# Patient Record
Sex: FEMALE | Race: WHITE | NOT HISPANIC OR LATINO | Employment: OTHER | URBAN - METROPOLITAN AREA
[De-identification: names, ages, dates, MRNs, and addresses within clinical notes are randomized per-mention and may not be internally consistent; named-entity substitution may affect disease eponyms.]

---

## 2017-01-12 ENCOUNTER — LAB (OUTPATIENT)
Dept: FAMILY MEDICINE CLINIC | Facility: CLINIC | Age: 82
End: 2017-01-12

## 2017-01-12 DIAGNOSIS — R79.89 ELEVATED SERUM CREATININE: ICD-10-CM

## 2017-01-12 LAB
ANION GAP SERPL CALCULATED.3IONS-SCNC: 14.5 MMOL/L
BUN BLD-MCNC: 15 MG/DL (ref 8–23)
BUN/CREAT SERPL: 14 (ref 7–25)
CALCIUM SPEC-SCNC: 9.8 MG/DL (ref 8.6–10.5)
CHLORIDE SERPL-SCNC: 100 MMOL/L (ref 98–107)
CO2 SERPL-SCNC: 25.5 MMOL/L (ref 22–29)
CREAT BLD-MCNC: 1.07 MG/DL (ref 0.57–1)
GFR SERPL CREATININE-BSD FRML MDRD: 49 ML/MIN/1.73
GLUCOSE BLD-MCNC: 91 MG/DL (ref 65–99)
POTASSIUM BLD-SCNC: 4.6 MMOL/L (ref 3.5–5.2)
SODIUM BLD-SCNC: 140 MMOL/L (ref 136–145)

## 2017-01-12 PROCEDURE — 80048 BASIC METABOLIC PNL TOTAL CA: CPT | Performed by: INTERNAL MEDICINE

## 2017-01-14 ENCOUNTER — LAB (OUTPATIENT)
Dept: FAMILY MEDICINE CLINIC | Facility: CLINIC | Age: 82
End: 2017-01-14

## 2017-01-14 DIAGNOSIS — R79.89 ELEVATED SERUM CREATININE: ICD-10-CM

## 2017-01-14 DIAGNOSIS — R80.9 PROTEIN IN URINE: ICD-10-CM

## 2017-01-15 DIAGNOSIS — R80.9 PROTEINURIA: Primary | ICD-10-CM

## 2017-01-15 LAB
CREAT 24H UR-MCNC: 28.6 MG/DL
CREAT 24H UR-MRATE: 858 MG/24 HR (ref 800–1800)
CREAT CL 24H UR+SERPL-VRATE: 60 ML/MIN (ref 88–128)
CREAT SERPL-MCNC: 0.99 MG/DL (ref 0.57–1)
PROT 24H UR-MRATE: 381 MG/24 HR (ref 30–150)
PROT UR-MCNC: 12.7 MG/DL

## 2017-01-23 ENCOUNTER — TELEPHONE (OUTPATIENT)
Dept: FAMILY MEDICINE CLINIC | Facility: CLINIC | Age: 82
End: 2017-01-23

## 2017-01-23 NOTE — TELEPHONE ENCOUNTER
RX SENT TO PHARM    ----- Message from Meena Guerrero sent at 1/23/2017  1:55 PM EST -----  Contact: PATIENT [phone]  REFILL ON glucose blood (FREESTYLE TEST STRIPS) test strip      [id number] - [place], [place] - [address] AT [facility] - [phone] [facility] [phone]  [phone] (Phone)

## 2017-02-02 ENCOUNTER — TELEPHONE (OUTPATIENT)
Dept: FAMILY MEDICINE CLINIC | Facility: CLINIC | Age: 82
End: 2017-02-02

## 2017-02-10 ENCOUNTER — TELEPHONE (OUTPATIENT)
Dept: FAMILY MEDICINE CLINIC | Facility: CLINIC | Age: 82
End: 2017-02-10

## 2017-02-10 NOTE — TELEPHONE ENCOUNTER
Pt informed of lab results    ----- Message from Claudia Timmons sent at 2/10/2017  3:27 PM EST -----  Contact: TELE: [phone]   SHE GOT A LETTER COLOR GOLD THAT IS THE CORRECT NUMBER YOU CAN LEAVE A MESSAGE ON HER ANSWER MACHINE.

## 2017-02-13 RX ORDER — AMLODIPINE BESYLATE 5 MG/1
TABLET ORAL
Qty: 180 TABLET | Refills: 0 | Status: SHIPPED | OUTPATIENT
Start: 2017-02-13 | End: 2017-05-13 | Stop reason: SDUPTHER

## 2017-03-14 RX ORDER — CYCLOBENZAPRINE HCL 5 MG
TABLET ORAL
Qty: 40 TABLET | Refills: 0 | OUTPATIENT
Start: 2017-03-14

## 2017-03-20 ENCOUNTER — TELEPHONE (OUTPATIENT)
Dept: FAMILY MEDICINE CLINIC | Facility: CLINIC | Age: 82
End: 2017-03-20

## 2017-03-20 RX ORDER — LEVOTHYROXINE SODIUM 0.07 MG/1
75 TABLET ORAL DAILY
Qty: 30 TABLET | Refills: 2 | Status: SHIPPED | OUTPATIENT
Start: 2017-03-20 | End: 2018-04-27 | Stop reason: SDUPTHER

## 2017-03-20 RX ORDER — CYCLOBENZAPRINE HCL 5 MG
TABLET ORAL
Qty: 40 TABLET | Refills: 0 | OUTPATIENT
Start: 2017-03-20

## 2017-03-20 NOTE — TELEPHONE ENCOUNTER
rx sent to pharm  Pt informed    ----- Message from Lavon Reyna sent at 3/20/2017  2:28 PM EDT -----  Contact: [phone]  ENT HAS BEEN GIVING HER SYNTHROID 75 MCG AND TOLD HER THE PRIMARY CARE CAN TAKE OVER MEDS SO CAN YOU REFIL THIS MED?    PHARMACY : [facility] [address] [facility]

## 2017-03-21 ENCOUNTER — TELEPHONE (OUTPATIENT)
Dept: FAMILY MEDICINE CLINIC | Facility: CLINIC | Age: 82
End: 2017-03-21

## 2017-03-21 NOTE — TELEPHONE ENCOUNTER
Gave pt appt with shira chan tomorrow    ----- Message from Malini Sanchez sent at 3/21/2017 11:12 AM EDT -----  Contact: -[phone]  PT HAS BLADDER INFECTION AND WANTS TO SEE SOMEONE TODAY OR AT LEAST COME IN TO LEAVE URINE. SHE IS MISERABLE. PLEASE CALL

## 2017-03-22 ENCOUNTER — OFFICE VISIT (OUTPATIENT)
Dept: FAMILY MEDICINE CLINIC | Facility: CLINIC | Age: 82
End: 2017-03-22

## 2017-03-22 VITALS
OXYGEN SATURATION: 99 % | HEIGHT: 61 IN | HEART RATE: 66 BPM | DIASTOLIC BLOOD PRESSURE: 56 MMHG | SYSTOLIC BLOOD PRESSURE: 114 MMHG | WEIGHT: 158.8 LBS | RESPIRATION RATE: 16 BRPM | TEMPERATURE: 97.6 F | BODY MASS INDEX: 29.98 KG/M2

## 2017-03-22 DIAGNOSIS — R30.0 DYSURIA: Primary | ICD-10-CM

## 2017-03-22 DIAGNOSIS — N30.01 ACUTE CYSTITIS WITH HEMATURIA: ICD-10-CM

## 2017-03-22 LAB
BACTERIA UR QL AUTO: ABNORMAL /HPF
BILIRUB UR QL STRIP: NEGATIVE
CLARITY UR: CLEAR
COLOR UR: YELLOW
GLUCOSE UR STRIP-MCNC: NEGATIVE MG/DL
HGB UR QL STRIP.AUTO: ABNORMAL
KETONES UR QL STRIP: NEGATIVE
LEUKOCYTE ESTERASE UR QL STRIP.AUTO: ABNORMAL
NITRITE UR QL STRIP: NEGATIVE
PH UR STRIP.AUTO: 6 [PH] (ref 4.6–8)
PROT UR QL STRIP: NEGATIVE
RBC # UR: ABNORMAL /HPF
REF LAB TEST METHOD: ABNORMAL
SP GR UR STRIP: 1.01 (ref 1–1.03)
SQUAMOUS #/AREA URNS HPF: ABNORMAL /HPF
UROBILINOGEN UR QL STRIP: ABNORMAL
WBC UR QL AUTO: ABNORMAL /HPF

## 2017-03-22 PROCEDURE — 87186 SC STD MICRODIL/AGAR DIL: CPT | Performed by: NURSE PRACTITIONER

## 2017-03-22 PROCEDURE — 81001 URINALYSIS AUTO W/SCOPE: CPT | Performed by: NURSE PRACTITIONER

## 2017-03-22 PROCEDURE — 87086 URINE CULTURE/COLONY COUNT: CPT | Performed by: NURSE PRACTITIONER

## 2017-03-22 PROCEDURE — 99213 OFFICE O/P EST LOW 20 MIN: CPT | Performed by: NURSE PRACTITIONER

## 2017-03-22 RX ORDER — AMOXICILLIN AND CLAVULANATE POTASSIUM 875; 125 MG/1; MG/1
1 TABLET, FILM COATED ORAL 2 TIMES DAILY
Qty: 14 TABLET | Refills: 0 | Status: SHIPPED | OUTPATIENT
Start: 2017-03-22 | End: 2017-03-24

## 2017-03-22 NOTE — PROGRESS NOTES
Subjective   Geeta Butt is a 81 y.o. female.     History of Present Illness   C.o dysuria, and cloudy urine, she has a hx of proteinuria, she saw nephrologist Dr. Benitez about 2 months for this, she was told to stop advil, and f/u in about 2 months for recheck. She states her current symptoms started a couple of days ago, she noticed urinary frequency initially, then pain with urination. She denies hematuria, She denies vag d/c and bleeding. She denies fever.     The following portions of the patient's history were reviewed and updated as appropriate: allergies, current medications, past family history, past medical history, past social history, past surgical history and problem list.    Review of Systems   Constitutional: Negative for chills, diaphoresis and fever.   Respiratory: Negative for shortness of breath.    Cardiovascular: Negative for chest pain.   Genitourinary: Positive for dysuria, frequency and pelvic pain. Negative for decreased urine volume, difficulty urinating, flank pain, hematuria, menstrual problem, urgency, vaginal bleeding, vaginal discharge and vaginal pain.   Musculoskeletal: Negative for arthralgias, back pain and myalgias.   Skin: Negative for pallor.   Neurological: Negative for dizziness, light-headedness and headaches.   All other systems reviewed and are negative.      Objective   Physical Exam   Constitutional: She is oriented to person, place, and time. She appears well-developed and well-nourished.   HENT:   Head: Normocephalic.   Eyes: Pupils are equal, round, and reactive to light.   Neck: Neck supple.   Cardiovascular: Normal rate, regular rhythm and normal heart sounds.    Pulmonary/Chest: Effort normal and breath sounds normal.   Abdominal: There is no CVA tenderness.   Musculoskeletal: Normal range of motion.   Neurological: She is alert and oriented to person, place, and time.   Skin: Skin is warm and dry.   Psychiatric: She has a normal mood and affect. Her behavior is  normal. Judgment and thought content normal.   Nursing note and vitals reviewed.      Assessment/Plan   Geeta was seen today for urinary tract infection.    Diagnoses and all orders for this visit:    Dysuria  -     Urinalysis With Microscopic  -     Urinalysis  -     Urinalysis, Microscopic Only        Allergy list noted.  Start augmentin 875-125mg 2x day for 7 days, take with food, previously tolerated.  Drink plenty of H2O, wipe front to back after urination.   Avoid bladder irritants- coffee, caffeine, carbonation.  Urine sent for culture, will call with results.  Increase fluid intake, get plenty of rest.   She will repeat the UA in 2-3 weeks, lab only if symptoms resolved.  Patient agrees with plan of care and understands instructions. Call if worsening symptoms or any problems or concerns.

## 2017-03-22 NOTE — PATIENT INSTRUCTIONS
Allergy list noted.  Start augmentin 875-125mg 2x day for 7 days, take with food, previously tolerated.  Drink plenty of H2O, wipe front to back after urination.   Avoid bladder irritants- coffee, caffeine, carbonation.  Urine sent for culture, will call with results.  Increase fluid intake, get plenty of rest.   She will repeat the UA in 2-3 weeks, lab only if symptoms resolved.  Patient agrees with plan of care and understands instructions. Call if worsening symptoms or any problems or concerns.

## 2017-03-25 LAB — BACTERIA SPEC AEROBE CULT: ABNORMAL

## 2017-03-27 RX ORDER — DOXYCYCLINE HYCLATE 100 MG
100 TABLET ORAL 2 TIMES DAILY
Qty: 14 TABLET | Refills: 0 | Status: SHIPPED | OUTPATIENT
Start: 2017-03-27 | End: 2017-10-16

## 2017-05-13 RX ORDER — AMLODIPINE BESYLATE 5 MG/1
TABLET ORAL
Qty: 180 TABLET | Refills: 0 | Status: SHIPPED | OUTPATIENT
Start: 2017-05-13 | End: 2017-08-11 | Stop reason: SDUPTHER

## 2017-05-31 RX ORDER — DEXLANSOPRAZOLE 60 MG/1
CAPSULE, DELAYED RELEASE ORAL
Qty: 90 CAPSULE | Refills: 1 | Status: SHIPPED | OUTPATIENT
Start: 2017-05-31 | End: 2017-10-16

## 2017-06-28 RX ORDER — FLUOXETINE 10 MG/1
CAPSULE ORAL
Qty: 135 CAPSULE | Refills: 1 | Status: SHIPPED | OUTPATIENT
Start: 2017-06-28 | End: 2017-12-13 | Stop reason: SDUPTHER

## 2017-08-11 RX ORDER — AMLODIPINE BESYLATE 5 MG/1
TABLET ORAL
Qty: 180 TABLET | Refills: 0 | Status: SHIPPED | OUTPATIENT
Start: 2017-08-11 | End: 2017-11-10 | Stop reason: SDUPTHER

## 2017-09-28 RX ORDER — ERGOCALCIFEROL 1.25 MG/1
CAPSULE ORAL
Qty: 12 CAPSULE | Refills: 0 | Status: SHIPPED | OUTPATIENT
Start: 2017-09-28 | End: 2017-10-16

## 2017-10-15 ENCOUNTER — HOSPITAL ENCOUNTER (EMERGENCY)
Facility: HOSPITAL | Age: 82
Discharge: HOME | End: 2017-10-15
Attending: EMERGENCY MEDICINE | Admitting: EMERGENCY MEDICINE

## 2017-10-15 VITALS
OXYGEN SATURATION: 96 % | TEMPERATURE: 97.8 F | RESPIRATION RATE: 16 BRPM | BODY MASS INDEX: 27.05 KG/M2 | SYSTOLIC BLOOD PRESSURE: 138 MMHG | HEIGHT: 62 IN | WEIGHT: 147 LBS | DIASTOLIC BLOOD PRESSURE: 71 MMHG | HEART RATE: 75 BPM

## 2017-10-15 DIAGNOSIS — K59.1 FUNCTIONAL DIARRHEA: Primary | ICD-10-CM

## 2017-10-15 LAB
ABO GROUP BLD: NORMAL
ALBUMIN SERPL-MCNC: 4.2 G/DL (ref 3.5–5.2)
ALBUMIN/GLOB SERPL: 1.2 G/DL
ALP SERPL-CCNC: 73 U/L (ref 39–117)
ALT SERPL W P-5'-P-CCNC: 16 U/L (ref 1–33)
ANION GAP SERPL CALCULATED.3IONS-SCNC: 16.1 MMOL/L
AST SERPL-CCNC: 18 U/L (ref 1–32)
BASOPHILS # BLD AUTO: 0.06 10*3/MM3 (ref 0–0.2)
BASOPHILS NFR BLD AUTO: 0.5 % (ref 0–1.5)
BILIRUB SERPL-MCNC: 0.3 MG/DL (ref 0.1–1.2)
BLD GP AB SCN SERPL QL: NEGATIVE
BUN BLD-MCNC: 14 MG/DL (ref 8–23)
BUN/CREAT SERPL: 12 (ref 7–25)
CALCIUM SPEC-SCNC: 9.4 MG/DL (ref 8.6–10.5)
CHLORIDE SERPL-SCNC: 102 MMOL/L (ref 98–107)
CO2 SERPL-SCNC: 22.9 MMOL/L (ref 22–29)
CREAT BLD-MCNC: 1.17 MG/DL (ref 0.57–1)
DEPRECATED RDW RBC AUTO: 44.7 FL (ref 37–54)
EOSINOPHIL # BLD AUTO: 0.1 10*3/MM3 (ref 0–0.7)
EOSINOPHIL NFR BLD AUTO: 0.8 % (ref 0.3–6.2)
ERYTHROCYTE [DISTWIDTH] IN BLOOD BY AUTOMATED COUNT: 13.9 % (ref 11.7–13)
GFR SERPL CREATININE-BSD FRML MDRD: 44 ML/MIN/1.73
GLOBULIN UR ELPH-MCNC: 3.6 GM/DL
GLUCOSE BLD-MCNC: 97 MG/DL (ref 65–99)
HCT VFR BLD AUTO: 38.4 % (ref 35.6–45.5)
HGB BLD-MCNC: 12.7 G/DL (ref 11.9–15.5)
HOLD SPECIMEN: NORMAL
HOLD SPECIMEN: NORMAL
IMM GRANULOCYTES # BLD: 0.04 10*3/MM3 (ref 0–0.03)
IMM GRANULOCYTES NFR BLD: 0.3 % (ref 0–0.5)
LYMPHOCYTES # BLD AUTO: 2.65 10*3/MM3 (ref 0.9–4.8)
LYMPHOCYTES NFR BLD AUTO: 21.1 % (ref 19.6–45.3)
MCH RBC QN AUTO: 28.9 PG (ref 26.9–32)
MCHC RBC AUTO-ENTMCNC: 33.1 G/DL (ref 32.4–36.3)
MCV RBC AUTO: 87.3 FL (ref 80.5–98.2)
MONOCYTES # BLD AUTO: 0.92 10*3/MM3 (ref 0.2–1.2)
MONOCYTES NFR BLD AUTO: 7.3 % (ref 5–12)
NEUTROPHILS # BLD AUTO: 8.8 10*3/MM3 (ref 1.9–8.1)
NEUTROPHILS NFR BLD AUTO: 70 % (ref 42.7–76)
PLATELET # BLD AUTO: 391 10*3/MM3 (ref 140–500)
PMV BLD AUTO: 10.3 FL (ref 6–12)
POTASSIUM BLD-SCNC: 4.7 MMOL/L (ref 3.5–5.2)
PROT SERPL-MCNC: 7.8 G/DL (ref 6–8.5)
RBC # BLD AUTO: 4.4 10*6/MM3 (ref 3.9–5.2)
RH BLD: POSITIVE
SODIUM BLD-SCNC: 141 MMOL/L (ref 136–145)
WBC NRBC COR # BLD: 12.57 10*3/MM3 (ref 4.5–10.7)
WHOLE BLOOD HOLD SPECIMEN: NORMAL
WHOLE BLOOD HOLD SPECIMEN: NORMAL

## 2017-10-15 RX ORDER — SODIUM CHLORIDE 0.9 % (FLUSH) 0.9 %
10 SYRINGE (ML) INJECTION AS NEEDED
Status: DISCONTINUED | OUTPATIENT
Start: 2017-10-15 | End: 2017-10-15 | Stop reason: HOSPADM

## 2017-10-16 ENCOUNTER — HOSPITAL ENCOUNTER (INPATIENT)
Facility: HOSPITAL | Age: 82
LOS: 4 days | Discharge: HOME | End: 2017-10-20
Attending: HOSPITALIST | Admitting: HOSPITALIST

## 2017-10-16 ENCOUNTER — OFFICE VISIT (OUTPATIENT)
Dept: FAMILY MEDICINE CLINIC | Facility: CLINIC | Age: 82
End: 2017-10-16

## 2017-10-16 ENCOUNTER — APPOINTMENT (OUTPATIENT)
Dept: CT IMAGING | Facility: HOSPITAL | Age: 82
End: 2017-10-16
Attending: HOSPITALIST

## 2017-10-16 VITALS
BODY MASS INDEX: 27.82 KG/M2 | WEIGHT: 151.2 LBS | HEIGHT: 62 IN | SYSTOLIC BLOOD PRESSURE: 138 MMHG | DIASTOLIC BLOOD PRESSURE: 60 MMHG | TEMPERATURE: 97.8 F | HEART RATE: 77 BPM | OXYGEN SATURATION: 97 %

## 2017-10-16 DIAGNOSIS — R11.2 NAUSEA AND VOMITING, INTRACTABILITY OF VOMITING NOT SPECIFIED, UNSPECIFIED VOMITING TYPE: ICD-10-CM

## 2017-10-16 DIAGNOSIS — K52.9 GASTROENTERITIS: ICD-10-CM

## 2017-10-16 DIAGNOSIS — R10.10 PAIN OF UPPER ABDOMEN: Primary | ICD-10-CM

## 2017-10-16 DIAGNOSIS — R19.5 DARK STOOLS: ICD-10-CM

## 2017-10-16 DIAGNOSIS — K92.1 BLACK STOOL: Primary | ICD-10-CM

## 2017-10-16 PROBLEM — E03.9 HYPOTHYROIDISM: Status: ACTIVE | Noted: 2017-10-16

## 2017-10-16 PROBLEM — E11.9 DIABETES MELLITUS (HCC): Status: ACTIVE | Noted: 2017-10-16

## 2017-10-16 PROBLEM — R19.7 DIARRHEA: Status: ACTIVE | Noted: 2017-10-16

## 2017-10-16 PROBLEM — D72.829 LEUKOCYTOSIS: Status: ACTIVE | Noted: 2017-10-16

## 2017-10-16 PROBLEM — R10.11 RUQ PAIN: Status: ACTIVE | Noted: 2017-10-16

## 2017-10-16 PROBLEM — I10 HYPERTENSION: Status: ACTIVE | Noted: 2017-10-16

## 2017-10-16 LAB
BACTERIA UR QL AUTO: ABNORMAL /HPF
BILIRUB UR QL STRIP: NEGATIVE
CLARITY UR: ABNORMAL
COLOR UR: ABNORMAL
DEVELOPER EXPIRATION DATE: 819
DEVELOPER LOT NUMBER: ABNORMAL
ERYTHROCYTE [DISTWIDTH] IN BLOOD BY AUTOMATED COUNT: 13.1 % (ref 4.5–15)
EXPIRATION DATE: 919
FECAL OCCULT BLOOD SCREEN, POC: POSITIVE
GLUCOSE BLDC GLUCOMTR-MCNC: 154 MG/DL (ref 70–130)
GLUCOSE UR STRIP-MCNC: NEGATIVE MG/DL
HBA1C MFR BLD: 5.6 % (ref 4.8–5.6)
HCT VFR BLD AUTO: 38.3 % (ref 35.6–45.5)
HCT VFR BLD AUTO: 38.9 % (ref 31–42)
HGB BLD-MCNC: 12.7 G/DL (ref 11.9–15.5)
HGB BLD-MCNC: 13.1 G/DL (ref 12–18)
HGB UR QL STRIP.AUTO: NEGATIVE
HYALINE CASTS UR QL AUTO: ABNORMAL /LPF
KETONES UR QL STRIP: NEGATIVE
LEUKOCYTE ESTERASE UR QL STRIP.AUTO: ABNORMAL
LYMPHOCYTES # BLD AUTO: 1.6 10*3/MM3 (ref 1.2–3.4)
LYMPHOCYTES NFR BLD AUTO: 9.6 % (ref 21–51)
Lab: ABNORMAL
MCH RBC QN AUTO: 29.1 PG (ref 26.1–33.1)
MCHC RBC AUTO-ENTMCNC: 33.6 G/DL (ref 33–37)
MCV RBC AUTO: 86.7 FL (ref 80–99)
MONOCYTES # BLD AUTO: 0.5 10*3/MM3 (ref 0.1–0.6)
MONOCYTES NFR BLD AUTO: 3.1 % (ref 2–9)
NEGATIVE CONTROL: POSITIVE
NEUTROPHILS # BLD AUTO: 14.3 10*3/MM3 (ref 1.4–6.5)
NEUTROPHILS NFR BLD AUTO: 87.3 % (ref 42–75)
NITRITE UR QL STRIP: POSITIVE
PH UR STRIP.AUTO: 5.5 [PH] (ref 5–8)
PLATELET # BLD AUTO: 363 10*3/MM3 (ref 150–450)
PMV BLD AUTO: 7.7 FL (ref 7.1–10.5)
POSITIVE CONTROL: POSITIVE
PROT UR QL STRIP: ABNORMAL
RBC # BLD AUTO: 4.48 10*6/MM3 (ref 4–6)
RBC # UR: ABNORMAL /HPF
REF LAB TEST METHOD: ABNORMAL
SP GR UR STRIP: 1.02 (ref 1–1.03)
SQUAMOUS #/AREA URNS HPF: ABNORMAL /HPF
UROBILINOGEN UR QL STRIP: ABNORMAL
WBC NRBC COR # BLD: 16.4 10*3/MM3 (ref 4.5–10)
WBC UR QL AUTO: ABNORMAL /HPF

## 2017-10-16 PROCEDURE — 74176 CT ABD & PELVIS W/O CONTRAST: CPT

## 2017-10-16 PROCEDURE — 85025 COMPLETE CBC W/AUTO DIFF WBC: CPT | Performed by: INTERNAL MEDICINE

## 2017-10-16 PROCEDURE — 99213 OFFICE O/P EST LOW 20 MIN: CPT | Performed by: INTERNAL MEDICINE

## 2017-10-16 PROCEDURE — 87186 SC STD MICRODIL/AGAR DIL: CPT | Performed by: HOSPITALIST

## 2017-10-16 PROCEDURE — 81001 URINALYSIS AUTO W/SCOPE: CPT | Performed by: HOSPITALIST

## 2017-10-16 PROCEDURE — 85018 HEMOGLOBIN: CPT | Performed by: HOSPITALIST

## 2017-10-16 PROCEDURE — G0378 HOSPITAL OBSERVATION PER HR: HCPCS

## 2017-10-16 PROCEDURE — 82962 GLUCOSE BLOOD TEST: CPT

## 2017-10-16 PROCEDURE — 82274 ASSAY TEST FOR BLOOD FECAL: CPT | Performed by: INTERNAL MEDICINE

## 2017-10-16 PROCEDURE — 83036 HEMOGLOBIN GLYCOSYLATED A1C: CPT | Performed by: HOSPITALIST

## 2017-10-16 PROCEDURE — 25010000002 MORPHINE PER 10 MG: Performed by: HOSPITALIST

## 2017-10-16 PROCEDURE — 85014 HEMATOCRIT: CPT | Performed by: HOSPITALIST

## 2017-10-16 PROCEDURE — 25010000002 ONDANSETRON PER 1 MG: Performed by: HOSPITALIST

## 2017-10-16 PROCEDURE — 87086 URINE CULTURE/COLONY COUNT: CPT | Performed by: HOSPITALIST

## 2017-10-16 RX ORDER — DEXTROSE MONOHYDRATE 25 G/50ML
25 INJECTION, SOLUTION INTRAVENOUS
Status: DISCONTINUED | OUTPATIENT
Start: 2017-10-16 | End: 2017-10-20 | Stop reason: HOSPADM

## 2017-10-16 RX ORDER — ONDANSETRON 4 MG/1
4 TABLET, ORALLY DISINTEGRATING ORAL EVERY 6 HOURS PRN
Status: DISCONTINUED | OUTPATIENT
Start: 2017-10-16 | End: 2017-10-20 | Stop reason: HOSPADM

## 2017-10-16 RX ORDER — SODIUM CHLORIDE 5 %
1 OINTMENT (GRAM) OPHTHALMIC (EYE) AS NEEDED
COMMUNITY
End: 2018-03-14

## 2017-10-16 RX ORDER — CETIRIZINE HYDROCHLORIDE 10 MG/1
5 TABLET ORAL DAILY
Status: DISCONTINUED | OUTPATIENT
Start: 2017-10-16 | End: 2017-10-20 | Stop reason: HOSPADM

## 2017-10-16 RX ORDER — SODIUM CHLORIDE 9 MG/ML
60 INJECTION, SOLUTION INTRAVENOUS CONTINUOUS
Status: DISCONTINUED | OUTPATIENT
Start: 2017-10-16 | End: 2017-10-18

## 2017-10-16 RX ORDER — NICOTINE POLACRILEX 4 MG
15 LOZENGE BUCCAL
Status: DISCONTINUED | OUTPATIENT
Start: 2017-10-16 | End: 2017-10-20 | Stop reason: HOSPADM

## 2017-10-16 RX ORDER — AMLODIPINE BESYLATE 5 MG/1
5 TABLET ORAL
Status: DISCONTINUED | OUTPATIENT
Start: 2017-10-16 | End: 2017-10-20 | Stop reason: HOSPADM

## 2017-10-16 RX ORDER — LEVOTHYROXINE SODIUM 0.07 MG/1
75 TABLET ORAL DAILY
Status: DISCONTINUED | OUTPATIENT
Start: 2017-10-16 | End: 2017-10-20 | Stop reason: HOSPADM

## 2017-10-16 RX ORDER — FLUOXETINE 10 MG/1
10 CAPSULE ORAL DAILY
Status: DISCONTINUED | OUTPATIENT
Start: 2017-10-16 | End: 2017-10-20 | Stop reason: HOSPADM

## 2017-10-16 RX ORDER — ONDANSETRON 2 MG/ML
4 INJECTION INTRAMUSCULAR; INTRAVENOUS EVERY 6 HOURS PRN
Status: DISCONTINUED | OUTPATIENT
Start: 2017-10-16 | End: 2017-10-20 | Stop reason: HOSPADM

## 2017-10-16 RX ORDER — KETOTIFEN FUMARATE 0.35 MG/ML
1 SOLUTION/ DROPS OPHTHALMIC 2 TIMES DAILY
COMMUNITY
End: 2018-04-05

## 2017-10-16 RX ORDER — FEXOFENADINE HYDROCHLORIDE 60 MG/1
60 TABLET, FILM COATED ORAL DAILY
Status: DISCONTINUED | OUTPATIENT
Start: 2017-10-16 | End: 2017-10-16 | Stop reason: CLARIF

## 2017-10-16 RX ORDER — FLUOROMETHOLONE 0.1 %
1 SUSPENSION, DROPS(FINAL DOSAGE FORM)(ML) OPHTHALMIC (EYE) DAILY
Status: DISCONTINUED | OUTPATIENT
Start: 2017-10-16 | End: 2017-10-17

## 2017-10-16 RX ORDER — SODIUM CHLORIDE 0.9 % (FLUSH) 0.9 %
1-10 SYRINGE (ML) INJECTION AS NEEDED
Status: DISCONTINUED | OUTPATIENT
Start: 2017-10-16 | End: 2017-10-20 | Stop reason: HOSPADM

## 2017-10-16 RX ORDER — MORPHINE SULFATE 2 MG/ML
1 INJECTION, SOLUTION INTRAMUSCULAR; INTRAVENOUS
Status: DISCONTINUED | OUTPATIENT
Start: 2017-10-16 | End: 2017-10-20 | Stop reason: HOSPADM

## 2017-10-16 RX ADMIN — ONDANSETRON 4 MG: 2 INJECTION INTRAMUSCULAR; INTRAVENOUS at 14:11

## 2017-10-16 RX ADMIN — SODIUM CHLORIDE 60 ML/HR: 9 INJECTION, SOLUTION INTRAVENOUS at 14:12

## 2017-10-16 RX ADMIN — ONDANSETRON 4 MG: 2 INJECTION INTRAMUSCULAR; INTRAVENOUS at 23:31

## 2017-10-16 RX ADMIN — FLUOXETINE HYDROCHLORIDE 10 MG: 10 CAPSULE ORAL at 16:48

## 2017-10-16 RX ADMIN — MORPHINE SULFATE 1 MG: 2 INJECTION, SOLUTION INTRAMUSCULAR; INTRAVENOUS at 16:52

## 2017-10-16 RX ADMIN — AMLODIPINE BESYLATE 5 MG: 5 TABLET ORAL at 16:45

## 2017-10-16 RX ADMIN — MORPHINE SULFATE 1 MG: 2 INJECTION, SOLUTION INTRAMUSCULAR; INTRAVENOUS at 23:31

## 2017-10-16 NOTE — PROGRESS NOTES
Subjective   Geeta Butt is a 82 y.o. female.   Diarrhea then vomiting also fri some mild recurrent discomfort right upper quadrant black stools began Saturday  History of Present Illness   Stools are black in color no gross blood mucus patient did have a little Pepto-Bismol she thinks Saturday no personal history for ulcer no increase NSAID use seen ER without particular findings and hemoglobin.  Normal at that time    Review of Systems   Gastrointestinal: Positive for abdominal pain, diarrhea and nausea.   All other systems reviewed and are negative.      Objective   Vitals:    10/16/17 0758   BP: 140/66   Pulse: 90   Temp: 97.8 °F (36.6 °C)   SpO2: 97%   Weight: 151 lb 3.2 oz (68.6 kg)     Physical Exam   Constitutional: She appears well-developed and well-nourished.   HENT:   Head: Normocephalic and atraumatic.   Eyes: Conjunctivae are normal. Pupils are equal, round, and reactive to light.   Cardiovascular: Normal rate, regular rhythm and normal heart sounds.    Pulmonary/Chest: Effort normal and breath sounds normal.   Abdominal: Soft. Bowel sounds are normal.   Mild discomfort present right upper quadrant, left upper quadrant to lesser degree in epigastrium slightly increased bowel sounds which would go along with her diarrhea type complaints.  Prior cholecystectomy   Neurological: She is alert.   Steady gait unremarkable   Skin: Skin is warm and dry.       No results found for: INR    Procedures  Orthostatic vital signs  Assessment/Plan   Upper GI bleed plan to ER now nothing by mouth orthostatics did fairly well CBC shows mild leukocytosis but hemoglobin remained stable.    Much of this encounter note is an electronic transcription/translation of spoken language to printed text.  The electronic translation of spoken language may permit erroneous, or at times, nonsensical words or phrases to be inadvertently transcribed.  Although I have reviewed the note for such errors, some may still exist.

## 2017-10-17 ENCOUNTER — INPATIENT HOSPITAL (OUTPATIENT)
Dept: URBAN - METROPOLITAN AREA HOSPITAL 113 | Facility: HOSPITAL | Age: 82
End: 2017-10-17
Payer: MEDICARE

## 2017-10-17 ENCOUNTER — ANESTHESIA (OUTPATIENT)
Dept: GASTROENTEROLOGY | Facility: HOSPITAL | Age: 82
End: 2017-10-17

## 2017-10-17 ENCOUNTER — ANESTHESIA EVENT (OUTPATIENT)
Dept: GASTROENTEROLOGY | Facility: HOSPITAL | Age: 82
End: 2017-10-17

## 2017-10-17 DIAGNOSIS — K29.50 UNSPECIFIED CHRONIC GASTRITIS WITHOUT BLEEDING: ICD-10-CM

## 2017-10-17 DIAGNOSIS — K29.80 DUODENITIS WITHOUT BLEEDING: ICD-10-CM

## 2017-10-17 DIAGNOSIS — R93.3 ABNORMAL FINDINGS ON DIAGNOSTIC IMAGING OF OTHER PARTS OF DI: ICD-10-CM

## 2017-10-17 DIAGNOSIS — R11.2 NAUSEA WITH VOMITING, UNSPECIFIED: ICD-10-CM

## 2017-10-17 PROBLEM — N39.0 UTI (URINARY TRACT INFECTION): Status: ACTIVE | Noted: 2017-10-17

## 2017-10-17 LAB
AMYLASE SERPL-CCNC: 84 U/L (ref 28–100)
ANION GAP SERPL CALCULATED.3IONS-SCNC: 11.9 MMOL/L
BUN BLD-MCNC: 18 MG/DL (ref 8–23)
BUN/CREAT SERPL: 18.4 (ref 7–25)
CALCIUM SPEC-SCNC: 8.7 MG/DL (ref 8.6–10.5)
CHLORIDE SERPL-SCNC: 102 MMOL/L (ref 98–107)
CO2 SERPL-SCNC: 23.1 MMOL/L (ref 22–29)
CREAT BLD-MCNC: 0.98 MG/DL (ref 0.57–1)
DEPRECATED RDW RBC AUTO: 45.1 FL (ref 37–54)
ERYTHROCYTE [DISTWIDTH] IN BLOOD BY AUTOMATED COUNT: 13.8 % (ref 11.7–13)
GFR SERPL CREATININE-BSD FRML MDRD: 54 ML/MIN/1.73
GLUCOSE BLD-MCNC: 124 MG/DL (ref 65–99)
GLUCOSE BLDC GLUCOMTR-MCNC: 103 MG/DL (ref 70–130)
GLUCOSE BLDC GLUCOMTR-MCNC: 105 MG/DL (ref 70–130)
GLUCOSE BLDC GLUCOMTR-MCNC: 114 MG/DL (ref 70–130)
GLUCOSE BLDC GLUCOMTR-MCNC: 114 MG/DL (ref 70–130)
GLUCOSE BLDC GLUCOMTR-MCNC: 120 MG/DL (ref 70–130)
HCT VFR BLD AUTO: 36.1 % (ref 35.6–45.5)
HCT VFR BLD AUTO: 37.7 % (ref 35.6–45.5)
HGB BLD-MCNC: 11.7 G/DL (ref 11.9–15.5)
HGB BLD-MCNC: 12 G/DL (ref 11.9–15.5)
LIPASE SERPL-CCNC: 120 U/L (ref 13–60)
MCH RBC QN AUTO: 28.6 PG (ref 26.9–32)
MCHC RBC AUTO-ENTMCNC: 31.8 G/DL (ref 32.4–36.3)
MCV RBC AUTO: 89.8 FL (ref 80.5–98.2)
PLATELET # BLD AUTO: 326 10*3/MM3 (ref 140–500)
PMV BLD AUTO: 10.3 FL (ref 6–12)
POTASSIUM BLD-SCNC: 3.9 MMOL/L (ref 3.5–5.2)
RBC # BLD AUTO: 4.2 10*6/MM3 (ref 3.9–5.2)
SODIUM BLD-SCNC: 137 MMOL/L (ref 136–145)
WBC NRBC COR # BLD: 12.74 10*3/MM3 (ref 4.5–10.7)

## 2017-10-17 PROCEDURE — 25010000002 PIPERACILLIN SOD-TAZOBACTAM PER 1 G: Performed by: HOSPITALIST

## 2017-10-17 PROCEDURE — 88312 SPECIAL STAINS GROUP 1: CPT | Performed by: INTERNAL MEDICINE

## 2017-10-17 PROCEDURE — 85027 COMPLETE CBC AUTOMATED: CPT | Performed by: HOSPITALIST

## 2017-10-17 PROCEDURE — G0378 HOSPITAL OBSERVATION PER HR: HCPCS

## 2017-10-17 PROCEDURE — 80048 BASIC METABOLIC PNL TOTAL CA: CPT | Performed by: HOSPITALIST

## 2017-10-17 PROCEDURE — 25010000002 ONDANSETRON PER 1 MG: Performed by: HOSPITALIST

## 2017-10-17 PROCEDURE — 0DD98ZX EXTRACTION OF DUODENUM, VIA NATURAL OR ARTIFICIAL OPENING ENDOSCOPIC, DIAGNOSTIC: ICD-10-PCS | Performed by: INTERNAL MEDICINE

## 2017-10-17 PROCEDURE — 85018 HEMOGLOBIN: CPT | Performed by: HOSPITALIST

## 2017-10-17 PROCEDURE — 85014 HEMATOCRIT: CPT | Performed by: HOSPITALIST

## 2017-10-17 PROCEDURE — 25010000002 PROMETHAZINE PER 50 MG: Performed by: INTERNAL MEDICINE

## 2017-10-17 PROCEDURE — 43239 EGD BIOPSY SINGLE/MULTIPLE: CPT | Performed by: INTERNAL MEDICINE

## 2017-10-17 PROCEDURE — 82150 ASSAY OF AMYLASE: CPT | Performed by: INTERNAL MEDICINE

## 2017-10-17 PROCEDURE — 83690 ASSAY OF LIPASE: CPT | Performed by: INTERNAL MEDICINE

## 2017-10-17 PROCEDURE — 82962 GLUCOSE BLOOD TEST: CPT

## 2017-10-17 PROCEDURE — 25010000002 MORPHINE PER 10 MG: Performed by: HOSPITALIST

## 2017-10-17 PROCEDURE — 25010000002 PROPOFOL 10 MG/ML EMULSION: Performed by: ANESTHESIOLOGY

## 2017-10-17 PROCEDURE — 88305 TISSUE EXAM BY PATHOLOGIST: CPT | Performed by: INTERNAL MEDICINE

## 2017-10-17 RX ORDER — PROPOFOL 10 MG/ML
VIAL (ML) INTRAVENOUS AS NEEDED
Status: DISCONTINUED | OUTPATIENT
Start: 2017-10-17 | End: 2017-10-17 | Stop reason: SURG

## 2017-10-17 RX ORDER — LIDOCAINE HYDROCHLORIDE 20 MG/ML
INJECTION, SOLUTION INFILTRATION; PERINEURAL AS NEEDED
Status: DISCONTINUED | OUTPATIENT
Start: 2017-10-17 | End: 2017-10-17 | Stop reason: SURG

## 2017-10-17 RX ORDER — SUCRALFATE ORAL 1 G/10ML
1 SUSPENSION ORAL EVERY 6 HOURS SCHEDULED
Status: DISCONTINUED | OUTPATIENT
Start: 2017-10-17 | End: 2017-10-20 | Stop reason: HOSPADM

## 2017-10-17 RX ORDER — FLUOROMETHOLONE 0.1 %
1 SUSPENSION, DROPS(FINAL DOSAGE FORM)(ML) OPHTHALMIC (EYE) NIGHTLY
Status: DISCONTINUED | OUTPATIENT
Start: 2017-10-17 | End: 2017-10-20 | Stop reason: HOSPADM

## 2017-10-17 RX ORDER — CEFTRIAXONE SODIUM 1 G/50ML
1 INJECTION, SOLUTION INTRAVENOUS EVERY 24 HOURS
Status: DISCONTINUED | OUTPATIENT
Start: 2017-10-17 | End: 2017-10-17

## 2017-10-17 RX ORDER — PANTOPRAZOLE SODIUM 40 MG/10ML
40 INJECTION, POWDER, LYOPHILIZED, FOR SOLUTION INTRAVENOUS
Status: DISCONTINUED | OUTPATIENT
Start: 2017-10-17 | End: 2017-10-18

## 2017-10-17 RX ORDER — SODIUM CHLORIDE, SODIUM LACTATE, POTASSIUM CHLORIDE, CALCIUM CHLORIDE 600; 310; 30; 20 MG/100ML; MG/100ML; MG/100ML; MG/100ML
20 INJECTION, SOLUTION INTRAVENOUS CONTINUOUS
Status: DISCONTINUED | OUTPATIENT
Start: 2017-10-17 | End: 2017-10-20 | Stop reason: HOSPADM

## 2017-10-17 RX ORDER — PROPOFOL 10 MG/ML
VIAL (ML) INTRAVENOUS CONTINUOUS PRN
Status: DISCONTINUED | OUTPATIENT
Start: 2017-10-17 | End: 2017-10-17 | Stop reason: SURG

## 2017-10-17 RX ORDER — PROMETHAZINE HYDROCHLORIDE 25 MG/ML
12.5 INJECTION, SOLUTION INTRAMUSCULAR; INTRAVENOUS ONCE
Status: COMPLETED | OUTPATIENT
Start: 2017-10-17 | End: 2017-10-17

## 2017-10-17 RX ADMIN — MORPHINE SULFATE 1 MG: 2 INJECTION, SOLUTION INTRAMUSCULAR; INTRAVENOUS at 13:56

## 2017-10-17 RX ADMIN — TAZOBACTAM SODIUM AND PIPERACILLIN SODIUM 3.38 G: 375; 3 INJECTION, SOLUTION INTRAVENOUS at 09:37

## 2017-10-17 RX ADMIN — PROPOFOL 100 MG: 10 INJECTION, EMULSION INTRAVENOUS at 12:07

## 2017-10-17 RX ADMIN — SUCRALFATE 1 G: 1 SUSPENSION ORAL at 15:02

## 2017-10-17 RX ADMIN — MORPHINE SULFATE 1 MG: 2 INJECTION, SOLUTION INTRAMUSCULAR; INTRAVENOUS at 06:41

## 2017-10-17 RX ADMIN — PANTOPRAZOLE SODIUM 40 MG: 40 INJECTION, POWDER, FOR SOLUTION INTRAVENOUS at 16:40

## 2017-10-17 RX ADMIN — FLUOROMETHOLONE 1 DROP: 1 SOLUTION/ DROPS OPHTHALMIC at 20:39

## 2017-10-17 RX ADMIN — MORPHINE SULFATE 1 MG: 2 INJECTION, SOLUTION INTRAMUSCULAR; INTRAVENOUS at 22:53

## 2017-10-17 RX ADMIN — MORPHINE SULFATE 1 MG: 2 INJECTION, SOLUTION INTRAMUSCULAR; INTRAVENOUS at 09:26

## 2017-10-17 RX ADMIN — PROMETHAZINE HYDROCHLORIDE 12.5 MG: 25 INJECTION INTRAMUSCULAR; INTRAVENOUS at 04:55

## 2017-10-17 RX ADMIN — LIDOCAINE HYDROCHLORIDE 60 MG: 20 INJECTION, SOLUTION INFILTRATION; PERINEURAL at 12:07

## 2017-10-17 RX ADMIN — FLUOROMETHOLONE 1 DROP: 1 SOLUTION/ DROPS OPHTHALMIC at 09:14

## 2017-10-17 RX ADMIN — PROPOFOL 150 MCG/KG/MIN: 10 INJECTION, EMULSION INTRAVENOUS at 12:07

## 2017-10-17 RX ADMIN — CETIRIZINE HYDROCHLORIDE 5 MG: 10 TABLET, FILM COATED ORAL at 09:15

## 2017-10-17 RX ADMIN — AMLODIPINE BESYLATE 5 MG: 5 TABLET ORAL at 09:14

## 2017-10-17 RX ADMIN — FLUOXETINE HYDROCHLORIDE 10 MG: 10 CAPSULE ORAL at 09:14

## 2017-10-17 RX ADMIN — LEVOTHYROXINE SODIUM 75 MCG: 75 TABLET ORAL at 09:14

## 2017-10-17 RX ADMIN — ONDANSETRON 4 MG: 2 INJECTION INTRAMUSCULAR; INTRAVENOUS at 16:41

## 2017-10-17 RX ADMIN — TAZOBACTAM SODIUM AND PIPERACILLIN SODIUM 3.38 G: 375; 3 INJECTION, SOLUTION INTRAVENOUS at 16:40

## 2017-10-17 RX ADMIN — SODIUM CHLORIDE, POTASSIUM CHLORIDE, SODIUM LACTATE AND CALCIUM CHLORIDE 20 ML/HR: 600; 310; 30; 20 INJECTION, SOLUTION INTRAVENOUS at 11:57

## 2017-10-17 RX ADMIN — MORPHINE SULFATE 1 MG: 2 INJECTION, SOLUTION INTRAMUSCULAR; INTRAVENOUS at 01:37

## 2017-10-17 RX ADMIN — ONDANSETRON 4 MG: 2 INJECTION INTRAMUSCULAR; INTRAVENOUS at 22:53

## 2017-10-17 RX ADMIN — SUCRALFATE 1 G: 1 SUSPENSION ORAL at 20:39

## 2017-10-17 RX ADMIN — ONDANSETRON 4 MG: 2 INJECTION INTRAMUSCULAR; INTRAVENOUS at 06:42

## 2017-10-17 NOTE — ANESTHESIA POSTPROCEDURE EVALUATION
"Patient: Geeta Butt    Procedure Summary     Date Anesthesia Start Anesthesia Stop Room / Location    10/17/17 1201 1220  [facility]       Procedure Diagnosis Surgeon Provider    ESOPHAGOGASTRODUODENOSCOPY WITH COLD BIOPSIES (N/A Esophagus) Black stool; Nausea and vomiting, intractability of vomiting not specified, unspecified vomiting type  (Black stool [K92.1]; Nausea and vomiting, intractability of vomiting not specified, unspecified vomiting type [R11.2]) MD Chelsy Prabhakar MD          Anesthesia Type: MAC  Last vitals  BP   168/70 (10/17/17 1304)   Temp   37 °C (98.6 °F) (10/17/17 1304)   Pulse   80 (10/17/17 1304)   Resp   15 (10/17/17 1304)     SpO2   96 % (10/17/17 1304)     Post Anesthesia Care and Evaluation    Patient location during evaluation: PACU  Patient participation: complete - patient participated  Level of consciousness: awake and alert  Pain management: adequate  Airway patency: patent  Anesthetic complications: No anesthetic complications    Cardiovascular status: acceptable  Respiratory status: acceptable  Hydration status: acceptable    Comments: /70 (BP Location: Right arm, Patient Position: Lying)  Pulse 80  Temp 37 °C (98.6 °F) (Oral)   Resp 15  Ht 61\" (154.9 cm)  Wt 137 lb 6.4 oz (62.3 kg)  SpO2 96%  BMI 25.96 kg/m2      "

## 2017-10-17 NOTE — ANESTHESIA PREPROCEDURE EVALUATION
Anesthesia Evaluation     Patient summary reviewed and Nursing notes reviewed   history of anesthetic complications: PONV  NPO Solid Status: > 8 hours  NPO Liquid Status: > 2 hours     Airway   Mallampati: II  no difficulty expected  Dental - normal exam   (+) upper dentures    Pulmonary - normal exam   Cardiovascular - normal exam    (+) hypertension,       Neuro/Psych  (+) CVA,    GI/Hepatic/Renal/Endo    (+)  diabetes mellitus, hypothyroidism,     Musculoskeletal     Abdominal    Substance History      OB/GYN          Other                                    Anesthesia Plan    ASA 3     MAC     Anesthetic plan and risks discussed with patient.

## 2017-10-18 ENCOUNTER — INPATIENT HOSPITAL (OUTPATIENT)
Dept: URBAN - METROPOLITAN AREA HOSPITAL 113 | Facility: HOSPITAL | Age: 82
End: 2017-10-18
Payer: MEDICARE

## 2017-10-18 DIAGNOSIS — R11.2 NAUSEA WITH VOMITING, UNSPECIFIED: ICD-10-CM

## 2017-10-18 DIAGNOSIS — R93.3 ABNORMAL FINDINGS ON DIAGNOSTIC IMAGING OF OTHER PARTS OF DI: ICD-10-CM

## 2017-10-18 LAB
BACTERIA SPEC AEROBE CULT: ABNORMAL
BACTERIA SPEC AEROBE CULT: ABNORMAL
CYTO UR: NORMAL
GLUCOSE BLDC GLUCOMTR-MCNC: 100 MG/DL (ref 70–130)
GLUCOSE BLDC GLUCOMTR-MCNC: 101 MG/DL (ref 70–130)
GLUCOSE BLDC GLUCOMTR-MCNC: 107 MG/DL (ref 70–130)
GLUCOSE BLDC GLUCOMTR-MCNC: 107 MG/DL (ref 70–130)
LAB AP CASE REPORT: NORMAL
Lab: NORMAL
PATH REPORT.FINAL DX SPEC: NORMAL
PATH REPORT.GROSS SPEC: NORMAL

## 2017-10-18 PROCEDURE — 25010000002 PIPERACILLIN SOD-TAZOBACTAM PER 1 G: Performed by: HOSPITALIST

## 2017-10-18 PROCEDURE — 82962 GLUCOSE BLOOD TEST: CPT

## 2017-10-18 PROCEDURE — G0378 HOSPITAL OBSERVATION PER HR: HCPCS

## 2017-10-18 PROCEDURE — 25010000002 MORPHINE SULFATE (PF) 2 MG/ML SOLUTION: Performed by: HOSPITALIST

## 2017-10-18 PROCEDURE — 82941 ASSAY OF GASTRIN: CPT | Performed by: INTERNAL MEDICINE

## 2017-10-18 PROCEDURE — 25010000002 ONDANSETRON PER 1 MG: Performed by: HOSPITALIST

## 2017-10-18 PROCEDURE — 99231 SBSQ HOSP IP/OBS SF/LOW 25: CPT

## 2017-10-18 PROCEDURE — 25010000002 MORPHINE PER 10 MG: Performed by: HOSPITALIST

## 2017-10-18 RX ORDER — ACETAMINOPHEN 325 MG/1
650 TABLET ORAL EVERY 6 HOURS PRN
Status: DISCONTINUED | OUTPATIENT
Start: 2017-10-18 | End: 2017-10-20 | Stop reason: HOSPADM

## 2017-10-18 RX ORDER — PANTOPRAZOLE SODIUM 40 MG/1
40 TABLET, DELAYED RELEASE ORAL
Status: DISCONTINUED | OUTPATIENT
Start: 2017-10-18 | End: 2017-10-20 | Stop reason: HOSPADM

## 2017-10-18 RX ADMIN — MORPHINE SULFATE 1 MG: 2 INJECTION, SOLUTION INTRAMUSCULAR; INTRAVENOUS at 13:48

## 2017-10-18 RX ADMIN — SUCRALFATE 1 G: 1 SUSPENSION ORAL at 05:39

## 2017-10-18 RX ADMIN — TAZOBACTAM SODIUM AND PIPERACILLIN SODIUM 3.38 G: 375; 3 INJECTION, SOLUTION INTRAVENOUS at 02:45

## 2017-10-18 RX ADMIN — SUCRALFATE 1 G: 1 SUSPENSION ORAL at 17:10

## 2017-10-18 RX ADMIN — TAZOBACTAM SODIUM AND PIPERACILLIN SODIUM 3.38 G: 375; 3 INJECTION, SOLUTION INTRAVENOUS at 17:10

## 2017-10-18 RX ADMIN — ACETAMINOPHEN 650 MG: 325 TABLET ORAL at 22:15

## 2017-10-18 RX ADMIN — FLUOROMETHOLONE 1 DROP: 1 SOLUTION/ DROPS OPHTHALMIC at 22:14

## 2017-10-18 RX ADMIN — SUCRALFATE 1 G: 1 SUSPENSION ORAL at 11:51

## 2017-10-18 RX ADMIN — AMLODIPINE BESYLATE 5 MG: 5 TABLET ORAL at 10:01

## 2017-10-18 RX ADMIN — PANTOPRAZOLE SODIUM 40 MG: 40 INJECTION, POWDER, FOR SOLUTION INTRAVENOUS at 11:52

## 2017-10-18 RX ADMIN — PANTOPRAZOLE SODIUM 40 MG: 40 TABLET, DELAYED RELEASE ORAL at 18:09

## 2017-10-18 RX ADMIN — TAZOBACTAM SODIUM AND PIPERACILLIN SODIUM 3.38 G: 375; 3 INJECTION, SOLUTION INTRAVENOUS at 10:02

## 2017-10-18 RX ADMIN — MORPHINE SULFATE 1 MG: 2 INJECTION, SOLUTION INTRAMUSCULAR; INTRAVENOUS at 17:12

## 2017-10-18 RX ADMIN — MORPHINE SULFATE 1 MG: 2 INJECTION, SOLUTION INTRAMUSCULAR; INTRAVENOUS at 21:01

## 2017-10-18 RX ADMIN — SUCRALFATE 1 G: 1 SUSPENSION ORAL at 18:09

## 2017-10-18 RX ADMIN — MORPHINE SULFATE 1 MG: 2 INJECTION, SOLUTION INTRAMUSCULAR; INTRAVENOUS at 02:49

## 2017-10-18 RX ADMIN — SUCRALFATE 1 G: 1 SUSPENSION ORAL at 02:45

## 2017-10-18 RX ADMIN — LEVOTHYROXINE SODIUM 75 MCG: 75 TABLET ORAL at 10:02

## 2017-10-18 RX ADMIN — ONDANSETRON 4 MG: 2 INJECTION INTRAMUSCULAR; INTRAVENOUS at 21:01

## 2017-10-18 RX ADMIN — CETIRIZINE HYDROCHLORIDE 5 MG: 10 TABLET, FILM COATED ORAL at 10:01

## 2017-10-18 RX ADMIN — SODIUM CHLORIDE 60 ML/HR: 9 INJECTION, SOLUTION INTRAVENOUS at 05:40

## 2017-10-18 RX ADMIN — FLUOXETINE HYDROCHLORIDE 10 MG: 10 CAPSULE ORAL at 10:02

## 2017-10-19 ENCOUNTER — APPOINTMENT (OUTPATIENT)
Dept: GENERAL RADIOLOGY | Facility: HOSPITAL | Age: 82
End: 2017-10-19
Attending: HOSPITALIST

## 2017-10-19 ENCOUNTER — INPATIENT HOSPITAL (OUTPATIENT)
Dept: URBAN - METROPOLITAN AREA HOSPITAL 113 | Facility: HOSPITAL | Age: 82
End: 2017-10-19
Payer: MEDICARE

## 2017-10-19 DIAGNOSIS — R93.3 ABNORMAL FINDINGS ON DIAGNOSTIC IMAGING OF OTHER PARTS OF DI: ICD-10-CM

## 2017-10-19 DIAGNOSIS — R11.2 NAUSEA WITH VOMITING, UNSPECIFIED: ICD-10-CM

## 2017-10-19 PROBLEM — M79.671 FOOT PAIN, BILATERAL: Status: ACTIVE | Noted: 2017-10-19

## 2017-10-19 PROBLEM — M79.672 FOOT PAIN, BILATERAL: Status: ACTIVE | Noted: 2017-10-19

## 2017-10-19 LAB
ANION GAP SERPL CALCULATED.3IONS-SCNC: 12.8 MMOL/L
BUN BLD-MCNC: 11 MG/DL (ref 8–23)
BUN/CREAT SERPL: 11.2 (ref 7–25)
CALCIUM SPEC-SCNC: 8.8 MG/DL (ref 8.6–10.5)
CHLORIDE SERPL-SCNC: 97 MMOL/L (ref 98–107)
CO2 SERPL-SCNC: 26.2 MMOL/L (ref 22–29)
CREAT BLD-MCNC: 0.98 MG/DL (ref 0.57–1)
DEPRECATED RDW RBC AUTO: 46.1 FL (ref 37–54)
ERYTHROCYTE [DISTWIDTH] IN BLOOD BY AUTOMATED COUNT: 14.1 % (ref 11.7–13)
GFR SERPL CREATININE-BSD FRML MDRD: 54 ML/MIN/1.73
GLUCOSE BLD-MCNC: 168 MG/DL (ref 65–99)
GLUCOSE BLDC GLUCOMTR-MCNC: 111 MG/DL (ref 70–130)
GLUCOSE BLDC GLUCOMTR-MCNC: 143 MG/DL (ref 70–130)
GLUCOSE BLDC GLUCOMTR-MCNC: 94 MG/DL (ref 70–130)
GLUCOSE BLDC GLUCOMTR-MCNC: 94 MG/DL (ref 70–130)
HCT VFR BLD AUTO: 30.9 % (ref 35.6–45.5)
HGB BLD-MCNC: 9.8 G/DL (ref 11.9–15.5)
MCH RBC QN AUTO: 28.3 PG (ref 26.9–32)
MCHC RBC AUTO-ENTMCNC: 31.7 G/DL (ref 32.4–36.3)
MCV RBC AUTO: 89.3 FL (ref 80.5–98.2)
PLATELET # BLD AUTO: 258 10*3/MM3 (ref 140–500)
PMV BLD AUTO: 10.4 FL (ref 6–12)
POTASSIUM BLD-SCNC: 4.2 MMOL/L (ref 3.5–5.2)
RBC # BLD AUTO: 3.46 10*6/MM3 (ref 3.9–5.2)
SODIUM BLD-SCNC: 136 MMOL/L (ref 136–145)
URATE SERPL-MCNC: 2.4 MG/DL (ref 2.4–5.7)
WBC NRBC COR # BLD: 11.67 10*3/MM3 (ref 4.5–10.7)

## 2017-10-19 PROCEDURE — 82962 GLUCOSE BLOOD TEST: CPT

## 2017-10-19 PROCEDURE — 99232 SBSQ HOSP IP/OBS MODERATE 35: CPT

## 2017-10-19 PROCEDURE — 25010000002 MORPHINE PER 10 MG: Performed by: HOSPITALIST

## 2017-10-19 PROCEDURE — 80048 BASIC METABOLIC PNL TOTAL CA: CPT | Performed by: HOSPITALIST

## 2017-10-19 PROCEDURE — 85027 COMPLETE CBC AUTOMATED: CPT | Performed by: HOSPITALIST

## 2017-10-19 PROCEDURE — 99231 SBSQ HOSP IP/OBS SF/LOW 25: CPT

## 2017-10-19 PROCEDURE — 84550 ASSAY OF BLOOD/URIC ACID: CPT | Performed by: HOSPITALIST

## 2017-10-19 PROCEDURE — 25010000002 PIPERACILLIN SOD-TAZOBACTAM PER 1 G: Performed by: HOSPITALIST

## 2017-10-19 PROCEDURE — 73630 X-RAY EXAM OF FOOT: CPT

## 2017-10-19 RX ORDER — IBUPROFEN 400 MG/1
400 TABLET ORAL ONCE
Status: COMPLETED | OUTPATIENT
Start: 2017-10-19 | End: 2017-10-19

## 2017-10-19 RX ORDER — CEPHALEXIN 500 MG/1
500 CAPSULE ORAL EVERY 12 HOURS SCHEDULED
Status: DISCONTINUED | OUTPATIENT
Start: 2017-10-19 | End: 2017-10-20 | Stop reason: HOSPADM

## 2017-10-19 RX ADMIN — CETIRIZINE HYDROCHLORIDE 5 MG: 10 TABLET, FILM COATED ORAL at 08:51

## 2017-10-19 RX ADMIN — ACETAMINOPHEN 650 MG: 325 TABLET ORAL at 23:17

## 2017-10-19 RX ADMIN — PANTOPRAZOLE SODIUM 40 MG: 40 TABLET, DELAYED RELEASE ORAL at 08:50

## 2017-10-19 RX ADMIN — PANTOPRAZOLE SODIUM 40 MG: 40 TABLET, DELAYED RELEASE ORAL at 16:45

## 2017-10-19 RX ADMIN — IBUPROFEN 400 MG: 400 TABLET ORAL at 18:08

## 2017-10-19 RX ADMIN — SUCRALFATE 1 G: 1 SUSPENSION ORAL at 00:42

## 2017-10-19 RX ADMIN — MORPHINE SULFATE 1 MG: 2 INJECTION, SOLUTION INTRAMUSCULAR; INTRAVENOUS at 09:06

## 2017-10-19 RX ADMIN — MORPHINE SULFATE 1 MG: 2 INJECTION, SOLUTION INTRAMUSCULAR; INTRAVENOUS at 03:14

## 2017-10-19 RX ADMIN — TAZOBACTAM SODIUM AND PIPERACILLIN SODIUM 3.38 G: 375; 3 INJECTION, SOLUTION INTRAVENOUS at 08:49

## 2017-10-19 RX ADMIN — SUCRALFATE 1 G: 1 SUSPENSION ORAL at 16:42

## 2017-10-19 RX ADMIN — CEPHALEXIN 500 MG: 500 CAPSULE ORAL at 15:23

## 2017-10-19 RX ADMIN — TAZOBACTAM SODIUM AND PIPERACILLIN SODIUM 3.38 G: 375; 3 INJECTION, SOLUTION INTRAVENOUS at 00:42

## 2017-10-19 RX ADMIN — ONDANSETRON 4 MG: 4 TABLET, ORALLY DISINTEGRATING ORAL at 23:17

## 2017-10-19 RX ADMIN — FLUOROMETHOLONE 1 DROP: 1 SOLUTION/ DROPS OPHTHALMIC at 21:37

## 2017-10-19 RX ADMIN — MORPHINE SULFATE 1 MG: 2 INJECTION, SOLUTION INTRAMUSCULAR; INTRAVENOUS at 18:12

## 2017-10-19 RX ADMIN — LEVOTHYROXINE SODIUM 75 MCG: 75 TABLET ORAL at 08:51

## 2017-10-19 RX ADMIN — SUCRALFATE 1 G: 1 SUSPENSION ORAL at 06:25

## 2017-10-19 RX ADMIN — SUCRALFATE 1 G: 1 SUSPENSION ORAL at 23:17

## 2017-10-19 RX ADMIN — CEPHALEXIN 500 MG: 500 CAPSULE ORAL at 21:37

## 2017-10-19 RX ADMIN — MORPHINE SULFATE 1 MG: 2 INJECTION, SOLUTION INTRAMUSCULAR; INTRAVENOUS at 11:30

## 2017-10-19 RX ADMIN — ONDANSETRON 4 MG: 4 TABLET, ORALLY DISINTEGRATING ORAL at 02:15

## 2017-10-19 RX ADMIN — SUCRALFATE 1 G: 1 SUSPENSION ORAL at 11:32

## 2017-10-19 RX ADMIN — AMLODIPINE BESYLATE 5 MG: 5 TABLET ORAL at 08:51

## 2017-10-19 RX ADMIN — FLUOXETINE HYDROCHLORIDE 10 MG: 10 CAPSULE ORAL at 08:51

## 2017-10-20 VITALS
OXYGEN SATURATION: 96 % | DIASTOLIC BLOOD PRESSURE: 74 MMHG | RESPIRATION RATE: 18 BRPM | TEMPERATURE: 97.5 F | WEIGHT: 137.4 LBS | SYSTOLIC BLOOD PRESSURE: 138 MMHG | BODY MASS INDEX: 25.94 KG/M2 | HEIGHT: 61 IN | HEART RATE: 89 BPM

## 2017-10-20 LAB
GASTRIN SERPL-MCNC: 64 PG/ML (ref 0–115)
GLUCOSE BLDC GLUCOMTR-MCNC: 105 MG/DL (ref 70–130)
GLUCOSE BLDC GLUCOMTR-MCNC: 109 MG/DL (ref 70–130)

## 2017-10-20 PROCEDURE — 25010000002 MORPHINE PER 10 MG: Performed by: HOSPITALIST

## 2017-10-20 PROCEDURE — 82962 GLUCOSE BLOOD TEST: CPT

## 2017-10-20 RX ORDER — SUCRALFATE ORAL 1 G/10ML
1 SUSPENSION ORAL EVERY 6 HOURS SCHEDULED
Qty: 420 ML | Refills: 0 | Status: SHIPPED | OUTPATIENT
Start: 2017-10-20 | End: 2017-10-30 | Stop reason: SDUPTHER

## 2017-10-20 RX ORDER — OXYCODONE HYDROCHLORIDE AND ACETAMINOPHEN 5; 325 MG/1; MG/1
1 TABLET ORAL EVERY 6 HOURS PRN
Qty: 20 TABLET | Refills: 0 | Status: SHIPPED | OUTPATIENT
Start: 2017-10-20 | End: 2017-10-30 | Stop reason: SDUPTHER

## 2017-10-20 RX ORDER — CEPHALEXIN 500 MG/1
500 CAPSULE ORAL EVERY 12 HOURS SCHEDULED
Qty: 7 CAPSULE | Refills: 0 | Status: SHIPPED | OUTPATIENT
Start: 2017-10-20 | End: 2017-10-24

## 2017-10-20 RX ORDER — PANTOPRAZOLE SODIUM 40 MG/1
40 TABLET, DELAYED RELEASE ORAL
Qty: 60 TABLET | Refills: 0 | Status: SHIPPED | OUTPATIENT
Start: 2017-10-20 | End: 2017-10-30 | Stop reason: SDUPTHER

## 2017-10-20 RX ADMIN — CETIRIZINE HYDROCHLORIDE 5 MG: 10 TABLET, FILM COATED ORAL at 10:02

## 2017-10-20 RX ADMIN — SUCRALFATE 1 G: 1 SUSPENSION ORAL at 12:42

## 2017-10-20 RX ADMIN — PANTOPRAZOLE SODIUM 40 MG: 40 TABLET, DELAYED RELEASE ORAL at 10:02

## 2017-10-20 RX ADMIN — SUCRALFATE 1 G: 1 SUSPENSION ORAL at 06:27

## 2017-10-20 RX ADMIN — FLUOXETINE HYDROCHLORIDE 10 MG: 10 CAPSULE ORAL at 10:02

## 2017-10-20 RX ADMIN — MORPHINE SULFATE 1 MG: 2 INJECTION, SOLUTION INTRAMUSCULAR; INTRAVENOUS at 00:14

## 2017-10-20 RX ADMIN — LEVOTHYROXINE SODIUM 75 MCG: 75 TABLET ORAL at 10:02

## 2017-10-20 RX ADMIN — CEPHALEXIN 500 MG: 500 CAPSULE ORAL at 10:02

## 2017-10-20 RX ADMIN — AMLODIPINE BESYLATE 5 MG: 5 TABLET ORAL at 10:01

## 2017-10-30 ENCOUNTER — OFFICE VISIT (OUTPATIENT)
Dept: FAMILY MEDICINE CLINIC | Facility: CLINIC | Age: 82
End: 2017-10-30

## 2017-10-30 VITALS
DIASTOLIC BLOOD PRESSURE: 70 MMHG | BODY MASS INDEX: 29.27 KG/M2 | WEIGHT: 155 LBS | TEMPERATURE: 97.5 F | SYSTOLIC BLOOD PRESSURE: 150 MMHG | HEART RATE: 79 BPM | HEIGHT: 61 IN | OXYGEN SATURATION: 98 %

## 2017-10-30 DIAGNOSIS — R10.9 ABDOMINAL DISCOMFORT: ICD-10-CM

## 2017-10-30 DIAGNOSIS — R19.7 DIARRHEA, UNSPECIFIED TYPE: ICD-10-CM

## 2017-10-30 DIAGNOSIS — D64.9 ANEMIA, UNSPECIFIED TYPE: ICD-10-CM

## 2017-10-30 DIAGNOSIS — Z51.81 MEDICATION MONITORING ENCOUNTER: ICD-10-CM

## 2017-10-30 DIAGNOSIS — N39.0 URINARY TRACT INFECTION WITHOUT HEMATURIA, SITE UNSPECIFIED: ICD-10-CM

## 2017-10-30 DIAGNOSIS — K29.81 DUODENITIS WITH BLEEDING: Primary | ICD-10-CM

## 2017-10-30 LAB
BACTERIA UR QL AUTO: ABNORMAL /HPF
BILIRUB UR QL STRIP: NEGATIVE
CLARITY UR: CLEAR
COLOR UR: YELLOW
ERYTHROCYTE [DISTWIDTH] IN BLOOD BY AUTOMATED COUNT: 13.2 % (ref 4.5–15)
GLUCOSE UR STRIP-MCNC: NEGATIVE MG/DL
HCT VFR BLD AUTO: 35.8 % (ref 31–42)
HGB BLD-MCNC: 11.6 G/DL (ref 12–18)
HGB UR QL STRIP.AUTO: NEGATIVE
KETONES UR QL STRIP: ABNORMAL
LEUKOCYTE ESTERASE UR QL STRIP.AUTO: ABNORMAL
LYMPHOCYTES # BLD AUTO: 2.4 10*3/MM3 (ref 1.2–3.4)
LYMPHOCYTES NFR BLD AUTO: 21.9 % (ref 21–51)
MCH RBC QN AUTO: 28 PG (ref 26.1–33.1)
MCHC RBC AUTO-ENTMCNC: 32.6 G/DL (ref 33–37)
MCV RBC AUTO: 86 FL (ref 80–99)
MONOCYTES # BLD AUTO: 0.6 10*3/MM3 (ref 0.1–0.6)
MONOCYTES NFR BLD AUTO: 5.6 % (ref 2–9)
NEUTROPHILS # BLD AUTO: 8 10*3/MM3 (ref 1.4–6.5)
NEUTROPHILS NFR BLD AUTO: 72.5 % (ref 42–75)
NITRITE UR QL STRIP: NEGATIVE
PH UR STRIP.AUTO: 5.5 [PH] (ref 4.6–8)
PLATELET # BLD AUTO: 651 10*3/MM3 (ref 150–450)
PMV BLD AUTO: 6.8 FL (ref 7.1–10.5)
PROT UR QL STRIP: ABNORMAL
RBC # BLD AUTO: 4.16 10*6/MM3 (ref 4–6)
RBC # UR: ABNORMAL /HPF
REF LAB TEST METHOD: ABNORMAL
SP GR UR STRIP: 1.01 (ref 1–1.03)
SQUAMOUS #/AREA URNS HPF: ABNORMAL /HPF
UROBILINOGEN UR QL STRIP: ABNORMAL
WBC NRBC COR # BLD: 11.1 10*3/MM3 (ref 4.5–10)
WBC UR QL AUTO: ABNORMAL /HPF

## 2017-10-30 PROCEDURE — 99214 OFFICE O/P EST MOD 30 MIN: CPT | Performed by: INTERNAL MEDICINE

## 2017-10-30 PROCEDURE — 36415 COLL VENOUS BLD VENIPUNCTURE: CPT | Performed by: INTERNAL MEDICINE

## 2017-10-30 PROCEDURE — 85025 COMPLETE CBC W/AUTO DIFF WBC: CPT | Performed by: INTERNAL MEDICINE

## 2017-10-30 PROCEDURE — 81001 URINALYSIS AUTO W/SCOPE: CPT | Performed by: INTERNAL MEDICINE

## 2017-10-30 RX ORDER — DOXYCYCLINE HYCLATE 100 MG/1
CAPSULE ORAL
Qty: 14 CAPSULE | Refills: 0 | Status: SHIPPED | OUTPATIENT
Start: 2017-10-30 | End: 2017-12-29

## 2017-10-30 RX ORDER — OXYCODONE HYDROCHLORIDE AND ACETAMINOPHEN 5; 325 MG/1; MG/1
1 TABLET ORAL EVERY 6 HOURS PRN
Qty: 20 TABLET | Refills: 0 | Status: SHIPPED | OUTPATIENT
Start: 2017-10-30 | End: 2018-03-14

## 2017-10-30 RX ORDER — SUCRALFATE ORAL 1 G/10ML
1 SUSPENSION ORAL EVERY 6 HOURS SCHEDULED
Qty: 420 ML | Refills: 3 | Status: SHIPPED | OUTPATIENT
Start: 2017-10-30 | End: 2017-12-11 | Stop reason: SDUPTHER

## 2017-10-30 RX ORDER — PANTOPRAZOLE SODIUM 40 MG/1
40 TABLET, DELAYED RELEASE ORAL
Qty: 60 TABLET | Refills: 3 | Status: SHIPPED | OUTPATIENT
Start: 2017-10-30 | End: 2018-03-14

## 2017-10-30 NOTE — PROGRESS NOTES
Subjective   Geeta Butt is a 82 y.o. female.   Follow-up status post hospital stay for GI bleed with duodenitis being the main finding.  She is able to give a stool specimen for C. difficile for the diarrhea did have a CBC that was abnormal is on Protonix and on Carafate 1 g 3 times a day as liquid formulation currently doing slightly better  History of Present Illness   Diarrhea began again yesterday and had had for a year diarrhea resolved in the hospital. .  Went away  very short term tearing hospital stay.  still having epigastric and also some lower abdominal discomfort epigastric but certainly go along with her active duodenitis the lower abdomen we will attribute to the diarrhea.  She also had a bladder infection during hospital stay was given Keflex can't tell she's over bladder infection or not currently.  And some upper abdominal discomfort also some lower abdominal discomfort upper abdomen would coincide with her duodenitis does request pain medication for that received Percocet 5 mg for discharge from the hospital do drug contract urine drug screening [facility] report and give her refill of this quantity 20 dissipated is being short-term to the Carafate and Protonix further resolve her duodenitis.      Review of Systems   Constitutional: Positive for appetite change and fatigue.   HENT: Negative.    Respiratory: Negative.    Cardiovascular: Negative.    Gastrointestinal: Positive for diarrhea.   Endocrine: Negative.    Genitourinary: Negative.    Musculoskeletal: Negative.    Skin: Negative.    Allergic/Immunologic: Negative.    Neurological: Negative.    Hematological: Negative.    Psychiatric/Behavioral: Negative.        Objective   Physical Exam   Constitutional: She appears well-developed and well-nourished.   HENT:   Head: Normocephalic and atraumatic.   Eyes: Conjunctivae are normal. Pupils are equal, round, and reactive to light.   Cardiovascular: Normal rate, regular rhythm and normal heart  sounds.    Pulmonary/Chest: Effort normal and breath sounds normal.   Abdominal: Soft. Bowel sounds are normal.   Mild discomfort suprapubically and midline lower abdomen   Neurological: She is alert.   Unremarkable and steady gait   Skin: Skin is warm.   Nursing note and vitals reviewed.      Assessment/Plan   .  1.  Duodenitis with bleeding status post hospital stay plan update his CBC follow-up with Dr. Zaragoza continue Protonix and sulcrafate    2.  UTI plan still pyuria plan doxycycline 100 mg twice a day ×7 days    3.  Diarrhea undefined plan stool for C. difficile, stool for enteric pathogens, O and P    4.  Anemia plan get anemia studies specifically hemoglobin was 11.6 today    5.  Abdominal discomfort felt be secondary duodenitis and diarrhea plan Percocet 5/325 #20 was drug screen    Contract [facility] report    6.  Medication monitoring with [facility], [facility], drug contract this date this being short-term for approximately a month's duration perhaps less his workup for diarrhea continues and patient awaits follow-up with GI    Much of this encounter note is an electronic transcription/translation of spoken language to printed text.  The electronic translation of spoken language may permit erroneous, or at times, nonsensical words or phrases to be inadvertently transcribed.  Although I have reviewed the note for such errors, some may still exist.

## 2017-11-02 ENCOUNTER — LAB (OUTPATIENT)
Dept: FAMILY MEDICINE CLINIC | Facility: CLINIC | Age: 82
End: 2017-11-02

## 2017-11-02 DIAGNOSIS — D64.9 ANEMIA, UNSPECIFIED TYPE: ICD-10-CM

## 2017-11-03 LAB — C DIFF TOX A+B STL QL IA: NEGATIVE

## 2017-11-05 LAB — CONV REPORT SUMMARY: NORMAL

## 2017-11-07 LAB
CAMPYLOBACTER STL CULT: NORMAL
E COLI SXT STL QL IA: NEGATIVE
Lab: NORMAL
Lab: NORMAL
SALM + SHIG STL CULT: NORMAL

## 2017-11-08 ENCOUNTER — TELEPHONE (OUTPATIENT)
Dept: FAMILY MEDICINE CLINIC | Facility: CLINIC | Age: 82
End: 2017-11-08

## 2017-11-09 ENCOUNTER — TELEPHONE (OUTPATIENT)
Dept: FAMILY MEDICINE CLINIC | Facility: CLINIC | Age: 82
End: 2017-11-09

## 2017-11-09 DIAGNOSIS — N39.0 URINARY TRACT INFECTION WITHOUT HEMATURIA, SITE UNSPECIFIED: Primary | ICD-10-CM

## 2017-11-09 NOTE — TELEPHONE ENCOUNTER
Stool specimens are normal.  Hemoglobin is improved get repeat CBC 1 month is still having diarrhea then needs to see gastroenterologist

## 2017-11-10 RX ORDER — AMLODIPINE BESYLATE 5 MG/1
TABLET ORAL
Qty: 180 TABLET | Refills: 0 | Status: SHIPPED | OUTPATIENT
Start: 2017-11-10 | End: 2018-02-17 | Stop reason: SDUPTHER

## 2017-11-17 ENCOUNTER — OFFICE (OUTPATIENT)
Dept: URBAN - METROPOLITAN AREA OTHER 6 | Facility: OTHER | Age: 82
End: 2017-11-17

## 2017-11-17 VITALS
WEIGHT: 156 LBS | SYSTOLIC BLOOD PRESSURE: 150 MMHG | HEIGHT: 61 IN | HEART RATE: 74 BPM | DIASTOLIC BLOOD PRESSURE: 66 MMHG

## 2017-11-17 DIAGNOSIS — A04.9 BACTERIAL INTESTINAL INFECTION, UNSPECIFIED: ICD-10-CM

## 2017-11-17 DIAGNOSIS — K21.9 GASTRO-ESOPHAGEAL REFLUX DISEASE WITHOUT ESOPHAGITIS: ICD-10-CM

## 2017-11-17 DIAGNOSIS — K59.1 FUNCTIONAL DIARRHEA: ICD-10-CM

## 2017-11-17 PROCEDURE — 99212 OFFICE O/P EST SF 10 MIN: CPT

## 2017-11-17 RX ORDER — METRONIDAZOLE 500 MG/1
1500 TABLET, FILM COATED ORAL
Qty: 42 | Refills: 0 | Status: COMPLETED
Start: 2017-11-17 | End: 2018-02-14

## 2017-12-12 RX ORDER — SUCRALFATE 1 G/10ML
SUSPENSION ORAL
Qty: 420 ML | Refills: 3 | Status: SHIPPED | OUTPATIENT
Start: 2017-12-12 | End: 2018-03-06 | Stop reason: SDUPTHER

## 2017-12-13 RX ORDER — FLUOXETINE 10 MG/1
CAPSULE ORAL
Qty: 135 CAPSULE | Refills: 1 | Status: SHIPPED | OUTPATIENT
Start: 2017-12-13 | End: 2018-03-14

## 2017-12-29 ENCOUNTER — OFFICE VISIT (OUTPATIENT)
Dept: FAMILY MEDICINE CLINIC | Facility: CLINIC | Age: 82
End: 2017-12-29

## 2017-12-29 VITALS
DIASTOLIC BLOOD PRESSURE: 52 MMHG | HEART RATE: 83 BPM | HEIGHT: 61 IN | BODY MASS INDEX: 30.17 KG/M2 | SYSTOLIC BLOOD PRESSURE: 118 MMHG | OXYGEN SATURATION: 98 % | RESPIRATION RATE: 16 BRPM | TEMPERATURE: 98 F | WEIGHT: 159.8 LBS

## 2017-12-29 DIAGNOSIS — J01.00 ACUTE MAXILLARY SINUSITIS, RECURRENCE NOT SPECIFIED: ICD-10-CM

## 2017-12-29 DIAGNOSIS — J06.9 ACUTE URI: Primary | ICD-10-CM

## 2017-12-29 DIAGNOSIS — Z00.00 HEALTHCARE MAINTENANCE: ICD-10-CM

## 2017-12-29 LAB
ALBUMIN SERPL-MCNC: 4.1 G/DL (ref 3.5–5.2)
ALBUMIN/GLOB SERPL: 1.1 G/DL
ALP SERPL-CCNC: 60 U/L (ref 39–117)
ALT SERPL W P-5'-P-CCNC: 19 U/L (ref 1–33)
ANION GAP SERPL CALCULATED.3IONS-SCNC: 12.4 MMOL/L
AST SERPL-CCNC: 20 U/L (ref 1–32)
BILIRUB SERPL-MCNC: 0.2 MG/DL (ref 0.1–1.2)
BUN BLD-MCNC: 19 MG/DL (ref 8–23)
BUN/CREAT SERPL: 16.2 (ref 7–25)
CALCIUM SPEC-SCNC: 8.7 MG/DL (ref 8.6–10.5)
CHLORIDE SERPL-SCNC: 97 MMOL/L (ref 98–107)
CO2 SERPL-SCNC: 26.6 MMOL/L (ref 22–29)
CREAT BLD-MCNC: 1.17 MG/DL (ref 0.57–1)
ERYTHROCYTE [DISTWIDTH] IN BLOOD BY AUTOMATED COUNT: 14.2 % (ref 4.5–15)
GFR SERPL CREATININE-BSD FRML MDRD: 44 ML/MIN/1.73
GLOBULIN UR ELPH-MCNC: 3.6 GM/DL
GLUCOSE BLD-MCNC: 119 MG/DL (ref 65–99)
HCT VFR BLD AUTO: 37 % (ref 31–42)
HGB BLD-MCNC: 11.8 G/DL (ref 12–18)
LYMPHOCYTES # BLD AUTO: 1.3 10*3/MM3 (ref 1.2–3.4)
LYMPHOCYTES NFR BLD AUTO: 21.4 % (ref 21–51)
MCH RBC QN AUTO: 27.6 PG (ref 26.1–33.1)
MCHC RBC AUTO-ENTMCNC: 32 G/DL (ref 33–37)
MCV RBC AUTO: 86.1 FL (ref 80–99)
MONOCYTES # BLD AUTO: 0.5 10*3/MM3 (ref 0.1–0.6)
MONOCYTES NFR BLD AUTO: 7.5 % (ref 2–9)
NEUTROPHILS # BLD AUTO: 4.4 10*3/MM3 (ref 1.4–6.5)
NEUTROPHILS NFR BLD AUTO: 71.1 % (ref 42–75)
PLATELET # BLD AUTO: 336 10*3/MM3 (ref 150–450)
PMV BLD AUTO: 7.3 FL (ref 7.1–10.5)
POTASSIUM BLD-SCNC: 4.4 MMOL/L (ref 3.5–5.2)
PROT SERPL-MCNC: 7.7 G/DL (ref 6–8.5)
RBC # BLD AUTO: 4.29 10*6/MM3 (ref 4–6)
SODIUM BLD-SCNC: 136 MMOL/L (ref 136–145)
TSH SERPL DL<=0.05 MIU/L-ACNC: 0.38 MIU/ML (ref 0.27–4.2)
WBC NRBC COR # BLD: 6.2 10*3/MM3 (ref 4.5–10)

## 2017-12-29 PROCEDURE — 85025 COMPLETE CBC W/AUTO DIFF WBC: CPT | Performed by: NURSE PRACTITIONER

## 2017-12-29 PROCEDURE — 84443 ASSAY THYROID STIM HORMONE: CPT | Performed by: NURSE PRACTITIONER

## 2017-12-29 PROCEDURE — 80053 COMPREHEN METABOLIC PANEL: CPT | Performed by: NURSE PRACTITIONER

## 2017-12-29 PROCEDURE — 99213 OFFICE O/P EST LOW 20 MIN: CPT | Performed by: NURSE PRACTITIONER

## 2017-12-29 PROCEDURE — 36415 COLL VENOUS BLD VENIPUNCTURE: CPT | Performed by: NURSE PRACTITIONER

## 2017-12-29 RX ORDER — BENZONATATE 100 MG/1
100 CAPSULE ORAL 3 TIMES DAILY PRN
Qty: 21 CAPSULE | Refills: 0 | Status: SHIPPED | OUTPATIENT
Start: 2017-12-29 | End: 2018-02-22

## 2017-12-29 RX ORDER — DEXLANSOPRAZOLE 60 MG/1
CAPSULE, DELAYED RELEASE ORAL
Refills: 1 | COMMUNITY
Start: 2017-11-24 | End: 2017-12-29

## 2017-12-29 RX ORDER — AMOXICILLIN 875 MG/1
875 TABLET, COATED ORAL EVERY 12 HOURS SCHEDULED
Qty: 14 TABLET | Refills: 0 | Status: SHIPPED | OUTPATIENT
Start: 2017-12-29 | End: 2018-01-05

## 2017-12-29 NOTE — PATIENT INSTRUCTIONS
Start amoxicillin 875mg 2x day for 7 days.  Tessalon perles 100mg up to 3x day as needed for cough.  May try mucinex OTC as needed for cough.  May try flonase at home as needed.  Cbc,cmp,tsh today will call with results.  If symptoms persist 7- days call office.   Increase fluid intake, get plenty of rest. Patient agrees with plan of care and understands instructions. Call if worsening symptoms or any problems or concerns.

## 2017-12-29 NOTE — PROGRESS NOTES
Subjective   Geeta Butt is a 82 y.o. female.     History of Present Illness   C/o cough, congestion, sinus pressure, started about 3 days ago, she has noticed hoarseness, she denies fever, she has had chills and sweats, she has a productive cough at times, yellow in color, she tried cough medication OTC and tylenol. She has a sore throat, she has nasal drainage, she denies wheezing, she denies smoking, she denies asthma, she does have seasonal allegies.   She would also like labs drawn today.   The following portions of the patient's history were reviewed and updated as appropriate: allergies, current medications, past family history, past medical history, past social history, past surgical history and problem list.    Review of Systems   Constitutional: Positive for chills. Negative for diaphoresis and fever.   HENT: Positive for congestion, postnasal drip, rhinorrhea, sinus pressure and voice change (hoarseness). Negative for ear pain and sore throat.    Eyes: Negative for pain.   Respiratory: Positive for cough. Negative for shortness of breath and wheezing.    Cardiovascular: Negative for chest pain.   Musculoskeletal: Negative for arthralgias and myalgias.   Skin: Negative for pallor.   Allergic/Immunologic: Positive for environmental allergies.   Neurological: Negative for dizziness, light-headedness and headaches.   All other systems reviewed and are negative.      Objective   Physical Exam   Constitutional: She is oriented to person, place, and time. She appears well-developed and well-nourished.   HENT:   Head: Normocephalic.   Right Ear: Tympanic membrane and ear canal normal.   Left Ear: Tympanic membrane and ear canal normal.   Nose: Right sinus exhibits maxillary sinus tenderness. Left sinus exhibits maxillary sinus tenderness.   Mouth/Throat: Uvula is midline, oropharynx is clear and moist and mucous membranes are normal.   Eyes: Pupils are equal, round, and reactive to light.   Neck: Neck supple.    Cardiovascular: Normal rate, regular rhythm and normal heart sounds.    Pulmonary/Chest: Effort normal and breath sounds normal. She has no decreased breath sounds. She has no wheezes. She has no rhonchi.   Musculoskeletal: Normal range of motion.   Neurological: She is alert and oriented to person, place, and time.   Skin: Skin is warm and dry.   Psychiatric: She has a normal mood and affect. Her behavior is normal. Judgment and thought content normal.   Nursing note and vitals reviewed.      Assessment/Plan   Geeta was seen today for uri.    Diagnoses and all orders for this visit:    Acute URI    Acute maxillary sinusitis, recurrence not specified    Healthcare maintenance  -     CBC & Differential  -     TSH  -     Comprehensive Metabolic Panel  -     CBC Auto Differential    Other orders  -     amoxicillin (AMOXIL) 875 MG tablet; Take 1 tablet by mouth Every 12 (Twelve) Hours for 7 days.  -     benzonatate (TESSALON PERLES) 100 MG capsule; Take 1 capsule by mouth 3 (Three) Times a Day As Needed for Cough.        Start amoxicillin 875mg 2x day for 7 days.  Tessalon perles 100mg up to 3x day as needed for cough.  May try mucinex OTC as needed for cough.  May try flonase at home as needed.  Cbc,cmp,tsh today will call with results.  If symptoms persist 7- days call office.   Increase fluid intake, get plenty of rest. Patient agrees with plan of care and understands instructions. Call if worsening symptoms or any problems or concerns.

## 2018-01-05 ENCOUNTER — TELEPHONE (OUTPATIENT)
Dept: FAMILY MEDICINE CLINIC | Facility: CLINIC | Age: 83
End: 2018-01-05

## 2018-01-05 NOTE — TELEPHONE ENCOUNTER
----- Message from JESSA Jacobsen sent at 1/3/2018 12:22 PM EST -----  Please call patient with results. Thyroid level is normal, BS slightly elevated will check her A1C, kidney func elevated, encourage 8 glasses water per day and avoid advil, ibuprofen OTC NSAIDS, cont f/u with nephrology Dr. Abel, liver func is normal, hemoglobin is slightly low but improved, if she is still having diarrhea she needs to f/u with Dr. Gallego, GI.    [facility]

## 2018-01-08 ENCOUNTER — TELEPHONE (OUTPATIENT)
Dept: FAMILY MEDICINE CLINIC | Facility: CLINIC | Age: 83
End: 2018-01-08

## 2018-01-08 RX ORDER — AMOXICILLIN 500 MG/1
CAPSULE ORAL
Qty: 40 CAPSULE | Refills: 0 | Status: SHIPPED | OUTPATIENT
Start: 2018-01-08 | End: 2018-01-10

## 2018-01-08 NOTE — TELEPHONE ENCOUNTER
----- Message from JESSA Jacobsen sent at 1/3/2018 12:22 PM EST -----  Please call patient with results. Thyroid level is normal, BS slightly elevated will check her A1C, kidney func elevated, encourage 8 glasses water per day and avoid advil, ibuprofen OTC NSAIDS, cont f/u with nephrology Dr. Abel, liver func is normal, hemoglobin is slightly low but improved, if she is still having diarrhea she needs to f/u with Dr. Gallego, GI.    Unable to leave message

## 2018-01-09 ENCOUNTER — TELEPHONE (OUTPATIENT)
Dept: FAMILY MEDICINE CLINIC | Facility: CLINIC | Age: 83
End: 2018-01-09

## 2018-01-10 ENCOUNTER — TELEPHONE (OUTPATIENT)
Dept: FAMILY MEDICINE CLINIC | Facility: CLINIC | Age: 83
End: 2018-01-10

## 2018-01-10 RX ORDER — AZITHROMYCIN 250 MG/1
TABLET, FILM COATED ORAL
Qty: 6 TABLET | Refills: 0 | Status: SHIPPED | OUTPATIENT
Start: 2018-01-10 | End: 2018-02-22

## 2018-01-10 NOTE — TELEPHONE ENCOUNTER
----- Message from JESSA Jacobsen sent at 1/3/2018 12:22 PM EST -----  Please call patient with results. Thyroid level is normal, BS slightly elevated will check her A1C, kidney func elevated, encourage 8 glasses water per day and avoid advil, ibuprofen OTC NSAIDS, cont f/u with nephrology Dr. Abel, liver func is normal, hemoglobin is slightly low but improved, if she is still having diarrhea she needs to f/u with Dr. Gallego, GI.    [facility]

## 2018-01-10 NOTE — TELEPHONE ENCOUNTER
----- Message from JESSA Jacobsen sent at 1/3/2018 12:22 PM EST -----  Please call patient with results. Thyroid level is normal, BS slightly elevated will check her A1C, kidney func elevated, encourage 8 glasses water per day and avoid advil, ibuprofen OTC NSAIDS, cont f/u with nephrology Dr. Abel, liver func is normal, hemoglobin is slightly low but improved, if she is still having diarrhea she needs to f/u with Dr. Gallego, GI.    Pt informed of lab results. Pt stated she has a cough that will not go and wanted to know if you can call something in or does she need to be seen?

## 2018-01-25 ENCOUNTER — TELEPHONE (OUTPATIENT)
Dept: FAMILY MEDICINE CLINIC | Facility: CLINIC | Age: 83
End: 2018-01-25

## 2018-01-25 ENCOUNTER — APPOINTMENT (OUTPATIENT)
Dept: WOMENS IMAGING | Facility: HOSPITAL | Age: 83
End: 2018-01-25

## 2018-01-25 PROCEDURE — 76641 ULTRASOUND BREAST COMPLETE: CPT | Performed by: RADIOLOGY

## 2018-01-25 PROCEDURE — G0279 TOMOSYNTHESIS, MAMMO: HCPCS | Performed by: RADIOLOGY

## 2018-01-25 PROCEDURE — MDREVIEWSP: Performed by: RADIOLOGY

## 2018-01-25 PROCEDURE — 77065 DX MAMMO INCL CAD UNI: CPT | Performed by: RADIOLOGY

## 2018-01-25 PROCEDURE — 77067 SCR MAMMO BI INCL CAD: CPT | Performed by: RADIOLOGY

## 2018-01-25 PROCEDURE — 77063 BREAST TOMOSYNTHESIS BI: CPT | Performed by: RADIOLOGY

## 2018-01-30 ENCOUNTER — TELEPHONE (OUTPATIENT)
Dept: FAMILY MEDICINE CLINIC | Facility: CLINIC | Age: 83
End: 2018-01-30

## 2018-01-30 DIAGNOSIS — N63.20 LEFT BREAST MASS: Primary | ICD-10-CM

## 2018-01-30 NOTE — TELEPHONE ENCOUNTER
PT IS SCHEDULED TO HAVE AN U\S GUIDED LFT BREAST BIOPSY FOR A MASS AND THERE ISN'T AN ORDER IN CHART

## 2018-01-31 ENCOUNTER — APPOINTMENT (OUTPATIENT)
Dept: WOMENS IMAGING | Facility: HOSPITAL | Age: 83
End: 2018-01-31

## 2018-01-31 PROCEDURE — 19083 BX BREAST 1ST LESION US IMAG: CPT | Performed by: RADIOLOGY

## 2018-02-02 DIAGNOSIS — C50.912 MALIGNANT NEOPLASM OF LEFT FEMALE BREAST, UNSPECIFIED ESTROGEN RECEPTOR STATUS, UNSPECIFIED SITE OF BREAST (HCC): Primary | ICD-10-CM

## 2018-02-08 DIAGNOSIS — N63.20 LEFT BREAST MASS: ICD-10-CM

## 2018-02-14 ENCOUNTER — OFFICE (OUTPATIENT)
Dept: URBAN - METROPOLITAN AREA OTHER 6 | Facility: OTHER | Age: 83
End: 2018-02-14

## 2018-02-14 VITALS
HEART RATE: 76 BPM | SYSTOLIC BLOOD PRESSURE: 148 MMHG | DIASTOLIC BLOOD PRESSURE: 66 MMHG | HEIGHT: 61 IN | WEIGHT: 157 LBS

## 2018-02-14 DIAGNOSIS — R13.10 DYSPHAGIA, UNSPECIFIED: ICD-10-CM

## 2018-02-14 DIAGNOSIS — R10.9 UNSPECIFIED ABDOMINAL PAIN: ICD-10-CM

## 2018-02-14 DIAGNOSIS — K29.50 UNSPECIFIED CHRONIC GASTRITIS WITHOUT BLEEDING: ICD-10-CM

## 2018-02-14 DIAGNOSIS — K21.9 GASTRO-ESOPHAGEAL REFLUX DISEASE WITHOUT ESOPHAGITIS: ICD-10-CM

## 2018-02-14 DIAGNOSIS — Z86.010 PERSONAL HISTORY OF COLONIC POLYPS: ICD-10-CM

## 2018-02-14 DIAGNOSIS — K59.00 CONSTIPATION, UNSPECIFIED: ICD-10-CM

## 2018-02-14 PROCEDURE — 99212 OFFICE O/P EST SF 10 MIN: CPT

## 2018-02-16 ENCOUNTER — ON CAMPUS - OUTPATIENT (OUTPATIENT)
Dept: URBAN - METROPOLITAN AREA HOSPITAL 114 | Facility: HOSPITAL | Age: 83
End: 2018-02-16
Payer: MEDICARE

## 2018-02-16 ENCOUNTER — HOSPITAL ENCOUNTER (OUTPATIENT)
Facility: HOSPITAL | Age: 83
Setting detail: HOSPITAL OUTPATIENT SURGERY
Discharge: HOME | End: 2018-02-16
Attending: INTERNAL MEDICINE | Admitting: INTERNAL MEDICINE

## 2018-02-16 ENCOUNTER — ANESTHESIA (OUTPATIENT)
Dept: GASTROENTEROLOGY | Facility: HOSPITAL | Age: 83
End: 2018-02-16

## 2018-02-16 ENCOUNTER — ANESTHESIA EVENT (OUTPATIENT)
Dept: GASTROENTEROLOGY | Facility: HOSPITAL | Age: 83
End: 2018-02-16

## 2018-02-16 VITALS
TEMPERATURE: 98.6 F | WEIGHT: 153.13 LBS | BODY MASS INDEX: 28.91 KG/M2 | SYSTOLIC BLOOD PRESSURE: 162 MMHG | RESPIRATION RATE: 14 BRPM | DIASTOLIC BLOOD PRESSURE: 73 MMHG | HEIGHT: 61 IN | OXYGEN SATURATION: 98 % | HEART RATE: 74 BPM

## 2018-02-16 DIAGNOSIS — K22.2 ESOPHAGEAL OBSTRUCTION: ICD-10-CM

## 2018-02-16 DIAGNOSIS — K64.1 SECOND DEGREE HEMORRHOIDS: ICD-10-CM

## 2018-02-16 DIAGNOSIS — K57.30 DIVERTICULOSIS OF LARGE INTESTINE WITHOUT PERFORATION OR ABS: ICD-10-CM

## 2018-02-16 DIAGNOSIS — Z86.010 PERSONAL HISTORY OF COLONIC POLYPS: ICD-10-CM

## 2018-02-16 DIAGNOSIS — Z86.010 HISTORY OF COLON POLYPS: ICD-10-CM

## 2018-02-16 DIAGNOSIS — R13.10 DYSPHAGIA: ICD-10-CM

## 2018-02-16 DIAGNOSIS — R13.14 DYSPHAGIA, PHARYNGOESOPHAGEAL PHASE: ICD-10-CM

## 2018-02-16 DIAGNOSIS — K44.9 DIAPHRAGMATIC HERNIA WITHOUT OBSTRUCTION OR GANGRENE: ICD-10-CM

## 2018-02-16 LAB — GLUCOSE BLDC GLUCOMTR-MCNC: 95 MG/DL (ref 70–130)

## 2018-02-16 PROCEDURE — G0105 COLORECTAL SCRN; HI RISK IND: HCPCS | Mod: PT | Performed by: INTERNAL MEDICINE

## 2018-02-16 PROCEDURE — 25010000002 PROPOFOL 10 MG/ML EMULSION: Performed by: ANESTHESIOLOGY

## 2018-02-16 PROCEDURE — 88305 TISSUE EXAM BY PATHOLOGIST: CPT | Performed by: INTERNAL MEDICINE

## 2018-02-16 PROCEDURE — 43239 EGD BIOPSY SINGLE/MULTIPLE: CPT | Mod: 59 | Performed by: INTERNAL MEDICINE

## 2018-02-16 PROCEDURE — 82962 GLUCOSE BLOOD TEST: CPT

## 2018-02-16 RX ORDER — SODIUM CHLORIDE, SODIUM LACTATE, POTASSIUM CHLORIDE, CALCIUM CHLORIDE 600; 310; 30; 20 MG/100ML; MG/100ML; MG/100ML; MG/100ML
1000 INJECTION, SOLUTION INTRAVENOUS CONTINUOUS PRN
Status: DISCONTINUED | OUTPATIENT
Start: 2018-02-16 | End: 2018-02-16 | Stop reason: HOSPADM

## 2018-02-16 RX ORDER — PROPOFOL 10 MG/ML
VIAL (ML) INTRAVENOUS AS NEEDED
Status: DISCONTINUED | OUTPATIENT
Start: 2018-02-16 | End: 2018-02-16 | Stop reason: SURG

## 2018-02-16 RX ORDER — PROPOFOL 10 MG/ML
VIAL (ML) INTRAVENOUS CONTINUOUS PRN
Status: DISCONTINUED | OUTPATIENT
Start: 2018-02-16 | End: 2018-02-16 | Stop reason: SURG

## 2018-02-16 RX ORDER — LIDOCAINE HYDROCHLORIDE 20 MG/ML
INJECTION, SOLUTION INFILTRATION; PERINEURAL AS NEEDED
Status: DISCONTINUED | OUTPATIENT
Start: 2018-02-16 | End: 2018-02-16 | Stop reason: SURG

## 2018-02-16 RX ADMIN — LIDOCAINE HYDROCHLORIDE 100 MG: 20 INJECTION, SOLUTION INFILTRATION; PERINEURAL at 09:30

## 2018-02-16 RX ADMIN — PROPOFOL 100 MCG/KG/MIN: 10 INJECTION, EMULSION INTRAVENOUS at 09:30

## 2018-02-16 RX ADMIN — PROPOFOL 100 MG: 10 INJECTION, EMULSION INTRAVENOUS at 09:30

## 2018-02-16 RX ADMIN — SODIUM CHLORIDE, POTASSIUM CHLORIDE, SODIUM LACTATE AND CALCIUM CHLORIDE: 600; 310; 30; 20 INJECTION, SOLUTION INTRAVENOUS at 09:26

## 2018-02-16 RX ADMIN — SODIUM CHLORIDE, POTASSIUM CHLORIDE, SODIUM LACTATE AND CALCIUM CHLORIDE 1000 ML: 600; 310; 30; 20 INJECTION, SOLUTION INTRAVENOUS at 08:57

## 2018-02-16 NOTE — ANESTHESIA POSTPROCEDURE EVALUATION
"Patient: Geeta Butt    Procedure Summary     Date Anesthesia Start Anesthesia Stop Room / Location    02/16/18 0926 1006  [facility]       Procedure Diagnosis Surgeon Provider    ESOPHAGOGASTRODUODENOSCOPY with biopsies and #54 [place] DILATATION (N/A Esophagus); COLONOSCOPY into cecum and T.I. with biopsies (N/A ) No diagnosis on file. MD Ray Prabhakar MD          Anesthesia Type: MAC  Last vitals  BP   162/73 (02/16/18 1029)   Temp   37 °C (98.6 °F) (02/16/18 0846)   Pulse   74 (02/16/18 1029)   Resp   14 (02/16/18 1029)     SpO2   98 % (02/16/18 1029)     Post Anesthesia Care and Evaluation    Patient location during evaluation: bedside  Patient participation: complete - patient participated  Level of consciousness: sleepy but conscious  Pain score: 0  Pain management: adequate  Airway patency: patent  Anesthetic complications: No anesthetic complications    Cardiovascular status: acceptable  Respiratory status: acceptable  Hydration status: acceptable    Comments: /73 (BP Location: Left arm, Patient Position: Lying)  Pulse 74  Temp 37 °C (98.6 °F) (Oral)   Resp 14  Ht 154.9 cm (61\")  Wt 69.5 kg (153 lb 2 oz)  SpO2 98%  BMI 28.93 kg/m2        "

## 2018-02-16 NOTE — H&P
Patient Care Team:  Jimmy Ivan Jr., MD as PCP - General  Jimmy Ivan Jr., MD as PCP - Family Medicine    Chief complaint  Difficulty swallowing    Subjective     History of Present Illness  Patient will trouble with solids, pills at times,  This is a new symptom over past weeks,  Some intermittent diarrhea.   Unfortunately recent dx breast cancer.  Seeing a surgeon 2/22/18   Review of Systems   All other systems reviewed and are negative.       Past Medical History:   Diagnosis Date   • Aspiration into airway     post anesthesia   • Cancer     mouth cancer    • Diabetes mellitus     boarerline    • Hypertension    • Hypothyroidism    • PONV (postoperative nausea and vomiting)    • Stroke      mild weakness on left, partial sight loss on right      Past Surgical History:   Procedure Laterality Date   • ABDOMINAL SURGERY     • COLONOSCOPY     • ENDOSCOPY N/A 10/17/2017    Procedure: ESOPHAGOGASTRODUODENOSCOPY WITH COLD BIOPSIES;  Surgeon: Augustine Gallego MD;  Location: [facility];  Service:    • EYE SURGERY      3-4 years, cornea replacement    • HYSTERECTOMY     • SKIN BIOPSY       Family History   Problem Relation Age of Onset   • Esophageal cancer Father    • Stomach cancer Father      Social History   Substance Use Topics   • Smoking status: Former Smoker   • Smokeless tobacco: Never Used      Comment: 29 years ago   • Alcohol use No     Prescriptions Prior to Admission   Medication Sig Dispense Refill Last Dose   • amLODIPine (NORVASC) 5 MG tablet TAKE 2 TABLETS BY MOUTH EVERY  tablet 0 2/16/2018 at Unknown time   • azithromycin (ZITHROMAX) 250 MG tablet Take 2 tablets the first day, then 1 tablet daily for 4 days. 6 tablet 0 Past Month at Unknown time   • benzonatate (TESSALON PERLES) 100 MG capsule Take 1 capsule by mouth 3 (Three) Times a Day As Needed for Cough. 21 capsule 0 Past Month at Unknown time   • CARAFATE 1 GM/10ML suspension TAKE 10 MLS BY MOUTH EVERY 6 HOURS TO COAT  STOMACH 420 mL 3 Past Week at Unknown time   • CVS VITAMIN D 2000 units capsule TAKE ONE CAPSULE BY MOUTH EVERY DAY 30 each 3 Past Week at Unknown time   • Fexofenadine HCl (MUCINEX ALLERGY PO) Take 600 mg by mouth Daily.   Past Week at Unknown time   • Fish Oil oil Daily.   Past Week at Unknown time   • fluorometholone (FML) 0.1 % ophthalmic suspension INSTILL 1 DROP EVERY DAY INTO THE LEFT EYE  2 2/15/2018 at Unknown time   • FLUoxetine (PROzac) 10 MG capsule TAKE 2 ALTERNATING WITH 1 EVERY OTHER  capsule 1 2/15/2018 at Unknown time   • glucose blood (FREESTYLE TEST STRIPS) test strip TEST BLOOD SUGARS ONCE DAILY 50 each 12 2/15/2018 at Unknown time   • ketotifen (ZADITOR) 0.025 % ophthalmic solution 1 drop As Needed.   2/15/2018 at Unknown time   • levothyroxine (SYNTHROID) 75 MCG tablet Take 1 tablet by mouth Daily. 30 tablet 2 2/15/2018 at Unknown time   • pantoprazole (PROTONIX) 40 MG EC tablet Take 1 tablet by mouth 2 (Two) Times a Day Before Meals. 60 tablet 3 2/15/2018 at Unknown time   • Probiotic Product (PROBIOTIC DAILY PO) Take  by mouth.   2/15/2018 at Unknown time   • sodium chloride 5 % ophthalmic ointment 1 drop As Needed (dryness).   2/15/2018 at Unknown time   • aspirin tablet Take 81 mg by mouth Daily.   2/14/2018   • oxyCODONE-acetaminophen (PERCOCET) 5-325 MG per tablet Take 1 tablet by mouth Every 6 (Six) Hours As Needed for Severe Pain . 20 tablet 0 More than a month at Unknown time     Allergies:  Ciprofloxacin; Macrodantin [nitrofurantoin macrocrystal]; Nitrofurantoin; Other; Sulfa antibiotics; and Yeast-related products    Objective      Vital Signs  Temp:  [98.6 °F (37 °C)] 98.6 °F (37 °C)  Heart Rate:  [73] 73  Resp:  [14] 14  BP: (156)/(66) 156/66    Physical Exam   Constitutional: She is oriented to person, place, and time. She appears well-developed and well-nourished.   HENT:   Mouth/Throat: Oropharynx is clear and moist.   Eyes: Conjunctivae are normal.   Neck: Neck supple.    Cardiovascular: Normal rate.    Pulmonary/Chest: Effort normal.   Abdominal: Soft.   Neurological: She is alert and oriented to person, place, and time.   Skin: Skin is warm and dry.       Results Review:   I reviewed the patient's new clinical results.      Assessment/Plan     Active Problems:    * No active hospital problems. *      Assessment:  (Dysphagia  Diarrhea  Constipation  History of colon polyps  Personal history of breast cancer. ).     Plan:   (Upper and lower tract endoscopy, risks, alternatives and benefits dicussed  with patient and patient is agreeable to proceed.).       I discussed the patients findings and my recommendations with patient and nursing staff    Augustine Gallego MD  02/16/18  9:27 AM

## 2018-02-16 NOTE — ANESTHESIA PREPROCEDURE EVALUATION
Anesthesia Evaluation     Patient summary reviewed and Nursing notes reviewed   history of anesthetic complications: PONV  NPO Solid Status: > 8 hours  NPO Liquid Status: > 4 hours           Airway   Mallampati: II  Neck ROM: full  no difficulty expected  Dental - normal exam     Pulmonary     breath sounds clear to auscultation  Cardiovascular     Rhythm: regular    (+) hypertension,       Neuro/Psych  (+) CVA,     GI/Hepatic/Renal/Endo    (+)  diabetes mellitus, hypothyroidism,     Musculoskeletal     Abdominal    Substance History      OB/GYN          Other      history of cancer                    Anesthesia Plan    ASA 3     MAC     intravenous induction   Anesthetic plan and risks discussed with patient.       Detail Level: Simple Detail Level: Zone

## 2018-02-16 NOTE — PLAN OF CARE
Problem: Patient Care Overview (Adult)  Goal: Plan of Care Review  Outcome: Ongoing (interventions implemented as appropriate)   02/16/18 0833   Coping/Psychosocial Response Interventions   Plan Of Care Reviewed With patient     Goal: Adult Individualization and Mutuality  Outcome: Ongoing (interventions implemented as appropriate)   02/16/18 0833   Individualization   Patient Specific Preferences none     Goal: Discharge Needs Assessment  Outcome: Ongoing (interventions implemented as appropriate)   02/16/18 0833   Discharge Needs Assessment   Concerns To Be Addressed no discharge needs identified   Living Environment   Transportation Available family or friend will provide       Problem: GI Endoscopy (Adult)  Goal: Signs and Symptoms of Listed Potential Problems Will be Absent or Manageable (GI Endoscopy)  Outcome: Ongoing (interventions implemented as appropriate)   02/16/18 0833   GI Endoscopy   Problems Assessed (GI Endoscopy) pain   Problems Present (GI Endoscopy) none

## 2018-02-19 LAB
CYTO UR: NORMAL
LAB AP CASE REPORT: NORMAL
Lab: NORMAL
PATH REPORT.FINAL DX SPEC: NORMAL
PATH REPORT.GROSS SPEC: NORMAL

## 2018-02-19 RX ORDER — AMLODIPINE BESYLATE 5 MG/1
TABLET ORAL
Qty: 180 TABLET | Refills: 0 | Status: SHIPPED | OUTPATIENT
Start: 2018-02-19 | End: 2018-03-14

## 2018-02-22 ENCOUNTER — OFFICE VISIT (OUTPATIENT)
Dept: MAMMOGRAPHY | Facility: CLINIC | Age: 83
End: 2018-02-22

## 2018-02-22 VITALS
HEART RATE: 82 BPM | HEIGHT: 61 IN | SYSTOLIC BLOOD PRESSURE: 130 MMHG | OXYGEN SATURATION: 94 % | BODY MASS INDEX: 29.64 KG/M2 | TEMPERATURE: 97.7 F | DIASTOLIC BLOOD PRESSURE: 60 MMHG | WEIGHT: 157 LBS | RESPIRATION RATE: 16 BRPM

## 2018-02-22 DIAGNOSIS — C50.312 MALIGNANT NEOPLASM OF LOWER-INNER QUADRANT OF LEFT BREAST IN FEMALE, ESTROGEN RECEPTOR POSITIVE (HCC): Primary | ICD-10-CM

## 2018-02-22 DIAGNOSIS — Z17.0 MALIGNANT NEOPLASM OF LOWER-INNER QUADRANT OF LEFT BREAST IN FEMALE, ESTROGEN RECEPTOR POSITIVE (HCC): Primary | ICD-10-CM

## 2018-02-22 PROCEDURE — 99205 OFFICE O/P NEW HI 60 MIN: CPT | Performed by: SURGERY

## 2018-02-22 RX ORDER — LIDOCAINE AND PRILOCAINE 25; 25 MG/G; MG/G
CREAM TOPICAL ONCE
Status: CANCELLED | OUTPATIENT
Start: 2018-03-19 | End: 2018-03-19

## 2018-02-22 RX ORDER — CEFAZOLIN SODIUM 2 G/100ML
2 INJECTION, SOLUTION INTRAVENOUS ONCE
Status: CANCELLED | OUTPATIENT
Start: 2018-03-19 | End: 2018-03-19

## 2018-02-22 RX ORDER — ACETAMINOPHEN 325 MG/1
1000 TABLET ORAL ONCE
Status: CANCELLED | OUTPATIENT
Start: 2018-03-19 | End: 2018-03-19

## 2018-02-22 RX ORDER — DIAZEPAM 2 MG/1
5 TABLET ORAL ONCE
Status: CANCELLED | OUTPATIENT
Start: 2018-03-19 | End: 2018-03-19

## 2018-02-22 NOTE — PROGRESS NOTES
Chief Complaint: Geeta Butt is a 82 y.o.. female here today for Breast Cancer (Left Breast )        History of Present Illness:  Patient presents with a recently diagnosed left breast cancer.  She was having her yearly mammograms when she was noted to have a focal asymmetry in the 7 o'clock position of the left breast.  It persisted on diagnostic imaging and on ultrasound measured 13 mm and was solid.  Biopsy was recommended and returned showing a grade 1 invasive ductal cancer that was ER positive at 85%, MI positive at 16% and HER-2 negative.  Prior to this finding the patient had not detected any masses, pain, or nipple discharge.  She has had a prior stereotactic biopsy of the left breast in 2015 that was benign.  She also had an open biopsy in 1980 that was benign in this left breast.  Her family history is significant for a mother with thyroid cancer and a father with stomach and esophageal cancer.  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      Review of Systems:  Review of Systems   Constitutional: Negative.    HENT:   Positive for hearing loss.    Eyes: Positive for eye problems.   Respiratory: Negative.    Cardiovascular: Negative.    Gastrointestinal: Negative.    Endocrine: Negative.    Musculoskeletal: Positive for back pain, flank pain and neck pain.   Skin: Negative.         The patient denies any noticeable changes to the skin of the breast.   Neurological: Positive for dizziness.   Hematological: Negative.    Psychiatric/Behavioral: Negative.         Past Medical and Surgical History:  Breast Biopsy History:  Patient has had the following breast biopsies:Left breast 1980; 2016; 2018  Breast Cancer HIstory:  Patient does not have a past medical history of breast cancer.  Breast Operations, and year:  None  History   Smoking Status   • Former Smoker   • Packs/day: 1.00    • Years: 10.00   • Start date: 1980   • Quit date: 1990   Smokeless Tobacco   • Never Used     Obstetric History:  Patient is postmenopausal, entered menopause naturally at age: 51   Number of pregnancies:5  Number of live births: 5  Number of abortions or miscarriages: 0  Age of delivery of first child: 21  Patient breast fed, for the following lenth of time:3-6months with each child  Length of time taking birth control pills:0  Patient took hormone replacement during the following dates:    Past Surgical History:   Procedure Laterality Date   • ABDOMINAL SURGERY     • COLONOSCOPY     • COLONOSCOPY N/A 2/16/2018    Procedure: COLONOSCOPY into cecum and T.I. with biopsies;  Surgeon: Augustine Gallego MD;  Location: [facility] ENDOSCOPY;  Service:    • ENDOSCOPY N/A 10/17/2017    Procedure: ESOPHAGOGASTRODUODENOSCOPY WITH COLD BIOPSIES;  Surgeon: Augustine Gallego MD;  Location: [facility] ENDOSCOPY;  Service:    • ENDOSCOPY N/A 2/16/2018    Procedure: ESOPHAGOGASTRODUODENOSCOPY with biopsies and #[address] DILATATION;  Surgeon: Augustine Gallego MD;  Location: [facility] ENDOSCOPY;  Service:    • EYE SURGERY      3-4 years, cornea replacement    • HYSTERECTOMY     • SKIN BIOPSY         Past Medical History:   Diagnosis Date   • Aspiration into airway     post anesthesia   • Cancer     mouth cancer    • Diabetes mellitus     boarerline    • Hypertension    • Hypothyroidism    • PONV (postoperative nausea and vomiting)    • Stroke      mild weakness on left, partial sight loss on right        Prior Hospitalizations, other than for surgery or childbirth, and year:  5 years    Social History:  Patient is .  Patient has 2 daughters.   Patient has 3 sons.    Family History:  Family History   Problem Relation Age of Onset   • Esophageal cancer Father    • Stomach cancer Father        Vital Signs:  Vitals:    02/22/18 1524   BP: 130/60   Pulse: 82   Resp: 16   Temp: 97.7 °F (36.5 °C)   SpO2: 94%       Medications:    Current  Outpatient Prescriptions:     Current Outpatient Prescriptions:   •  amLODIPine (NORVASC) 5 MG tablet, TAKE 2 TABLETS BY MOUTH EVERY DAY, Disp: 180 tablet, Rfl: 0  •  aspirin tablet, Take 81 mg by mouth Daily., Disp: , Rfl:   •  CARAFATE 1 GM/10ML suspension, TAKE 10 MLS BY MOUTH EVERY 6 HOURS TO COAT STOMACH, Disp: 420 mL, Rfl: 3  •  CVS VITAMIN D 2000 units capsule, TAKE ONE CAPSULE BY MOUTH EVERY DAY, Disp: 30 each, Rfl: 3  •  Fexofenadine HCl (MUCINEX ALLERGY PO), Take 600 mg by mouth Daily., Disp: , Rfl:   •  Fish Oil oil, Daily., Disp: , Rfl:   •  fluorometholone (FML) 0.1 % ophthalmic suspension, INSTILL 1 DROP EVERY DAY INTO THE LEFT EYE, Disp: , Rfl: 2  •  FLUoxetine (PROzac) 10 MG capsule, TAKE 2 ALTERNATING WITH 1 EVERY OTHER DAY, Disp: 135 capsule, Rfl: 1  •  glucose blood (FREESTYLE TEST STRIPS) test strip, TEST BLOOD SUGARS ONCE DAILY, Disp: 50 each, Rfl: 12  •  ketotifen (ZADITOR) 0.025 % ophthalmic solution, 1 drop As Needed., Disp: , Rfl:   •  levothyroxine (SYNTHROID) 75 MCG tablet, Take 1 tablet by mouth Daily., Disp: 30 tablet, Rfl: 2  •  oxyCODONE-acetaminophen (PERCOCET) 5-325 MG per tablet, Take 1 tablet by mouth Every 6 (Six) Hours As Needed for Severe Pain ., Disp: 20 tablet, Rfl: 0  •  pantoprazole (PROTONIX) 40 MG EC tablet, Take 1 tablet by mouth 2 (Two) Times a Day Before Meals., Disp: 60 tablet, Rfl: 3  •  Probiotic Product (PROBIOTIC DAILY PO), Take  by mouth., Disp: , Rfl:   •  sodium chloride 5 % ophthalmic ointment, 1 drop As Needed (dryness)., Disp: , Rfl:     Physical Examination:  General Appearance:   Patient is in no distress.  She is well kept and has an average build.   Psychiatric:  Patient with appropriate mood and affect. Alert and oriented to self, time, and place.    Breast, RIGHT:  small sized, symmetric with the contralateral side.  Breast skin is without erythema, edema, rashes.  There are no visible abnormalities upon inspection during the arm-raising maneuver or  with hands on hips in the sitting position. There is no nipple retraction, discharge or nipple/areolar skin changes.There are no masses palpable in the sitting or supine positions.    Breast, LEFT:  small sized, symmetric with the contralateral side.  Breast skin is without erythema, edema, rashes.  with the patient sitting, there is some subtle indentation in the 7 o'clock position. There is no nipple retraction, discharge or nipple/areolar skin changes.There is a small biopsy scar in the 5 o'clock position.  I cannot feel a distinct mass today.    Lymphatic:  There is no axillary, cervical, infraclavicular, or supraclavicular adenopathy bilaterally.  Eyes:  Pupils are round and reactive to light.  Cardiovascular:  Heart rate and rhythm are regular.  Respiratory:  Lungs are clear bilaterally with no crackles or wheezes in any lung field.  Gastrointestinal:  Abdomen is soft, nondistended, and nontender.  There was no evidence of hepatosplenomegaly.  There scars from her previous hysterectomy and cholecystectomy.    Musculoskeletal:  Good strength in all 4 extremities.   There is good range of motion in both shoulders.    Skin:  No new skin lesions or rashes on the skin excluding the breast (see breast exam above).  She does have some capillary hemangiomas of the chest.    Assessment:  1. Malignant neoplasm of lower-inner quadrant of left breast in female, estrogen receptor positive          Plan:  She was accompanied today by HER-2 daughters.  Face-to-face office visit lasted 1 hour with 45 minutes spent in consultation regarding her options.    We began the conversation discussing her pathology report.  We talked about the origin of most of breast cancers from either the ducts or the lobules.  The difference between invasive and in situ disease was explained and the visual was drawn for her.  When invasion has occurred, the lymph nodes need to be evaluated.  When there is in situ disease the lymph nodes should be  considered if the area of concern is widespread or it is high-grade.  We also discussed the significance of hormone receptors.  They often can give us some idea how the breast cancer will behave and potentially lead us to offer neoadjuvant chemotherapy.    Next we discussed the surgical options which include breast conserving therapy versus a mastectomy.  With breast conserving therapy we are talking about a lumpectomy with margins, lymph node evaluation, and radiation treatment.  The radiation treatment is generally given to the entire breast for 6 weeks.  The side effects consist of local skin change and potential injury to nearby structures such as the lung or the heart.  A mastectomy would involve removing the breast tissues with preservation of the pectoral muscles and evaluation of the lymph nodes if indicated.  The potential for reconstruction by either an implant or autologous tissue was discussed.  This could be performed on a delayed basis or immediately depending on a multitude of factors.  The survival rates for these 2 procedures are equivalent but there are incidences where one may be favored over the other.  There are times when a lumpectomy is not possible due to the large tumor to breast ratio or the location of the tumor.  Previous radiation to the chest wall or collagen disorders such as scleroderma would make radiation treatment and possible and therefore exclude breast conserving therapy as an option.    The patient prefers breast conserving surgery and I think is a good candidate for this.  We have discussed what is involved with needle localization and sentinel lymph node biopsy and injection.  The patient understands the small risk of a positive margin or the need to go back for an axillary dissection if 3 or more lymph nodes are involved.  She does not appear to be a candidate for genetic testing and I don't think we would benefit much from an MRI.  She does wish to go forward of those  arrangements are being made.      CPT coding:    Next Appointment:  No Follow-up on file.            EMR Dragon/transcription disclaimer:    Much of this encounter note is an electronic transcription/translocation of spoken language to printed text.  The electronic translation of spoken language may permit erroneous, or at times, nonsensical words or phrases to be inadvertently transcribed.  Although I have reviewed the note from such areas, some may still exist.

## 2018-02-23 DIAGNOSIS — C50.312 MALIGNANT NEOPLASM OF LOWER-INNER QUADRANT OF LEFT FEMALE BREAST, UNSPECIFIED ESTROGEN RECEPTOR STATUS (HCC): Primary | ICD-10-CM

## 2018-02-27 ENCOUNTER — TELEPHONE (OUTPATIENT)
Dept: FAMILY MEDICINE CLINIC | Facility: CLINIC | Age: 83
End: 2018-02-27

## 2018-02-27 PROBLEM — Z17.0 MALIGNANT NEOPLASM OF LOWER-INNER QUADRANT OF LEFT BREAST IN FEMALE, ESTROGEN RECEPTOR POSITIVE: Status: ACTIVE | Noted: 2018-02-27

## 2018-02-27 PROBLEM — C50.312 MALIGNANT NEOPLASM OF LOWER-INNER QUADRANT OF LEFT BREAST IN FEMALE, ESTROGEN RECEPTOR POSITIVE: Status: ACTIVE | Noted: 2018-02-27

## 2018-02-27 NOTE — TELEPHONE ENCOUNTER
PLEASE CALL [phone]  TO GET THIS MED FILLED WETHER TO CUT TO ONE A DAY OR KEEP HER AT 2 A DAY THIS IS THE APPROVAL LINE FOR HER MEDS SHE DOESN'T KNOW WHICH MED

## 2018-03-06 RX ORDER — SUCRALFATE 1 G/10ML
SUSPENSION ORAL
Qty: 420 ML | Refills: 3 | Status: SHIPPED | OUTPATIENT
Start: 2018-03-06 | End: 2018-03-14

## 2018-03-09 ENCOUNTER — APPOINTMENT (OUTPATIENT)
Dept: PREADMISSION TESTING | Facility: HOSPITAL | Age: 83
End: 2018-03-09

## 2018-03-14 ENCOUNTER — HOSPITAL ENCOUNTER (OUTPATIENT)
Dept: GENERAL RADIOLOGY | Facility: HOSPITAL | Age: 83
Discharge: HOME | End: 2018-03-14
Admitting: SURGERY

## 2018-03-14 ENCOUNTER — APPOINTMENT (OUTPATIENT)
Dept: PREADMISSION TESTING | Facility: HOSPITAL | Age: 83
End: 2018-03-14

## 2018-03-14 VITALS
RESPIRATION RATE: 20 BRPM | DIASTOLIC BLOOD PRESSURE: 66 MMHG | OXYGEN SATURATION: 98 % | WEIGHT: 157.8 LBS | HEIGHT: 62 IN | TEMPERATURE: 97.7 F | SYSTOLIC BLOOD PRESSURE: 155 MMHG | BODY MASS INDEX: 29.04 KG/M2 | HEART RATE: 74 BPM

## 2018-03-14 DIAGNOSIS — C50.312 MALIGNANT NEOPLASM OF LOWER-INNER QUADRANT OF LEFT BREAST IN FEMALE, ESTROGEN RECEPTOR POSITIVE (HCC): ICD-10-CM

## 2018-03-14 DIAGNOSIS — Z17.0 MALIGNANT NEOPLASM OF LOWER-INNER QUADRANT OF LEFT BREAST IN FEMALE, ESTROGEN RECEPTOR POSITIVE (HCC): ICD-10-CM

## 2018-03-14 LAB
ALBUMIN SERPL-MCNC: 4.1 G/DL (ref 3.5–5.2)
ALBUMIN/GLOB SERPL: 1.3 G/DL
ALP SERPL-CCNC: 57 U/L (ref 39–117)
ALT SERPL W P-5'-P-CCNC: 16 U/L (ref 1–33)
ANION GAP SERPL CALCULATED.3IONS-SCNC: 10.9 MMOL/L
AST SERPL-CCNC: 14 U/L (ref 1–32)
BILIRUB SERPL-MCNC: 0.2 MG/DL (ref 0.1–1.2)
BUN BLD-MCNC: 22 MG/DL (ref 8–23)
BUN/CREAT SERPL: 20.8 (ref 7–25)
CALCIUM SPEC-SCNC: 10.2 MG/DL (ref 8.6–10.5)
CHLORIDE SERPL-SCNC: 100 MMOL/L (ref 98–107)
CO2 SERPL-SCNC: 28.1 MMOL/L (ref 22–29)
CREAT BLD-MCNC: 1.06 MG/DL (ref 0.57–1)
DEPRECATED RDW RBC AUTO: 41.5 FL (ref 37–54)
ERYTHROCYTE [DISTWIDTH] IN BLOOD BY AUTOMATED COUNT: 13.1 % (ref 11.7–13)
GFR SERPL CREATININE-BSD FRML MDRD: 50 ML/MIN/1.73
GLOBULIN UR ELPH-MCNC: 3.1 GM/DL
GLUCOSE BLD-MCNC: 99 MG/DL (ref 65–99)
HCT VFR BLD AUTO: 37.1 % (ref 35.6–45.5)
HGB BLD-MCNC: 11.9 G/DL (ref 11.9–15.5)
MCH RBC QN AUTO: 27.5 PG (ref 26.9–32)
MCHC RBC AUTO-ENTMCNC: 32.1 G/DL (ref 32.4–36.3)
MCV RBC AUTO: 85.7 FL (ref 80.5–98.2)
PLATELET # BLD AUTO: 324 10*3/MM3 (ref 140–500)
PMV BLD AUTO: 9.5 FL (ref 6–12)
POTASSIUM BLD-SCNC: 4.5 MMOL/L (ref 3.5–5.2)
PROT SERPL-MCNC: 7.2 G/DL (ref 6–8.5)
RBC # BLD AUTO: 4.33 10*6/MM3 (ref 3.9–5.2)
SODIUM BLD-SCNC: 139 MMOL/L (ref 136–145)
WBC NRBC COR # BLD: 6.9 10*3/MM3 (ref 4.5–10.7)

## 2018-03-14 PROCEDURE — 36415 COLL VENOUS BLD VENIPUNCTURE: CPT

## 2018-03-14 PROCEDURE — 93010 ELECTROCARDIOGRAM REPORT: CPT | Performed by: INTERNAL MEDICINE

## 2018-03-14 PROCEDURE — 80053 COMPREHEN METABOLIC PANEL: CPT | Performed by: SURGERY

## 2018-03-14 PROCEDURE — 71046 X-RAY EXAM CHEST 2 VIEWS: CPT

## 2018-03-14 PROCEDURE — 93005 ELECTROCARDIOGRAM TRACING: CPT

## 2018-03-14 PROCEDURE — 85027 COMPLETE CBC AUTOMATED: CPT | Performed by: SURGERY

## 2018-03-14 RX ORDER — GUAIFENESIN, PSEUDOEPHEDRINE HYDROCHLORIDE 600; 60 MG/1; MG/1
1 TABLET, EXTENDED RELEASE ORAL DAILY PRN
COMMUNITY
End: 2018-04-05

## 2018-03-14 RX ORDER — RANITIDINE 150 MG/1
150 TABLET ORAL 2 TIMES DAILY
COMMUNITY
End: 2018-04-05

## 2018-03-14 RX ORDER — MULTIVIT-MIN/IRON/FOLIC ACID/K 18-600-40
2000 CAPSULE ORAL DAILY
COMMUNITY
End: 2018-04-10 | Stop reason: SDUPTHER

## 2018-03-14 RX ORDER — SUCRALFATE ORAL 1 G/10ML
1 SUSPENSION ORAL AS NEEDED
COMMUNITY
End: 2019-04-04 | Stop reason: SDUPTHER

## 2018-03-14 RX ORDER — AMLODIPINE BESYLATE 5 MG/1
5 TABLET ORAL EVERY 12 HOURS
COMMUNITY
End: 2018-09-06 | Stop reason: SDUPTHER

## 2018-03-14 RX ORDER — FLUOXETINE 10 MG/1
CAPSULE ORAL DAILY
COMMUNITY
End: 2018-07-10 | Stop reason: SDUPTHER

## 2018-03-14 RX ORDER — FLUOROMETHOLONE 0.1 %
1 SUSPENSION, DROPS(FINAL DOSAGE FORM)(ML) OPHTHALMIC (EYE) DAILY
COMMUNITY

## 2018-03-14 NOTE — DISCHARGE INSTRUCTIONS
Take the following medications the morning of surgery with a small sip of water: AMLODIPINE AND ZANTAC  STOP PREOPERATIVELY ANY ANTIINFLAMMATORY, HERBAL, ASPIRIN, FISH OIL;  TRY TO NOT TAKE YOUR MUCINEX D A COUPLE OF DAY PRIOR TO SURGERY  ARRIVE TO THE OUTPATIENT SURGERY DESK THE DAY OF YOUR SURGERY BY 11AM        General Instructions:  • Do not eat solid food after midnight the night before surgery.  • You may drink clear liquids day of surgery but must stop at least one hour before your hospital arrival time (LAST SIP BY 10AM).  • It is beneficial for you to have a clear drink that contains carbohydrates the day of surgery.  We suggest a 12 to 20 ounce bottle of Gatorade or Powerade for non-diabetic patients or a 12 to 20 ounce bottle of G2 or Powerade Zero for diabetic patients. (Pediatric patients, are not advised to drink a 12 to 20 ounce carbohydrate drink)    Clear liquids are liquids you can see through.  Nothing red in color.     Plain water                               Sports drinks  Sodas                                   Gelatin (Jell-O)  Fruit juices without pulp such as white grape juice and apple juice  Popsicles that contain no fruit or yogurt  Tea or coffee (no cream or milk added)  Gatorade / Powerade  G2 / Powerade Zero    • Infants may have breast milk up to four hours before surgery.  • Infants drinking formula may drink formula up to six hours before surgery.   • Patients who avoid smoking, chewing tobacco and alcohol for 4 weeks prior to surgery have a reduced risk of post-operative complications.  Quit smoking as many days before surgery as you can.  • Do not smoke, use chewing tobacco or drink alcohol the day of surgery.   • If applicable bring your C-PAP/ BI-PAP machine.  • Bring any papers given to you in the doctor’s office.  • Wear clean comfortable clothes and socks.  • Do not wear contact lenses or make-up.  Bring a case for your glasses.   • Bring crutches or walker if  applicable.  • Remove all piercings.  Leave jewelry and any other valuables at home.  • Hair extensions with metal clips must be removed prior to surgery.  • The Pre-Admission Testing nurse will instruct you to bring medications if unable to obtain an accurate list in Pre-Admission Testing.        If you were given a blood bank ID arm band remember to bring it with you the day of surgery.    Preventing a Surgical Site Infection:  • For 2 to 3 days before surgery, avoid shaving with a razor because the razor can irritate skin and make it easier to develop an infection.  • The night prior to surgery sleep in a clean bed with clean clothing.  Do not allow pets to sleep with you.  • Shower on the morning of surgery using a fresh bar of anti-bacterial soap (such as Dial) and clean washcloth.  Dry with a clean towel and dress in clean clothing.  • Ask your surgeon if you will be receiving antibiotics prior to surgery.  • Make sure you, your family, and all healthcare providers clean their hands with soap and water or an alcohol based hand  before caring for you or your wound.    Day of surgery:  Upon arrival, a Pre-op nurse and Anesthesiologist will review your health history, obtain vital signs, and answer questions you may have.  The only belongings needed at this time will be your home medications and if applicable your C-PAP/BI-PAP machine.  If you are staying overnight your family can leave the rest of your belongings in the car and bring them to your room later.  A Pre-op nurse will start an IV and you may receive medication in preparation for surgery, including something to help you relax.  Your family will be able to see you in the Pre-op area.  While you are in surgery your family should notify the waiting room  if they leave the waiting room area and provide a contact phone number.    Please be aware that surgery does come with discomfort.  We want to make every effort to control your  discomfort so please discuss any uncontrolled symptoms with your nurse.   Your doctor will most likely have prescribed pain medications.      If you are going home after surgery you will receive individualized written care instructions before being discharged.  A responsible adult must drive you to and from the hospital on the day of your surgery and stay with you for 24 hours.    If you are staying overnight following surgery, you will be transported to your hospital room following the recovery period.  [facility] has all private rooms.    If you have any questions please call Pre-Admission Testing at [phone].  Deductibles and co-payments are collected on the day of service. Please be prepared to pay the required co-pay, deductible or deposit on the day of service as defined by your plan.

## 2018-03-19 ENCOUNTER — ANESTHESIA (OUTPATIENT)
Dept: PERIOP | Facility: HOSPITAL | Age: 83
End: 2018-03-19

## 2018-03-19 ENCOUNTER — ANESTHESIA EVENT (OUTPATIENT)
Dept: PERIOP | Facility: HOSPITAL | Age: 83
End: 2018-03-19

## 2018-03-19 ENCOUNTER — HOSPITAL ENCOUNTER (OUTPATIENT)
Dept: NUCLEAR MEDICINE | Facility: HOSPITAL | Age: 83
Discharge: HOME | End: 2018-03-19
Attending: SURGERY

## 2018-03-19 ENCOUNTER — APPOINTMENT (OUTPATIENT)
Dept: GENERAL RADIOLOGY | Facility: HOSPITAL | Age: 83
End: 2018-03-19

## 2018-03-19 ENCOUNTER — HOSPITAL ENCOUNTER (OUTPATIENT)
Facility: HOSPITAL | Age: 83
Setting detail: HOSPITAL OUTPATIENT SURGERY
Discharge: HOME | End: 2018-03-19
Attending: SURGERY | Admitting: SURGERY

## 2018-03-19 ENCOUNTER — HOSPITAL ENCOUNTER (OUTPATIENT)
Dept: MAMMOGRAPHY | Facility: HOSPITAL | Age: 83
Discharge: HOME | End: 2018-03-19
Attending: SURGERY

## 2018-03-19 VITALS
DIASTOLIC BLOOD PRESSURE: 56 MMHG | RESPIRATION RATE: 20 BRPM | WEIGHT: 158.1 LBS | BODY MASS INDEX: 29.39 KG/M2 | SYSTOLIC BLOOD PRESSURE: 139 MMHG | TEMPERATURE: 98.2 F | OXYGEN SATURATION: 96 % | HEART RATE: 75 BPM

## 2018-03-19 DIAGNOSIS — Z17.0 MALIGNANT NEOPLASM OF LOWER-INNER QUADRANT OF LEFT BREAST IN FEMALE, ESTROGEN RECEPTOR POSITIVE (HCC): ICD-10-CM

## 2018-03-19 DIAGNOSIS — C80.1 CANCER (HCC): ICD-10-CM

## 2018-03-19 DIAGNOSIS — C50.312 MALIGNANT NEOPLASM OF LOWER-INNER QUADRANT OF LEFT BREAST IN FEMALE, ESTROGEN RECEPTOR POSITIVE (HCC): ICD-10-CM

## 2018-03-19 LAB — GLUCOSE BLDC GLUCOMTR-MCNC: 104 MG/DL (ref 70–130)

## 2018-03-19 PROCEDURE — 25010000002 PROPOFOL 1000 MG/ML EMULSION: Performed by: ANESTHESIOLOGY

## 2018-03-19 PROCEDURE — 0 TECHNETIUM FILTERED SULFUR COLLOID: Performed by: SURGERY

## 2018-03-19 PROCEDURE — 25010000002 DEXAMETHASONE PER 1 MG: Performed by: ANESTHESIOLOGY

## 2018-03-19 PROCEDURE — 38525 BIOPSY/REMOVAL LYMPH NODES: CPT | Performed by: SURGERY

## 2018-03-19 PROCEDURE — 25010000002 PROMETHAZINE PER 50 MG: Performed by: ANESTHESIOLOGY

## 2018-03-19 PROCEDURE — 82962 GLUCOSE BLOOD TEST: CPT

## 2018-03-19 PROCEDURE — 25010000002 ONDANSETRON PER 1 MG: Performed by: ANESTHESIOLOGY

## 2018-03-19 PROCEDURE — 25010000002 FENTANYL CITRATE (PF) 100 MCG/2ML SOLUTION: Performed by: ANESTHESIOLOGY

## 2018-03-19 PROCEDURE — 38792 RA TRACER ID OF SENTINL NODE: CPT

## 2018-03-19 PROCEDURE — A9541 TC99M SULFUR COLLOID: HCPCS | Performed by: SURGERY

## 2018-03-19 PROCEDURE — 38900 IO MAP OF SENT LYMPH NODE: CPT | Performed by: SURGERY

## 2018-03-19 PROCEDURE — 19301 PARTIAL MASTECTOMY: CPT | Performed by: SURGERY

## 2018-03-19 PROCEDURE — 25010000002 PROPOFOL 10 MG/ML EMULSION: Performed by: ANESTHESIOLOGY

## 2018-03-19 PROCEDURE — 88307 TISSUE EXAM BY PATHOLOGIST: CPT | Performed by: SURGERY

## 2018-03-19 PROCEDURE — 76098 X-RAY EXAM SURGICAL SPECIMEN: CPT

## 2018-03-19 PROCEDURE — 25010000003 CEFAZOLIN IN DEXTROSE 2-4 GM/100ML-% SOLUTION: Performed by: SURGERY

## 2018-03-19 RX ORDER — ONDANSETRON 2 MG/ML
INJECTION INTRAMUSCULAR; INTRAVENOUS AS NEEDED
Status: DISCONTINUED | OUTPATIENT
Start: 2018-03-19 | End: 2018-03-19 | Stop reason: SURG

## 2018-03-19 RX ORDER — SODIUM CHLORIDE, SODIUM LACTATE, POTASSIUM CHLORIDE, CALCIUM CHLORIDE 600; 310; 30; 20 MG/100ML; MG/100ML; MG/100ML; MG/100ML
9 INJECTION, SOLUTION INTRAVENOUS CONTINUOUS
Status: DISCONTINUED | OUTPATIENT
Start: 2018-03-19 | End: 2018-03-19 | Stop reason: HOSPADM

## 2018-03-19 RX ORDER — FAMOTIDINE 10 MG/ML
20 INJECTION, SOLUTION INTRAVENOUS ONCE
Status: COMPLETED | OUTPATIENT
Start: 2018-03-19 | End: 2018-03-19

## 2018-03-19 RX ORDER — PROPOFOL 10 MG/ML
VIAL (ML) INTRAVENOUS AS NEEDED
Status: DISCONTINUED | OUTPATIENT
Start: 2018-03-19 | End: 2018-03-19 | Stop reason: SURG

## 2018-03-19 RX ORDER — ONDANSETRON 2 MG/ML
4 INJECTION INTRAMUSCULAR; INTRAVENOUS ONCE AS NEEDED
Status: COMPLETED | OUTPATIENT
Start: 2018-03-19 | End: 2018-03-19

## 2018-03-19 RX ORDER — LIDOCAINE HYDROCHLORIDE 10 MG/ML
0.5 INJECTION, SOLUTION EPIDURAL; INFILTRATION; INTRACAUDAL; PERINEURAL ONCE AS NEEDED
Status: DISCONTINUED | OUTPATIENT
Start: 2018-03-19 | End: 2018-03-19 | Stop reason: HOSPADM

## 2018-03-19 RX ORDER — DIPHENHYDRAMINE HYDROCHLORIDE 50 MG/ML
6.25 INJECTION INTRAMUSCULAR; INTRAVENOUS
Status: DISCONTINUED | OUTPATIENT
Start: 2018-03-19 | End: 2018-03-19 | Stop reason: HOSPADM

## 2018-03-19 RX ORDER — ONDANSETRON 2 MG/ML
4 INJECTION INTRAMUSCULAR; INTRAVENOUS ONCE AS NEEDED
Status: DISCONTINUED | OUTPATIENT
Start: 2018-03-19 | End: 2018-03-19

## 2018-03-19 RX ORDER — FLUMAZENIL 0.1 MG/ML
0.2 INJECTION INTRAVENOUS AS NEEDED
Status: DISCONTINUED | OUTPATIENT
Start: 2018-03-19 | End: 2018-03-19 | Stop reason: HOSPADM

## 2018-03-19 RX ORDER — ACETAMINOPHEN 325 MG/1
1000 TABLET ORAL ONCE
Status: COMPLETED | OUTPATIENT
Start: 2018-03-19 | End: 2018-03-19

## 2018-03-19 RX ORDER — PROMETHAZINE HYDROCHLORIDE 25 MG/ML
6.25 INJECTION, SOLUTION INTRAMUSCULAR; INTRAVENOUS ONCE AS NEEDED
Status: DISCONTINUED | OUTPATIENT
Start: 2018-03-19 | End: 2018-03-19 | Stop reason: HOSPADM

## 2018-03-19 RX ORDER — DIAZEPAM 2 MG/1
5 TABLET ORAL ONCE
Status: COMPLETED | OUTPATIENT
Start: 2018-03-19 | End: 2018-03-19

## 2018-03-19 RX ORDER — EPHEDRINE SULFATE 50 MG/ML
5 INJECTION, SOLUTION INTRAVENOUS ONCE AS NEEDED
Status: DISCONTINUED | OUTPATIENT
Start: 2018-03-19 | End: 2018-03-19 | Stop reason: HOSPADM

## 2018-03-19 RX ORDER — LIDOCAINE AND PRILOCAINE 25; 25 MG/G; MG/G
CREAM TOPICAL ONCE
Status: COMPLETED | OUTPATIENT
Start: 2018-03-19 | End: 2018-03-19

## 2018-03-19 RX ORDER — HYDROCODONE BITARTRATE AND ACETAMINOPHEN 7.5; 325 MG/1; MG/1
1 TABLET ORAL ONCE AS NEEDED
Status: DISCONTINUED | OUTPATIENT
Start: 2018-03-19 | End: 2018-03-19 | Stop reason: HOSPADM

## 2018-03-19 RX ORDER — SCOLOPAMINE TRANSDERMAL SYSTEM 1 MG/1
1 PATCH, EXTENDED RELEASE TRANSDERMAL ONCE
Status: DISCONTINUED | OUTPATIENT
Start: 2018-03-19 | End: 2018-03-19 | Stop reason: HOSPADM

## 2018-03-19 RX ORDER — FENTANYL CITRATE 50 UG/ML
100 INJECTION, SOLUTION INTRAMUSCULAR; INTRAVENOUS
Status: DISCONTINUED | OUTPATIENT
Start: 2018-03-19 | End: 2018-03-19 | Stop reason: HOSPADM

## 2018-03-19 RX ORDER — MAGNESIUM HYDROXIDE 1200 MG/15ML
LIQUID ORAL AS NEEDED
Status: DISCONTINUED | OUTPATIENT
Start: 2018-03-19 | End: 2018-03-19 | Stop reason: HOSPADM

## 2018-03-19 RX ORDER — SODIUM CHLORIDE 0.9 % (FLUSH) 0.9 %
1-10 SYRINGE (ML) INJECTION AS NEEDED
Status: DISCONTINUED | OUTPATIENT
Start: 2018-03-19 | End: 2018-03-19 | Stop reason: HOSPADM

## 2018-03-19 RX ORDER — PROMETHAZINE HYDROCHLORIDE 25 MG/1
25 TABLET ORAL ONCE AS NEEDED
Status: DISCONTINUED | OUTPATIENT
Start: 2018-03-19 | End: 2018-03-19 | Stop reason: HOSPADM

## 2018-03-19 RX ORDER — HYDROCODONE BITARTRATE AND ACETAMINOPHEN 5; 325 MG/1; MG/1
1 TABLET ORAL ONCE AS NEEDED
Status: COMPLETED | OUTPATIENT
Start: 2018-03-19 | End: 2018-03-19

## 2018-03-19 RX ORDER — HYDROCODONE BITARTRATE AND ACETAMINOPHEN 5; 325 MG/1; MG/1
1-2 TABLET ORAL EVERY 4 HOURS PRN
Qty: 15 TABLET | Refills: 0 | Status: SHIPPED | OUTPATIENT
Start: 2018-03-19 | End: 2018-04-05

## 2018-03-19 RX ORDER — LABETALOL HYDROCHLORIDE 5 MG/ML
5 INJECTION, SOLUTION INTRAVENOUS
Status: DISCONTINUED | OUTPATIENT
Start: 2018-03-19 | End: 2018-03-19 | Stop reason: HOSPADM

## 2018-03-19 RX ORDER — LIDOCAINE HYDROCHLORIDE 10 MG/ML
10 INJECTION, SOLUTION INFILTRATION; PERINEURAL ONCE
Status: COMPLETED | OUTPATIENT
Start: 2018-03-19 | End: 2018-03-19

## 2018-03-19 RX ORDER — HYDRALAZINE HYDROCHLORIDE 20 MG/ML
5 INJECTION INTRAMUSCULAR; INTRAVENOUS
Status: DISCONTINUED | OUTPATIENT
Start: 2018-03-19 | End: 2018-03-19 | Stop reason: HOSPADM

## 2018-03-19 RX ORDER — MIDAZOLAM HYDROCHLORIDE 1 MG/ML
2 INJECTION INTRAMUSCULAR; INTRAVENOUS
Status: DISCONTINUED | OUTPATIENT
Start: 2018-03-19 | End: 2018-03-19 | Stop reason: HOSPADM

## 2018-03-19 RX ORDER — DIAZEPAM 5 MG/1
TABLET ORAL
Status: COMPLETED
Start: 2018-03-19 | End: 2018-03-19

## 2018-03-19 RX ORDER — PROMETHAZINE HYDROCHLORIDE 25 MG/ML
6.25 INJECTION, SOLUTION INTRAMUSCULAR; INTRAVENOUS ONCE
Status: COMPLETED | OUTPATIENT
Start: 2018-03-19 | End: 2018-03-19

## 2018-03-19 RX ORDER — BUPIVACAINE HYDROCHLORIDE AND EPINEPHRINE 2.5; 5 MG/ML; UG/ML
INJECTION, SOLUTION INFILTRATION; PERINEURAL AS NEEDED
Status: DISCONTINUED | OUTPATIENT
Start: 2018-03-19 | End: 2018-03-19 | Stop reason: HOSPADM

## 2018-03-19 RX ORDER — MIDAZOLAM HYDROCHLORIDE 1 MG/ML
1 INJECTION INTRAMUSCULAR; INTRAVENOUS
Status: DISCONTINUED | OUTPATIENT
Start: 2018-03-19 | End: 2018-03-19 | Stop reason: HOSPADM

## 2018-03-19 RX ORDER — CEFAZOLIN SODIUM 2 G/100ML
2 INJECTION, SOLUTION INTRAVENOUS ONCE
Status: COMPLETED | OUTPATIENT
Start: 2018-03-19 | End: 2018-03-19

## 2018-03-19 RX ORDER — FENTANYL CITRATE 50 UG/ML
INJECTION, SOLUTION INTRAMUSCULAR; INTRAVENOUS AS NEEDED
Status: DISCONTINUED | OUTPATIENT
Start: 2018-03-19 | End: 2018-03-19 | Stop reason: SURG

## 2018-03-19 RX ORDER — PROMETHAZINE HYDROCHLORIDE 25 MG/1
25 SUPPOSITORY RECTAL ONCE AS NEEDED
Status: DISCONTINUED | OUTPATIENT
Start: 2018-03-19 | End: 2018-03-19 | Stop reason: HOSPADM

## 2018-03-19 RX ORDER — DEXAMETHASONE SODIUM PHOSPHATE 10 MG/ML
INJECTION INTRAMUSCULAR; INTRAVENOUS AS NEEDED
Status: DISCONTINUED | OUTPATIENT
Start: 2018-03-19 | End: 2018-03-19 | Stop reason: SURG

## 2018-03-19 RX ORDER — OXYCODONE HYDROCHLORIDE AND ACETAMINOPHEN 5; 325 MG/1; MG/1
2 TABLET ORAL ONCE AS NEEDED
Status: DISCONTINUED | OUTPATIENT
Start: 2018-03-19 | End: 2018-03-19 | Stop reason: HOSPADM

## 2018-03-19 RX ORDER — FENTANYL CITRATE 50 UG/ML
50 INJECTION, SOLUTION INTRAMUSCULAR; INTRAVENOUS
Status: DISCONTINUED | OUTPATIENT
Start: 2018-03-19 | End: 2018-03-19 | Stop reason: HOSPADM

## 2018-03-19 RX ADMIN — DEXAMETHASONE SODIUM PHOSPHATE 8 MG: 10 INJECTION INTRAMUSCULAR; INTRAVENOUS at 15:17

## 2018-03-19 RX ADMIN — PROMETHAZINE HYDROCHLORIDE 6.25 MG: 25 INJECTION INTRAMUSCULAR; INTRAVENOUS at 13:52

## 2018-03-19 RX ADMIN — ONDANSETRON 4 MG: 2 INJECTION, SOLUTION INTRAMUSCULAR; INTRAVENOUS at 17:38

## 2018-03-19 RX ADMIN — PROPOFOL 140 MCG/KG/MIN: 10 INJECTION, EMULSION INTRAVENOUS at 15:10

## 2018-03-19 RX ADMIN — SODIUM CHLORIDE, POTASSIUM CHLORIDE, SODIUM LACTATE AND CALCIUM CHLORIDE 9 ML/HR: 600; 310; 30; 20 INJECTION, SOLUTION INTRAVENOUS at 13:53

## 2018-03-19 RX ADMIN — LIDOCAINE HYDROCHLORIDE 10 ML: 10 INJECTION, SOLUTION INFILTRATION; PERINEURAL at 13:10

## 2018-03-19 RX ADMIN — FENTANYL CITRATE 25 MCG: 50 INJECTION INTRAMUSCULAR; INTRAVENOUS at 16:00

## 2018-03-19 RX ADMIN — HYDROCODONE BITARTATE AND ACETAMINOPHEN 1 TABLET: 5; 325 TABLET ORAL at 17:40

## 2018-03-19 RX ADMIN — LIDOCAINE AND PRILOCAINE 1 APPLICATION: 25; 25 CREAM TOPICAL at 11:28

## 2018-03-19 RX ADMIN — FENTANYL CITRATE 50 MCG: 50 INJECTION INTRAMUSCULAR; INTRAVENOUS at 17:15

## 2018-03-19 RX ADMIN — SCOPALAMINE 1 PATCH: 1 PATCH, EXTENDED RELEASE TRANSDERMAL at 13:53

## 2018-03-19 RX ADMIN — FAMOTIDINE 20 MG: 10 INJECTION INTRAVENOUS at 13:52

## 2018-03-19 RX ADMIN — ACETAMINOPHEN 975 MG: 325 TABLET ORAL at 11:28

## 2018-03-19 RX ADMIN — DIAZEPAM 5 MG: 5 TABLET ORAL at 11:29

## 2018-03-19 RX ADMIN — PROPOFOL 80 MG: 10 INJECTION, EMULSION INTRAVENOUS at 15:11

## 2018-03-19 RX ADMIN — DIAZEPAM 5 MG: 2 TABLET ORAL at 11:29

## 2018-03-19 RX ADMIN — FENTANYL CITRATE 25 MCG: 50 INJECTION INTRAMUSCULAR; INTRAVENOUS at 15:13

## 2018-03-19 RX ADMIN — TECHNETIUM TC 99M SULFUR COLLOID 1 DOSE: KIT at 13:18

## 2018-03-19 RX ADMIN — FENTANYL CITRATE 25 MCG: 50 INJECTION INTRAMUSCULAR; INTRAVENOUS at 15:40

## 2018-03-19 RX ADMIN — ONDANSETRON 4 MG: 2 INJECTION INTRAMUSCULAR; INTRAVENOUS at 15:35

## 2018-03-19 RX ADMIN — FENTANYL CITRATE 25 MCG: 50 INJECTION INTRAMUSCULAR; INTRAVENOUS at 15:22

## 2018-03-19 RX ADMIN — CEFAZOLIN SODIUM 2 G: 2 INJECTION, SOLUTION INTRAVENOUS at 15:05

## 2018-03-19 RX ADMIN — PROPOFOL 50 MG: 10 INJECTION, EMULSION INTRAVENOUS at 15:38

## 2018-03-19 RX ADMIN — PROPOFOL 50 MG: 10 INJECTION, EMULSION INTRAVENOUS at 15:18

## 2018-03-19 NOTE — ANESTHESIA POSTPROCEDURE EVALUATION
Patient: Geeta Butt    Procedure Summary     Date:  03/19/18 Room / Location:   [facility]  [facility]    Anesthesia Start:  1505 Anesthesia Stop:  1703    Procedure:  BREAST LUMPECTOMY WITH SENTINEL NODE BIOPSY AND NEEDLE LOCALIZATION (Left Breast) Diagnosis:       Malignant neoplasm of lower-inner quadrant of left breast in female, estrogen receptor positive      (Malignant neoplasm of lower-inner quadrant of left breast in female, estrogen receptor positive [C50.312, Z17.0])    Surgeon:  Myles Soliman MD Provider:  Osmin Burks MD    Anesthesia Type:  general ASA Status:  3          Anesthesia Type: general  Last vitals  BP   139/56 (03/19/18 1800)   Temp   36.8 °C (98.2 °F) (03/19/18 1745)   Pulse   75 (03/19/18 1800)   Resp   20 (03/19/18 1800)     SpO2   96 % (03/19/18 1800)     Post Anesthesia Care and Evaluation    Patient location during evaluation: bedside  Patient participation: complete - patient participated  Level of consciousness: awake  Pain management: adequate  Airway patency: patent  Anesthetic complications: No anesthetic complications    Cardiovascular status: acceptable  Respiratory status: acceptable  Hydration status: acceptable    Comments: */56 (Patient Position: Sitting)   Pulse 75   Temp 36.8 °C (98.2 °F) (Temporal Artery )   Resp 20   Wt 71.7 kg (158 lb 1.6 oz)   SpO2 96%   BMI 29.39 kg/m²

## 2018-03-19 NOTE — ANESTHESIA PROCEDURE NOTES
Airway  Urgency: elective    Date/Time: 3/19/2018 3:14 PM  Airway not difficult    General Information and Staff    Patient location during procedure: OR  Anesthesiologist: JV PERLA    Indications and Patient Condition  Indications for airway management: airway protection    Preoxygenated: yes  Mask difficulty assessment: 1 - vent by mask    Final Airway Details  Final airway type: supraglottic airway      Successful airway: classic  Size 4    Number of attempts at approach: 1    Additional Comments  Pre oxygenated  Easy mask vent  Atraumatic lma placement  +etco2

## 2018-03-19 NOTE — ANESTHESIA PREPROCEDURE EVALUATION
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    Plan discussed with CRNA.

## 2018-03-19 NOTE — OP NOTE
Needle Localization Breast lumpectomy, [place] Node Biopsy Procedure Note    Name: Geeta Butt  Age: 82 y.o.  Sex: female  :  1935  MRN: [id number]      Pre-operative Diagnosis:left Breast cancer, clinical  stage IA    Post-operative Diagnosis: Same    Procedure: Left Needle Localization Breast lumpectomy, [place] Node Biopsy with blue dye and radioactive sulfur colloid injection    Surgeon: Myles Soliman MD    Assistants: none    Anesthesia: General    Indications: This patient recently was diagnosed with left breast cancer after presenting with a mammogram in the 6 o'clock position of the left breast.  After discussing her options for breast conserving surgery      Procedure Details   The patient was seen in the Holding Room. The risks, benefits, complications, treatment options, and expected outcomes were discussed with the patient. The possibilities of reaction to medication, pulmonary aspiration, bleeding, infection, the need for additional procedures, failure to diagnose a condition, and creating a complication requiring transfusion or operation were discussed with the patient. The patient concurred with the proposed plan, giving informed consent.  The site of surgery properly noted/marked.    The patient underwent preoperative guidewire localization of a mammographic abnormality in the  6:00 aspect of the left breast.  The patient was also taken to the nuclear med department where a radioisotope injection was performed in the periareolar area of the affected breast  in the usual fashion.     The patient was then taken to Operating Room; identified patient as Geeta Butt  and the procedure verified as leftNeedle Localization  Breast lumpectomy, with sentinel node biopsy, possible axillary dissection.   A Time Out was held and the above information confirmed.    4 cc of blue dye were injected into the breast near the localization site.     The breast was prepped and draped in standard  fashion. Marcaine  0.50% with epinephrine was used to anesthetize the skin at the site of the guidewire placement and in the axilla.  An axillary incision was made in the area of the hotspot, found with the neoprobe.  2 sentinel node(s) was/were found.  The first sentinel lymph node was blue and had counts as high as 530.  The second sentinel lymph node was not blue but had counts as high as 178. . No other nodes were found with counts over 53 and there were no other blue nodes.  Frozen section was not performed.   Skin closure was performed with vicryl.     A triangular shaped incision was made to remove skin over the top of the lump and have the incision oriented in the radial orientation.  Dissection was carried down to the localized area which was completely excised.  A specimen radiograph confirmed inclusion of the preoperatively identified mammographic lesion/ clip.  There were palpably clean margins around the specimen.  We then began undermining the breast tissue at the level of the pectoralis fascia to help us pull tissue together to close the defect we had created.  A 10 Danish Iain-Martinez drain was placed and brought out through a stab wound below our incision.  Hemostasis was achieved with cautery.  Closure was performed  with interrupted 3-0 Vicryl for the deeper layers and a 4-0 Vicryl subcuticular closure.      Steri-Strips were applied. At the end of the operation, all sponge, instrument, and needle counts were correct.    Findings:  There were no unexpected findings.  Estimated Blood Loss:  Minimal           Drains: 10 Danish Iain-Martinez ×1          Specimens:   ID Type Source Tests Collected by Time   A : LEFT BREAST LUMPECTOMY (SINGLE SHORT STITCH SUPERIOR, SINGLE LONG LATERAL AND SKIN IS ANTERIOR) Tissue Breast, Left TISSUE PATHOLOGY EXAM Myles Soliman MD 3/19/2018 1545   B : LEFT SENTINEL LYMPH NODE #1 Tissue [place] Lymph Node TISSUE PATHOLOGY EXAM Myles Soliman MD 3/19/2018  1628   C : LEFT SENTINEL LYMPH NODE #2 Tissue [place] Lymph Node TISSUE PATHOLOGY EXAM Myles Soliman MD 3/19/2018 7036       Complications:  None; patient tolerated the procedure well.           Condition: stable

## 2018-03-19 NOTE — H&P (VIEW-ONLY)
Chief Complaint: Geeta Butt is a 82 y.o.. female here today for Breast Cancer (Left Breast )        History of Present Illness:  Patient presents with a recently diagnosed left breast cancer.  She was having her yearly mammograms when she was noted to have a focal asymmetry in the 7 o'clock position of the left breast.  It persisted on diagnostic imaging and on ultrasound measured 13 mm and was solid.  Biopsy was recommended and returned showing a grade 1 invasive ductal cancer that was ER positive at 85%, AR positive at 16% and HER-2 negative.  Prior to this finding the patient had not detected any masses, pain, or nipple discharge.  She has had a prior stereotactic biopsy of the left breast in 2015 that was benign.  She also had an open biopsy in 1980 that was benign in this left breast.  Her family history is significant for a mother with thyroid cancer and a father with stomach and esophageal cancer.  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      Review of Systems:  Review of Systems   Constitutional: Negative.    HENT:   Positive for hearing loss.    Eyes: Positive for eye problems.   Respiratory: Negative.    Cardiovascular: Negative.    Gastrointestinal: Negative.    Endocrine: Negative.    Musculoskeletal: Positive for back pain, flank pain and neck pain.   Skin: Negative.         The patient denies any noticeable changes to the skin of the breast.   Neurological: Positive for dizziness.   Hematological: Negative.    Psychiatric/Behavioral: Negative.         Past Medical and Surgical History:  Breast Biopsy History:  Patient has had the following breast biopsies:Left breast 1980; 2016; 2018  Breast Cancer HIstory:  Patient does not have a past medical history of breast cancer.  Breast Operations, and year:  None  History   Smoking Status   • Former Smoker   • Packs/day: 1.00    • Years: 10.00   • Start date: 1980   • Quit date: 1990   Smokeless Tobacco   • Never Used     Obstetric History:  Patient is postmenopausal, entered menopause naturally at age: 51   Number of pregnancies:5  Number of live births: 5  Number of abortions or miscarriages: 0  Age of delivery of first child: 21  Patient breast fed, for the following lenth of time:3-6months with each child  Length of time taking birth control pills:0  Patient took hormone replacement during the following dates:    Past Surgical History:   Procedure Laterality Date   • ABDOMINAL SURGERY     • COLONOSCOPY     • COLONOSCOPY N/A 2/16/2018    Procedure: COLONOSCOPY into cecum and T.I. with biopsies;  Surgeon: Augustine Gallego MD;  Location: [facility] ENDOSCOPY;  Service:    • ENDOSCOPY N/A 10/17/2017    Procedure: ESOPHAGOGASTRODUODENOSCOPY WITH COLD BIOPSIES;  Surgeon: Augustine Gallego MD;  Location: [facility] ENDOSCOPY;  Service:    • ENDOSCOPY N/A 2/16/2018    Procedure: ESOPHAGOGASTRODUODENOSCOPY with biopsies and #54 [place] DILATATION;  Surgeon: Augustine Gallego MD;  Location: [facility] ENDOSCOPY;  Service:    • EYE SURGERY      3-4 years, cornea replacement    • HYSTERECTOMY     • SKIN BIOPSY         Past Medical History:   Diagnosis Date   • Aspiration into airway     post anesthesia   • Cancer     mouth cancer    • Diabetes mellitus     boarerline    • Hypertension    • Hypothyroidism    • PONV (postoperative nausea and vomiting)    • Stroke      mild weakness on left, partial sight loss on right        Prior Hospitalizations, other than for surgery or childbirth, and year:  5 years    Social History:  Patient is .  Patient has 2 daughters.   Patient has 3 sons.    Family History:  Family History   Problem Relation Age of Onset   • Esophageal cancer Father    • Stomach cancer Father        Vital Signs:  Vitals:    02/22/18 1524   BP: 130/60   Pulse: 82   Resp: 16   Temp: 97.7 °F (36.5 °C)   SpO2: 94%       Medications:    Current  Outpatient Prescriptions:     Current Outpatient Prescriptions:   •  amLODIPine (NORVASC) 5 MG tablet, TAKE 2 TABLETS BY MOUTH EVERY DAY, Disp: 180 tablet, Rfl: 0  •  aspirin tablet, Take 81 mg by mouth Daily., Disp: , Rfl:   •  CARAFATE 1 GM/10ML suspension, TAKE 10 MLS BY MOUTH EVERY 6 HOURS TO COAT STOMACH, Disp: 420 mL, Rfl: 3  •  [employer] VITAMIN D 2000 units capsule, TAKE ONE CAPSULE BY MOUTH EVERY DAY, Disp: 30 each, Rfl: 3  •  Fexofenadine HCl (MUCINEX ALLERGY PO), Take 600 mg by mouth Daily., Disp: , Rfl:   •  Fish Oil oil, Daily., Disp: , Rfl:   •  fluorometholone (FML) 0.1 % ophthalmic suspension, INSTILL 1 DROP EVERY DAY INTO THE LEFT EYE, Disp: , Rfl: 2  •  FLUoxetine (PROzac) 10 MG capsule, TAKE 2 ALTERNATING WITH 1 EVERY OTHER DAY, Disp: 135 capsule, Rfl: 1  •  glucose blood (FREESTYLE TEST STRIPS) test strip, TEST BLOOD SUGARS ONCE DAILY, Disp: 50 each, Rfl: 12  •  ketotifen (ZADITOR) 0.025 % ophthalmic solution, 1 drop As Needed., Disp: , Rfl:   •  levothyroxine (SYNTHROID) 75 MCG tablet, Take 1 tablet by mouth Daily., Disp: 30 tablet, Rfl: 2  •  oxyCODONE-acetaminophen (PERCOCET) 5-325 MG per tablet, Take 1 tablet by mouth Every 6 (Six) Hours As Needed for Severe Pain ., Disp: 20 tablet, Rfl: 0  •  pantoprazole (PROTONIX) 40 MG EC tablet, Take 1 tablet by mouth 2 (Two) Times a Day Before Meals., Disp: 60 tablet, Rfl: 3  •  Probiotic Product (PROBIOTIC DAILY PO), Take  by mouth., Disp: , Rfl:   •  sodium chloride 5 % ophthalmic ointment, 1 drop As Needed (dryness)., Disp: , Rfl:     Physical Examination:  General Appearance:   Patient is in no distress.  She is well kept and has an average build.   Psychiatric:  Patient with appropriate mood and affect. Alert and oriented to self, time, and place.    Breast, RIGHT:  small sized, symmetric with the contralateral side.  Breast skin is without erythema, edema, rashes.  There are no visible abnormalities upon inspection during the arm-raising maneuver or  with hands on hips in the sitting position. There is no nipple retraction, discharge or nipple/areolar skin changes.There are no masses palpable in the sitting or supine positions.    Breast, LEFT:  small sized, symmetric with the contralateral side.  Breast skin is without erythema, edema, rashes.  with the patient sitting, there is some subtle indentation in the 7 o'clock position. There is no nipple retraction, discharge or nipple/areolar skin changes.There is a small biopsy scar in the 5 o'clock position.  I cannot feel a distinct mass today.    Lymphatic:  There is no axillary, cervical, infraclavicular, or supraclavicular adenopathy bilaterally.  Eyes:  Pupils are round and reactive to light.  Cardiovascular:  Heart rate and rhythm are regular.  Respiratory:  Lungs are clear bilaterally with no crackles or wheezes in any lung field.  Gastrointestinal:  Abdomen is soft, nondistended, and nontender.  There was no evidence of hepatosplenomegaly.  There scars from her previous hysterectomy and cholecystectomy.    Musculoskeletal:  Good strength in all 4 extremities.   There is good range of motion in both shoulders.    Skin:  No new skin lesions or rashes on the skin excluding the breast (see breast exam above).  She does have some capillary hemangiomas of the chest.    Assessment:  1. Malignant neoplasm of lower-inner quadrant of left breast in female, estrogen receptor positive          Plan:  She was accompanied today by HER-2 daughters.  Face-to-face office visit lasted 1 hour with 45 minutes spent in consultation regarding her options.    We began the conversation discussing her pathology report.  We talked about the origin of most of breast cancers from either the ducts or the lobules.  The difference between invasive and in situ disease was explained and the visual was drawn for her.  When invasion has occurred, the lymph nodes need to be evaluated.  When there is in situ disease the lymph nodes should be  considered if the area of concern is widespread or it is high-grade.  We also discussed the significance of hormone receptors.  They often can give us some idea how the breast cancer will behave and potentially lead us to offer neoadjuvant chemotherapy.    Next we discussed the surgical options which include breast conserving therapy versus a mastectomy.  With breast conserving therapy we are talking about a lumpectomy with margins, lymph node evaluation, and radiation treatment.  The radiation treatment is generally given to the entire breast for 6 weeks.  The side effects consist of local skin change and potential injury to nearby structures such as the lung or the heart.  A mastectomy would involve removing the breast tissues with preservation of the pectoral muscles and evaluation of the lymph nodes if indicated.  The potential for reconstruction by either an implant or autologous tissue was discussed.  This could be performed on a delayed basis or immediately depending on a multitude of factors.  The survival rates for these 2 procedures are equivalent but there are incidences where one may be favored over the other.  There are times when a lumpectomy is not possible due to the large tumor to breast ratio or the location of the tumor.  Previous radiation to the chest wall or collagen disorders such as scleroderma would make radiation treatment and possible and therefore exclude breast conserving therapy as an option.    The patient prefers breast conserving surgery and I think is a good candidate for this.  We have discussed what is involved with needle localization and sentinel lymph node biopsy and injection.  The patient understands the small risk of a positive margin or the need to go back for an axillary dissection if 3 or more lymph nodes are involved.  She does not appear to be a candidate for genetic testing and I don't think we would benefit much from an MRI.  She does wish to go forward of those  arrangements are being made.      CPT coding:    Next Appointment:  No Follow-up on file.            EMR Dragon/transcription disclaimer:    Much of this encounter note is an electronic transcription/translocation of spoken language to printed text.  The electronic translation of spoken language may permit erroneous, or at times, nonsensical words or phrases to be inadvertently transcribed.  Although I have reviewed the note from such areas, some may still exist.

## 2018-03-21 LAB
LAB AP CASE REPORT: NORMAL
LAB AP CLINICAL INFORMATION: NORMAL
Lab: NORMAL
PATH REPORT.FINAL DX SPEC: NORMAL
PATH REPORT.GROSS SPEC: NORMAL

## 2018-03-22 ENCOUNTER — TELEPHONE (OUTPATIENT)
Dept: MAMMOGRAPHY | Facility: CLINIC | Age: 83
End: 2018-03-22

## 2018-03-22 DIAGNOSIS — C50.312 MALIGNANT NEOPLASM OF LOWER-INNER QUADRANT OF LEFT BREAST IN FEMALE, ESTROGEN RECEPTOR POSITIVE (HCC): Primary | ICD-10-CM

## 2018-03-22 DIAGNOSIS — Z17.0 MALIGNANT NEOPLASM OF LOWER-INNER QUADRANT OF LEFT BREAST IN FEMALE, ESTROGEN RECEPTOR POSITIVE (HCC): Primary | ICD-10-CM

## 2018-03-22 NOTE — TELEPHONE ENCOUNTER
I was able to get in touch with them.  The path report shows a micrometastasis in 1 lymph node and clear margins.  I have placed an order for her to see the medical oncologists.

## 2018-03-22 NOTE — TELEPHONE ENCOUNTER
Pt's daughter called to give drain totals.   Pt just has 1 tube.    03/19 18 Day of surgery  03/20 48  03/21 34  03/22 24 Morning only    Advised daughter that we were on the right track. That we were looking for under 30 for 3 strait days. Advised pt to call back on Monday or Tuesday with more totals and we would go from there. Pt's daughter voiced understanding.

## 2018-03-23 ENCOUNTER — OFFICE VISIT (OUTPATIENT)
Dept: MAMMOGRAPHY | Facility: CLINIC | Age: 83
End: 2018-03-23

## 2018-03-23 VITALS
TEMPERATURE: 98 F | HEART RATE: 78 BPM | OXYGEN SATURATION: 97 % | DIASTOLIC BLOOD PRESSURE: 70 MMHG | SYSTOLIC BLOOD PRESSURE: 134 MMHG

## 2018-03-23 DIAGNOSIS — C50.312 MALIGNANT NEOPLASM OF LOWER-INNER QUADRANT OF LEFT BREAST IN FEMALE, ESTROGEN RECEPTOR POSITIVE (HCC): Primary | ICD-10-CM

## 2018-03-23 DIAGNOSIS — Z17.0 MALIGNANT NEOPLASM OF LOWER-INNER QUADRANT OF LEFT BREAST IN FEMALE, ESTROGEN RECEPTOR POSITIVE (HCC): Primary | ICD-10-CM

## 2018-03-23 PROCEDURE — 99024 POSTOP FOLLOW-UP VISIT: CPT | Performed by: SURGERY

## 2018-03-23 RX ORDER — HYDROCODONE BITARTRATE AND ACETAMINOPHEN 5; 325 MG/1; MG/1
TABLET ORAL
Qty: 10 TABLET | Refills: 0 | Status: SHIPPED | OUTPATIENT
Start: 2018-03-23 | End: 2018-04-05

## 2018-03-23 RX ORDER — CEPHALEXIN 500 MG/1
500 CAPSULE ORAL 2 TIMES DAILY
Qty: 14 CAPSULE | Refills: 0 | Status: SHIPPED | OUTPATIENT
Start: 2018-03-23 | End: 2022-04-04 | Stop reason: SDUPTHER

## 2018-03-23 NOTE — PROGRESS NOTES
Chief Complaint: Geeta Butt is a  82 y.o. female, initially referred by No ref. provider found , who is here today for a postoperative visit.    History of Present Illness:  In the interim,Geeta Butt has had the following procedure and resultant pathology report: She has undergone breast conserving surgery of the left breast.  Her path report reveals a 1.2 cm tumor with clear margins.  One lymph node had a micrometastasis in it.    The drain is no longer holding a seal and in fact there is some drainage coming from around it.  The family also thinks the breast is becoming a little red.  Denies fever or chills.      Current Outpatient Prescriptions:   •  amLODIPine (NORVASC) 5 MG tablet, Take 5 mg by mouth Every 12 (Twelve) Hours., Disp: , Rfl:   •  cephalexin (KEFLEX) 500 MG capsule, Take 1 capsule by mouth 2 (Two) Times a Day for 7 days., Disp: 14 capsule, Rfl: 0  •  Cholecalciferol (VITAMIN D) 2000 units capsule, Take 2,000 Units by mouth Daily., Disp: , Rfl:   •  fluorometholone (FML) 0.1 % ophthalmic suspension, Administer 1 drop into the left eye Daily., Disp: , Rfl:   •  FLUoxetine (PROzac) 10 MG capsule, Take  by mouth Daily. PT ALTERNATES DAILY DOSES WITH ONE DAY 10MG, NEXT DAY 20MG, Disp: , Rfl:   •  glucose blood (FREESTYLE TEST STRIPS) test strip, TEST BLOOD SUGARS ONCE DAILY, Disp: 50 each, Rfl: 12  •  HYDROcodone-acetaminophen (NORCO) 5-325 MG per tablet, Take 1-2 tablets by mouth Every 4 (Four) Hours As Needed (Pain)., Disp: 15 tablet, Rfl: 0  •  HYDROcodone-acetaminophen (NORCO) 5-325 MG per tablet, 1-2 tabs q 6 hours prn pain.  Maximum tabs 6 daily, Disp: 10 tablet, Rfl: 0  •  ketotifen (ZADITOR) 0.025 % ophthalmic solution, Administer 1 drop to the right eye 2 (Two) Times a Day., Disp: , Rfl:   •  levothyroxine (SYNTHROID) 75 MCG tablet, Take 1 tablet by mouth Daily., Disp: 30 tablet, Rfl: 2  •  NON FORMULARY, Administer 1 drop to both eyes 3 (Three) Times a Day. THERA TEARS, Disp: , Rfl:   •   Probiotic Product (PROBIOTIC DAILY PO), Take 1 capsule by mouth Daily., Disp: , Rfl:   •  pseudoephedrine-guaifenesin (MUCINEX D)  MG per 12 hr tablet, Take 1 tablet by mouth Daily As Needed for Allergies. PT TO AVOID A COUPLE OF DAY PREOP IF CAN, Disp: , Rfl:   •  raNITIdine (ZANTAC) 150 MG tablet, Take 150 mg by mouth 2 (Two) Times a Day., Disp: , Rfl:   •  sucralfate (CARAFATE) 1 GM/10ML suspension, Take 1 g by mouth Daily., Disp: , Rfl:   Physical examination  Left breast-I removed the Steri-Strips from the lumpectomy incision and it looks fine.  I placed a new ones across the incision.  There is some bruising to the central portion of the breast which is causing the skin to be able discolored but it does not look like infection.  The drain was removed without difficulty today in the dressings applied.    Assessment:  Left breast cancer status post breast conserving surgery.  The drain had actually pulled out significantly and it needed to come out today.  Otherwise I do not see any signs of obvious infection.    Plan:  The patient will apply a gauze dressing over the drain site after she removes all the dressings tomorrow.  I have given her 10 more hydrocodone tablets and also placed her on Keflex 500 mg 1 by mouth twice a day for 1 week.  She will keep her follow-up appointment in approximately 1 week.          EMR Dragon/transcription disclaimer:    Much of this encounter note is an electronic transcription/translocation of spoken language to printed text.  The electronic translation of spoken language may permit erroneous, or at times, nonsensical words or phrases to be inadvertently transcribed.  Although I have reviewed the note from such areas, some may still exist.

## 2018-04-03 ENCOUNTER — OFFICE VISIT (OUTPATIENT)
Dept: MAMMOGRAPHY | Facility: CLINIC | Age: 83
End: 2018-04-03

## 2018-04-03 VITALS
SYSTOLIC BLOOD PRESSURE: 148 MMHG | TEMPERATURE: 98.1 F | OXYGEN SATURATION: 97 % | HEART RATE: 75 BPM | DIASTOLIC BLOOD PRESSURE: 60 MMHG

## 2018-04-03 DIAGNOSIS — C50.312 MALIGNANT NEOPLASM OF LOWER-INNER QUADRANT OF LEFT BREAST IN FEMALE, ESTROGEN RECEPTOR POSITIVE (HCC): Primary | ICD-10-CM

## 2018-04-03 DIAGNOSIS — Z17.0 MALIGNANT NEOPLASM OF LOWER-INNER QUADRANT OF LEFT BREAST IN FEMALE, ESTROGEN RECEPTOR POSITIVE (HCC): Primary | ICD-10-CM

## 2018-04-03 PROCEDURE — 99024 POSTOP FOLLOW-UP VISIT: CPT | Performed by: SURGERY

## 2018-04-03 RX ORDER — PANTOPRAZOLE SODIUM 40 MG/1
TABLET, DELAYED RELEASE ORAL
Refills: 3 | COMMUNITY
Start: 2018-03-21 | End: 2018-05-09 | Stop reason: SDUPTHER

## 2018-04-03 NOTE — PROGRESS NOTES
Chief Complaint: Geeta Butt is a  82 y.o. female, initially referred by No ref. provider found , who is here today for a postoperative visit.    History of Present Illness:  In the interim,Geeta Butt has finished taking her antibiotics.  She states that she feels well and his had no wound drainage or problems.    She has noted no redness, warmth,drainage, swelling at the incision site. Denies fever or chills.      Current Outpatient Prescriptions:   •  amLODIPine (NORVASC) 5 MG tablet, Take 5 mg by mouth Every 12 (Twelve) Hours., Disp: , Rfl:   •  Cholecalciferol (VITAMIN D) 2000 units capsule, Take 2,000 Units by mouth Daily., Disp: , Rfl:   •  fluorometholone (FML) 0.1 % ophthalmic suspension, Administer 1 drop into the left eye Daily., Disp: , Rfl:   •  FLUoxetine (PROzac) 10 MG capsule, Take  by mouth Daily. PT ALTERNATES DAILY DOSES WITH ONE DAY 10MG, NEXT DAY 20MG, Disp: , Rfl:   •  glucose blood (FREESTYLE TEST STRIPS) test strip, TEST BLOOD SUGARS ONCE DAILY, Disp: 50 each, Rfl: 12  •  HYDROcodone-acetaminophen (NORCO) 5-325 MG per tablet, Take 1-2 tablets by mouth Every 4 (Four) Hours As Needed (Pain)., Disp: 15 tablet, Rfl: 0  •  HYDROcodone-acetaminophen (NORCO) 5-325 MG per tablet, 1-2 tabs q 6 hours prn pain.  Maximum tabs 6 daily, Disp: 10 tablet, Rfl: 0  •  ketotifen (ZADITOR) 0.025 % ophthalmic solution, Administer 1 drop to the right eye 2 (Two) Times a Day., Disp: , Rfl:   •  levothyroxine (SYNTHROID) 75 MCG tablet, Take 1 tablet by mouth Daily., Disp: 30 tablet, Rfl: 2  •  NON FORMULARY, Administer 1 drop to both eyes 3 (Three) Times a Day. THERA TEARS, Disp: , Rfl:   •  pantoprazole (PROTONIX) 40 MG EC tablet, TAKE 1 TABLET BY MOUTH TWICE DAILY BEFORE MEALS (CAN FILL 11/12/17), Disp: , Rfl: 3  •  Probiotic Product (PROBIOTIC DAILY PO), Take 1 capsule by mouth Daily., Disp: , Rfl:   •  pseudoephedrine-guaifenesin (MUCINEX D)  MG per 12 hr tablet, Take 1 tablet by mouth Daily As Needed  for Allergies. PT TO AVOID A COUPLE OF DAY PREOP IF CAN, Disp: , Rfl:   •  raNITIdine (ZANTAC) 150 MG tablet, Take 150 mg by mouth 2 (Two) Times a Day., Disp: , Rfl:   •  sucralfate (CARAFATE) 1 GM/10ML suspension, Take 1 g by mouth Daily., Disp: , Rfl:   Physical examination  Left breast-the incision is healing well and I removed the last Steri-Strip.  She has evidence of old bruising but I do not see any infection.  Assessment:  Left breast cancer status post breast conserving surgery.  She has not appointment to see the medical oncologist in a couple of days.  From there she will go to the radiation oncologist.    Plan:  I would like to see her back in the office in 2 months.          EMR Dragon/transcription disclaimer:    Much of this encounter note is an electronic transcription/translocation of spoken language to printed text.  The electronic translation of spoken language may permit erroneous, or at times, nonsensical words or phrases to be inadvertently transcribed.  Although I have reviewed the note from such areas, some may still exist.

## 2018-04-05 ENCOUNTER — LAB (OUTPATIENT)
Dept: LAB | Facility: HOSPITAL | Age: 83
End: 2018-04-05

## 2018-04-05 ENCOUNTER — CONSULT (OUTPATIENT)
Dept: ONCOLOGY | Facility: CLINIC | Age: 83
End: 2018-04-05

## 2018-04-05 VITALS
TEMPERATURE: 97.6 F | OXYGEN SATURATION: 96 % | RESPIRATION RATE: 16 BRPM | WEIGHT: 160.4 LBS | SYSTOLIC BLOOD PRESSURE: 158 MMHG | HEIGHT: 61 IN | DIASTOLIC BLOOD PRESSURE: 72 MMHG | HEART RATE: 71 BPM | BODY MASS INDEX: 30.29 KG/M2

## 2018-04-05 DIAGNOSIS — C50.312 MALIGNANT NEOPLASM OF LOWER-INNER QUADRANT OF LEFT BREAST IN FEMALE, ESTROGEN RECEPTOR POSITIVE (HCC): Primary | ICD-10-CM

## 2018-04-05 DIAGNOSIS — C50.919 MALIGNANT NEOPLASM OF FEMALE BREAST, UNSPECIFIED ESTROGEN RECEPTOR STATUS, UNSPECIFIED LATERALITY, UNSPECIFIED SITE OF BREAST (HCC): Primary | ICD-10-CM

## 2018-04-05 DIAGNOSIS — Z17.0 MALIGNANT NEOPLASM OF LOWER-INNER QUADRANT OF LEFT BREAST IN FEMALE, ESTROGEN RECEPTOR POSITIVE (HCC): Primary | ICD-10-CM

## 2018-04-05 DIAGNOSIS — Z09 ENCOUNTER FOR FOLLOW-UP EXAMINATION AFTER COMPLETED TREATMENT FOR CONDITIONS OTHER THAN MALIGNANT NEOPLASM: ICD-10-CM

## 2018-04-05 LAB
BASOPHILS # BLD AUTO: 0.21 10*3/MM3 (ref 0–0.1)
BASOPHILS NFR BLD AUTO: 2.2 % (ref 0–1.1)
DEPRECATED RDW RBC AUTO: 41 FL (ref 37–49)
EOSINOPHIL # BLD AUTO: 0.42 10*3/MM3 (ref 0–0.36)
EOSINOPHIL NFR BLD AUTO: 4.4 % (ref 1–5)
ERYTHROCYTE [DISTWIDTH] IN BLOOD BY AUTOMATED COUNT: 13.3 % (ref 11.7–14.5)
HCT VFR BLD AUTO: 34.2 % (ref 34–45)
HGB BLD-MCNC: 11.3 G/DL (ref 11.5–14.9)
IMM GRANULOCYTES # BLD: 0.04 10*3/MM3 (ref 0–0.03)
IMM GRANULOCYTES NFR BLD: 0.4 % (ref 0–0.5)
LYMPHOCYTES # BLD AUTO: 2.06 10*3/MM3 (ref 1–3.5)
LYMPHOCYTES NFR BLD AUTO: 21.6 % (ref 20–49)
MCH RBC QN AUTO: 27.9 PG (ref 27–33)
MCHC RBC AUTO-ENTMCNC: 33 G/DL (ref 32–35)
MCV RBC AUTO: 84.4 FL (ref 83–97)
MONOCYTES # BLD AUTO: 0.81 10*3/MM3 (ref 0.25–0.8)
MONOCYTES NFR BLD AUTO: 8.5 % (ref 4–12)
NEUTROPHILS # BLD AUTO: 6.01 10*3/MM3 (ref 1.5–7)
NEUTROPHILS NFR BLD AUTO: 62.9 % (ref 39–75)
NRBC BLD MANUAL-RTO: 0 /100 WBC (ref 0–0)
PLATELET # BLD AUTO: 347 10*3/MM3 (ref 150–375)
PMV BLD AUTO: 10.1 FL (ref 8.9–12.1)
RBC # BLD AUTO: 4.05 10*6/MM3 (ref 3.9–5)
WBC NRBC COR # BLD: 9.55 10*3/MM3 (ref 4–10)

## 2018-04-05 PROCEDURE — 85025 COMPLETE CBC W/AUTO DIFF WBC: CPT | Performed by: INTERNAL MEDICINE

## 2018-04-05 PROCEDURE — 36415 COLL VENOUS BLD VENIPUNCTURE: CPT | Performed by: INTERNAL MEDICINE

## 2018-04-05 PROCEDURE — 99205 OFFICE O/P NEW HI 60 MIN: CPT | Performed by: INTERNAL MEDICINE

## 2018-04-05 RX ORDER — ANASTROZOLE 1 MG/1
1 TABLET ORAL DAILY
Qty: 90 TABLET | Refills: 3 | Status: SHIPPED | OUTPATIENT
Start: 2018-04-05 | End: 2018-10-26

## 2018-04-05 NOTE — PROGRESS NOTES
Subjective     REASON FOR CONSULTATION:  Left breast cancer [id number] ER/CA+ her2 neg post lumpectomy 3/18  Provide an opinion on any further workup or treatment                             REQUESTING PHYSICIAN:  Myles Soliman M.D.    RECORDS OBTAINED:  Records of the patients history including those obtained from the referring provider were reviewed and summarized in detail.    HISTORY OF PRESENT ILLNESS:  The patient is a 82 y.o. year old female who is here for an opinion about the above issue.    History of Present Illness patient is an 82-year-old female with recent problems with abdominal pain and diarrhea and weight loss which is being evaluated by GI and finally found to have multiple ulcers in the duodenum and small bowel finally responding to treatment.  In the middle of this she went for routine mammogram a couple of months later was found to have an abnormality in the left breast at 7 o'clock position measuring about 12 mm in size that was not present last year.  The patient had previously had a left breast biopsy in  with benign findings.   There was no palpable mass and biopsy was done and this revealed a grade 1 infiltrating ductal carcinoma at least 1 cm in size ER 78%/CA 17% positive HER-2 negative.  The patient was referred to Dr. Soliman who performed a lumpectomy and sentinel node biopsy with the findings of a 1.2 cm tumor grade 2 with lymphovascular invasion and clear margins with associated DCIS.  One of 2 sentinel nodes showed a micrometastasis although the pathologist said this was almost too small to be considered a micrometastasis.  Postoperatively she has done well and thankfully her GI complains of much improved with Carafate and Protonix and her diarrhea finally resolved.    She's not had a bone density in quite a while.  She is  5 para 5  menarche at age 15 and menopause in her mid 50s although she had a hysterectomy at 30 for an ulcerated uterus.  First childbirth was at  age 20 she breast-fed all 5 children.  She took hormonal therapy for at least 15 years after menopause and stopped about 10 years ago .  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    Her father  at 52 of stomach cancer mother had thyroid cancer at age 50 her brother's daughter had breast cancer age 40 and she had a paternal aunt with leukemia is no other breast cancer or ovarian cancer uterine cancer or pancreatic cancer in the family . I did ask him to check with her niece to see if she has had genetic testing - they do not know much at all about the maternal family .    Past Medical History:   Diagnosis Date   • Anesthesia complication     ASPIRATION INTO AIRWAY WITH TRACH PRESENT IN PAST (WITH ORAL CANCER SURGERY(   • Arthritis    • Aspiration into airway     post anesthesia   • Breast cancer    • Cancer     mouth cancer    • CKD (chronic kidney disease)     PT STATES STAGE 3   • Depression    • Diabetes mellitus     BORDERLINE   • Diverticular disease    • Gastric ulcer     AND DUODENAL   • GERD (gastroesophageal reflux disease)    • Hearing loss     BILAT AIDES   • History of colon polyps    • History of depression    • History of GI bleed    • Hypertension    • Hypothyroidism    • PONV (postoperative nausea and vomiting)    • Poor vision     RIGHT EYE, HAS PERIPHERAL VISION    • Stroke 2000     mild weakness on left, partial sight loss on right         Past Surgical History:   Procedure Laterality Date   • ABDOMINAL SURGERY     • APPENDECTOMY     • BLADDER REPAIR      AND RECTOCELE   • BREAST BIOPSY     • BREAST CYST ASPIRATION     • BREAST LUMPECTOMY WITH SENTINEL NODE BIOPSY Left 3/19/2018    Procedure: BREAST LUMPECTOMY WITH SENTINEL NODE BIOPSY AND NEEDLE LOCALIZATION;  Surgeon: Myles Soliman MD;  [facility] [facility] [place] [facility] [place]  Service: General   • CHOLECYSTECTOMY     • COLONOSCOPY     • COLONOSCOPY N/A 2018    Procedure:  COLONOSCOPY into cecum and T.I. with biopsies;  Surgeon: Augustine Gallego MD;  Location: [facility] ENDOSCOPY;  Service:    • ENDOSCOPY N/A 10/17/2017    Procedure: ESOPHAGOGASTRODUODENOSCOPY WITH COLD BIOPSIES;  Surgeon: Augustine Gallego MD;  Location: [facility] ENDOSCOPY;  Service:    • ENDOSCOPY N/A 2/16/2018    Procedure: ESOPHAGOGASTRODUODENOSCOPY with biopsies and #54 [address] DILATATION;  Surgeon: Augustine Gallego MD;  Location: [facility] ENDOSCOPY;  Service:    • EYE SURGERY Left 2014    3-4 years, cornea replacement    • HYSTERECTOMY     • MOUTH SURGERY  2000    UNDER TONGUE AND LEFT FLOOR OF MOUTH FOR CA   • PARATHYROIDECTOMY      PER PT   • SINUS SURGERY     • SKIN BIOPSY     • THYROID SURGERY     • VARICOSE VEIN SURGERY          Current Outpatient Prescriptions on File Prior to Visit   Medication Sig Dispense Refill   • amLODIPine (NORVASC) 5 MG tablet Take 5 mg by mouth Every 12 (Twelve) Hours.     • Cholecalciferol (VITAMIN D) 2000 units capsule Take 2,000 Units by mouth Daily.     • fluorometholone (FML) 0.1 % ophthalmic suspension Administer 1 drop into the left eye Daily.     • FLUoxetine (PROzac) 10 MG capsule Take  by mouth Daily. PT ALTERNATES DAILY DOSES WITH ONE DAY 10MG, NEXT DAY 20MG     • glucose blood (FREESTYLE TEST STRIPS) test strip TEST BLOOD SUGARS ONCE DAILY 50 each 12   • levothyroxine (SYNTHROID) 75 MCG tablet Take 1 tablet by mouth Daily. 30 tablet 2   • NON FORMULARY Administer 1 drop to both eyes 3 (Three) Times a Day. THERA TEARS     • pantoprazole (PROTONIX) 40 MG EC tablet TAKE 1 TABLET BY MOUTH TWICE DAILY BEFORE MEALS (CAN FILL 11/12/17)  3   • Probiotic Product (PROBIOTIC DAILY PO) Take 1 capsule by mouth Daily.     • sucralfate (CARAFATE) 1 GM/10ML suspension Take 1 g by mouth Daily.     • [DISCONTINUED] pseudoephedrine-guaifenesin (MUCINEX D)  MG per 12 hr tablet Take 1 tablet by mouth Daily As Needed for Allergies. PT TO AVOID A COUPLE OF DAY PREOP IF CAN     • [DISCONTINUED]  HYDROcodone-acetaminophen (NORCO) 5-325 MG per tablet Take 1-2 tablets by mouth Every 4 (Four) Hours As Needed (Pain). 15 tablet 0   • [DISCONTINUED] HYDROcodone-acetaminophen (NORCO) 5-325 MG per tablet 1-2 tabs q 6 hours prn pain.  Maximum tabs 6 daily 10 tablet 0   • [DISCONTINUED] ketotifen (ZADITOR) 0.025 % ophthalmic solution Administer 1 drop to the right eye 2 (Two) Times a Day.     • [DISCONTINUED] raNITIdine (ZANTAC) 150 MG tablet Take 150 mg by mouth 2 (Two) Times a Day.       No current facility-administered medications on file prior to visit.         ALLERGIES:    Allergies   Allergen Reactions   • Other Nausea Only     All mycins   • Sulfa Antibiotics Other (See Comments)     LOST CONSCIOUSNESS AND BODY TURNED RED/ON FIRE   • Ciprofloxacin Other (See Comments)     RED BLISTERS   • Iodine Other (See Comments)     DECADES AGO, IODINE INJECTION CAUSED SKIN REDNESS AND BURNING   • Macrodantin [Nitrofurantoin Macrocrystal] Diarrhea and Nausea And Vomiting   • Nitrofurantoin Nausea And Vomiting   • Yeast-Related Products Other (See Comments)     MOUTH SORES AND BLADDER INFECTION        Social History     Social History   • Marital status:      Social History Main Topics   • Smoking status: Former Smoker     Packs/day: 1.00     Years: 10.00     Types: Cigarettes     Start date: 1980     Quit date: 1990   • Smokeless tobacco: Never Used   • Alcohol use No   • Drug use: No   • Sexual activity: Defer     Other Topics Concern   • Not on file        Family History   Problem Relation Age of Onset   • Esophageal cancer Father    • Stomach cancer Father    • Heart disease Mother    • Hypertension Sister    • Hypertension Brother    • Stroke Brother    • Malig Hyperthermia Neg Hx         Review of Systems   Constitutional: Negative.    Cardiovascular: Positive for leg swelling (Ankles).   Gastrointestinal: Negative for abdominal distention, abdominal pain and diarrhea.   Musculoskeletal: Positive for back pain  "(Chronic).        Objective     Vitals:    04/05/18 1534   BP: 158/72   Pulse: 71   Resp: 16   Temp: 97.6 °F (36.4 °C)   TempSrc: Oral   SpO2: 96%   Weight: 72.8 kg (160 lb 6.4 oz)   Height: 154.5 cm (60.83\")  Comment: new ht w/shoes   PainSc: 0-No pain  Comment: no pain but some soreness in L breast     Current Status 4/5/2018   ECOG score 0       Physical Exam    GENERAL:  Well-developed, well-nourished in no acute distress.   SKIN:  Warm, dry without rashes, purpura or petechiae.  EYES:  Pupils equal, round and reactive to light.  EOMs intact.  Conjunctivae normal.  EARS:  Hearing intact.  NOSE:  Septum midline.  No excoriations or nasal discharge.  MOUTH:  Tongue is well-papillated; no stomatitis or ulcers.  Lips normal.  THROAT:  Oropharynx without lesions or exudates.  NECK:  Supple with good range of motion; no thyromegaly or masses, no JVD.  LYMPHATICS:  No cervical, supraclavicular, axillary or inguinal adenopathy.  CHEST:  Lungs clear to auscultation. Good airflow.  BREASTS:right breast   CARDIAC:  Regular rate and rhythm without murmurs, rubs or gallops. Normal S1,S2.  ABDOMEN:  Soft, nontender with no hepatosplenomegaly or masses.  EXTREMITIES:  No clubbing, cyanosis or edema.  NEUROLOGICAL:  Cranial Nerves II-XII grossly intact.  No focal neurological deficits.  PSYCHIATRIC:  Normal affect and mood.        RECENT LABS:  Hematology WBC   Date Value Ref Range Status   04/05/2018 9.55 4.00 - 10.00 10*3/mm3 Final     RBC   Date Value Ref Range Status   04/05/2018 4.05 3.90 - 5.00 10*6/mm3 Final     Hemoglobin   Date Value Ref Range Status   04/05/2018 11.3 (L) 11.5 - 14.9 g/dL Final     Hematocrit   Date Value Ref Range Status   04/05/2018 34.2 34.0 - 45.0 % Final     Platelets   Date Value Ref Range Status   04/05/2018 347 150 - 375 10*3/mm3 Final      ER 78% SC 17% Her 2 -neg Ki-67 7%  Final Diagnosis   1.  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                SEE SYNOPTIC REPORT (BELOW) FOR ADDITIONAL DETAILS.     2.  SENTINEL LYMPH NODE #1, LEFT AXILLA, EXCISION:                ONE LYMPH NODE, POSITIVE FOR METASTATIC CARCINOMA (MICROMETASTASIS) (SEE COMMENT).               NO EXTRANODAL EXTENSION IS IDENTIFIED.     COMMENT: Measuring the exact size of the lymph node metastasis is difficult. The lymph node demonstrates a few very small scattered foci of tumor cells, compatible with isolated tumor cells.  One slide demonstrates a single contiguous focus of tumor cells exceeding 0.2 mm; therefore, this is best considered a micrometastasis rather than isolated tumor cells.     3.  SENTINEL LYMPH NODE #2, LEFT AXILLA, EXCISION.               ONE LYMPH NODE, NEGATIVE FOR METASTATIC CARCINOMA.     SYNOPTIC REPORT (Based on [facility] cancer case summary, version January 2018):               Procedure: Excision (less than total mastectomy).               Specimen laterality: Left.                Tumor site: Not specified.                Tumor size: 1.2 x 1.1 x 1 cm.               Histologic type: Invasive carcinoma of no special type (ductal, not otherwise specified).                Histologic grade (Melony Histologic Score):                              Glandular (acinar)/tubular differentiation: Score 3.                            Nuclear pleomorphism: Score 2.                             Mitotic rate: Score 1.                            Overall grade: Grade 2 (Score 6 of 9).               Tumor focality: Single focus of invasive carcinoma.                Ductal carcinoma in situ (DCIS): Present.                            Architectural patterns: Solid.                             Nuclear grade: Grade II (intermediate).                            Necrosis: Present, central comedonecrosis.                Lobular carcinoma in situ (LCIS): No LCIS in specimen.                Margins:                            Invasive carcinoma margins: Uninvolved by invasive carcinoma.                                          Distance from closest margin: 3 mm (medial margin).                                          NOTE: Invasive carcinoma also extends to within 4 mm of superior margin and to within                                                 4 mm of lateral margin.  All additional margins are greater than 10 mm from invasive                                                 carcinoma.                              DCIS Margins: Uninvolved by DCIS.                                          Distance from closest margin: 3 mm (medial margin).                    Regional Lymph nodes: Involved by tumor cells.                             Number of lymph nodes with macrometastases: 0.                            Number of lymph nodes with micrometastases: 1.                            Size of largest metastatic deposit: 0.4 mm.                            Extranodal extension: Not identified.                            Number of lymph nodes examined: 2.                            Number of sentinel lymph nodes examined: 2.                Treatment effect: No known presurgical therapy.                Lymphovascular invasion: Present.                Dermal Lymphovascular invasion: Not identified.                Pathologic stage classification.                            Primary tumor: pT1c.                            Regional lymph nodes: pN1mi(sn).                            Distant metastasis: Not applicable.                Additional pathologic findings:                             Ductal hyperplasia of the usual type.                             Fibrocystic changes with duct dilatation and stasis and focal apocrine metaplasia.                             Columnar cell change without atypia.                             Fibroinflammatory changes consistent with site of prior biopsy.                     Ancillary studies: ER, TX, HER-2/melanie, and Ki-67 studies performed on previous biopsy specimen at                       outside facility ([facility]).           Assessment/Plan   1.  T1c N1 jese N0 ER/TX positive HER-2 negative left breast cancer post lumpectomy  2.  Ulcerations noted upper GI tract improved  3.  Family history of breast cancer in a niece at a young age and father with cancer at age 51?  Genetic testing    Plan  1.  DEXA scan and Arimidex if the bone density is adequate  2.  Radiation consult   3.  She will call for the results of the DEXA scan and if adequate we will start Arimidex which I am already prescribed to her and see her back in 3 months to assess her tolerance.  She is in very good health and I explained to her that adjuvant therapy decreases the risk of recurrence of her cancer which might be in the 15-20% range probably down to the 10% range and if she has significant side effects we will be inclined to stop treatment.  Obviously if her GI symptoms recur with Arimidex we will have to try another medications.  We talked about the joint pains and hot flashes which are unlikely at her age and she was agreeable to a trial of Arimidex.

## 2018-04-10 RX ORDER — MULTIVIT-MIN/IRON/FOLIC ACID/K 18-600-40
2000 CAPSULE ORAL DAILY
Qty: 90 CAPSULE | Refills: 1 | Status: SHIPPED | OUTPATIENT
Start: 2018-04-10 | End: 2018-11-03 | Stop reason: SDUPTHER

## 2018-04-11 ENCOUNTER — OFFICE VISIT (OUTPATIENT)
Dept: FAMILY MEDICINE CLINIC | Facility: CLINIC | Age: 83
End: 2018-04-11

## 2018-04-11 VITALS
SYSTOLIC BLOOD PRESSURE: 140 MMHG | WEIGHT: 162.6 LBS | HEART RATE: 86 BPM | TEMPERATURE: 97.7 F | HEIGHT: 60 IN | BODY MASS INDEX: 31.92 KG/M2 | OXYGEN SATURATION: 97 % | DIASTOLIC BLOOD PRESSURE: 62 MMHG | RESPIRATION RATE: 16 BRPM

## 2018-04-11 DIAGNOSIS — N30.01 ACUTE CYSTITIS WITH HEMATURIA: ICD-10-CM

## 2018-04-11 DIAGNOSIS — R30.9 PAINFUL URINATION: Primary | ICD-10-CM

## 2018-04-11 PROCEDURE — 81001 URINALYSIS AUTO W/SCOPE: CPT | Performed by: NURSE PRACTITIONER

## 2018-04-11 PROCEDURE — 87186 SC STD MICRODIL/AGAR DIL: CPT | Performed by: NURSE PRACTITIONER

## 2018-04-11 PROCEDURE — 99213 OFFICE O/P EST LOW 20 MIN: CPT | Performed by: NURSE PRACTITIONER

## 2018-04-11 PROCEDURE — 87086 URINE CULTURE/COLONY COUNT: CPT | Performed by: NURSE PRACTITIONER

## 2018-04-11 RX ORDER — AMOXICILLIN AND CLAVULANATE POTASSIUM 875; 125 MG/1; MG/1
1 TABLET, FILM COATED ORAL 2 TIMES DAILY
Qty: 20 TABLET | Refills: 0 | Status: SHIPPED | OUTPATIENT
Start: 2018-04-11 | End: 2018-04-21

## 2018-04-11 NOTE — PROGRESS NOTES
Subjective   Geeta Butt is a 82 y.o. female.     History of Present Illness   C/o urinary frequency and dysuria, started yesterday, she states she has pain with urination, she denies fever, she denies bleeding or d/c, she has noticed cloudy urine, denies odor. She did not try any medications at home, she has had some back.   The following portions of the patient's history were reviewed and updated as appropriate: allergies, current medications, past family history, past medical history, past social history, past surgical history and problem list.    Review of Systems   Constitutional: Negative for chills, diaphoresis and fever.   Respiratory: Negative for cough and shortness of breath.    Cardiovascular: Negative for chest pain.   Genitourinary: Positive for dysuria and frequency. Negative for urinary incontinence, decreased urine volume, pelvic pain, urgency, vaginal bleeding and vaginal discharge.   Musculoskeletal: Positive for back pain. Negative for arthralgias and myalgias.   Neurological: Negative for dizziness, light-headedness and headaches.   All other systems reviewed and are negative.      Objective   Physical Exam   Constitutional: She is oriented to person, place, and time. She appears well-developed and well-nourished.   HENT:   Head: Normocephalic.   Eyes: Pupils are equal, round, and reactive to light.   Cardiovascular: Normal rate, regular rhythm and normal heart sounds.    Pulmonary/Chest: Effort normal and breath sounds normal.   Abdominal: There is no CVA tenderness.   Musculoskeletal: Normal range of motion.   Neurological: She is alert and oriented to person, place, and time.   Skin: Skin is warm and dry.   Psychiatric: She has a normal mood and affect. Her behavior is normal.   Nursing note and vitals reviewed.        Assessment/Plan   Geeta was seen today for urinary tract infection.    Diagnoses and all orders for this visit:    Painful urination  -     Urinalysis With Microscopic - Urine,  Clean Catch  -     Urinalysis - Urine, Clean Catch  -     Urinalysis, Microscopic Only - Urine, Clean Catch  -     Urine Culture - Urine, Urine, Clean Catch  -     Urinalysis With Microscopic - Urine, Clean Catch; Future    Acute cystitis with hematuria  -     Urine Culture - Urine, Urine, Clean Catch  -     Urinalysis With Microscopic - Urine, Clean Catch; Future    Other orders  -     amoxicillin-clavulanate (AUGMENTIN) 875-125 MG per tablet; Take 1 tablet by mouth 2 (Two) Times a Day for 10 days.        UA today will send for culture and call with results.   Repeat UA in about 2 weeks lab only if symptoms resolved.   Start augmentin 875-125mg 2x day for 10 days, prev tolerated.   Drink plenty of H2O, wipe front to back after urination.   Avoid bladder irritants- coffee, caffeine, carbonation.  If symptoms persist call office.   Increase fluid intake, get plenty of rest.   Patient agrees with plan of care and understands instructions. Call if worsening symptoms or any problems or concerns.

## 2018-04-11 NOTE — PATIENT INSTRUCTIONS
UA today will send for culture and call with results.   Repeat UA in about 2 weeks lab only if symptoms resolved.   Start augmentin 875-125mg 2x day for 10 days, prev tolerated.   Drink plenty of H2O, wipe front to back after urination.   Avoid bladder irritants- coffee, caffeine, carbonation.  If symptoms persist call office.   Increase fluid intake, get plenty of rest.   Patient agrees with plan of care and understands instructions. Call if worsening symptoms or any problems or concerns.

## 2018-04-14 LAB
BACTERIA SPEC AEROBE CULT: ABNORMAL
BACTERIA SPEC AEROBE CULT: ABNORMAL

## 2018-04-16 ENCOUNTER — TELEPHONE (OUTPATIENT)
Dept: FAMILY MEDICINE CLINIC | Facility: CLINIC | Age: 83
End: 2018-04-16

## 2018-04-16 ENCOUNTER — HOSPITAL ENCOUNTER (OUTPATIENT)
Dept: BONE DENSITY | Facility: HOSPITAL | Age: 83
Discharge: HOME | End: 2018-04-16
Attending: INTERNAL MEDICINE | Admitting: INTERNAL MEDICINE

## 2018-04-16 DIAGNOSIS — Z17.0 MALIGNANT NEOPLASM OF LOWER-INNER QUADRANT OF LEFT BREAST IN FEMALE, ESTROGEN RECEPTOR POSITIVE (HCC): ICD-10-CM

## 2018-04-16 DIAGNOSIS — C50.312 MALIGNANT NEOPLASM OF LOWER-INNER QUADRANT OF LEFT BREAST IN FEMALE, ESTROGEN RECEPTOR POSITIVE (HCC): ICD-10-CM

## 2018-04-16 DIAGNOSIS — Z09 ENCOUNTER FOR FOLLOW-UP EXAMINATION AFTER COMPLETED TREATMENT FOR CONDITIONS OTHER THAN MALIGNANT NEOPLASM: ICD-10-CM

## 2018-04-16 PROCEDURE — 77080 DXA BONE DENSITY AXIAL: CPT

## 2018-04-16 NOTE — TELEPHONE ENCOUNTER
----- Message from JESSA Jacobsen sent at 4/16/2018  8:21 AM EDT -----  Please call patient with results. Urine culture shows infection, how are her symptoms? Need to recheck this in about 1 week, lab only if symptoms improved.    [facility]

## 2018-04-17 ENCOUNTER — TELEPHONE (OUTPATIENT)
Dept: FAMILY MEDICINE CLINIC | Facility: CLINIC | Age: 83
End: 2018-04-17

## 2018-04-17 ENCOUNTER — TELEPHONE (OUTPATIENT)
Dept: OTHER | Facility: HOSPITAL | Age: 83
End: 2018-04-17

## 2018-04-17 NOTE — TELEPHONE ENCOUNTER
----- Message from JESSA Jacobsen sent at 4/16/2018  8:21 AM EDT -----  Please call patient with results. Urine culture shows infection, how are her symptoms? Need to recheck this in about 1 week, lab only if symptoms improved.    [facility]

## 2018-04-17 NOTE — TELEPHONE ENCOUNTER
I tried to call Geeta today to see how she's doing. I left a message asking her to please return my call.

## 2018-04-17 NOTE — TELEPHONE ENCOUNTER
Geeta returned my call. She states things are going well. She mentioned that her breast is still bruised and healing. She will see Dr. Dominguez on 4-20-18. We discussed that she is to call Dr. Valenzuela's office for her bone density testing results. [facility] discussed [facility] and she will call if she is interested in participating.

## 2018-04-18 ENCOUNTER — TELEPHONE (OUTPATIENT)
Dept: FAMILY MEDICINE CLINIC | Facility: CLINIC | Age: 83
End: 2018-04-18

## 2018-04-18 NOTE — TELEPHONE ENCOUNTER
----- Message from JESSA Jacobsen sent at 4/16/2018  8:21 AM EDT -----  Please call patient with results. Urine culture shows infection, how are her symptoms? Need to recheck this in about 1 week, lab only if symptoms improved.    TR and sent letter

## 2018-04-19 RX ORDER — PANTOPRAZOLE SODIUM 40 MG/1
TABLET, DELAYED RELEASE ORAL
Qty: 60 TABLET | Refills: 3 | Status: SHIPPED | OUTPATIENT
Start: 2018-04-19 | End: 2018-05-09 | Stop reason: SDUPTHER

## 2018-04-20 ENCOUNTER — CONSULT (OUTPATIENT)
Dept: RADIATION ONCOLOGY | Facility: HOSPITAL | Age: 83
End: 2018-04-20

## 2018-04-20 ENCOUNTER — APPOINTMENT (OUTPATIENT)
Dept: RADIATION ONCOLOGY | Facility: HOSPITAL | Age: 83
End: 2018-04-20

## 2018-04-20 VITALS
SYSTOLIC BLOOD PRESSURE: 143 MMHG | TEMPERATURE: 97.2 F | HEART RATE: 75 BPM | DIASTOLIC BLOOD PRESSURE: 69 MMHG | RESPIRATION RATE: 16 BRPM | OXYGEN SATURATION: 98 %

## 2018-04-20 DIAGNOSIS — Z17.0 MALIGNANT NEOPLASM OF LOWER-INNER QUADRANT OF LEFT BREAST IN FEMALE, ESTROGEN RECEPTOR POSITIVE (HCC): Primary | ICD-10-CM

## 2018-04-20 DIAGNOSIS — C50.312 MALIGNANT NEOPLASM OF LOWER-INNER QUADRANT OF LEFT BREAST IN FEMALE, ESTROGEN RECEPTOR POSITIVE (HCC): Primary | ICD-10-CM

## 2018-04-20 PROCEDURE — 77332 RADIATION TREATMENT AID(S): CPT | Performed by: RADIOLOGY

## 2018-04-20 PROCEDURE — G0463 HOSPITAL OUTPT CLINIC VISIT: HCPCS | Performed by: RADIOLOGY

## 2018-04-20 PROCEDURE — 99214 OFFICE O/P EST MOD 30 MIN: CPT | Performed by: RADIOLOGY

## 2018-04-20 PROCEDURE — 77290 THER RAD SIMULAJ FIELD CPLX: CPT | Performed by: RADIOLOGY

## 2018-04-20 PROCEDURE — 77263 THER RADIOLOGY TX PLNG CPLX: CPT | Performed by: RADIOLOGY
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
## 2018-04-24 ENCOUNTER — TELEPHONE (OUTPATIENT)
Dept: ONCOLOGY | Facility: CLINIC | Age: 83
End: 2018-04-24

## 2018-04-24 PROCEDURE — 77300 RADIATION THERAPY DOSE PLAN: CPT | Performed by: RADIOLOGY

## 2018-04-24 PROCEDURE — 77334 RADIATION TREATMENT AID(S): CPT | Performed by: RADIOLOGY

## 2018-04-24 PROCEDURE — 77295 3-D RADIOTHERAPY PLAN: CPT | Performed by: RADIOLOGY

## 2018-04-24 NOTE — TELEPHONE ENCOUNTER
Patient calling for bone density results. Reviewed with Dr Valenzuela. Per MD scan was normal and patient can start taking Arimidex half way through her radiation treatments. Pt v/u

## 2018-04-26 PROCEDURE — 77417 THER RADIOLOGY PORT IMAGE(S): CPT | Performed by: RADIOLOGY

## 2018-04-26 PROCEDURE — 77412 RADIATION TX DELIVERY LVL 3: CPT | Performed by: RADIOLOGY

## 2018-04-26 PROCEDURE — 77280 THER RAD SIMULAJ FIELD SMPL: CPT | Performed by: RADIOLOGY

## 2018-04-26 PROCEDURE — 77427 RADIATION TX MANAGEMENT X5: CPT | Performed by: RADIOLOGY

## 2018-04-27 PROCEDURE — 77412 RADIATION TX DELIVERY LVL 3: CPT | Performed by: RADIOLOGY

## 2018-04-30 RX ORDER — LEVOTHYROXINE SODIUM 0.07 MG/1
75 TABLET ORAL DAILY
Qty: 90 TABLET | Refills: 3 | Status: SHIPPED | OUTPATIENT
Start: 2018-04-30 | End: 2019-06-28 | Stop reason: SDUPTHER

## 2018-05-01 ENCOUNTER — LAB (OUTPATIENT)
Dept: FAMILY MEDICINE CLINIC | Facility: CLINIC | Age: 83
End: 2018-05-01

## 2018-05-01 ENCOUNTER — RADIATION ONCOLOGY WEEKLY ASSESSMENT (OUTPATIENT)
Dept: RADIATION ONCOLOGY | Facility: HOSPITAL | Age: 83
End: 2018-05-01

## 2018-05-01 ENCOUNTER — APPOINTMENT (OUTPATIENT)
Dept: RADIATION ONCOLOGY | Facility: HOSPITAL | Age: 83
End: 2018-05-01

## 2018-05-01 VITALS
SYSTOLIC BLOOD PRESSURE: 148 MMHG | HEART RATE: 74 BPM | RESPIRATION RATE: 16 BRPM | TEMPERATURE: 97.4 F | DIASTOLIC BLOOD PRESSURE: 63 MMHG | OXYGEN SATURATION: 97 %

## 2018-05-01 DIAGNOSIS — N30.01 ACUTE CYSTITIS WITH HEMATURIA: ICD-10-CM

## 2018-05-01 DIAGNOSIS — C50.312 MALIGNANT NEOPLASM OF LOWER-INNER QUADRANT OF LEFT BREAST IN FEMALE, ESTROGEN RECEPTOR POSITIVE (HCC): Primary | ICD-10-CM

## 2018-05-01 DIAGNOSIS — Z17.0 MALIGNANT NEOPLASM OF LOWER-INNER QUADRANT OF LEFT BREAST IN FEMALE, ESTROGEN RECEPTOR POSITIVE (HCC): Primary | ICD-10-CM

## 2018-05-01 DIAGNOSIS — R30.9 PAINFUL URINATION: ICD-10-CM

## 2018-05-01 LAB
BACTERIA UR QL AUTO: NORMAL /HPF
BILIRUB UR QL STRIP: NEGATIVE
CLARITY UR: CLEAR
COLOR UR: YELLOW
GLUCOSE UR STRIP-MCNC: NEGATIVE MG/DL
HGB UR QL STRIP.AUTO: NEGATIVE
KETONES UR QL STRIP: NEGATIVE
LEUKOCYTE ESTERASE UR QL STRIP.AUTO: ABNORMAL
NITRITE UR QL STRIP: NEGATIVE
PH UR STRIP.AUTO: 7 [PH] (ref 4.6–8)
PROT UR QL STRIP: NEGATIVE
RBC # UR: NORMAL /HPF
REF LAB TEST METHOD: NORMAL
SP GR UR STRIP: 1.01 (ref 1–1.03)
SQUAMOUS #/AREA URNS HPF: NORMAL /HPF
UROBILINOGEN UR QL STRIP: ABNORMAL
WBC UR QL AUTO: NORMAL /HPF

## 2018-05-01 PROCEDURE — 77336 RADIATION PHYSICS CONSULT: CPT | Performed by: RADIOLOGY

## 2018-05-01 PROCEDURE — 77412 RADIATION TX DELIVERY LVL 3: CPT | Performed by: RADIOLOGY

## 2018-05-01 PROCEDURE — 81001 URINALYSIS AUTO W/SCOPE: CPT | Performed by: NURSE PRACTITIONER

## 2018-05-01 NOTE — PROGRESS NOTES
Physician Weekly Management Note    Diagnosis:    Left Breast Ca    Reason for Visit: Radiation (3/21)    Concurrent Chemo:   No    Notes on Treatment course, Films, Medical progress and Plan:  Doing well. No problems or questions, cont on.    ROS - Other than as listed above, as follow:  Constitutional - Normal - Denies lack of appetite, fatigue, fever, night sweats and change in weight.  Neck - Normal - Denies neck masses, muscle weakness, neck pain, decreased range of motion and swelling of the neck.  Breasts - Normal - Denies breast masses, nipple discharge, nipple inversion and pain.  Respiratory - Normal - Denies cough, dyspnea, hemoptysis, hiccoughs, pleuritic chest pain and wheezing.  Gastrointestinal - Normal - Denies abdominal pain, constipation, diarrhea, heartburn / dyspepsia, hematemesis, hemorrhoids, melena / GI bleeding, nausea, pain / cramping, satiety and vomiting.  Musculoskeletal - Normal - Denies arthritis, bone pain, joint pain, muscle weakness and decreased range of motion.   Hematologic/Lymphatic - Normal - Denies easy bruising and tender or enlarged lymph nodes.    PYHSICAL EXAM - Other than listed above, as follows:  Vitals:    05/01/18 0950   BP: 148/63   Pulse: 74   Resp: 16   Temp: 97.4 °F (36.3 °C)   TempSrc: Oral   SpO2: 97%   PainSc: 0-No pain       Constitutional - Normal - No evidence of impaired alertness, inadequate appearance, premature or advanced chronologic age, uncooperativeness, altered mood and affect and disorientation.  Neck - Normal - No evidence of tender or enlarged lymph nodes, neck abnormalities, restricted range of motion and enlarged thyroid gland.  Breasts - Normal - No change in nipple or incision site.  Chest - Normal - No evidence of chest abnormalities and tender or enlarged lymph nodes.  Hematologic/Lymphatic -  Normal - No evidence of tender or enlarged axillae lymph nodes and tender or enlarged neck lymph nodes.    Performance Status: (1) Restricted in  "physically strenuous activity, ambulatory and able to do work of light nature  Problem added:  No problems updated.  Medications added: No orders of the defined types were placed in this encounter.    Ancillary referrals made: No orders of the defined types were placed in this encounter.      Technical aspects reviewed:  Weekly OBI approved if applicable? Yes  Weekly port films approved?   Yes  Change requests noted if applicable? Yes  Patient setup and plan reviewed?  Yes    Chart Reviewed:  Continue current treatment plan?   Yes  Treatment plan change requested?  No    I have reviewed and marked as \"reviewed\" the current medications, allergies and problem list in the patients EMR.    I have reviewed the patient's medical, surgical  history in detail, reviewed any pertinent lab work  and updated the computerized patient record if needed.    Patient's Care Team:  Patient Care Team:  Jimmy Ivan Jr., MD as PCP - General  Jimmy Ivan Jr., MD as PCP - Family Medicine  Malini Torres MD as Consulting Physician (Nephrology)  Myles Soliman MD as Referring Physician (Breast Surgery)  Omi Valenzuela MD as Consulting Physician (Hematology and Oncology)  Serene Dominguez MD as Consulting Physician (Radiation Oncology)    Seen and approved by:  Serene Dominguez MD, 05/01/2018  "

## 2018-05-02 ENCOUNTER — TELEPHONE (OUTPATIENT)
Dept: FAMILY MEDICINE CLINIC | Facility: CLINIC | Age: 83
End: 2018-05-02

## 2018-05-02 PROCEDURE — 77412 RADIATION TX DELIVERY LVL 3: CPT | Performed by: RADIOLOGY

## 2018-05-02 NOTE — TELEPHONE ENCOUNTER
----- Message from JESSA Jacobsen sent at 5/2/2018  8:20 AM EDT -----  Please call patient with results. UA looks ok, does she still have symptoms?    TRC

## 2018-05-03 ENCOUNTER — TELEPHONE (OUTPATIENT)
Dept: FAMILY MEDICINE CLINIC | Facility: CLINIC | Age: 83
End: 2018-05-03

## 2018-05-03 PROCEDURE — 77412 RADIATION TX DELIVERY LVL 3: CPT | Performed by: RADIOLOGY

## 2018-05-03 NOTE — TELEPHONE ENCOUNTER
----- Message from JESSA Jacobsen sent at 5/2/2018  8:20 AM EDT -----  Please call patient with results. UA looks ok, does she still have symptoms?    Pt informed of lab results. Pt isn't having any symptoms

## 2018-05-04 PROCEDURE — 77427 RADIATION TX MANAGEMENT X5: CPT | Performed by: RADIOLOGY

## 2018-05-04 PROCEDURE — 77417 THER RADIOLOGY PORT IMAGE(S): CPT | Performed by: RADIOLOGY

## 2018-05-04 PROCEDURE — 77412 RADIATION TX DELIVERY LVL 3: CPT | Performed by: RADIOLOGY

## 2018-05-07 PROCEDURE — 77412 RADIATION TX DELIVERY LVL 3: CPT | Performed by: RADIOLOGY

## 2018-05-07 PROCEDURE — 77417 THER RADIOLOGY PORT IMAGE(S): CPT | Performed by: RADIOLOGY

## 2018-05-08 PROCEDURE — 77412 RADIATION TX DELIVERY LVL 3: CPT | Performed by: RADIOLOGY

## 2018-05-08 PROCEDURE — 77336 RADIATION PHYSICS CONSULT: CPT | Performed by: RADIOLOGY

## 2018-05-09 ENCOUNTER — RADIATION ONCOLOGY WEEKLY ASSESSMENT (OUTPATIENT)
Dept: RADIATION ONCOLOGY | Facility: HOSPITAL | Age: 83
End: 2018-05-09

## 2018-05-09 VITALS
OXYGEN SATURATION: 100 % | TEMPERATURE: 97.1 F | SYSTOLIC BLOOD PRESSURE: 133 MMHG | RESPIRATION RATE: 16 BRPM | HEART RATE: 69 BPM | DIASTOLIC BLOOD PRESSURE: 76 MMHG

## 2018-05-09 DIAGNOSIS — C50.312 MALIGNANT NEOPLASM OF LOWER-INNER QUADRANT OF LEFT BREAST IN FEMALE, ESTROGEN RECEPTOR POSITIVE (HCC): Primary | ICD-10-CM

## 2018-05-09 DIAGNOSIS — Z17.0 MALIGNANT NEOPLASM OF LOWER-INNER QUADRANT OF LEFT BREAST IN FEMALE, ESTROGEN RECEPTOR POSITIVE (HCC): Primary | ICD-10-CM

## 2018-05-09 PROCEDURE — 77412 RADIATION TX DELIVERY LVL 3: CPT | Performed by: RADIOLOGY

## 2018-05-09 RX ORDER — PANTOPRAZOLE SODIUM 40 MG/1
40 TABLET, DELAYED RELEASE ORAL 2 TIMES DAILY
Qty: 60 TABLET | Refills: 3 | Status: SHIPPED | OUTPATIENT
Start: 2018-05-09 | End: 2018-06-29

## 2018-05-09 NOTE — PROGRESS NOTES
Physician Weekly Management Note    Diagnosis:    Left Breast Ca    Reason for Visit: Radiation (9/21)    Concurrent Chemo:   No    Notes on Treatment course, Films, Medical progress and Plan:  Doing well.  Mild erythema. No problems or questions, cont on.    ROS - Other than as listed above, as follow:  Constitutional - Normal - Denies lack of appetite, fatigue, fever, night sweats and change in weight.  Neck - Normal - Denies neck masses, muscle weakness, neck pain, decreased range of motion and swelling of the neck.  Breasts - Normal - Denies breast masses, nipple discharge, nipple inversion and pain.  Respiratory - Normal - Denies cough, dyspnea, hemoptysis, hiccoughs, pleuritic chest pain and wheezing.  Gastrointestinal - Normal - Denies abdominal pain, constipation, diarrhea, heartburn / dyspepsia, hematemesis, hemorrhoids, melena / GI bleeding, nausea, pain / cramping, satiety and vomiting.  Musculoskeletal - Normal - Denies arthritis, bone pain, joint pain, muscle weakness and decreased range of motion.   Hematologic/Lymphatic - Normal - Denies easy bruising and tender or enlarged lymph nodes.    PYHSICAL EXAM - Other than listed above, as follows:  Vitals:    05/09/18 0946   BP: 133/76   Pulse: 69   Resp: 16   Temp: 97.1 °F (36.2 °C)   TempSrc: Oral   SpO2: 100%   PainSc: 0-No pain       Constitutional - Normal - No evidence of impaired alertness, inadequate appearance, premature or advanced chronologic age, uncooperativeness, altered mood and affect and disorientation.  Neck - Normal - No evidence of tender or enlarged lymph nodes, neck abnormalities, restricted range of motion and enlarged thyroid gland.  Breasts - Normal - No change in nipple or incision site.  Chest - Normal - No evidence of chest abnormalities and tender or enlarged lymph nodes.  Hematologic/Lymphatic -  Normal - No evidence of tender or enlarged axillae lymph nodes and tender or enlarged neck lymph nodes.    Performance Status: (1)  "Restricted in physically strenuous activity, ambulatory and able to do work of light nature  Problem added:  No problems updated.  Medications added: No orders of the defined types were placed in this encounter.    Ancillary referrals made: No orders of the defined types were placed in this encounter.      Technical aspects reviewed:  Weekly OBI approved if applicable? Yes  Weekly port films approved?   Yes  Change requests noted if applicable? Yes  Patient setup and plan reviewed?  Yes    Chart Reviewed:  Continue current treatment plan?   Yes  Treatment plan change requested?  No    I have reviewed and marked as \"reviewed\" the current medications, allergies and problem list in the patients EMR.    I have reviewed the patient's medical, surgical  history in detail, reviewed any pertinent lab work  and updated the computerized patient record if needed.    Patient's Care Team:  Patient Care Team:  Jimmy Ivan Jr., MD as PCP - General  Jimmy Ivan Jr., MD as PCP - Family Medicine  Malini Torres MD as Consulting Physician (Nephrology)  Myles Soliman MD as Referring Physician (Breast Surgery)  Omi Valenzuela MD as Consulting Physician (Hematology and Oncology)  Serene Dominguez MD as Consulting Physician (Radiation Oncology)    Seen and approved by:  Serene Dominguez MD, 05/01/2018  "

## 2018-05-10 PROCEDURE — 77412 RADIATION TX DELIVERY LVL 3: CPT | Performed by: RADIOLOGY

## 2018-05-11 PROCEDURE — 77412 RADIATION TX DELIVERY LVL 3: CPT | Performed by: RADIOLOGY

## 2018-05-11 PROCEDURE — 77417 THER RADIOLOGY PORT IMAGE(S): CPT | Performed by: RADIOLOGY

## 2018-05-11 PROCEDURE — 77427 RADIATION TX MANAGEMENT X5: CPT | Performed by: RADIOLOGY

## 2018-05-14 ENCOUNTER — TELEPHONE (OUTPATIENT)
Dept: FAMILY MEDICINE CLINIC | Facility: CLINIC | Age: 83
End: 2018-05-14

## 2018-05-14 PROCEDURE — 77412 RADIATION TX DELIVERY LVL 3: CPT | Performed by: RADIOLOGY

## 2018-05-14 PROCEDURE — 77417 THER RADIOLOGY PORT IMAGE(S): CPT | Performed by: RADIOLOGY

## 2018-05-14 NOTE — TELEPHONE ENCOUNTER
PHARMACY CALLING IN REGARDS TO RX FOR PANTOPRAZOLE, 40 MG 2 TIMES DAILY TWICE DAILY     INSURANCE WANTS PA FOR TWICE DAILY OR CHANGE TO DIFFERENT RX

## 2018-05-14 NOTE — TELEPHONE ENCOUNTER
I will patient continue this.  All of I'm note she is getting radiation to the chest regarding breast cancer as a possible reason for increased need.  For PPI

## 2018-05-15 ENCOUNTER — RADIATION ONCOLOGY WEEKLY ASSESSMENT (OUTPATIENT)
Dept: RADIATION ONCOLOGY | Facility: HOSPITAL | Age: 83
End: 2018-05-15

## 2018-05-15 VITALS
HEART RATE: 73 BPM | SYSTOLIC BLOOD PRESSURE: 131 MMHG | RESPIRATION RATE: 16 BRPM | OXYGEN SATURATION: 100 % | DIASTOLIC BLOOD PRESSURE: 63 MMHG

## 2018-05-15 DIAGNOSIS — C50.312 MALIGNANT NEOPLASM OF LOWER-INNER QUADRANT OF LEFT BREAST IN FEMALE, ESTROGEN RECEPTOR POSITIVE (HCC): Primary | ICD-10-CM

## 2018-05-15 DIAGNOSIS — Z17.0 MALIGNANT NEOPLASM OF LOWER-INNER QUADRANT OF LEFT BREAST IN FEMALE, ESTROGEN RECEPTOR POSITIVE (HCC): Primary | ICD-10-CM

## 2018-05-15 PROCEDURE — 77412 RADIATION TX DELIVERY LVL 3: CPT | Performed by: RADIOLOGY

## 2018-05-15 PROCEDURE — 77336 RADIATION PHYSICS CONSULT: CPT | Performed by: RADIOLOGY

## 2018-05-15 NOTE — PROGRESS NOTES
Physician Weekly Management Note    Diagnosis:    Left Breast Ca    Reason for Visit: Radiation (13/31)    Concurrent Chemo:   No    Notes on Treatment course, Films, Medical progress and Plan:  Doing well.  Mild erythema. No problems or questions, cont on.    ROS - Other than as listed above, as follow:  Constitutional - Normal - Denies lack of appetite, fatigue, fever, night sweats and change in weight.  Neck - Normal - Denies neck masses, muscle weakness, neck pain, decreased range of motion and swelling of the neck.  Breasts - Normal - Denies breast masses, nipple discharge, nipple inversion and pain.  Respiratory - Normal - Denies cough, dyspnea, hemoptysis, hiccoughs, pleuritic chest pain and wheezing.  Gastrointestinal - Normal - Denies abdominal pain, constipation, diarrhea, heartburn / dyspepsia, hematemesis, hemorrhoids, melena / GI bleeding, nausea, pain / cramping, satiety and vomiting.  Musculoskeletal - Normal - Denies arthritis, bone pain, joint pain, muscle weakness and decreased range of motion.   Hematologic/Lymphatic - Normal - Denies easy bruising and tender or enlarged lymph nodes.    PYHSICAL EXAM - Other than listed above, as follows:  Vitals:    05/15/18 0955   BP: 131/63   Pulse: 73   Resp: 16   SpO2: 100%   PainSc:   2   PainLoc: Breast       Constitutional - Normal - No evidence of impaired alertness, inadequate appearance, premature or advanced chronologic age, uncooperativeness, altered mood and affect and disorientation.  Neck - Normal - No evidence of tender or enlarged lymph nodes, neck abnormalities, restricted range of motion and enlarged thyroid gland.  Breasts - Normal - No change in nipple or incision site.  Chest - Normal - No evidence of chest abnormalities and tender or enlarged lymph nodes.  Hematologic/Lymphatic -  Normal - No evidence of tender or enlarged axillae lymph nodes and tender or enlarged neck lymph nodes.    Performance Status: (1) Restricted in physically  "strenuous activity, ambulatory and able to do work of light nature  Problem added:  No problems updated.  Medications added: No orders of the defined types were placed in this encounter.    Ancillary referrals made: No orders of the defined types were placed in this encounter.      Technical aspects reviewed:  Weekly OBI approved if applicable? Yes  Weekly port films approved?   Yes  Change requests noted if applicable? Yes  Patient setup and plan reviewed?  Yes    Chart Reviewed:  Continue current treatment plan?   Yes  Treatment plan change requested?  No    I have reviewed and marked as \"reviewed\" the current medications, allergies and problem list in the patients EMR.    I have reviewed the patient's medical, surgical  history in detail, reviewed any pertinent lab work  and updated the computerized patient record if needed.    Patient's Care Team:  Patient Care Team:  Jimmy Ivan Jr., MD as PCP - General  Jimmy Ivan Jr., MD as PCP - Family Medicine  Malini Torres MD as Consulting Physician (Nephrology)  Myles Soliman MD as Referring Physician (Breast Surgery)  Omi Valenzuela MD as Consulting Physician (Hematology and Oncology)  Serene Dominguez MD as Consulting Physician (Radiation Oncology)    Seen and approved by:  Serene Dominguez MD, 05/01/2018  "

## 2018-05-16 PROCEDURE — 77412 RADIATION TX DELIVERY LVL 3: CPT | Performed by: RADIOLOGY

## 2018-05-17 PROCEDURE — 77300 RADIATION THERAPY DOSE PLAN: CPT | Performed by: RADIOLOGY

## 2018-05-17 PROCEDURE — 77412 RADIATION TX DELIVERY LVL 3: CPT | Performed by: RADIOLOGY

## 2018-05-18 PROCEDURE — 77412 RADIATION TX DELIVERY LVL 3: CPT | Performed by: RADIOLOGY

## 2018-05-18 PROCEDURE — 77331 SPECIAL RADIATION DOSIMETRY: CPT | Performed by: RADIOLOGY

## 2018-05-18 PROCEDURE — 77427 RADIATION TX MANAGEMENT X5: CPT | Performed by: RADIOLOGY

## 2018-05-18 RX ORDER — AMLODIPINE BESYLATE 5 MG/1
TABLET ORAL
Qty: 180 TABLET | Refills: 0 | Status: SHIPPED | OUTPATIENT
Start: 2018-05-18 | End: 2018-06-29 | Stop reason: SDUPTHER

## 2018-05-21 ENCOUNTER — RADIATION ONCOLOGY WEEKLY ASSESSMENT (OUTPATIENT)
Dept: RADIATION ONCOLOGY | Facility: HOSPITAL | Age: 83
End: 2018-05-21

## 2018-05-21 DIAGNOSIS — Z17.0 MALIGNANT NEOPLASM OF LOWER-INNER QUADRANT OF LEFT BREAST IN FEMALE, ESTROGEN RECEPTOR POSITIVE (HCC): Primary | ICD-10-CM

## 2018-05-21 DIAGNOSIS — C50.312 MALIGNANT NEOPLASM OF LOWER-INNER QUADRANT OF LEFT BREAST IN FEMALE, ESTROGEN RECEPTOR POSITIVE (HCC): Primary | ICD-10-CM

## 2018-05-21 PROCEDURE — 77334 RADIATION TREATMENT AID(S): CPT | Performed by: RADIOLOGY

## 2018-05-21 PROCEDURE — 77321 SPECIAL TELETX PORT PLAN: CPT | Performed by: RADIOLOGY

## 2018-05-21 PROCEDURE — 77412 RADIATION TX DELIVERY LVL 3: CPT | Performed by: RADIOLOGY

## 2018-05-21 PROCEDURE — 77280 THER RAD SIMULAJ FIELD SMPL: CPT | Performed by: RADIOLOGY

## 2018-05-21 PROCEDURE — 77331 SPECIAL RADIATION DOSIMETRY: CPT | Performed by: RADIOLOGY

## 2018-05-21 NOTE — PROGRESS NOTES
Physician Weekly Management Note    Diagnosis:    Left Breast Ca    Reason for Visit: Radiation (17/21)    Concurrent Chemo:   No    Notes on Treatment course, Films, Medical progress and Plan:  Doing well.  Mild erythema as expected. Boost set up today, no complaints. No problems or questions, cont on.    ROS - Other than as listed above, as follow:  Constitutional - Normal - Denies lack of appetite, fatigue, fever, night sweats and change in weight.  Neck - Normal - Denies neck masses, muscle weakness, neck pain, decreased range of motion and swelling of the neck.  Breasts - Normal - Denies breast masses, nipple discharge, nipple inversion and pain.  Respiratory - Normal - Denies cough, dyspnea, hemoptysis, hiccoughs, pleuritic chest pain and wheezing.  Gastrointestinal - Normal - Denies abdominal pain, constipation, diarrhea, heartburn / dyspepsia, hematemesis, hemorrhoids, melena / GI bleeding, nausea, pain / cramping, satiety and vomiting.  Musculoskeletal - Normal - Denies arthritis, bone pain, joint pain, muscle weakness and decreased range of motion.   Hematologic/Lymphatic - Normal - Denies easy bruising and tender or enlarged lymph nodes.    PYHSICAL EXAM - Other than listed above, as follows:  There were no vitals filed for this visit.    Constitutional - Normal - No evidence of impaired alertness, inadequate appearance, premature or advanced chronologic age, uncooperativeness, altered mood and affect and disorientation.  Neck - Normal - No evidence of tender or enlarged lymph nodes, neck abnormalities, restricted range of motion and enlarged thyroid gland.  Breasts - Normal - No change in nipple or incision site.  Chest - Normal - No evidence of chest abnormalities and tender or enlarged lymph nodes.  Hematologic/Lymphatic -  Normal - No evidence of tender or enlarged axillae lymph nodes and tender or enlarged neck lymph nodes.    Performance Status: (1) Restricted in physically strenuous activity,  "ambulatory and able to do work of light nature  Problem added:  No problems updated.  Medications added: No orders of the defined types were placed in this encounter.    Ancillary referrals made: No orders of the defined types were placed in this encounter.      Technical aspects reviewed:  Weekly OBI approved if applicable? Yes  Weekly port films approved?   Yes  Change requests noted if applicable? Yes  Patient setup and plan reviewed?  Yes    Chart Reviewed:  Continue current treatment plan?   Yes  Treatment plan change requested?  No    I have reviewed and marked as \"reviewed\" the current medications, allergies and problem list in the patients EMR.    I have reviewed the patient's medical, surgical  history in detail, reviewed any pertinent lab work  and updated the computerized patient record if needed.    Patient's Care Team:  Patient Care Team:  Jimmy Ivan Jr., MD as PCP - General  Jimmy Ivan Jr., MD as PCP - Family Medicine  Malini Torres MD as Consulting Physician (Nephrology)  Myles Soliman MD as Referring Physician (Breast Surgery)  Omi Valenzuela MD as Consulting Physician (Hematology and Oncology)  Serene Dominguez MD as Consulting Physician (Radiation Oncology)    Seen and approved by:  Serene Dominguez MD, 05/01/2018  "

## 2018-05-22 PROCEDURE — 77412 RADIATION TX DELIVERY LVL 3: CPT | Performed by: RADIOLOGY

## 2018-05-22 PROCEDURE — 77336 RADIATION PHYSICS CONSULT: CPT | Performed by: RADIOLOGY

## 2018-05-23 PROCEDURE — 77412 RADIATION TX DELIVERY LVL 3: CPT | Performed by: RADIOLOGY

## 2018-05-24 PROCEDURE — 77412 RADIATION TX DELIVERY LVL 3: CPT | Performed by: RADIOLOGY

## 2018-05-25 PROCEDURE — 77412 RADIATION TX DELIVERY LVL 3: CPT | Performed by: RADIOLOGY

## 2018-05-30 ENCOUNTER — DOCUMENTATION (OUTPATIENT)
Dept: RADIATION ONCOLOGY | Facility: HOSPITAL | Age: 83
End: 2018-05-30

## 2018-05-30 NOTE — PROGRESS NOTES
RADIATION THERAPY TREATMENT SUMMARY    Diagnosis:   Left Breast Ca    Patient Care Team:  Jimmy Ivan Jr., MD as PCP - General  Jimmy Ivan Jr., MD as PCP - Family Medicine  Malini Torres MD as Consulting Physician (Nephrology)  Myles Soliman MD as Referring Physician (Breast Surgery)  Omi Valenzuela MD as Consulting Physician (Hematology and Oncology)  Serene Dominguez MD as Consulting Physician (Radiation Oncology)    Geeta Butt has recently completed the course of radiation therapy prescribed for the above-mentioned diagnosis.  Below, please find the specifics of the course of treatment delivered:    Radiation Details:      Dates of treatment:  4/26/2018 - 5/25/2018.  Treatment Site - Left [place] Breast  Treatment Intent - Curative, Postoperative  Total Dose in cGy - 4256  Number of Treatments - 16  Dose per fraction - 266 cGy per fraction  Fractions per day - 1 fx/day  Fractions per week - 5 fx/week  Treatment Type - Tangents, Electronic Compensation (EC), 3D  Energy - 6 MVP  Normalization - Calc point, Prescribed at 100%  Imaging/Field Verification - Simulation before first treatment to verify field, blocking, placement and positioning, Simulation for all ports of change, Weekly port films of all ports    Treatment Site - Left Breast Tumor Bed Boost  Treatment Intent - Curative, Postoperative  Total Dose in cGy - 1000  Number of Treatments - 5  Dose per fraction - 200 cGy per fraction  Fractions per day - 1 fx/day  Fractions per week - 5 fx/week  Treatment Type - Electrons, 3D  Energy - 16 MeV  Normalization - Calc point, Prescribed at 100%  Imaging/Field Verification - Simulation before first treatment to verify field, blocking, placement and positioning, Simulation for all ports of change, Weekly port films of all ports    Tolerance and Toxicities: She tolerated the treatments well, with only the expected erythema, requiring no treatment breaks. She completed the treatments  in 29 elapsed days.    Follow-up Plans:  I have asked the patient to return to see me in approximately 3-4 weeks.  I have also made a referral to our [facility]orship [facility].

## 2018-06-01 ENCOUNTER — OFFICE (OUTPATIENT)
Dept: URBAN - METROPOLITAN AREA CLINIC 65 | Facility: CLINIC | Age: 83
End: 2018-06-01

## 2018-06-01 VITALS
HEART RATE: 78 BPM | DIASTOLIC BLOOD PRESSURE: 72 MMHG | SYSTOLIC BLOOD PRESSURE: 120 MMHG | WEIGHT: 160 LBS | HEIGHT: 61 IN

## 2018-06-01 DIAGNOSIS — K57.30 DIVERTICULOSIS OF LARGE INTESTINE WITHOUT PERFORATION OR ABS: ICD-10-CM

## 2018-06-01 DIAGNOSIS — K22.2 ESOPHAGEAL OBSTRUCTION: ICD-10-CM

## 2018-06-01 DIAGNOSIS — A04.9 BACTERIAL INTESTINAL INFECTION, UNSPECIFIED: ICD-10-CM

## 2018-06-01 DIAGNOSIS — K21.9 GASTRO-ESOPHAGEAL REFLUX DISEASE WITHOUT ESOPHAGITIS: ICD-10-CM

## 2018-06-01 DIAGNOSIS — Z86.010 PERSONAL HISTORY OF COLONIC POLYPS: ICD-10-CM

## 2018-06-01 PROCEDURE — 99212 OFFICE O/P EST SF 10 MIN: CPT | Performed by: INTERNAL MEDICINE

## 2018-06-01 RX ORDER — SUCRALFATE 1 G/10ML
SUSPENSION ORAL
Qty: 420 | Refills: 3 | Status: COMPLETED
End: 2018-06-01

## 2018-06-12 ENCOUNTER — OFFICE VISIT (OUTPATIENT)
Dept: MAMMOGRAPHY | Facility: CLINIC | Age: 83
End: 2018-06-12

## 2018-06-12 VITALS
TEMPERATURE: 98.4 F | HEART RATE: 78 BPM | OXYGEN SATURATION: 97 % | DIASTOLIC BLOOD PRESSURE: 62 MMHG | SYSTOLIC BLOOD PRESSURE: 132 MMHG

## 2018-06-12 DIAGNOSIS — Z17.0 MALIGNANT NEOPLASM OF LOWER-INNER QUADRANT OF LEFT BREAST IN FEMALE, ESTROGEN RECEPTOR POSITIVE (HCC): Primary | ICD-10-CM

## 2018-06-12 DIAGNOSIS — C50.312 MALIGNANT NEOPLASM OF LOWER-INNER QUADRANT OF LEFT BREAST IN FEMALE, ESTROGEN RECEPTOR POSITIVE (HCC): Primary | ICD-10-CM

## 2018-06-12 PROCEDURE — 99024 POSTOP FOLLOW-UP VISIT: CPT | Performed by: SURGERY

## 2018-06-12 NOTE — PROGRESS NOTES
Chief Complaint: Geeta Butt is a  82 y.o. female, initially referred by No ref. provider found , who is here today for a postoperative visit.    History of Present Illness:  In the interim,Geeta Butt has completed her radiation treatment.  She appeared to tolerate that well and is now taking hormone blocking therapy.    She has noted no redness, warmth,drainage, swelling at the incision site. Denies fever or chills.      Current Outpatient Prescriptions:   •  amLODIPine (NORVASC) 5 MG tablet, Take 5 mg by mouth Every 12 (Twelve) Hours., Disp: , Rfl:   •  amLODIPine (NORVASC) 5 MG tablet, TAKE 2 TABLETS BY MOUTH EVERY DAY, Disp: 180 tablet, Rfl: 0  •  anastrozole (ARIMIDEX) 1 MG tablet, Take 1 tablet by mouth Daily., Disp: 90 tablet, Rfl: 3  •  aspirin 81 MG tablet, Take 81 mg by mouth Daily., Disp: , Rfl:   •  Cholecalciferol (VITAMIN D) 2000 units capsule, Take 1 capsule by mouth Daily., Disp: 90 capsule, Rfl: 1  •  fluorometholone (FML) 0.1 % ophthalmic suspension, Administer 1 drop into the left eye Daily., Disp: , Rfl:   •  FLUoxetine (PROzac) 10 MG capsule, Take  by mouth Daily. PT ALTERNATES DAILY DOSES WITH ONE DAY 10MG, NEXT DAY 20MG, Disp: , Rfl:   •  glucose blood (FREESTYLE TEST STRIPS) test strip, TEST BLOOD SUGARS ONCE DAILY, Disp: 50 each, Rfl: 12  •  levothyroxine (SYNTHROID, LEVOTHROID) 75 MCG tablet, TAKE 1 TABLET BY MOUTH DAILY, Disp: 90 tablet, Rfl: 3  •  NON FORMULARY, Administer 1 drop to both eyes 3 (Three) Times a Day. THERA TEARS, Disp: , Rfl:   •  Omega-3 Fatty Acids (FISH OIL PO), Take  by mouth Daily As Needed., Disp: , Rfl:   •  pantoprazole (PROTONIX) 40 MG EC tablet, Take 1 tablet by mouth 2 (Two) Times a Day., Disp: 60 tablet, Rfl: 3  •  Probiotic Product (PROBIOTIC DAILY PO), Take 1 capsule by mouth Daily., Disp: , Rfl:   •  sucralfate (CARAFATE) 1 GM/10ML suspension, Take 1 g by mouth Daily., Disp: , Rfl:   Physical examination  Left breast-she does have some hyperpigmentation  and volume loss related to her radiation.  The incision itself is in the 6:00 axis and has generally healed well.  I do not palpate any particular firm areas or see any problems with wound healing.  Assessment:  Left breast cancer status post breast conserving surgery.  She has completed her radiation and begun her hormone blocking therapy.  She will continue to follow with the medical oncologists and obtain yearly mammograms.    Plan:  I will see her on an as-needed basis.          EMR Dragon/transcription disclaimer:    Much of this encounter note is an electronic transcription/translocation of spoken language to printed text.  The electronic translation of spoken language may permit erroneous, or at times, nonsensical words or phrases to be inadvertently transcribed.  Although I have reviewed the note from such areas, some may still exist.

## 2018-06-18 DIAGNOSIS — C50.312 MALIGNANT NEOPLASM OF LOWER-INNER QUADRANT OF LEFT BREAST IN FEMALE, ESTROGEN RECEPTOR POSITIVE (HCC): Primary | ICD-10-CM

## 2018-06-18 DIAGNOSIS — Z17.0 MALIGNANT NEOPLASM OF LOWER-INNER QUADRANT OF LEFT BREAST IN FEMALE, ESTROGEN RECEPTOR POSITIVE (HCC): Primary | ICD-10-CM

## 2018-06-18 RX ORDER — FLUOXETINE 10 MG/1
CAPSULE ORAL
Qty: 135 CAPSULE | Refills: 1 | Status: SHIPPED | OUTPATIENT
Start: 2018-06-18 | End: 2018-12-13 | Stop reason: SDUPTHER

## 2018-06-25 ENCOUNTER — OFFICE VISIT (OUTPATIENT)
Dept: RADIATION ONCOLOGY | Facility: HOSPITAL | Age: 83
End: 2018-06-25

## 2018-06-25 VITALS
HEART RATE: 65 BPM | OXYGEN SATURATION: 97 % | RESPIRATION RATE: 16 BRPM | SYSTOLIC BLOOD PRESSURE: 164 MMHG | DIASTOLIC BLOOD PRESSURE: 70 MMHG

## 2018-06-25 DIAGNOSIS — C50.312 MALIGNANT NEOPLASM OF LOWER-INNER QUADRANT OF LEFT BREAST IN FEMALE, ESTROGEN RECEPTOR POSITIVE (HCC): Primary | ICD-10-CM

## 2018-06-25 DIAGNOSIS — Z17.0 MALIGNANT NEOPLASM OF LOWER-INNER QUADRANT OF LEFT BREAST IN FEMALE, ESTROGEN RECEPTOR POSITIVE (HCC): Primary | ICD-10-CM

## 2018-06-25 PROCEDURE — 99024 POSTOP FOLLOW-UP VISIT: CPT | Performed by: RADIOLOGY
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
## 2018-06-29 ENCOUNTER — OFFICE VISIT (OUTPATIENT)
Dept: ONCOLOGY | Facility: CLINIC | Age: 83
End: 2018-06-29

## 2018-06-29 ENCOUNTER — LAB (OUTPATIENT)
Dept: LAB | Facility: HOSPITAL | Age: 83
End: 2018-06-29

## 2018-06-29 VITALS
OXYGEN SATURATION: 97 % | RESPIRATION RATE: 14 BRPM | BODY MASS INDEX: 31.57 KG/M2 | HEIGHT: 60 IN | DIASTOLIC BLOOD PRESSURE: 62 MMHG | HEART RATE: 70 BPM | TEMPERATURE: 98 F | SYSTOLIC BLOOD PRESSURE: 160 MMHG | WEIGHT: 160.8 LBS

## 2018-06-29 DIAGNOSIS — C50.312 MALIGNANT NEOPLASM OF LOWER-INNER QUADRANT OF LEFT BREAST IN FEMALE, ESTROGEN RECEPTOR POSITIVE (HCC): Primary | ICD-10-CM

## 2018-06-29 DIAGNOSIS — Z17.0 MALIGNANT NEOPLASM OF LOWER-INNER QUADRANT OF LEFT BREAST IN FEMALE, ESTROGEN RECEPTOR POSITIVE (HCC): ICD-10-CM

## 2018-06-29 DIAGNOSIS — Z17.0 MALIGNANT NEOPLASM OF LOWER-INNER QUADRANT OF LEFT BREAST IN FEMALE, ESTROGEN RECEPTOR POSITIVE (HCC): Primary | ICD-10-CM

## 2018-06-29 DIAGNOSIS — C50.312 MALIGNANT NEOPLASM OF LOWER-INNER QUADRANT OF LEFT BREAST IN FEMALE, ESTROGEN RECEPTOR POSITIVE (HCC): ICD-10-CM

## 2018-06-29 LAB
ALBUMIN SERPL-MCNC: 4 G/DL (ref 3.5–5.2)
ALBUMIN/GLOB SERPL: 1.3 G/DL (ref 1.1–2.4)
ALP SERPL-CCNC: 56 U/L (ref 38–116)
ALT SERPL W P-5'-P-CCNC: 13 U/L (ref 0–33)
ANION GAP SERPL CALCULATED.3IONS-SCNC: 11.8 MMOL/L
AST SERPL-CCNC: 16 U/L (ref 0–32)
BASOPHILS # BLD AUTO: 0.07 10*3/MM3 (ref 0–0.1)
BASOPHILS NFR BLD AUTO: 0.8 % (ref 0–1.1)
BILIRUB SERPL-MCNC: 0.2 MG/DL (ref 0.1–1.2)
BUN BLD-MCNC: 23 MG/DL (ref 6–20)
BUN/CREAT SERPL: 18.4 (ref 7.3–30)
CALCIUM SPEC-SCNC: 9.4 MG/DL (ref 8.5–10.2)
CHLORIDE SERPL-SCNC: 96 MMOL/L (ref 98–107)
CO2 SERPL-SCNC: 25.2 MMOL/L (ref 22–29)
CREAT BLD-MCNC: 1.25 MG/DL (ref 0.6–1.1)
DEPRECATED RDW RBC AUTO: 43.5 FL (ref 37–49)
EOSINOPHIL # BLD AUTO: 0.13 10*3/MM3 (ref 0–0.36)
EOSINOPHIL NFR BLD AUTO: 1.5 % (ref 1–5)
ERYTHROCYTE [DISTWIDTH] IN BLOOD BY AUTOMATED COUNT: 14 % (ref 11.7–14.5)
GFR SERPL CREATININE-BSD FRML MDRD: 41 ML/MIN/1.73
GLOBULIN UR ELPH-MCNC: 3.1 GM/DL (ref 1.8–3.5)
GLUCOSE BLD-MCNC: 135 MG/DL (ref 74–124)
HCT VFR BLD AUTO: 34.9 % (ref 34–45)
HGB BLD-MCNC: 11.4 G/DL (ref 11.5–14.9)
IMM GRANULOCYTES # BLD: 0.03 10*3/MM3 (ref 0–0.03)
IMM GRANULOCYTES NFR BLD: 0.4 % (ref 0–0.5)
LYMPHOCYTES # BLD AUTO: 1.46 10*3/MM3 (ref 1–3.5)
LYMPHOCYTES NFR BLD AUTO: 17.2 % (ref 20–49)
MCH RBC QN AUTO: 27.8 PG (ref 27–33)
MCHC RBC AUTO-ENTMCNC: 32.7 G/DL (ref 32–35)
MCV RBC AUTO: 85.1 FL (ref 83–97)
MONOCYTES # BLD AUTO: 0.72 10*3/MM3 (ref 0.25–0.8)
MONOCYTES NFR BLD AUTO: 8.5 % (ref 4–12)
NEUTROPHILS # BLD AUTO: 6.1 10*3/MM3 (ref 1.5–7)
NEUTROPHILS NFR BLD AUTO: 71.6 % (ref 39–75)
NRBC BLD MANUAL-RTO: 0 /100 WBC (ref 0–0)
PLATELET # BLD AUTO: 331 10*3/MM3 (ref 150–375)
PMV BLD AUTO: 9 FL (ref 8.9–12.1)
POTASSIUM BLD-SCNC: 4.8 MMOL/L (ref 3.5–4.7)
PROT SERPL-MCNC: 7.1 G/DL (ref 6.3–8)
RBC # BLD AUTO: 4.1 10*6/MM3 (ref 3.9–5)
SODIUM BLD-SCNC: 133 MMOL/L (ref 134–145)
WBC NRBC COR # BLD: 8.51 10*3/MM3 (ref 4–10)

## 2018-06-29 PROCEDURE — 99214 OFFICE O/P EST MOD 30 MIN: CPT | Performed by: INTERNAL MEDICINE

## 2018-06-29 PROCEDURE — 80053 COMPREHEN METABOLIC PANEL: CPT | Performed by: INTERNAL MEDICINE

## 2018-06-29 PROCEDURE — 36415 COLL VENOUS BLD VENIPUNCTURE: CPT | Performed by: INTERNAL MEDICINE

## 2018-06-29 PROCEDURE — 85025 COMPLETE CBC W/AUTO DIFF WBC: CPT | Performed by: INTERNAL MEDICINE

## 2018-06-29 NOTE — PROGRESS NOTES
Subjective     REASON FOR CONSULTATION:   1. Left breast cancer [id number] ER/PA+ her2 neg post lumpectomy/SLN  3/18 and whole breast radiation  2.  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  Fatigue out of proportion to anemia  4.  Arimidex started in 3/18 with bone density showing normal density in the spine and osteopenia in the hips                             REQUESTING PHYSICIAN:  Myles Soliman M.D.    History of Present Illness patient is an 82-year-old female with a T1cN I jese hormone positive left breast cancer treated with lumpectomy sentinel node biopsy and now radiation and Arimidex.  She reports that the Arimidex cause hot flashes but no joint pains or other complaints.  She remains very fatigued and this predates the Arimidex and radiation and her nephrologist thought it might be due to her mild anemia and has done some blood tests to evaluate her anemia.  These are not available to me.  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    • Chronic kidney disease    • CKD (chronic kidney disease)     PT STATES STAGE 3   • Depression    • Diabetes mellitus     BORDERLINE   • Diverticular disease    • Gastric ulcer     AND DUODENAL   • GERD (gastroesophageal reflux disease)    • Hearing loss     BILAT AIDES   • History of colon polyps    • History of depression    • History of GI bleed    • Hypertension    • Hypothyroidism    • Peptic ulceration    • PONV (postoperative nausea and vomiting)    • Poor  vision     RIGHT EYE, HAS PERIPHERAL VISION    • Stroke 2000     mild weakness on left, partial sight loss on right         Past Surgical History:   Procedure Laterality Date   • ABDOMINAL SURGERY     • APPENDECTOMY     • BLADDER REPAIR      AND RECTOCELE   • BREAST BIOPSY     • BREAST CYST ASPIRATION     • BREAST LUMPECTOMY WITH SENTINEL NODE BIOPSY Left 3/19/2018    Procedure: BREAST LUMPECTOMY WITH SENTINEL NODE BIOPSY AND NEEDLE LOCALIZATION;  Surgeon: Myles Soliman MD;  Location: [facility] [place];  Service: General   • CHOLECYSTECTOMY     • COLONOSCOPY  2013   • COLONOSCOPY N/A 2/16/2018    Procedure: COLONOSCOPY into cecum and T.I. with biopsies;  Surgeon: Augustine Gallego MD;  Location: [facility];  Service:    • ENDOSCOPY N/A 10/17/2017    Procedure: ESOPHAGOGASTRODUODENOSCOPY WITH COLD BIOPSIES;  Surgeon: Augustine Gallego MD;  Location: [facility];  Service:    • ENDOSCOPY N/A 2/16/2018    Procedure: ESOPHAGOGASTRODUODENOSCOPY with biopsies and #54 Arguello DILATATION;  Surgeon: Augustine Gallego MD;  Location: [facility];  Service:    • EYE SURGERY Left 2014    3-4 years, cornea replacement    • HYSTERECTOMY     • MOUTH SURGERY  2000    UNDER TONGUE AND LEFT FLOOR OF MOUTH FOR CA   • PARATHYROIDECTOMY      PER PT   • SINUS SURGERY     • SKIN BIOPSY     • THYROID SURGERY     • VARICOSE VEIN SURGERY      Oncological history  patient is an 82-year-old female with recent problems with abdominal pain and diarrhea and weight loss which is being evaluated by GI and finally found to have multiple ulcers in the duodenum and small bowel finally responding to treatment.  In the middle of this she went for routine mammogram a couple of months later was found to have an abnormality in the left breast at 7 o'clock position measuring about 12 mm in size that was not present last year.  The patient had previously had a left breast biopsy in 2015 with benign findings.   There was no palpable mass and  biopsy was done and this revealed a grade 1 infiltrating ductal carcinoma at least 1 cm in size ER 78%/ME 17% positive HER-2 negative.  The patient was referred to Dr. Soliman who performed a lumpectomy and sentinel node biopsy with the findings of a 1.2 cm tumor grade 2 with lymphovascular invasion and clear margins with associated DCIS.  One of 2 sentinel nodes showed a micrometastasis although the pathologist said this was almost too small to be considered a micrometastasis.  Postoperatively she has done well and thankfully her GI complains of much improved with Carafate and Protonix and her diarrhea finally resolved.    She's not had a bone density in quite a while.  She is  5 para 5  menarche at age 15 and menopause in her mid 50s although she had a hysterectomy at 30 for an ulcerated uterus.  First childbirth was at age 20 she breast-fed all 5 children.  She took hormonal therapy for at least 15 years after menopause and stopped about 10 years ago   .  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    Her father  at 52 of stomach cancer mother had thyroid cancer at age 50 her brother's daughter had breast cancer age 40 and she had a paternal aunt with leukemia is no other breast cancer or ovarian cancer uterine cancer or pancreatic cancer in the family . I did ask him to check with her niece to see if she has had genetic testing - they do not know much at all about the maternal family .    She will call for the results of the DEXA scan and if adequate we will start Arimidex which I have  already prescribed to her and see her back in 3 months to assess her tolerance.  She is in very good health and I explained to her that adjuvant therapy decreases the risk of recurrence of her cancer which might be in the 15-20% range probably down to the 10% range and if she has significant side effects we will be inclined to stop treatment.  Obviously if  her GI symptoms recur with Arimidex we will have to try another medications.  We talked about the joint pains and hot flashes which are unlikely at her age and she was agreeable to a trial of Arimidex.  Radiation has been planned        Current Outpatient Prescriptions on File Prior to Visit   Medication Sig Dispense Refill   • amLODIPine (NORVASC) 5 MG tablet Take 5 mg by mouth Every 12 (Twelve) Hours.     • anastrozole (ARIMIDEX) 1 MG tablet Take 1 tablet by mouth Daily. 90 tablet 3   • aspirin 81 MG tablet Take 81 mg by mouth Daily.     • Cholecalciferol (VITAMIN D) 2000 units capsule Take 1 capsule by mouth Daily. 90 capsule 1   • fluorometholone (FML) 0.1 % ophthalmic suspension Administer 1 drop into the left eye Daily.     • FLUoxetine (PROzac) 10 MG capsule Take  by mouth Daily. PT ALTERNATES DAILY DOSES WITH ONE DAY 10MG, NEXT DAY 20MG     • FLUoxetine (PROzac) 10 MG capsule TAKE 2 ALTERNATING WITH 1 EVERY OTHER  capsule 1   • glucose blood (FREESTYLE TEST STRIPS) test strip TEST BLOOD SUGARS ONCE DAILY 50 each 12   • levothyroxine (SYNTHROID, LEVOTHROID) 75 MCG tablet TAKE 1 TABLET BY MOUTH DAILY 90 tablet 3   • NON FORMULARY Administer 1 drop to both eyes 3 (Three) Times a Day. THERA TEARS     • Omega-3 Fatty Acids (FISH OIL PO) Take  by mouth Daily As Needed.     • Probiotic Product (PROBIOTIC DAILY PO) Take 1 capsule by mouth Daily.     • sucralfate (CARAFATE) 1 GM/10ML suspension Take 1 g by mouth As Needed.     • [DISCONTINUED] amLODIPine (NORVASC) 5 MG tablet TAKE 2 TABLETS BY MOUTH EVERY  tablet 0   • [DISCONTINUED] pantoprazole (PROTONIX) 40 MG EC tablet Take 1 tablet by mouth 2 (Two) Times a Day. 60 tablet 3     No current facility-administered medications on file prior to visit.         ALLERGIES:    Allergies   Allergen Reactions   • Other Nausea Only     All mycins   • Sulfa Antibiotics Other (See Comments)     LOST CONSCIOUSNESS AND BODY TURNED RED/ON FIRE   • Ciprofloxacin Other  "(See Comments)     RED BLISTERS   • Iodine Other (See Comments)     DECADES AGO, IODINE INJECTION CAUSED SKIN REDNESS AND BURNING   • Macrodantin [Nitrofurantoin Macrocrystal] Diarrhea and Nausea And Vomiting   • Nitrofurantoin Nausea And Vomiting   • Yeast-Related Products Other (See Comments)     MOUTH SORES AND BLADDER INFECTION        Social History     Social History   • Marital status:      Spouse name: Nicolas   • Years of education: High school     Occupational History   • Homemaker      Social History Main Topics   • Smoking status: Former Smoker     Packs/day: 1.00     Years: 10.00     Types: Cigarettes     Start date: 1980     Quit date: 1990   • Smokeless tobacco: Never Used   • Alcohol use No   • Drug use: No   • Sexual activity: Defer     Other Topics Concern   • Not on file     Social History Narrative    Accompanied by daughters Macrina and Daisy        Family History   Problem Relation Age of Onset   • Esophageal cancer Father    • Stomach cancer Father    • Heart disease Mother    • Thyroid cancer Mother    • Hypertension Mother    • Hypertension Sister    • Hypertension Brother    • Stroke Brother    • Heart disease Brother    • Diabetes Brother    • Leukemia Paternal Aunt    • Malig Hyperthermia Neg Hx         Review of Systems   Constitutional: Positive for fatigue.   Cardiovascular: Positive for leg swelling (Ankles).   Gastrointestinal: Negative for abdominal distention, abdominal pain and diarrhea.   Musculoskeletal: Positive for back pain (Chronic).        Objective     Vitals:    06/29/18 1544   BP: 160/62   Pulse: 70   Resp: 14   Temp: 98 °F (36.7 °C)   TempSrc: Oral   SpO2: 97%   Weight: 72.9 kg (160 lb 12.8 oz)   Height: 152.4 cm (60\")   PainSc: 0-No pain     Current Status 6/29/2018   ECOG score 0       Physical Exam    GENERAL:  Well-developed, well-nourished in no acute distress.   SKIN:  Warm, dry without rashes, purpura or petechiae.  EYES:  Pupils equal, round and reactive " to light.  EOMs intact.  Conjunctivae normal.  EARS:  Hearing intact.  NOSE:  Septum midline.  No excoriations or nasal discharge.  MOUTH:  Tongue is well-papillated; no stomatitis or ulcers.  Lips normal.  THROAT:  Oropharynx without lesions or exudates.  NECK:  Supple with good range of motion; no thyromegaly or masses, no JVD.  LYMPHATICS:  No cervical, supraclavicular, axillary or inguinal adenopathy.  CHEST:  Lungs clear to auscultation. Good airflow.  BREASTS:right breast Benign left breast with lumpectomy scar in the dependent part of breast at 6:00 with minimal thickening  CARDIAC:  Regular rate and rhythm without murmurs, rubs or gallops. Normal S1,S2.  ABDOMEN:  Soft, nontender with no hepatosplenomegaly or masses.  EXTREMITIES:  No clubbing, cyanosis or edema.  NEUROLOGICAL:  Cranial Nerves II-XII grossly intact.  No focal neurological deficits.  PSYCHIATRIC:  Normal affect and mood.        RECENT LABS:  Hematology WBC   Date Value Ref Range Status   06/29/2018 8.51 4.00 - 10.00 10*3/mm3 Final     RBC   Date Value Ref Range Status   06/29/2018 4.10 3.90 - 5.00 10*6/mm3 Final     Hemoglobin   Date Value Ref Range Status   06/29/2018 11.4 (L) 11.5 - 14.9 g/dL Final     Hematocrit   Date Value Ref Range Status   06/29/2018 34.9 34.0 - 45.0 % Final     Platelets   Date Value Ref Range Status   06/29/2018 331 150 - 375 10*3/mm3 Final      ER 78% SC 17% Her 2 -neg Ki-67 7%  Final Diagnosis   1.  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                SEE SYNOPTIC REPORT (BELOW) FOR ADDITIONAL DETAILS.     2.  SENTINEL LYMPH NODE #1, LEFT AXILLA, EXCISION:                ONE LYMPH NODE, POSITIVE FOR METASTATIC CARCINOMA (MICROMETASTASIS) (SEE COMMENT).               NO EXTRANODAL EXTENSION IS IDENTIFIED.     COMMENT: Measuring the exact size of the lymph node  metastasis is difficult. The lymph node demonstrates a few very small scattered foci of tumor cells, compatible with isolated tumor cells.  One slide demonstrates a single contiguous focus of tumor cells exceeding 0.2 mm; therefore, this is best considered a micrometastasis rather than isolated tumor cells.     3.  SENTINEL LYMPH NODE #2, LEFT AXILLA, EXCISION.               ONE LYMPH NODE, NEGATIVE FOR METASTATIC CARCINOMA.     SYNOPTIC REPORT (Based on [facility] cancer case summary, version January 2018):               Procedure: Excision (less than total mastectomy).               Specimen laterality: Left.                Tumor site: Not specified.                Tumor size: 1.2 x 1.1 x 1 cm.               Histologic type: Invasive carcinoma of no special type (ductal, not otherwise specified).                Histologic grade (Melony Histologic Score):                              Glandular (acinar)/tubular differentiation: Score 3.                            Nuclear pleomorphism: Score 2.                             Mitotic rate: Score 1.                            Overall grade: Grade 2 (Score 6 of 9).               Tumor focality: Single focus of invasive carcinoma.                Ductal carcinoma in situ (DCIS): Present.                            Architectural patterns: Solid.                             Nuclear grade: Grade II (intermediate).                            Necrosis: Present, central comedonecrosis.                Lobular carcinoma in situ (LCIS): No LCIS in specimen.               Margins:                            Invasive carcinoma margins: Uninvolved by invasive carcinoma.                                          Distance from closest margin: 3 mm (medial margin).                                          NOTE: Invasive carcinoma also extends to within 4 mm of superior margin and to within                                                 4 mm of lateral margin.  All additional margins are  greater than 10 mm from invasive                                                 carcinoma.                              DCIS Margins: Uninvolved by DCIS.                                          Distance from closest margin: 3 mm (medial margin).                    Regional Lymph nodes: Involved by tumor cells.                             Number of lymph nodes with macrometastases: 0.                            Number of lymph nodes with micrometastases: 1.                            Size of largest metastatic deposit: 0.4 mm.                            Extranodal extension: Not identified.                            Number of lymph nodes examined: 2.                            Number of sentinel lymph nodes examined: 2.                Treatment effect: No known presurgical therapy.                Lymphovascular invasion: Present.                Dermal Lymphovascular invasion: Not identified.                Pathologic stage classification.                            Primary tumor: pT1c.                            Regional lymph nodes: pN1mi(sn).                            Distant metastasis: Not applicable.                Additional pathologic findings:                             Ductal hyperplasia of the usual type.                             Fibrocystic changes with duct dilatation and stasis and focal apocrine metaplasia.                             Columnar cell change without atypia.                             Fibroinflammatory changes consistent with site of prior biopsy.                    Ancillary studies: ER, TX, HER-2/melanie, and Ki-67 studies performed on previous biopsy specimen at                       outside facility ([facility]).           Assessment/Plan   1.  T1c N1 jese N0 ER/TX positive HER-2 negative left breast cancer post lumpectomyAnd radiation  2.  Ulcerations noted upper GI tract improved  3.  Family history of breast cancer in a niece at a young age and father with cancer at age 51?      Genetic testing  4.  History of oral cancer in 1990  5.  Anemia due to CK D 3?   6.  Arimidex started in 3/18    Plan  1. Continue Arimidex   2.  Check immunofixation  3.  Anemia workup ongoing by nephrology we will recheck her next visit in 3-4 months

## 2018-07-02 LAB
ALBUMIN SERPL-MCNC: 3.9 G/DL (ref 2.9–4.4)
ALBUMIN/GLOB SERPL: 1.3 {RATIO} (ref 0.7–1.7)
ALPHA1 GLOB FLD ELPH-MCNC: 0.2 G/DL (ref 0–0.4)
ALPHA2 GLOB SERPL ELPH-MCNC: 0.8 G/DL (ref 0.4–1)
B-GLOBULIN SERPL ELPH-MCNC: 1.3 G/DL (ref 0.7–1.3)
GAMMA GLOB SERPL ELPH-MCNC: 0.8 G/DL (ref 0.4–1.8)
GLOBULIN SER CALC-MCNC: 3.1 G/DL (ref 2.2–3.9)
IGA SERPL-MCNC: 279 MG/DL (ref 64–422)
IGG SERPL-MCNC: 835 MG/DL (ref 700–1600)
IGM SERPL-MCNC: 48 MG/DL (ref 26–217)
INTERPRETATION SERPL IEP-IMP: ABNORMAL
KAPPA LC SERPL-MCNC: 22.9 MG/L (ref 3.3–19.4)
KAPPA LC/LAMBDA SER: 1.32 {RATIO} (ref 0.26–1.65)
LAMBDA LC FREE SERPL-MCNC: 17.4 MG/L (ref 5.7–26.3)
Lab: ABNORMAL
M-SPIKE: ABNORMAL G/DL
PROT SERPL-MCNC: 7 G/DL (ref 6–8.5)

## 2018-07-06 ENCOUNTER — DOCUMENTATION (OUTPATIENT)
Dept: OTHER | Facility: HOSPITAL | Age: 83
End: 2018-07-06

## 2018-07-10 ENCOUNTER — OFFICE VISIT (OUTPATIENT)
Dept: FAMILY MEDICINE CLINIC | Facility: CLINIC | Age: 83
End: 2018-07-10

## 2018-07-10 VITALS
BODY MASS INDEX: 31.33 KG/M2 | SYSTOLIC BLOOD PRESSURE: 140 MMHG | OXYGEN SATURATION: 97 % | TEMPERATURE: 98 F | WEIGHT: 159.6 LBS | DIASTOLIC BLOOD PRESSURE: 60 MMHG | HEART RATE: 78 BPM | HEIGHT: 60 IN

## 2018-07-10 DIAGNOSIS — K21.9 GASTROESOPHAGEAL REFLUX DISEASE WITHOUT ESOPHAGITIS: Primary | ICD-10-CM

## 2018-07-10 DIAGNOSIS — R60.0 BILATERAL LEG EDEMA: ICD-10-CM

## 2018-07-10 DIAGNOSIS — R73.9 BLOOD GLUCOSE ELEVATED: ICD-10-CM

## 2018-07-10 DIAGNOSIS — E87.5 HYPERKALEMIA: ICD-10-CM

## 2018-07-10 DIAGNOSIS — E03.9 HYPOTHYROIDISM, UNSPECIFIED TYPE: ICD-10-CM

## 2018-07-10 LAB
ANION GAP SERPL CALCULATED.3IONS-SCNC: 14.3 MMOL/L
BACTERIA UR QL AUTO: ABNORMAL /HPF
BILIRUB UR QL STRIP: NEGATIVE
BUN BLD-MCNC: 18 MG/DL (ref 8–23)
BUN/CREAT SERPL: 15 (ref 7–25)
CALCIUM SPEC-SCNC: 9.5 MG/DL (ref 8.6–10.5)
CHLORIDE SERPL-SCNC: 98 MMOL/L (ref 98–107)
CLARITY UR: CLEAR
CO2 SERPL-SCNC: 23.7 MMOL/L (ref 22–29)
COLOR UR: YELLOW
CREAT BLD-MCNC: 1.2 MG/DL (ref 0.57–1)
GFR SERPL CREATININE-BSD FRML MDRD: 43 ML/MIN/1.73
GLUCOSE BLD-MCNC: 110 MG/DL (ref 65–99)
GLUCOSE UR STRIP-MCNC: NEGATIVE MG/DL
HBA1C MFR BLD: 5.88 % (ref 4.8–5.6)
HGB UR QL STRIP.AUTO: NEGATIVE
KETONES UR QL STRIP: NEGATIVE
LEUKOCYTE ESTERASE UR QL STRIP.AUTO: ABNORMAL
NITRITE UR QL STRIP: NEGATIVE
PH UR STRIP.AUTO: 5.5 [PH] (ref 4.6–8)
POTASSIUM BLD-SCNC: 4.9 MMOL/L (ref 3.5–5.2)
PROT UR QL STRIP: ABNORMAL
RBC # UR: ABNORMAL /HPF
REF LAB TEST METHOD: ABNORMAL
SODIUM BLD-SCNC: 136 MMOL/L (ref 136–145)
SP GR UR STRIP: 1.01 (ref 1–1.03)
SQUAMOUS #/AREA URNS HPF: ABNORMAL /HPF
TSH SERPL DL<=0.05 MIU/L-ACNC: 1.2 MIU/ML (ref 0.27–4.2)
UROBILINOGEN UR QL STRIP: ABNORMAL
WBC UR QL AUTO: ABNORMAL /HPF

## 2018-07-10 PROCEDURE — 83036 HEMOGLOBIN GLYCOSYLATED A1C: CPT | Performed by: INTERNAL MEDICINE

## 2018-07-10 PROCEDURE — 84443 ASSAY THYROID STIM HORMONE: CPT | Performed by: INTERNAL MEDICINE

## 2018-07-10 PROCEDURE — 36415 COLL VENOUS BLD VENIPUNCTURE: CPT | Performed by: INTERNAL MEDICINE

## 2018-07-10 PROCEDURE — 99214 OFFICE O/P EST MOD 30 MIN: CPT | Performed by: INTERNAL MEDICINE

## 2018-07-10 PROCEDURE — 80048 BASIC METABOLIC PNL TOTAL CA: CPT | Performed by: INTERNAL MEDICINE

## 2018-07-10 PROCEDURE — 81001 URINALYSIS AUTO W/SCOPE: CPT | Performed by: INTERNAL MEDICINE

## 2018-07-10 RX ORDER — RABEPRAZOLE SODIUM 20 MG/1
20 TABLET, DELAYED RELEASE ORAL DAILY
Qty: 30 TABLET | Refills: 5 | Status: SHIPPED | OUTPATIENT
Start: 2018-07-10 | End: 2019-02-03 | Stop reason: SDUPTHER

## 2018-07-10 NOTE — PROGRESS NOTES
Subjective   Geeta Butt is a 82 y.o. female.   On Protonix which insurance will no longer cover twice a day in those generic.  Also recent leg edema no major changes in medications for obvious reason with that.  History of Present Illness   protonix not well covered by insurance as a bid below a generic.  Has tried  The past with good benefits  Will have patient try generic AcipHex and see if that might be better covered by insurance as a generic we will anticipate giving samples of dexalant in case AcipHex was not covered and then give a dexalant as a prescription prescription with perhaps a authorization regarding on that.  Did regarding leg edema no history of blood clots no definite reason have leg edema.  No recent medication changes she did see Dr. Torres fairly recently area patient does have known thyroid disease we'll get updated TSH to vascular abdomen leg edema there is recent potassium elevation of 4.8 with get a normal being 4.7 so we will recheck values there.  History of elevated glucose we'll get updated hemoglobin A1c see if she is in normal range.    Review of Systems   Cardiovascular: Positive for leg swelling.   All other systems reviewed and are negative.      Objective   Vitals:    07/10/18 1405   BP: 140/60   Pulse: 78   Temp: 98 °F (36.7 °C)   SpO2: 97%   Weight: 72.4 kg (159 lb 9.6 oz)     Physical Exam   Constitutional: She appears well-developed and well-nourished.   HENT:   Head: Normocephalic and atraumatic.   Eyes: Conjunctivae are normal. Pupils are equal, round, and reactive to light.   Cardiovascular: Normal rate, regular rhythm and normal heart sounds.    Pulmonary/Chest: Effort normal and breath sounds normal.   Abdominal: Soft. Bowel sounds are normal.   Musculoskeletal:   1+ edema both legs, negative cords negative Homans both posterior tibial pulses are 2+   Neurological: She is alert.   Unremarkable gait and station   Skin: Skin is warm and dry.   Nursing note and vitals  reviewed.      No results found for: INR    Procedures    Assessment/Plan   1.  Bilateral leg edema plan await pending lab get venous Doppler exclude possible DVT    2.  Hypothyroidism await pending lab work if good recheck in one years time    3.  GERD plan trial of AcipHex as generic that saw successful septal dexalant were given as a 60 mg tablet with prescription follow-up if generic AcipHex is not well covered by her plan.    4.  Hyperkalemia get updated BMP    5.  Elevated glucose.  Update hemoglobin A1c not on any diabetic medicines or diet for same.      Much of this encounter note is an electronic transcription/translation of spoken language to printed text.  The electronic translation of spoken language may permit erroneous, or at times, nonsensical words or phrases to be inadvertently transcribed.  Although I have reviewed the note for such errors, some may still exist. If there are questions or for further clarification, please contact me.

## 2018-07-13 RX ORDER — DOXYCYCLINE HYCLATE 100 MG
100 TABLET ORAL 2 TIMES DAILY
Qty: 20 TABLET | Refills: 0 | Status: SHIPPED | OUTPATIENT
Start: 2018-07-13 | End: 2018-10-26

## 2018-07-30 ENCOUNTER — OFFICE VISIT (OUTPATIENT)
Dept: FAMILY MEDICINE CLINIC | Facility: CLINIC | Age: 83
End: 2018-07-30

## 2018-07-30 VITALS
HEART RATE: 80 BPM | TEMPERATURE: 98.4 F | SYSTOLIC BLOOD PRESSURE: 150 MMHG | HEIGHT: 60 IN | BODY MASS INDEX: 31.61 KG/M2 | DIASTOLIC BLOOD PRESSURE: 60 MMHG | OXYGEN SATURATION: 98 % | WEIGHT: 161 LBS

## 2018-07-30 DIAGNOSIS — S80.812A ABRASION OF LEFT LOWER EXTREMITY, INITIAL ENCOUNTER: Primary | ICD-10-CM

## 2018-07-30 PROCEDURE — 99213 OFFICE O/P EST LOW 20 MIN: CPT | Performed by: NURSE PRACTITIONER

## 2018-07-30 RX ORDER — CEPHALEXIN 500 MG/1
500 CAPSULE ORAL 2 TIMES DAILY
Qty: 14 CAPSULE | Refills: 0 | Status: SHIPPED | OUTPATIENT
Start: 2018-07-30 | End: 2018-08-06 | Stop reason: SDUPTHER

## 2018-07-30 NOTE — PATIENT INSTRUCTIONS
Start keflex 500mg bid for 7 days.   Apply bactroban oint to affected areas 2x day with qtip,  Keep wound clean and dry, wash with soap and water, keep covered with band aid,   F/u in 1 week for recheck .  Increase fluid intake, get plenty of rest.   Patient agrees with plan of care and understands instructions. Call if worsening symptoms or any problems or concerns.

## 2018-07-30 NOTE — PROGRESS NOTES
Subjective   Geeta Butt is a 82 y.o. female.     History of Present Illness   C/o laceration on left leg, she tripped over suitcase after packing 12 days ago, caught leg on zipper, she still has noticed drainage, she tried neosporin, peroxide, she has hx of prediabetes. She states area did stop bleeding, denies pain, has noticed some swelling around the cut.     The following portions of the patient's history were reviewed and updated as appropriate: allergies, current medications, past family history, past medical history, past social history, past surgical history and problem list.    Review of Systems   Constitutional: Negative for chills, diaphoresis and fever.   Respiratory: Negative for cough and shortness of breath.    Cardiovascular: Negative for chest pain.   Musculoskeletal: Negative for arthralgias and myalgias.   Skin: Positive for skin lesions.   Neurological: Negative for dizziness, light-headedness and headache.   All other systems reviewed and are negative.      Objective   Physical Exam   Constitutional: She is oriented to person, place, and time. She appears well-developed and well-nourished.   HENT:   Head: Normocephalic.   Eyes: Pupils are equal, round, and reactive to light.   Cardiovascular: Normal rate, regular rhythm, normal heart sounds and intact distal pulses.    Pulmonary/Chest: Effort normal and breath sounds normal.   Musculoskeletal: Normal range of motion.   Neurological: She is alert and oriented to person, place, and time.   Skin: Skin is warm and dry. Abrasion noted. No ecchymosis and no rash noted. There is erythema.        Psychiatric: She has a normal mood and affect. Her behavior is normal.   Nursing note and vitals reviewed.        Assessment/Plan   Geeta was seen today for laceration.    Diagnoses and all orders for this visit:    Abrasion of left lower extremity, initial encounter    Other orders  -     cephalexin (KEFLEX) 500 MG capsule; Take 1 capsule by mouth 2 (Two)  Times a Day.  -     mupirocin (BACTROBAN) 2 % ointment; Apply  topically to the appropriate area as directed 3 (Three) Times a Day.      Start keflex 500mg bid for 7 days.   Apply bactroban oint to affected areas 2x day with qtip,  Keep wound clean and dry, wash with soap and water, keep covered with band aid,   F/u in 1 week for recheck .  Increase fluid intake, get plenty of rest.   Patient agrees with plan of care and understands instructions. Call if worsening symptoms or any problems or concerns.

## 2018-08-06 ENCOUNTER — OFFICE VISIT (OUTPATIENT)
Dept: FAMILY MEDICINE CLINIC | Facility: CLINIC | Age: 83
End: 2018-08-06

## 2018-08-06 VITALS
SYSTOLIC BLOOD PRESSURE: 140 MMHG | OXYGEN SATURATION: 96 % | BODY MASS INDEX: 32.16 KG/M2 | HEIGHT: 60 IN | WEIGHT: 163.8 LBS | TEMPERATURE: 98 F | RESPIRATION RATE: 16 BRPM | DIASTOLIC BLOOD PRESSURE: 60 MMHG | HEART RATE: 67 BPM

## 2018-08-06 DIAGNOSIS — S80.812D ABRASION OF LEFT LOWER EXTREMITY, SUBSEQUENT ENCOUNTER: Primary | ICD-10-CM

## 2018-08-06 PROCEDURE — 99213 OFFICE O/P EST LOW 20 MIN: CPT | Performed by: NURSE PRACTITIONER

## 2018-08-06 RX ORDER — CEPHALEXIN 500 MG/1
500 CAPSULE ORAL 2 TIMES DAILY
Qty: 6 CAPSULE | Refills: 0 | Status: SHIPPED | OUTPATIENT
Start: 2018-08-06 | End: 2018-10-26

## 2018-08-06 NOTE — PROGRESS NOTES
Subjective   Geeta Butt is a 82 y.o. female.     History of Present Illness   Here today to f/u for leg wound, she was seen 7/30/18 for abrasion on left ant leg, given keflex and bactroban, she states leg is improved, no redness, scabbed appearance, she denies fever, she tried peroxide at home also, she left open to air, she denies pain.       The following portions of the patient's history were reviewed and updated as appropriate: allergies, current medications, past family history, past medical history, past social history, past surgical history and problem list.    Review of Systems   Constitutional: Negative for chills and fever.   Respiratory: Negative for cough and shortness of breath.    Cardiovascular: Negative for chest pain.   Musculoskeletal: Negative for arthralgias and myalgias.   Skin: Positive for skin lesions.   Neurological: Negative for dizziness, light-headedness and headache.   All other systems reviewed and are negative.      Objective   Physical Exam   Constitutional: She is oriented to person, place, and time. She appears well-developed and well-nourished.   HENT:   Head: Normocephalic.   Eyes: Pupils are equal, round, and reactive to light.   Cardiovascular: Normal rate, regular rhythm and normal heart sounds.    Pulmonary/Chest: Effort normal and breath sounds normal.   Musculoskeletal: Normal range of motion.   Neurological: She is alert and oriented to person, place, and time.   Skin: Skin is warm and dry. Abrasion noted. No erythema.        Psychiatric: She has a normal mood and affect. Her behavior is normal.   Nursing note and vitals reviewed.        Assessment/Plan   Geeta was seen today for skin problem.    Diagnoses and all orders for this visit:    Abrasion of left lower extremity, subsequent encounter    Other orders  -     cephalexin (KEFLEX) 500 MG capsule; Take 1 capsule by mouth 2 (Two) Times a Day.          contr keflex 2x day for 3 more days to equal 10 days,   Apply  bacitracin to area 2x day, keep open to air as needed.   May clean with soap and water.   Educated about falls precautions.   Increase fluid intake, get plenty of rest.   Patient agrees with plan of care and understands instructions. Call if worsening symptoms or any problems or concerns.

## 2018-08-06 NOTE — PATIENT INSTRUCTIONS
contr keflex 2x day for 3 more days to equal 10 days,   Apply bacitracin to area 2x day, keep open to air as needed.   May clean with soap and water.   Educated about falls precautions.   Increase fluid intake, get plenty of rest.   Patient agrees with plan of care and understands instructions. Call if worsening symptoms or any problems or concerns.

## 2018-08-17 RX ORDER — AMLODIPINE BESYLATE 5 MG/1
TABLET ORAL
Qty: 180 TABLET | Refills: 0 | Status: SHIPPED | OUTPATIENT
Start: 2018-08-17 | End: 2018-11-17 | Stop reason: SDUPTHER

## 2018-08-21 ENCOUNTER — OFFICE (OUTPATIENT)
Dept: URBAN - METROPOLITAN AREA CLINIC 66 | Facility: CLINIC | Age: 83
End: 2018-08-21

## 2018-08-21 ENCOUNTER — TRANSCRIBE ORDERS (OUTPATIENT)
Dept: ADMINISTRATIVE | Facility: HOSPITAL | Age: 83
End: 2018-08-21

## 2018-08-21 VITALS
DIASTOLIC BLOOD PRESSURE: 63 MMHG | HEART RATE: 79 BPM | SYSTOLIC BLOOD PRESSURE: 157 MMHG | HEIGHT: 61 IN | WEIGHT: 160 LBS

## 2018-08-21 DIAGNOSIS — K21.9 GASTRO-ESOPHAGEAL REFLUX DISEASE WITHOUT ESOPHAGITIS: ICD-10-CM

## 2018-08-21 DIAGNOSIS — R10.11 RIGHT UPPER QUADRANT PAIN: ICD-10-CM

## 2018-08-21 DIAGNOSIS — K57.30 DIVERTICULOSIS OF LARGE INTESTINE WITHOUT PERFORATION OR ABS: ICD-10-CM

## 2018-08-21 DIAGNOSIS — A04.9 BACTERIAL INTESTINAL INFECTION, UNSPECIFIED: ICD-10-CM

## 2018-08-21 DIAGNOSIS — K59.1 FUNCTIONAL DIARRHEA: ICD-10-CM

## 2018-08-21 DIAGNOSIS — K29.50 UNSPECIFIED CHRONIC GASTRITIS WITHOUT BLEEDING: ICD-10-CM

## 2018-08-21 PROCEDURE — 99214 OFFICE O/P EST MOD 30 MIN: CPT | Performed by: NURSE PRACTITIONER

## 2018-08-21 RX ORDER — COLESTIPOL HYDROCHLORIDE 1 G/1
2 TABLET ORAL
Qty: 60 | Refills: 5 | Status: ACTIVE
Start: 2018-08-21

## 2018-08-21 RX ORDER — DICYCLOMINE HYDROCHLORIDE 10 MG/1
40 CAPSULE ORAL
Qty: 60 | Refills: 0 | Status: ACTIVE
Start: 2018-08-21

## 2018-08-22 ENCOUNTER — LAB (OUTPATIENT)
Dept: LAB | Facility: HOSPITAL | Age: 83
End: 2018-08-22

## 2018-08-22 ENCOUNTER — TRANSCRIBE ORDERS (OUTPATIENT)
Dept: ADMINISTRATIVE | Facility: HOSPITAL | Age: 83
End: 2018-08-22

## 2018-08-22 DIAGNOSIS — K21.9 CHALASIA OF LOWER ESOPHAGEAL SPHINCTER: ICD-10-CM

## 2018-08-22 DIAGNOSIS — K29.50 CHRONIC ANTRAL GASTRITIS: ICD-10-CM

## 2018-08-22 DIAGNOSIS — A04.9 BACTERIAL ENTERITIS: Primary | ICD-10-CM

## 2018-08-22 DIAGNOSIS — K59.1 FUNCTIONAL DIARRHEA: ICD-10-CM

## 2018-08-22 DIAGNOSIS — A04.9 BACTERIAL ENTERITIS: ICD-10-CM

## 2018-08-22 DIAGNOSIS — R10.11 ABDOMINAL PAIN, RIGHT UPPER QUADRANT: ICD-10-CM

## 2018-08-22 DIAGNOSIS — K57.30 DIVERTICULOSIS OF LARGE INTESTINE WITHOUT DIVERTICULITIS: ICD-10-CM

## 2018-08-22 LAB
ADV 40+41 DNA STL QL NAA+NON-PROBE: NOT DETECTED
ALBUMIN SERPL-MCNC: 4.5 G/DL (ref 3.5–5.2)
ALBUMIN/GLOB SERPL: 1.6 G/DL
ALP SERPL-CCNC: 61 U/L (ref 39–117)
ALT SERPL W P-5'-P-CCNC: 23 U/L (ref 1–33)
ANION GAP SERPL CALCULATED.3IONS-SCNC: 7.2 MMOL/L
AST SERPL-CCNC: 19 U/L (ref 1–32)
ASTRO TYP 1-8 RNA STL QL NAA+NON-PROBE: NOT DETECTED
BASOPHILS # BLD AUTO: 0.05 10*3/MM3 (ref 0–0.2)
BASOPHILS NFR BLD AUTO: 0.5 % (ref 0–1.5)
BILIRUB SERPL-MCNC: <0.2 MG/DL (ref 0.1–1.2)
BUN BLD-MCNC: 25 MG/DL (ref 8–23)
BUN/CREAT SERPL: 21.2 (ref 7–25)
C CAYETANENSIS DNA STL QL NAA+NON-PROBE: NOT DETECTED
C DIFF TOX GENS STL QL NAA+PROBE: NEGATIVE
CALCIUM SPEC-SCNC: 8.9 MG/DL (ref 8.6–10.5)
CAMPY SP DNA.DIARRHEA STL QL NAA+PROBE: NOT DETECTED
CHLORIDE SERPL-SCNC: 102 MMOL/L (ref 98–107)
CO2 SERPL-SCNC: 25.8 MMOL/L (ref 22–29)
CREAT BLD-MCNC: 1.18 MG/DL (ref 0.57–1)
CRP SERPL-MCNC: 0.19 MG/DL (ref 0–0.5)
CRYPTOSP STL CULT: NOT DETECTED
DEPRECATED RDW RBC AUTO: 45.4 FL (ref 37–54)
E COLI DNA SPEC QL NAA+PROBE: NOT DETECTED
E HISTOLYT AG STL-ACNC: NOT DETECTED
EAEC PAA PLAS AGGR+AATA ST NAA+NON-PRB: NOT DETECTED
EC STX1 + STX2 GENES STL NAA+PROBE: NOT DETECTED
EOSINOPHIL # BLD AUTO: 0.12 10*3/MM3 (ref 0–0.7)
EOSINOPHIL NFR BLD AUTO: 1.2 % (ref 0.3–6.2)
EPEC EAE GENE STL QL NAA+NON-PROBE: NOT DETECTED
ERYTHROCYTE [DISTWIDTH] IN BLOOD BY AUTOMATED COUNT: 14.2 % (ref 11.7–13)
ETEC LTA+ST1A+ST1B TOX ST NAA+NON-PROBE: NOT DETECTED
G LAMBLIA DNA SPEC QL NAA+PROBE: NOT DETECTED
GFR SERPL CREATININE-BSD FRML MDRD: 44 ML/MIN/1.73
GLOBULIN UR ELPH-MCNC: 2.8 GM/DL
GLUCOSE BLD-MCNC: 95 MG/DL (ref 65–99)
HCT VFR BLD AUTO: 35.3 % (ref 35.6–45.5)
HGB BLD-MCNC: 11.3 G/DL (ref 11.9–15.5)
IMM GRANULOCYTES # BLD: 0.04 10*3/MM3 (ref 0–0.03)
IMM GRANULOCYTES NFR BLD: 0.4 % (ref 0–0.5)
LACTOFERRIN STL QL LA: NEGATIVE
LYMPHOCYTES # BLD AUTO: 1.8 10*3/MM3 (ref 0.9–4.8)
LYMPHOCYTES NFR BLD AUTO: 18.6 % (ref 19.6–45.3)
MCH RBC QN AUTO: 28 PG (ref 26.9–32)
MCHC RBC AUTO-ENTMCNC: 32 G/DL (ref 32.4–36.3)
MCV RBC AUTO: 87.6 FL (ref 80.5–98.2)
MONOCYTES # BLD AUTO: 0.78 10*3/MM3 (ref 0.2–1.2)
MONOCYTES NFR BLD AUTO: 8 % (ref 5–12)
NEUTROPHILS # BLD AUTO: 6.94 10*3/MM3 (ref 1.9–8.1)
NEUTROPHILS NFR BLD AUTO: 71.7 % (ref 42.7–76)
NOROVIRUS GI+II RNA STL QL NAA+NON-PROBE: NOT DETECTED
P SHIGELLOIDES DNA STL QL NAA+NON-PROBE: NOT DETECTED
PLATELET # BLD AUTO: 385 10*3/MM3 (ref 140–500)
PMV BLD AUTO: 9.7 FL (ref 6–12)
POTASSIUM BLD-SCNC: 4.2 MMOL/L (ref 3.5–5.2)
PROT SERPL-MCNC: 7.3 G/DL (ref 6–8.5)
RBC # BLD AUTO: 4.03 10*6/MM3 (ref 3.9–5.2)
RV RNA STL NAA+PROBE: NOT DETECTED
SALMONELLA DNA SPEC QL NAA+PROBE: NOT DETECTED
SAPO I+II+IV+V RNA STL QL NAA+NON-PROBE: NOT DETECTED
SHIGELLA SP+EIEC IPAH STL QL NAA+PROBE: NOT DETECTED
SODIUM BLD-SCNC: 135 MMOL/L (ref 136–145)
V CHOLERAE DNA SPEC QL NAA+PROBE: NOT DETECTED
VIBRIO DNA SPEC NAA+PROBE: NOT DETECTED
WBC NRBC COR # BLD: 9.69 10*3/MM3 (ref 4.5–10.7)
YERSINIA STL CULT: NOT DETECTED

## 2018-08-22 PROCEDURE — 83631 LACTOFERRIN FECAL (QUANT): CPT

## 2018-08-22 PROCEDURE — 36415 COLL VENOUS BLD VENIPUNCTURE: CPT

## 2018-08-22 PROCEDURE — 85025 COMPLETE CBC W/AUTO DIFF WBC: CPT

## 2018-08-22 PROCEDURE — 83993 ASSAY FOR CALPROTECTIN FECAL: CPT

## 2018-08-22 PROCEDURE — 80053 COMPREHEN METABOLIC PANEL: CPT

## 2018-08-22 PROCEDURE — 86140 C-REACTIVE PROTEIN: CPT

## 2018-08-22 PROCEDURE — 87507 IADNA-DNA/RNA PROBE TQ 12-25: CPT

## 2018-08-30 LAB — CALPROTECTIN STL-MCNT: <16 UG/G (ref 0–120)

## 2018-09-04 ENCOUNTER — TELEPHONE (OUTPATIENT)
Dept: ONCOLOGY | Facility: HOSPITAL | Age: 83
End: 2018-09-04

## 2018-09-05 ENCOUNTER — TELEPHONE (OUTPATIENT)
Dept: MAMMOGRAPHY | Facility: CLINIC | Age: 83
End: 2018-09-05

## 2018-09-05 NOTE — TELEPHONE ENCOUNTER
Returned pt call. C/O swelling and fluid in breast under the incision site. Made appointment for tomorrow at 11:15 for Dr. Soliman to assess the area. Pt with V/U.    Rosalba Morris RN

## 2018-09-06 ENCOUNTER — OFFICE VISIT (OUTPATIENT)
Dept: MAMMOGRAPHY | Facility: CLINIC | Age: 83
End: 2018-09-06

## 2018-09-06 VITALS
OXYGEN SATURATION: 96 % | TEMPERATURE: 97.7 F | DIASTOLIC BLOOD PRESSURE: 62 MMHG | SYSTOLIC BLOOD PRESSURE: 134 MMHG | HEART RATE: 82 BPM

## 2018-09-06 DIAGNOSIS — Z17.0 MALIGNANT NEOPLASM OF LOWER-INNER QUADRANT OF LEFT BREAST IN FEMALE, ESTROGEN RECEPTOR POSITIVE (HCC): Primary | ICD-10-CM

## 2018-09-06 DIAGNOSIS — C50.312 MALIGNANT NEOPLASM OF LOWER-INNER QUADRANT OF LEFT BREAST IN FEMALE, ESTROGEN RECEPTOR POSITIVE (HCC): Primary | ICD-10-CM

## 2018-09-06 PROCEDURE — 99213 OFFICE O/P EST LOW 20 MIN: CPT | Performed by: SURGERY

## 2018-09-06 NOTE — PROGRESS NOTES
Subjective   Geeta Butt is a 83 y.o. female     History of Present Illness she is a nice lady diagnosed with left breast cancer in late March of this year.  She has undergone breast conserving surgery and completed her radiation treatment.  Lately she has noted some swelling beneath the left breast and she thinks there may be some fluid there as well.  She is also concerned about the possibility of truncal lymphedema.        Review of Systems  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    • Chronic kidney disease    • CKD (chronic kidney disease)     PT STATES STAGE 3   • Depression    • Diabetes mellitus (CMS/HCC)     BORDERLINE   • Diverticular disease    • Gastric ulcer     AND DUODENAL   • GERD (gastroesophageal reflux disease)    • Hearing loss     BILAT AIDES   • History of colon polyps    • History of depression    • History of GI bleed    • Hypertension    • Hypothyroidism    • Peptic ulceration    • PONV (postoperative nausea and vomiting)    • Poor vision     RIGHT EYE, HAS PERIPHERAL VISION    • Stroke (CMS/HCC) 2000     mild weakness on left, partial sight loss on right      Past Surgical History:   Procedure Laterality Date   • ABDOMINAL SURGERY     • APPENDECTOMY     • BLADDER REPAIR      AND RECTOCELE   • BREAST BIOPSY     • BREAST CYST ASPIRATION     • BREAST LUMPECTOMY WITH SENTINEL NODE BIOPSY Left 3/19/2018    Procedure: BREAST LUMPECTOMY WITH SENTINEL NODE BIOPSY AND NEEDLE LOCALIZATION;  Surgeon: Myles Soliman MD;  [place] [facility] [place] [facility] [place]  Service: General   • CHOLECYSTECTOMY     • COLONOSCOPY  2013   • COLONOSCOPY N/A 2/16/2018    Procedure: COLONOSCOPY into cecum and T.I. with biopsies;  Surgeon: Augustine Gallego MD;  Location: [facility] ENDOSCOPY;   Service:    • ENDOSCOPY N/A 10/17/2017    Procedure: ESOPHAGOGASTRODUODENOSCOPY WITH COLD BIOPSIES;  Surgeon: Augustine Gallego MD;  Location: [facility] ENDOSCOPY;  Service:    • ENDOSCOPY N/A 2/16/2018    Procedure: ESOPHAGOGASTRODUODENOSCOPY with biopsies and #54 [address] DILATATION;  Surgeon: Augustine Gallego MD;  Location: [facility] ENDOSCOPY;  Service:    • EYE SURGERY Left 2014    3-4 years, cornea replacement    • HYSTERECTOMY     • MOUTH SURGERY  2000    UNDER TONGUE AND LEFT FLOOR OF MOUTH FOR CA   • PARATHYROIDECTOMY      PER PT   • SINUS SURGERY     • SKIN BIOPSY     • THYROID SURGERY     • VARICOSE VEIN SURGERY       Family History   Problem Relation Age of Onset   • Esophageal cancer Father    • Stomach cancer Father    • Heart disease Mother    • Thyroid cancer Mother    • Hypertension Mother    • Hypertension Sister    • Hypertension Brother    • Stroke Brother    • Heart disease Brother    • Diabetes Brother    • Leukemia Paternal Aunt    • Malig Hyperthermia Neg Hx      Social History     Social History   • Marital status:      Spouse name: Nicolas   • Number of children: N/A   • Years of education: High school     Occupational History   • Homemaker      Social History Main Topics   • Smoking status: Former Smoker     Packs/day: 1.00     Years: 10.00     Types: Cigarettes     Start date: 1980     Quit date: 1990   • Smokeless tobacco: Never Used   • Alcohol use No   • Drug use: No   • Sexual activity: Defer     Other Topics Concern   • Not on file     Social History Narrative    Accompanied by daughters Marlena       Objective   Physical Exam  Gen.-elderly white female in no acute distress  Vital signs-blood pressure 134/62, pulse 82, temperature 97.7  Heart-regular rate and rhythm  Lungs-clear to auscultation  Left breast-she has an inverted T incision in the 6 o'clock position of the breast.  There is a little flattening to the skin with the arms raised.  The horizontal scar is rather  adherent to the chest wall secondary to the surgery and the radiation.  The lumpectomy site is actually healed very well without any significant soft tissue defect  Lymphatics-there is no cervical or axillary adenopathy.  Extremities-no evidence of left or right axillary adenopathy.    Assessment/Plan   Left breast cancer treated with breast conserving measures.  I do not think that there is any evidence of lymphedema or fluid collection and needs to be drained.  I think the adherent are is creating the illusion of puffiness to the tissues just beneath this area.  I have asked him to let me recheck things in 3-4 months it should be right after her yearly mammograms.    The encounter diagnosis was Malignant neoplasm of lower-inner quadrant of left breast in female, estrogen receptor positive (CMS/HCC).

## 2018-10-02 ENCOUNTER — OFFICE VISIT (OUTPATIENT)
Dept: FAMILY MEDICINE CLINIC | Facility: CLINIC | Age: 83
End: 2018-10-02

## 2018-10-02 VITALS
TEMPERATURE: 98.4 F | HEART RATE: 84 BPM | SYSTOLIC BLOOD PRESSURE: 136 MMHG | BODY MASS INDEX: 28.85 KG/M2 | DIASTOLIC BLOOD PRESSURE: 70 MMHG | WEIGHT: 152.8 LBS | HEIGHT: 61 IN | OXYGEN SATURATION: 98 %

## 2018-10-02 DIAGNOSIS — J01.10 ACUTE FRONTAL SINUSITIS, RECURRENCE NOT SPECIFIED: ICD-10-CM

## 2018-10-02 DIAGNOSIS — N39.0 URINARY TRACT INFECTION WITHOUT HEMATURIA, SITE UNSPECIFIED: Primary | ICD-10-CM

## 2018-10-02 LAB
BACTERIA UR QL AUTO: NORMAL /HPF
BILIRUB UR QL STRIP: NEGATIVE
CLARITY UR: CLEAR
COLOR UR: YELLOW
GLUCOSE UR STRIP-MCNC: NEGATIVE MG/DL
HGB UR QL STRIP.AUTO: NEGATIVE
KETONES UR QL STRIP: NEGATIVE
LEUKOCYTE ESTERASE UR QL STRIP.AUTO: NEGATIVE
NITRITE UR QL STRIP: NEGATIVE
PH UR STRIP.AUTO: 5.5 [PH] (ref 4.6–8)
PROT UR QL STRIP: NEGATIVE
RBC # UR: NORMAL /HPF
REF LAB TEST METHOD: NORMAL
SP GR UR STRIP: <=1.005 (ref 1–1.03)
SQUAMOUS #/AREA URNS HPF: NORMAL /HPF
UROBILINOGEN UR QL STRIP: NORMAL
WBC UR QL AUTO: NORMAL /HPF

## 2018-10-02 PROCEDURE — 81001 URINALYSIS AUTO W/SCOPE: CPT | Performed by: INTERNAL MEDICINE

## 2018-10-02 PROCEDURE — 87086 URINE CULTURE/COLONY COUNT: CPT | Performed by: INTERNAL MEDICINE

## 2018-10-02 PROCEDURE — 99214 OFFICE O/P EST MOD 30 MIN: CPT | Performed by: INTERNAL MEDICINE

## 2018-10-02 RX ORDER — KETOTIFEN FUMARATE 0.35 MG/ML
1 SOLUTION/ DROPS OPHTHALMIC 2 TIMES DAILY
COMMUNITY
End: 2021-03-15

## 2018-10-02 RX ORDER — MONTELUKAST SODIUM 4 MG/1
1 TABLET, CHEWABLE ORAL 2 TIMES DAILY
COMMUNITY
End: 2021-03-15

## 2018-10-02 RX ORDER — PANTOPRAZOLE SODIUM 40 MG/1
40 TABLET, DELAYED RELEASE ORAL DAILY
COMMUNITY
End: 2019-03-12 | Stop reason: ALTCHOICE

## 2018-10-02 RX ORDER — DOXYCYCLINE HYCLATE 100 MG
100 TABLET ORAL 2 TIMES DAILY
Qty: 20 TABLET | Refills: 0 | Status: SHIPPED | OUTPATIENT
Start: 2018-10-02 | End: 2018-10-26

## 2018-10-02 RX ORDER — PHENAZOPYRIDINE HYDROCHLORIDE 200 MG/1
200 TABLET, FILM COATED ORAL 3 TIMES DAILY PRN
Qty: 21 TABLET | Refills: 0 | Status: SHIPPED | OUTPATIENT
Start: 2018-10-02 | End: 2019-03-21 | Stop reason: SDUPTHER

## 2018-10-02 NOTE — PROGRESS NOTES
Subjective   Geeta Butt is a 83 y.o. female.   2wks sinus drainage  dark and bloody mucous also dysuria  History of Present Illness sinus drainage very dark and bloody nature pressure more frontal sinuses have a recent dysuria also.  Several bladder infections the past 7 increased frequency goes at least 3×3 discomfort when goes some mild suprapubic as well as lower back discomfort.  With urination.  Not aware of any temperature with this  Drug allergies include Cipro sulfa and Macrobid might be causing GI upset as well as other meds.  History positive for hypertension heart disease diabetes as well as thyroid cancer and esophageal cancer former smoker quit in 1990 no heavy EtOH    Review of Systems   HENT: Positive for congestion and sinus pressure.    Genitourinary: Positive for flank pain and frequency.   All other systems reviewed and are negative.      Objective   Vitals:    10/02/18 1511   BP: 136/70   Pulse: 84   Temp: 98.4 °F (36.9 °C)   SpO2: 98%   Weight: 69.3 kg (152 lb 12.8 oz)     Physical Exam   Constitutional: She appears well-developed and well-nourished.   HENT:   Head: Normocephalic and atraumatic.   Right Ear: External ear normal.   Left Ear: External ear normal.   Mouth/Throat: Oropharynx is clear and moist.   Mild pressure over frontal sinuses bilaterally   Eyes: Pupils are equal, round, and reactive to light. Conjunctivae are normal.   Cardiovascular: Normal rate, regular rhythm and normal heart sounds.    Pulmonary/Chest: Effort normal and breath sounds normal.   Abdominal: Soft. Bowel sounds are normal.   Minimal suprapubic discomfort negative CVA tenderness   Neurological: She is alert.   Unremarkable gait and station   Skin: Skin is warm and dry.   Nursing note and vitals reviewed.      No results found for: INR    Procedures    Assessment/Plan     1.  Frontal sinusitis plan doxycycline milligrams twice a day for 10 days' time  Call if not well in 10 days' time follow-up as needed.  2.   UTI/dysuria plan urine cultures been obtain give doxycycline milligrams twice a day for 10 days for this as well as Pyridium 2 mg 2 years when necessary dysuria for the follow-up not well in 2 weeks time and as needed.    Much of this encounter note is an electronic transcription/translation of spoken language to printed text.  The electronic translation of spoken language may permit erroneous, or at times, nonsensical words or phrases to be inadvertently transcribed.  Although I have reviewed the note for such errors, some may still exist. If there are questions or for further clarification, please contact me.

## 2018-10-04 LAB — BACTERIA SPEC AEROBE CULT: NORMAL

## 2018-10-22 ENCOUNTER — OFFICE VISIT (OUTPATIENT)
Dept: WOUND CARE | Facility: HOSPITAL | Age: 83
End: 2018-10-22
Attending: SURGERY

## 2018-10-22 PROCEDURE — G0463 HOSPITAL OUTPT CLINIC VISIT: HCPCS

## 2018-10-26 ENCOUNTER — OFFICE VISIT (OUTPATIENT)
Dept: ONCOLOGY | Facility: CLINIC | Age: 83
End: 2018-10-26

## 2018-10-26 ENCOUNTER — LAB (OUTPATIENT)
Dept: LAB | Facility: HOSPITAL | Age: 83
End: 2018-10-26

## 2018-10-26 VITALS
RESPIRATION RATE: 16 BRPM | HEART RATE: 74 BPM | SYSTOLIC BLOOD PRESSURE: 158 MMHG | OXYGEN SATURATION: 98 % | DIASTOLIC BLOOD PRESSURE: 70 MMHG | BODY MASS INDEX: 30.17 KG/M2 | HEIGHT: 61 IN | WEIGHT: 159.8 LBS | TEMPERATURE: 97.7 F

## 2018-10-26 DIAGNOSIS — Z17.0 MALIGNANT NEOPLASM OF LOWER-INNER QUADRANT OF LEFT BREAST IN FEMALE, ESTROGEN RECEPTOR POSITIVE (HCC): ICD-10-CM

## 2018-10-26 DIAGNOSIS — C50.312 MALIGNANT NEOPLASM OF LOWER-INNER QUADRANT OF LEFT BREAST IN FEMALE, ESTROGEN RECEPTOR POSITIVE (HCC): ICD-10-CM

## 2018-10-26 DIAGNOSIS — C50.312 MALIGNANT NEOPLASM OF LOWER-INNER QUADRANT OF LEFT BREAST IN FEMALE, ESTROGEN RECEPTOR POSITIVE (HCC): Primary | ICD-10-CM

## 2018-10-26 DIAGNOSIS — Z17.0 MALIGNANT NEOPLASM OF LOWER-INNER QUADRANT OF LEFT BREAST IN FEMALE, ESTROGEN RECEPTOR POSITIVE (HCC): Primary | ICD-10-CM

## 2018-10-26 LAB
ALBUMIN SERPL-MCNC: 4.1 G/DL (ref 3.5–5.2)
ALBUMIN/GLOB SERPL: 1.4 G/DL (ref 1.1–2.4)
ALP SERPL-CCNC: 65 U/L (ref 38–116)
ALT SERPL W P-5'-P-CCNC: 13 U/L (ref 0–33)
ANION GAP SERPL CALCULATED.3IONS-SCNC: 11 MMOL/L
AST SERPL-CCNC: 14 U/L (ref 0–32)
BASOPHILS # BLD AUTO: 0.07 10*3/MM3 (ref 0–0.1)
BASOPHILS NFR BLD AUTO: 0.9 % (ref 0–1.1)
BILIRUB SERPL-MCNC: 0.2 MG/DL (ref 0.1–1.2)
BUN BLD-MCNC: 15 MG/DL (ref 6–20)
BUN/CREAT SERPL: 15.3 (ref 7.3–30)
CALCIUM SPEC-SCNC: 9.2 MG/DL (ref 8.5–10.2)
CHLORIDE SERPL-SCNC: 99 MMOL/L (ref 98–107)
CO2 SERPL-SCNC: 27 MMOL/L (ref 22–29)
CREAT BLD-MCNC: 0.98 MG/DL (ref 0.6–1.1)
DEPRECATED RDW RBC AUTO: 42.9 FL (ref 37–49)
EOSINOPHIL # BLD AUTO: 0.14 10*3/MM3 (ref 0–0.36)
EOSINOPHIL NFR BLD AUTO: 1.8 % (ref 1–5)
ERYTHROCYTE [DISTWIDTH] IN BLOOD BY AUTOMATED COUNT: 13.3 % (ref 11.7–14.5)
GFR SERPL CREATININE-BSD FRML MDRD: 54 ML/MIN/1.73
GLOBULIN UR ELPH-MCNC: 2.9 GM/DL (ref 1.8–3.5)
GLUCOSE BLD-MCNC: 100 MG/DL (ref 74–124)
HCT VFR BLD AUTO: 35.1 % (ref 34–45)
HGB BLD-MCNC: 11.2 G/DL (ref 11.5–14.9)
IMM GRANULOCYTES # BLD: 0.03 10*3/MM3 (ref 0–0.03)
IMM GRANULOCYTES NFR BLD: 0.4 % (ref 0–0.5)
LYMPHOCYTES # BLD AUTO: 1.6 10*3/MM3 (ref 1–3.5)
LYMPHOCYTES NFR BLD AUTO: 20.4 % (ref 20–49)
MCH RBC QN AUTO: 28 PG (ref 27–33)
MCHC RBC AUTO-ENTMCNC: 31.9 G/DL (ref 32–35)
MCV RBC AUTO: 87.8 FL (ref 83–97)
MONOCYTES # BLD AUTO: 0.84 10*3/MM3 (ref 0.25–0.8)
MONOCYTES NFR BLD AUTO: 10.7 % (ref 4–12)
NEUTROPHILS # BLD AUTO: 5.16 10*3/MM3 (ref 1.5–7)
NEUTROPHILS NFR BLD AUTO: 65.8 % (ref 39–75)
NRBC BLD MANUAL-RTO: 0 /100 WBC (ref 0–0)
PLATELET # BLD AUTO: 325 10*3/MM3 (ref 150–375)
PMV BLD AUTO: 8.8 FL (ref 8.9–12.1)
POTASSIUM BLD-SCNC: 4.7 MMOL/L (ref 3.5–4.7)
PROT SERPL-MCNC: 7 G/DL (ref 6.3–8)
RBC # BLD AUTO: 4 10*6/MM3 (ref 3.9–5)
SODIUM BLD-SCNC: 137 MMOL/L (ref 134–145)
WBC NRBC COR # BLD: 7.84 10*3/MM3 (ref 4–10)

## 2018-10-26 PROCEDURE — 80053 COMPREHEN METABOLIC PANEL: CPT | Performed by: INTERNAL MEDICINE

## 2018-10-26 PROCEDURE — 99214 OFFICE O/P EST MOD 30 MIN: CPT | Performed by: INTERNAL MEDICINE

## 2018-10-26 PROCEDURE — 85025 COMPLETE CBC W/AUTO DIFF WBC: CPT | Performed by: INTERNAL MEDICINE

## 2018-10-26 PROCEDURE — 36415 COLL VENOUS BLD VENIPUNCTURE: CPT | Performed by: INTERNAL MEDICINE

## 2018-10-26 RX ORDER — EXEMESTANE 25 MG/1
25 TABLET ORAL DAILY
Qty: 30 TABLET | Refills: 3 | Status: SHIPPED | OUTPATIENT
Start: 2018-10-26 | End: 2019-04-04 | Stop reason: SINTOL

## 2018-10-26 NOTE — PROGRESS NOTES
Subjective     REASON FOR CONSULTATION:   1. Left breast cancer [id number] ER/ID+ her2 neg post lumpectomy/SLN  3/18 and whole breast radiation  2.  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  Fatigue out of proportion to anemia  4.  Arimidex started in 3/18 with bone density showing normal density in the spine and osteopenia in the hips                             REQUESTING PHYSICIAN:  Myles Soliman M.D.    History of Present Illness patient is an 82-year-old female with a T1cN I jese hormone positive left breast cancer treated with lumpectomy sentinel node biopsy and radiation     When I saw her last in June she been on Arimidex for 3 months and was complaining of some joint pains but she tells me that after she saw me last in June shortly after she started having visual blurring and her good left eye and after a couple days  she stopped it because her niece told her this was a side effect of Arimidex    She did not call us and notify has however and so she has been off the medicine now for about 2 months and her vision is stable    We reviewed the side effect profile and except for retinal vein thrombosis there is no significant high problems with Arimidex and I have recommended she switched to Aromasin but to see her ophthalmologist before this to make sure he has no contraindication to her taking Aromasin because of her poor vision in the right eye and 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 and I'm not sure why she had this problem and the fact that it reversed when she stopped the Arimidex makes it hard to resume it    Her anemia is stable and her immunofixation was negative for paraprotein    Mammography is due in January    Past Medical History:   Diagnosis Date   • Anesthesia complication     ASPIRATION INTO AIRWAY WITH TRACH PRESENT IN PAST (WITH ORAL CANCER SURGERY(   • Arthritis     • Aspiration into airway     post anesthesia   • Breast cancer (CMS/HCC)    • 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    • Chronic kidney disease    • CKD (chronic kidney disease)     PT STATES STAGE 3   • Depression    • Diabetes mellitus (CMS/HCC)     BORDERLINE   • Diverticular disease    • Gastric ulcer     AND DUODENAL   • GERD (gastroesophageal reflux disease)    • Hearing loss     BILAT AIDES   • History of colon polyps    • History of depression    • History of GI bleed    • Hypertension    • Hypothyroidism    • Peptic ulceration    • PONV (postoperative nausea and vomiting)    • Poor vision     RIGHT EYE, HAS PERIPHERAL VISION    • Stroke (CMS/HCC) 2000     mild weakness on left, partial sight loss on right         Past Surgical History:   Procedure Laterality Date   • ABDOMINAL SURGERY     • APPENDECTOMY     • BLADDER REPAIR      AND RECTOCELE   • BREAST BIOPSY     • BREAST CYST ASPIRATION     • BREAST LUMPECTOMY WITH SENTINEL NODE BIOPSY Left 3/19/2018    Procedure: BREAST LUMPECTOMY WITH SENTINEL NODE BIOPSY AND NEEDLE LOCALIZATION;  Surgeon: Myles Soliman MD;  [place]  [facility] [place] [facility] [place]: [place]   • CHOLECYSTECTOMY     • COLONOSCOPY  2013   • COLONOSCOPY N/A 2/16/2018    Procedure: COLONOSCOPY into cecum and T.I. with biopsies;  Surgeon: Augustine Gallego MD;  [facility];  Service:    • ENDOSCOPY N/A 10/17/2017    Procedure: ESOPHAGOGASTRODUODENOSCOPY WITH COLD BIOPSIES;  Surgeon: Augustine Gallego MD;  [facility];  Service:    • ENDOSCOPY N/A 2/16/2018    Procedure: ESOPHAGOGASTRODUODENOSCOPY with biopsies and #54 [address] DILATATION;  Surgeon: Augustine Gallego MD;  Location: [facility] ENDOSCOPY;  Service:    • EYE SURGERY Left 2014    3-4 years, cornea replacement    • HYSTERECTOMY     • MOUTH SURGERY  2000    UNDER TONGUE AND LEFT FLOOR OF MOUTH FOR CA   • PARATHYROIDECTOMY      PER PT   • SINUS SURGERY     • SKIN BIOPSY     • THYROID SURGERY     • VARICOSE VEIN  SURGERY      Oncological history  patient is an 82-year-old female with recent problems with abdominal pain and diarrhea and weight loss which is being evaluated by GI and finally found to have multiple ulcers in the duodenum and small bowel finally responding to treatment.  In the middle of this she went for routine mammogram a couple of months later was found to have an abnormality in the left breast at 7 o'clock position measuring about 12 mm in size that was not present last year.  The patient had previously had a left breast biopsy in 2015 with benign findings.   There was no palpable mass and biopsy was done and this revealed a grade 1 infiltrating ductal carcinoma at least 1 cm in size ER 78%/TN 17% positive HER-2 negative.  The patient was referred to Dr. Soliman who performed a lumpectomy and sentinel node biopsy with the findings of a 1.2 cm tumor grade 2 with lymphovascular invasion and clear margins with associated DCIS.  One of 2 sentinel nodes showed a micrometastasis although the pathologist said this was almost too small to be considered a micrometastasis.  Postoperatively she has done well and thankfully her GI complains of much improved with Carafate and Protonix and her diarrhea finally resolved.    She's not had a bone density in quite a while.  She is  5 para 5  menarche at age 15 and menopause in her mid 50s although she had a hysterectomy at 30 for an ulcerated uterus.  First childbirth was at age 20 she breast-fed all 5 children.  She took hormonal therapy for at least 15 years after menopause and stopped about 10 years ago   .  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    Her father  at 52 of stomach cancer mother had thyroid cancer at age 50 her brother's daughter had breast cancer age 40 and she had a paternal aunt with leukemia is no other breast cancer or ovarian cancer uterine cancer or pancreatic cancer in the  family . I did ask him to check with her niece to see if she has had genetic testing - they do not know much at all about the maternal family .    She will call for the results of the DEXA scan and if adequate we will start Arimidex which I have  already prescribed to her and see her back in 3 months to assess her tolerance.  She is in very good health and I explained to her that adjuvant therapy decreases the risk of recurrence of her cancer which might be in the 15-20% range probably down to the 10% range and if she has significant side effects we will be inclined to stop treatment.  Obviously if her GI symptoms recur with Arimidex we will have to try another medications.  We talked about the joint pains and hot flashes which are unlikely at her age and she was agreeable to a trial of Arimidex.  Radiation has been planned        Current Outpatient Prescriptions on File Prior to Visit   Medication Sig Dispense Refill   • amLODIPine (NORVASC) 5 MG tablet TAKE 2 TABLETS BY MOUTH EVERY  tablet 0   • aspirin 81 MG tablet Take 81 mg by mouth Daily.     • Cholecalciferol (VITAMIN D) 2000 units capsule Take 1 capsule by mouth Daily. 90 capsule 1   • doxycycline (VIBRAMYICN) 100 MG tablet Take 1 tablet by mouth 2 (Two) Times a Day. 20 tablet 0   • fluorometholone (FML) 0.1 % ophthalmic suspension Administer 1 drop into the left eye Daily.     • FLUoxetine (PROzac) 10 MG capsule TAKE 2 ALTERNATING WITH 1 EVERY OTHER  capsule 1   • glucose blood (FREESTYLE TEST STRIPS) test strip TEST BLOOD SUGARS ONCE DAILY 50 each 12   • ketotifen (ZADITOR) 0.025 % ophthalmic solution 1 drop 2 (Two) Times a Day.     • levothyroxine (SYNTHROID, LEVOTHROID) 75 MCG tablet TAKE 1 TABLET BY MOUTH DAILY 90 tablet 3   • NON FORMULARY Administer 1 drop to both eyes 3 (Three) Times a Day. THERA TEARS     • Omega-3 Fatty Acids (FISH OIL PO) Take  by mouth Daily As Needed.     • Omeprazole (PRILOSEC PO) Take  by mouth.     •  pantoprazole (PROTONIX) 40 MG EC tablet Take 40 mg by mouth Daily.     • Probiotic Product (PROBIOTIC DAILY PO) Take 1 capsule by mouth Daily.     • RABEprazole (ACIPHEX) 20 MG EC tablet Take 1 tablet by mouth Daily. 30 tablet 5   • sucralfate (CARAFATE) 1 GM/10ML suspension Take 1 g by mouth As Needed.     • anastrozole (ARIMIDEX) 1 MG tablet Take 1 tablet by mouth Daily. 90 tablet 3   • cephalexin (KEFLEX) 500 MG capsule Take 1 capsule by mouth 2 (Two) Times a Day. 6 capsule 0   • colestipol (COLESTID) 1 g tablet Take 1 g by mouth 2 (Two) Times a Day.     • doxycycline (VIBRAMYICN) 100 MG tablet Take 1 tablet by mouth 2 (Two) Times a Day. 20 tablet 0   • mupirocin (BACTROBAN) 2 % ointment Apply  topically to the appropriate area as directed 3 (Three) Times a Day. 22 g 0   • phenazopyridine (PYRIDIUM) 200 MG tablet Take 1 tablet by mouth 3 (Three) Times a Day As Needed for bladder spasms. 21 tablet 0     No current facility-administered medications on file prior to visit.         ALLERGIES:    Allergies   Allergen Reactions   • Other Nausea Only     All mycins   • Sulfa Antibiotics Other (See Comments)     LOST CONSCIOUSNESS AND BODY TURNED RED/ON FIRE   • Ciprofloxacin Other (See Comments)     RED BLISTERS   • Iodine Other (See Comments)     DECADES AGO, IODINE INJECTION CAUSED SKIN REDNESS AND BURNING   • Macrodantin [Nitrofurantoin Macrocrystal] Diarrhea and Nausea And Vomiting   • Nitrofurantoin Nausea And Vomiting   • Yeast-Related Products Other (See Comments)     MOUTH SORES AND BLADDER INFECTION        Social History     Social History   • Marital status:      Spouse name: Nicolas   • Years of education: High school     Occupational History   • Homemaker      Social History Main Topics   • Smoking status: Former Smoker     Packs/day: 1.00     Years: 10.00     Types: Cigarettes     Start date: 1980     Quit date: 1990   • Smokeless tobacco: Never Used   • Alcohol use No   • Drug use: No   • Sexual  "activity: Defer     Other Topics Concern   • Not on file     Social History Narrative    Accompanied by daughters Marlena        Family History   Problem Relation Age of Onset   • Esophageal cancer Father    • Stomach cancer Father    • Heart disease Mother    • Thyroid cancer Mother    • Hypertension Mother    • Hypertension Sister    • Hypertension Brother    • Stroke Brother    • Heart disease Brother    • Diabetes Brother    • Leukemia Paternal Aunt    • Malig Hyperthermia Neg Hx         Review of Systems   Constitutional: Positive for fatigue (getting better 10/26/2018).   HENT: Negative.    Respiratory: Negative.    Cardiovascular: Positive for leg swelling (no change in ankles 10/26/2018).   Gastrointestinal: Positive for diarrhea (getting better 10/26/2018). Negative for abdominal distention and abdominal pain.   Genitourinary: Negative.    Musculoskeletal: Positive for back pain (Chronic no change 10/26/2018) and joint swelling (knees 10/26/2018).   Neurological: Negative.    Psychiatric/Behavioral: Negative.         Objective     Vitals:    10/26/18 1252   Weight: 72.5 kg (159 lb 12.8 oz)   Height: 154.5 cm (60.83\")   PainSc: 0-No pain     Current Status 10/26/2018   ECOG score 0       Physical Exam    GENERAL:  Well-developed, well-nourished in no acute distress.   SKIN:  Warm, dry without rashes, purpura or petechiae.  EYES:  Pupils equal, round and reactive to light.  EOMs intact.  Conjunctivae normal.  EARS:  Hearing intact.  NOSE:  Septum midline.  No excoriations or nasal discharge.  MOUTH:  Tongue is well-papillated; no stomatitis or ulcers.  Lips normal.  THROAT:  Oropharynx without lesions or exudates.  NECK:  Supple with good range of motion; no thyromegaly or masses, no JVD.  LYMPHATICS:  No cervical, supraclavicular, axillary or inguinal adenopathy.  CHEST:  Lungs clear to auscultation. Good airflow.  BREASTS:right breast Benign left breast with lumpectomy scar in the dependent " part of breast at 6:00 with minimal thickening  CARDIAC:  Regular rate and rhythm without murmurs, rubs or gallops. Normal S1,S2.  ABDOMEN:  Soft, nontender with no hepatosplenomegaly or masses.  EXTREMITIES:  No clubbing, cyanosis or edema.  NEUROLOGICAL:  Cranial Nerves II-XII grossly intact.  No focal neurological deficits.  PSYCHIATRIC:  Normal affect and mood.        RECENT LABS:  Hematology WBC   Date Value Ref Range Status   10/26/2018 7.84 4.00 - 10.00 10*3/mm3 Final     RBC   Date Value Ref Range Status   10/26/2018 4.00 3.90 - 5.00 10*6/mm3 Final     Hemoglobin   Date Value Ref Range Status   10/26/2018 11.2 (L) 11.5 - 14.9 g/dL Final     Hematocrit   Date Value Ref Range Status   10/26/2018 35.1 34.0 - 45.0 % Final     Platelets   Date Value Ref Range Status   10/26/2018 325 150 - 375 10*3/mm3 Final      ER 78% AR 17% Her 2 -neg Ki-67 7%  Final Diagnosis   1.  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                SEE SYNOPTIC REPORT (BELOW) FOR ADDITIONAL DETAILS.     2.  SENTINEL LYMPH NODE #1, LEFT AXILLA, EXCISION:                ONE LYMPH NODE, POSITIVE FOR METASTATIC CARCINOMA (MICROMETASTASIS) (SEE COMMENT).               NO EXTRANODAL EXTENSION IS IDENTIFIED.     COMMENT: Measuring the exact size of the lymph node metastasis is difficult. The lymph node demonstrates a few very small scattered foci of tumor cells, compatible with isolated tumor cells.  One slide demonstrates a single contiguous focus of tumor cells exceeding 0.2 mm; therefore, this is best considered a micrometastasis rather than isolated tumor cells.     3.  SENTINEL LYMPH NODE #2, LEFT AXILLA, EXCISION.               ONE LYMPH NODE, NEGATIVE FOR METASTATIC CARCINOMA.     SYNOPTIC REPORT (Based on [facility] cancer case summary, version January 2018):               Procedure: Excision  (less than total mastectomy).               Specimen laterality: Left.                Tumor site: Not specified.                Tumor size: 1.2 x 1.1 x 1 cm.               Histologic type: Invasive carcinoma of no special type (ductal, not otherwise specified).                Histologic grade (Melony Histologic Score):                              Glandular (acinar)/tubular differentiation: Score 3.                            Nuclear pleomorphism: Score 2.                             Mitotic rate: Score 1.                            Overall grade: Grade 2 (Score 6 of 9).               Tumor focality: Single focus of invasive carcinoma.                Ductal carcinoma in situ (DCIS): Present.                            Architectural patterns: Solid.                             Nuclear grade: Grade II (intermediate).                            Necrosis: Present, central comedonecrosis.                Lobular carcinoma in situ (LCIS): No LCIS in specimen.               Margins:                            Invasive carcinoma margins: Uninvolved by invasive carcinoma.                                          Distance from closest margin: 3 mm (medial margin).                                          NOTE: Invasive carcinoma also extends to within 4 mm of superior margin and to within                                                 4 mm of lateral margin.  All additional margins are greater than 10 mm from invasive                                                 carcinoma.                              DCIS Margins: Uninvolved by DCIS.                                          Distance from closest margin: 3 mm (medial margin).                    Regional Lymph nodes: Involved by tumor cells.                             Number of lymph nodes with macrometastases: 0.                            Number of lymph nodes with micrometastases: 1.                            Size of largest metastatic deposit: 0.4 mm.                             Extranodal extension: Not identified.                            Number of lymph nodes examined: 2.                            Number of sentinel lymph nodes examined: 2.                Treatment effect: No known presurgical therapy.                Lymphovascular invasion: Present.                Dermal Lymphovascular invasion: Not identified.                Pathologic stage classification.                            Primary tumor: pT1c.                            Regional lymph nodes: pN1mi(sn).                            Distant metastasis: Not applicable.                Additional pathologic findings:                             Ductal hyperplasia of the usual type.                             Fibrocystic changes with duct dilatation and stasis and focal apocrine metaplasia.                             Columnar cell change without atypia.                             Fibroinflammatory changes consistent with site of prior biopsy.                    Ancillary studies: ER, [place], HER-2/melanie, and Ki-67 studies performed on previous biopsy specimen at                       outside facility ([facility]).           Assessment/Plan   1.  T1c N1 jese N0 ER/W[place] positive HER-2 negative left breast cancer post lumpectomyAnd radiation  2.  Ulcerations noted upper GI tract improved  3.  Family history of breast cancer in a niece at a young age and father with cancer at age 51?     Genetic testing  4.  History of oral cancer in 1990  5.  Anemia due to CK D 3?   6.  Arimidex started in 3/18 discontinued by the patient in 7/18 due to visual blurring which resolved    Plan  1.  Changed to Aromasin 25 mg daily after seeing her ophthalmologist and make sure he has no contraindication  2.  Return in 3 months for NP evaluation and see me in 6 months    Told her to call and stop the Aromasin if similar symptoms recur with the Aromasin

## 2018-11-05 RX ORDER — ACETAMINOPHEN 160 MG
2000 TABLET,DISINTEGRATING ORAL DAILY
Qty: 90 CAPSULE | Refills: 1 | Status: SHIPPED | OUTPATIENT
Start: 2018-11-05 | End: 2019-04-25 | Stop reason: SDUPTHER

## 2018-11-19 RX ORDER — AMLODIPINE BESYLATE 5 MG/1
TABLET ORAL
Qty: 180 TABLET | Refills: 0 | Status: SHIPPED | OUTPATIENT
Start: 2018-11-19 | End: 2019-02-10 | Stop reason: SDUPTHER

## 2018-12-13 RX ORDER — FLUOXETINE 10 MG/1
CAPSULE ORAL
Qty: 135 CAPSULE | Refills: 1 | Status: SHIPPED | OUTPATIENT
Start: 2018-12-13 | End: 2019-06-17 | Stop reason: SDUPTHER

## 2019-01-24 ENCOUNTER — LAB (OUTPATIENT)
Dept: LAB | Facility: HOSPITAL | Age: 84
End: 2019-01-24

## 2019-01-24 ENCOUNTER — OFFICE VISIT (OUTPATIENT)
Dept: ONCOLOGY | Facility: CLINIC | Age: 84
End: 2019-01-24

## 2019-01-24 VITALS
WEIGHT: 166.1 LBS | TEMPERATURE: 98.3 F | HEIGHT: 61 IN | HEART RATE: 83 BPM | SYSTOLIC BLOOD PRESSURE: 154 MMHG | OXYGEN SATURATION: 98 % | BODY MASS INDEX: 31.36 KG/M2 | DIASTOLIC BLOOD PRESSURE: 57 MMHG | RESPIRATION RATE: 16 BRPM

## 2019-01-24 DIAGNOSIS — C50.312 MALIGNANT NEOPLASM OF LOWER-INNER QUADRANT OF LEFT BREAST IN FEMALE, ESTROGEN RECEPTOR POSITIVE (HCC): ICD-10-CM

## 2019-01-24 DIAGNOSIS — Z17.0 MALIGNANT NEOPLASM OF LOWER-INNER QUADRANT OF LEFT BREAST IN FEMALE, ESTROGEN RECEPTOR POSITIVE (HCC): ICD-10-CM

## 2019-01-24 DIAGNOSIS — C50.312 MALIGNANT NEOPLASM OF LOWER-INNER QUADRANT OF LEFT BREAST IN FEMALE, ESTROGEN RECEPTOR POSITIVE (HCC): Primary | ICD-10-CM

## 2019-01-24 DIAGNOSIS — T88.7XXA MEDICATION SIDE EFFECT: ICD-10-CM

## 2019-01-24 DIAGNOSIS — Z17.0 MALIGNANT NEOPLASM OF LOWER-INNER QUADRANT OF LEFT BREAST IN FEMALE, ESTROGEN RECEPTOR POSITIVE (HCC): Primary | ICD-10-CM

## 2019-01-24 LAB
BASOPHILS # BLD AUTO: 0.09 10*3/MM3 (ref 0–0.1)
BASOPHILS NFR BLD AUTO: 1 % (ref 0–1.1)
DEPRECATED RDW RBC AUTO: 39 FL (ref 37–49)
EOSINOPHIL # BLD AUTO: 0.26 10*3/MM3 (ref 0–0.36)
EOSINOPHIL NFR BLD AUTO: 2.9 % (ref 1–5)
ERYTHROCYTE [DISTWIDTH] IN BLOOD BY AUTOMATED COUNT: 12.9 % (ref 11.7–14.5)
HCT VFR BLD AUTO: 34.5 % (ref 34–45)
HGB BLD-MCNC: 11.5 G/DL (ref 11.5–14.9)
IMM GRANULOCYTES # BLD AUTO: 0.03 10*3/MM3 (ref 0–0.03)
IMM GRANULOCYTES NFR BLD AUTO: 0.3 % (ref 0–0.5)
LYMPHOCYTES # BLD AUTO: 1.78 10*3/MM3 (ref 1–3.5)
LYMPHOCYTES NFR BLD AUTO: 20.1 % (ref 20–49)
MCH RBC QN AUTO: 27.5 PG (ref 27–33)
MCHC RBC AUTO-ENTMCNC: 33.3 G/DL (ref 32–35)
MCV RBC AUTO: 82.5 FL (ref 83–97)
MONOCYTES # BLD AUTO: 0.91 10*3/MM3 (ref 0.25–0.8)
MONOCYTES NFR BLD AUTO: 10.3 % (ref 4–12)
NEUTROPHILS # BLD AUTO: 5.79 10*3/MM3 (ref 1.5–7)
NEUTROPHILS NFR BLD AUTO: 65.4 % (ref 39–75)
NRBC BLD AUTO-RTO: 0 /100 WBC (ref 0–0)
PLATELET # BLD AUTO: 345 10*3/MM3 (ref 150–375)
PMV BLD AUTO: 9.5 FL (ref 8.9–12.1)
RBC # BLD AUTO: 4.18 10*6/MM3 (ref 3.9–5)
WBC NRBC COR # BLD: 8.86 10*3/MM3 (ref 4–10)

## 2019-01-24 PROCEDURE — 99213 OFFICE O/P EST LOW 20 MIN: CPT | Performed by: NURSE PRACTITIONER

## 2019-01-24 PROCEDURE — 85025 COMPLETE CBC W/AUTO DIFF WBC: CPT | Performed by: INTERNAL MEDICINE

## 2019-01-24 PROCEDURE — 36415 COLL VENOUS BLD VENIPUNCTURE: CPT | Performed by: INTERNAL MEDICINE

## 2019-01-24 NOTE — PROGRESS NOTES
Subjective     REASON FOR CONSULTATION:   1. Left breast cancer [id number] ER/NM+ her2 neg post lumpectomy/SLN  3/18 and whole breast radiation completed May 2018  2.  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  Fatigue out of proportion to anemia  4.  Arimidex started in 3/18 with bone density showing normal density in the spine and osteopenia in the hips-this was self stopped due to vision changes.  5.  Aromasin initiated 10/20/18 and discontinued 1/20/19 secondary to vision changes.                             REQUESTING PHYSICIAN:  Myles Soliman M.D.    History of Present Illness patient is an 82-year-old female with a T1cN I jese hormone positive left breast cancer treated with lumpectomy sentinel node biopsy and radiation.    Been seen by Dr. Valenzuela at the end of October, she was changed to Aromasin as she had issues previously with Arimidex with visual blurring.  The patient states for the past 4-6 weeks she has had visual blurring once again.  She does continue the Aromasin currently.  She states she also has numbness in her bilateral hands and upper left arm that she attributes to the Aromasin as well.  She also notices arthralgias, particularly in her knees since starting this medication.  Symptoms are quite the same with regards to her vision, as they were when she was on the Arimidex.      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    • Chronic kidney disease    • CKD (chronic kidney disease)     PT STATES STAGE 3   • Depression    • Diabetes mellitus (CMS/HCC)     BORDERLINE   • Diverticular disease    • Gastric ulcer     AND DUODENAL   • GERD (gastroesophageal reflux disease)    • Hearing loss     BILAT AIDES   • History of colon polyps    • History of depression    • History  of GI bleed    • Hypertension    • Hypothyroidism    • Peptic ulceration    • PONV (postoperative nausea and vomiting)    • Poor vision     RIGHT EYE, HAS PERIPHERAL VISION    • Stroke (CMS/HCC) 2000     mild weakness on left, partial sight loss on right         Past Surgical History:   Procedure Laterality Date   • ABDOMINAL SURGERY     • APPENDECTOMY     • BLADDER REPAIR      AND RECTOCELE   • BREAST BIOPSY     • BREAST CYST ASPIRATION     • BREAST LUMPECTOMY WITH SENTINEL NODE BIOPSY Left 3/19/2018    Procedure: BREAST LUMPECTOMY WITH SENTINEL NODE BIOPSY AND NEEDLE LOCALIZATION;  Surgeon: Myles Soliman MD;  Location:  [facility] [place];  Service: General   • CHOLECYSTECTOMY     • COLONOSCOPY  2013   • COLONOSCOPY N/A 2/16/2018    Procedure: COLONOSCOPY into cecum and T.I. with biopsies;  Surgeon: Augustine Gallego MD;  Location: [facility] ENDOSCOPY;  Service:    • ENDOSCOPY N/A 10/17/2017    Procedure: ESOPHAGOGASTRODUODENOSCOPY WITH COLD BIOPSIES;  Surgeon: Augustine Gallego MD;  Location: [facility] ENDOSCOPY;  Service:    • ENDOSCOPY N/A 2/16/2018    Procedure: ESOPHAGOGASTRODUODENOSCOPY with biopsies and #54 Arguello DILATATION;  Surgeon: Augustine Gallego MD;  Location: [facility] ENDOSCOPY;  Service:    • EYE SURGERY Left 2014    3-4 years, cornea replacement    • HYSTERECTOMY     • MOUTH SURGERY  2000    UNDER TONGUE AND LEFT FLOOR OF MOUTH FOR CA   • PARATHYROIDECTOMY      PER PT   • SINUS SURGERY     • SKIN BIOPSY     • THYROID SURGERY     • VARICOSE VEIN SURGERY      Oncological history  patient is an 82-year-old female with recent problems with abdominal pain and diarrhea and weight loss which is being evaluated by GI and finally found to have multiple ulcers in the duodenum and small bowel finally responding to treatment.  In the middle of this she went for routine mammogram a couple of months later was found to have an abnormality in the left breast at 7 o'clock position measuring about 12 mm in size that was not  present last year.  The patient had previously had a left breast biopsy in 2015 with benign findings.   There was no palpable mass and biopsy was done and this revealed a grade 1 infiltrating ductal carcinoma at least 1 cm in size ER 78%/VA 17% positive HER-2 negative.  The patient was referred to Dr. Soliman who performed a lumpectomy and sentinel node biopsy with the findings of a 1.2 cm tumor grade 2 with lymphovascular invasion and clear margins with associated DCIS.  One of 2 sentinel nodes showed a micrometastasis although the pathologist said this was almost too small to be considered a micrometastasis.  Postoperatively she has done well and thankfully her GI complains of much improved with Carafate and Protonix and her diarrhea finally resolved.    She's not had a bone density in quite a while.  She is  5 para 5  menarche at age 15 and menopause in her mid 50s although she had a hysterectomy at 30 for an ulcerated uterus.  First childbirth was at age 20 she breast-fed all 5 children.  She took hormonal therapy for at least 15 years after menopause and stopped about 10 years ago   .  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    Her father  at 52 of stomach cancer mother had thyroid cancer at age 50 her brother's daughter had breast cancer age 40 and she had a paternal aunt with leukemia is no other breast cancer or ovarian cancer uterine cancer or pancreatic cancer in the family . I did ask him to check with her niece to see if she has had genetic testing - they do not know much at all about the maternal family .    She will call for the results of the DEXA scan and if adequate we will start Arimidex which I have  already prescribed to her and see her back in 3 months to assess her tolerance.  She is in very good health and I explained to her that adjuvant therapy decreases the risk of recurrence of her cancer which might be in the  15-20% range probably down to the 10% range and if she has significant side effects we will be inclined to stop treatment.  Obviously if her GI symptoms recur with Arimidex we will have to try another medications.  We talked about the joint pains and hot flashes which are unlikely at her age and she was agreeable to a trial of Arimidex.  Radiation has been planned        Current Outpatient Medications on File Prior to Visit   Medication Sig Dispense Refill   • amLODIPine (NORVASC) 5 MG tablet TAKE 2 TABLETS BY MOUTH EVERY  tablet 0   • aspirin 81 MG tablet Take 81 mg by mouth Daily.     • Cholecalciferol (VITAMIN D3) 2000 units capsule TAKE 1 CAPSULE BY MOUTH DAILY. 90 capsule 1   • colestipol (COLESTID) 1 g tablet Take 1 g by mouth 2 (Two) Times a Day.     • exemestane (AROMASIN) 25 MG chemo tablet Take 1 tablet by mouth Daily. 30 tablet 3   • fluorometholone (FML) 0.1 % ophthalmic suspension Administer 1 drop into the left eye Daily.     • FLUoxetine (PROzac) 10 MG capsule TAKE 2 ALTERNATING WITH 1 EVERY OTHER  capsule 1   • glucose blood (FREESTYLE TEST STRIPS) test strip TEST BLOOD SUGARS ONCE DAILY 50 each 12   • ketotifen (ZADITOR) 0.025 % ophthalmic solution 1 drop 2 (Two) Times a Day.     • levothyroxine (SYNTHROID, LEVOTHROID) 75 MCG tablet TAKE 1 TABLET BY MOUTH DAILY 90 tablet 3   • NON FORMULARY Administer 1 drop to both eyes 3 (Three) Times a Day. THERA TEARS     • Omega-3 Fatty Acids (FISH OIL PO) Take  by mouth Daily As Needed.     • Omeprazole (PRILOSEC PO) Take  by mouth.     • pantoprazole (PROTONIX) 40 MG EC tablet Take 40 mg by mouth Daily.     • RABEprazole (ACIPHEX) 20 MG EC tablet Take 1 tablet by mouth Daily. 30 tablet 5   • sucralfate (CARAFATE) 1 GM/10ML suspension Take 1 g by mouth As Needed.     • mupirocin (BACTROBAN) 2 % ointment Apply  topically to the appropriate area as directed 3 (Three) Times a Day. 22 g 0   • phenazopyridine (PYRIDIUM) 200 MG tablet Take 1 tablet by  mouth 3 (Three) Times a Day As Needed for bladder spasms. 21 tablet 0   • Probiotic Product (PROBIOTIC DAILY PO) Take 1 capsule by mouth Daily.       No current facility-administered medications on file prior to visit.         ALLERGIES:    Allergies   Allergen Reactions   • Other Nausea Only     All mycins   • Sulfa Antibiotics Other (See Comments)     LOST CONSCIOUSNESS AND BODY TURNED RED/ON FIRE   • Ciprofloxacin Other (See Comments)     RED BLISTERS   • Iodine Other (See Comments)     DECADES AGO, IODINE INJECTION CAUSED SKIN REDNESS AND BURNING   • Macrodantin [Nitrofurantoin Macrocrystal] Diarrhea and Nausea And Vomiting   • Nitrofurantoin Nausea And Vomiting   • Yeast-Related Products Other (See Comments)     MOUTH SORES AND BLADDER INFECTION        Social History     Socioeconomic History   • Marital status:      Spouse name: Nicolas   • Number of children: Not on file   • Years of education: High school   • Highest education level: Not on file   Occupational History   • Occupation: Homemaker   Tobacco Use   • Smoking status: Former Smoker     Packs/day: 1.00     Years: 10.00     Pack years: 10.00     Types: Cigarettes     Start date:      Last attempt to quit:      Years since quittin.0   • Smokeless tobacco: Never Used   Substance and Sexual Activity   • Alcohol use: No   • Drug use: No   • Sexual activity: Defer   Social History Narrative    Accompanied by daughters Macrina and Daisy        Family History   Problem Relation Age of Onset   • Esophageal cancer Father    • Stomach cancer Father    • Heart disease Mother    • Thyroid cancer Mother    • Hypertension Mother    • Hypertension Sister    • Hypertension Brother    • Stroke Brother    • Heart disease Brother    • Diabetes Brother    • Leukemia Paternal Aunt    • Malig Hyperthermia Neg Hx         Review of Systems   Constitutional: Positive for fatigue. Negative for chills and fever.   HENT: Negative.    Eyes: Positive for  "visual disturbance (blurring). Negative for discharge.   Respiratory: Negative for cough and shortness of breath.    Cardiovascular: Positive for leg swelling (legs and hands x 6 weeks).   Gastrointestinal: Negative for abdominal distention, abdominal pain and nausea.   Genitourinary: Negative.    Musculoskeletal: Positive for back pain (Chronic no change) and joint swelling (increased knee pain).   Skin: Negative for pallor and rash.   Neurological: Positive for numbness (bilateral hands and left arm). Negative for dizziness.   Psychiatric/Behavioral: Negative for behavioral problems and confusion.        Objective     Vitals:    01/24/19 1408   BP: 154/57   Pulse: 83   Resp: 16   Temp: 98.3 °F (36.8 °C)   SpO2: 98%   Weight: 75.3 kg (166 lb 1.6 oz)   Height: 154.5 cm (60.83\")   PainSc:   4   PainLoc: Arm  Comment: left arm     Current Status 1/24/2019   ECOG score 0       Physical Exam    GENERAL:  Well-developed, well-nourished in no acute distress.   SKIN:  Warm, dry without rashes, purpura or petechiae.  EYES:  Pupils equal, round and reactive to light.  EOMs intact.  Conjunctivae normal.  EARS:  Hearing intact.  NOSE:  Septum midline.  No excoriations or nasal discharge.  MOUTH:  Tongue is well-papillated; no stomatitis or ulcers.  Lips normal.  THROAT:  Oropharynx without lesions or exudates.  NECK:  Supple with good range of motion; no thyromegaly or masses, no JVD.  LYMPHATICS:  No cervical, supraclavicular, axillary adenopathy.  CHEST:  Lungs clear to auscultation. Good airflow.  BREASTS:right breast Benign left breast with lumpectomy scar in the dependent part of breast at 6:00 with minimal thickening  CARDIAC:  Regular rate and rhythm without murmurs, rubs or gallops. Normal S1,S2.  ABDOMEN:  Soft, nontender with no hepatosplenomegaly or masses.  EXTREMITIES:  No clubbing, cyanosis or edema.  NEUROLOGICAL:  Cranial Nerves II-XII grossly intact.  No focal neurological deficits.  PSYCHIATRIC:  Normal " affect and mood.        RECENT LABS:  Hematology WBC   Date Value Ref Range Status   01/24/2019 8.86 4.00 - 10.00 10*3/mm3 Final     RBC   Date Value Ref Range Status   01/24/2019 4.18 3.90 - 5.00 10*6/mm3 Final     Hemoglobin   Date Value Ref Range Status   01/24/2019 11.5 11.5 - 14.9 g/dL Final     Hematocrit   Date Value Ref Range Status   01/24/2019 34.5 34.0 - 45.0 % Final     Platelets   Date Value Ref Range Status   01/24/2019 345 150 - 375 10*3/mm3 Final      ER 78% NE 17% Her 2 -neg Ki-67 7%  Final Diagnosis   1.  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                SEE SYNOPTIC REPORT (BELOW) FOR ADDITIONAL DETAILS.     2.  SENTINEL LYMPH NODE #1, LEFT AXILLA, EXCISION:                ONE LYMPH NODE, POSITIVE FOR METASTATIC CARCINOMA (MICROMETASTASIS) (SEE COMMENT).               NO EXTRANODAL EXTENSION IS IDENTIFIED.     COMMENT: Measuring the exact size of the lymph node metastasis is difficult. The lymph node demonstrates a few very small scattered foci of tumor cells, compatible with isolated tumor cells.  One slide demonstrates a single contiguous focus of tumor cells exceeding 0.2 mm; therefore, this is best considered a micrometastasis rather than isolated tumor cells.     3.  SENTINEL LYMPH NODE #2, LEFT AXILLA, EXCISION.               ONE LYMPH NODE, NEGATIVE FOR METASTATIC CARCINOMA.     SYNOPTIC REPORT (Based on [facility] cancer case summary, version January 2018):               Procedure: Excision (less than total mastectomy).               Specimen laterality: Left.                Tumor site: Not specified.                Tumor size: 1.2 x 1.1 x 1 cm.               Histologic type: Invasive carcinoma of no special type (ductal, not otherwise specified).                Histologic grade (Melony Histologic Score):                               Glandular (acinar)/tubular differentiation: Score 3.                            Nuclear pleomorphism: Score 2.                             Mitotic rate: Score 1.                            Overall grade: Grade 2 (Score 6 of 9).               Tumor focality: Single focus of invasive carcinoma.                Ductal carcinoma in situ (DCIS): Present.                            Architectural patterns: Solid.                             Nuclear grade: Grade II (intermediate).                            Necrosis: Present, central comedonecrosis.                Lobular carcinoma in situ (LCIS): No LCIS in specimen.               Margins:                            Invasive carcinoma margins: Uninvolved by invasive carcinoma.                                          Distance from closest margin: 3 mm (medial margin).                                          NOTE: Invasive carcinoma also extends to within 4 mm of superior margin and to within                                                 4 mm of lateral margin.  All additional margins are greater than 10 mm from invasive                                                 carcinoma.                              DCIS Margins: Uninvolved by DCIS.                                          Distance from closest margin: 3 mm (medial margin).                    Regional Lymph nodes: Involved by tumor cells.                             Number of lymph nodes with macrometastases: 0.                            Number of lymph nodes with micrometastases: 1.                            Size of largest metastatic deposit: 0.4 mm.                            Extranodal extension: Not identified.                            Number of lymph nodes examined: 2.                            Number of sentinel lymph nodes examined: 2.                Treatment effect: No known presurgical therapy.                Lymphovascular invasion: Present.                Dermal Lymphovascular invasion: Not  identified.                Pathologic stage classification.                            Primary tumor: pT1c.                            Regional lymph nodes: pN1mi(sn).                            Distant metastasis: Not applicable.                Additional pathologic findings:                             Ductal hyperplasia of the usual type.                             Fibrocystic changes with duct dilatation and stasis and focal apocrine metaplasia.                             Columnar cell change without atypia.                             Fibroinflammatory changes consistent with site of prior biopsy.                    Ancillary studies: ER, OK, HER-2/melanie, and Ki-67 studies performed on previous biopsy specimen at                       outside facility ([facility]).           Assessment/Plan   1.  T1c N1 jese N0 ER/OK positive HER-2 negative left breast cancer post lumpectomy And radiation  2.  Ulcerations noted upper GI tract improved  3.  Family history of breast cancer in a niece at a young age and father with cancer at age 51?     Genetic testing  4.  History of oral cancer in 1990  5.  Anemia due to CK D 3?   6.  Arimidex started in 3/18 discontinued by the patient in 7/18 due to visual blurring which resolved.  Aromasin started 11/2018, she reports visual blurring and 10 as well as new onset neuropathy in her bilateral fingers as well as left arm and arthralgias.  She also has had weight gain which she attributes to swelling in her hands and feet since starting the medication.  This is discussed with Dr. Valenzuela today and we will discontinue the Aromasin.    Plan  1.  Discontinue Aromasin.  2.  Return in 1 month for review by Dr. Putnam.  Told her to call and stop the Aromasin if similar symptoms recur with the Aromasin

## 2019-01-29 ENCOUNTER — OFFICE VISIT (OUTPATIENT)
Dept: MAMMOGRAPHY | Facility: CLINIC | Age: 84
End: 2019-01-29

## 2019-01-29 VITALS
HEART RATE: 74 BPM | OXYGEN SATURATION: 99 % | TEMPERATURE: 98.1 F | SYSTOLIC BLOOD PRESSURE: 140 MMHG | DIASTOLIC BLOOD PRESSURE: 60 MMHG

## 2019-01-29 DIAGNOSIS — Z17.0 MALIGNANT NEOPLASM OF LOWER-INNER QUADRANT OF LEFT BREAST IN FEMALE, ESTROGEN RECEPTOR POSITIVE (HCC): Primary | ICD-10-CM

## 2019-01-29 DIAGNOSIS — C50.312 MALIGNANT NEOPLASM OF LOWER-INNER QUADRANT OF LEFT BREAST IN FEMALE, ESTROGEN RECEPTOR POSITIVE (HCC): Primary | ICD-10-CM

## 2019-01-29 PROCEDURE — 99213 OFFICE O/P EST LOW 20 MIN: CPT | Performed by: SURGERY

## 2019-01-29 NOTE — PROGRESS NOTES
Subjective   Geeta Butt is a 83 y.o. female     History of Present Illness she is a nice 83-year-old white female diagnosed with left breast cancer almost a year ago.  She is undergone left breast conserving surgery followed by radiation.  I last saw her in September 2018.  She was concerned about some swelling at the lumpectomy site and the possibility of truncal lymphedema.  The patient states that the area seems a little puffy still but she does not think that it has worsened in anyway.  She has not palpated any masses or had any nipple discharge.  She is due for mammograms and they are scheduled next week.        Review of Systems constitutional-no weight loss, fever, or chills.                Integumentary-no changes to the skin of the breast.  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    • Chronic kidney disease    • CKD (chronic kidney disease)     PT STATES STAGE 3   • Depression    • Diabetes mellitus (CMS/HCC)     BORDERLINE   • Diverticular disease    • Gastric ulcer     AND DUODENAL   • GERD (gastroesophageal reflux disease)    • Hearing loss     BILAT AIDES   • History of colon polyps    • History of depression    • History of GI bleed    • Hypertension    • Hypothyroidism    • Peptic ulceration    • PONV (postoperative nausea and vomiting)    • Poor vision     RIGHT EYE, HAS PERIPHERAL VISION    • Stroke (CMS/HCC) 2000     mild weakness on left, partial sight loss on right      Past Surgical History:   Procedure Laterality Date   • ABDOMINAL SURGERY     • APPENDECTOMY     • BLADDER REPAIR      AND RECTOCELE   • BREAST BIOPSY     • BREAST CYST ASPIRATION     • BREAST LUMPECTOMY WITH SENTINEL NODE BIOPSY Left 3/19/2018    Procedure: BREAST LUMPECTOMY WITH SENTINEL  NODE BIOPSY AND NEEDLE LOCALIZATION;  Surgeon: Myles Soliman MD;  Location:  [facility] [place];  Service: General   • CHOLECYSTECTOMY     • COLONOSCOPY  2013   • COLONOSCOPY N/A 2/16/2018    Procedure: COLONOSCOPY into cecum and T.I. with biopsies;  Surgeon: Augustine Gallego MD;  Location: [facility] ENDOSCOPY;  Service:    • ENDOSCOPY N/A 10/17/2017    Procedure: ESOPHAGOGASTRODUODENOSCOPY WITH COLD BIOPSIES;  Surgeon: Augustine Gallego MD;  [facility];  Service:    • ENDOSCOPY N/A 2/16/2018    Procedure: ESOPHAGOGASTRODUODENOSCOPY with biopsies and #54 Arguello DILATATION;  Surgeon: Augustine Gallego MD;  Location: [facility] ENDOSCOPY;  Service:    • EYE SURGERY Left 2014    3-4 years, cornea replacement    • HYSTERECTOMY     • MOUTH SURGERY  2000    UNDER TONGUE AND LEFT FLOOR OF MOUTH FOR CA   • PARATHYROIDECTOMY      PER PT   • SINUS SURGERY     • SKIN BIOPSY     • THYROID SURGERY     • VARICOSE VEIN SURGERY       Family History   Problem Relation Age of Onset   • Esophageal cancer Father    • Stomach cancer Father    • Heart disease Mother    • Thyroid cancer Mother    • Hypertension Mother    • Hypertension Sister    • Hypertension Brother    • Stroke Brother    • Heart disease Brother    • Diabetes Brother    • Leukemia Paternal Aunt    • Malig Hyperthermia Neg Hx      Social History     Socioeconomic History   • Marital status:      Spouse name: Nicolas   • Number of children: Not on file   • Years of education: High school   • Highest education level: Not on file   Social Needs   • Financial resource strain: Not on file   • Food insecurity - worry: Not on file   • Food insecurity - inability: Not on file   • Transportation needs - medical: Not on file   • Transportation needs - non-medical: Not on file   Occupational History   • Occupation: Homemaker   Tobacco Use   • Smoking status: Former Smoker     Packs/day: 1.00     Years: 10.00     Pack years: 10.00     Types: Cigarettes     Start date:       Last attempt to quit:      Years since quittin.0   • Smokeless tobacco: Never Used   Substance and Sexual Activity   • Alcohol use: No   • Drug use: No   • Sexual activity: Defer   Other Topics Concern   • Not on file   Social History Narrative    Accompanied by daughters Macrina and Daisy       Objective   Physical Exam  Gen.-slightly overweight white female in no acute distress  Vital signs-blood pressure 140/60, pulse 74, temperature 98.1  Left breast-the skin is slightly hyperpigmented but nice and supple.  The patient has an inverted T scar in the 6:00 axis.  With the patient sitting and her arms raised, this area is a little flattened.  The nipple areolar complex however is not malpositioned.  I do not palpate any masses in the breast or behind the lumpectomy scar.  Chest wall-I do not see any signs of edema of the chest wall  Extremities-no evidence of upper extremity lymphedema  Assessment/Plan   Left breast cancer status post breast conserving surgery.  I think the physical examination is stable today.  She has tolerated the radiation very well and there are no signs of developing lymphedema.  She will obtain her mammograms next week.  For now, I will see her on an as-needed basis.    The encounter diagnosis was Malignant neoplasm of lower-inner quadrant of left breast in female, estrogen receptor positive (CMS/HCC).

## 2019-02-04 RX ORDER — RABEPRAZOLE SODIUM 20 MG/1
TABLET, DELAYED RELEASE ORAL
Qty: 30 TABLET | Refills: 5 | Status: SHIPPED | OUTPATIENT
Start: 2019-02-04 | End: 2019-03-12

## 2019-02-07 ENCOUNTER — APPOINTMENT (OUTPATIENT)
Dept: WOMENS IMAGING | Facility: HOSPITAL | Age: 84
End: 2019-02-07

## 2019-02-07 PROCEDURE — MDREVIEWSP: Performed by: RADIOLOGY

## 2019-02-07 PROCEDURE — 77063 BREAST TOMOSYNTHESIS BI: CPT | Performed by: RADIOLOGY

## 2019-02-07 PROCEDURE — 77067 SCR MAMMO BI INCL CAD: CPT | Performed by: RADIOLOGY

## 2019-02-09 ENCOUNTER — TELEPHONE (OUTPATIENT)
Dept: MAMMOGRAPHY | Facility: CLINIC | Age: 84
End: 2019-02-09

## 2019-02-09 NOTE — TELEPHONE ENCOUNTER
I left a message stating that her mammograms looked fine and they recommended a one-year follow-up.

## 2019-02-11 ENCOUNTER — TELEPHONE (OUTPATIENT)
Dept: FAMILY MEDICINE CLINIC | Facility: CLINIC | Age: 84
End: 2019-02-11

## 2019-02-11 RX ORDER — AMLODIPINE BESYLATE 5 MG/1
TABLET ORAL
Qty: 180 TABLET | Refills: 0 | Status: SHIPPED | OUTPATIENT
Start: 2019-02-11 | End: 2019-05-12 | Stop reason: SDUPTHER

## 2019-02-11 RX ORDER — EXEMESTANE 25 MG/1
TABLET ORAL
Qty: 30 TABLET | Refills: 3 | OUTPATIENT
Start: 2019-02-11

## 2019-02-11 NOTE — TELEPHONE ENCOUNTER
[facility]  ----- Message from Jimmy Ivan Jr., MD sent at 2/10/2019  3:58 PM EST -----  mammo shows incr density of recent surg  defer to dr barrera  suspect scar  rx

## 2019-02-15 ENCOUNTER — TELEPHONE (OUTPATIENT)
Dept: MAMMOGRAPHY | Facility: CLINIC | Age: 84
End: 2019-02-15

## 2019-02-15 NOTE — TELEPHONE ENCOUNTER
I tried to leave a message stating that her mammogram was negative but was unable to do so on her voicemail.

## 2019-02-19 RX ORDER — SUCRALFATE 1 G/10ML
SUSPENSION ORAL
Qty: 420 ML | Refills: 3 | Status: SHIPPED | OUTPATIENT
Start: 2019-02-19 | End: 2020-02-21

## 2019-02-27 ENCOUNTER — DOCUMENTATION (OUTPATIENT)
Dept: FAMILY MEDICINE CLINIC | Facility: CLINIC | Age: 84
End: 2019-02-27

## 2019-03-04 ENCOUNTER — TELEPHONE (OUTPATIENT)
Dept: FAMILY MEDICINE CLINIC | Facility: CLINIC | Age: 84
End: 2019-03-04

## 2019-03-04 NOTE — TELEPHONE ENCOUNTER
PATEINT STS THAT SHE GOT A LETTER FROM DR RODRIGUEZ TO CALL THE OFFICE ..PLEASE CALL [phone]....JUST A FYI SHE DID MAKE A APPT....

## 2019-03-05 ENCOUNTER — APPOINTMENT (OUTPATIENT)
Dept: LAB | Facility: HOSPITAL | Age: 84
End: 2019-03-05

## 2019-03-05 ENCOUNTER — APPOINTMENT (OUTPATIENT)
Dept: ONCOLOGY | Facility: CLINIC | Age: 84
End: 2019-03-05

## 2019-03-11 ENCOUNTER — TELEPHONE (OUTPATIENT)
Dept: ONCOLOGY | Facility: HOSPITAL | Age: 84
End: 2019-03-11

## 2019-03-11 NOTE — TELEPHONE ENCOUNTER
Pt has been off of her Aromasin since 1/24 due to side effects and she is concerned that she hasn't been able to keep an appt due to her conflicting schedule. She now can't get back in to see Dr Valenzuela until May. She wanted to know if another med could be prescribed or if she had to be seen by someone. Reviewed with MATTHEW Nayak, pt can be seen in April by an NP as long as Dr Valenzuela is here. Pt said she is unavailable April 2, 22,23, and 24th. Message sent to scheduling.

## 2019-03-11 NOTE — TELEPHONE ENCOUNTER
----- Message from Clari Wren sent at 3/11/2019  4:39 PM EDT -----  Contact: [phone]  Pt has med questions.

## 2019-03-12 ENCOUNTER — OFFICE VISIT (OUTPATIENT)
Dept: FAMILY MEDICINE CLINIC | Facility: CLINIC | Age: 84
End: 2019-03-12

## 2019-03-12 VITALS
TEMPERATURE: 97.9 F | WEIGHT: 161 LBS | HEIGHT: 61 IN | OXYGEN SATURATION: 99 % | BODY MASS INDEX: 30.4 KG/M2 | DIASTOLIC BLOOD PRESSURE: 72 MMHG | HEART RATE: 76 BPM | SYSTOLIC BLOOD PRESSURE: 160 MMHG

## 2019-03-12 DIAGNOSIS — I10 HYPERTENSION, ESSENTIAL: Primary | ICD-10-CM

## 2019-03-12 DIAGNOSIS — R60.9 PERIPHERAL EDEMA: ICD-10-CM

## 2019-03-12 DIAGNOSIS — E03.9 HYPOTHYROIDISM, UNSPECIFIED TYPE: ICD-10-CM

## 2019-03-12 DIAGNOSIS — E78.5 HYPERLIPIDEMIA, UNSPECIFIED HYPERLIPIDEMIA TYPE: ICD-10-CM

## 2019-03-12 DIAGNOSIS — K21.9 GASTROESOPHAGEAL REFLUX DISEASE, ESOPHAGITIS PRESENCE NOT SPECIFIED: ICD-10-CM

## 2019-03-12 DIAGNOSIS — R30.0 DYSURIA: ICD-10-CM

## 2019-03-12 LAB
ALBUMIN SERPL-MCNC: 4.6 G/DL (ref 3.5–5.2)
ALBUMIN/GLOB SERPL: 1.3 G/DL
ALP SERPL-CCNC: 74 U/L (ref 39–117)
ALT SERPL W P-5'-P-CCNC: 14 U/L (ref 1–33)
ANION GAP SERPL CALCULATED.3IONS-SCNC: 12.9 MMOL/L
AST SERPL-CCNC: 14 U/L (ref 1–32)
BACTERIA UR QL AUTO: ABNORMAL /HPF
BILIRUB SERPL-MCNC: 0.3 MG/DL (ref 0.1–1.2)
BILIRUB UR QL STRIP: NEGATIVE
BUN BLD-MCNC: 20 MG/DL (ref 8–23)
BUN/CREAT SERPL: 18.7 (ref 7–25)
CALCIUM SPEC-SCNC: 10.5 MG/DL (ref 8.6–10.5)
CHLORIDE SERPL-SCNC: 99 MMOL/L (ref 98–107)
CHOLEST SERPL-MCNC: 221 MG/DL (ref 0–200)
CLARITY UR: CLEAR
CO2 SERPL-SCNC: 26.1 MMOL/L (ref 22–29)
COLOR UR: YELLOW
CREAT BLD-MCNC: 1.07 MG/DL (ref 0.57–1)
ERYTHROCYTE [DISTWIDTH] IN BLOOD BY AUTOMATED COUNT: 13.5 % (ref 12.3–15.4)
GFR SERPL CREATININE-BSD FRML MDRD: 49 ML/MIN/1.73
GLOBULIN UR ELPH-MCNC: 3.5 GM/DL
GLUCOSE BLD-MCNC: 103 MG/DL (ref 65–99)
GLUCOSE UR STRIP-MCNC: NEGATIVE MG/DL
HCT VFR BLD AUTO: 38.6 % (ref 34–46.6)
HDLC SERPL-MCNC: 67 MG/DL (ref 40–60)
HGB BLD-MCNC: 10.2 G/DL (ref 12–15.9)
HGB UR QL STRIP.AUTO: NEGATIVE
KETONES UR QL STRIP: NEGATIVE
LDLC SERPL CALC-MCNC: 130 MG/DL (ref 0–100)
LDLC/HDLC SERPL: 1.93 {RATIO}
LEUKOCYTE ESTERASE UR QL STRIP.AUTO: ABNORMAL
LYMPHOCYTES # BLD AUTO: 1.7 10*3/MM3 (ref 0.7–3.1)
LYMPHOCYTES NFR BLD AUTO: 24.3 % (ref 19.6–45.3)
MCH RBC QN AUTO: 21.7 PG (ref 26.6–33)
MCHC RBC AUTO-ENTMCNC: 26.3 G/DL (ref 31.5–35.7)
MCV RBC AUTO: 82.4 FL (ref 79–97)
MONOCYTES # BLD AUTO: 0.6 10*3/MM3 (ref 0.1–0.9)
MONOCYTES NFR BLD AUTO: 8.7 % (ref 5–12)
NEUTROPHILS # BLD AUTO: 4.6 10*3/MM3 (ref 1.4–7)
NEUTROPHILS NFR BLD AUTO: 67 % (ref 42.7–76)
NITRITE UR QL STRIP: NEGATIVE
PH UR STRIP.AUTO: 7 [PH] (ref 4.6–8)
PLATELET # BLD AUTO: 373 10*3/MM3 (ref 140–450)
PMV BLD AUTO: 8.3 FL (ref 6–12)
POTASSIUM BLD-SCNC: 4.4 MMOL/L (ref 3.5–5.2)
PROT SERPL-MCNC: 8.1 G/DL (ref 6–8.5)
PROT UR QL STRIP: ABNORMAL
RBC # BLD AUTO: 4.69 10*6/MM3 (ref 3.77–5.28)
RBC # UR: ABNORMAL /HPF
REF LAB TEST METHOD: ABNORMAL
SODIUM BLD-SCNC: 138 MMOL/L (ref 136–145)
SP GR UR STRIP: 1.01 (ref 1–1.03)
SQUAMOUS #/AREA URNS HPF: ABNORMAL /HPF
TRIGL SERPL-MCNC: 122 MG/DL (ref 0–150)
TSH SERPL DL<=0.05 MIU/L-ACNC: 0.92 MIU/ML (ref 0.27–4.2)
UROBILINOGEN UR QL STRIP: ABNORMAL
VLDLC SERPL-MCNC: 24.4 MG/DL (ref 5–40)
WBC NRBC COR # BLD: 6.9 10*3/MM3 (ref 3.4–10.8)
WBC UR QL AUTO: ABNORMAL /HPF

## 2019-03-12 PROCEDURE — 80053 COMPREHEN METABOLIC PANEL: CPT | Performed by: INTERNAL MEDICINE

## 2019-03-12 PROCEDURE — 99214 OFFICE O/P EST MOD 30 MIN: CPT | Performed by: INTERNAL MEDICINE

## 2019-03-12 PROCEDURE — 36415 COLL VENOUS BLD VENIPUNCTURE: CPT | Performed by: INTERNAL MEDICINE

## 2019-03-12 PROCEDURE — 81001 URINALYSIS AUTO W/SCOPE: CPT | Performed by: INTERNAL MEDICINE

## 2019-03-12 PROCEDURE — 85025 COMPLETE CBC W/AUTO DIFF WBC: CPT | Performed by: INTERNAL MEDICINE

## 2019-03-12 PROCEDURE — 80061 LIPID PANEL: CPT | Performed by: INTERNAL MEDICINE

## 2019-03-12 PROCEDURE — 84443 ASSAY THYROID STIM HORMONE: CPT | Performed by: INTERNAL MEDICINE

## 2019-03-12 RX ORDER — RABEPRAZOLE SODIUM 20 MG/1
20 TABLET, DELAYED RELEASE ORAL DAILY
Qty: 30 TABLET | Refills: 11 | Status: SHIPPED | OUTPATIENT
Start: 2019-03-12 | End: 2020-03-17

## 2019-03-12 RX ORDER — VALSARTAN 160 MG/1
TABLET ORAL
Qty: 30 TABLET | Refills: 0 | Status: SHIPPED | OUTPATIENT
Start: 2019-03-12 | End: 2019-05-06 | Stop reason: SDUPTHER

## 2019-03-12 RX ORDER — RABEPRAZOLE SODIUM 20 MG/1
20 TABLET, DELAYED RELEASE ORAL DAILY
Qty: 30 TABLET | Refills: 11 | Status: SHIPPED | OUTPATIENT
Start: 2019-03-12 | End: 2019-03-12

## 2019-03-12 RX ORDER — DOXYCYCLINE HYCLATE 100 MG/1
100 CAPSULE ORAL 2 TIMES DAILY
Qty: 14 CAPSULE | Refills: 0 | Status: SHIPPED | OUTPATIENT
Start: 2019-03-12 | End: 2019-04-04

## 2019-03-12 NOTE — PROGRESS NOTES
Subjective   Geeta Butt is a 83 y.o. female.   Patient concerned about a UTI once lab work checked on that do note blood pressure is increased today as well.  Also 7 peripheral edema both hands and feet no obvious changes as to why  History of Present Illness   Increasing edema for a month or more.  No calf pain per se.  Hands are swelling wounds are tight pain is significant worse later in the day.  Of note patient is on amlodipine 5 mg but this is been more long-standing and not a new medication no increased temperature but having some increased urine frequency.  Next which is working well for her GERD symptoms.  Patient due to have her thyroid checked as well to see if there is a contributory factor to peripheral edema his lipids rechecked blood pressure is suboptimally controlled we are going to add valsartan half tablet daily: Blood pressure readings months that improved we consider weaning back on or even stopping the amlodipine to see if that is contributing factor to peripheral edema but would wait to get pending lab results back first.  Family history positive for esophageal cancer stomach cancer heart disease thyroid cancer hypertension stroke diabetes leukemia.  Patient is former smoker quit in 1990 drug allergies are several including sulfa, Cipro, Macrobid which cause GI upset he is related products and iodine allergic to sulfa which causes loss of consciousness reaction and all mycins    Review of Systems   Genitourinary: Positive for frequency and pelvic pain.   All other systems reviewed and are negative.      Objective   Physical Exam   Constitutional: She appears well-developed and well-nourished.   HENT:   Head: Normocephalic and atraumatic.   Eyes: Conjunctivae are normal. Pupils are equal, round, and reactive to light.   Cardiovascular: Normal rate, regular rhythm and normal heart sounds.   Pulmonary/Chest: Effort normal and breath sounds normal.   Abdominal: Soft. Bowel sounds are normal.    Musculoskeletal:   Legs with 1+ edema bilaterally generally gets worse later in the day by patient description no redness no increased heat negative Homans bilaterally negative cords bilaterally left and right posterior tibial pulses are 2+.   Neurological: She is alert.   Unremarkable gait and station   Skin: Skin is warm and dry.   Nursing note and vitals reviewed.        Assessment/Plan 1.  Peripheral edema plan await pending lab    2.  Hypertension suboptimally controlled plan add valsartan 160 half tablet daily follow-up in 1 week with appointment and BMP on return    3.  Hypothyroidism await TSH    4.  Dysuria with pending UA    5.  Hyperlipidemia await pending lab if good recheck 6 months    6.  GERD patient's controlled with AcipHex we will renew that for her.    Much of this encounter note is an electronic transcription/translation of spoken language to printed text.  The electronic translation of spoken language may permit erroneous, or at times, nonsensical words or phrases to be inadvertently transcribed.  Although I have reviewed the note for such errors, some may still exist. If there are questions or for further clarification, please contact me.      Valsartan 160 began as 1/2 tablet daily  UA.  Did show pyuria begin doxycycline 100 mg twice daily for 7 days.    Much of this encounter note is an electronic transcription/translation of spoken language to printed text.  The electronic translation of spoken language may permit erroneous, or at times, nonsensical words or phrases to be inadvertently transcribed.  Although I have reviewed the note for such errors, some may still exist. If there are questions or for further clarification, please contact me.

## 2019-03-20 ENCOUNTER — DOCUMENTATION (OUTPATIENT)
Dept: FAMILY MEDICINE CLINIC | Facility: CLINIC | Age: 84
End: 2019-03-20

## 2019-03-21 ENCOUNTER — OFFICE VISIT (OUTPATIENT)
Dept: FAMILY MEDICINE CLINIC | Facility: CLINIC | Age: 84
End: 2019-03-21

## 2019-03-21 VITALS
HEIGHT: 61 IN | OXYGEN SATURATION: 98 % | BODY MASS INDEX: 30.89 KG/M2 | HEART RATE: 69 BPM | WEIGHT: 163.6 LBS | SYSTOLIC BLOOD PRESSURE: 144 MMHG | DIASTOLIC BLOOD PRESSURE: 58 MMHG | TEMPERATURE: 97.5 F

## 2019-03-21 DIAGNOSIS — D64.9 ANEMIA, UNSPECIFIED TYPE: ICD-10-CM

## 2019-03-21 DIAGNOSIS — I10 HYPERTENSION, ESSENTIAL: Primary | ICD-10-CM

## 2019-03-21 DIAGNOSIS — I65.21 CAROTID STENOSIS, RIGHT: ICD-10-CM

## 2019-03-21 DIAGNOSIS — E78.5 HYPERLIPIDEMIA, UNSPECIFIED HYPERLIPIDEMIA TYPE: ICD-10-CM

## 2019-03-21 DIAGNOSIS — I45.9 SKIPPED HEART BEATS: ICD-10-CM

## 2019-03-21 LAB
ANION GAP SERPL CALCULATED.3IONS-SCNC: 14.3 MMOL/L
BUN BLD-MCNC: 19 MG/DL (ref 8–23)
BUN/CREAT SERPL: 19.6 (ref 7–25)
CALCIUM SPEC-SCNC: 9.7 MG/DL (ref 8.6–10.5)
CHLORIDE SERPL-SCNC: 100 MMOL/L (ref 98–107)
CO2 SERPL-SCNC: 23.7 MMOL/L (ref 22–29)
CREAT BLD-MCNC: 0.97 MG/DL (ref 0.57–1)
ERYTHROCYTE [DISTWIDTH] IN BLOOD BY AUTOMATED COUNT: 13.9 % (ref 12.3–15.4)
FERRITIN SERPL-MCNC: 18.8 NG/ML (ref 13–150)
FOLATE SERPL-MCNC: 16.5 NG/ML (ref 4.78–24.2)
GFR SERPL CREATININE-BSD FRML MDRD: 55 ML/MIN/1.73
GLUCOSE BLD-MCNC: 95 MG/DL (ref 65–99)
HCT VFR BLD AUTO: 37 % (ref 34–46.6)
HGB BLD-MCNC: 11.8 G/DL (ref 12–15.9)
IRON 24H UR-MRATE: 41 MCG/DL (ref 37–145)
IRON SATN MFR SERPL: 8 % (ref 20–50)
LYMPHOCYTES # BLD AUTO: 2.3 10*3/MM3 (ref 0.7–3.1)
LYMPHOCYTES NFR BLD AUTO: 25.3 % (ref 19.6–45.3)
MCH RBC QN AUTO: 26 PG (ref 26.6–33)
MCHC RBC AUTO-ENTMCNC: 31.9 G/DL (ref 31.5–35.7)
MCV RBC AUTO: 81.3 FL (ref 79–97)
MONOCYTES # BLD AUTO: 0.8 10*3/MM3 (ref 0.1–0.9)
MONOCYTES NFR BLD AUTO: 8.8 % (ref 5–12)
NEUTROPHILS # BLD AUTO: 5.9 10*3/MM3 (ref 1.4–7)
NEUTROPHILS NFR BLD AUTO: 65.9 % (ref 42.7–76)
PLATELET # BLD AUTO: 370 10*3/MM3 (ref 140–450)
PMV BLD AUTO: 7.5 FL (ref 6–12)
POTASSIUM BLD-SCNC: 4.4 MMOL/L (ref 3.5–5.2)
RBC # BLD AUTO: 4.55 10*6/MM3 (ref 3.77–5.28)
SODIUM BLD-SCNC: 138 MMOL/L (ref 136–145)
TIBC SERPL-MCNC: 496 MCG/DL (ref 298–536)
TRANSFERRIN SERPL-MCNC: 333 MG/DL (ref 200–360)
VIT B12 BLD-MCNC: 552 PG/ML (ref 211–946)
WBC NRBC COR # BLD: 8.9 10*3/MM3 (ref 3.4–10.8)

## 2019-03-21 PROCEDURE — 85025 COMPLETE CBC W/AUTO DIFF WBC: CPT | Performed by: INTERNAL MEDICINE

## 2019-03-21 PROCEDURE — 99214 OFFICE O/P EST MOD 30 MIN: CPT | Performed by: INTERNAL MEDICINE

## 2019-03-21 PROCEDURE — 82728 ASSAY OF FERRITIN: CPT | Performed by: INTERNAL MEDICINE

## 2019-03-21 PROCEDURE — 82607 VITAMIN B-12: CPT | Performed by: INTERNAL MEDICINE

## 2019-03-21 PROCEDURE — 93000 ELECTROCARDIOGRAM COMPLETE: CPT | Performed by: INTERNAL MEDICINE

## 2019-03-21 PROCEDURE — 82746 ASSAY OF FOLIC ACID SERUM: CPT | Performed by: INTERNAL MEDICINE

## 2019-03-21 PROCEDURE — 80048 BASIC METABOLIC PNL TOTAL CA: CPT | Performed by: INTERNAL MEDICINE

## 2019-03-21 PROCEDURE — 83540 ASSAY OF IRON: CPT | Performed by: INTERNAL MEDICINE

## 2019-03-21 PROCEDURE — 36415 COLL VENOUS BLD VENIPUNCTURE: CPT | Performed by: INTERNAL MEDICINE

## 2019-03-21 PROCEDURE — 84466 ASSAY OF TRANSFERRIN: CPT | Performed by: INTERNAL MEDICINE

## 2019-03-21 RX ORDER — EZETIMIBE 10 MG/1
10 TABLET ORAL DAILY
Qty: 30 TABLET | Refills: 1 | Status: SHIPPED | OUTPATIENT
Start: 2019-03-21 | End: 2020-02-07

## 2019-03-21 NOTE — PROGRESS NOTES
Subjective   Geeta Butt is a 83 y.o. female.   Recent labs show elevated lipids patient has been statin intolerant on one stent.  She does bring in a [facility] screening with several things going on  History of Present Illness   Information from screening shows some moderate right carotid disease but normal left also has a reading of no atrial fibrillation but ectopic beats found will be an EKG with a 1 minute rhythm strip lastly her kidney test is listed as normal but she did have an elevated estimated by their calculations.  DEXA scan not done PAD studies were normal at 0.94 GFR.  Patient is statin intolerant but will begin on Zetia today for increased lipids also has right carotid stenosis so we will get vascular surgeon.  Dr. Annalise Alfaro's group.  As a reference to some ectopic beats on a rhythm strip.  I do not have that specific strip so we will do an EKG and rhythm strip today as well.  No chest pain or other complaints no palpitations noted by history.  We will given the several months reaction patient have a statin is probably drug about is that he had another for lipid control.  Patient is a former smoker.  Drug allergies consist of sulfa, Cipro, Macrobid causing diarrhea nausea yeast related products undefined and iodine undefined.  Family is positive for esophageal cancer stomach cancer heart disease thyroid cancer hypertension stroke diabetes leukemia  Review of Systems   All other systems reviewed and are negative.      Objective   Vitals:    03/21/19 1505   BP: 148/70   Pulse: 69   Temp: 97.5 °F (36.4 °C)   SpO2: 98%   Weight: 74.2 kg (163 lb 9.6 oz)     Physical Exam   Constitutional: She appears well-developed and well-nourished.   HENT:   Head: Normocephalic and atraumatic.   Eyes: Conjunctivae are normal. Pupils are equal, round, and reactive to light.   Neck:   Left and right carotids are without bruits   Cardiovascular: Normal rate, regular rhythm and normal heart sounds.   Pulmonary/Chest:  Effort normal and breath sounds normal.   Nursing note and vitals reviewed.      No results found for: INR      ECG 12 Lead  Date/Time: 3/21/2019 3:26 PM  Performed by: Jimmy Ivan Jr., MD  Authorized by: Jimmy Ivan Jr., MD             1 minute rhythm strip  Assessment/Plan   1.  Hypertension recently added valsartan 160 mg half tab daily with improved blood pressure control will observe for now call in blood pressure readings in 1-2 weeks can always increase to whole tablet but clearly improved tolerating medicine    2.  Carotid disease right mild/moderate plan vascular surgery referral additionally will begin Zetia with follow-up for lab work in 6 weeks time.    3.  Ectopic beats by recent [facility] study will get a 1 minute rhythm strip and EKG  Rhythm strip and EKG are totally unremarkable patient not having palpitations just a few ectopic beats undefined record on lifelong strip which I do not have plan observation only for time being    4.  Hyperlipidemia plans that he has above with recheck 6 weeks lipids    5.  Anemia undefined get anemia studies await pending lab    Much of this encounter note is an electronic transcription/translation of spoken language to printed text.  The electronic translation of spoken language may permit erroneous, or at times, nonsensical words or phrases to be inadvertently transcribed.  Although I have reviewed the note for such errors, some may still exist. If there are questions or for further clarification, please contact me.

## 2019-03-22 ENCOUNTER — APPOINTMENT (OUTPATIENT)
Dept: LAB | Facility: HOSPITAL | Age: 84
End: 2019-03-22

## 2019-03-22 ENCOUNTER — APPOINTMENT (OUTPATIENT)
Dept: ONCOLOGY | Facility: CLINIC | Age: 84
End: 2019-03-22

## 2019-03-26 ENCOUNTER — TELEPHONE (OUTPATIENT)
Dept: FAMILY MEDICINE CLINIC | Facility: CLINIC | Age: 84
End: 2019-03-26

## 2019-04-03 NOTE — PROGRESS NOTES
Subjective     REASON FOR CONSULTATION:   1. Left breast cancer [id number] ER/LA+ her2 neg post lumpectomy/SLN  3/18 and whole breast radiation completed May 2018  2.  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  Fatigue out of proportion to anemia  4.  Arimidex started in 3/18 with bone density showing normal density in the spine and osteopenia in the hips-this was self stopped due to vision changes.  5.  Aromasin initiated 10/20/18 and discontinued 1/20/19 secondary to vision changes.                             REQUESTING PHYSICIAN:  Myles Soliman M.D.    History of Present Illness patient is an 82-year-old female with a T1cN I jese hormone positive left breast cancer treated with lumpectomy sentinel node biopsy and radiation.    In 10/2018 she was changed to Aromasin as she had issues previously with Arimidex with visual blurring.      The patient was then seen 1/25/19 with reports of blurred vision once again.  She was also noting numbness in her bilateral hands and upper left arm that she attributed to the Aromasin as well. Patient was also noting worsening arthralgias, particularly in her knees. Aromasin was discontinued and the patient was to follow-up one month later.    However due to scheduling conflicts she has not been back until today (over 2 months since stopping Aromasin).     Today she states she is doing well with her symptoms essentially resolving with the exception of fluid retention which is likely not related.  We discussed options going forward, with the first approach recommended to be Femara as that is the final aromatase inhibitor she has not tried.  After some discussion the patient is willing to try this.    Also of note, she recently saw her internist, Dr. Ivan in follow-up. Noted to be mildly anemic with Hgb of 11.8. Additional studies done show Ferritin of 18, iron sat of 8%. B12 and folate were normal.  She was not started on any oral iron.  In review of the chart, she did undergo upper and  lower endoscopy per Dr. Gallego roughly 1 year ago with no concerning findings.  That being said, the patient does have a history of GI ulcers and Dr. Ivan has her on AcipHex started in the last few months with improvement of her heartburn and GI symptoms.    We discussed that with her iron deficiency anemia now evident, we need to at least try oral iron.  If she is intolerant to this, which she very well may be in light of her GI history, we could then pursue IV iron.      She is in agreement to this plan.  She denies other concerns at this time.      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    • Chronic kidney disease    • CKD (chronic kidney disease)     PT STATES STAGE 3   • Depression    • Diabetes mellitus (CMS/HCC)     BORDERLINE   • Diverticular disease    • Gastric ulcer     AND DUODENAL   • GERD (gastroesophageal reflux disease)    • Hearing loss     BILAT AIDES   • History of colon polyps    • History of depression    • History of GI bleed    • Hypertension    • Hypothyroidism    • Peptic ulceration    • PONV (postoperative nausea and vomiting)    • Poor vision     RIGHT EYE, HAS PERIPHERAL VISION    • Stroke (CMS/HCC) 2000     mild weakness on left, partial sight loss on right         Past Surgical History:   Procedure Laterality Date   • ABDOMINAL SURGERY     • APPENDECTOMY     • BLADDER REPAIR      AND RECTOCELE   • BREAST BIOPSY     • BREAST CYST ASPIRATION     • BREAST LUMPECTOMY WITH SENTINEL NODE BIOPSY Left 3/19/2018    Procedure: BREAST LUMPECTOMY WITH SENTINEL NODE BIOPSY AND NEEDLE LOCALIZATION;  Surgeon: Myles Soliman MD;  [place] [facility] [place] [facility] [place]  Service: General   • CHOLECYSTECTOMY     • COLONOSCOPY  2013   • COLONOSCOPY N/A 2/16/2018     Procedure: COLONOSCOPY into cecum and T.I. with biopsies;  Surgeon: Augustine Gallego MD;  Location: [facility] ENDOSCOPY;  Service:    • ENDOSCOPY N/A 10/17/2017    Procedure: ESOPHAGOGASTRODUODENOSCOPY WITH COLD BIOPSIES;  Surgeon: Augustine Gallego MD;  Location: [facility] ENDOSCOPY;  Service:    • ENDOSCOPY N/A 2/16/2018    Procedure: ESOPHAGOGASTRODUODENOSCOPY with biopsies and #54 Ar[address] DILATATION;  Surgeon: Augustine Gallego MD;  Location: [facility] ENDOSCOPY;  Service:    • EYE SURGERY Left 2014    3-4 years, cornea replacement    • HYSTERECTOMY     • MOUTH SURGERY  2000    UNDER TONGUE AND LEFT FLOOR OF MOUTH FOR CA   • PARATHYROIDECTOMY      PER PT   • SINUS SURGERY     • SKIN BIOPSY     • THYROID SURGERY     • VARICOSE VEIN SURGERY      Oncological history  patient is an 82-year-old female with recent problems with abdominal pain and diarrhea and weight loss which is being evaluated by GI and finally found to have multiple ulcers in the duodenum and small bowel finally responding to treatment.  In the middle of this she went for routine mammogram a couple of months later was found to have an abnormality in the left breast at 7 o'clock position measuring about 12 mm in size that was not present last year.  The patient had previously had a left breast biopsy in 2015 with benign findings.   There was no palpable mass and biopsy was done and this revealed a grade 1 infiltrating ductal carcinoma at least 1 cm in size ER 78%/ID 17% positive HER-2 negative.  The patient was referred to Dr. Soliman who performed a lumpectomy and sentinel node biopsy with the findings of a 1.2 cm tumor grade 2 with lymphovascular invasion and clear margins with associated DCIS.  One of 2 sentinel nodes showed a micrometastasis although the pathologist said this was almost too small to be considered a micrometastasis.  Postoperatively she has done well and thankfully her GI complains of much improved with Carafate and Protonix and her diarrhea  finally resolved.    She's not had a bone density in quite a while.  She is  5 para 5  menarche at age 15 and menopause in her mid 50s although she had a hysterectomy at 30 for an ulcerated uterus.  First childbirth was at age 20 she breast-fed all 5 children.  She took hormonal therapy for at least 15 years after menopause and stopped about 10 years ago   .  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    Her father  at 52 of stomach cancer mother had thyroid cancer at age 50 her brother's daughter had breast cancer age 40 and she had a paternal aunt with leukemia is no other breast cancer or ovarian cancer uterine cancer or pancreatic cancer in the family . I did ask him to check with her niece to see if she has had genetic testing - they do not know much at all about the maternal family .    She will call for the results of the DEXA scan and if adequate we will start Arimidex which I have  already prescribed to her and see her back in 3 months to assess her tolerance.  She is in very good health and I explained to her that adjuvant therapy decreases the risk of recurrence of her cancer which might be in the 15-20% range probably down to the 10% range and if she has significant side effects we will be inclined to stop treatment.  Obviously if her GI symptoms recur with Arimidex we will have to try another medications.  We talked about the joint pains and hot flashes which are unlikely at her age and she was agreeable to a trial of Arimidex.  Radiation has been planned        Current Outpatient Medications on File Prior to Visit   Medication Sig Dispense Refill   • amLODIPine (NORVASC) 5 MG tablet TAKE 2 TABLETS BY MOUTH EVERY  tablet 0   • aspirin 81 MG tablet Take 81 mg by mouth Daily.     • CARAFATE 1 GM/10ML suspension TAKE 10 MLS BY MOUTH EVERY 6 HOURS TO COAT STOMACH 420 mL 3   • Cholecalciferol (VITAMIN D3) 2000 units capsule TAKE  1 CAPSULE BY MOUTH DAILY. 90 capsule 1   • colestipol (COLESTID) 1 g tablet Take 1 g by mouth 2 (Two) Times a Day.     • ezetimibe (ZETIA) 10 MG tablet Take 1 tablet by mouth Daily. 30 tablet 1   • fluorometholone (FML) 0.1 % ophthalmic suspension Administer 1 drop into the left eye Daily.     • FLUoxetine (PROzac) 10 MG capsule TAKE 2 ALTERNATING WITH 1 EVERY OTHER  capsule 1   • glucose blood (FREESTYLE TEST STRIPS) test strip TEST BLOOD SUGARS ONCE DAILY 50 each 12   • ketotifen (ZADITOR) 0.025 % ophthalmic solution 1 drop 2 (Two) Times a Day.     • levothyroxine (SYNTHROID, LEVOTHROID) 75 MCG tablet TAKE 1 TABLET BY MOUTH DAILY 90 tablet 3   • NON FORMULARY Administer 1 drop to both eyes 3 (Three) Times a Day. THERA TEARS     • Omega-3 Fatty Acids (FISH OIL PO) Take  by mouth Daily As Needed.     • Probiotic Product (PROBIOTIC DAILY PO) Take 1 capsule by mouth Daily.     • RABEprazole (ACIPHEX) 20 MG EC tablet Take 1 tablet by mouth Daily. 30 tablet 11   • valsartan (DIOVAN) 160 MG tablet Begin as 1/2 tablet daily 30 tablet 0   • [DISCONTINUED] Omeprazole (PRILOSEC PO) Take  by mouth.     • [DISCONTINUED] sucralfate (CARAFATE) 1 GM/10ML suspension Take 1 g by mouth As Needed.     • mupirocin (BACTROBAN) 2 % ointment Apply  topically to the appropriate area as directed 3 (Three) Times a Day. 22 g 0   • [DISCONTINUED] doxycycline (VIBRAMYCIN) 100 MG capsule Take 1 capsule by mouth 2 (Two) Times a Day. 14 capsule 0   • [DISCONTINUED] exemestane (AROMASIN) 25 MG chemo tablet Take 1 tablet by mouth Daily. 30 tablet 3     No current facility-administered medications on file prior to visit.         ALLERGIES:    Allergies   Allergen Reactions   • Other Nausea Only     All mycins   • Sulfa Antibiotics Other (See Comments)     LOST CONSCIOUSNESS AND BODY TURNED RED/ON FIRE   • Ciprofloxacin Other (See Comments)     RED BLISTERS   • Iodine Other (See Comments)     DECADES AGO, IODINE INJECTION CAUSED SKIN REDNESS  AND BURNING   • Macrodantin [Nitrofurantoin Macrocrystal] Diarrhea and Nausea And Vomiting   • Nitrofurantoin Nausea And Vomiting   • Yeast-Related Products Other (See Comments)     MOUTH SORES AND BLADDER INFECTION        Social History     Socioeconomic History   • Marital status:      Spouse name: Nicolas   • Number of children: Not on file   • Years of education: High school   • Highest education level: Not on file   Occupational History   • Occupation: Homemaker   Tobacco Use   • Smoking status: Former Smoker     Packs/day: 1.00     Years: 10.00     Pack years: 10.00     Types: Cigarettes     Start date:      Last attempt to quit:      Years since quittin.2   • Smokeless tobacco: Never Used   Substance and Sexual Activity   • Alcohol use: No   • Drug use: No   • Sexual activity: Defer   Social History Narrative    Accompanied by daughters Macrina and Daisy        Family History   Problem Relation Age of Onset   • Esophageal cancer Father    • Stomach cancer Father    • Heart disease Mother    • Thyroid cancer Mother    • Hypertension Mother    • Hypertension Sister    • Hypertension Brother    • Stroke Brother    • Heart disease Brother    • Diabetes Brother    • Leukemia Paternal Aunt    • Malig Hyperthermia Neg Hx         Review of Systems   Constitutional: Negative for chills, fatigue and fever.   HENT: Negative.    Eyes: Negative for discharge and visual disturbance.   Respiratory: Negative for cough and shortness of breath.    Cardiovascular: Positive for leg swelling (better but still present).   Gastrointestinal: Negative for abdominal distention, abdominal pain and nausea.   Genitourinary: Negative.    Musculoskeletal: Positive for back pain (Chronic). Negative for joint swelling.   Skin: Negative for pallor and rash.   Neurological: Negative for dizziness and numbness.   Psychiatric/Behavioral: Negative for behavioral problems and confusion.        Objective     Vitals:     "04/04/19 1207   BP: 152/63   Pulse: 68   Resp: 12   Temp: 97.4 °F (36.3 °C)   TempSrc: Oral   SpO2: 98%   Weight: 74.1 kg (163 lb 6.4 oz)   Height: 154.5 cm (60.83\")   PainSc: 0-No pain     Current Status 4/4/2019   ECOG score 0       Physical Exam    GENERAL:  Well-developed, well-nourished in no acute distress.   SKIN:  Warm, dry without rashes, purpura or petechiae.  EYES:  Pupils equal, round and reactive to light.  EOMs intact.  Conjunctivae normal.  EARS:  Hearing intact.  NOSE:  Septum midline.  No excoriations or nasal discharge.  MOUTH:  Tongue is well-papillated; no stomatitis or ulcers.  Lips normal.  THROAT:  Oropharynx without lesions or exudates.  NECK:  Supple with good range of motion; no thyromegaly or masses, no JVD.  LYMPHATICS:  No cervical, supraclavicular, axillary adenopathy.  CHEST:  Lungs clear to auscultation. Good airflow.  BREASTS: Not examined today.   CARDIAC:  Regular rate and rhythm without murmurs, rubs or gallops. Normal S1,S2.  ABDOMEN:  Soft, nontender with no hepatosplenomegaly or masses.  EXTREMITIES:  No clubbing, cyanosis or edema.  NEUROLOGICAL:  Cranial Nerves II-XII grossly intact.  No focal neurological deficits.  PSYCHIATRIC:  Normal affect and mood.        RECENT LABS:    Results from last 7 days   Lab Units 04/04/19  1137   WBC 10*3/mm3 8.97   NEUTROS ABS 10*3/mm3 6.09   HEMOGLOBIN g/dL 11.1*   HEMATOCRIT % 34.6   PLATELETS 10*3/mm3 312             Lab Results   Component Value Date    IRON 41 03/21/2019    TIBC 496 03/21/2019    FERRITIN 18.80 03/21/2019     Iron Sat 8%    R 78% OH 17% Her 2 -neg Ki-67 7%  Final Diagnosis   1.  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                SEE SYNOPTIC REPORT (BELOW) FOR ADDITIONAL DETAILS.     2.  SENTINEL LYMPH NODE #1, LEFT AXILLA, EXCISION:                ONE LYMPH NODE, " POSITIVE FOR METASTATIC CARCINOMA (MICROMETASTASIS) (SEE COMMENT).               NO EXTRANODAL EXTENSION IS IDENTIFIED.     COMMENT: Measuring the exact size of the lymph node metastasis is difficult. The lymph node demonstrates a few very small scattered foci of tumor cells, compatible with isolated tumor cells.  One slide demonstrates a single contiguous focus of tumor cells exceeding 0.2 mm; therefore, this is best considered a micrometastasis rather than isolated tumor cells.     3.  SENTINEL LYMPH NODE #2, LEFT AXILLA, EXCISION.               ONE LYMPH NODE, NEGATIVE FOR METASTATIC CARCINOMA.     SYNOPTIC REPORT (Based on [facility] cancer case summary, version January 2018):               Procedure: Excision (less than total mastectomy).               Specimen laterality: Left.                Tumor site: Not specified.                Tumor size: 1.2 x 1.1 x 1 cm.               Histologic type: Invasive carcinoma of no special type (ductal, not otherwise specified).                Histologic grade (Philo Histologic Score):                              Glandular (acinar)/tubular differentiation: Score 3.                            Nuclear pleomorphism: Score 2.                             Mitotic rate: Score 1.                            Overall grade: Grade 2 (Score 6 of 9).               Tumor focality: Single focus of invasive carcinoma.                Ductal carcinoma in situ (DCIS): Present.                            Architectural patterns: Solid.                             Nuclear grade: Grade II (intermediate).                            Necrosis: Present, central comedonecrosis.                Lobular carcinoma in situ (LCIS): No LCIS in specimen.               Margins:                            Invasive carcinoma margins: Uninvolved by invasive carcinoma.                                          Distance from closest margin: 3 mm (medial margin).                                          NOTE: Invasive  carcinoma also extends to within 4 mm of superior margin and to within                                                 4 mm of lateral margin.  All additional margins are greater than 10 mm from invasive                                                 carcinoma.                              DCIS Margins: Uninvolved by DCIS.                                          Distance from closest margin: 3 mm (medial margin).                    Regional Lymph nodes: Involved by tumor cells.                             Number of lymph nodes with macrometastases: 0.                            Number of lymph nodes with micrometastases: 1.                            Size of largest metastatic deposit: 0.4 mm.                            Extranodal extension: Not identified.                            Number of lymph nodes examined: 2.                            Number of sentinel lymph nodes examined: 2.                Treatment effect: No known presurgical therapy.                Lymphovascular invasion: Present.                Dermal Lymphovascular invasion: Not identified.                Pathologic stage classification.                            Primary tumor: pT1c.                            Regional lymph nodes: pN1mi(sn).                            Distant metastasis: Not applicable.                Additional pathologic findings:                             Ductal hyperplasia of the usual type.                             Fibrocystic changes with duct dilatation and stasis and focal apocrine metaplasia.                             Columnar cell change without atypia.                             Fibroinflammatory changes consistent with site of prior biopsy.                    Ancillary studies: ER, TX, HER-2/melanie, and Ki-67 studies performed on previous biopsy specimen at                       outside facility ([facility]).           Assessment/Plan   1.  T1c N1 jese N0 ER/TX positive HER-2 negative left breast cancer post  lumpectomy and radiation  · Arimidex started in 3/18 discontinued by the patient in 7/18 due to visual blurring which resolved.    · Aromasin started 11/2018. 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  She also has had weight gain. Aromasin discontinued.  · Today her symptoms have essentially resolved with exception of some fluid retention which is likely not related to previous Aromasin.  After much discussion she is willing to try Femara 2.5 mg daily.  She understands that she could have similar symptoms as other AI therapy.  We will see her back in 4 weeks to follow-up on her tolerance.  She understands to call if she begins to have difficulty prior to that time.  2.  Family history of breast cancer in a niece at a young age and father with cancer at age 51?  Genetic testing  4.  History of oral cancer in 1990  5.  Anemia, recently worked up per Dr. Ivan, internal medicine.   · Hgb today 11.1. Recent ferritin 18, Iron sat 8%.  Patient had EGD and colonoscopy per Dr. Gallego 1 year ago with no concerning findings.  That being said she does have a history of GI ulcers and is now taking AcipHex with improvement in GI symptoms the last few months per Dr. Ivan.  · We discussed trying her on oral iron, specifically ferrous sulfate 325 mg 1 twice a day.  She was started just once daily dosing and work up to twice a day if tolerated.  I am concerned that she may not tolerate this with her GI history.  If that is the case, she understands to call back again, at which time we will set her up to undergo IV Injectafer x2 doses.    PLAN:  1.  Patient to begin Femara 2.5 mg daily.  2.  Patient to begin ferrous sulfate 3 and 25 mg twice a day as tolerated.  3.  If patient unable to tolerate oral iron she will call back and, at which time we will set her up for IV Injectafer x2 doses.  4.  Otherwise patient scheduled to return in 1 month  for follow-up with Dr. Valenzuela, CBC, CMP and to assess tolerance to Femara.

## 2019-04-04 ENCOUNTER — LAB (OUTPATIENT)
Dept: LAB | Facility: HOSPITAL | Age: 84
End: 2019-04-04

## 2019-04-04 ENCOUNTER — OFFICE VISIT (OUTPATIENT)
Dept: ONCOLOGY | Facility: CLINIC | Age: 84
End: 2019-04-04

## 2019-04-04 VITALS
HEIGHT: 61 IN | RESPIRATION RATE: 12 BRPM | SYSTOLIC BLOOD PRESSURE: 152 MMHG | DIASTOLIC BLOOD PRESSURE: 63 MMHG | WEIGHT: 163.4 LBS | BODY MASS INDEX: 30.85 KG/M2 | OXYGEN SATURATION: 98 % | HEART RATE: 68 BPM | TEMPERATURE: 97.4 F

## 2019-04-04 DIAGNOSIS — Z17.0 MALIGNANT NEOPLASM OF LOWER-INNER QUADRANT OF LEFT BREAST IN FEMALE, ESTROGEN RECEPTOR POSITIVE (HCC): Primary | ICD-10-CM

## 2019-04-04 DIAGNOSIS — C50.312 MALIGNANT NEOPLASM OF LOWER-INNER QUADRANT OF LEFT BREAST IN FEMALE, ESTROGEN RECEPTOR POSITIVE (HCC): ICD-10-CM

## 2019-04-04 DIAGNOSIS — Z79.811 ENCOUNTER FOR MONITORING AROMATASE INHIBITOR THERAPY: ICD-10-CM

## 2019-04-04 DIAGNOSIS — Z51.81 ENCOUNTER FOR MONITORING AROMATASE INHIBITOR THERAPY: ICD-10-CM

## 2019-04-04 DIAGNOSIS — Z17.0 MALIGNANT NEOPLASM OF LOWER-INNER QUADRANT OF LEFT BREAST IN FEMALE, ESTROGEN RECEPTOR POSITIVE (HCC): ICD-10-CM

## 2019-04-04 DIAGNOSIS — C50.312 MALIGNANT NEOPLASM OF LOWER-INNER QUADRANT OF LEFT BREAST IN FEMALE, ESTROGEN RECEPTOR POSITIVE (HCC): Primary | ICD-10-CM

## 2019-04-04 DIAGNOSIS — D50.0 IRON DEFICIENCY ANEMIA DUE TO CHRONIC BLOOD LOSS: ICD-10-CM

## 2019-04-04 PROBLEM — D50.9 IRON DEFICIENCY ANEMIA: Status: ACTIVE | Noted: 2019-04-04

## 2019-04-04 LAB
ALBUMIN SERPL-MCNC: 4.2 G/DL (ref 3.5–5.2)
ALBUMIN/GLOB SERPL: 1.4 G/DL (ref 1.1–2.4)
ALP SERPL-CCNC: 69 U/L (ref 38–116)
ALT SERPL W P-5'-P-CCNC: 13 U/L (ref 0–33)
ANION GAP SERPL CALCULATED.3IONS-SCNC: 11.7 MMOL/L
AST SERPL-CCNC: 17 U/L (ref 0–32)
BASOPHILS # BLD AUTO: 0.1 10*3/MM3 (ref 0–0.2)
BASOPHILS NFR BLD AUTO: 1.1 % (ref 0–1.5)
BILIRUB SERPL-MCNC: 0.2 MG/DL (ref 0.2–1.2)
BUN BLD-MCNC: 23 MG/DL (ref 6–20)
BUN/CREAT SERPL: 21.7 (ref 7.3–30)
CALCIUM SPEC-SCNC: 9.6 MG/DL (ref 8.5–10.2)
CHLORIDE SERPL-SCNC: 97 MMOL/L (ref 98–107)
CO2 SERPL-SCNC: 26.3 MMOL/L (ref 22–29)
CREAT BLD-MCNC: 1.06 MG/DL (ref 0.6–1.1)
DEPRECATED RDW RBC AUTO: 41.5 FL (ref 37–54)
EOSINOPHIL # BLD AUTO: 0.32 10*3/MM3 (ref 0–0.4)
EOSINOPHIL NFR BLD AUTO: 3.6 % (ref 0.3–6.2)
ERYTHROCYTE [DISTWIDTH] IN BLOOD BY AUTOMATED COUNT: 13.4 % (ref 12.3–15.4)
GFR SERPL CREATININE-BSD FRML MDRD: 50 ML/MIN/1.73
GLOBULIN UR ELPH-MCNC: 3.1 GM/DL (ref 1.8–3.5)
GLUCOSE BLD-MCNC: 100 MG/DL (ref 74–124)
HCT VFR BLD AUTO: 34.6 % (ref 34–46.6)
HGB BLD-MCNC: 11.1 G/DL (ref 12–15.9)
IMM GRANULOCYTES # BLD AUTO: 0.03 10*3/MM3 (ref 0–0.05)
IMM GRANULOCYTES NFR BLD AUTO: 0.3 % (ref 0–0.5)
LYMPHOCYTES # BLD AUTO: 1.47 10*3/MM3 (ref 0.7–3.1)
LYMPHOCYTES NFR BLD AUTO: 16.4 % (ref 19.6–45.3)
MCH RBC QN AUTO: 26.9 PG (ref 26.6–33)
MCHC RBC AUTO-ENTMCNC: 32.1 G/DL (ref 31.5–35.7)
MCV RBC AUTO: 84 FL (ref 79–97)
MONOCYTES # BLD AUTO: 0.96 10*3/MM3 (ref 0.1–0.9)
MONOCYTES NFR BLD AUTO: 10.7 % (ref 5–12)
NEUTROPHILS # BLD AUTO: 6.09 10*3/MM3 (ref 1.4–7)
NEUTROPHILS NFR BLD AUTO: 67.9 % (ref 42.7–76)
NRBC BLD AUTO-RTO: 0 /100 WBC (ref 0–0)
PLATELET # BLD AUTO: 312 10*3/MM3 (ref 140–450)
PMV BLD AUTO: 8.7 FL (ref 6–12)
POTASSIUM BLD-SCNC: 4.8 MMOL/L (ref 3.5–4.7)
PROT SERPL-MCNC: 7.3 G/DL (ref 6.3–8)
RBC # BLD AUTO: 4.12 10*6/MM3 (ref 3.77–5.28)
SODIUM BLD-SCNC: 135 MMOL/L (ref 134–145)
WBC NRBC COR # BLD: 8.97 10*3/MM3 (ref 3.4–10.8)

## 2019-04-04 PROCEDURE — 80053 COMPREHEN METABOLIC PANEL: CPT | Performed by: INTERNAL MEDICINE

## 2019-04-04 PROCEDURE — 36415 COLL VENOUS BLD VENIPUNCTURE: CPT | Performed by: INTERNAL MEDICINE

## 2019-04-04 PROCEDURE — 99215 OFFICE O/P EST HI 40 MIN: CPT | Performed by: NURSE PRACTITIONER

## 2019-04-04 PROCEDURE — 85025 COMPLETE CBC W/AUTO DIFF WBC: CPT | Performed by: INTERNAL MEDICINE

## 2019-04-04 RX ORDER — LETROZOLE 2.5 MG/1
2.5 TABLET, FILM COATED ORAL DAILY
Qty: 30 TABLET | Refills: 4 | Status: SHIPPED | OUTPATIENT
Start: 2019-04-04 | End: 2019-05-04

## 2019-04-04 RX ORDER — FERROUS SULFATE 325(65) MG
325 TABLET ORAL 2 TIMES DAILY
Qty: 60 TABLET | Refills: 1 | Status: SHIPPED | OUTPATIENT
Start: 2019-04-04 | End: 2019-08-01

## 2019-04-09 ENCOUNTER — TRANSCRIBE ORDERS (OUTPATIENT)
Dept: ADMINISTRATIVE | Facility: HOSPITAL | Age: 84
End: 2019-04-09

## 2019-04-09 DIAGNOSIS — N28.9 RENAL INSUFFICIENCY: Primary | ICD-10-CM

## 2019-04-11 ENCOUNTER — HOSPITAL ENCOUNTER (OUTPATIENT)
Dept: ULTRASOUND IMAGING | Facility: HOSPITAL | Age: 84
Discharge: HOME | End: 2019-04-11
Admitting: NURSE PRACTITIONER

## 2019-04-11 DIAGNOSIS — N28.9 RENAL INSUFFICIENCY: ICD-10-CM

## 2019-04-11 PROCEDURE — 76775 US EXAM ABDO BACK WALL LIM: CPT

## 2019-04-25 RX ORDER — CALCIUM CARBONATE/VITAMIN D3 600 MG-20
TABLET ORAL
Qty: 90 CAPSULE | Refills: 1 | Status: SHIPPED | OUTPATIENT
Start: 2019-04-25 | End: 2019-11-12 | Stop reason: SDUPTHER

## 2019-05-06 RX ORDER — EXEMESTANE 25 MG/1
TABLET ORAL
Qty: 30 TABLET | Refills: 3 | OUTPATIENT
Start: 2019-05-06

## 2019-05-07 ENCOUNTER — APPOINTMENT (OUTPATIENT)
Dept: ONCOLOGY | Facility: CLINIC | Age: 84
End: 2019-05-07

## 2019-05-07 ENCOUNTER — APPOINTMENT (OUTPATIENT)
Dept: LAB | Facility: HOSPITAL | Age: 84
End: 2019-05-07

## 2019-05-07 RX ORDER — VALSARTAN 160 MG/1
TABLET ORAL
Qty: 30 TABLET | Refills: 0 | Status: SHIPPED | OUTPATIENT
Start: 2019-05-07 | End: 2019-07-07 | Stop reason: SDUPTHER

## 2019-05-09 ENCOUNTER — OFFICE VISIT (OUTPATIENT)
Dept: ONCOLOGY | Facility: CLINIC | Age: 84
End: 2019-05-09

## 2019-05-09 ENCOUNTER — LAB (OUTPATIENT)
Dept: LAB | Facility: HOSPITAL | Age: 84
End: 2019-05-09

## 2019-05-09 VITALS
RESPIRATION RATE: 14 BRPM | HEART RATE: 72 BPM | TEMPERATURE: 98.3 F | HEIGHT: 61 IN | OXYGEN SATURATION: 97 % | DIASTOLIC BLOOD PRESSURE: 62 MMHG | WEIGHT: 160.9 LBS | BODY MASS INDEX: 30.38 KG/M2 | SYSTOLIC BLOOD PRESSURE: 160 MMHG

## 2019-05-09 DIAGNOSIS — C50.312 MALIGNANT NEOPLASM OF LOWER-INNER QUADRANT OF LEFT BREAST IN FEMALE, ESTROGEN RECEPTOR POSITIVE (HCC): Primary | ICD-10-CM

## 2019-05-09 DIAGNOSIS — D50.0 IRON DEFICIENCY ANEMIA DUE TO CHRONIC BLOOD LOSS: ICD-10-CM

## 2019-05-09 DIAGNOSIS — Z17.0 MALIGNANT NEOPLASM OF LOWER-INNER QUADRANT OF LEFT BREAST IN FEMALE, ESTROGEN RECEPTOR POSITIVE (HCC): Primary | ICD-10-CM

## 2019-05-09 DIAGNOSIS — D50.0 IRON DEFICIENCY ANEMIA DUE TO CHRONIC BLOOD LOSS: Primary | ICD-10-CM

## 2019-05-09 PROBLEM — T45.4X5A ADVERSE EFFECT OF IRON: Status: ACTIVE | Noted: 2019-05-09

## 2019-05-09 LAB
ALBUMIN SERPL-MCNC: 4.2 G/DL (ref 3.5–5.2)
ALBUMIN/GLOB SERPL: 1.4 G/DL (ref 1.1–2.4)
ALP SERPL-CCNC: 76 U/L (ref 38–116)
ALT SERPL W P-5'-P-CCNC: 16 U/L (ref 0–33)
ANION GAP SERPL CALCULATED.3IONS-SCNC: 11.5 MMOL/L
AST SERPL-CCNC: 17 U/L (ref 0–32)
BASOPHILS # BLD AUTO: 0.1 10*3/MM3 (ref 0–0.2)
BASOPHILS NFR BLD AUTO: 1.3 % (ref 0–1.5)
BILIRUB SERPL-MCNC: 0.2 MG/DL (ref 0.2–1.2)
BUN BLD-MCNC: 20 MG/DL (ref 6–20)
BUN/CREAT SERPL: 18.3 (ref 7.3–30)
CALCIUM SPEC-SCNC: 9.5 MG/DL (ref 8.5–10.2)
CHLORIDE SERPL-SCNC: 96 MMOL/L (ref 98–107)
CO2 SERPL-SCNC: 26.5 MMOL/L (ref 22–29)
CREAT BLD-MCNC: 1.09 MG/DL (ref 0.6–1.1)
DEPRECATED RDW RBC AUTO: 49.4 FL (ref 37–54)
EOSINOPHIL # BLD AUTO: 0.17 10*3/MM3 (ref 0–0.4)
EOSINOPHIL NFR BLD AUTO: 2.2 % (ref 0.3–6.2)
ERYTHROCYTE [DISTWIDTH] IN BLOOD BY AUTOMATED COUNT: 15.8 % (ref 12.3–15.4)
FERRITIN SERPL-MCNC: 30.9 NG/ML (ref 13–150)
GFR SERPL CREATININE-BSD FRML MDRD: 48 ML/MIN/1.73
GLOBULIN UR ELPH-MCNC: 3 GM/DL (ref 1.8–3.5)
GLUCOSE BLD-MCNC: 121 MG/DL (ref 74–124)
HCT VFR BLD AUTO: 36.5 % (ref 34–46.6)
HGB BLD-MCNC: 11.8 G/DL (ref 12–15.9)
IMM GRANULOCYTES # BLD AUTO: 0.03 10*3/MM3 (ref 0–0.05)
IMM GRANULOCYTES NFR BLD AUTO: 0.4 % (ref 0–0.5)
IRON 24H UR-MRATE: 66 MCG/DL (ref 37–145)
IRON SATN MFR SERPL: 15 % (ref 14–48)
LYMPHOCYTES # BLD AUTO: 1.34 10*3/MM3 (ref 0.7–3.1)
LYMPHOCYTES NFR BLD AUTO: 17.1 % (ref 19.6–45.3)
MCH RBC QN AUTO: 27.8 PG (ref 26.6–33)
MCHC RBC AUTO-ENTMCNC: 32.3 G/DL (ref 31.5–35.7)
MCV RBC AUTO: 86.1 FL (ref 79–97)
MONOCYTES # BLD AUTO: 0.59 10*3/MM3 (ref 0.1–0.9)
MONOCYTES NFR BLD AUTO: 7.5 % (ref 5–12)
NEUTROPHILS # BLD AUTO: 5.62 10*3/MM3 (ref 1.7–7)
NEUTROPHILS NFR BLD AUTO: 71.5 % (ref 42.7–76)
NRBC BLD AUTO-RTO: 0 /100 WBC (ref 0–0.2)
PLATELET # BLD AUTO: 336 10*3/MM3 (ref 140–450)
PMV BLD AUTO: 8.9 FL (ref 6–12)
POTASSIUM BLD-SCNC: 4.6 MMOL/L (ref 3.5–4.7)
PROT SERPL-MCNC: 7.2 G/DL (ref 6.3–8)
RBC # BLD AUTO: 4.24 10*6/MM3 (ref 3.77–5.28)
SODIUM BLD-SCNC: 134 MMOL/L (ref 134–145)
TIBC SERPL-MCNC: 440 MCG/DL (ref 249–505)
TRANSFERRIN SERPL-MCNC: 314 MG/DL (ref 200–360)
WBC NRBC COR # BLD: 7.85 10*3/MM3 (ref 3.4–10.8)

## 2019-05-09 PROCEDURE — 85025 COMPLETE CBC W/AUTO DIFF WBC: CPT

## 2019-05-09 PROCEDURE — 36415 COLL VENOUS BLD VENIPUNCTURE: CPT | Performed by: NURSE PRACTITIONER

## 2019-05-09 PROCEDURE — 83540 ASSAY OF IRON: CPT | Performed by: NURSE PRACTITIONER

## 2019-05-09 PROCEDURE — 99214 OFFICE O/P EST MOD 30 MIN: CPT | Performed by: NURSE PRACTITIONER

## 2019-05-09 PROCEDURE — 82728 ASSAY OF FERRITIN: CPT | Performed by: NURSE PRACTITIONER

## 2019-05-09 PROCEDURE — 80053 COMPREHEN METABOLIC PANEL: CPT

## 2019-05-09 PROCEDURE — 84466 ASSAY OF TRANSFERRIN: CPT | Performed by: NURSE PRACTITIONER

## 2019-05-09 RX ORDER — LETROZOLE 2.5 MG/1
2.5 TABLET, FILM COATED ORAL DAILY
Qty: 90 TABLET | Refills: 3 | Status: SHIPPED | OUTPATIENT
Start: 2019-05-09 | End: 2019-07-03

## 2019-05-09 NOTE — PROGRESS NOTES
Subjective     REASON FOR CONSULTATION:   1. Left breast cancer [id number] ER/KS+ her2 neg post lumpectomy/SLN  3/18 and whole breast radiation completed May 2018  2.  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  Fatigue out of proportion to anemia  4.  Arimidex started in 3/18 with bone density showing normal density in the spine and osteopenia in the hips-this was self stopped due to vision changes.  5.  Aromasin initiated 10/20/18 and discontinued 1/20/19 secondary to vision changes.                             REQUESTING PHYSICIAN:  Myles Soliman M.D.    History of Present Illness patient is an 82-year-old female with a T1cN I jese hormone positive left breast cancer treated with lumpectomy sentinel node biopsy and radiation.    In 10/2018 she was changed to Aromasin as she had issues previously with Arimidex with visual blurring.      The patient was then seen 1/25/19 with reports of blurred vision once again.  She was also noting numbness in her bilateral hands and upper left arm that she attributed to the Aromasin as well. Patient was also noting worsening arthralgias, particularly in her knees. Aromasin was discontinued and the patient was to follow-up one month later.    However due to scheduling conflicts she has not been back until 4/4/19 (over 2 months since stopping Aromasin).  At that point the patient's symptoms have essentially resolved.  We therefore elected to switch her to Femara.    Also of note, when I saw her that day, it was noted that she had recently seen her internist, Dr. Ivan in follow-up. Noted to be mildly anemic with Hgb of 11.8. Additional studies done show Ferritin of 18, iron sat of 8%. B12 and folate were normal.  She was not started on any oral iron.  In review of the chart, she did undergo upper and lower endoscopy per Dr. Gallego roughly 1 year ago with no concerning findings.  That being said, the patient does have a history of GI ulcers and Dr. Ivan has her on AcipHex started in  the last few months with improvement of her heartburn and GI symptoms.  With the patient having noted iron deficiency I recommended that she start on oral iron.    Today the patient returns, doing well overall.  She has had some occasional leg cramping and occasional blurred vision,?  Related to the Femara.  She would like to continue on the Femara at this point and give it at least another month.    In regards to her iron deficiency, the patient states she tried the oral iron for about a week and it upset her stomach and therefore she stopped.  She does struggle with ongoing fatigue though her hemoglobin at the time is stable to slightly improved. We discussed that she would be a good candidate for IV iron in the form of Injectafer.  She would like to try this.    Her only other concern is discomfort in her bladder.  Patient has a history of recurrent UTIs.  She will actually see her PCP, Dr. Ivan, tomorrow for this issue.    Otherwise she denies further concerns today.    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    • Chronic kidney disease    • CKD (chronic kidney disease)     PT STATES STAGE 3   • Depression    • Diabetes mellitus (CMS/HCC)     BORDERLINE   • Diverticular disease    • Gastric ulcer     AND DUODENAL   • GERD (gastroesophageal reflux disease)    • Hearing loss     BILAT AIDES   • History of colon polyps    • History of depression    • History of GI bleed    • Hypertension    • Hypothyroidism    • Peptic ulceration    • PONV (postoperative nausea and vomiting)    • Poor vision     RIGHT EYE, HAS PERIPHERAL VISION    • Stroke (CMS/HCC) 2000     mild weakness on left, partial sight loss on right         Past Surgical History:   Procedure Laterality Date   •  ABDOMINAL SURGERY     • APPENDECTOMY     • BLADDER REPAIR      AND RECTOCELE   • BREAST BIOPSY     • BREAST CYST ASPIRATION     • BREAST LUMPECTOMY WITH SENTINEL NODE BIOPSY Left 3/19/2018    Procedure: BREAST LUMPECTOMY WITH SENTINEL NODE BIOPSY AND NEEDLE LOCALIZATION;  Surgeon: Myles Soliman MD;  Location:  [facility] [place];  Service: General   • CHOLECYSTECTOMY     • COLONOSCOPY  2013   • COLONOSCOPY N/A 2/16/2018    Procedure: COLONOSCOPY into cecum and T.I. with biopsies;  Surgeon: Augustine Gallego MD;  [facility];  Service:    • ENDOSCOPY N/A 10/17/2017    Procedure: ESOPHAGOGASTRODUODENOSCOPY WITH COLD BIOPSIES;  Surgeon: Augustine Gallego MD;  [facility];  Service:    • ENDOSCOPY N/A 2/16/2018    Procedure: ESOPHAGOGASTRODUODENOSCOPY with biopsies and #54 [place] DILATATION;  Surgeon: Augustine Gallego MD;  Location: [facility] ENDOSCOPY;  Service:    • EYE SURGERY Left 2014    3-4 years, cornea replacement    • HYSTERECTOMY     • MOUTH SURGERY  2000    UNDER TONGUE AND LEFT FLOOR OF MOUTH FOR CA   • PARATHYROIDECTOMY      PER PT   • SINUS SURGERY     • SKIN BIOPSY     • THYROID SURGERY     • VARICOSE VEIN SURGERY      Oncological history  patient is an 82-year-old female with recent problems with abdominal pain and diarrhea and weight loss which is being evaluated by GI and finally found to have multiple ulcers in the duodenum and small bowel finally responding to treatment.  In the middle of this she went for routine mammogram a couple of months later was found to have an abnormality in the left breast at 7 o'clock position measuring about 12 mm in size that was not present last year.  The patient had previously had a left breast biopsy in 2015 with benign findings.   There was no palpable mass and biopsy was done and this revealed a grade 1 infiltrating ductal carcinoma at least 1 cm in size ER 78%/MD 17% positive HER-2 negative.  The patient was referred to Dr. oSliman who  performed a lumpectomy and sentinel node biopsy with the findings of a 1.2 cm tumor grade 2 with lymphovascular invasion and clear margins with associated DCIS.  One of 2 sentinel nodes showed a micrometastasis although the pathologist said this was almost too small to be considered a micrometastasis.  Postoperatively she has done well and thankfully her GI complains of much improved with Carafate and Protonix and her diarrhea finally resolved.    She's not had a bone density in quite a while.  She is  5 para 5  menarche at age 15 and menopause in her mid 50s although she had a hysterectomy at 30 for an ulcerated uterus.  First childbirth was at age 20 she breast-fed all 5 children.  She took hormonal therapy for at least 15 years after menopause and stopped about 10 years ago   .  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    Her father  at 52 of stomach cancer mother had thyroid cancer at age 50 her brother's daughter had breast cancer age 40 and she had a paternal aunt with leukemia is no other breast cancer or ovarian cancer uterine cancer or pancreatic cancer in the family . I did ask him to check with her niece to see if she has had genetic testing - they do not know much at all about the maternal family .    She will call for the results of the DEXA scan and if adequate we will start Arimidex which I have  already prescribed to her and see her back in 3 months to assess her tolerance.  She is in very good health and I explained to her that adjuvant therapy decreases the risk of recurrence of her cancer which might be in the 15-20% range probably down to the 10% range and if she has significant side effects we will be inclined to stop treatment.  Obviously if her GI symptoms recur with Arimidex we will have to try another medications.  We talked about the joint pains and hot flashes which are unlikely at her age and she was agreeable to a  trial of Arimidex.  Radiation has been planned        Current Outpatient Medications on File Prior to Visit   Medication Sig Dispense Refill   • amLODIPine (NORVASC) 5 MG tablet TAKE 2 TABLETS BY MOUTH EVERY  tablet 0   • aspirin 81 MG tablet Take 81 mg by mouth Daily.     • CARAFATE 1 GM/10ML suspension TAKE 10 MLS BY MOUTH EVERY 6 HOURS TO COAT STOMACH 420 mL 3   • colestipol (COLESTID) 1 g tablet Take 1 g by mouth 2 (Two) Times a Day.     • [employer] D3 2000 units capsule TAKE 1 CAPSULE BY MOUTH DAILY. 90 capsule 1   • ezetimibe (ZETIA) 10 MG tablet Take 1 tablet by mouth Daily. 30 tablet 1   • fluorometholone (FML) 0.1 % ophthalmic suspension Administer 1 drop into the left eye Daily.     • FLUoxetine (PROzac) 10 MG capsule TAKE 2 ALTERNATING WITH 1 EVERY OTHER  capsule 1   • glucose blood (FREESTYLE TEST STRIPS) test strip TEST BLOOD SUGARS ONCE DAILY 50 each 12   • ketotifen (ZADITOR) 0.025 % ophthalmic solution 1 drop 2 (Two) Times a Day.     • levothyroxine (SYNTHROID, LEVOTHROID) 75 MCG tablet TAKE 1 TABLET BY MOUTH DAILY 90 tablet 3   • NON FORMULARY Administer 1 drop to both eyes 3 (Three) Times a Day. THERA TEARS     • Omega-3 Fatty Acids (FISH OIL PO) Take  by mouth Daily As Needed.     • Probiotic Product (PROBIOTIC DAILY PO) Take 1 capsule by mouth Daily.     • RABEprazole (ACIPHEX) 20 MG EC tablet Take 1 tablet by mouth Daily. 30 tablet 11   • valsartan (DIOVAN) 160 MG tablet TAKE 1/2 TABLET BY MOUTH DAILY 30 tablet 0   • ferrous sulfate 325 (65 FE) MG tablet Take 1 tablet by mouth 2 (Two) Times a Day. 60 tablet 1   • mupirocin (BACTROBAN) 2 % ointment Apply  topically to the appropriate area as directed 3 (Three) Times a Day. 22 g 0     No current facility-administered medications on file prior to visit.         ALLERGIES:    Allergies   Allergen Reactions   • Other Nausea Only     All mycins   • Sulfa Antibiotics Other (See Comments)     LOST CONSCIOUSNESS AND BODY TURNED RED/ON FIRE   •  Ciprofloxacin Other (See Comments)     RED BLISTERS   • Iodine Other (See Comments)     DECADES AGO, IODINE INJECTION CAUSED SKIN REDNESS AND BURNING   • Macrodantin [Nitrofurantoin Macrocrystal] Diarrhea and Nausea And Vomiting   • Nitrofurantoin Nausea And Vomiting   • Yeast-Related Products Other (See Comments)     MOUTH SORES AND BLADDER INFECTION        Social History     Socioeconomic History   • Marital status:      Spouse name: Nicolas   • Number of children: Not on file   • Years of education: High school   • Highest education level: Not on file   Occupational History   • Occupation: Homemaker   Tobacco Use   • Smoking status: Former Smoker     Packs/day: 1.00     Years: 10.00     Pack years: 10.00     Types: Cigarettes     Start date:      Last attempt to quit:      Years since quittin.3   • Smokeless tobacco: Never Used   Substance and Sexual Activity   • Alcohol use: No   • Drug use: No   • Sexual activity: Defer   Social History Narrative    Accompanied by daughters Macrina and Daisy        Family History   Problem Relation Age of Onset   • Esophageal cancer Father    • Stomach cancer Father    • Heart disease Mother    • Thyroid cancer Mother    • Hypertension Mother    • Hypertension Sister    • Hypertension Brother    • Stroke Brother    • Heart disease Brother    • Diabetes Brother    • Leukemia Paternal Aunt    • Malig Hyperthermia Neg Hx         Review of Systems   Constitutional: Negative for chills, fatigue and fever.   HENT: Negative.    Eyes: Negative for discharge and visual disturbance.   Respiratory: Negative for cough and shortness of breath.    Cardiovascular: Positive for leg swelling (better but still present).   Gastrointestinal: Negative for abdominal distention, abdominal pain and nausea.   Genitourinary: Positive for frequency.        Bladder pain   Musculoskeletal: Positive for back pain (Chronic). Negative for joint swelling.   Skin: Negative for pallor and  "rash.   Neurological: Negative for dizziness and numbness.   Psychiatric/Behavioral: Negative for behavioral problems and confusion.        Objective     Vitals:    05/09/19 1444   BP: 160/62   Pulse: 72   Resp: 14   Temp: 98.3 °F (36.8 °C)   TempSrc: Oral   SpO2: 97%   Weight: 73 kg (160 lb 14.4 oz)   Height: 154.5 cm (60.83\")   PainSc: 7  Comment: bladder     Current Status 5/9/2019   ECOG score 0       Physical Exam    GENERAL:  Well-developed, well-nourished in no acute distress.   SKIN:  Warm, dry without rashes, purpura or petechiae.  EYES:  Pupils equal, round and reactive to light.  EOMs intact.  Conjunctivae normal.  EARS:  Hearing intact.  NOSE:  Septum midline.  No excoriations or nasal discharge.  MOUTH:  Tongue is well-papillated; no stomatitis or ulcers.  Lips normal.  THROAT:  Oropharynx without lesions or exudates.  NECK:  Supple with good range of motion; no thyromegaly or masses, no JVD.  LYMPHATICS:  No cervical, supraclavicular, axillary adenopathy.  CHEST:  Lungs clear to auscultation. Good airflow.  BREASTS: Not examined today.   CARDIAC:  Regular rate and rhythm without murmurs, rubs or gallops. Normal S1,S2.  ABDOMEN:  Soft, nontender with no hepatosplenomegaly or masses.  EXTREMITIES:  No clubbing, cyanosis or edema.  NEUROLOGICAL:  Cranial Nerves II-XII grossly intact.  No focal neurological deficits.  PSYCHIATRIC:  Normal affect and mood.        RECENT LABS:    Results from last 7 days   Lab Units 05/09/19  1428   WBC 10*3/mm3 7.85   NEUTROS ABS 10*3/mm3 5.62   HEMOGLOBIN g/dL 11.8*   HEMATOCRIT % 36.5   PLATELETS 10*3/mm3 336             Lab Results   Component Value Date    IRON 41 03/21/2019    TIBC 496 03/21/2019    FERRITIN 18.80 03/21/2019     Iron Sat 8%    R 78% IN 17% Her 2 -neg Ki-67 7%  Final Diagnosis   1.  BREAST, LEFT, LUMPECTOMY:                INVASIVE MAMMARY CARCINOMA OF NO SPECIAL TYPE (INVASIVE DUCTAL CARCINOMA) AND                       FOCAL ASSOCIATED DUCTAL " CARCINOMA IN SITU (DCIS).                SEE SYNOPTIC REPORT (BELOW) FOR ADDITIONAL DETAILS.     2.  SENTINEL LYMPH NODE #1, LEFT AXILLA, EXCISION:                ONE LYMPH NODE, POSITIVE FOR METASTATIC CARCINOMA (MICROMETASTASIS) (SEE COMMENT).               NO EXTRANODAL EXTENSION IS IDENTIFIED.     COMMENT: Measuring the exact size of the lymph node metastasis is difficult. The lymph node demonstrates a few very small scattered foci of tumor cells, compatible with isolated tumor cells.  One slide demonstrates a single contiguous focus of tumor cells exceeding 0.2 mm; therefore, this is best considered a micrometastasis rather than isolated tumor cells.     3.  SENTINEL LYMPH NODE #2, LEFT AXILLA, EXCISION.               ONE LYMPH NODE, NEGATIVE FOR METASTATIC CARCINOMA.     SYNOPTIC REPORT (Based on [facility] cancer case summary, version January 2018):               Procedure: Excision (less than total mastectomy).               Specimen laterality: Left.                Tumor site: Not specified.                Tumor size: 1.2 x 1.1 x 1 cm.               Histologic type: Invasive carcinoma of no special type (ductal, not otherwise specified).                Histologic grade ([place] Histologic Score):                              Glandular (acinar)/tubular differentiation: Score 3.                            Nuclear pleomorphism: Score 2.                             Mitotic rate: Score 1.                            Overall grade: Grade 2 (Score 6 of 9).               Tumor focality: Single focus of invasive carcinoma.                Ductal carcinoma in situ (DCIS): Present.                            Architectural patterns: Solid.                             Nuclear grade: Grade II (intermediate).                            Necrosis: Present, central comedonecrosis.                Lobular carcinoma in situ (LCIS): No LCIS in specimen.               Margins:                            Invasive carcinoma margins:  Uninvolved by invasive carcinoma.                                          Distance from closest margin: 3 mm (medial margin).                                          NOTE: Invasive carcinoma also extends to within 4 mm of superior margin and to within                                                 4 mm of lateral margin.  All additional margins are greater than 10 mm from invasive                                                 carcinoma.                              DCIS Margins: Uninvolved by DCIS.                                          Distance from closest margin: 3 mm (medial margin).                    Regional Lymph nodes: Involved by tumor cells.                             Number of lymph nodes with macrometastases: 0.                            Number of lymph nodes with micrometastases: 1.                            Size of largest metastatic deposit: 0.4 mm.                            Extranodal extension: Not identified.                            Number of lymph nodes examined: 2.                            Number of sentinel lymph nodes examined: 2.                Treatment effect: No known presurgical therapy.                Lymphovascular invasion: Present.                Dermal Lymphovascular invasion: Not identified.                Pathologic stage classification.                            Primary tumor: pT1c.                            Regional lymph nodes: pN1mi(sn).                            Distant metastasis: Not applicable.                Additional pathologic findings:                             Ductal hyperplasia of the usual type.                             Fibrocystic changes with duct dilatation and stasis and focal apocrine metaplasia.                             Columnar cell change without atypia.                             Fibroinflammatory changes consistent with site of prior biopsy.                    Ancillary studies: ER, ID, HER-2/melanie, and Ki-67 studies performed on  previous biopsy specimen at                       outside facility ([facility]).           Assessment/Plan   1.  T1c N1 jese N0 ER/SC positive HER-2 negative left breast cancer post lumpectomy and radiation  · Arimidex started in 3/18 discontinued by the patient in 7/18 due to visual blurring which resolved.    · Aromasin started 11/2018. 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  She also has had weight gain. Aromasin discontinued.  · Patient seen early April 2019 and follow-up.  Patient agreeable to switch therapy to Femara 2.5 mg daily.  · Today, 5/9/2019, patient tolerating Femara fairly well.  She is having some occasional leg cramps and blurred vision,?  Related to Femara.  Of note, she has recently seen her eye doctor who did not find any cause for her blurred vision.  She is willing to give the medication at least another month.  I have sent in a refill for her, #90 with 3 refills.  2.  Family history of breast cancer in a niece at a young age and father with cancer at age 51?  Genetic testing  4.  History of oral cancer in 1990  5.  Anemia, recently worked up per Dr. Ivan, internal medicine.   · When I saw her 4/4/19, we reviewed her recent iron studies.  Specifically ferritin 18, Iron sat 8%.  Patient had EGD and colonoscopy per Dr. Gallego 1 year ago with no concerning findings.  Because of her history of GI ulcers however she was started on AcipHex per Dr. Ivan.  · She can a trial of oral iron 325 mg twice daily but tells me today she was only able to take this for about a week as it caused GI upset.  · The patient is symptomatic with ongoing fatigue.  We will therefore set her up to undergo 2 doses of IV Injectafer in the coming weeks.    PLAN:  1.  Patient to continue Femara 2.5 mg daily.  Refill sent in today.  2.  Patient to be scheduled for Injectafer x2 doses.  3.  Patient will otherwise return in roughly 6  weeks to see Dr. Valenzuela in follow-up.  We will repeat CBC, CMP, ferritin, and iron profile at that time.  4.  I encouraged the patient to call should she develop any change in her mild symptoms as outlined above warranting sooner review.    ADDENDUM: Per our precertification nurses, patient will need current iron studies.  We will run these today and see if patient is eligible for Injectafer.

## 2019-05-10 ENCOUNTER — OFFICE VISIT (OUTPATIENT)
Dept: FAMILY MEDICINE CLINIC | Facility: CLINIC | Age: 84
End: 2019-05-10

## 2019-05-10 VITALS
HEART RATE: 70 BPM | WEIGHT: 161.4 LBS | SYSTOLIC BLOOD PRESSURE: 134 MMHG | BODY MASS INDEX: 30.47 KG/M2 | OXYGEN SATURATION: 97 % | HEIGHT: 61 IN | TEMPERATURE: 98.4 F | DIASTOLIC BLOOD PRESSURE: 64 MMHG

## 2019-05-10 DIAGNOSIS — N30.00 ACUTE CYSTITIS WITHOUT HEMATURIA: Primary | ICD-10-CM

## 2019-05-10 DIAGNOSIS — R35.0 URINE FREQUENCY: ICD-10-CM

## 2019-05-10 DIAGNOSIS — I10 HYPERTENSION, ESSENTIAL: ICD-10-CM

## 2019-05-10 LAB
BACTERIA UR QL AUTO: NORMAL /HPF
BILIRUB UR QL STRIP: NEGATIVE
CLARITY UR: CLEAR
COLOR UR: ABNORMAL
GLUCOSE UR STRIP-MCNC: NEGATIVE MG/DL
HGB UR QL STRIP.AUTO: NEGATIVE
KETONES UR QL STRIP: ABNORMAL
LEUKOCYTE ESTERASE UR QL STRIP.AUTO: ABNORMAL
NITRITE UR QL STRIP: POSITIVE
PH UR STRIP.AUTO: 6 [PH] (ref 4.6–8)
PROT UR QL STRIP: ABNORMAL
RBC # UR: NORMAL /HPF
REF LAB TEST METHOD: NORMAL
SP GR UR STRIP: 1.01 (ref 1–1.03)
SQUAMOUS #/AREA URNS HPF: NORMAL /HPF
UROBILINOGEN UR QL STRIP: ABNORMAL
WBC UR QL AUTO: NORMAL /HPF

## 2019-05-10 PROCEDURE — 99213 OFFICE O/P EST LOW 20 MIN: CPT | Performed by: INTERNAL MEDICINE

## 2019-05-10 PROCEDURE — 87086 URINE CULTURE/COLONY COUNT: CPT | Performed by: INTERNAL MEDICINE

## 2019-05-10 PROCEDURE — 81001 URINALYSIS AUTO W/SCOPE: CPT | Performed by: INTERNAL MEDICINE

## 2019-05-10 RX ORDER — DOXYCYCLINE HYCLATE 100 MG/1
100 CAPSULE ORAL 2 TIMES DAILY
Qty: 20 CAPSULE | Refills: 0 | Status: SHIPPED | OUTPATIENT
Start: 2019-05-10 | End: 2019-08-01

## 2019-05-10 NOTE — PROGRESS NOTES
Subjective   Geeta Butt is a 83 y.o. female.   Increased urine frequency mild dysuria follow-up on urine is not sure never truly went away.  History of Present Illness recent bladder infection we will get updated urine today start another antibiotic.  Will get around urine culture as well.  Blood pressure satisfactory today she is at the doctor's office other day and found to have 160 systolic blood pressure we will continue to monitor both today's readings being satisfactory.see change to upward adjust her blood pressure medicine  bp160 sytolic   yest    Review of Systems   Genitourinary: Positive for dysuria and frequency.   All other systems reviewed and are negative.      Objective   Vitals:    05/10/19 1425   BP: 134/64   Pulse: 70   Temp: 98.4 °F (36.9 °C)   SpO2: 97%   Weight: 73.2 kg (161 lb 6.4 oz)     Physical Exam   Constitutional: She appears well-developed and well-nourished.   HENT:   Head: Normocephalic and atraumatic.   Eyes: Conjunctivae are normal. Pupils are equal, round, and reactive to light.   Cardiovascular: Normal rate, regular rhythm and normal heart sounds.   Pulmonary/Chest: Effort normal and breath sounds normal.   Abdominal: Soft. Bowel sounds are normal.   Negative CVA tenderness, minimal suprapubic discomfort   Skin: Skin is warm and dry.   Nursing note and vitals reviewed.      No results found for: INR    Procedures    Assessment/Plan     ..  1.  UTI today's urine is negative for pyuria does have nitrate positive leukocyte Estrace positive urine will run urine culture also begin as I can have milligrams twice daily for 10 days time hallway await urine C&S.       2.  Hypertension controlled by history plan continue present medicine follow-up in 6 months and call blood pressures start trending high    Much of this encounter note is an electronic transcription/translation of spoken language to printed text.  The electronic translation of spoken language may permit erroneous, or at  times, nonsensical words or phrases to be inadvertently transcribed.  Although I have reviewed the note for such errors, some may still exist. If there are questions or for further clarification, please contact me.

## 2019-05-12 LAB — BACTERIA SPEC AEROBE CULT: ABNORMAL

## 2019-05-13 RX ORDER — AMLODIPINE BESYLATE 5 MG/1
TABLET ORAL
Qty: 180 TABLET | Refills: 0 | Status: SHIPPED | OUTPATIENT
Start: 2019-05-13 | End: 2019-08-30 | Stop reason: SDUPTHER

## 2019-05-16 ENCOUNTER — INFUSION (OUTPATIENT)
Dept: ONCOLOGY | Facility: HOSPITAL | Age: 84
End: 2019-05-16

## 2019-05-16 VITALS
WEIGHT: 161.4 LBS | DIASTOLIC BLOOD PRESSURE: 64 MMHG | SYSTOLIC BLOOD PRESSURE: 173 MMHG | HEART RATE: 75 BPM | BODY MASS INDEX: 30.67 KG/M2 | TEMPERATURE: 97.7 F | OXYGEN SATURATION: 96 %

## 2019-05-16 DIAGNOSIS — D50.0 IRON DEFICIENCY ANEMIA DUE TO CHRONIC BLOOD LOSS: ICD-10-CM

## 2019-05-16 DIAGNOSIS — T45.4X5A ADVERSE EFFECT OF IRON, INITIAL ENCOUNTER: Primary | ICD-10-CM

## 2019-05-16 PROCEDURE — 63710000001 PROCHLORPERAZINE MALEATE PER 5 MG: Performed by: INTERNAL MEDICINE

## 2019-05-16 PROCEDURE — 96374 THER/PROPH/DIAG INJ IV PUSH: CPT | Performed by: INTERNAL MEDICINE

## 2019-05-16 PROCEDURE — 25010000002 FERRIC CARBOXYMALTOSE 750 MG/15ML SOLUTION 15 ML VIAL: Performed by: INTERNAL MEDICINE

## 2019-05-16 RX ORDER — PROCHLORPERAZINE MALEATE 10 MG
10 TABLET ORAL EVERY 6 HOURS PRN
Qty: 6 TABLET | Refills: 0 | Status: SHIPPED | OUTPATIENT
Start: 2019-05-16 | End: 2019-08-01

## 2019-05-16 RX ORDER — SODIUM CHLORIDE 9 MG/ML
250 INJECTION, SOLUTION INTRAVENOUS ONCE
Status: COMPLETED | OUTPATIENT
Start: 2019-05-16 | End: 2019-05-16

## 2019-05-16 RX ORDER — PROCHLORPERAZINE MALEATE 5 MG/1
10 TABLET ORAL ONCE
Status: COMPLETED | OUTPATIENT
Start: 2019-05-16 | End: 2019-05-16

## 2019-05-16 RX ADMIN — SODIUM CHLORIDE 250 ML: 9 INJECTION, SOLUTION INTRAVENOUS at 14:38

## 2019-05-16 RX ADMIN — FERRIC CARBOXYMALTOSE INJECTION 750 MG: 50 INJECTION, SOLUTION INTRAVENOUS at 14:38

## 2019-05-16 RX ADMIN — PROCHLORPERAZINE MALEATE 10 MG: 5 TABLET, FILM COATED ORAL at 14:29

## 2019-05-16 NOTE — PROGRESS NOTES
Pt and daughter report that pt is sensitive to medication and often has nausea issues.  Discussed with Niru GERMAIN and prescription for Compazine sent to patient's pharmacy for prn use.  Pt and daughter aware of same.

## 2019-05-21 ENCOUNTER — TELEPHONE (OUTPATIENT)
Dept: FAMILY MEDICINE CLINIC | Facility: CLINIC | Age: 84
End: 2019-05-21

## 2019-05-21 NOTE — TELEPHONE ENCOUNTER
Very low-grade E. coli continue present antibiotics recheck urine 2-4 days after completion of antibiotics without office visit  Pt informed

## 2019-05-23 ENCOUNTER — INFUSION (OUTPATIENT)
Dept: ONCOLOGY | Facility: HOSPITAL | Age: 84
End: 2019-05-23

## 2019-05-23 ENCOUNTER — DOCUMENTATION (OUTPATIENT)
Dept: ONCOLOGY | Facility: CLINIC | Age: 84
End: 2019-05-23

## 2019-05-23 ENCOUNTER — LAB (OUTPATIENT)
Dept: FAMILY MEDICINE CLINIC | Facility: CLINIC | Age: 84
End: 2019-05-23

## 2019-05-23 VITALS
SYSTOLIC BLOOD PRESSURE: 146 MMHG | OXYGEN SATURATION: 97 % | HEART RATE: 71 BPM | HEIGHT: 61 IN | BODY MASS INDEX: 30.32 KG/M2 | TEMPERATURE: 98.2 F | DIASTOLIC BLOOD PRESSURE: 71 MMHG | RESPIRATION RATE: 16 BRPM | WEIGHT: 160.6 LBS

## 2019-05-23 DIAGNOSIS — D50.0 IRON DEFICIENCY ANEMIA DUE TO CHRONIC BLOOD LOSS: ICD-10-CM

## 2019-05-23 DIAGNOSIS — T45.4X5A ADVERSE EFFECT OF IRON, INITIAL ENCOUNTER: Primary | ICD-10-CM

## 2019-05-23 DIAGNOSIS — R35.0 URINE FREQUENCY: ICD-10-CM

## 2019-05-23 LAB
BACTERIA UR QL AUTO: NORMAL /HPF
BILIRUB UR QL STRIP: NEGATIVE
CLARITY UR: CLEAR
COLOR UR: YELLOW
GLUCOSE UR STRIP-MCNC: NEGATIVE MG/DL
HGB UR QL STRIP.AUTO: NEGATIVE
KETONES UR QL STRIP: NEGATIVE
LEUKOCYTE ESTERASE UR QL STRIP.AUTO: NEGATIVE
NITRITE UR QL STRIP: NEGATIVE
PH UR STRIP.AUTO: 7 [PH] (ref 4.6–8)
PROT UR QL STRIP: NEGATIVE
RBC # UR: NORMAL /HPF
REF LAB TEST METHOD: NORMAL
SP GR UR STRIP: 1.01 (ref 1–1.03)
SQUAMOUS #/AREA URNS HPF: NORMAL /HPF
UROBILINOGEN UR QL STRIP: NORMAL
WBC UR QL AUTO: NORMAL /HPF

## 2019-05-23 PROCEDURE — 25010000002 FERRIC CARBOXYMALTOSE 750 MG/15ML SOLUTION 15 ML VIAL: Performed by: NURSE PRACTITIONER

## 2019-05-23 PROCEDURE — 81001 URINALYSIS AUTO W/SCOPE: CPT | Performed by: INTERNAL MEDICINE

## 2019-05-23 PROCEDURE — 96374 THER/PROPH/DIAG INJ IV PUSH: CPT | Performed by: NURSE PRACTITIONER

## 2019-05-23 PROCEDURE — 63710000001 PROCHLORPERAZINE MALEATE PER 5 MG: Performed by: NURSE PRACTITIONER

## 2019-05-23 RX ORDER — SODIUM CHLORIDE 9 MG/ML
250 INJECTION, SOLUTION INTRAVENOUS ONCE
Status: COMPLETED | OUTPATIENT
Start: 2019-05-23 | End: 2019-05-23

## 2019-05-23 RX ORDER — PROCHLORPERAZINE MALEATE 5 MG/1
10 TABLET ORAL ONCE
Status: COMPLETED | OUTPATIENT
Start: 2019-05-23 | End: 2019-05-23

## 2019-05-23 RX ADMIN — SODIUM CHLORIDE 250 ML: 9 INJECTION, SOLUTION INTRAVENOUS at 14:22

## 2019-05-23 RX ADMIN — PROCHLORPERAZINE MALEATE 10 MG: 5 TABLET, FILM COATED ORAL at 14:23

## 2019-05-23 RX ADMIN — FERRIC CARBOXYMALTOSE INJECTION 750 MG: 50 INJECTION, SOLUTION INTRAVENOUS at 14:42

## 2019-05-23 NOTE — PROGRESS NOTES
Pt is to be getting Injectafer and Georgette has notified me to set up a copay card for her. Unfortunately-she has a Medicare Advantage plan through [facility] and cannot utilize the card.

## 2019-05-23 NOTE — PROGRESS NOTES
Pt reports she stopped femara a few days ago due to c/o blurry vision and muscle cramps. Aliceherminia GERMAIN notified and will inform Dr. Valenzuela. Pt instructed to keep f/u appt with Dr. Valenzuela in July. She v/u.

## 2019-05-24 ENCOUNTER — TELEPHONE (OUTPATIENT)
Dept: FAMILY MEDICINE CLINIC | Facility: CLINIC | Age: 84
End: 2019-05-24

## 2019-06-17 RX ORDER — FLUOXETINE 10 MG/1
CAPSULE ORAL
Qty: 135 CAPSULE | Refills: 1 | Status: SHIPPED | OUTPATIENT
Start: 2019-06-17 | End: 2019-07-17 | Stop reason: ALTCHOICE

## 2019-06-28 RX ORDER — LEVOTHYROXINE SODIUM 0.07 MG/1
75 TABLET ORAL DAILY
Qty: 90 TABLET | Refills: 3 | Status: SHIPPED | OUTPATIENT
Start: 2019-06-28 | End: 2020-07-07 | Stop reason: SDUPTHER

## 2019-07-02 ENCOUNTER — APPOINTMENT (OUTPATIENT)
Dept: LAB | Facility: HOSPITAL | Age: 84
End: 2019-07-02

## 2019-07-03 ENCOUNTER — APPOINTMENT (OUTPATIENT)
Dept: LAB | Facility: HOSPITAL | Age: 84
End: 2019-07-03

## 2019-07-03 ENCOUNTER — OFFICE VISIT (OUTPATIENT)
Dept: ONCOLOGY | Facility: CLINIC | Age: 84
End: 2019-07-03

## 2019-07-03 ENCOUNTER — APPOINTMENT (OUTPATIENT)
Dept: ONCOLOGY | Facility: CLINIC | Age: 84
End: 2019-07-03

## 2019-07-03 ENCOUNTER — LAB (OUTPATIENT)
Dept: LAB | Facility: HOSPITAL | Age: 84
End: 2019-07-03

## 2019-07-03 VITALS
OXYGEN SATURATION: 96 % | WEIGHT: 162.6 LBS | BODY MASS INDEX: 30.7 KG/M2 | RESPIRATION RATE: 16 BRPM | HEIGHT: 61 IN | DIASTOLIC BLOOD PRESSURE: 61 MMHG | SYSTOLIC BLOOD PRESSURE: 138 MMHG | TEMPERATURE: 97.7 F | HEART RATE: 71 BPM

## 2019-07-03 DIAGNOSIS — C50.312 MALIGNANT NEOPLASM OF LOWER-INNER QUADRANT OF LEFT BREAST IN FEMALE, ESTROGEN RECEPTOR POSITIVE (HCC): ICD-10-CM

## 2019-07-03 DIAGNOSIS — Z17.0 MALIGNANT NEOPLASM OF LOWER-INNER QUADRANT OF LEFT BREAST IN FEMALE, ESTROGEN RECEPTOR POSITIVE (HCC): ICD-10-CM

## 2019-07-03 DIAGNOSIS — D50.0 IRON DEFICIENCY ANEMIA DUE TO CHRONIC BLOOD LOSS: Primary | ICD-10-CM

## 2019-07-03 DIAGNOSIS — D50.0 IRON DEFICIENCY ANEMIA DUE TO CHRONIC BLOOD LOSS: ICD-10-CM

## 2019-07-03 LAB
ALBUMIN SERPL-MCNC: 4.5 G/DL (ref 3.5–5.2)
ALBUMIN/GLOB SERPL: 1.6 G/DL (ref 1.1–2.4)
ALP SERPL-CCNC: 76 U/L (ref 38–116)
ALT SERPL W P-5'-P-CCNC: 16 U/L (ref 0–33)
ANION GAP SERPL CALCULATED.3IONS-SCNC: 12.5 MMOL/L (ref 5–15)
AST SERPL-CCNC: 16 U/L (ref 0–32)
BASOPHILS # BLD AUTO: 0.1 10*3/MM3 (ref 0–0.2)
BASOPHILS NFR BLD AUTO: 1.2 % (ref 0–1.5)
BILIRUB SERPL-MCNC: 0.2 MG/DL (ref 0.2–1.2)
BUN BLD-MCNC: 19 MG/DL (ref 6–20)
BUN/CREAT SERPL: 16.5 (ref 7.3–30)
CALCIUM SPEC-SCNC: 9.4 MG/DL (ref 8.5–10.2)
CHLORIDE SERPL-SCNC: 94 MMOL/L (ref 98–107)
CO2 SERPL-SCNC: 25.5 MMOL/L (ref 22–29)
CREAT BLD-MCNC: 1.15 MG/DL (ref 0.6–1.1)
DEPRECATED RDW RBC AUTO: 51.8 FL (ref 37–54)
EOSINOPHIL # BLD AUTO: 0.17 10*3/MM3 (ref 0–0.4)
EOSINOPHIL NFR BLD AUTO: 2 % (ref 0.3–6.2)
ERYTHROCYTE [DISTWIDTH] IN BLOOD BY AUTOMATED COUNT: 15.5 % (ref 12.3–15.4)
FERRITIN SERPL-MCNC: 710.3 NG/ML (ref 13–150)
GFR SERPL CREATININE-BSD FRML MDRD: 45 ML/MIN/1.73
GLOBULIN UR ELPH-MCNC: 2.8 GM/DL (ref 1.8–3.5)
GLUCOSE BLD-MCNC: 78 MG/DL (ref 74–124)
HCT VFR BLD AUTO: 38.5 % (ref 34–46.6)
HGB BLD-MCNC: 13 G/DL (ref 12–15.9)
IMM GRANULOCYTES # BLD AUTO: 0.06 10*3/MM3 (ref 0–0.05)
IMM GRANULOCYTES NFR BLD AUTO: 0.7 % (ref 0–0.5)
IRON 24H UR-MRATE: 98 MCG/DL (ref 37–145)
IRON SATN MFR SERPL: 29 % (ref 14–48)
LYMPHOCYTES # BLD AUTO: 1.46 10*3/MM3 (ref 0.7–3.1)
LYMPHOCYTES NFR BLD AUTO: 16.9 % (ref 19.6–45.3)
MCH RBC QN AUTO: 30.2 PG (ref 26.6–33)
MCHC RBC AUTO-ENTMCNC: 33.8 G/DL (ref 31.5–35.7)
MCV RBC AUTO: 89.3 FL (ref 79–97)
MONOCYTES # BLD AUTO: 0.83 10*3/MM3 (ref 0.1–0.9)
MONOCYTES NFR BLD AUTO: 9.6 % (ref 5–12)
NEUTROPHILS # BLD AUTO: 6 10*3/MM3 (ref 1.7–7)
NEUTROPHILS NFR BLD AUTO: 69.6 % (ref 42.7–76)
NRBC BLD AUTO-RTO: 0 /100 WBC (ref 0–0.2)
PLATELET # BLD AUTO: 314 10*3/MM3 (ref 140–450)
PMV BLD AUTO: 9.2 FL (ref 6–12)
POTASSIUM BLD-SCNC: 4.7 MMOL/L (ref 3.5–4.7)
PROT SERPL-MCNC: 7.3 G/DL (ref 6.3–8)
RBC # BLD AUTO: 4.31 10*6/MM3 (ref 3.77–5.28)
SODIUM BLD-SCNC: 132 MMOL/L (ref 134–145)
TIBC SERPL-MCNC: 340 MCG/DL (ref 249–505)
TRANSFERRIN SERPL-MCNC: 243 MG/DL (ref 200–360)
WBC NRBC COR # BLD: 8.62 10*3/MM3 (ref 3.4–10.8)

## 2019-07-03 PROCEDURE — 84466 ASSAY OF TRANSFERRIN: CPT | Performed by: NURSE PRACTITIONER

## 2019-07-03 PROCEDURE — 85025 COMPLETE CBC W/AUTO DIFF WBC: CPT | Performed by: NURSE PRACTITIONER

## 2019-07-03 PROCEDURE — 99214 OFFICE O/P EST MOD 30 MIN: CPT | Performed by: NURSE PRACTITIONER

## 2019-07-03 PROCEDURE — 83540 ASSAY OF IRON: CPT | Performed by: NURSE PRACTITIONER

## 2019-07-03 PROCEDURE — 36415 COLL VENOUS BLD VENIPUNCTURE: CPT | Performed by: NURSE PRACTITIONER

## 2019-07-03 PROCEDURE — 82728 ASSAY OF FERRITIN: CPT | Performed by: NURSE PRACTITIONER

## 2019-07-03 PROCEDURE — 80053 COMPREHEN METABOLIC PANEL: CPT | Performed by: NURSE PRACTITIONER

## 2019-07-03 NOTE — PROGRESS NOTES
Subjective     REASON FOR CONSULTATION:   1. Left breast cancer [id number] ER/AR+ her2 neg post lumpectomy/SLN  3/18 and whole breast radiation completed May 2018  2.  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  Fatigue out of proportion to anemia  4.  Arimidex started in 3/18 with bone density showing normal density in the spine and osteopenia in the hips-this was self stopped due to vision changes.  5.  Aromasin initiated 10/20/18 and discontinued 1/20/19 secondary to vision changes.                             REQUESTING PHYSICIAN:  Myles Soliman M.D.    History of Present Illness patient is an 82-year-old female with a T1cN I jese hormone positive left breast cancer treated with lumpectomy sentinel node biopsy and radiation.    In 10/2018 she was changed to Aromasin as she had issues previously with Arimidex with visual blurring.  The patient was then seen 1/25/19 with reports of blurred vision once again.  She was also noting numbness in her bilateral hands and upper left arm that she attributed to the Aromasin as well. Patient was also noting worsening arthralgias, particularly in her knees. Aromasin was discontinued and the patient was to follow-up one month later. However due to scheduling conflicts she has not been back until 4/4/19 (over 2 months since stopping Aromasin).  At that point the patient's symptoms have essentially resolved.  We therefore elected to switch her to Femara.    Unfortunately, the patient ultimately experienced similar symptoms with Femara because of muscle cramps and blurred vision.    We also discovered upon review of her lab work a few months ago that she was iron deficient.  She was given 2 doses of IV Injectafer. She did undergo upper and lower endoscopy per Dr. Gallego roughly 1 year ago with no concerning findings.  More recently Dr. Ivan started her on AcipHex in light of history of GI ulcers.  She has had improvement of her heartburn and GI symptoms.     All that to say she returns  back today in follow-up.  Her hemoglobin has improved from 11.8 up to 13.  Iron saturation has improved up to 29%, previously 8%.  Iron stores are replete with ferritin greater than 700.  The patient notes some improvement of energy though still struggles with fatigue overall.    I discussed with her today that Dr. Valenzuela would like the patient to consider taking tamoxifen as yet another potential medication to benefit her in light of history of hormone positive breast cancer.  It is noted that the patient takes Prozac and has been on this for many many years.  There is a potential interaction and I plan to discuss this with Dr. Valenzuela further.  If okayed per Dr. Valenzuela, we would plan then to start the patient on this as soon as next week.    Otherwise the patient is doing well today and denies further concerns at this time.      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    • Chronic kidney disease    • CKD (chronic kidney disease)     PT STATES STAGE 3   • Depression    • Diabetes mellitus (CMS/HCC)     BORDERLINE   • Diverticular disease    • Gastric ulcer     AND DUODENAL   • GERD (gastroesophageal reflux disease)    • Hearing loss     BILAT AIDES   • History of colon polyps    • History of depression    • History of GI bleed    • Hypertension    • Hypothyroidism    • Peptic ulceration    • PONV (postoperative nausea and vomiting)    • Poor vision     RIGHT EYE, HAS PERIPHERAL VISION    • Stroke (CMS/HCC) 2000     mild weakness on left, partial sight loss on right         Past Surgical History:   Procedure Laterality Date   • ABDOMINAL SURGERY     • APPENDECTOMY     • BLADDER REPAIR      AND RECTOCELE   • BREAST BIOPSY     • BREAST CYST ASPIRATION     • BREAST LUMPECTOMY WITH  SENTINEL NODE BIOPSY Left 3/19/2018    Procedure: BREAST LUMPECTOMY WITH SENTINEL NODE BIOPSY AND NEEDLE LOCALIZATION;  Surgeon: Myles Soliman MD;  Location: [facility] OR [facility] [place];  Service: General   • CHOLECYSTECTOMY     • COLONOSCOPY  2013   • COLONOSCOPY N/A 2/16/2018    Procedure: COLONOSCOPY into cecum and T.I. with biopsies;  Surgeon: Augustine Gallego MD;  Location: [facility] ENDOSCOPY;  Service:    • ENDOSCOPY N/A 10/17/2017    Procedure: ESOPHAGOGASTRODUODENOSCOPY WITH COLD BIOPSIES;  Surgeon: Augustine Gallego MD;  Location: [facility] ENDOSCOPY;  Service:    • ENDOSCOPY N/A 2/16/2018    Procedure: ESOPHAGOGASTRODUODENOSCOPY with biopsies and #54 [address] DILATATION;  Surgeon: Augustine Gallego MD;  Location: [facility] ENDOSCOPY;  Service:    • EYE SURGERY Left 2014    3-4 years, cornea replacement    • HYSTERECTOMY     • MOUTH SURGERY  2000    UNDER TONGUE AND LEFT FLOOR OF MOUTH FOR CA   • PARATHYROIDECTOMY      PER PT   • SINUS SURGERY     • SKIN BIOPSY     • THYROID SURGERY     • VARICOSE VEIN SURGERY      Oncological history  patient is an 82-year-old female with recent problems with abdominal pain and diarrhea and weight loss which is being evaluated by GI and finally found to have multiple ulcers in the duodenum and small bowel finally responding to treatment.  In the middle of this she went for routine mammogram a couple of months later was found to have an abnormality in the left breast at 7 o'clock position measuring about 12 mm in size that was not present last year.  The patient had previously had a left breast biopsy in 2015 with benign findings.   There was no palpable mass and biopsy was done and this revealed a grade 1 infiltrating ductal carcinoma at least 1 cm in size ER 78%/UT 17% positive HER-2 negative.  The patient was referred to Dr. Soliman who performed a lumpectomy and sentinel node biopsy with the findings of a 1.2 cm tumor grade 2 with lymphovascular invasion and clear margins with associated  DCIS.  One of 2 sentinel nodes showed a micrometastasis although the pathologist said this was almost too small to be considered a micrometastasis.  Postoperatively she has done well and thankfully her GI complains of much improved with Carafate and Protonix and her diarrhea finally resolved.    She's not had a bone density in quite a while.  She is  5 para 5  menarche at age 15 and menopause in her mid 50s although she had a hysterectomy at 30 for an ulcerated uterus.  First childbirth was at age 20 she breast-fed all 5 children.  She took hormonal therapy for at least 15 years after menopause and stopped about 10 years ago   .  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    Her father  at 52 of stomach cancer mother had thyroid cancer at age 50 her brother's daughter had breast cancer age 40 and she had a paternal aunt with leukemia is no other breast cancer or ovarian cancer uterine cancer or pancreatic cancer in the family . I did ask him to check with her niece to see if she has had genetic testing - they do not know much at all about the maternal family .    She will call for the results of the DEXA scan and if adequate we will start Arimidex which I have  already prescribed to her and see her back in 3 months to assess her tolerance.  She is in very good health and I explained to her that adjuvant therapy decreases the risk of recurrence of her cancer which might be in the 15-20% range probably down to the 10% range and if she has significant side effects we will be inclined to stop treatment.  Obviously if her GI symptoms recur with Arimidex we will have to try another medications.  We talked about the joint pains and hot flashes which are unlikely at her age and she was agreeable to a trial of Arimidex.  Radiation has been planned        Current Outpatient Medications on File Prior to Visit   Medication Sig Dispense Refill   •  amLODIPine (NORVASC) 5 MG tablet TAKE 2 TABLETS BY MOUTH EVERY  tablet 0   • aspirin 81 MG tablet Take 81 mg by mouth Daily.     • CARAFATE 1 GM/10ML suspension TAKE 10 MLS BY MOUTH EVERY 6 HOURS TO COAT STOMACH 420 mL 3   • colestipol (COLESTID) 1 g tablet Take 1 g by mouth 2 (Two) Times a Day.     • [employer] D3 2000 units capsule TAKE 1 CAPSULE BY MOUTH DAILY. 90 capsule 1   • doxycycline (VIBRAMYCIN) 100 MG capsule Take 1 capsule by mouth 2 (Two) Times a Day. 20 capsule 0   • ezetimibe (ZETIA) 10 MG tablet Take 1 tablet by mouth Daily. 30 tablet 1   • ferrous sulfate 325 (65 FE) MG tablet Take 1 tablet by mouth 2 (Two) Times a Day. 60 tablet 1   • fluorometholone (FML) 0.1 % ophthalmic suspension Administer 1 drop into the left eye Daily.     • FLUoxetine (PROzac) 10 MG capsule TAKE 2 ALTERNATING WITH 1 EVERY OTHER  capsule 1   • glucose blood (FREESTYLE TEST STRIPS) test strip TEST BLOOD SUGARS ONCE DAILY 50 each 12   • ketotifen (ZADITOR) 0.025 % ophthalmic solution 1 drop 2 (Two) Times a Day.     • levothyroxine (SYNTHROID, LEVOTHROID) 75 MCG tablet TAKE 1 TABLET BY MOUTH DAILY 90 tablet 3   • NON FORMULARY Administer 1 drop to both eyes 3 (Three) Times a Day. THERA TEARS     • Omega-3 Fatty Acids (FISH OIL PO) Take  by mouth Daily As Needed.     • Probiotic Product (PROBIOTIC DAILY PO) Take 1 capsule by mouth Daily.     • prochlorperazine (COMPAZINE) 10 MG tablet Take 1 tablet by mouth Every 6 (Six) Hours As Needed for Nausea or Vomiting. 6 tablet 0   • RABEprazole (ACIPHEX) 20 MG EC tablet Take 1 tablet by mouth Daily. 30 tablet 11   • valsartan (DIOVAN) 160 MG tablet TAKE 1/2 TABLET BY MOUTH DAILY 30 tablet 0   • [DISCONTINUED] letrozole (FEMARA) 2.5 MG tablet Take 1 tablet by mouth Daily for 90 days. 90 tablet 3   • mupirocin (BACTROBAN) 2 % ointment Apply  topically to the appropriate area as directed 3 (Three) Times a Day. 22 g 0     No current facility-administered medications on file prior to  visit.         ALLERGIES:    Allergies   Allergen Reactions   • Other Nausea Only     All mycins   • Sulfa Antibiotics Other (See Comments)     LOST CONSCIOUSNESS AND BODY TURNED RED/ON FIRE   • Ciprofloxacin Other (See Comments)     RED BLISTERS   • Iodine Other (See Comments)     DECADES AGO, IODINE INJECTION CAUSED SKIN REDNESS AND BURNING   • Macrodantin [Nitrofurantoin Macrocrystal] Diarrhea and Nausea And Vomiting   • Nitrofurantoin Nausea And Vomiting   • Yeast-Related Products Other (See Comments)     MOUTH SORES AND BLADDER INFECTION        Social History     Socioeconomic History   • Marital status:      Spouse name: Nicolas   • Number of children: Not on file   • Years of education: High school   • Highest education level: Not on file   Occupational History   • Occupation: Homemaker   Tobacco Use   • Smoking status: Former Smoker     Packs/day: 1.00     Years: 10.00     Pack years: 10.00     Types: Cigarettes     Start date:      Last attempt to quit:      Years since quittin.5   • Smokeless tobacco: Never Used   Substance and Sexual Activity   • Alcohol use: No   • Drug use: No   • Sexual activity: Defer   Social History Narrative    Accompanied by daughters Macrina and Daisy        Family History   Problem Relation Age of Onset   • Esophageal cancer Father    • Stomach cancer Father    • Heart disease Mother    • Thyroid cancer Mother    • Hypertension Mother    • Hypertension Sister    • Hypertension Brother    • Stroke Brother    • Heart disease Brother    • Diabetes Brother    • Leukemia Paternal Aunt    • Malig Hyperthermia Neg Hx         Review of Systems   Constitutional: Positive for fatigue (somewhat improved). Negative for chills and fever.   HENT: Negative.    Eyes: Negative for discharge and visual disturbance.   Respiratory: Negative for cough and shortness of breath.    Cardiovascular: Negative for leg swelling.   Gastrointestinal: Negative for abdominal distention,  "abdominal pain and nausea.   Genitourinary: Negative for frequency.   Musculoskeletal: Positive for back pain (Chronic, stable). Negative for joint swelling.   Skin: Negative for pallor and rash.   Neurological: Negative for dizziness and numbness.   Psychiatric/Behavioral: Negative for behavioral problems and confusion.        Objective     Vitals:    07/03/19 1527   BP: 138/61   Pulse: 71   Resp: 16   Temp: 97.7 °F (36.5 °C)   TempSrc: Oral   SpO2: 96%   Weight: 73.8 kg (162 lb 9.6 oz)   Height: 154.5 cm (60.83\")   PainSc: 0-No pain     Current Status 7/3/2019   ECOG score 0       Physical Exam    GENERAL:  Well-developed, well-nourished in no acute distress.   SKIN:  Warm, dry without rashes, purpura or petechiae.  EYES:  Pupils equal, round and reactive to light.  EOMs intact.  Conjunctivae normal.  EARS:  Hearing intact.  NOSE:  Septum midline.  No excoriations or nasal discharge.  MOUTH:  Tongue is well-papillated; no stomatitis or ulcers.  Lips normal.  THROAT:  Oropharynx without lesions or exudates.  NECK:  Supple with good range of motion; no thyromegaly or masses, no JVD.  LYMPHATICS:  No cervical, supraclavicular, axillary adenopathy.  CHEST:  Lungs clear to auscultation. Good airflow.  BREASTS: Right breast unremarkable.  Left breast with well-healed lumpectomy scar.  She is slightly tender to palpation which is unchanged.  CARDIAC:  Regular rate and rhythm without murmurs, rubs or gallops. Normal S1,S2.  ABDOMEN:  Soft, nontender with no hepatosplenomegaly or masses.  EXTREMITIES:  No clubbing, cyanosis or edema.  NEUROLOGICAL:  Cranial Nerves II-XII grossly intact.  No focal neurological deficits.  PSYCHIATRIC:  Normal affect and mood.        RECENT LABS:    Results from last 7 days   Lab Units 07/03/19  1521   WBC 10*3/mm3 8.62   NEUTROS ABS 10*3/mm3 6.00   HEMOGLOBIN g/dL 13.0   HEMATOCRIT % 38.5   PLATELETS 10*3/mm3 314     Results from last 7 days   Lab Units 07/03/19  1521   SODIUM mmol/L 132* "   POTASSIUM mmol/L 4.7   CHLORIDE mmol/L 94*   CO2 mmol/L 25.5   BUN mg/dL 19   CREATININE mg/dL 1.15*   CALCIUM mg/dL 9.4   ALBUMIN g/dL 4.50   BILIRUBIN mg/dL 0.2   ALK PHOS U/L 76   ALT (SGPT) U/L 16   AST (SGOT) U/L 16   GLUCOSE mg/dL 78   FERRITIN ng/mL 710.30*   IRON mcg/dL 98   TIBC mcg/dL 340         Lab Results   Component Value Date    IRON 98 07/03/2019    TIBC 340 07/03/2019    FERRITIN 710.30 (H) 07/03/2019     Iron Sat 8%    R 78% MT 17% Her 2 -neg Ki-67 7%  Final Diagnosis   1.  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                SEE SYNOPTIC REPORT (BELOW) FOR ADDITIONAL DETAILS.     2.  SENTINEL LYMPH NODE #1, LEFT AXILLA, EXCISION:                ONE LYMPH NODE, POSITIVE FOR METASTATIC CARCINOMA (MICROMETASTASIS) (SEE COMMENT).               NO EXTRANODAL EXTENSION IS IDENTIFIED.     COMMENT: Measuring the exact size of the lymph node metastasis is difficult. The lymph node demonstrates a few very small scattered foci of tumor cells, compatible with isolated tumor cells.  One slide demonstrates a single contiguous focus of tumor cells exceeding 0.2 mm; therefore, this is best considered a micrometastasis rather than isolated tumor cells.     3.  SENTINEL LYMPH NODE #2, LEFT AXILLA, EXCISION.               ONE LYMPH NODE, NEGATIVE FOR METASTATIC CARCINOMA.     SYNOPTIC REPORT (Based on [facility] cancer case summary, version January 2018):               Procedure: Excision (less than total mastectomy).               Specimen laterality: Left.                Tumor site: Not specified.                Tumor size: 1.2 x 1.1 x 1 cm.               Histologic type: Invasive carcinoma of no special type (ductal, not otherwise specified).                Histologic grade (Melony Histologic Score):                              Glandular (acinar)/tubular  differentiation: Score 3.                            Nuclear pleomorphism: Score 2.                             Mitotic rate: Score 1.                            Overall grade: Grade 2 (Score 6 of 9).               Tumor focality: Single focus of invasive carcinoma.                Ductal carcinoma in situ (DCIS): Present.                            Architectural patterns: Solid.                             Nuclear grade: Grade II (intermediate).                            Necrosis: Present, central comedonecrosis.                Lobular carcinoma in situ (LCIS): No LCIS in specimen.               Margins:                            Invasive carcinoma margins: Uninvolved by invasive carcinoma.                                          Distance from closest margin: 3 mm (medial margin).                                          NOTE: Invasive carcinoma also extends to within 4 mm of superior margin and to within                                                 4 mm of lateral margin.  All additional margins are greater than 10 mm from invasive                                                 carcinoma.                              DCIS Margins: Uninvolved by DCIS.                                          Distance from closest margin: 3 mm (medial margin).                    Regional Lymph nodes: Involved by tumor cells.                             Number of lymph nodes with macrometastases: 0.                            Number of lymph nodes with micrometastases: 1.                            Size of largest metastatic deposit: 0.4 mm.                            Extranodal extension: Not identified.                            Number of lymph nodes examined: 2.                            Number of sentinel lymph nodes examined: 2.                Treatment effect: No known presurgical therapy.                Lymphovascular invasion: Present.                Dermal Lymphovascular invasion: Not identified.                 Pathologic stage classification.                            Primary tumor: pT1c.                            Regional lymph nodes: pN1mi(sn).                            Distant metastasis: Not applicable.                Additional pathologic findings:                             Ductal hyperplasia of the usual type.                             Fibrocystic changes with duct dilatation and stasis and focal apocrine metaplasia.                             Columnar cell change without atypia.                             Fibroinflammatory changes consistent with site of prior biopsy.                    Ancillary studies: ER, RI, HER-2/melanie, and Ki-67 studies performed on previous biopsy specimen at                       outside facility ([facility]).           Assessment/Plan      1.  T1c N1 jese N0 ER/RI positive HER-2 negative left breast cancer post lumpectomy and radiation  · Arimidex started in 3/18 discontinued by the patient in 7/18 due to visual blurring which resolved.    · Aromasin started 11/2018. 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  She also has had weight gain. Aromasin discontinued.  · Patient seen early April 2019 and follow-up.  Patient agreeable to switch therapy to Femara 2.5 mg daily.  · Patient ultimately discontinuing Femara due to repeated arthralgias and blurred vision.  · She is reviewed today, 7/3/2019.  We have discussed that this time potential benefit with tamoxifen.  The patient is open to trying this.  There is a potential interaction with tamoxifen and Prozac which the patient is taken for many many years.  I plan to discuss this with Dr. Valenzuela further.  If okayed per Dr. Valenzuela, we will start the patient on this as soon as next week and see her roughly in 4 weeks in follow-up.    2.  Family history of breast cancer in a niece at a young age and father with cancer at age 51?  Genetic testing  4.   History of oral cancer in 1990  5.  Anemia, recently worked up per Dr. Ivan, internal medicine.   · Patient had EGD and colonoscopy per Dr. Gallego 1 year ago with no concerning findings.  Because of her history of GI ulcers however she was started on AcipHex per Dr. Ivan.  · 3/21/2019 showed iron saturation of 8% ferritin of 18. Patient started on trial of oral iron but was intolerant due to GI upset.  · Patient receiving 2 doses of IV Injectafer in May 2019.  Iron stores are now replete with iron saturation of 29% and hemoglobin having now normalized to 13.    PLAN:  1.  Patient willing to try tamoxifen 20 mg daily.  I plan to discuss this with Dr. Valenzuela first to make sure there is no concern for interaction with this in regards to long-standing Prozac medication.  2.  Patient begins tamoxifen I have scheduled her to come back in 4 weeks for follow-up with Dr. Valenzuela.  3.  Patient not due again for annual mammogram until February 2020.

## 2019-07-08 RX ORDER — VALSARTAN 160 MG/1
TABLET ORAL
Qty: 30 TABLET | Refills: 0 | Status: SHIPPED | OUTPATIENT
Start: 2019-07-08 | End: 2019-09-04 | Stop reason: SDUPTHER

## 2019-07-10 ENCOUNTER — TELEPHONE (OUTPATIENT)
Dept: FAMILY MEDICINE CLINIC | Facility: CLINIC | Age: 84
End: 2019-07-10

## 2019-07-12 ENCOUNTER — DOCUMENTATION (OUTPATIENT)
Dept: FAMILY MEDICINE CLINIC | Facility: CLINIC | Age: 84
End: 2019-07-12

## 2019-07-17 RX ORDER — ESCITALOPRAM OXALATE 10 MG/1
TABLET ORAL
Qty: 30 TABLET | Refills: 0 | Status: SHIPPED | OUTPATIENT
Start: 2019-07-17 | End: 2019-08-02 | Stop reason: SDUPTHER

## 2019-08-01 ENCOUNTER — LAB (OUTPATIENT)
Dept: LAB | Facility: HOSPITAL | Age: 84
End: 2019-08-01

## 2019-08-01 ENCOUNTER — OFFICE VISIT (OUTPATIENT)
Dept: ONCOLOGY | Facility: CLINIC | Age: 84
End: 2019-08-01

## 2019-08-01 VITALS
BODY MASS INDEX: 31.08 KG/M2 | OXYGEN SATURATION: 97 % | HEART RATE: 73 BPM | DIASTOLIC BLOOD PRESSURE: 53 MMHG | WEIGHT: 164.6 LBS | TEMPERATURE: 97.8 F | RESPIRATION RATE: 16 BRPM | HEIGHT: 61 IN | SYSTOLIC BLOOD PRESSURE: 144 MMHG

## 2019-08-01 DIAGNOSIS — Z17.0 MALIGNANT NEOPLASM OF LOWER-INNER QUADRANT OF LEFT BREAST IN FEMALE, ESTROGEN RECEPTOR POSITIVE (HCC): Primary | ICD-10-CM

## 2019-08-01 DIAGNOSIS — C50.312 MALIGNANT NEOPLASM OF LOWER-INNER QUADRANT OF LEFT BREAST IN FEMALE, ESTROGEN RECEPTOR POSITIVE (HCC): Primary | ICD-10-CM

## 2019-08-01 LAB
ALBUMIN SERPL-MCNC: 4.1 G/DL (ref 3.5–5.2)
ALBUMIN/GLOB SERPL: 1.5 G/DL (ref 1.1–2.4)
ALP SERPL-CCNC: 68 U/L (ref 38–116)
ALT SERPL W P-5'-P-CCNC: 14 U/L (ref 0–33)
ANION GAP SERPL CALCULATED.3IONS-SCNC: 10.9 MMOL/L (ref 5–15)
AST SERPL-CCNC: 14 U/L (ref 0–32)
BASOPHILS # BLD AUTO: 0.08 10*3/MM3 (ref 0–0.2)
BASOPHILS NFR BLD AUTO: 1.2 % (ref 0–1.5)
BILIRUB SERPL-MCNC: 0.3 MG/DL (ref 0.2–1.2)
BUN BLD-MCNC: 18 MG/DL (ref 6–20)
BUN/CREAT SERPL: 17.6 (ref 7.3–30)
CALCIUM SPEC-SCNC: 9.4 MG/DL (ref 8.5–10.2)
CHLORIDE SERPL-SCNC: 97 MMOL/L (ref 98–107)
CO2 SERPL-SCNC: 26.1 MMOL/L (ref 22–29)
CREAT BLD-MCNC: 1.02 MG/DL (ref 0.6–1.1)
DEPRECATED RDW RBC AUTO: 45.6 FL (ref 37–54)
EOSINOPHIL # BLD AUTO: 0.19 10*3/MM3 (ref 0–0.4)
EOSINOPHIL NFR BLD AUTO: 2.8 % (ref 0.3–6.2)
ERYTHROCYTE [DISTWIDTH] IN BLOOD BY AUTOMATED COUNT: 13.3 % (ref 12.3–15.4)
GFR SERPL CREATININE-BSD FRML MDRD: 52 ML/MIN/1.73
GLOBULIN UR ELPH-MCNC: 2.7 GM/DL (ref 1.8–3.5)
GLUCOSE BLD-MCNC: 97 MG/DL (ref 74–124)
HCT VFR BLD AUTO: 37.2 % (ref 34–46.6)
HGB BLD-MCNC: 12.2 G/DL (ref 12–15.9)
IMM GRANULOCYTES # BLD AUTO: 0.04 10*3/MM3 (ref 0–0.05)
IMM GRANULOCYTES NFR BLD AUTO: 0.6 % (ref 0–0.5)
LYMPHOCYTES # BLD AUTO: 1.42 10*3/MM3 (ref 0.7–3.1)
LYMPHOCYTES NFR BLD AUTO: 21.1 % (ref 19.6–45.3)
MCH RBC QN AUTO: 30.4 PG (ref 26.6–33)
MCHC RBC AUTO-ENTMCNC: 32.8 G/DL (ref 31.5–35.7)
MCV RBC AUTO: 92.8 FL (ref 79–97)
MONOCYTES # BLD AUTO: 0.7 10*3/MM3 (ref 0.1–0.9)
MONOCYTES NFR BLD AUTO: 10.4 % (ref 5–12)
NEUTROPHILS # BLD AUTO: 4.29 10*3/MM3 (ref 1.7–7)
NEUTROPHILS NFR BLD AUTO: 63.9 % (ref 42.7–76)
NRBC BLD AUTO-RTO: 0 /100 WBC (ref 0–0.2)
PLATELET # BLD AUTO: 302 10*3/MM3 (ref 140–450)
PMV BLD AUTO: 9.1 FL (ref 6–12)
POTASSIUM BLD-SCNC: 4.7 MMOL/L (ref 3.5–4.7)
PROT SERPL-MCNC: 6.8 G/DL (ref 6.3–8)
RBC # BLD AUTO: 4.01 10*6/MM3 (ref 3.77–5.28)
SODIUM BLD-SCNC: 134 MMOL/L (ref 134–145)
WBC NRBC COR # BLD: 6.72 10*3/MM3 (ref 3.4–10.8)

## 2019-08-01 PROCEDURE — 36415 COLL VENOUS BLD VENIPUNCTURE: CPT | Performed by: INTERNAL MEDICINE

## 2019-08-01 PROCEDURE — 99214 OFFICE O/P EST MOD 30 MIN: CPT | Performed by: INTERNAL MEDICINE

## 2019-08-01 PROCEDURE — 80053 COMPREHEN METABOLIC PANEL: CPT | Performed by: INTERNAL MEDICINE

## 2019-08-01 PROCEDURE — 85025 COMPLETE CBC W/AUTO DIFF WBC: CPT | Performed by: INTERNAL MEDICINE

## 2019-08-01 RX ORDER — TAMOXIFEN CITRATE 20 MG/1
20 TABLET ORAL DAILY
Qty: 30 TABLET | Refills: 5 | Status: SHIPPED | OUTPATIENT
Start: 2019-08-01 | End: 2020-02-07

## 2019-08-01 NOTE — PROGRESS NOTES
Subjective     REASON FOR CONSULTATION:   1. Left breast cancer [id number] ER/MA+ her2 neg post lumpectomy/SLN  3/18 and whole breast radiation completed May 2018  2.  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  Fatigue out of proportion to anemia  4.  Arimidex started in 3/18 with bone density showing normal density in the spine and osteopenia in the hips-this was self stopped due to vision changes.  5.  Aromasin initiated 10/20/18 and discontinued 1/20/19 secondary to vision changes.                             REQUESTING PHYSICIAN:  Myles Soliman M.D.    History of Present Illness patient is an 82-year-old female with a T1cN I jese hormone positive left breast cancer treated with lumpectomy sentinel node biopsy and radiation.    In 10/2018 she was changed to Aromasin as she had issues previously with Arimidex with visual blurring.  The patient was then seen 1/25/19 with reports of blurred vision once again.    At that point the patient's symptoms have essentially resolved.  We therefore elected to switch her to Femara.    Unfortunately, the patient ultimately experienced similar symptoms with Femara because of muscle cramps and blurred vision.    Patient is here today having switched from Prozac which was incompatible with tamoxifen to Lexapro with good response and we discussed today tamoxifen and the side effect profile.  She has had a hysterectomy so uterine cancer is not an issue but DVT may be an issue.  She remains on an aspirin a day and hopefully this will help.  She is very active otherwise.  The side effects and toxicities of Tamoxifen were discussed with the patient, including hot flashes, mood swings,depression, DVT . A list of drugs that interfere with the efficacy of tamoxifen and are to be avoided were given to the patient.  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    • Chronic kidney disease    • CKD (chronic kidney disease)     PT STATES STAGE 3   • Depression    • Diabetes mellitus (CMS/HCC)     BORDERLINE   • Diverticular disease    • Gastric ulcer     AND DUODENAL   • GERD (gastroesophageal reflux disease)    • Hearing loss     BILAT AIDES   • History of colon polyps    • History of depression    • History of GI bleed    • Hypertension    • Hypothyroidism    • Peptic ulceration    • PONV (postoperative nausea and vomiting)    • Poor vision     RIGHT EYE, HAS PERIPHERAL VISION    • Stroke (CMS/HCC) 2000     mild weakness on left, partial sight loss on right         Past Surgical History:   Procedure Laterality Date   • ABDOMINAL SURGERY     • APPENDECTOMY     • BLADDER REPAIR      AND RECTOCELE   • BREAST BIOPSY     • BREAST CYST ASPIRATION     • BREAST LUMPECTOMY WITH SENTINEL NODE BIOPSY Left 3/19/2018    Procedure: BREAST LUMPECTOMY WITH SENTINEL NODE BIOPSY AND NEEDLE LOCALIZATION;  Surgeon: Myles Soliman MD;  [place] [facility] [place] [facility] [place]: [place]   • CHOLECYSTECTOMY     • COLONOSCOPY  2013   • COLONOSCOPY N/A 2/16/2018    Procedure: COLONOSCOPY into cecum and T.I. with biopsies;  Surgeon: Augustine Gallego MD;  Location: [facility] ENDOSCOPY;  Service:    • ENDOSCOPY N/A 10/17/2017    Procedure: ESOPHAGOGASTRODUODENOSCOPY WITH COLD BIOPSIES;  Surgeon: Augustine Gallego MD;  Location: [facility] ENDOSCOPY;  Service:    • ENDOSCOPY N/A 2/16/2018    Procedure: ESOPHAGOGASTRODUODENOSCOPY with biopsies and #54 [address] DILATATION;  Surgeon: Augustine Gallego MD;  Location: [facility] ENDOSCOPY;  Service:    • EYE SURGERY  Left     3-4 years, cornea replacement    • HYSTERECTOMY     • MOUTH SURGERY      UNDER TONGUE AND LEFT FLOOR OF MOUTH FOR CA   • PARATHYROIDECTOMY      PER PT   • SINUS SURGERY     • SKIN BIOPSY     • THYROID SURGERY     • VARICOSE VEIN SURGERY      Oncological history  patient is an 82-year-old female with recent problems with abdominal pain and diarrhea and weight loss which is being evaluated by GI and finally found to have multiple ulcers in the duodenum and small bowel finally responding to treatment.  In the middle of this she went for routine mammogram a couple of months later was found to have an abnormality in the left breast at 7 o'clock position measuring about 12 mm in size that was not present last year.  The patient had previously had a left breast biopsy in  with benign findings.   There was no palpable mass and biopsy was done and this revealed a grade 1 infiltrating ductal carcinoma at least 1 cm in size ER 78%/MN 17% positive HER-2 negative.  The patient was referred to Dr. Soliman who performed a lumpectomy and sentinel node biopsy with the findings of a 1.2 cm tumor grade 2 with lymphovascular invasion and clear margins with associated DCIS.  One of 2 sentinel nodes showed a micrometastasis although the pathologist said this was almost too small to be considered a micrometastasis.  Postoperatively she has done well and thankfully her GI complains of much improved with Carafate and Protonix and her diarrhea finally resolved.    She's not had a bone density in quite a while.  She is  5 para 5  menarche at age 15 and menopause in her mid 50s although she had a hysterectomy at 30 for an ulcerated uterus.  First childbirth was at age 20 she breast-fed all 5 children.  She took hormonal therapy for at least 15 years after menopause and stopped about 10 years ago   .  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    Her  father  at 52 of stomach cancer mother had thyroid cancer at age 50 her brother's daughter had breast cancer age 40 and she had a paternal aunt with leukemia is no other breast cancer or ovarian cancer uterine cancer or pancreatic cancer in the family . I did ask him to check with her niece to see if she has had genetic testing - they do not know much at all about the maternal family .    She will call for the results of the DEXA scan and if adequate we will start Arimidex which I have  already prescribed to her and see her back in 3 months to assess her tolerance.  She is in very good health and I explained to her that adjuvant therapy decreases the risk of recurrence of her cancer which might be in the 15-20% range probably down to the 10% range and if she has significant side effects we will be inclined to stop treatment.  Obviously if her GI symptoms recur with Arimidex we will have to try another medications.  We talked about the joint pains and hot flashes which are unlikely at her age and she was agreeable to a trial of Arimidex.  Radiation has been planned      In 10/2018 she was changed to Aromasin as she had issues previously with Arimidex with visual blurring.  The patient was then seen 19 with reports of blurred vision once again.    At that point the patient's symptoms have essentially resolved.  We therefore elected to switch her to Femara.    Unfortunately, the patient ultimately experienced similar symptoms with Femara because of muscle cramps and blurred vision.      I discussed with her today that Dr. Valenzuela would like the patient to consider taking tamoxifen as yet another potential medication to benefit her in light of history of hormone positive breast cancer.  It is noted that the patient takes Prozac and has been on this for many many years.  There is a potential interaction and I plan to discuss this with Dr. Valenzuela further.  If okayed per Dr. Valenzuela, we would plan then to start  the patient on this as soon as next week.        Current Outpatient Medications on File Prior to Visit   Medication Sig Dispense Refill   • amLODIPine (NORVASC) 5 MG tablet TAKE 2 TABLETS BY MOUTH EVERY  tablet 0   • aspirin 81 MG tablet Take 81 mg by mouth Daily.     • CARAFATE 1 GM/10ML suspension TAKE 10 MLS BY MOUTH EVERY 6 HOURS TO COAT STOMACH 420 mL 3   • colestipol (COLESTID) 1 g tablet Take 1 g by mouth 2 (Two) Times a Day.     • [employer] D3 2000 units capsule TAKE 1 CAPSULE BY MOUTH DAILY. 90 capsule 1   • doxycycline (VIBRAMYCIN) 100 MG capsule Take 1 capsule by mouth 2 (Two) Times a Day. 20 capsule 0   • escitalopram (LEXAPRO) 10 MG tablet Take 1/2 tablet daily for 7 days then 1 tablet daily thereafter 30 tablet 0   • ezetimibe (ZETIA) 10 MG tablet Take 1 tablet by mouth Daily. 30 tablet 1   • ferrous sulfate 325 (65 FE) MG tablet Take 1 tablet by mouth 2 (Two) Times a Day. 60 tablet 1   • fluorometholone (FML) 0.1 % ophthalmic suspension Administer 1 drop into the left eye Daily.     • glucose blood (FREESTYLE TEST STRIPS) test strip TEST BLOOD SUGARS ONCE DAILY 50 each 12   • ketotifen (ZADITOR) 0.025 % ophthalmic solution 1 drop 2 (Two) Times a Day.     • levothyroxine (SYNTHROID, LEVOTHROID) 75 MCG tablet TAKE 1 TABLET BY MOUTH DAILY 90 tablet 3   • mupirocin (BACTROBAN) 2 % ointment Apply  topically to the appropriate area as directed 3 (Three) Times a Day. 22 g 0   • NON FORMULARY Administer 1 drop to both eyes 3 (Three) Times a Day. THERA TEARS     • Omega-3 Fatty Acids (FISH OIL PO) Take  by mouth Daily As Needed.     • Probiotic Product (PROBIOTIC DAILY PO) Take 1 capsule by mouth Daily.     • prochlorperazine (COMPAZINE) 10 MG tablet Take 1 tablet by mouth Every 6 (Six) Hours As Needed for Nausea or Vomiting. 6 tablet 0   • RABEprazole (ACIPHEX) 20 MG EC tablet Take 1 tablet by mouth Daily. 30 tablet 11   • valsartan (DIOVAN) 160 MG tablet TAKE 1/2 TABLET BY MOUTH DAILY 30 tablet 0     No  current facility-administered medications on file prior to visit.         ALLERGIES:    Allergies   Allergen Reactions   • Other Nausea Only     All mycins   • Sulfa Antibiotics Other (See Comments)     LOST CONSCIOUSNESS AND BODY TURNED RED/ON FIRE   • Ciprofloxacin Other (See Comments)     RED BLISTERS   • Iodine Other (See Comments)     DECADES AGO, IODINE INJECTION CAUSED SKIN REDNESS AND BURNING   • Macrodantin [Nitrofurantoin Macrocrystal] Diarrhea and Nausea And Vomiting   • Nitrofurantoin Nausea And Vomiting   • Yeast-Related Products Other (See Comments)     MOUTH SORES AND BLADDER INFECTION        Social History     Socioeconomic History   • Marital status:      Spouse name: Nicolas   • Number of children: Not on file   • Years of education: High school   • Highest education level: Not on file   Occupational History   • Occupation: Homemaker   Tobacco Use   • Smoking status: Former Smoker     Packs/day: 1.00     Years: 10.00     Pack years: 10.00     Types: Cigarettes     Start date:      Last attempt to quit:      Years since quittin.6   • Smokeless tobacco: Never Used   Substance and Sexual Activity   • Alcohol use: No   • Drug use: No   • Sexual activity: Defer   Social History Narrative    Accompanied by daughters Macrina and Daisy        Family History   Problem Relation Age of Onset   • Esophageal cancer Father    • Stomach cancer Father    • Heart disease Mother    • Thyroid cancer Mother    • Hypertension Mother    • Hypertension Sister    • Hypertension Brother    • Stroke Brother    • Heart disease Brother    • Diabetes Brother    • Leukemia Paternal Aunt    • Malig Hyperthermia Neg Hx         Review of Systems   Constitutional: Positive for fatigue (same 19 good days and bad days). Negative for chills and fever.   HENT: Negative.    Eyes: Negative for discharge and visual disturbance.   Respiratory: Negative for cough and shortness of breath.    Cardiovascular: Negative  for leg swelling.   Gastrointestinal: Negative for abdominal distention, abdominal pain and nausea.   Genitourinary: Positive for difficulty urinating (reoccuring uti 8/1/19). Negative for frequency.   Musculoskeletal: Positive for back pain (Chronic, stable 8/1/19). Negative for joint swelling.   Skin: Negative for pallor and rash.   Neurological: Negative for dizziness and numbness.   Psychiatric/Behavioral: Negative for behavioral problems and confusion.        Objective     There were no vitals filed for this visit.  Current Status 7/3/2019   ECOG score 0       Physical Exam    GENERAL:  Well-developed, well-nourished in no acute distress.   SKIN:  Warm, dry without rashes, purpura or petechiae.  EYES:  Pupils equal, round and reactive to light.  EOMs intact.  Conjunctivae normal.  EARS:  Hearing intact.  NOSE:  Septum midline.  No excoriations or nasal discharge.  MOUTH:  Tongue is well-papillated; no stomatitis or ulcers.  Lips normal.  THROAT:  Oropharynx without lesions or exudates.  NECK:  Supple with good range of motion; no thyromegaly or masses, no JVD.  LYMPHATICS:  No cervical, supraclavicular, axillary adenopathy.  CHEST:  Lungs clear to auscultation. Good airflow.  BREASTS: Right breast unremarkable.  Left breast with well-healed lumpectomy scar.  She is slightly tender to palpation which is unchanged.  CARDIAC:  Regular rate and rhythm without murmurs, rubs or gallops. Normal S1,S2.  ABDOMEN:  Soft, nontender with no hepatosplenomegaly or masses.  EXTREMITIES:  No clubbing, cyanosis or edema.  Skin bruising and hematoma from a fall on her left shin  NEUROLOGICAL:  Cranial Nerves II-XII grossly intact.  No focal neurological deficits.  PSYCHIATRIC:  Normal affect and mood.        RECENT LABS:                Lab Results   Component Value Date    IRON 98 07/03/2019    TIBC 340 07/03/2019    FERRITIN 710.30 (H) 07/03/2019     Iron Sat 8%    R 78% NH 17% Her 2 -neg Ki-67 7%  Final Diagnosis   1.  BREAST,  LEFT, LUMPECTOMY:                INVASIVE MAMMARY CARCINOMA OF NO SPECIAL TYPE (INVASIVE DUCTAL CARCINOMA) AND                       FOCAL ASSOCIATED DUCTAL CARCINOMA IN SITU (DCIS).                SEE SYNOPTIC REPORT (BELOW) FOR ADDITIONAL DETAILS.     2.  SENTINEL LYMPH NODE #1, LEFT AXILLA, EXCISION:                ONE LYMPH NODE, POSITIVE FOR METASTATIC CARCINOMA (MICROMETASTASIS) (SEE COMMENT).               NO EXTRANODAL EXTENSION IS IDENTIFIED.     COMMENT: Measuring the exact size of the lymph node metastasis is difficult. The lymph node demonstrates a few very small scattered foci of tumor cells, compatible with isolated tumor cells.  One slide demonstrates a single contiguous focus of tumor cells exceeding 0.2 mm; therefore, this is best considered a micrometastasis rather than isolated tumor cells.     3.  SENTINEL LYMPH NODE #2, LEFT AXILLA, EXCISION.               ONE LYMPH NODE, NEGATIVE FOR METASTATIC CARCINOMA.     SYNOPTIC REPORT (Based on [facility] cancer case summary, version January 2018):               Procedure: Excision (less than total mastectomy).               Specimen laterality: Left.                Tumor site: Not specified.                Tumor size: 1.2 x 1.1 x 1 cm.               Histologic type: Invasive carcinoma of no special type (ductal, not otherwise specified).                Histologic grade ([place] Histologic Score):                              Glandular (acinar)/tubular differentiation: Score 3.                            Nuclear pleomorphism: Score 2.                             Mitotic rate: Score 1.                            Overall grade: Grade 2 (Score 6 of 9).               Tumor focality: Single focus of invasive carcinoma.                Ductal carcinoma in situ (DCIS): Present.                            Architectural patterns: Solid.                             Nuclear grade: Grade II (intermediate).                            Necrosis: Present, central  comedonecrosis.                Lobular carcinoma in situ (LCIS): No LCIS in specimen.               Margins:                            Invasive carcinoma margins: Uninvolved by invasive carcinoma.                                          Distance from closest margin: 3 mm (medial margin).                                          NOTE: Invasive carcinoma also extends to within 4 mm of superior margin and to within                                                 4 mm of lateral margin.  All additional margins are greater than 10 mm from invasive                                                 carcinoma.                              DCIS Margins: Uninvolved by DCIS.                                          Distance from closest margin: 3 mm (medial margin).                    Regional Lymph nodes: Involved by tumor cells.                             Number of lymph nodes with macrometastases: 0.                            Number of lymph nodes with micrometastases: 1.                            Size of largest metastatic deposit: 0.4 mm.                            Extranodal extension: Not identified.                            Number of lymph nodes examined: 2.                            Number of sentinel lymph nodes examined: 2.                Treatment effect: No known presurgical therapy.                Lymphovascular invasion: Present.                Dermal Lymphovascular invasion: Not identified.                Pathologic stage classification.                            Primary tumor: pT1c.                            Regional lymph nodes: pN1mi(sn).                            Distant metastasis: Not applicable.                Additional pathologic findings:                             Ductal hyperplasia of the usual type.                             Fibrocystic changes with duct dilatation and stasis and focal apocrine metaplasia.                             Columnar cell change without atypia.                              Fibroinflammatory changes consistent with site of prior biopsy.                    Ancillary studies: ER, CT, HER-2/melanie, and Ki-67 studies performed on previous biopsy specimen at                       outside facility ([facility]).               Assessment/Plan      1.  T1c N1 jese N0 ER/CT positive HER-2 negative left breast cancer post lumpectomy and radiation  · Arimidex started in 3/18 discontinued by the patient in 7/18 due to visual blurring which resolved.    · Aromasin started 11/2018. 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  She also has had weight gain. Aromasin discontinued.  · Patient seen early April 2019 and follow-up.  Patient agreeable to switch therapy to Femara 2.5 mg daily.  · Patient ultimately discontinuing Femara due to repeated arthralgias and blurred vision.  She is reviewed today, 8/1/2019 she has been switched from Prozac to Celexa and we will start 20 mg of tamoxifen.     2.  Family history of breast cancer in a niece at a young age and father with cancer at age 51?  Genetic testing  4.  History of oral cancer in 1990  5.  Anemia, recently worked up per Dr. Ivan, internal medicine.   · Patient had EGD and colonoscopy per Dr. Gallego 1 year ago with no concerning findings.  Because of her history of GI ulcers however she was started on AcipHex per Dr. Ivan.  · 3/21/2019 showed iron saturation of 8% ferritin of 18. Patient started on trial of oral iron but was intolerant due to GI upset.  · Patient receiving 2 doses of IV Injectafer in May 2019.  Iron stores are now replete with iron saturation of 29% and hemoglobin having now normalized to 13.    PLAN:  1.  Patient to try tamoxifen 20 mg daily.  I   2.  Return in 3 to 4 months for follow-up to assess her tolerance  3.  Patient not due again for annual mammogram until February 2020.    I did encourage her to call if there is any visual  symptoms and again stressed to the signs and symptoms of DVT.  I explained with the microscopic node positivity I felt it still would be worthwhile trying some kind of hormonal blockade for at least another year or 2 if she can tolerate it  25 minutes, over half of that time counseling.

## 2019-08-05 RX ORDER — ESCITALOPRAM OXALATE 10 MG/1
TABLET ORAL
Qty: 30 TABLET | Refills: 0 | Status: SHIPPED | OUTPATIENT
Start: 2019-08-05 | End: 2019-09-05 | Stop reason: SDUPTHER

## 2019-08-20 RX ORDER — AMOXICILLIN 500 MG/1
CAPSULE ORAL
Qty: 40 CAPSULE | Refills: 0 | Status: SHIPPED | OUTPATIENT
Start: 2019-08-20 | End: 2020-02-21

## 2019-08-30 RX ORDER — AMLODIPINE BESYLATE 5 MG/1
TABLET ORAL
Qty: 180 TABLET | Refills: 0 | Status: SHIPPED | OUTPATIENT
Start: 2019-08-30 | End: 2019-10-04

## 2019-09-04 RX ORDER — VALSARTAN 160 MG/1
TABLET ORAL
Qty: 30 TABLET | Refills: 0 | Status: SHIPPED | OUTPATIENT
Start: 2019-09-04 | End: 2019-10-28 | Stop reason: SDUPTHER

## 2019-09-05 RX ORDER — ESCITALOPRAM OXALATE 10 MG/1
TABLET ORAL
Qty: 30 TABLET | Refills: 0 | Status: SHIPPED | OUTPATIENT
Start: 2019-09-05 | End: 2019-10-02 | Stop reason: SDUPTHER

## 2019-09-16 ENCOUNTER — TELEPHONE (OUTPATIENT)
Dept: FAMILY MEDICINE CLINIC | Facility: CLINIC | Age: 84
End: 2019-09-16

## 2019-09-17 ENCOUNTER — TELEPHONE (OUTPATIENT)
Dept: FAMILY MEDICINE CLINIC | Facility: CLINIC | Age: 84
End: 2019-09-17

## 2019-09-23 RX ORDER — EXEMESTANE 25 MG/1
TABLET ORAL
Qty: 30 TABLET | Refills: 3 | OUTPATIENT
Start: 2019-09-23

## 2019-10-02 RX ORDER — ESCITALOPRAM OXALATE 10 MG/1
TABLET ORAL
Qty: 30 TABLET | Refills: 0 | Status: SHIPPED | OUTPATIENT
Start: 2019-10-02 | End: 2019-11-01 | Stop reason: SDUPTHER

## 2019-10-04 ENCOUNTER — OFFICE VISIT (OUTPATIENT)
Dept: FAMILY MEDICINE CLINIC | Facility: CLINIC | Age: 84
End: 2019-10-04

## 2019-10-04 ENCOUNTER — HOSPITAL ENCOUNTER (OUTPATIENT)
Dept: CARDIOLOGY | Facility: HOSPITAL | Age: 84
Discharge: HOME | End: 2019-10-04
Admitting: NURSE PRACTITIONER

## 2019-10-04 VITALS
DIASTOLIC BLOOD PRESSURE: 58 MMHG | HEIGHT: 60 IN | WEIGHT: 164 LBS | BODY MASS INDEX: 32.2 KG/M2 | OXYGEN SATURATION: 98 % | TEMPERATURE: 97.8 F | SYSTOLIC BLOOD PRESSURE: 146 MMHG | HEART RATE: 76 BPM

## 2019-10-04 DIAGNOSIS — I10 ESSENTIAL HYPERTENSION: ICD-10-CM

## 2019-10-04 DIAGNOSIS — M79.89 PAIN AND SWELLING OF LOWER LEG, UNSPECIFIED LATERALITY: ICD-10-CM

## 2019-10-04 DIAGNOSIS — Z82.49 FAMILY HISTORY OF HEART DISEASE: ICD-10-CM

## 2019-10-04 DIAGNOSIS — R60.0 BILATERAL LEG EDEMA: ICD-10-CM

## 2019-10-04 DIAGNOSIS — M79.669 PAIN AND SWELLING OF LOWER LEG, UNSPECIFIED LATERALITY: ICD-10-CM

## 2019-10-04 DIAGNOSIS — E11.9 DIABETES MELLITUS WITHOUT COMPLICATION (HCC): ICD-10-CM

## 2019-10-04 DIAGNOSIS — R60.0 BILATERAL LEG EDEMA: Primary | ICD-10-CM

## 2019-10-04 LAB

## 2019-10-04 PROCEDURE — 99214 OFFICE O/P EST MOD 30 MIN: CPT | Performed by: NURSE PRACTITIONER

## 2019-10-04 PROCEDURE — 93970 EXTREMITY STUDY: CPT

## 2019-10-04 RX ORDER — NIFEDIPINE 30 MG/1
30 TABLET, EXTENDED RELEASE ORAL DAILY
Qty: 30 TABLET | Refills: 1 | Status: SHIPPED | OUTPATIENT
Start: 2019-10-04 | End: 2019-11-04 | Stop reason: SDUPTHER

## 2019-10-04 NOTE — PROGRESS NOTES
10/4/19 Bilat LEV duplex preliminary findings are negative for DVT. Preliminary report given to JESSA Wood and pt released per follow up instructions.

## 2019-10-04 NOTE — PROGRESS NOTES
Subjective   Geeta Butt is a 84 y.o. female.     History of Present Illness   C/o bilateral leg swelling, redness, states symptoms started about 1 year ago, pain with ambulation, states she noticed swelling in march, trouble with walking long distances, she denies numbness, she has not tried anything forthis before, states she noticed swelling in legs and ankles, states symptoms improved with elevation, she does have hx of elevated BS but does not take medications, diet controlled, she states symptoms started prior to resuming tamoxifen. States BS at home usually 105-140s.  hx of HTN taking amlodipine 5mg daily, valsartan 160mg daily, hx of breast cancer taking tamoxifen, sees Dr. Valenzuela. She has seen vascular before for right carotid stenosis. She would like to see cardiology, she has family hx of MI in mother, she had abnormal ekg on lifeline study, she denies chest pain, SOA. ekg in 3/19 WNL. Denies SOA with exertion. Her daughter is in the room with her today.     The following portions of the patient's history were reviewed and updated as appropriate: allergies, current medications, past family history, past medical history, past social history, past surgical history and problem list.    Review of Systems   Constitutional: Negative for chills, diaphoresis and fever.   Respiratory: Negative for cough and shortness of breath.    Cardiovascular: Positive for leg swelling. Negative for chest pain and palpitations.   Musculoskeletal: Negative for arthralgias and myalgias.   Neurological: Negative for dizziness, light-headedness and headache.   All other systems reviewed and are negative.      Objective   Physical Exam   Constitutional: She is oriented to person, place, and time. She appears well-developed and well-nourished.   HENT:   Head: Normocephalic.   Eyes: Pupils are equal, round, and reactive to light.   Cardiovascular: Normal rate, regular rhythm, normal heart sounds and intact distal pulses.    Pulmonary/Chest: Effort normal and breath sounds normal.   Musculoskeletal: Normal range of motion.        Right ankle: She exhibits swelling.        Left ankle: She exhibits swelling.        Right lower leg: She exhibits no tenderness, no bony tenderness and no swelling.        Left lower leg: She exhibits no tenderness, no bony tenderness and no swelling.   Mild swelling noted on malleolus bilaterally. No pitting edema noted. Skin intact, no calf tenderness or plantarflexion pain noted. Very mild redness noted on bilat tibia, no drainage or rash noted.    Neurological: She is alert and oriented to person, place, and time.   Skin: Skin is warm and dry.   Psychiatric: She has a normal mood and affect. Her behavior is normal.   Nursing note and vitals reviewed.        Assessment/Plan   Geeta was seen today for leg swelling.    Diagnoses and all orders for this visit:    Bilateral leg edema  -     Comprehensive metabolic panel  -     Magnesium  -     Duplex Venous Lower Extremity - Bilateral CAR; Future    Family history of heart disease  -     Ambulatory Referral to Cardiology    Diabetes mellitus without complication (CMS/[facility])  -     Comprehensive metabolic panel  -     Magnesium    Essential hypertension    Pain and swelling of lower leg, unspecified laterality  -     Duplex Venous Lower Extremity - Bilateral CAR; Future    Other orders  -     NIFEdipine XL (PROCARDIA XL) 30 MG 24 hr tablet; Take 1 tablet by mouth Daily.        Stop amlodipine start nifedipine 30mg daily in am, monitor BP at home call with elevated readings ,  Encouraged low salt diet, hand out given, elevation.   Refer to cardiology will call with appt.   Cont to monitor BS at home,   Stat dopplers today will call with results.   If any worsening symptoms, pain swelling advised ER.   Cont f/u with oncology as scheduled.   Increase fluid intake, get plenty of rest.   Patient agrees with plan of care and understands instructions. Call if worsening  symptoms or any problems or concerns.

## 2019-10-04 NOTE — PATIENT INSTRUCTIONS
"DASH Eating Plan  DASH stands for \"Dietary Approaches to Stop Hypertension.\" The DASH eating plan is a healthy eating plan that has been shown to reduce high blood pressure (hypertension). It may also reduce your risk for type 2 diabetes, heart disease, and stroke. The DASH eating plan may also help with weight loss.  What are tips for following this plan?    General guidelines  · Avoid eating more than 2,300 mg (milligrams) of salt (sodium) a day. If you have hypertension, you may need to reduce your sodium intake to 1,500 mg a day.  · Limit alcohol intake to no more than 1 drink a day for nonpregnant women and 2 drinks a day for men. One drink equals 12 oz of beer, 5 oz of wine, or 1½ oz of hard liquor.  · Work with your health care provider to maintain a healthy body weight or to lose weight. Ask what an ideal weight is for you.  · Get at least 30 minutes of exercise that causes your heart to beat faster (aerobic exercise) most days of the week. Activities may include walking, swimming, or biking.  · Work with your health care provider or diet and nutrition specialist (dietitian) to adjust your eating plan to your individual calorie needs.  Reading food labels    · Check food labels for the amount of sodium per serving. Choose foods with less than 5 percent of the Daily Value of sodium. Generally, foods with less than 300 mg of sodium per serving fit into this eating plan.  · To find whole grains, look for the word \"whole\" as the first word in the ingredient list.  Shopping  · Buy products labeled as \"low-sodium\" or \"no salt added.\"  · Buy fresh foods. Avoid canned foods and premade or frozen meals.  Cooking  · Avoid adding salt when cooking. Use salt-free seasonings or herbs instead of table salt or sea salt. Check with your health care provider or pharmacist before using salt substitutes.  · Do not leahy foods. Cook foods using healthy methods such as baking, boiling, grilling, and broiling instead.  · Cook with " heart-healthy oils, such as olive, canola, soybean, or sunflower oil.  Meal planning  · Eat a balanced diet that includes:  ? 5 or more servings of fruits and vegetables each day. At each meal, try to fill half of your plate with fruits and vegetables.  ? Up to 6-8 servings of whole grains each day.  ? Less than 6 oz of lean meat, poultry, or fish each day. A 3-oz serving of meat is about the same size as a deck of cards. One egg equals 1 oz.  ? 2 servings of low-fat dairy each day.  ? A serving of nuts, seeds, or beans 5 times each week.  ? Heart-healthy fats. Healthy fats called Omega-3 fatty acids are found in foods such as flaxseeds and coldwater fish, like sardines, salmon, and mackerel.  · Limit how much you eat of the following:  ? Canned or prepackaged foods.  ? Food that is high in trans fat, such as fried foods.  ? Food that is high in saturated fat, such as fatty meat.  ? Sweets, desserts, sugary drinks, and other foods with added sugar.  ? Full-fat dairy products.  · Do not salt foods before eating.  · Try to eat at least 2 vegetarian meals each week.  · Eat more home-cooked food and less restaurant, buffet, and fast food.  · When eating at a restaurant, ask that your food be prepared with less salt or no salt, if possible.  What foods are recommended?  The items listed may not be a complete list. Talk with your dietitian about what dietary choices are best for you.  Grains  Whole-grain or whole-wheat bread. Whole-grain or whole-wheat pasta. Brown rice. Oatmeal. Quinoa. Bulgur. Whole-grain and low-sodium cereals. Anupama bread. Low-fat, low-sodium crackers. Whole-wheat flour tortillas.  Vegetables  Fresh or frozen vegetables (raw, steamed, roasted, or grilled). Low-sodium or reduced-sodium tomato and vegetable juice. Low-sodium or reduced-sodium tomato sauce and tomato paste. Low-sodium or reduced-sodium canned vegetables.  Fruits  All fresh, dried, or frozen fruit. Canned fruit in natural juice (without  added sugar).  Meat and other protein foods  Skinless chicken or turkey. Ground chicken or turkey. Pork with fat trimmed off. Fish and seafood. Egg whites. Dried beans, peas, or lentils. Unsalted nuts, nut butters, and seeds. Unsalted canned beans. Lean cuts of beef with fat trimmed off. Low-sodium, lean deli meat.  Dairy  Low-fat (1%) or fat-free (skim) milk. Fat-free, low-fat, or reduced-fat cheeses. Nonfat, low-sodium ricotta or cottage cheese. Low-fat or nonfat yogurt. Low-fat, low-sodium cheese.  Fats and oils  Soft margarine without trans fats. Vegetable oil. Low-fat, reduced-fat, or light mayonnaise and salad dressings (reduced-sodium). Canola, safflower, olive, soybean, and sunflower oils. Avocado.  Seasoning and other foods  Herbs. Spices. Seasoning mixes without salt. Unsalted popcorn and pretzels. Fat-free sweets.  What foods are not recommended?  The items listed may not be a complete list. Talk with your dietitian about what dietary choices are best for you.  Grains  Baked goods made with fat, such as croissants, muffins, or some breads. Dry pasta or rice meal packs.  Vegetables  Creamed or fried vegetables. Vegetables in a cheese sauce. Regular canned vegetables (not low-sodium or reduced-sodium). Regular canned tomato sauce and paste (not low-sodium or reduced-sodium). Regular tomato and vegetable juice (not low-sodium or reduced-sodium). Pickles. Olives.  Fruits  Canned fruit in a light or heavy syrup. Fried fruit. Fruit in cream or butter sauce.  Meat and other protein foods  Fatty cuts of meat. Ribs. Fried meat. Beth. Sausage. Bologna and other processed lunch meats. Salami. Fatback. Hotdogs. Bratwurst. Salted nuts and seeds. Canned beans with added salt. Canned or smoked fish. Whole eggs or egg yolks. Chicken or turkey with skin.  Dairy  Whole or 2% milk, cream, and half-and-half. Whole or full-fat cream cheese. Whole-fat or sweetened yogurt. Full-fat cheese. Nondairy creamers. Whipped toppings.  Processed cheese and cheese spreads.  Fats and oils  Butter. Stick margarine. Lard. Shortening. Ghee. Beth fat. Tropical oils, such as coconut, palm kernel, or palm oil.  Seasoning and other foods  Salted popcorn and pretzels. Onion salt, garlic salt, seasoned salt, table salt, and sea salt. Worcestershire sauce. Tartar sauce. Barbecue sauce. Teriyaki sauce. Soy sauce, including reduced-sodium. Steak sauce. Canned and packaged gravies. Fish sauce. Oyster sauce. Cocktail sauce. Horseradish that you find on the shelf. Ketchup. Mustard. Meat flavorings and tenderizers. Bouillon cubes. Hot sauce and Tabasco sauce. Premade or packaged marinades. Premade or packaged taco seasonings. Relishes. Regular salad dressings.  Where to find more information:  · National Heart, [employer], and [employer] [place]: www.nhlbi.nih.gov  · American Heart Association: www.heart.org  Summary  · The DASH eating plan is a healthy eating plan that has been shown to reduce high blood pressure (hypertension). It may also reduce your risk for type 2 diabetes, heart disease, and stroke.  · With the DASH eating plan, you should limit salt (sodium) intake to 2,300 mg a day. If you have hypertension, you may need to reduce your sodium intake to 1,500 mg a day.  · When on the DASH eating plan, aim to eat more fresh fruits and vegetables, whole grains, lean proteins, low-fat dairy, and heart-healthy fats.  · Work with your health care provider or diet and nutrition specialist (dietitian) to adjust your eating plan to your individual calorie needs.  This information is not intended to replace advice given to you by your health care provider. Make sure you discuss any questions you have with your health care provider.  Document Released: 12/06/2012 Document Revised: 12/11/2017 Document Reviewed: 12/11/2017  [employer] Patient Education © 2019 [employer]        Stop amlodipine start nifedipine 30mg daily in am, monitor BP at home call with elevated  readings ,  Encouraged low salt diet, hand out given, elevation.   Refer to cardiology will call with appt.   Cont to monitor BS at home,   Stat dopplers today will call with results.   If any worsening symptoms, pain swelling advised ER.   Cont f/u with oncology as scheduled.   Increase fluid intake, get plenty of rest.   Patient agrees with plan of care and understands instructions. Call if worsening symptoms or any problems or concerns.

## 2019-10-09 ENCOUNTER — TELEPHONE (OUTPATIENT)
Dept: FAMILY MEDICINE CLINIC | Facility: CLINIC | Age: 84
End: 2019-10-09

## 2019-10-11 ENCOUNTER — LAB (OUTPATIENT)
Dept: LAB | Facility: HOSPITAL | Age: 84
End: 2019-10-11

## 2019-10-11 ENCOUNTER — OFFICE VISIT (OUTPATIENT)
Dept: ONCOLOGY | Facility: CLINIC | Age: 84
End: 2019-10-11

## 2019-10-11 VITALS
TEMPERATURE: 98 F | RESPIRATION RATE: 16 BRPM | DIASTOLIC BLOOD PRESSURE: 64 MMHG | HEIGHT: 61 IN | SYSTOLIC BLOOD PRESSURE: 145 MMHG | OXYGEN SATURATION: 96 % | WEIGHT: 164.6 LBS | HEART RATE: 74 BPM | BODY MASS INDEX: 31.08 KG/M2

## 2019-10-11 DIAGNOSIS — Z09 ENCOUNTER FOR FOLLOW-UP EXAMINATION AFTER COMPLETED TREATMENT FOR CONDITIONS OTHER THAN MALIGNANT NEOPLASM: ICD-10-CM

## 2019-10-11 DIAGNOSIS — C50.312 MALIGNANT NEOPLASM OF LOWER-INNER QUADRANT OF LEFT BREAST IN FEMALE, ESTROGEN RECEPTOR POSITIVE (HCC): Primary | ICD-10-CM

## 2019-10-11 DIAGNOSIS — Z17.0 MALIGNANT NEOPLASM OF LOWER-INNER QUADRANT OF LEFT BREAST IN FEMALE, ESTROGEN RECEPTOR POSITIVE (HCC): Primary | ICD-10-CM

## 2019-10-11 DIAGNOSIS — C50.312 MALIGNANT NEOPLASM OF LOWER-INNER QUADRANT OF LEFT BREAST IN FEMALE, ESTROGEN RECEPTOR POSITIVE (HCC): ICD-10-CM

## 2019-10-11 DIAGNOSIS — Z17.0 MALIGNANT NEOPLASM OF LOWER-INNER QUADRANT OF LEFT BREAST IN FEMALE, ESTROGEN RECEPTOR POSITIVE (HCC): ICD-10-CM

## 2019-10-11 LAB
ALBUMIN SERPL-MCNC: 4.2 G/DL (ref 3.5–5.2)
ALBUMIN/GLOB SERPL: 1.6 G/DL (ref 1.1–2.4)
ALP SERPL-CCNC: 50 U/L (ref 38–116)
ALT SERPL W P-5'-P-CCNC: 14 U/L (ref 0–33)
ANION GAP SERPL CALCULATED.3IONS-SCNC: 12.7 MMOL/L (ref 5–15)
AST SERPL-CCNC: 20 U/L (ref 0–32)
BASOPHILS # BLD AUTO: 0.06 10*3/MM3 (ref 0–0.2)
BASOPHILS NFR BLD AUTO: 0.9 % (ref 0–1.5)
BILIRUB SERPL-MCNC: 0.2 MG/DL (ref 0.2–1.2)
BUN BLD-MCNC: 18 MG/DL (ref 6–20)
BUN/CREAT SERPL: 16.7 (ref 7.3–30)
CALCIUM SPEC-SCNC: 9.2 MG/DL (ref 8.5–10.2)
CHLORIDE SERPL-SCNC: 96 MMOL/L (ref 98–107)
CO2 SERPL-SCNC: 25.3 MMOL/L (ref 22–29)
CREAT BLD-MCNC: 1.08 MG/DL (ref 0.6–1.1)
DEPRECATED RDW RBC AUTO: 41.2 FL (ref 37–54)
EOSINOPHIL # BLD AUTO: 0.12 10*3/MM3 (ref 0–0.4)
EOSINOPHIL NFR BLD AUTO: 1.8 % (ref 0.3–6.2)
ERYTHROCYTE [DISTWIDTH] IN BLOOD BY AUTOMATED COUNT: 12 % (ref 12.3–15.4)
GFR SERPL CREATININE-BSD FRML MDRD: 48 ML/MIN/1.73
GLOBULIN UR ELPH-MCNC: 2.7 GM/DL (ref 1.8–3.5)
GLUCOSE BLD-MCNC: 90 MG/DL (ref 74–124)
HCT VFR BLD AUTO: 36.3 % (ref 34–46.6)
HGB BLD-MCNC: 12.1 G/DL (ref 12–15.9)
IMM GRANULOCYTES # BLD AUTO: 0.01 10*3/MM3 (ref 0–0.05)
IMM GRANULOCYTES NFR BLD AUTO: 0.1 % (ref 0–0.5)
LYMPHOCYTES # BLD AUTO: 1.59 10*3/MM3 (ref 0.7–3.1)
LYMPHOCYTES NFR BLD AUTO: 23.8 % (ref 19.6–45.3)
MCH RBC QN AUTO: 31 PG (ref 26.6–33)
MCHC RBC AUTO-ENTMCNC: 33.3 G/DL (ref 31.5–35.7)
MCV RBC AUTO: 93.1 FL (ref 79–97)
MONOCYTES # BLD AUTO: 0.67 10*3/MM3 (ref 0.1–0.9)
MONOCYTES NFR BLD AUTO: 10 % (ref 5–12)
NEUTROPHILS # BLD AUTO: 4.23 10*3/MM3 (ref 1.7–7)
NEUTROPHILS NFR BLD AUTO: 63.4 % (ref 42.7–76)
NRBC BLD AUTO-RTO: 0 /100 WBC (ref 0–0.2)
PLATELET # BLD AUTO: 292 10*3/MM3 (ref 140–450)
PMV BLD AUTO: 8.8 FL (ref 6–12)
POTASSIUM BLD-SCNC: 4.5 MMOL/L (ref 3.5–4.7)
PROT SERPL-MCNC: 6.9 G/DL (ref 6.3–8)
RBC # BLD AUTO: 3.9 10*6/MM3 (ref 3.77–5.28)
SODIUM BLD-SCNC: 134 MMOL/L (ref 134–145)
WBC NRBC COR # BLD: 6.68 10*3/MM3 (ref 3.4–10.8)

## 2019-10-11 PROCEDURE — 99214 OFFICE O/P EST MOD 30 MIN: CPT | Performed by: INTERNAL MEDICINE

## 2019-10-11 PROCEDURE — G0463 HOSPITAL OUTPT CLINIC VISIT: HCPCS | Performed by: INTERNAL MEDICINE

## 2019-10-11 PROCEDURE — 80053 COMPREHEN METABOLIC PANEL: CPT

## 2019-10-11 PROCEDURE — 36415 COLL VENOUS BLD VENIPUNCTURE: CPT

## 2019-10-11 PROCEDURE — 85025 COMPLETE CBC W/AUTO DIFF WBC: CPT

## 2019-10-11 NOTE — PROGRESS NOTES
Subjective     REASON FOR CONSULTATION:   1. Left breast cancer [id number] ER/MA+ her2 neg post lumpectomy/SLN  3/18 and whole breast radiation completed May 2018  2.  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  Fatigue out of proportion to anemia  4.  Arimidex started in 3/18 with bone density showing normal density in the spine and osteopenia in the hips-this was self stopped due to vision changes.  5.  Aromasin initiated 10/20/18 and discontinued 1/20/19 secondary to vision changes.                             REQUESTING PHYSICIAN:  Myles Soliman M.D.    History of Present Illness patient is an 82-year-old female with a T1cN I jese hormone positive left breast cancer treated with lumpectomy sentinel node biopsy and radiation.    In 10/2018 she was changed to Aromasin as she had issues previously with Arimidex with visual blurring.  The patient was then seen 1/25/19 with reports of blurred vision once again.    At that point the patient's symptoms have essentially resolved.  We therefore elected to switch her to Femara.    Unfortunately, the patient ultimately experienced similar symptoms with Femara because of muscle cramps and blurred vision.    Patient is here today having switched from Prozac which was incompatible with tamoxifen to Lexapro and tamoxifen and thankfully she has had no side effects whatsoever with the tamoxifen    She remains on a baby aspirin we again reiterated the risk of DVT and to watch closely for this but she is very happy with her lack of side effects.  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    • Chronic kidney disease    • CKD (chronic kidney disease)     PT STATES STAGE 3   •  Depression    • Diabetes mellitus (CMS/HCC)     BORDERLINE   • Diverticular disease    • Gastric ulcer     AND DUODENAL   • GERD (gastroesophageal reflux disease)    • Hearing loss     BILAT AIDES   • History of colon polyps    • History of depression    • History of GI bleed    • Hypertension    • Hypothyroidism    • Peptic ulceration    • PONV (postoperative nausea and vomiting)    • Poor vision     RIGHT EYE, HAS PERIPHERAL VISION    • Stroke (CMS/HCC) 2000     mild weakness on left, partial sight loss on right         Past Surgical History:   Procedure Laterality Date   • ABDOMINAL SURGERY     • APPENDECTOMY     • BLADDER REPAIR      AND RECTOCELE   • BREAST BIOPSY     • BREAST CYST ASPIRATION     • BREAST LUMPECTOMY WITH SENTINEL NODE BIOPSY Left 3/19/2018    Procedure: BREAST LUMPECTOMY WITH SENTINEL NODE BIOPSY AND NEEDLE LOCALIZATION;  Surgeon: Myles Soliman MD;  Location:  [facility] [place];  Service: General   • CHOLECYSTECTOMY     • COLONOSCOPY  2013   • COLONOSCOPY N/A 2/16/2018    Procedure: COLONOSCOPY into cecum and T.I. with biopsies;  Surgeon: Augustine Gallego MD;  Location: [facility] ENDOSCOPY;  Service:    • ENDOSCOPY N/A 10/17/2017    Procedure: ESOPHAGOGASTRODUODENOSCOPY WITH COLD BIOPSIES;  Surgeon: Augustine Gallego MD;  Location: [facility] ENDOSCOPY;  Service:    • ENDOSCOPY N/A 2/16/2018    Procedure: ESOPHAGOGASTRODUODENOSCOPY with biopsies and #54 [address] DILATATION;  Surgeon: Augustine Gallego MD;  Location: [facility] ENDOSCOPY;  Service:    • EYE SURGERY Left 2014    3-4 years, cornea replacement    • HYSTERECTOMY     • MOUTH SURGERY  2000    UNDER TONGUE AND LEFT FLOOR OF MOUTH FOR CA   • PARATHYROIDECTOMY      PER PT   • SINUS SURGERY     • SKIN BIOPSY     • THYROID SURGERY     • VARICOSE VEIN SURGERY      Oncological history  patient is an 82-year-old female with recent problems with abdominal pain and diarrhea and weight loss which is being evaluated by GI and finally found to have multiple  ulcers in the duodenum and small bowel finally responding to treatment.  In the middle of this she went for routine mammogram a couple of months later was found to have an abnormality in the left breast at 7 o'clock position measuring about 12 mm in size that was not present last year.  The patient had previously had a left breast biopsy in  with benign findings.   There was no palpable mass and biopsy was done and this revealed a grade 1 infiltrating ductal carcinoma at least 1 cm in size ER 78%/AR 17% positive HER-2 negative.  The patient was referred to Dr. Soliman who performed a lumpectomy and sentinel node biopsy with the findings of a 1.2 cm tumor grade 2 with lymphovascular invasion and clear margins with associated DCIS.  One of 2 sentinel nodes showed a micrometastasis although the pathologist said this was almost too small to be considered a micrometastasis.  Postoperatively she has done well and thankfully her GI complains of much improved with Carafate and Protonix and her diarrhea finally resolved.    She's not had a bone density in quite a while.  She is  5 para 5  menarche at age 15 and menopause in her mid 50s although she had a hysterectomy at 30 for an ulcerated uterus.  First childbirth was at age 20 she breast-fed all 5 children.  She took hormonal therapy for at least 15 years after menopause and stopped about 10 years ago   .  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    Her father  at 52 of stomach cancer mother had thyroid cancer at age 50 her brother's daughter had breast cancer age 40 and she had a paternal aunt with leukemia is no other breast cancer or ovarian cancer uterine cancer or pancreatic cancer in the family . I did ask him to check with her niece to see if she has had genetic testing - they do not know much at all about the maternal family .    She will call for the results of the DEXA scan and if  adequate we will start Arimidex which I have  already prescribed to her and see her back in 3 months to assess her tolerance.  She is in very good health and I explained to her that adjuvant therapy decreases the risk of recurrence of her cancer which might be in the 15-20% range probably down to the 10% range and if she has significant side effects we will be inclined to stop treatment.  Obviously if her GI symptoms recur with Arimidex we will have to try another medications.  We talked about the joint pains and hot flashes which are unlikely at her age and she was agreeable to a trial of Arimidex.  Radiation has been planned    1/19  In 10/2018 she was changed to Aromasin as she had issues previously with Arimidex with visual blurring.  The patient was then seen 1/25/19 with reports of blurred vision once again.    At that point the patient's symptoms have essentially resolved.  We therefore elected to switch her to Femara.    Unfortunately, the patient ultimately experienced similar symptoms with Femara because of muscle cramps and blurred vision.      I discussed with her today that Dr. Valenzuela would like the patient to consider taking tamoxifen as yet another potential medication to benefit her in light of history of hormone positive breast cancer.  It is noted that the patient takes Prozac and has been on this for many many years.  There is a potential interaction and I plan to discuss this with Dr. Valenzuela further.  If okayed per Dr. Valenzuela, we would plan then to start the patient on this as soon as next week.        Current Outpatient Medications on File Prior to Visit   Medication Sig Dispense Refill   • aspirin 81 MG tablet Take 81 mg by mouth Daily.     • colestipol (COLESTID) 1 g tablet Take 1 g by mouth 2 (Two) Times a Day.     • [employer] D3 2000 units capsule TAKE 1 CAPSULE BY MOUTH DAILY. 90 capsule 1   • escitalopram (LEXAPRO) 10 MG tablet TAKE 1/2 TABLET DAILY FOR 7 DAYS THEN 1 TABLET DAILY THEREAFTER 30  tablet 0   • ezetimibe (ZETIA) 10 MG tablet Take 1 tablet by mouth Daily. 30 tablet 1   • fluorometholone (FML) 0.1 % ophthalmic suspension Administer 1 drop into the left eye Daily.     • glucose blood (FREESTYLE TEST STRIPS) test strip TEST BLOOD SUGARS ONCE DAILY 50 each 12   • ketotifen (ZADITOR) 0.025 % ophthalmic solution 1 drop 2 (Two) Times a Day.     • levothyroxine (SYNTHROID, LEVOTHROID) 75 MCG tablet TAKE 1 TABLET BY MOUTH DAILY 90 tablet 3   • NIFEdipine XL (PROCARDIA XL) 30 MG 24 hr tablet Take 1 tablet by mouth Daily. 30 tablet 1   • NON FORMULARY Administer 1 drop to both eyes 3 (Three) Times a Day. THERA TEARS     • Omega-3 Fatty Acids (FISH OIL PO) Take  by mouth Daily As Needed.     • Probiotic Product (PROBIOTIC DAILY PO) Take 1 capsule by mouth Daily.     • RABEprazole (ACIPHEX) 20 MG EC tablet Take 1 tablet by mouth Daily. 30 tablet 11   • tamoxifen (NOLVADEX) 20 MG chemo tablet Take 1 tablet by mouth Daily. 30 tablet 5   • valsartan (DIOVAN) 160 MG tablet TAKE 1/2 TABLET BY MOUTH DAILY 30 tablet 0   • amoxicillin (AMOXIL) 500 MG capsule TAKE FOUR CAPSULES BY MOUTH ONE HOUR BEFORE DENTAL SURGERY 40 capsule 0   • CARAFATE 1 GM/10ML suspension TAKE 10 MLS BY MOUTH EVERY 6 HOURS TO COAT STOMACH 420 mL 3     No current facility-administered medications on file prior to visit.         ALLERGIES:    Allergies   Allergen Reactions   • Other Nausea Only     All mycins   • Sulfa Antibiotics Other (See Comments)     LOST CONSCIOUSNESS AND BODY TURNED RED/ON FIRE   • Ciprofloxacin Other (See Comments)     RED BLISTERS   • Iodine Other (See Comments)     DECADES AGO, IODINE INJECTION CAUSED SKIN REDNESS AND BURNING   • Macrodantin [Nitrofurantoin Macrocrystal] Diarrhea and Nausea And Vomiting   • Nitrofurantoin Nausea And Vomiting   • Yeast-Related Products Other (See Comments)     MOUTH SORES AND BLADDER INFECTION        Social History     Socioeconomic History   • Marital status:      Spouse name:  "Nicolas   • Number of children: Not on file   • Years of education: High school   • Highest education level: Not on file   Occupational History   • Occupation: Homemaker   Tobacco Use   • Smoking status: Former Smoker     Packs/day: 1.00     Years: 10.00     Pack years: 10.00     Types: Cigarettes     Start date:      Last attempt to quit:      Years since quittin.7   • Smokeless tobacco: Never Used   Substance and Sexual Activity   • Alcohol use: No   • Drug use: No   • Sexual activity: Defer   Social History Narrative    Accompanied by daughters Macrina and Daisy        Family History   Problem Relation Age of Onset   • Esophageal cancer Father    • Stomach cancer Father    • Heart disease Mother    • Thyroid cancer Mother    • Hypertension Mother    • Hypertension Sister    • Hypertension Brother    • Stroke Brother    • Heart disease Brother    • Diabetes Brother    • Leukemia Paternal Aunt    • Malig Hyperthermia Neg Hx         Review of Systems   Constitutional: Positive for fatigue (same 10/11/19). Negative for chills and fever.   HENT: Negative.    Eyes: Negative for discharge and visual disturbance.   Respiratory: Positive for shortness of breath (10/11/19). Negative for cough and chest tightness.    Cardiovascular: Positive for leg swelling (10/11/19). Negative for chest pain.   Gastrointestinal: Negative for abdominal distention, abdominal pain and nausea.   Genitourinary: Negative for difficulty urinating (better 10/11/19) and frequency.   Musculoskeletal: Positive for back pain (Chronic, stable 10/11/19) and joint swelling (hands 10/11/19).   Skin: Negative for pallor and rash.   Neurological: Negative for dizziness and numbness.   Psychiatric/Behavioral: Negative for behavioral problems and confusion.        Objective     Vitals:    10/11/19 1054   BP: 145/64   Pulse: 74   Resp: 16   Temp: 98 °F (36.7 °C)   SpO2: 96%   Weight: 74.7 kg (164 lb 9.6 oz)   Height: 154.5 cm (60.83\")   PainSc: " 0-No pain     Current Status 10/11/2019   ECOG score 0       Physical Exam   Pulmonary/Chest:           GENERAL:  Well-developed, well-nourished in no acute distress.   SKIN:  Warm, dry without rashes, purpura or petechiae.  EYES:  Pupils equal, round and reactive to light.  EOMs intact.  Conjunctivae normal.  EARS:  Hearing intact.  NOSE:  Septum midline.  No excoriations or nasal discharge.  MOUTH:  Tongue is well-papillated; no stomatitis or ulcers.  Lips normal.  THROAT:  Oropharynx without lesions or exudates.  NECK:  Supple with good range of motion; no thyromegaly or masses, no JVD.  LYMPHATICS:  No cervical, supraclavicular, axillary adenopathy.  CHEST:  Lungs clear to auscultation. Good airflow.  BREASTS: Right breast unremarkable.  Left breast with well-healed lumpectomy scar.  She is slightly tender to palpation the linear nodularity at the inner quadrant?  Mondor's disease.  CARDIAC:  Regular rate and rhythm without murmurs, rubs or gallops. Normal S1,S2.  ABDOMEN:  Soft, nontender with no hepatosplenomegaly or masses.  EXTREMITIES:  No clubbing, cyanosis or edema.  Skin bruising and hematoma from a fall on her left shin  NEUROLOGICAL:  Cranial Nerves II-XII grossly intact.  No focal neurological deficits.  PSYCHIATRIC:  Normal affect and mood.        RECENT LABS:    Results from last 7 days   Lab Units 10/11/19  1058   WBC 10*3/mm3 6.68   NEUTROS ABS 10*3/mm3 4.23   HEMOGLOBIN g/dL 12.1   HEMATOCRIT % 36.3   PLATELETS 10*3/mm3 292             Lab Results   Component Value Date    IRON 98 07/03/2019    TIBC 340 07/03/2019    FERRITIN 710.30 (H) 07/03/2019     Iron Sat 8%    R 78% VA 17% Her 2 -neg Ki-67 7%  Final Diagnosis   1.  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                SEE SYNOPTIC REPORT (BELOW) FOR ADDITIONAL DETAILS.     2.  SENTINEL LYMPH  NODE #1, LEFT AXILLA, EXCISION:                ONE LYMPH NODE, POSITIVE FOR METASTATIC CARCINOMA (MICROMETASTASIS) (SEE COMMENT).               NO EXTRANODAL EXTENSION IS IDENTIFIED.     COMMENT: Measuring the exact size of the lymph node metastasis is difficult. The lymph node demonstrates a few very small scattered foci of tumor cells, compatible with isolated tumor cells.  One slide demonstrates a single contiguous focus of tumor cells exceeding 0.2 mm; therefore, this is best considered a micrometastasis rather than isolated tumor cells.     3.  SENTINEL LYMPH NODE #2, LEFT AXILLA, EXCISION.               ONE LYMPH NODE, NEGATIVE FOR METASTATIC CARCINOMA.     SYNOPTIC REPORT (Based on [facility] cancer case summary, version January 2018):               Procedure: Excision (less than total mastectomy).               Specimen laterality: Left.                Tumor site: Not specified.                Tumor size: 1.2 x 1.1 x 1 cm.               Histologic type: Invasive carcinoma of no special type (ductal, not otherwise specified).                Histologic grade (Melony Histologic Score):                              Glandular (acinar)/tubular differentiation: Score 3.                            Nuclear pleomorphism: Score 2.                             Mitotic rate: Score 1.                            Overall grade: Grade 2 (Score 6 of 9).               Tumor focality: Single focus of invasive carcinoma.                Ductal carcinoma in situ (DCIS): Present.                            Architectural patterns: Solid.                             Nuclear grade: Grade II (intermediate).                            Necrosis: Present, central comedonecrosis.                Lobular carcinoma in situ (LCIS): No LCIS in specimen.               Margins:                            Invasive carcinoma margins: Uninvolved by invasive carcinoma.                                          Distance from closest margin: 3 mm (medial  margin).                                          NOTE: Invasive carcinoma also extends to within 4 mm of superior margin and to within                                                 4 mm of lateral margin.  All additional margins are greater than 10 mm from invasive                                                 carcinoma.                              DCIS Margins: Uninvolved by DCIS.                                          Distance from closest margin: 3 mm (medial margin).                    Regional Lymph nodes: Involved by tumor cells.                             Number of lymph nodes with macrometastases: 0.                            Number of lymph nodes with micrometastases: 1.                            Size of largest metastatic deposit: 0.4 mm.                            Extranodal extension: Not identified.                            Number of lymph nodes examined: 2.                            Number of sentinel lymph nodes examined: 2.                Treatment effect: No known presurgical therapy.                Lymphovascular invasion: Present.                Dermal Lymphovascular invasion: Not identified.                Pathologic stage classification.                            Primary tumor: pT1c.                            Regional lymph nodes: pN1mi(sn).                            Distant metastasis: Not applicable.                Additional pathologic findings:                             Ductal hyperplasia of the usual type.                             Fibrocystic changes with duct dilatation and stasis and focal apocrine metaplasia.                             Columnar cell change without atypia.                             Fibroinflammatory changes consistent with site of prior biopsy.                    Ancillary studies: ER, TX, HER-2/melanie, and Ki-67 studies performed on previous biopsy specimen at                       outside facility ([facility]).               Duplex scan of  extremity veins including responses to compression and other maneuvers; bilateral   Study Impression     • Right Common Femoral: No deep vein thrombosis noted.   • Right Saphenofemoral Junction: No superficial thrombophlebitis noted.   • Right Proximal Femoral: No deep vein thrombosis noted.   • Right Mid Femoral: No deep vein thrombosis noted.   • Right Distal Femoral: No deep vein thrombosis noted.   • Right Popliteal: No deep vein thrombosis noted.   • Right Posterior Tibial: No deep vein thrombosis noted.   • Right Peroneal: No deep vein thrombosis noted.   • Right Gastrocnemius Soleal: No deep vein thrombosis noted.   • Right Greater Saphenous Above Knee: No superficial thrombophlebitis noted.   • Right Greater Saphenous Below Knee: No superficial thrombophlebitis noted.   • Left Common Femoral: No deep vein thrombosis noted.   • Left Saphenofemoral Junction: No superficial thrombophlebitis noted.   • Left Proximal Femoral: No deep vein thrombosis noted.   • Left Mid Femoral: No deep vein thrombosis noted.   • Left Distal Femoral: No deep vein thrombosis noted.   • Left Popliteal: No deep vein thrombosis noted.   • Left Posterior Tibial: No deep vein thrombosis noted.   • Left Peroneal: No deep vein thrombosis noted.   • Left Gastrocnemius Soleal: No deep vein thrombosis noted.   • Left Greater Saphenous Above Knee: No superficial thrombophlebitis noted.   • Left Greater Saphenous Below Knee: No superficial thrombophlebitis noted.   Electronically signed by Nicolas Cotton MD on 10/4/19 at 1933 EDT                Assessment/Plan      1.  T1c N1 jese N0 ER/HI positive HER-2 negative left breast cancer post lumpectomy and radiation  · Arimidex started in 3/18 discontinued by the patient in 7/18 due to visual blurring which resolved.    · Aromasin started 11/2018. 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  She also has  had weight gain. Aromasin discontinued.  · Patient seen early April 2019 and follow-up.  Patient agreeable to switch therapy to Femara 2.5 mg daily.  · Patient ultimately discontinuing Femara due to repeated arthralgias and blurred vision.  She is reviewed today, 8/1/2019 she has been switched from Prozac to Celexa and we will start 20 mg of tamoxifen.     2.  Family history of breast cancer in a niece at a young age and father with cancer at age 51?  Genetic testing  4.  History of oral cancer in 1990  5.  Anemia, recently worked up per Dr. Ivan, internal medicine.   · Patient had EGD and colonoscopy per Dr. Gallego 1 year ago with no concerning findings.  Because of her history of GI ulcers however she was started on AcipHex per Dr. Ivan.  · 3/21/2019 showed iron saturation of 8% ferritin of 18. Patient started on trial of oral iron but was intolerant due to GI upset.  · Patient receiving 2 doses of IV Injectafer in May 2019.  Iron stores are now replete with iron saturation of 29% and hemoglobin having now normalized to 13.  6.  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  Continue tamoxifen 20 mg daily.  Plus a baby aspirin    2.  Return in 3 months for follow-up with NP and see me in 6 months to assess her tolerance  3.  Patient not due again for annual mammogram until February 2020.  4.  DEXA scan due in April 2020    I did encourage her to call if there is any visual symptoms and again stressed to the signs and symptoms of DVT.  I explained with the microscopic node positivity I felt it still would be worthwhile trying some kind of hormonal blockade for at least another year or 2 if she can tolerate it

## 2019-10-24 ENCOUNTER — OFFICE VISIT (OUTPATIENT)
Dept: CARDIOLOGY | Facility: CLINIC | Age: 84
End: 2019-10-24

## 2019-10-24 VITALS
DIASTOLIC BLOOD PRESSURE: 82 MMHG | SYSTOLIC BLOOD PRESSURE: 154 MMHG | WEIGHT: 163.4 LBS | HEART RATE: 69 BPM | HEIGHT: 60 IN | BODY MASS INDEX: 32.08 KG/M2

## 2019-10-24 DIAGNOSIS — E78.2 MIXED HYPERLIPIDEMIA: ICD-10-CM

## 2019-10-24 DIAGNOSIS — R06.09 DYSPNEA ON EXERTION: ICD-10-CM

## 2019-10-24 DIAGNOSIS — I20.8 STABLE ANGINA PECTORIS (HCC): Primary | ICD-10-CM

## 2019-10-24 DIAGNOSIS — I10 ESSENTIAL HYPERTENSION: ICD-10-CM

## 2019-10-24 DIAGNOSIS — I65.22 STENOSIS OF LEFT CAROTID ARTERY: ICD-10-CM

## 2019-10-24 PROCEDURE — 93000 ELECTROCARDIOGRAM COMPLETE: CPT | Performed by: INTERNAL MEDICINE

## 2019-10-24 PROCEDURE — 99204 OFFICE O/P NEW MOD 45 MIN: CPT | Performed by: INTERNAL MEDICINE

## 2019-10-24 NOTE — PROGRESS NOTES
Date of Office Visit: 10/24/19  Encounter Provider: Grzegorz Carter MD  Place of Service: [facility] CARDIOLOGY  Patient Name: Geeta Butt  :1935  Referral Provider:Talya Alfaro APRN      Chief Complaint   Patient presents with   • Irregular Heart Beat     History of Present Illness  Mrs. Butt is a pleasant 84-year-old female with no prior history of heart disease does have a history of hypertension hyperlipidemia breast cancer and stroke.  But she now comes in with chest pain that occurs about every few days it can occur at any time it has not changed any over the past few months when it started.  It lasts up to 10 minutes does get some associated shortness of breath with it but she also gets some shortness of breath at other times.  No adriana exertional pain or pressure.  She also has palpitations that occur almost every day lasting a few seconds.  No associated dizziness, near-syncope or syncope.  She is also had lower extremity edema and had a normal lower extremity venous study she said that swelling for 2 years and is been relatively unchanged.  She denies any prior history of rheumatic fever, heart attack, heart failure, heart murmur.  She also had in July of this year [employer] screening and had moderate disease in the left carotid artery.  Also the screening she had suggested possible ectopy.          Past Medical History:   Diagnosis Date   • Anesthesia complication     ASPIRATION INTO AIRWAY WITH TRACH PRESENT IN PAST (WITH ORAL CANCER SURGERY(   • Arthritis    • Aspiration into airway     post anesthesia   • Breast cancer (CMS/HCC)    • Cancer (CMS/HCC)     mouth cancer    • Chronic kidney disease    • CKD (chronic kidney disease)     PT STATES STAGE 3   • Depression    • Diabetes mellitus (CMS/HCC)     BORDERLINE   • Diverticular disease    • Gastric ulcer     AND DUODENAL   • GERD (gastroesophageal reflux disease)    • Hearing loss     BILAT AIDES   •  History of colon polyps    • History of depression    • History of GI bleed    • Hypertension    • Hypothyroidism    • Peptic ulceration    • PONV (postoperative nausea and vomiting)    • Poor vision     RIGHT EYE, HAS PERIPHERAL VISION    • Stroke (CMS/HCC) 2000     mild weakness on left, partial sight loss on right          Past Surgical History:   Procedure Laterality Date   • ABDOMINAL SURGERY     • APPENDECTOMY     • BLADDER REPAIR      AND RECTOCELE   • BREAST BIOPSY     • BREAST CYST ASPIRATION     • BREAST LUMPECTOMY WITH SENTINEL NODE BIOPSY Left 3/19/2018    Procedure: BREAST LUMPECTOMY WITH SENTINEL NODE BIOPSY AND NEEDLE LOCALIZATION;  Surgeon: Myles Soliman MD;  [place]  [facility] [place] [facility] [place]  Service: General   • CHOLECYSTECTOMY     • COLONOSCOPY  2013   • COLONOSCOPY N/A 2/16/2018    Procedure: COLONOSCOPY into cecum and T.I. with biopsies;  Surgeon: Augustine Gallego MD;  Location: [facility] ENDOSCOPY;  Service:    • ENDOSCOPY N/A 10/17/2017    Procedure: ESOPHAGOGASTRODUODENOSCOPY WITH COLD BIOPSIES;  Surgeon: Augustine Gallego MD;  Location: [facility] ENDOSCOPY;  Service:    • ENDOSCOPY N/A 2/16/2018    Procedure: ESOPHAGOGASTRODUODENOSCOPY with biopsies and #54 [address] DILATATION;  Surgeon: Augustine Gallego MD;  Location: [facility] ENDOSCOPY;  Service:    • EYE SURGERY Left 2014    3-4 years, cornea replacement    • HYSTERECTOMY     • MOUTH SURGERY  2000    UNDER TONGUE AND LEFT FLOOR OF MOUTH FOR CA   • PARATHYROIDECTOMY      PER PT   • SINUS SURGERY     • SKIN BIOPSY     • THYROID SURGERY     • VARICOSE VEIN SURGERY           Current Outpatient Medications on File Prior to Visit   Medication Sig Dispense Refill   • amoxicillin (AMOXIL) 500 MG capsule TAKE FOUR CAPSULES BY MOUTH ONE HOUR BEFORE DENTAL SURGERY 40 capsule 0   • aspirin 81 MG tablet Take 81 mg by mouth Daily.     • CARAFATE 1 GM/10ML suspension TAKE 10 MLS BY MOUTH EVERY 6 HOURS TO COAT STOMACH 420 mL 3   • colestipol (COLESTID) 1 g tablet Take  1 g by mouth 2 (Two) Times a Day.     • [employer] D3 2000 units capsule TAKE 1 CAPSULE BY MOUTH DAILY. 90 capsule 1   • escitalopram (LEXAPRO) 10 MG tablet TAKE 1/2 TABLET DAILY FOR 7 DAYS THEN 1 TABLET DAILY THEREAFTER 30 tablet 0   • ezetimibe (ZETIA) 10 MG tablet Take 1 tablet by mouth Daily. 30 tablet 1   • fluorometholone (FML) 0.1 % ophthalmic suspension Administer 1 drop into the left eye Daily.     • glucose blood (FREESTYLE TEST STRIPS) test strip TEST BLOOD SUGARS ONCE DAILY 50 each 12   • ketotifen (ZADITOR) 0.025 % ophthalmic solution 1 drop 2 (Two) Times a Day.     • levothyroxine (SYNTHROID, LEVOTHROID) 75 MCG tablet TAKE 1 TABLET BY MOUTH DAILY 90 tablet 3   • NIFEdipine XL (PROCARDIA XL) 30 MG 24 hr tablet Take 1 tablet by mouth Daily. 30 tablet 1   • NON FORMULARY Administer 1 drop to both eyes 3 (Three) Times a Day. THERA TEARS     • Omega-3 Fatty Acids (FISH OIL PO) Take  by mouth Daily As Needed.     • Probiotic Product (PROBIOTIC DAILY PO) Take 1 capsule by mouth Daily.     • RABEprazole (ACIPHEX) 20 MG EC tablet Take 1 tablet by mouth Daily. 30 tablet 11   • tamoxifen (NOLVADEX) 20 MG chemo tablet Take 1 tablet by mouth Daily. 30 tablet 5   • valsartan (DIOVAN) 160 MG tablet TAKE 1/2 TABLET BY MOUTH DAILY 30 tablet 0     No current facility-administered medications on file prior to visit.          Social History     Socioeconomic History   • Marital status:      Spouse name: Nicolas   • Number of children: Not on file   • Years of education: High school   • Highest education level: Not on file   Occupational History   • Occupation: Homemaker   Tobacco Use   • Smoking status: Former Smoker     Packs/day: 1.00     Years: 10.00     Pack years: 10.00     Types: Cigarettes     Start date:      Last attempt to quit:      Years since quittin.8   • Smokeless tobacco: Never Used   Substance and Sexual Activity   • Alcohol use: No   • Drug use: No   • Sexual activity: Defer   Lifestyle   •  Physical activity:     Days per week: Patient refused     Minutes per session: Patient refused   • Stress: Not on file   Social History Narrative    Accompanied by daughters Macrina and Daisy       Family History   Problem Relation Age of Onset   • Esophageal cancer Father    • Stomach cancer Father    • Heart disease Mother    • Thyroid cancer Mother    • Hypertension Mother    • Hypertension Sister    • Hypertension Brother    • Stroke Brother    • Heart disease Brother    • Diabetes Brother    • Leukemia Paternal Aunt    • Malig Hyperthermia Neg Hx          Review of Systems   Constitution: Positive for malaise/fatigue. Negative for decreased appetite, diaphoresis, fever, weakness, weight gain and weight loss.   HENT: Negative for congestion, hearing loss, nosebleeds and tinnitus.    Eyes: Negative for blurred vision, double vision, vision loss in left eye, vision loss in right eye and visual disturbance.   Cardiovascular:        As noted in HPI   Respiratory:        As noted HPI   Endocrine: Negative for cold intolerance and heat intolerance.   Hematologic/Lymphatic: Negative for bleeding problem. Does not bruise/bleed easily.   Skin: Positive for poor wound healing. Negative for color change, flushing, itching and rash.   Musculoskeletal: Positive for joint pain. Negative for arthritis, back pain, joint swelling, muscle weakness and myalgias.   Gastrointestinal: Negative for bloating, abdominal pain, constipation, diarrhea, dysphagia, heartburn, hematemesis, hematochezia, melena, nausea and vomiting.   Genitourinary: Positive for frequency. Negative for bladder incontinence, dysuria, nocturia and urgency.   Neurological: Negative for dizziness, focal weakness, headaches, light-headedness, loss of balance, numbness, paresthesias and vertigo.   Psychiatric/Behavioral: Positive for depression. Negative for memory loss and substance abuse.       Procedures      ECG 12 Lead  Date/Time: 10/24/2019 9:58  "AM  Performed by: Grzegorz Carter MD  Authorized by: Grzegorz Carter MD   Comparison: compared with previous ECG   Similar to previous ECG  Rhythm: sinus rhythm  Ectopy: atrial premature contractions  Rate: normal  QRS axis: normal                  Objective:    /82 (BP Location: Right arm)   Pulse 69   Ht 152.4 cm (60\")   Wt 74.1 kg (163 lb 6.4 oz)   BMI 31.91 kg/m²        Physical Exam  Physical Exam   Constitutional: She is oriented to person, place, and time. She appears well-developed and well-nourished. No distress.   HENT:   Head: Normocephalic.   Eyes: Conjunctivae are normal. Pupils are equal, round, and reactive to light. No scleral icterus.   Neck: Normal carotid pulses, no hepatojugular reflux and no JVD present. Carotid bruit is not present. No tracheal deviation, no edema and no erythema present. No thyromegaly present.   Cardiovascular: Normal rate, regular rhythm, S1 normal, S2 normal, normal heart sounds and intact distal pulses.  No extrasystoles are present. PMI is not displaced. Exam reveals no gallop, no distant heart sounds and no friction rub.   No murmur heard.  Pulses:       Carotid pulses are 2+ on the right side, and 2+ on the left side.       Radial pulses are 2+ on the right side, and 2+ on the left side.        Femoral pulses are 2+ on the right side, and 2+ on the left side.       Dorsalis pedis pulses are 2+ on the right side, and 2+ on the left side.        Posterior tibial pulses are 2+ on the right side, and 2+ on the left side.   Pulmonary/Chest: Effort normal and breath sounds normal. No respiratory distress. She has no decreased breath sounds. She has no wheezes. She has no rhonchi. She has no rales. She exhibits no tenderness.   Abdominal: Soft. Bowel sounds are normal. She exhibits no distension and no mass. There is no hepatosplenomegaly. There is no tenderness. There is no rebound and no guarding.   Musculoskeletal: She exhibits edema (1+ bilateral " pretibial). She exhibits no tenderness or deformity.   Neurological: She is alert and oriented to person, place, and time.   Skin: Skin is warm and dry. No rash noted. She is not diaphoretic. No cyanosis or erythema. No pallor. Nails show no clubbing.   Psychiatric: She has a normal mood and affect. Her speech is normal and behavior is normal. Judgment and thought content normal.           Assessment:   1.  84-year-old female with chest pain that is atypical but unfortunately she also has shortness of breath with that she is at least intermediate risk.  Therefore she will return for a perfusion stress test as a 1 day non-walking protocol in addition we will check an echocardiogram if those are unremarkable would pursue non-cardiac evaluation.  In addition she did get radiation therapy to her left chest this would be early for atherosclerotic disease but certainly could have radiation-induced pericardial involvement.  2.  Lower extremity edema most likely due to venous insufficiency as above we will check an echocardiogram.  3.  Hypertension blood pressure borderline elevated today goal 140 over mid 80s or less.  4.  Hyperlipidemia currently just on Zetia unless she has documented ischemic disease would not switch to statin.  5.  Carotid artery stenosis apparently moderate on [employer] screening in July 2019 will need follow-up carotid ultrasound a year.  6.  History of breast cancer status post left modified mastectomy chemotherapy and radiation therapy that was completed in May 2018.  7.  Palpitations brief short-lived for above cardiac work-up is negative we just follow this clinically.         Plan:

## 2019-10-28 RX ORDER — VALSARTAN 160 MG/1
TABLET ORAL
Qty: 30 TABLET | Refills: 0 | Status: SHIPPED | OUTPATIENT
Start: 2019-10-28 | End: 2019-12-20

## 2019-11-01 RX ORDER — ESCITALOPRAM OXALATE 10 MG/1
TABLET ORAL
Qty: 30 TABLET | Refills: 0 | Status: SHIPPED | OUTPATIENT
Start: 2019-11-01 | End: 2019-12-10 | Stop reason: SDUPTHER

## 2019-11-04 RX ORDER — NIFEDIPINE 30 MG/1
TABLET, EXTENDED RELEASE ORAL
Qty: 30 TABLET | Refills: 1 | Status: SHIPPED | OUTPATIENT
Start: 2019-11-04 | End: 2020-01-14

## 2019-11-07 ENCOUNTER — HOSPITAL ENCOUNTER (OUTPATIENT)
Dept: CARDIOLOGY | Facility: HOSPITAL | Age: 84
Discharge: HOME | End: 2019-11-07
Admitting: INTERNAL MEDICINE

## 2019-11-07 ENCOUNTER — HOSPITAL ENCOUNTER (OUTPATIENT)
Dept: CARDIOLOGY | Facility: HOSPITAL | Age: 84
Discharge: HOME | End: 2019-11-07

## 2019-11-07 ENCOUNTER — TELEPHONE (OUTPATIENT)
Dept: CARDIOLOGY | Facility: CLINIC | Age: 84
End: 2019-11-07

## 2019-11-07 VITALS
SYSTOLIC BLOOD PRESSURE: 154 MMHG | DIASTOLIC BLOOD PRESSURE: 82 MMHG | HEART RATE: 68 BPM | BODY MASS INDEX: 32 KG/M2 | HEIGHT: 60 IN | WEIGHT: 163 LBS

## 2019-11-07 LAB
AORTIC ARCH: 1.8 CM
AORTIC ROOT ANNULUS: 1.5 CM
ASCENDING AORTA: 2.7 CM
BH CV ECHO MEAS - ACS: 1.8 CM
BH CV ECHO MEAS - AO MAX PG (FULL): 2 MMHG
BH CV ECHO MEAS - AO MAX PG: 6.4 MMHG
BH CV ECHO MEAS - AO MEAN PG (FULL): 0.54 MMHG
BH CV ECHO MEAS - AO MEAN PG: 2.6 MMHG
BH CV ECHO MEAS - AO ROOT AREA (BSA CORRECTED): 1.7
BH CV ECHO MEAS - AO ROOT AREA: 6.7 CM^2
BH CV ECHO MEAS - AO ROOT DIAM: 2.9 CM
BH CV ECHO MEAS - AO V2 MAX: 126.2 CM/SEC
BH CV ECHO MEAS - AO V2 MEAN: 65.6 CM/SEC
BH CV ECHO MEAS - AO V2 VTI: 31 CM
BH CV ECHO MEAS - ASC AORTA: 2.7 CM
BH CV ECHO MEAS - AVA(I,A): 2.3 CM^2
BH CV ECHO MEAS - AVA(I,D): 2.3 CM^2
BH CV ECHO MEAS - AVA(V,A): 2.1 CM^2
BH CV ECHO MEAS - AVA(V,D): 2.1 CM^2
BH CV ECHO MEAS - BSA(HAYCOCK): 1.8 M^2
BH CV ECHO MEAS - BSA: 1.7 M^2
BH CV ECHO MEAS - BZI_BMI: 31.8 KILOGRAMS/M^2
BH CV ECHO MEAS - BZI_METRIC_HEIGHT: 152.4 CM
BH CV ECHO MEAS - BZI_METRIC_WEIGHT: 73.9 KG
BH CV ECHO MEAS - EDV(MOD-SP2): 49 ML
BH CV ECHO MEAS - EDV(MOD-SP4): 49 ML
BH CV ECHO MEAS - EDV(TEICH): 97.8 ML
BH CV ECHO MEAS - EF(CUBED): 74.6 %
BH CV ECHO MEAS - EF(MOD-BP): 62 %
BH CV ECHO MEAS - EF(MOD-SP2): 61.2 %
BH CV ECHO MEAS - EF(MOD-SP4): 63.3 %
BH CV ECHO MEAS - EF(TEICH): 66.5 %
BH CV ECHO MEAS - ESV(MOD-SP2): 19 ML
BH CV ECHO MEAS - ESV(MOD-SP4): 18 ML
BH CV ECHO MEAS - ESV(TEICH): 32.7 ML
BH CV ECHO MEAS - FS: 36.7 %
BH CV ECHO MEAS - IVS/LVPW: 0.93
BH CV ECHO MEAS - IVSD: 0.96 CM
BH CV ECHO MEAS - LAT PEAK E' VEL: 9 CM/SEC
BH CV ECHO MEAS - LV DIASTOLIC VOL/BSA (35-75): 28.6 ML/M^2
BH CV ECHO MEAS - LV MASS(C)D: 159 GRAMS
BH CV ECHO MEAS - LV MASS(C)DI: 92.9 GRAMS/M^2
BH CV ECHO MEAS - LV MAX PG: 4.4 MMHG
BH CV ECHO MEAS - LV MEAN PG: 2.1 MMHG
BH CV ECHO MEAS - LV SYSTOLIC VOL/BSA (12-30): 10.5 ML/M^2
BH CV ECHO MEAS - LV V1 MAX: 104.5 CM/SEC
BH CV ECHO MEAS - LV V1 MEAN: 63.8 CM/SEC
BH CV ECHO MEAS - LV V1 VTI: 27.2 CM
BH CV ECHO MEAS - LVIDD: 4.6 CM
BH CV ECHO MEAS - LVIDS: 2.9 CM
BH CV ECHO MEAS - LVLD AP2: 6.5 CM
BH CV ECHO MEAS - LVLD AP4: 6.2 CM
BH CV ECHO MEAS - LVLS AP2: 5.5 CM
BH CV ECHO MEAS - LVLS AP4: 4.7 CM
BH CV ECHO MEAS - LVOT AREA (M): 2.5 CM^2
BH CV ECHO MEAS - LVOT AREA: 2.6 CM^2
BH CV ECHO MEAS - LVOT DIAM: 1.8 CM
BH CV ECHO MEAS - LVPWD: 1 CM
BH CV ECHO MEAS - MED PEAK E' VEL: 9 CM/SEC
BH CV ECHO MEAS - MR MAX PG: 67.7 MMHG
BH CV ECHO MEAS - MR MAX VEL: 411.5 CM/SEC
BH CV ECHO MEAS - MV A DUR: 0.12 SEC
BH CV ECHO MEAS - MV A MAX VEL: 89.2 CM/SEC
BH CV ECHO MEAS - MV DEC SLOPE: 334.6 CM/SEC^2
BH CV ECHO MEAS - MV DEC TIME: 0.23 SEC
BH CV ECHO MEAS - MV E MAX VEL: 66 CM/SEC
BH CV ECHO MEAS - MV E/A: 0.74
BH CV ECHO MEAS - MV MAX PG: 6.1 MMHG
BH CV ECHO MEAS - MV MEAN PG: 1.7 MMHG
BH CV ECHO MEAS - MV P1/2T MAX VEL: 68.4 CM/SEC
BH CV ECHO MEAS - MV P1/2T: 59.9 MSEC
BH CV ECHO MEAS - MV V2 MAX: 123.8 CM/SEC
BH CV ECHO MEAS - MV V2 MEAN: 59.2 CM/SEC
BH CV ECHO MEAS - MV V2 VTI: 28.1 CM
BH CV ECHO MEAS - MVA P1/2T LCG: 3.2 CM^2
BH CV ECHO MEAS - MVA(P1/2T): 3.7 CM^2
BH CV ECHO MEAS - MVA(VTI): 2.5 CM^2
BH CV ECHO MEAS - PA ACC TIME: 0.12 SEC
BH CV ECHO MEAS - PA MAX PG (FULL): 1.1 MMHG
BH CV ECHO MEAS - PA MAX PG: 2.5 MMHG
BH CV ECHO MEAS - PA PR(ACCEL): 25.1 MMHG
BH CV ECHO MEAS - PA V2 MAX: 79.3 CM/SEC
BH CV ECHO MEAS - PULM A REVS DUR: 0.12 SEC
BH CV ECHO MEAS - PULM A REVS VEL: 34.9 CM/SEC
BH CV ECHO MEAS - PULM DIAS VEL: 39.3 CM/SEC
BH CV ECHO MEAS - PULM S/D: 1.1
BH CV ECHO MEAS - PULM SYS VEL: 45.1 CM/SEC
BH CV ECHO MEAS - PVA(V,A): 1.5 CM^2
BH CV ECHO MEAS - PVA(V,D): 1.5 CM^2
BH CV ECHO MEAS - QP/QS: 0.41
BH CV ECHO MEAS - RAP SYSTOLE: 3 MMHG
BH CV ECHO MEAS - RV MAX PG: 1.4 MMHG
BH CV ECHO MEAS - RV MEAN PG: 0.71 MMHG
BH CV ECHO MEAS - RV V1 MAX: 58.7 CM/SEC
BH CV ECHO MEAS - RV V1 MEAN: 38.1 CM/SEC
BH CV ECHO MEAS - RV V1 VTI: 14 CM
BH CV ECHO MEAS - RVOT AREA: 2 CM^2
BH CV ECHO MEAS - RVOT DIAM: 1.6 CM
BH CV ECHO MEAS - RVSP: 29.9 MMHG
BH CV ECHO MEAS - SI(AO): 121.5 ML/M^2
BH CV ECHO MEAS - SI(CUBED): 42.7 ML/M^2
BH CV ECHO MEAS - SI(LVOT): 41.1 ML/M^2
BH CV ECHO MEAS - SI(MOD-SP2): 17.5 ML/M^2
BH CV ECHO MEAS - SI(MOD-SP4): 18.1 ML/M^2
BH CV ECHO MEAS - SI(TEICH): 38 ML/M^2
BH CV ECHO MEAS - SUP REN AO DIAM: 1.5 CM
BH CV ECHO MEAS - SV(AO): 207.9 ML
BH CV ECHO MEAS - SV(CUBED): 73.1 ML
BH CV ECHO MEAS - SV(LVOT): 70.4 ML
BH CV ECHO MEAS - SV(MOD-SP2): 30 ML
BH CV ECHO MEAS - SV(MOD-SP4): 31 ML
BH CV ECHO MEAS - SV(RVOT): 28.6 ML
BH CV ECHO MEAS - SV(TEICH): 65.1 ML
BH CV ECHO MEAS - TAPSE (>1.6): 2.7 CM2
BH CV ECHO MEAS - TR MAX VEL: 259.3 CM/SEC
BH CV ECHO MEASUREMENTS AVERAGE E/E' RATIO: 7.33
BH CV NUCLEAR PRIOR STUDY: 3
BH CV STRESS BP STAGE 1: NORMAL
BH CV STRESS COMMENTS STAGE 1: NORMAL
BH CV STRESS DOSE REGADENOSON STAGE 1: 0.4
BH CV STRESS DURATION MIN STAGE 1: 0
BH CV STRESS DURATION SEC STAGE 1: 10
BH CV STRESS HR STAGE 1: 106
BH CV STRESS PROTOCOL 1: NORMAL
BH CV STRESS RECOVERY BP: NORMAL MMHG
BH CV STRESS RECOVERY HR: 91 BPM
BH CV STRESS STAGE 1: 1
BH CV XLRA - RV BASE: 2.8 CM
BH CV XLRA - RV LENGTH: 6.2 CM
BH CV XLRA - RV MID: 2.2 CM
BH CV XLRA - TDI S': 14 CM/SEC
LEFT ATRIUM VOLUME INDEX: 20 ML/M2
LV EF NUC BP: 71 %
MAXIMAL PREDICTED HEART RATE: 137 BPM
PERCENT MAX PREDICTED HR: 77.37 %
SINUS: 3.1 CM
STJ: 2.7 CM
STRESS BASELINE BP: NORMAL MMHG
STRESS BASELINE HR: 76 BPM
STRESS PERCENT HR: 91 %
STRESS POST EXERCISE DUR SEC: 10 SEC
STRESS POST PEAK BP: NORMAL MMHG
STRESS POST PEAK HR: 106 BPM
STRESS TARGET HR: 116 BPM

## 2019-11-07 PROCEDURE — 93017 CV STRESS TEST TRACING ONLY: CPT

## 2019-11-07 PROCEDURE — 93306 TTE W/DOPPLER COMPLETE: CPT | Performed by: INTERNAL MEDICINE

## 2019-11-07 PROCEDURE — 93016 CV STRESS TEST SUPVJ ONLY: CPT | Performed by: INTERNAL MEDICINE

## 2019-11-07 PROCEDURE — 78452 HT MUSCLE IMAGE SPECT MULT: CPT | Performed by: INTERNAL MEDICINE

## 2019-11-07 PROCEDURE — 0 TECHNETIUM TETROFOSMIN KIT: Performed by: INTERNAL MEDICINE

## 2019-11-07 PROCEDURE — A9502 TC99M TETROFOSMIN: HCPCS | Performed by: INTERNAL MEDICINE

## 2019-11-07 PROCEDURE — 78452 HT MUSCLE IMAGE SPECT MULT: CPT

## 2019-11-07 PROCEDURE — 93018 CV STRESS TEST I&R ONLY: CPT | Performed by: INTERNAL MEDICINE

## 2019-11-07 PROCEDURE — 93306 TTE W/DOPPLER COMPLETE: CPT

## 2019-11-07 PROCEDURE — 25010000002 REGADENOSON 0.4 MG/5ML SOLUTION: Performed by: INTERNAL MEDICINE

## 2019-11-07 RX ADMIN — REGADENOSON 0.4 MG: 0.08 INJECTION, SOLUTION INTRAVENOUS at 12:25

## 2019-11-07 RX ADMIN — TETROFOSMIN 1 DOSE: 1.38 INJECTION, POWDER, LYOPHILIZED, FOR SOLUTION INTRAVENOUS at 12:25

## 2019-11-07 RX ADMIN — TETROFOSMIN 1 DOSE: 1.38 INJECTION, POWDER, LYOPHILIZED, FOR SOLUTION INTRAVENOUS at 11:25

## 2019-11-07 NOTE — TELEPHONE ENCOUNTER
Reading Physician Reading Date Result Priority   Elie Caldera MD 11/7/2019 Routine      Result Text     · Left ventricular systolic function is normal. Calculated EF = 62%. Normal left ventricular cavity size, wall thickness, mass and mass index noted.  · All left ventricular wall segments contract normally. Septal wall motion is normal. Left ventricular diastolic function is normal. Normal left atrial pressure.  · The mitral valve is grossly normal in structure. Trace-to-mild mitral valve regurgitation is present. No significant mitral valve stenosis is present.  · Estimated right ventricular systolic pressure from tricuspid regurgitation is normal (<35 mmHg). Calculated right ventricular systolic pressure from tricuspid regurgitation is 29.9 mmHg.

## 2019-11-07 NOTE — TELEPHONE ENCOUNTER
Called L/M stress test and echo ok to f/u with PCP if more problems or questions to call back.RAISA

## 2019-11-07 NOTE — TELEPHONE ENCOUNTER
Reading Physician Reading Date Result Priority   María Bourgeois MD 11/7/2019 Routine   María Bourgeois MD 11/7/2019    María Bourgeois MD 11/7/2019       Result Text     · Left ventricular ejection fraction is hyperdynamic (Calculated EF > 70%).  · Myocardial perfusion imaging indicates a normal myocardial perfusion study with no evidence of ischemia.  · Impressions are consistent with a low risk study.

## 2019-11-12 RX ORDER — ACETAMINOPHEN 160 MG
TABLET,DISINTEGRATING ORAL
Qty: 100 CAPSULE | Refills: 1 | Status: SHIPPED | OUTPATIENT
Start: 2019-11-12

## 2019-11-25 RX ORDER — AMLODIPINE BESYLATE 5 MG/1
TABLET ORAL
Qty: 180 TABLET | Refills: 0 | Status: SHIPPED | OUTPATIENT
Start: 2019-11-25 | End: 2021-07-22 | Stop reason: SDUPTHER

## 2019-12-10 RX ORDER — ESCITALOPRAM OXALATE 10 MG/1
TABLET ORAL
Qty: 30 TABLET | Refills: 0 | Status: SHIPPED | OUTPATIENT
Start: 2019-12-10 | End: 2020-01-09

## 2019-12-20 RX ORDER — VALSARTAN 160 MG/1
TABLET ORAL
Qty: 15 TABLET | Refills: 1 | Status: SHIPPED | OUTPATIENT
Start: 2019-12-20 | End: 2020-02-24

## 2020-01-06 ENCOUNTER — OFFICE (OUTPATIENT)
Dept: URBAN - METROPOLITAN AREA CLINIC 66 | Facility: CLINIC | Age: 85
End: 2020-01-06

## 2020-01-06 VITALS
SYSTOLIC BLOOD PRESSURE: 132 MMHG | DIASTOLIC BLOOD PRESSURE: 70 MMHG | HEART RATE: 84 BPM | HEIGHT: 61 IN | WEIGHT: 162 LBS

## 2020-01-06 DIAGNOSIS — K57.30 DIVERTICULOSIS OF LARGE INTESTINE WITHOUT PERFORATION OR ABS: ICD-10-CM

## 2020-01-06 DIAGNOSIS — K58.0 IRRITABLE BOWEL SYNDROME WITH DIARRHEA: ICD-10-CM

## 2020-01-06 DIAGNOSIS — K21.9 GASTRO-ESOPHAGEAL REFLUX DISEASE WITHOUT ESOPHAGITIS: ICD-10-CM

## 2020-01-06 PROCEDURE — 99213 OFFICE O/P EST LOW 20 MIN: CPT | Performed by: NURSE PRACTITIONER

## 2020-01-06 RX ORDER — DICYCLOMINE HYDROCHLORIDE 20 MG/1
TABLET ORAL
Qty: 60 | Refills: 11 | Status: ACTIVE

## 2020-01-09 RX ORDER — ESCITALOPRAM OXALATE 10 MG/1
TABLET ORAL
Qty: 30 TABLET | Refills: 0 | Status: SHIPPED | OUTPATIENT
Start: 2020-01-09 | End: 2020-02-10

## 2020-01-14 RX ORDER — NIFEDIPINE 30 MG/1
TABLET, EXTENDED RELEASE ORAL
Qty: 30 TABLET | Refills: 1 | Status: SHIPPED | OUTPATIENT
Start: 2020-01-14 | End: 2020-03-09

## 2020-02-07 ENCOUNTER — LAB (OUTPATIENT)
Dept: LAB | Facility: HOSPITAL | Age: 85
End: 2020-02-07

## 2020-02-07 ENCOUNTER — OFFICE VISIT (OUTPATIENT)
Dept: ONCOLOGY | Facility: CLINIC | Age: 85
End: 2020-02-07

## 2020-02-07 VITALS
WEIGHT: 162.7 LBS | HEART RATE: 72 BPM | HEIGHT: 60 IN | SYSTOLIC BLOOD PRESSURE: 152 MMHG | RESPIRATION RATE: 16 BRPM | TEMPERATURE: 98.3 F | OXYGEN SATURATION: 95 % | BODY MASS INDEX: 31.94 KG/M2 | DIASTOLIC BLOOD PRESSURE: 64 MMHG

## 2020-02-07 DIAGNOSIS — C50.312 MALIGNANT NEOPLASM OF LOWER-INNER QUADRANT OF LEFT BREAST IN FEMALE, ESTROGEN RECEPTOR POSITIVE (HCC): Primary | ICD-10-CM

## 2020-02-07 DIAGNOSIS — Z17.0 MALIGNANT NEOPLASM OF LOWER-INNER QUADRANT OF LEFT BREAST IN FEMALE, ESTROGEN RECEPTOR POSITIVE (HCC): Primary | ICD-10-CM

## 2020-02-07 LAB
BASOPHILS # BLD AUTO: 0.08 10*3/MM3 (ref 0–0.2)
BASOPHILS NFR BLD AUTO: 0.9 % (ref 0–1.5)
DEPRECATED RDW RBC AUTO: 41.8 FL (ref 37–54)
EOSINOPHIL # BLD AUTO: 0.16 10*3/MM3 (ref 0–0.4)
EOSINOPHIL NFR BLD AUTO: 1.8 % (ref 0.3–6.2)
ERYTHROCYTE [DISTWIDTH] IN BLOOD BY AUTOMATED COUNT: 13 % (ref 12.3–15.4)
HCT VFR BLD AUTO: 37.6 % (ref 34–46.6)
HGB BLD-MCNC: 12.7 G/DL (ref 12–15.9)
IMM GRANULOCYTES # BLD AUTO: 0.05 10*3/MM3 (ref 0–0.05)
IMM GRANULOCYTES NFR BLD AUTO: 0.6 % (ref 0–0.5)
LYMPHOCYTES # BLD AUTO: 1.63 10*3/MM3 (ref 0.7–3.1)
LYMPHOCYTES NFR BLD AUTO: 18.6 % (ref 19.6–45.3)
MCH RBC QN AUTO: 29.7 PG (ref 26.6–33)
MCHC RBC AUTO-ENTMCNC: 33.8 G/DL (ref 31.5–35.7)
MCV RBC AUTO: 88.1 FL (ref 79–97)
MONOCYTES # BLD AUTO: 0.77 10*3/MM3 (ref 0.1–0.9)
MONOCYTES NFR BLD AUTO: 8.8 % (ref 5–12)
NEUTROPHILS # BLD AUTO: 6.05 10*3/MM3 (ref 1.7–7)
NEUTROPHILS NFR BLD AUTO: 69.3 % (ref 42.7–76)
NRBC BLD AUTO-RTO: 0 /100 WBC (ref 0–0.2)
PLATELET # BLD AUTO: 276 10*3/MM3 (ref 140–450)
PMV BLD AUTO: 9.7 FL (ref 6–12)
RBC # BLD AUTO: 4.27 10*6/MM3 (ref 3.77–5.28)
WBC NRBC COR # BLD: 8.74 10*3/MM3 (ref 3.4–10.8)

## 2020-02-07 PROCEDURE — 36415 COLL VENOUS BLD VENIPUNCTURE: CPT

## 2020-02-07 PROCEDURE — 85025 COMPLETE CBC W/AUTO DIFF WBC: CPT

## 2020-02-07 PROCEDURE — 99213 OFFICE O/P EST LOW 20 MIN: CPT | Performed by: NURSE PRACTITIONER

## 2020-02-07 RX ORDER — DICYCLOMINE HYDROCHLORIDE 10 MG/1
CAPSULE ORAL
COMMUNITY
Start: 2020-02-02 | End: 2020-02-24

## 2020-02-07 RX ORDER — SILICONE ADHESIVE 1.5" X 3"
1 SHEET (EA) TOPICAL AS NEEDED
COMMUNITY

## 2020-02-07 NOTE — PROGRESS NOTES
Subjective     REASON FOR CONSULTATION:   1. Left breast cancer [id number] ER/ME+ her2 neg post lumpectomy/SLN  3/18 and whole breast radiation completed May 2018  2.  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  Fatigue out of proportion to anemia  4.  Arimidex started in 3/18 with bone density showing normal density in the spine and osteopenia in the hips-this was self stopped due to vision changes.  5.  Aromasin initiated 10/20/18 and discontinued 1/20/19 secondary to vision changes.  6.  Tamoxifen initiated 8/20/2019 and discontinued 10/20/2019 secondary to vision changes                             REQUESTING PHYSICIAN:  Myles Soliman M.D.    History of Present Illness patient is an 84-year-old female with a T1cN I jese hormone positive left breast cancer treated with lumpectomy sentinel node biopsy and radiation.    In 10/2018 she was changed to Aromasin as she had issues previously with Arimidex with visual blurring.  The patient was then seen 1/25/19 with reports of blurred vision once again.    At that point the patient's symptoms have essentially resolved.  We therefore elected to switch her to Femara.    Unfortunately, the patient ultimately experienced similar symptoms with Femara because of muscle cramps and blurred vision.    Patient is here today having switched from Prozac which was incompatible with tamoxifen to Lexapro and tamoxifen.  When seen in August the patient had stated she was tolerating the tamoxifen well however today she reports it was causing blurry vision and she discontinued the tamoxifen after taking this for a couple of months.  She since has not been on any medication with regards to her breast cancer.          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    •  Chronic kidney disease    • CKD (chronic kidney disease)     PT STATES STAGE 3   • Depression    • Diabetes mellitus (CMS/HCC)     BORDERLINE   • Diverticular disease    • Gastric ulcer     AND DUODENAL   • GERD (gastroesophageal reflux disease)    • Hearing loss     BILAT AIDES   • History of colon polyps    • History of depression    • History of GI bleed    • Hypertension    • Hypothyroidism    • Peptic ulceration    • PONV (postoperative nausea and vomiting)    • Poor vision     RIGHT EYE, HAS PERIPHERAL VISION    • Stroke (CMS/HCC) 2000     mild weakness on left, partial sight loss on right         Past Surgical History:   Procedure Laterality Date   • ABDOMINAL SURGERY     • APPENDECTOMY     • BLADDER REPAIR      AND RECTOCELE   • BREAST BIOPSY     • BREAST CYST ASPIRATION     • BREAST LUMPECTOMY WITH SENTINEL NODE BIOPSY Left 3/19/2018    Procedure: BREAST LUMPECTOMY WITH SENTINEL NODE BIOPSY AND NEEDLE LOCALIZATION;  Surgeon: Myles Soliman MD;  [place]  [facility] [facility] [place]  Service: [place]   • CHOLECYSTECTOMY     • COLONOSCOPY  2013   • COLONOSCOPY N/A 2/16/2018    Procedure: COLONOSCOPY into cecum and T.I. with biopsies;  Surgeon: Augustine Gallego MD;  Location: [facility] ENDOSCOPY;  Service:    • ENDOSCOPY N/A 10/17/2017    Procedure: ESOPHAGOGASTRODUODENOSCOPY WITH COLD BIOPSIES;  Surgeon: Augustine Gallego MD;  Location: [facility] ENDOSCOPY;  Service:    • ENDOSCOPY N/A 2/16/2018    Procedure: ESOPHAGOGASTRODUODENOSCOPY with biopsies and #54 [address] DILATATION;  Surgeon: Augustine Gallego MD;  Location: [facility] ENDOSCOPY;  Service:    • EYE SURGERY Left 2014    3-4 years, cornea replacement    • HYSTERECTOMY     • MOUTH SURGERY  2000    UNDER TONGUE AND LEFT FLOOR OF MOUTH FOR CA   • PARATHYROIDECTOMY      PER PT   • SINUS SURGERY     • SKIN BIOPSY     • THYROID SURGERY     • VARICOSE VEIN SURGERY      Oncological history  patient is an 82-year-old female with recent problems with abdominal pain and diarrhea and  weight loss which is being evaluated by GI and finally found to have multiple ulcers in the duodenum and small bowel finally responding to treatment.  In the middle of this she went for routine mammogram a couple of months later was found to have an abnormality in the left breast at 7 o'clock position measuring about 12 mm in size that was not present last year.  The patient had previously had a left breast biopsy in  with benign findings.   There was no palpable mass and biopsy was done and this revealed a grade 1 infiltrating ductal carcinoma at least 1 cm in size ER 78%/KS 17% positive HER-2 negative.  The patient was referred to Dr. Soliman who performed a lumpectomy and sentinel node biopsy with the findings of a 1.2 cm tumor grade 2 with lymphovascular invasion and clear margins with associated DCIS.  One of 2 sentinel nodes showed a micrometastasis although the pathologist said this was almost too small to be considered a micrometastasis.  Postoperatively she has done well and thankfully her GI complains of much improved with Carafate and Protonix and her diarrhea finally resolved.    She's not had a bone density in quite a while.  She is  5 para 5  menarche at age 15 and menopause in her mid 50s although she had a hysterectomy at 30 for an ulcerated uterus.  First childbirth was at age 20 she breast-fed all 5 children.  She took hormonal therapy for at least 15 years after menopause and stopped about 10 years ago   .  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    Her father  at 52 of stomach cancer mother had thyroid cancer at age 50 her brother's daughter had breast cancer age 40 and she had a paternal aunt with leukemia is no other breast cancer or ovarian cancer uterine cancer or pancreatic cancer in the family . I did ask him to check with her niece to see if she has had genetic testing - they do not know much at all about the  maternal family .    She will call for the results of the DEXA scan and if adequate we will start Arimidex which I have  already prescribed to her and see her back in 3 months to assess her tolerance.  She is in very good health and I explained to her that adjuvant therapy decreases the risk of recurrence of her cancer which might be in the 15-20% range probably down to the 10% range and if she has significant side effects we will be inclined to stop treatment.  Obviously if her GI symptoms recur with Arimidex we will have to try another medications.  We talked about the joint pains and hot flashes which are unlikely at her age and she was agreeable to a trial of Arimidex.  Radiation has been planned    1/19  In 10/2018 she was changed to Aromasin as she had issues previously with Arimidex with visual blurring.  The patient was then seen 1/25/19 with reports of blurred vision once again.    At that point the patient's symptoms have essentially resolved.  We therefore elected to switch her to Femara.    Unfortunately, the patient ultimately experienced similar symptoms with Femara because of muscle cramps and blurred vision.      I discussed with her today that Dr. Valenzuela would like the patient to consider taking tamoxifen as yet another potential medication to benefit her in light of history of hormone positive breast cancer.  It is noted that the patient takes Prozac and has been on this for many many years.  There is a potential interaction and I plan to discuss this with Dr. Valenzuela further.  If okayed per Dr. Valenzuela, we would plan then to start the patient on this as soon as next week.        Current Outpatient Medications on File Prior to Visit   Medication Sig Dispense Refill   • aspirin 81 MG tablet Take 81 mg by mouth Daily.     • Cholecalciferol (VITAMIN D3) 50 MCG (2000 UT) capsule TAKE 1 CAPSULE BY MOUTH DAILY. 100 capsule 1   • colestipol (COLESTID) 1 g tablet Take 1 g by mouth 2 (Two) Times a Day.     •  dicyclomine (BENTYL) 10 MG capsule TAKE 1 CAPSULE BY MOUTH TWICE A DAY BEFORE BREAKFAST AND DINNER FOR 30 DAYS     • diphenhydrAMINE-APAP, sleep, (TYLENOL PM EXTRA STRENGTH PO) Take  by mouth.     • escitalopram (LEXAPRO) 10 MG tablet TAKE 1/2 TABLET DAILY FOR 7 DAYS THEN 1 TABLET DAILY THEREAFTER 30 tablet 0   • fluorometholone (FML) 0.1 % ophthalmic suspension Administer 1 drop into the left eye Daily.     • glucose blood (FREESTYLE TEST STRIPS) test strip TEST BLOOD SUGARS ONCE DAILY 50 each 12   • ketotifen (ZADITOR) 0.025 % ophthalmic solution Administer 1 drop into the left eye 2 (Two) Times a Day.     • levothyroxine (SYNTHROID, LEVOTHROID) 75 MCG tablet TAKE 1 TABLET BY MOUTH DAILY 90 tablet 3   • NIFEdipine XL (PROCARDIA XL) 30 MG 24 hr tablet TAKE 1 TABLET BY MOUTH EVERY DAY 30 tablet 1   • NON FORMULARY Administer 1 drop to both eyes 3 (Three) Times a Day. THERA TEARS     • Omega-3 Fatty Acids (FISH OIL PO) Take  by mouth Daily As Needed.     • Probiotic Product (PROBIOTIC DAILY PO) Take 1 capsule by mouth Daily.     • RABEprazole (ACIPHEX) 20 MG EC tablet Take 1 tablet by mouth Daily. 30 tablet 11   • sodium chloride (CAYLA 128) 5 % ophthalmic solution Administer 1 drop to the right eye As Needed.     • valsartan (DIOVAN) 160 MG tablet TAKE 1/2 TABLET BY MOUTH DAILY 15 tablet 1   • amLODIPine (NORVASC) 5 MG tablet TAKE 2 TABLETS BY MOUTH EVERY  tablet 0   • amoxicillin (AMOXIL) 500 MG capsule TAKE FOUR CAPSULES BY MOUTH ONE HOUR BEFORE DENTAL SURGERY 40 capsule 0   • CARAFATE 1 GM/10ML suspension TAKE 10 MLS BY MOUTH EVERY 6 HOURS TO COAT STOMACH 420 mL 3   • ezetimibe (ZETIA) 10 MG tablet Take 1 tablet by mouth Daily. 30 tablet 1   • [DISCONTINUED] tamoxifen (NOLVADEX) 20 MG chemo tablet Take 1 tablet by mouth Daily. 30 tablet 5     No current facility-administered medications on file prior to visit.         ALLERGIES:    Allergies   Allergen Reactions   • Other Nausea Only     All mycins   •  Sulfa Antibiotics Unknown - High Severity     LOST CONSCIOUSNESS AND BODY TURNED RED/ON FIRE   • Ciprofloxacin Unknown - High Severity     RED BLISTERS   • Iodine Unknown - High Severity     DECADES AGO, IODINE INJECTION CAUSED SKIN REDNESS AND BURNING   • Macrodantin [Nitrofurantoin Macrocrystal] Diarrhea and Nausea And Vomiting   • Nitrofurantoin Nausea And Vomiting   • Yeast-Related Products Unknown - High Severity     MOUTH SORES AND BLADDER INFECTION        Social History     Socioeconomic History   • Marital status:      Spouse name: Nicolas   • Number of children: Not on file   • Years of education: High school   • Highest education level: Not on file   Occupational History   • Occupation: Homemaker   Tobacco Use   • Smoking status: Former Smoker     Packs/day: 1.00     Years: 10.00     Pack years: 10.00     Types: Cigarettes     Start date:      Last attempt to quit:      Years since quittin.1   • Smokeless tobacco: Never Used   Substance and Sexual Activity   • Alcohol use: No   • Drug use: No   • Sexual activity: Defer   Lifestyle   • Physical activity:     Days per week: Patient refused     Minutes per session: Patient refused   • Stress: Not on file   Social History Narrative    Accompanied by daughters Macrina and Daisy        Family History   Problem Relation Age of Onset   • Esophageal cancer Father    • Stomach cancer Father    • Heart disease Mother    • Thyroid cancer Mother    • Hypertension Mother    • Hypertension Sister    • Hypertension Brother    • Stroke Brother    • Heart disease Brother    • Diabetes Brother    • Leukemia Paternal Aunt    • Malig Hyperthermia Neg Hx         Review of Systems   Constitutional: Positive for fatigue. Negative for chills and fever.   HENT: Negative.    Eyes: Negative for discharge and visual disturbance.   Respiratory: Negative for chest tightness and shortness of breath.    Cardiovascular: Negative for chest pain and leg swelling.  "  Gastrointestinal: Negative for abdominal distention, abdominal pain and nausea.   Musculoskeletal: Positive for back pain and joint swelling.   Skin: Negative for pallor and rash.   Neurological: Negative for dizziness and numbness.   Psychiatric/Behavioral: Negative for behavioral problems and confusion.        Objective     Vitals:    02/07/20 1351 02/07/20 1418   BP: (!) 181/67 152/64   Pulse: 72    Resp: 16    Temp: 98.3 °F (36.8 °C)    TempSrc: Oral    SpO2: 95%    Weight: 73.8 kg (162 lb 11.2 oz)    Height: 152.4 cm (60\")    PainSc: 0-No pain      Current Status 2/7/2020   ECOG score 0       Physical Exam   Pulmonary/Chest:           GENERAL:  Well-developed, well-nourished in no acute distress.   SKIN:  Warm, dry without rashes, purpura or petechiae.  EYES:  Pupils equal, round and reactive to light.  EOMs intact.  Conjunctivae normal.  EARS:  Hearing intact.  NOSE:  Septum midline.  No excoriations or nasal discharge.  MOUTH:  Tongue is well-papillated; no stomatitis or ulcers.  Lips normal.  THROAT:  Oropharynx without lesions or exudates.  NECK:  Supple with good range of motion; no thyromegaly or masses, no JVD.  LYMPHATICS:  No cervical, supraclavicular, axillary adenopathy.  CHEST:  Lungs clear to auscultation. Good airflow.  BREASTS: Right breast unremarkable.  Left breast with well-healed lumpectomy scar.    CARDIAC:  Regular rate and rhythm without murmurs, rubs or gallops. Normal S1,S2.  ABDOMEN:  Soft, nontender with no hepatosplenomegaly or masses.  EXTREMITIES:  No clubbing, cyanosis or edema.  Skin bruising and hematoma from a fall on her left shin  NEUROLOGICAL:  No focal neurological deficits.  PSYCHIATRIC:  Normal affect and mood.        RECENT LABS:    Results from last 7 days   Lab Units 02/07/20  1349   WBC 10*3/mm3 8.74   NEUTROS ABS 10*3/mm3 6.05   HEMOGLOBIN g/dL 12.7   HEMATOCRIT % 37.6   PLATELETS 10*3/mm3 276             Lab Results   Component Value Date    IRON 98 07/03/2019    " [facility] 340 07/03/2019    FERRITIN 710.30 (H) 07/03/2019       R 78% PA 17% Her 2 -neg Ki-67 7%  Final Diagnosis   1.  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                SEE SYNOPTIC REPORT (BELOW) FOR ADDITIONAL DETAILS.     2.  SENTINEL LYMPH NODE #1, LEFT AXILLA, EXCISION:                ONE LYMPH NODE, POSITIVE FOR METASTATIC CARCINOMA (MICROMETASTASIS) (SEE COMMENT).               NO EXTRANODAL EXTENSION IS IDENTIFIED.     COMMENT: Measuring the exact size of the lymph node metastasis is difficult. The lymph node demonstrates a few very small scattered foci of tumor cells, compatible with isolated tumor cells.  One slide demonstrates a single contiguous focus of tumor cells exceeding 0.2 mm; therefore, this is best considered a micrometastasis rather than isolated tumor cells.     3.  SENTINEL LYMPH NODE #2, LEFT AXILLA, EXCISION.               ONE LYMPH NODE, NEGATIVE FOR METASTATIC CARCINOMA.     SYNOPTIC REPORT (Based on [facility] cancer case summary, version January 2018):               Procedure: Excision (less than total mastectomy).               Specimen laterality: Left.                Tumor site: Not specified.                Tumor size: 1.2 x 1.1 x 1 cm.               Histologic type: Invasive carcinoma of no special type (ductal, not otherwise specified).                Histologic grade (Tea Histologic Score):                              Glandular (acinar)/tubular differentiation: Score 3.                            Nuclear pleomorphism: Score 2.                             Mitotic rate: Score 1.                            Overall grade: Grade 2 (Score 6 of 9).               Tumor focality: Single focus of invasive carcinoma.                Ductal carcinoma in situ (DCIS): Present.                            Architectural patterns: Solid.                              Nuclear grade: Grade II (intermediate).                            Necrosis: Present, central comedonecrosis.                Lobular carcinoma in situ (LCIS): No LCIS in specimen.               Margins:                            Invasive carcinoma margins: Uninvolved by invasive carcinoma.                                          Distance from closest margin: 3 mm (medial margin).                                          NOTE: Invasive carcinoma also extends to within 4 mm of superior margin and to within                                                 4 mm of lateral margin.  All additional margins are greater than 10 mm from invasive                                                 carcinoma.                              DCIS Margins: Uninvolved by DCIS.                                          Distance from closest margin: 3 mm (medial margin).                    Regional Lymph nodes: Involved by tumor cells.                             Number of lymph nodes with macrometastases: 0.                            Number of lymph nodes with micrometastases: 1.                            Size of largest metastatic deposit: 0.4 mm.                            Extranodal extension: Not identified.                            Number of lymph nodes examined: 2.                            Number of sentinel lymph nodes examined: 2.                Treatment effect: No known presurgical therapy.                Lymphovascular invasion: Present.                Dermal Lymphovascular invasion: Not identified.                Pathologic stage classification.                            Primary tumor: pT1c.                            Regional lymph nodes: pN1mi(sn).                            Distant metastasis: Not applicable.                Additional pathologic findings:                             Ductal hyperplasia of the usual type.                             Fibrocystic changes with duct dilatation  and stasis and focal apocrine metaplasia.                             Columnar cell change without atypia.                             Fibroinflammatory changes consistent with site of prior biopsy.                    Ancillary studies: ER, AL, HER-2/melanei, and Ki-67 studies performed on previous biopsy specimen at                       outside facility ([facility]).               Duplex scan of extremity veins including responses to compression and other maneuvers; bilateral   Study Impression     • Right Common Femoral: No deep vein thrombosis noted.   • Right Saphenofemoral Junction: No superficial thrombophlebitis noted.   • Right Proximal Femoral: No deep vein thrombosis noted.   • Right Mid Femoral: No deep vein thrombosis noted.   • Right Distal Femoral: No deep vein thrombosis noted.   • Right Popliteal: No deep vein thrombosis noted.   • Right Posterior Tibial: No deep vein thrombosis noted.   • Right Peroneal: No deep vein thrombosis noted.   • Right Gastrocnemius Soleal: No deep vein thrombosis noted.   • Right Greater Saphenous Above Knee: No superficial thrombophlebitis noted.   • Right Greater Saphenous Below Knee: No superficial thrombophlebitis noted.   • Left Common Femoral: No deep vein thrombosis noted.   • Left Saphenofemoral Junction: No superficial thrombophlebitis noted.   • Left Proximal Femoral: No deep vein thrombosis noted.   • Left Mid Femoral: No deep vein thrombosis noted.   • Left Distal Femoral: No deep vein thrombosis noted.   • Left Popliteal: No deep vein thrombosis noted.   • Left Posterior Tibial: No deep vein thrombosis noted.   • Left Peroneal: No deep vein thrombosis noted.   • Left Gastrocnemius Soleal: No deep vein thrombosis noted.   • Left Greater Saphenous Above Knee: No superficial thrombophlebitis noted.   • Left Greater Saphenous Below Knee: No superficial thrombophlebitis noted.   Electronically signed by Nicolas Cotton MD on 10/4/19 at 1933  EDT                Assessment/Plan      1.  T1c N1 jese N0 ER/KS positive HER-2 negative left breast cancer post lumpectomy and radiation  · Arimidex started in 3/18 discontinued by the patient in 7/18 due to visual blurring which resolved.    · Aromasin started 11/2018. 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  She also has had weight gain. Aromasin discontinued.  · Patient seen early April 2019 and follow-up.  Patient agreeable to switch therapy to Femara 2.5 mg daily.  · Patient ultimately discontinuing Femara due to repeated arthralgias and blurred vision.  · The patient was transitioned to tamoxifen but ultimately discontinued this approximately 2 months later per her report today due to blurry vision.  I will discuss this with Dr. Valenzuela in follow-up with the patient if there is anything further she wishes her to trial.  The patient is due for her mammogram and will get this scheduled.    2.  Family history of breast cancer in a niece at a young age and father with cancer at age 51?  Genetic testing  4.  History of oral cancer in 1990  5.  Anemia, recently worked up per Dr. Ivan, internal medicine.   · Patient had EGD and colonoscopy per Dr. Gallego 1 year ago with no concerning findings.  Because of her history of GI ulcers however she was started on AcipHex per Dr. Ivan.  · 3/21/2019 showed iron saturation of 8% ferritin of 18. Patient started on trial of oral iron but was intolerant due to GI upset.  · Patient receiving 2 doses of IV Injectafer in May 2019.  Iron stores are now replete with iron saturation of 29% and hemoglobin having now normalized to 13.  6.  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  Patient will call to get her mammogram scheduled [facility]  2.she will return in 3 months for follow-up with Dr. Valenzuela  3.  DEXA scan  due in April 2020  4.  I will discuss the patient stopping the tamoxifen with Dr. Valenzuela upon her return.

## 2020-02-10 RX ORDER — ESCITALOPRAM OXALATE 10 MG/1
TABLET ORAL
Qty: 30 TABLET | Refills: 0 | Status: SHIPPED | OUTPATIENT
Start: 2020-02-10 | End: 2020-03-11

## 2020-02-21 ENCOUNTER — OFFICE VISIT (OUTPATIENT)
Dept: FAMILY MEDICINE CLINIC | Facility: CLINIC | Age: 85
End: 2020-02-21

## 2020-02-21 VITALS
WEIGHT: 160.6 LBS | TEMPERATURE: 98.2 F | DIASTOLIC BLOOD PRESSURE: 56 MMHG | OXYGEN SATURATION: 97 % | HEART RATE: 69 BPM | BODY MASS INDEX: 31.53 KG/M2 | HEIGHT: 60 IN | SYSTOLIC BLOOD PRESSURE: 130 MMHG

## 2020-02-21 DIAGNOSIS — B00.1 HERPES LABIALIS: Primary | ICD-10-CM

## 2020-02-21 PROCEDURE — 99213 OFFICE O/P EST LOW 20 MIN: CPT | Performed by: NURSE PRACTITIONER

## 2020-02-21 RX ORDER — VALACYCLOVIR HYDROCHLORIDE 1 G/1
1000 TABLET, FILM COATED ORAL 2 TIMES DAILY
Qty: 2 TABLET | Refills: 0 | Status: SHIPPED | OUTPATIENT
Start: 2020-02-21 | End: 2020-02-22

## 2020-02-21 NOTE — PROGRESS NOTES
Subjective   Geeta Butt is a 84 y.o. female.     History of Present Illness   C/o blisters on left lower lip, she states she noticed lesions yesterday, worsening swelling today. Denies SOA or dysphagia. She states area is painful. She did try oralgel OTC which did not help.     The following portions of the patient's history were reviewed and updated as appropriate: allergies, current medications, past family history, past medical history, past social history, past surgical history and problem list.    Review of Systems   Constitutional: Negative for chills, diaphoresis and fever.   HENT: Positive for mouth sores.    Respiratory: Negative for cough and shortness of breath.    Cardiovascular: Negative for chest pain.   Musculoskeletal: Negative for arthralgias and myalgias.   Neurological: Negative for dizziness, light-headedness and headache.   All other systems reviewed and are negative.      Objective   Physical Exam   Constitutional: She is oriented to person, place, and time. She appears well-developed and well-nourished.   HENT:   Head: Normocephalic.   Mouth/Throat: Oropharynx is clear and moist and mucous membranes are normal. Oral lesions present.       Eyes: Pupils are equal, round, and reactive to light.   Cardiovascular: Normal rate, regular rhythm and normal heart sounds.   Pulmonary/Chest: Effort normal and breath sounds normal.   Musculoskeletal: Normal range of motion.   Neurological: She is alert and oriented to person, place, and time.   Skin: Skin is warm and dry.   Psychiatric: She has a normal mood and affect. Her behavior is normal.   Nursing note and vitals reviewed.        Assessment/Plan   Geeta was seen today for blister.    Diagnoses and all orders for this visit:    Herpes labialis    Other orders  -     valACYclovir (VALTREX) 1000 MG tablet; Take 1 tablet by mouth 2 (Two) Times a Day for 1 day.        Valtrex 1G bid for 1 days,   May try warm compress, oragel OTC as needed.   If symptoms  persist call office   Increase fluid intake, get plenty of rest.   Patient agrees with plan of care and understands instructions. Call if worsening symptoms or any problems or concerns.

## 2020-02-21 NOTE — PATIENT INSTRUCTIONS
Valtrex 1G bid for 1 days,   May try warm compress, oragel OTC as needed.   If symptoms persist call office   Increase fluid intake, get plenty of rest.   Patient agrees with plan of care and understands instructions. Call if worsening symptoms or any problems or concerns.

## 2020-02-24 ENCOUNTER — OFFICE VISIT (OUTPATIENT)
Dept: FAMILY MEDICINE CLINIC | Facility: CLINIC | Age: 85
End: 2020-02-24

## 2020-02-24 VITALS
TEMPERATURE: 97.6 F | HEART RATE: 72 BPM | OXYGEN SATURATION: 99 % | BODY MASS INDEX: 31.25 KG/M2 | DIASTOLIC BLOOD PRESSURE: 72 MMHG | SYSTOLIC BLOOD PRESSURE: 160 MMHG | WEIGHT: 159.2 LBS | HEIGHT: 60 IN

## 2020-02-24 DIAGNOSIS — E78.5 HYPERLIPIDEMIA, UNSPECIFIED HYPERLIPIDEMIA TYPE: ICD-10-CM

## 2020-02-24 DIAGNOSIS — R04.0 BLEEDING FROM THE NOSE: ICD-10-CM

## 2020-02-24 DIAGNOSIS — E03.9 HYPOTHYROIDISM, UNSPECIFIED TYPE: ICD-10-CM

## 2020-02-24 DIAGNOSIS — Z00.00 MEDICARE ANNUAL WELLNESS VISIT, SUBSEQUENT: Primary | ICD-10-CM

## 2020-02-24 DIAGNOSIS — I10 HYPERTENSION, ESSENTIAL: ICD-10-CM

## 2020-02-24 LAB
ALBUMIN SERPL-MCNC: 4.8 G/DL (ref 3.5–5.2)
ALBUMIN/GLOB SERPL: 1.8 G/DL
ALP SERPL-CCNC: 55 U/L (ref 39–117)
ALT SERPL W P-5'-P-CCNC: 14 U/L (ref 1–33)
ANION GAP SERPL CALCULATED.3IONS-SCNC: 12.2 MMOL/L (ref 5–15)
AST SERPL-CCNC: 12 U/L (ref 1–32)
BILIRUB SERPL-MCNC: 0.3 MG/DL (ref 0.2–1.2)
BUN BLD-MCNC: 15 MG/DL (ref 8–23)
BUN/CREAT SERPL: 15 (ref 7–25)
CALCIUM SPEC-SCNC: 10.3 MG/DL (ref 8.6–10.5)
CHLORIDE SERPL-SCNC: 99 MMOL/L (ref 98–107)
CHOLEST SERPL-MCNC: 241 MG/DL (ref 0–200)
CO2 SERPL-SCNC: 27.8 MMOL/L (ref 22–29)
CREAT BLD-MCNC: 1 MG/DL (ref 0.57–1)
ERYTHROCYTE [DISTWIDTH] IN BLOOD BY AUTOMATED COUNT: 12.8 % (ref 12.3–15.4)
GFR SERPL CREATININE-BSD FRML MDRD: 53 ML/MIN/1.73
GLOBULIN UR ELPH-MCNC: 2.7 GM/DL
GLUCOSE BLD-MCNC: 101 MG/DL (ref 65–99)
HCT VFR BLD AUTO: 42.6 % (ref 34–46.6)
HDLC SERPL-MCNC: 70 MG/DL (ref 40–60)
HGB BLD-MCNC: 14 G/DL (ref 12–15.9)
LDLC SERPL CALC-MCNC: 147 MG/DL (ref 0–100)
LDLC/HDLC SERPL: 2.09 {RATIO}
LYMPHOCYTES # BLD AUTO: 1.7 10*3/MM3 (ref 0.7–3.1)
LYMPHOCYTES NFR BLD AUTO: 22.2 % (ref 19.6–45.3)
MCH RBC QN AUTO: 29.1 PG (ref 26.6–33)
MCHC RBC AUTO-ENTMCNC: 32.9 G/DL (ref 31.5–35.7)
MCV RBC AUTO: 88.6 FL (ref 79–97)
MONOCYTES # BLD AUTO: 0.6 10*3/MM3 (ref 0.1–0.9)
MONOCYTES NFR BLD AUTO: 8.2 % (ref 5–12)
NEUTROPHILS # BLD AUTO: 5.4 10*3/MM3 (ref 1.7–7)
NEUTROPHILS NFR BLD AUTO: 69.6 % (ref 42.7–76)
PLATELET # BLD AUTO: 394 10*3/MM3 (ref 140–450)
PMV BLD AUTO: 6.7 FL (ref 6–12)
POTASSIUM BLD-SCNC: 4.8 MMOL/L (ref 3.5–5.2)
PROT SERPL-MCNC: 7.5 G/DL (ref 6–8.5)
RBC # BLD AUTO: 4.8 10*6/MM3 (ref 3.77–5.28)
SODIUM BLD-SCNC: 139 MMOL/L (ref 136–145)
TRIGL SERPL-MCNC: 122 MG/DL (ref 0–150)
TSH SERPL DL<=0.05 MIU/L-ACNC: 1.57 UIU/ML (ref 0.27–4.2)
VLDLC SERPL-MCNC: 24.4 MG/DL (ref 5–40)
WBC NRBC COR # BLD: 7.8 10*3/MM3 (ref 3.4–10.8)

## 2020-02-24 PROCEDURE — 80053 COMPREHEN METABOLIC PANEL: CPT | Performed by: INTERNAL MEDICINE

## 2020-02-24 PROCEDURE — G0439 PPPS, SUBSEQ VISIT: HCPCS | Performed by: INTERNAL MEDICINE

## 2020-02-24 PROCEDURE — 99214 OFFICE O/P EST MOD 30 MIN: CPT | Performed by: INTERNAL MEDICINE

## 2020-02-24 PROCEDURE — 80061 LIPID PANEL: CPT | Performed by: INTERNAL MEDICINE

## 2020-02-24 PROCEDURE — 84443 ASSAY THYROID STIM HORMONE: CPT | Performed by: INTERNAL MEDICINE

## 2020-02-24 PROCEDURE — 85025 COMPLETE CBC W/AUTO DIFF WBC: CPT | Performed by: INTERNAL MEDICINE

## 2020-02-24 PROCEDURE — 36415 COLL VENOUS BLD VENIPUNCTURE: CPT | Performed by: INTERNAL MEDICINE

## 2020-02-24 RX ORDER — VALSARTAN 160 MG/1
160 TABLET ORAL DAILY
Qty: 30 TABLET | Refills: 1 | Status: SHIPPED | OUTPATIENT
Start: 2020-02-24 | End: 2020-03-27

## 2020-02-24 RX ORDER — VALACYCLOVIR HYDROCHLORIDE 500 MG/1
500 TABLET, FILM COATED ORAL 3 TIMES DAILY
Qty: 21 TABLET | Refills: 0 | Status: SHIPPED | OUTPATIENT
Start: 2020-02-24 | End: 2020-03-19 | Stop reason: HOSPADM

## 2020-02-24 NOTE — PATIENT INSTRUCTIONS
Medicare Wellness  Personal Prevention Plan of Service     Date of Office Visit:  2020  Encounter Provider:  Jimmy Ivan MD  Place of Service:  [facility]  Patient Name: Geeta Butt  :  1935    As part of the Medicare Wellness portion of your visit today, we are providing you with this personalized preventive plan of services (PPPS). This plan is based upon recommendations of the [place] Preventive Services Task Force (USPSTF) and the Advisory Committee on Immunization Practices (ACIP).    This lists the preventive care services that should be considered, and provides dates of when you are due. Items listed as completed are up-to-date and do not require any further intervention.    Health Maintenance   Topic Date Due   • ZOSTER VACCINE (2 of 2) 2017   • URINE MICROALBUMIN  2018   • HEMOGLOBIN A1C  01/10/2019   • Pneumococcal Vaccine Once at 65 Years Old  2020 (Originally 2001)   • LIPID PANEL  2020   • DIABETIC EYE EXAM  2021   • MAMMOGRAM  2021   • MEDICARE ANNUAL WELLNESS  2021   • TDAP/TD VACCINES (2 - Td) 2027   • INFLUENZA VACCINE  Addressed       No orders of the defined types were placed in this encounter.      No follow-ups on file.

## 2020-02-24 NOTE — PROGRESS NOTES
Subjective  Medicare wellness visit subsequent follow-up on blood pressure and thyroid  Geeta Butt is a 84 y.o. female. Body mass index is 31.09 kg/m².  History of Present Illness   Medicare wellness visit subsequent, follow-up on blood pressure and thyroid patient also has a fever blister seen on her lip fairly recently got 21st worth of Valtrex which improved matters somewhat but did not resolve issue also have recurrent nosebleeds mainly from the right nares.  Blood pressure was up a little bit today we will look for other potential factors.  Patient describes that she has not had her blood pressure medicine today since she is fasting lab or monitor this at home and call in readings next few days time after she has had her blood pressure medicine does have a few borderline readings she is currently taking half tablet of Diovan 160 will increase her whole tablet and call in readings on Thursday or Friday borderline lipids by history we will get updated values on that as well.  Patient due for thyroid testing is on Synthroid replacement no complaints that regard  Drug allergies include sulfa Cipro iodine Macrobid causing nausea vomiting diarrhea and yeast related products undefined.  Patient's former smoker quit in 1990.  Family history is positive for esophageal cancer stomach cancer heart disease thyroid cancer hypertension stroke diabetes leukemia    Review of Systems   All other systems reviewed and are negative.      Objective   Vitals:    02/24/20 0949   BP: 160/72   Pulse: 72   Temp: 97.6 °F (36.4 °C)   SpO2: 99%   Weight: 72.2 kg (159 lb 3.2 oz)     Physical Exam   Constitutional: She appears well-developed and well-nourished.   HENT:   Head: Normocephalic and atraumatic.   Patient with feverblister inner lower lip.  Both areas have mild septal irritation septal wall distally.  No overt bleeding source or clot.  No blood in the posterior pharynx.   Eyes: Pupils are equal, round, and reactive to light.  Conjunctivae are normal.   Cardiovascular: Normal rate, regular rhythm and normal heart sounds.   Pulmonary/Chest: Effort normal and breath sounds normal.   Abdominal: Soft. Bowel sounds are normal.   Neurological: She is alert.   Unremarkable gait and station   Skin: Skin is warm and dry.   Nursing note and vitals reviewed.      No results found for: INR    Procedures    Assessment/Plan   1.  Medicare wellness visit subsequent    2.  Hypertension suboptimally controlled increase Diovan to 160 mg 1 whole tablet daily call in readings in 4 days time further follow-up contingent on results    3.  Anterior nosebleed by history reduce blood pressure get CBC if symptoms persist will consider ENT for evaluation no overt source or need for cauterization today  4.  Hypothyroidism await pending lab    5.  Hyperlipidemia await pending lab further recommendations to follow currently is not on a statin.  Of note patient did not want any immunizations.  Just not a fan by her description    Much of this encounter note is an electronic transcription/translation of spoken language to printed text.  The electronic translation of spoken language may permit erroneous, or at times, nonsensical words or phrases to be inadvertently transcribed.  Although I have reviewed the note for such errors, some may still exist. If there are questions or for further clarification, please contact me.

## 2020-02-24 NOTE — PROGRESS NOTES
The ABCs of the Annual Wellness Visit  Subsequent Medicare Wellness Visit    Chief Complaint   Patient presents with   • Hypertension     f/u   • Hypothyroidism   • Blister     lip   • Medicare Wellness-subsequent       Subjective   History of Present Illness:  Geeta Butt is a 84 y.o. female who presents for a Subsequent Medicare Wellness Visit.    HEALTH RISK ASSESSMENT    Recent Hospitalizations:  No hospitalization(s) within the last year.    Current Medical Providers:  Patient Care Team:  Jimmy Ivan Jr., MD as PCP - General  Jimmy Ivan Jr., MD as PCP - Family Medicine  Celia Valenzuela MD as Consulting Physician (Hematology and Oncology)  Sidney Campbell MD as Referring Physician (Dermatology)    Smoking Status:  Social History     Tobacco Use   Smoking Status Former Smoker   • Packs/day: 1.00   • Years: 10.00   • Pack years: 10.00   • Types: Cigarettes   • Start date:    • Last attempt to quit:    • Years since quittin.1   Smokeless Tobacco Never Used       Alcohol Consumption:  Social History     Substance and Sexual Activity   Alcohol Use No       Depression Screen:   PHQ-2/PHQ-9 Depression Screening 2020   Little interest or pleasure in doing things 0   Feeling down, depressed, or hopeless 0   Trouble falling or staying asleep, or sleeping too much 3   Feeling tired or having little energy 0   Poor appetite or overeating 0   Feeling bad about yourself - or that you are a failure or have let yourself or your family down 0   Trouble concentrating on things, such as reading the newspaper or watching television 0   Moving or speaking so slowly that other people could have noticed. Or the opposite - being so fidgety or restless that you have been moving around a lot more than usual 0   Thoughts that you would be better off dead, or of hurting yourself in some way 0   Total Score 3   If you checked off any problems, how difficult have these problems made it for you to do your  work, take care of things at home, or get along with other people? Not difficult at all       Fall Risk Screen:  PER Fall Risk Assessment was completed, and patient is at HIGH risk for falls. Assessment completed on:2/21/2020    Health Habits and Functional and Cognitive Screening:  Functional & Cognitive Status 2/24/2020   Do you have difficulty preparing food and eating? No   Do you have difficulty bathing yourself, getting dressed or grooming yourself? No   Do you have difficulty using the toilet? No   Do you have difficulty moving around from place to place? No   Do you have trouble with steps or getting out of a bed or a chair? No   Current Diet Well Balanced Diet   Dental Exam Up to date   Eye Exam Up to date   Exercise (times per week) 0 times per week   Current Exercise Activities Include None   Do you need help using the phone?  No   Are you deaf or do you have serious difficulty hearing?  Yes   Do you need help with transportation? No   Do you need help shopping? No   Do you need help preparing meals?  No   Do you need help with housework?  No   Do you need help with laundry? No   Do you need help taking your medications? No   Do you need help managing money? No   Do you ever drive or ride in a car without wearing a seat belt? No   Have you felt unusual stress, anger or loneliness in the last month? No   Who do you live with? Spouse   If you need help, do you have trouble finding someone available to you? No   Do you have difficulty concentrating, remembering or making decisions? No         Does the patient have evidence of cognitive impairment? No    Asprin use counseling:Taking ASA appropriately as indicated    Age-appropriate Screening Schedule:  Refer to the list below for future screening recommendations based on patient's age, sex and/or medical conditions. Orders for these recommended tests are listed in the plan section. The patient has been provided with a written plan.    Health Maintenance    Topic Date Due   • ZOSTER VACCINE (2 of 2) 05/17/2017   • URINE MICROALBUMIN  01/14/2018   • HEMOGLOBIN A1C  01/10/2019   • LIPID PANEL  03/12/2020   • DIABETIC EYE EXAM  01/24/2021   • MAMMOGRAM  02/07/2021   • TDAP/TD VACCINES (2 - Td) 03/22/2027   • INFLUENZA VACCINE  Addressed          The following portions of the patient's history were reviewed and updated as appropriate: allergies, current medications, past medical history, past surgical history and problem list.    Outpatient Medications Prior to Visit   Medication Sig Dispense Refill   • amLODIPine (NORVASC) 5 MG tablet TAKE 2 TABLETS BY MOUTH EVERY  tablet 0   • aspirin 81 MG tablet Take 81 mg by mouth Daily.     • Cholecalciferol (VITAMIN D3) 50 MCG (2000 UT) capsule TAKE 1 CAPSULE BY MOUTH DAILY. 100 capsule 1   • colestipol (COLESTID) 1 g tablet Take 1 g by mouth 2 (Two) Times a Day.     • diphenhydrAMINE-APAP, sleep, (TYLENOL PM EXTRA STRENGTH PO) Take  by mouth.     • escitalopram (LEXAPRO) 10 MG tablet TAKE 1 TABLET BY MOUTH EVERY DAY 30 tablet 0   • fluorometholone (FML) 0.1 % ophthalmic suspension Administer 1 drop into the left eye Daily.     • glucose blood (FREESTYLE TEST STRIPS) test strip TEST BLOOD SUGARS ONCE DAILY 50 each 12   • ketotifen (ZADITOR) 0.025 % ophthalmic solution Administer 1 drop into the left eye 2 (Two) Times a Day.     • levothyroxine (SYNTHROID, LEVOTHROID) 75 MCG tablet TAKE 1 TABLET BY MOUTH DAILY 90 tablet 3   • NIFEdipine XL (PROCARDIA XL) 30 MG 24 hr tablet TAKE 1 TABLET BY MOUTH EVERY DAY 30 tablet 1   • NON FORMULARY Administer 1 drop to both eyes 3 (Three) Times a Day. THERA TEARS     • Omega-3 Fatty Acids (FISH OIL PO) Take  by mouth Daily As Needed.     • Probiotic Product (PROBIOTIC DAILY PO) Take 1 capsule by mouth Daily.     • RABEprazole (ACIPHEX) 20 MG EC tablet Take 1 tablet by mouth Daily. 30 tablet 11   • sodium chloride (CAYLA 128) 5 % ophthalmic solution Administer 1 drop to the right eye As  "Needed.     • valsartan (DIOVAN) 160 MG tablet TAKE 1/2 TABLET BY MOUTH DAILY 15 tablet 1   • dicyclomine (BENTYL) 10 MG capsule TAKE 1 CAPSULE BY MOUTH TWICE A DAY BEFORE BREAKFAST AND DINNER FOR 30 DAYS       No facility-administered medications prior to visit.        Patient Active Problem List   Diagnosis   • Hypertension   • Hypothyroidism   • Diabetes mellitus (CMS/HCC)   • Black stool   • Diarrhea   • Nausea & vomiting   • RUQ pain   • Leukocytosis   • UTI (urinary tract infection)   • Duodenitis   • Foot pain, bilateral   • Malignant neoplasm of lower-inner quadrant of left breast in female, estrogen receptor positive (CMS/HCC)   • Iron deficiency anemia   • Adverse effect of iron       Advanced Care Planning:  ACP discussion was held with the patient during this visit. Patient has an advance directive, copy requested.    Review of Systems    Compared to one year ago, the patient feels her physical health is better.  Compared to one year ago, the patient feels her mental health is better.    Reviewed chart for potential of high risk medication in the elderly: not applicable  Reviewed chart for potential of harmful drug interactions in the elderly:not applicable    Objective         Vitals:    02/24/20 0949   BP: 160/72   BP Location: Left arm   Patient Position: Sitting   Cuff Size: Adult   Pulse: 72   Temp: 97.6 °F (36.4 °C)   TempSrc: Oral   SpO2: 99%   Weight: 72.2 kg (159 lb 3.2 oz)   Height: 152.4 cm (60\")   PainSc: 0-No pain       Body mass index is 31.09 kg/m².  Discussed the patient's BMI with her. The BMI is above average; no BMI management plan is appropriate..    Physical Exam          Assessment/Plan   Medicare Risks and Personalized Health Plan  CMS Preventative Services Quick Reference  Cardiovascular risk  Immunizations Discussed/Encouraged (specific immunizations; Pneumococcal 23 )    The above risks/problems have been discussed with the patient.  Pertinent information has been shared with " the patient in the After Visit Summary.  Follow up plans and orders are seen below in the Assessment/Plan Section.    There are no diagnoses linked to this encounter.  Follow Up:  No follow-ups on file.     An After Visit Summary and PPPS were given to the patient.

## 2020-02-27 ENCOUNTER — TELEPHONE (OUTPATIENT)
Dept: FAMILY MEDICINE CLINIC | Facility: CLINIC | Age: 85
End: 2020-02-27

## 2020-03-03 ENCOUNTER — DOCUMENTATION (OUTPATIENT)
Dept: FAMILY MEDICINE CLINIC | Facility: CLINIC | Age: 85
End: 2020-03-03

## 2020-03-09 RX ORDER — NIFEDIPINE 30 MG/1
TABLET, EXTENDED RELEASE ORAL
Qty: 30 TABLET | Refills: 1 | Status: SHIPPED | OUTPATIENT
Start: 2020-03-09 | End: 2020-05-04

## 2020-03-10 NOTE — TELEPHONE ENCOUNTER
Pt states she cannot take statin  Also you increased her valsartan to 160 and her bp is still high 160's/60's

## 2020-03-11 RX ORDER — ESCITALOPRAM OXALATE 10 MG/1
TABLET ORAL
Qty: 30 TABLET | Refills: 0 | Status: SHIPPED | OUTPATIENT
Start: 2020-03-11 | End: 2020-04-10

## 2020-03-17 RX ORDER — RABEPRAZOLE SODIUM 20 MG/1
TABLET, DELAYED RELEASE ORAL
Qty: 30 TABLET | Refills: 11 | Status: SHIPPED | OUTPATIENT
Start: 2020-03-17 | End: 2020-03-19 | Stop reason: HOSPADM

## 2020-03-18 ENCOUNTER — APPOINTMENT (OUTPATIENT)
Dept: CT IMAGING | Facility: HOSPITAL | Age: 85
End: 2020-03-18

## 2020-03-18 ENCOUNTER — TELEPHONE (OUTPATIENT)
Dept: FAMILY MEDICINE CLINIC | Facility: CLINIC | Age: 85
End: 2020-03-18

## 2020-03-18 ENCOUNTER — HOSPITAL ENCOUNTER (OUTPATIENT)
Facility: HOSPITAL | Age: 85
Discharge: HOME | End: 2020-03-19
Attending: EMERGENCY MEDICINE | Admitting: INTERNAL MEDICINE

## 2020-03-18 DIAGNOSIS — K29.81 GASTROINTESTINAL HEMORRHAGE ASSOCIATED WITH DUODENITIS: ICD-10-CM

## 2020-03-18 DIAGNOSIS — K92.1 HEMATOCHEZIA: ICD-10-CM

## 2020-03-18 DIAGNOSIS — K29.80 DUODENITIS: Primary | ICD-10-CM

## 2020-03-18 PROBLEM — N17.9 AKI (ACUTE KIDNEY INJURY): Status: ACTIVE | Noted: 2020-03-18

## 2020-03-18 PROBLEM — K92.2 GI BLEED: Status: ACTIVE | Noted: 2020-03-18

## 2020-03-18 LAB
ALBUMIN SERPL-MCNC: 4.2 G/DL (ref 3.5–5.2)
ALBUMIN/GLOB SERPL: 1.5 G/DL
ALP SERPL-CCNC: 66 U/L (ref 39–117)
ALT SERPL W P-5'-P-CCNC: 20 U/L (ref 1–33)
ANION GAP SERPL CALCULATED.3IONS-SCNC: 11.5 MMOL/L (ref 5–15)
AST SERPL-CCNC: 17 U/L (ref 1–32)
BACTERIA UR QL AUTO: ABNORMAL /HPF
BASOPHILS # BLD AUTO: 0.1 10*3/MM3 (ref 0–0.2)
BASOPHILS NFR BLD AUTO: 0.9 % (ref 0–1.5)
BILIRUB SERPL-MCNC: 0.3 MG/DL (ref 0.2–1.2)
BILIRUB UR QL STRIP: NEGATIVE
BUN BLD-MCNC: 26 MG/DL (ref 8–23)
BUN/CREAT SERPL: 20.3 (ref 7–25)
CALCIUM SPEC-SCNC: 8.7 MG/DL (ref 8.6–10.5)
CHLORIDE SERPL-SCNC: 95 MMOL/L (ref 98–107)
CLARITY UR: ABNORMAL
CO2 SERPL-SCNC: 24.5 MMOL/L (ref 22–29)
COLOR UR: YELLOW
CREAT BLD-MCNC: 1.28 MG/DL (ref 0.57–1)
D-LACTATE SERPL-SCNC: 1 MMOL/L (ref 0.5–2)
DEPRECATED RDW RBC AUTO: 43.9 FL (ref 37–54)
EOSINOPHIL # BLD AUTO: 0.12 10*3/MM3 (ref 0–0.4)
EOSINOPHIL NFR BLD AUTO: 1 % (ref 0.3–6.2)
ERYTHROCYTE [DISTWIDTH] IN BLOOD BY AUTOMATED COUNT: 13.4 % (ref 12.3–15.4)
EXPIRATION DATE: ABNORMAL
FECAL OCCULT BLOOD SCREEN, POC: POSITIVE
GFR SERPL CREATININE-BSD FRML MDRD: 40 ML/MIN/1.73
GLOBULIN UR ELPH-MCNC: 2.8 GM/DL
GLUCOSE BLD-MCNC: 105 MG/DL (ref 65–99)
GLUCOSE UR STRIP-MCNC: NEGATIVE MG/DL
HCT VFR BLD AUTO: 40.6 % (ref 34–46.6)
HGB BLD-MCNC: 13.8 G/DL (ref 12–15.9)
HGB UR QL STRIP.AUTO: NEGATIVE
HYALINE CASTS UR QL AUTO: ABNORMAL /LPF
IMM GRANULOCYTES # BLD AUTO: 0.12 10*3/MM3 (ref 0–0.05)
IMM GRANULOCYTES NFR BLD AUTO: 1 % (ref 0–0.5)
KETONES UR QL STRIP: NEGATIVE
LEUKOCYTE ESTERASE UR QL STRIP.AUTO: ABNORMAL
LIPASE SERPL-CCNC: 44 U/L (ref 13–60)
LYMPHOCYTES # BLD AUTO: 1.93 10*3/MM3 (ref 0.7–3.1)
LYMPHOCYTES NFR BLD AUTO: 16.8 % (ref 19.6–45.3)
Lab: 140
MCH RBC QN AUTO: 30.6 PG (ref 26.6–33)
MCHC RBC AUTO-ENTMCNC: 34 G/DL (ref 31.5–35.7)
MCV RBC AUTO: 90 FL (ref 79–97)
MONOCYTES # BLD AUTO: 1.17 10*3/MM3 (ref 0.1–0.9)
MONOCYTES NFR BLD AUTO: 10.2 % (ref 5–12)
NEGATIVE CONTROL: NEGATIVE
NEUTROPHILS # BLD AUTO: 8.02 10*3/MM3 (ref 1.7–7)
NEUTROPHILS NFR BLD AUTO: 70.1 % (ref 42.7–76)
NITRITE UR QL STRIP: NEGATIVE
NRBC BLD AUTO-RTO: 0 /100 WBC (ref 0–0.2)
PH UR STRIP.AUTO: 5.5 [PH] (ref 5–8)
PLATELET # BLD AUTO: 389 10*3/MM3 (ref 140–450)
PMV BLD AUTO: 9.4 FL (ref 6–12)
POSITIVE CONTROL: POSITIVE
POTASSIUM BLD-SCNC: 4.6 MMOL/L (ref 3.5–5.2)
PROT SERPL-MCNC: 7 G/DL (ref 6–8.5)
PROT UR QL STRIP: NEGATIVE
RBC # BLD AUTO: 4.51 10*6/MM3 (ref 3.77–5.28)
RBC # UR: ABNORMAL /HPF
REF LAB TEST METHOD: ABNORMAL
SODIUM BLD-SCNC: 131 MMOL/L (ref 136–145)
SP GR UR STRIP: 1.02 (ref 1–1.03)
SQUAMOUS #/AREA URNS HPF: ABNORMAL /HPF
UROBILINOGEN UR QL STRIP: ABNORMAL
WBC NRBC COR # BLD: 11.46 10*3/MM3 (ref 3.4–10.8)
WBC UR QL AUTO: ABNORMAL /HPF

## 2020-03-18 PROCEDURE — 83605 ASSAY OF LACTIC ACID: CPT | Performed by: PHYSICIAN ASSISTANT

## 2020-03-18 PROCEDURE — G0378 HOSPITAL OBSERVATION PER HR: HCPCS

## 2020-03-18 PROCEDURE — 99284 EMERGENCY DEPT VISIT MOD MDM: CPT

## 2020-03-18 PROCEDURE — 80053 COMPREHEN METABOLIC PANEL: CPT | Performed by: PHYSICIAN ASSISTANT

## 2020-03-18 PROCEDURE — 83690 ASSAY OF LIPASE: CPT | Performed by: PHYSICIAN ASSISTANT

## 2020-03-18 PROCEDURE — 81001 URINALYSIS AUTO W/SCOPE: CPT | Performed by: PHYSICIAN ASSISTANT

## 2020-03-18 PROCEDURE — 82270 OCCULT BLOOD FECES: CPT | Performed by: PHYSICIAN ASSISTANT

## 2020-03-18 PROCEDURE — 96376 TX/PRO/DX INJ SAME DRUG ADON: CPT

## 2020-03-18 PROCEDURE — 85025 COMPLETE CBC W/AUTO DIFF WBC: CPT | Performed by: PHYSICIAN ASSISTANT

## 2020-03-18 PROCEDURE — 74176 CT ABD & PELVIS W/O CONTRAST: CPT

## 2020-03-18 PROCEDURE — 87086 URINE CULTURE/COLONY COUNT: CPT | Performed by: PHYSICIAN ASSISTANT

## 2020-03-18 PROCEDURE — 96365 THER/PROPH/DIAG IV INF INIT: CPT

## 2020-03-18 RX ORDER — ACETAMINOPHEN 160 MG/5ML
650 SOLUTION ORAL EVERY 4 HOURS PRN
Status: DISCONTINUED | OUTPATIENT
Start: 2020-03-18 | End: 2020-03-19 | Stop reason: HOSPADM

## 2020-03-18 RX ORDER — ONDANSETRON 2 MG/ML
4 INJECTION INTRAMUSCULAR; INTRAVENOUS EVERY 6 HOURS PRN
Status: DISCONTINUED | OUTPATIENT
Start: 2020-03-18 | End: 2020-03-19 | Stop reason: HOSPADM

## 2020-03-18 RX ORDER — SODIUM CHLORIDE 0.9 % (FLUSH) 0.9 %
10 SYRINGE (ML) INJECTION AS NEEDED
Status: DISCONTINUED | OUTPATIENT
Start: 2020-03-18 | End: 2020-03-19 | Stop reason: HOSPADM

## 2020-03-18 RX ORDER — DOXYCYCLINE HYCLATE 100 MG/1
100 CAPSULE ORAL DAILY
COMMUNITY
End: 2020-03-19 | Stop reason: HOSPADM

## 2020-03-18 RX ORDER — ACETAMINOPHEN 325 MG/1
650 TABLET ORAL EVERY 4 HOURS PRN
Status: DISCONTINUED | OUTPATIENT
Start: 2020-03-18 | End: 2020-03-19 | Stop reason: HOSPADM

## 2020-03-18 RX ORDER — LEVOTHYROXINE SODIUM 0.07 MG/1
75 TABLET ORAL
Status: DISCONTINUED | OUTPATIENT
Start: 2020-03-19 | End: 2020-03-19 | Stop reason: HOSPADM

## 2020-03-18 RX ORDER — AMLODIPINE BESYLATE 10 MG/1
10 TABLET ORAL DAILY
Status: DISCONTINUED | OUTPATIENT
Start: 2020-03-19 | End: 2020-03-19 | Stop reason: HOSPADM

## 2020-03-18 RX ORDER — NIFEDIPINE 30 MG/1
30 TABLET, EXTENDED RELEASE ORAL DAILY
Status: DISCONTINUED | OUTPATIENT
Start: 2020-03-19 | End: 2020-03-19 | Stop reason: HOSPADM

## 2020-03-18 RX ORDER — ACETAMINOPHEN 650 MG/1
650 SUPPOSITORY RECTAL EVERY 4 HOURS PRN
Status: DISCONTINUED | OUTPATIENT
Start: 2020-03-18 | End: 2020-03-19 | Stop reason: HOSPADM

## 2020-03-18 RX ORDER — ESCITALOPRAM OXALATE 10 MG/1
10 TABLET ORAL DAILY
Status: DISCONTINUED | OUTPATIENT
Start: 2020-03-19 | End: 2020-03-19 | Stop reason: HOSPADM

## 2020-03-18 RX ORDER — SODIUM CHLORIDE 0.9 % (FLUSH) 0.9 %
10 SYRINGE (ML) INJECTION EVERY 12 HOURS SCHEDULED
Status: DISCONTINUED | OUTPATIENT
Start: 2020-03-18 | End: 2020-03-19 | Stop reason: HOSPADM

## 2020-03-18 RX ORDER — DICYCLOMINE HYDROCHLORIDE 10 MG/1
10 CAPSULE ORAL
COMMUNITY
End: 2021-03-15

## 2020-03-18 RX ORDER — PANTOPRAZOLE SODIUM 40 MG/10ML
80 INJECTION, POWDER, LYOPHILIZED, FOR SOLUTION INTRAVENOUS ONCE
Status: COMPLETED | OUTPATIENT
Start: 2020-03-18 | End: 2020-03-18

## 2020-03-18 RX ORDER — NITROGLYCERIN 0.4 MG/1
0.4 TABLET SUBLINGUAL
Status: DISCONTINUED | OUTPATIENT
Start: 2020-03-18 | End: 2020-03-19 | Stop reason: HOSPADM

## 2020-03-18 RX ORDER — SODIUM CHLORIDE 9 MG/ML
75 INJECTION, SOLUTION INTRAVENOUS CONTINUOUS
Status: DISCONTINUED | OUTPATIENT
Start: 2020-03-18 | End: 2020-03-19 | Stop reason: HOSPADM

## 2020-03-18 RX ADMIN — SODIUM CHLORIDE 8 MG/HR: 900 INJECTION INTRAVENOUS at 22:59

## 2020-03-18 RX ADMIN — SODIUM CHLORIDE 75 ML/HR: 9 INJECTION, SOLUTION INTRAVENOUS at 21:15

## 2020-03-18 RX ADMIN — SODIUM CHLORIDE, PRESERVATIVE FREE 10 ML: 5 INJECTION INTRAVENOUS at 22:03

## 2020-03-18 RX ADMIN — SODIUM CHLORIDE 500 ML: 9 INJECTION, SOLUTION INTRAVENOUS at 18:55

## 2020-03-18 RX ADMIN — PANTOPRAZOLE SODIUM 80 MG: 40 INJECTION, POWDER, FOR SOLUTION INTRAVENOUS at 18:54

## 2020-03-18 NOTE — ED PROVIDER NOTES
"EMERGENCY DEPARTMENT ENCOUNTER    Room Number:  33/33  PCP: Jimmy Ivan Jr., MD  Historian: pt  ENT: Dr Perez    HPI:  Chief Complaint: Hematochezia  Context: Geeta Butt is a 84 y.o. female who presents to the ED c/o intermittent \"bright red\" hematochezia that started two days ago. Pt admits to upper abdomianl pain x 5 days, diarrhea, and nausea but denies vomiting, dysuria, urinary frequency, or hematuria.   Pt states she started taking a dicyclomine and steroids due to a sinus infection last week and has had frequent epistaxis since starting the medication.     Character: \"bright red\"  Duration: two days  Severity: moderate  Progression: unchanged  Aggravating Factors: none  Alleviating Factors: none        MEDICAL RECORD REVIEW  EF of  62% on Echo by Dr Carter in November of 2019.      ALLERGIES  Other; Sulfa antibiotics; Ciprofloxacin; Iodine; Macrodantin [nitrofurantoin macrocrystal]; Nitrofurantoin; and Yeast-related products    PAST MEDICAL HISTORY  Active Ambulatory Problems     Diagnosis Date Noted   • Hypertension 10/16/2017   • Hypothyroidism 10/16/2017   • DM2 (diabetes mellitus, type 2) (CMS/HCC) 10/16/2017   • Black stool 10/16/2017   • Diarrhea 10/16/2017   • Nausea & vomiting 10/16/2017   • RUQ pain 10/16/2017   • Leukocytosis 10/16/2017   • UTI (urinary tract infection) 10/17/2017   • Duodenitis 10/17/2017   • Foot pain, bilateral 10/19/2017   • Malignant neoplasm of lower-inner quadrant of left breast in female, estrogen receptor positive (CMS/HCC) 02/27/2018   • Iron deficiency anemia 04/04/2019   • Adverse effect of iron 05/09/2019     Resolved Ambulatory Problems     Diagnosis Date Noted   • No Resolved Ambulatory Problems     Past Medical History:   Diagnosis Date   • Anesthesia complication    • Arthritis    • Aspiration into airway    • Breast cancer (CMS/HCC)    • Cancer (CMS/HCC) 1990   • Chronic kidney disease    • CKD (chronic kidney disease)    • Depression    • Diabetes " mellitus (CMS/HCC)    • Diverticular disease    • Gastric ulcer    • GERD (gastroesophageal reflux disease)    • Hearing loss    • History of colon polyps    • History of depression    • History of GI bleed    • Peptic ulceration    • PONV (postoperative nausea and vomiting)    • Poor vision    • Stroke (CMS/HCC) 2000       PAST SURGICAL HISTORY  Past Surgical History:   Procedure Laterality Date   • ABDOMINAL SURGERY     • APPENDECTOMY     • BLADDER REPAIR      AND RECTOCELE   • BREAST BIOPSY     • BREAST CYST ASPIRATION     • BREAST LUMPECTOMY WITH SENTINEL NODE BIOPSY Left 3/19/2018    Procedure: BREAST LUMPECTOMY WITH SENTINEL NODE BIOPSY AND NEEDLE LOCALIZATION;  Surgeon: Myles Soliman MD;  Location[place]  [facility] [place];  Service: General   • CHOLECYSTECTOMY     • COLONOSCOPY  2013   • COLONOSCOPY N/A 2/16/2018    Procedure: COLONOSCOPY into cecum and T.I. with biopsies;  Surgeon: Augustine Gallego MD;  Location[facility];  Service:    • ENDOSCOPY N/A 10/17/2017    Procedure: ESOPHAGOGASTRODUODENOSCOPY WITH COLD BIOPSIES;  Surgeon: Augustine Gallego MD;  Location: [facility];  Service:    • ENDOSCOPY N/A 2/16/2018    Procedure: ESOPHAGOGASTRODUODENOSCOPY with biopsies and #54 [address] DILATATION;  Surgeon: Augustine Gallego MD;  Location: [facility] END[facility];  Service:    • EYE SURGERY Left 2014    3-4 years, cornea replacement    • HYSTERECTOMY     • MOUTH SURGERY  2000    UNDER TONGUE AND LEFT FLOOR OF MOUTH FOR CA   • PARATHYROIDECTOMY      PER PT   • SINUS SURGERY     • SKIN BIOPSY     • THYROID SURGERY     • VARICOSE VEIN SURGERY         FAMILY HISTORY  Family History   Problem Relation Age of Onset   • Esophageal cancer Father    • Stomach cancer Father    • Heart disease Mother    • Thyroid cancer Mother    • Hypertension Mother    • Hypertension Sister    • Hypertension Brother    • Stroke Brother    • Heart disease Brother    • Diabetes Brother    • Leukemia Paternal Aunt    • Malig Hyperthermia  Neg Hx        SOCIAL HISTORY  Social History     Socioeconomic History   • Marital status:      Spouse name: Nicolas   • Number of children: Not on file   • Years of education: High school   • Highest education level: Not on file   Occupational History   • Occupation: Homemaker   Tobacco Use   • Smoking status: Former Smoker     Packs/day: 1.00     Years: 10.00     Pack years: 10.00     Types: Cigarettes     Start date:      Last attempt to quit:      Years since quittin.2   • Smokeless tobacco: Never Used   Substance and Sexual Activity   • Alcohol use: No   • Drug use: No   • Sexual activity: Defer   Lifestyle   • Physical activity:     Days per week: Patient refused     Minutes per session: Patient refused   • Stress: Not on file   Social History Narrative    Accompanied by daughters Marlena           REVIEW OF SYSTEMS  Review of Systems   Constitutional: Negative for chills and fever.   HENT: Positive for nosebleeds (frequent). Negative for sore throat.    Respiratory: Negative for shortness of breath.    Cardiovascular: Negative for chest pain.   Gastrointestinal: Positive for abdominal pain (upper), blood in stool, diarrhea and nausea. Negative for vomiting.   Genitourinary: Negative for dysuria, frequency and hematuria.   Musculoskeletal: Negative for back pain.   Skin: Negative for rash.   Neurological: Negative for dizziness.   Psychiatric/Behavioral: The patient is not nervous/anxious.            PHYSICAL EXAM  ED Triage Vitals [20 1656]   Temp Heart Rate Resp BP SpO2   100.3 °F (37.9 °C) 96 16 -- 94 %      Temp src Heart Rate Source Patient Position BP Location FiO2 (%)   -- Monitor -- -- --     Physical Exam   Constitutional: She is oriented to person, place, and time. No distress.   HENT:   Head: Normocephalic and atraumatic.   Eyes: Pupils are equal, round, and reactive to light. EOM are normal.   Neck: Normal range of motion. Neck supple.   Cardiovascular: Normal  rate, regular rhythm and normal heart sounds.   Pulmonary/Chest: Effort normal and breath sounds normal. No respiratory distress.   Abdominal: Soft. There is tenderness in the epigastric area. There is guarding. There is no rebound.   Musculoskeletal: Normal range of motion. She exhibits no edema.   Neurological: She is alert and oriented to person, place, and time. She has normal sensation and normal strength.   Skin: Skin is warm and dry. No rash noted.   Psychiatric: Mood and affect normal.   Nursing note and vitals reviewed.          LAB RESULTS  Recent Results (from the past 24 hour(s))   Comprehensive Metabolic Panel    Collection Time: 03/18/20  5:31 PM   Result Value Ref Range    Glucose 105 (H) 65 - 99 mg/dL    BUN 26 (H) 8 - 23 mg/dL    Creatinine 1.28 (H) 0.57 - 1.00 mg/dL    Sodium 131 (L) 136 - 145 mmol/L    Potassium 4.6 3.5 - 5.2 mmol/L    Chloride 95 (L) 98 - 107 mmol/L    CO2 24.5 22.0 - 29.0 mmol/L    Calcium 8.7 8.6 - 10.5 mg/dL    Total Protein 7.0 6.0 - 8.5 g/dL    Albumin 4.20 3.50 - 5.20 g/dL    ALT (SGPT) 20 1 - 33 U/L    AST (SGOT) 17 1 - 32 U/L    Alkaline Phosphatase 66 39 - 117 U/L    Total Bilirubin 0.3 0.2 - 1.2 mg/dL    eGFR Non African Amer 40 (L) >60 mL/min/1.73    Globulin 2.8 gm/dL    A/G Ratio 1.5 g/dL    BUN/Creatinine Ratio 20.3 7.0 - 25.0    Anion Gap 11.5 5.0 - 15.0 mmol/L   Lipase    Collection Time: 03/18/20  5:31 PM   Result Value Ref Range    Lipase 44 13 - 60 U/L   CBC Auto Differential    Collection Time: 03/18/20  5:31 PM   Result Value Ref Range    WBC 11.46 (H) 3.40 - 10.80 10*3/mm3    RBC 4.51 3.77 - 5.28 10*6/mm3    Hemoglobin 13.8 12.0 - 15.9 g/dL    Hematocrit 40.6 34.0 - 46.6 %    MCV 90.0 79.0 - 97.0 fL    MCH 30.6 26.6 - 33.0 pg    MCHC 34.0 31.5 - 35.7 g/dL    RDW 13.4 12.3 - 15.4 %    RDW-SD 43.9 37.0 - 54.0 fl    MPV 9.4 6.0 - 12.0 fL    Platelets 389 140 - 450 10*3/mm3    Neutrophil % 70.1 42.7 - 76.0 %    Lymphocyte % 16.8 (L) 19.6 - 45.3 %    Monocyte %  10.2 5.0 - 12.0 %    Eosinophil % 1.0 0.3 - 6.2 %    Basophil % 0.9 0.0 - 1.5 %    Immature Grans % 1.0 (H) 0.0 - 0.5 %    Neutrophils, Absolute 8.02 (H) 1.70 - 7.00 10*3/mm3    Lymphocytes, Absolute 1.93 0.70 - 3.10 10*3/mm3    Monocytes, Absolute 1.17 (H) 0.10 - 0.90 10*3/mm3    Eosinophils, Absolute 0.12 0.00 - 0.40 10*3/mm3    Basophils, Absolute 0.10 0.00 - 0.20 10*3/mm3    Immature Grans, Absolute 0.12 (H) 0.00 - 0.05 10*3/mm3    nRBC 0.0 0.0 - 0.2 /100 WBC   POCT Occult Blood Stool    Collection Time: 03/18/20  6:01 PM   Result Value Ref Range    Fecal Occult Blood Positive (A) Negative    Lot Number 140     Expiration Date 11/30/20     Positive Control Positive Positive    Negative Control Negative Negative   Lactic Acid, Plasma    Collection Time: 03/18/20  6:54 PM   Result Value Ref Range    Lactate 1.0 0.5 - 2.0 mmol/L   Urinalysis With Microscopic If Indicated (No Culture) - Urine, Clean Catch    Collection Time: 03/18/20  6:57 PM   Result Value Ref Range    Color, UA Yellow Yellow, Straw    Appearance, UA Cloudy (A) Clear    pH, UA 5.5 5.0 - 8.0    Specific Gravity, UA 1.016 1.005 - 1.030    Glucose, UA Negative Negative    Ketones, UA Negative Negative    Bilirubin, UA Negative Negative    Blood, UA Negative Negative    Protein, UA Negative Negative    Leuk Esterase, UA Small (1+) (A) Negative    Nitrite, UA Negative Negative    Urobilinogen, UA 0.2 E.U./dL 0.2 - 1.0 E.U./dL   Urinalysis, Microscopic Only - Urine, Clean Catch    Collection Time: 03/18/20  6:57 PM   Result Value Ref Range    RBC, UA 0-2 None Seen, 0-2 /HPF    WBC, UA 6-12 (A) None Seen, 0-2 /HPF    Bacteria, UA None Seen None Seen /HPF    Squamous Epithelial Cells, UA 7-12 (A) None Seen, 0-2 /HPF    Hyaline Casts, UA 3-6 None Seen /LPF    Methodology Automated Microscopy        I ordered the above labs and reviewed the results        RADIOLOGY  CT Abdomen Pelvis Without Contrast   Final Result   CT findings suspicious for duodenitis  involving the second   portion of the duodenum as described. No focal area of ulceration are   identified, although peptic ulcer disease can certainly not be excluded.   There is no free air. Similar, but less prominent findings were present   on a previous CT in 2017. There is moderately extensive colonic   diverticulosis without evidence of diverticulitis.       This report was finalized on 3/18/2020 7:13 PM by Dr. Camron Kwong M.D.                    MEDICATIONS GIVEN IN ER  Medications   sodium chloride 0.9 % flush 10 mL (has no administration in time range)   sodium chloride 0.9 % flush 10 mL (has no administration in time range)   acetaminophen (TYLENOL) tablet 650 mg (has no administration in time range)     Or   acetaminophen (TYLENOL) 160 MG/5ML solution 650 mg (has no administration in time range)     Or   acetaminophen (TYLENOL) suppository 650 mg (has no administration in time range)   nitroglycerin (NITROSTAT) SL tablet 0.4 mg (has no administration in time range)   sodium chloride 0.9 % infusion (has no administration in time range)   ondansetron (ZOFRAN) injection 4 mg (has no administration in time range)   sodium chloride 0.9 % bolus 500 mL (0 mL Intravenous Stopped 3/18/20 1948)   pantoprazole (PROTONIX) injection 80 mg (80 mg Intravenous Given 3/18/20 1854)             PROCEDURES  Procedures          PROGRESS AND CONSULTS    Progress Notes:           Reviewed pt's history and workup with Dr. Blank (ER physician). After a bedside evaluation, Dr. Blank agrees with the plan of care.    1752  Discussed plan to do a rectal exam. Pt understands and agrees with the plan. All questions have been answered.  Rectal exam performed with female chaperone, Madai, present.     I discussed with Aditi, nurse practitioner for [facility] about patient's condition results.  She reports Dr. Watts will admit patient for GI bleed and duodenitis.      Disposition vitals:  /66   Pulse 86   Temp 100.3 °F (37.9 °C)  "  Resp 16   Ht 154.9 cm (61\")   Wt 72.6 kg (160 lb)   SpO2 98%   BMI 30.23 kg/m²           DIAGNOSIS  Final diagnoses:   Duodenitis   Gastrointestinal hemorrhage associated with duodenitis   Hematochezia           DISPOSITION  ADMISSION    Discussed treatment plan and reason for admission with pt/family and admitting physician.  Pt/family voiced understanding of the plan for admission for further testing/treatment as needed.             Documentation assistance provided by anderson Chin for Ms. Marissa Garcia PA-C.  Information recorded by the phucibrina was done at my direction and has been verified and validated by me.              Chiqui Chin  03/18/20 1832       Marissa Garcia PA-C  03/18/20 2021    "

## 2020-03-18 NOTE — ED NOTES
"Patient reports that she has had 6-8 nose bleeds, blood in her stool twice and abdominal pain. Patient states \"I think I am having an allergic reaction to one of my medications (dicyclmoine)\" that she started last friday. Patient denies cough or SOA.     Laura Brady RN  03/18/20 [id number]       Laura Brady RN  03/18/20 1832    "

## 2020-03-18 NOTE — ED PROVIDER NOTES
Pt presents to the ED c/o hematochezia for 2 days. Pt is passing bright red blood with diarrhea. She also c/o upper abd pain x5 days, nausea, and intermittent epistaxis. She denies vomiting, fever, CP, and SOA. Pt started taking Dicyclomine and steroids last week for a sinus infection. Pt is not on blood thinners. She has a h/o gastric ulcers and GI bleeds.   Gi Dr. Gallego      On exam,  A&Ox3. NAD, PERRL, moist mucous membranes. Heart is RRR, lungs are CTAB. Abd is soft, mid epigastric tenderness, no rebound or guarding, non distended, bowel sounds positive. No pedal edema.  Normocephalic, atraumatic. Normal neuro exam      Plan-Reviewed labs and CT. Hemoglobin is stable at 13.1 but her stool is heme positive.  Patient has duodenitis on her CT scan.  Discussed this with the patient her family reported that Dr. Gallego has scoped her before when she has had multiple peptic ulcers.  Plan to admit pt for GI bleed.       Attestation:  The SILVIO and I have discussed this patient's history, physical exam, and treatment plan.  I have reviewed the documentation and personally had a face to face interaction with the patient. I affirm the documentation and agree with the treatment and plan.  The attached note describes my personal findings.      Documentation assistance provided by anderson Morris for Dr. Blank Information recorded by the anderson was done at my direction and has been verified and validated by me.       Brenda Morris  03/18/20 1924       Aguila Blank MD  03/18/20 2029

## 2020-03-18 NOTE — TELEPHONE ENCOUNTER
PT CALLED SAYING THAT HER ENT PUT HER ON SOME STEROIDS AND DOXYCYCLINE 100 MG ON Friday. SHE STARTED TO HAVE PAIN IN HER STOMACH, NOT BEING ABLE TO EAT, HAVE NOSE BLEEDS, NOTICED THAT SHE WAS PASSING BLOODING HER DIARRHEA AND THIS MORNING HER BP /44. SHE SAID THAT SHE STOPPED THE MEDS, BUT THE ENT TOLD HER TO CONTACT HER PCP.

## 2020-03-19 ENCOUNTER — ON CAMPUS - OUTPATIENT (OUTPATIENT)
Dept: URBAN - METROPOLITAN AREA HOSPITAL 114 | Facility: HOSPITAL | Age: 85
End: 2020-03-19

## 2020-03-19 ENCOUNTER — ANESTHESIA (OUTPATIENT)
Dept: GASTROENTEROLOGY | Facility: HOSPITAL | Age: 85
End: 2020-03-19

## 2020-03-19 ENCOUNTER — ANESTHESIA EVENT (OUTPATIENT)
Dept: GASTROENTEROLOGY | Facility: HOSPITAL | Age: 85
End: 2020-03-19

## 2020-03-19 VITALS
BODY MASS INDEX: 28.97 KG/M2 | HEIGHT: 62 IN | DIASTOLIC BLOOD PRESSURE: 52 MMHG | SYSTOLIC BLOOD PRESSURE: 122 MMHG | OXYGEN SATURATION: 97 % | WEIGHT: 157.4 LBS | HEART RATE: 68 BPM | TEMPERATURE: 98 F | RESPIRATION RATE: 18 BRPM

## 2020-03-19 DIAGNOSIS — D62 ACUTE POSTHEMORRHAGIC ANEMIA: ICD-10-CM

## 2020-03-19 DIAGNOSIS — K26.9 DUODENAL ULCER, UNSPECIFIED AS ACUTE OR CHRONIC, WITHOUT HEM: ICD-10-CM

## 2020-03-19 DIAGNOSIS — R19.5 OTHER FECAL ABNORMALITIES: ICD-10-CM

## 2020-03-19 DIAGNOSIS — K92.1 MELENA: ICD-10-CM

## 2020-03-19 DIAGNOSIS — K31.89 OTHER DISEASES OF STOMACH AND DUODENUM: ICD-10-CM

## 2020-03-19 DIAGNOSIS — R10.13 EPIGASTRIC PAIN: ICD-10-CM

## 2020-03-19 DIAGNOSIS — R04.0 EPISTAXIS: ICD-10-CM

## 2020-03-19 PROBLEM — K29.81 GASTROINTESTINAL HEMORRHAGE ASSOCIATED WITH DUODENITIS: Status: ACTIVE | Noted: 2020-03-18

## 2020-03-19 LAB
ANION GAP SERPL CALCULATED.3IONS-SCNC: 8.4 MMOL/L (ref 5–15)
BUN BLD-MCNC: 18 MG/DL (ref 8–23)
BUN/CREAT SERPL: 20.5 (ref 7–25)
CALCIUM SPEC-SCNC: 8.2 MG/DL (ref 8.6–10.5)
CHLORIDE SERPL-SCNC: 104 MMOL/L (ref 98–107)
CO2 SERPL-SCNC: 21.6 MMOL/L (ref 22–29)
CREAT BLD-MCNC: 0.88 MG/DL (ref 0.57–1)
DEPRECATED RDW RBC AUTO: 44.1 FL (ref 37–54)
ERYTHROCYTE [DISTWIDTH] IN BLOOD BY AUTOMATED COUNT: 13.4 % (ref 12.3–15.4)
GFR SERPL CREATININE-BSD FRML MDRD: 61 ML/MIN/1.73
GLUCOSE BLD-MCNC: 107 MG/DL (ref 65–99)
GLUCOSE BLDC GLUCOMTR-MCNC: 100 MG/DL (ref 70–130)
HCT VFR BLD AUTO: 34.5 % (ref 34–46.6)
HCT VFR BLD AUTO: 34.9 % (ref 34–46.6)
HGB BLD-MCNC: 11.6 G/DL (ref 12–15.9)
HGB BLD-MCNC: 11.9 G/DL (ref 12–15.9)
MCH RBC QN AUTO: 30.6 PG (ref 26.6–33)
MCHC RBC AUTO-ENTMCNC: 34.1 G/DL (ref 31.5–35.7)
MCV RBC AUTO: 89.7 FL (ref 79–97)
PLATELET # BLD AUTO: 322 10*3/MM3 (ref 140–450)
PMV BLD AUTO: 9.2 FL (ref 6–12)
POTASSIUM BLD-SCNC: 4.4 MMOL/L (ref 3.5–5.2)
RBC # BLD AUTO: 3.89 10*6/MM3 (ref 3.77–5.28)
SODIUM BLD-SCNC: 134 MMOL/L (ref 136–145)
WBC NRBC COR # BLD: 10.48 10*3/MM3 (ref 3.4–10.8)

## 2020-03-19 PROCEDURE — 85027 COMPLETE CBC AUTOMATED: CPT | Performed by: NURSE PRACTITIONER

## 2020-03-19 PROCEDURE — 85018 HEMOGLOBIN: CPT | Performed by: NURSE PRACTITIONER

## 2020-03-19 PROCEDURE — 36415 COLL VENOUS BLD VENIPUNCTURE: CPT | Performed by: NURSE PRACTITIONER

## 2020-03-19 PROCEDURE — 85014 HEMATOCRIT: CPT | Performed by: NURSE PRACTITIONER

## 2020-03-19 PROCEDURE — A9270 NON-COVERED ITEM OR SERVICE: HCPCS | Performed by: NURSE PRACTITIONER

## 2020-03-19 PROCEDURE — 63710000001 ACETAMINOPHEN 325 MG TABLET: Performed by: NURSE PRACTITIONER

## 2020-03-19 PROCEDURE — 96366 THER/PROPH/DIAG IV INF ADDON: CPT

## 2020-03-19 PROCEDURE — 25010000002 PROPOFOL 10 MG/ML EMULSION: Performed by: NURSE ANESTHETIST, CERTIFIED REGISTERED

## 2020-03-19 PROCEDURE — 88305 TISSUE EXAM BY PATHOLOGIST: CPT | Performed by: INTERNAL MEDICINE

## 2020-03-19 PROCEDURE — 80048 BASIC METABOLIC PNL TOTAL CA: CPT | Performed by: NURSE PRACTITIONER

## 2020-03-19 PROCEDURE — G0378 HOSPITAL OBSERVATION PER HR: HCPCS

## 2020-03-19 PROCEDURE — 43239 EGD BIOPSY SINGLE/MULTIPLE: CPT | Performed by: INTERNAL MEDICINE

## 2020-03-19 PROCEDURE — 99214 OFFICE O/P EST MOD 30 MIN: CPT | Performed by: INTERNAL MEDICINE

## 2020-03-19 PROCEDURE — 82962 GLUCOSE BLOOD TEST: CPT

## 2020-03-19 RX ORDER — SODIUM CHLORIDE, SODIUM LACTATE, POTASSIUM CHLORIDE, CALCIUM CHLORIDE 600; 310; 30; 20 MG/100ML; MG/100ML; MG/100ML; MG/100ML
INJECTION, SOLUTION INTRAVENOUS CONTINUOUS PRN
Status: DISCONTINUED | OUTPATIENT
Start: 2020-03-19 | End: 2020-03-19 | Stop reason: SURG

## 2020-03-19 RX ORDER — PROPOFOL 10 MG/ML
VIAL (ML) INTRAVENOUS AS NEEDED
Status: DISCONTINUED | OUTPATIENT
Start: 2020-03-19 | End: 2020-03-19 | Stop reason: SURG

## 2020-03-19 RX ORDER — LIDOCAINE HYDROCHLORIDE 20 MG/ML
INJECTION, SOLUTION INFILTRATION; PERINEURAL AS NEEDED
Status: DISCONTINUED | OUTPATIENT
Start: 2020-03-19 | End: 2020-03-19 | Stop reason: SURG

## 2020-03-19 RX ORDER — SODIUM CHLORIDE 9 MG/ML
30 INJECTION, SOLUTION INTRAVENOUS CONTINUOUS PRN
Status: DISCONTINUED | OUTPATIENT
Start: 2020-03-19 | End: 2020-03-19 | Stop reason: HOSPADM

## 2020-03-19 RX ORDER — PROPOFOL 10 MG/ML
VIAL (ML) INTRAVENOUS CONTINUOUS PRN
Status: DISCONTINUED | OUTPATIENT
Start: 2020-03-19 | End: 2020-03-19 | Stop reason: SURG

## 2020-03-19 RX ORDER — PANTOPRAZOLE SODIUM 40 MG/1
40 TABLET, DELAYED RELEASE ORAL EVERY 12 HOURS
Qty: 60 TABLET | Refills: 1 | Status: SHIPPED | OUTPATIENT
Start: 2020-03-19 | End: 2020-06-03 | Stop reason: DRUGHIGH

## 2020-03-19 RX ADMIN — SODIUM CHLORIDE 8 MG/HR: 900 INJECTION INTRAVENOUS at 13:15

## 2020-03-19 RX ADMIN — SODIUM CHLORIDE, PRESERVATIVE FREE 10 ML: 5 INJECTION INTRAVENOUS at 11:30

## 2020-03-19 RX ADMIN — ACETAMINOPHEN 650 MG: 325 TABLET, FILM COATED ORAL at 00:13

## 2020-03-19 RX ADMIN — PROPOFOL 50 MG: 10 INJECTION, EMULSION INTRAVENOUS at 12:09

## 2020-03-19 RX ADMIN — ESCITALOPRAM 10 MG: 10 TABLET, FILM COATED ORAL at 08:38

## 2020-03-19 RX ADMIN — LIDOCAINE HYDROCHLORIDE 60 MG: 20 INJECTION, SOLUTION INFILTRATION; PERINEURAL at 12:09

## 2020-03-19 RX ADMIN — AMLODIPINE BESYLATE 10 MG: 10 TABLET ORAL at 08:38

## 2020-03-19 RX ADMIN — SODIUM CHLORIDE 30 ML/HR: 9 INJECTION, SOLUTION INTRAVENOUS at 11:30

## 2020-03-19 RX ADMIN — SODIUM CHLORIDE, PRESERVATIVE FREE 10 ML: 5 INJECTION INTRAVENOUS at 08:39

## 2020-03-19 RX ADMIN — LEVOTHYROXINE SODIUM 75 MCG: 75 TABLET ORAL at 06:22

## 2020-03-19 RX ADMIN — PROPOFOL 200 MCG/KG/MIN: 10 INJECTION, EMULSION INTRAVENOUS at 12:09

## 2020-03-19 RX ADMIN — NIFEDIPINE 30 MG: 30 TABLET, FILM COATED, EXTENDED RELEASE ORAL at 08:38

## 2020-03-19 RX ADMIN — SODIUM CHLORIDE, POTASSIUM CHLORIDE, SODIUM LACTATE AND CALCIUM CHLORIDE: 600; 310; 30; 20 INJECTION, SOLUTION INTRAVENOUS at 12:06

## 2020-03-19 RX ADMIN — SODIUM CHLORIDE 8 MG/HR: 900 INJECTION INTRAVENOUS at 03:46

## 2020-03-19 RX ADMIN — ACETAMINOPHEN 650 MG: 325 TABLET, FILM COATED ORAL at 06:22

## 2020-03-19 RX ADMIN — ACETAMINOPHEN 650 MG: 325 TABLET, FILM COATED ORAL at 13:22
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
## 2020-03-19 NOTE — PLAN OF CARE
Patient with discharge orders. Daughter at bedside to transport patient home. No acute distress noted at this time.

## 2020-03-19 NOTE — ANESTHESIA POSTPROCEDURE EVALUATION
Patient: Geeta Butt    Procedure Summary     Date:  03/19/20 Room / Location:   [facility] /  [facility]    Anesthesia Start:  1204 Anesthesia Stop:  1224    Procedure:  ESOPHAGOGASTRODUODENOSCOPY with biopsies (N/A Esophagus) Diagnosis:       Gastrointestinal hemorrhage associated with duodenitis      (Gastrointestinal hemorrhage associated with duodenitis [K29.81])    Surgeon:  Augustine Gallego MD Provider:  Malu Osorio MD    Anesthesia Type:  MAC ASA Status:  3          Anesthesia Type: MAC    Vitals  Vitals Value Taken Time   /45 3/19/2020 12:33 PM   Temp     Pulse 68 3/19/2020 12:33 PM   Resp 16 3/19/2020 12:33 PM   SpO2 97 % 3/19/2020 12:33 PM           Post Anesthesia Care and Evaluation    Patient location during evaluation: PACU  Patient participation: complete - patient participated  Level of consciousness: awake and alert  Pain management: adequate  Airway patency: patent  Anesthetic complications: No anesthetic complications  PONV Status: none  Cardiovascular status: acceptable  Respiratory status: acceptable  Hydration status: acceptable

## 2020-03-19 NOTE — CONSULTS
Inpatient Gastroenterology Consult  Consult performed by: Augustine Gallego MD  Consult ordered by: Aditi Ernst APRN          Patient Care Team:  Jimmy Ivan Jr., MD as PCP - General  Jimmy Ivan Jr., MD as PCP - Family Medicine  Celia Valenzuela MD as Consulting Physician (Hematology and Oncology)  Sidney Campbell MD as Referring Physician (Dermatology)    Chief complaint: dark stools     Subjective     History of Present Illness  Patient well known to me  Presents with multiple nosebleeds,  Swallowing blood and dark stools. She also has nausea and upper abdominal pain.  A ct suggested possible duodenitis.   Patient had upper endoscopy 4/2018  Gastritis, and cscope then was in good order.  Weight stable.  She has diarrhea for which dicyclomine is helpful for a dx of irritable bowel syndrome.   Review of Systems   Constitutional: Positive for activity change.   HENT: Positive for nosebleeds.    Gastrointestinal: Positive for blood in stool, diarrhea and nausea.        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    • Chronic kidney disease    • CKD (chronic kidney disease)     PT STATES STAGE 3   • Depression    • Diabetes mellitus (CMS/HCC)     BORDERLINE   • Diverticular disease    • Gastric ulcer     AND DUODENAL   • GERD (gastroesophageal reflux disease)    • Hearing loss     BILAT AIDES   • History of colon polyps    • History of depression    • History of GI bleed    • Hypertension    • Hypothyroidism    • Peptic ulceration    • PONV (postoperative nausea and vomiting)    • Poor vision     RIGHT EYE, HAS PERIPHERAL VISION    • Stroke (CMS/HCC) 2000     mild weakness on left, partial sight loss on right    ,   Past Surgical History:   Procedure Laterality Date    • ABDOMINAL SURGERY     • APPENDECTOMY     • BLADDER REPAIR      AND RECTOCELE   • BREAST BIOPSY     • BREAST CYST ASPIRATION     • BREAST LUMPECTOMY WITH SENTINEL NODE BIOPSY Left 3/19/2018    Procedure: BREAST LUMPECTOMY WITH SENTINEL NODE BIOPSY AND NEEDLE LOCALIZATION;  Surgeon: Myles Soliman MD;  [place]  [facility] [place] [facility] [place]: General   • CHOLECYSTECTOMY     • COLONOSCOPY     • COLONOSCOPY N/A 2018    Procedure: COLONOSCOPY into cecum and T.I. with biopsies;  Surgeon: Augustine Gallego MD;  [facility];  Service:    • ENDOSCOPY N/A 10/17/2017    Procedure: ESOPHAGOGASTRODUODENOSCOPY WITH COLD BIOPSIES;  Surgeon: Augustine Gallego MD;  [facility];  Service:    • ENDOSCOPY N/A 2018    Procedure: ESOPHAGOGASTRODUODENOSCOPY with biopsies and #54 Arguello DILATATION;  Surgeon: Augustine Gallego MD;  Location: [facility] ENDOSCOPY;  Service:    • EYE SURGERY Left     3-4 years, cornea replacement    • HYSTERECTOMY     • MOUTH SURGERY      UNDER TONGUE AND LEFT FLOOR OF MOUTH FOR CA   • PARATHYROIDECTOMY      PER PT   • SINUS SURGERY     • SKIN BIOPSY     • THYROID SURGERY     • VARICOSE VEIN SURGERY     ,   Family History   Problem Relation Age of Onset   • Esophageal cancer Father    • Stomach cancer Father    • Heart disease Mother    • Thyroid cancer Mother    • Hypertension Mother    • Hypertension Sister    • Hypertension Brother    • Stroke Brother    • Heart disease Brother    • Diabetes Brother    • Leukemia Paternal Aunt    • Malig Hyperthermia Neg Hx    ,   Social History     Tobacco Use   • Smoking status: Former Smoker     Packs/day: 1.00     Years: 10.00     Pack years: 10.00     Types: Cigarettes     Start date:      Last attempt to quit:      Years since quittin.2   • Smokeless tobacco: Never Used   Substance Use Topics   • Alcohol use: No   • Drug use: No   ,   Medications Prior to Admission   Medication Sig Dispense Refill Last Dose    • amLODIPine (NORVASC) 5 MG tablet TAKE 2 TABLETS BY MOUTH EVERY  tablet 0 3/17/2020 at Unknown time   • aspirin 81 MG tablet Take 81 mg by mouth Daily.   Past Week at Unknown time   • Cholecalciferol (VITAMIN D3) 50 MCG (2000 UT) capsule TAKE 1 CAPSULE BY MOUTH DAILY. 100 capsule 1 3/17/2020 at Unknown time   • colestipol (COLESTID) 1 g tablet Take 1 g by mouth 2 (Two) Times a Day.   Past Week at Unknown time   • dicyclomine (BENTYL) 10 MG capsule Take 10 mg by mouth Daily Before Lunch.   Past Week at Unknown time   • diphenhydrAMINE-APAP, sleep, (TYLENOL PM EXTRA STRENGTH PO) Take  by mouth.   3/17/2020 at Unknown time   • doxycycline (VIBRAMYCIN) 100 MG capsule Take 100 mg by mouth Daily.   Past Week at Unknown time   • escitalopram (LEXAPRO) 10 MG tablet TAKE 1 TABLET BY MOUTH EVERY DAY 30 tablet 0 Past Week at Unknown time   • fluorometholone (FML) 0.1 % ophthalmic suspension Administer 1 drop into the left eye Daily.   3/18/2020 at Unknown time   • glucose blood (FREESTYLE TEST STRIPS) test strip TEST BLOOD SUGARS ONCE DAILY 50 each 12 3/18/2020 at Unknown time   • ketotifen (ZADITOR) 0.025 % ophthalmic solution Administer 1 drop into the left eye 2 (Two) Times a Day.   3/18/2020 at Unknown time   • levothyroxine (SYNTHROID, LEVOTHROID) 75 MCG tablet TAKE 1 TABLET BY MOUTH DAILY 90 tablet 3 3/17/2020 at Unknown time   • NON FORMULARY Administer 1 drop to both eyes 3 (Three) Times a Day. THERA TEARS   3/18/2020 at Unknown time   • Omega-3 Fatty Acids (FISH OIL PO) Take  by mouth Daily As Needed.   3/17/2020 at Unknown time   • Probiotic Product (PROBIOTIC DAILY PO) Take 1 capsule by mouth Daily.   3/17/2020 at Unknown time   • RABEprazole (ACIPHEX) 20 MG EC tablet TAKE 1 TABLET BY MOUTH EVERY DAY 30 tablet 11 3/17/2020 at Unknown time   • sodium chloride (CAYLA 128) 5 % ophthalmic solution Administer 1 drop to the right eye As Needed.   3/18/2020 at Unknown time   • valsartan (DIOVAN) 160 MG tablet Take  1 tablet by mouth Daily. 30 tablet 1 Past Week at Unknown time   • NIFEdipine XL (PROCARDIA XL) 30 MG 24 hr tablet TAKE 1 TABLET BY MOUTH EVERY DAY 30 tablet 1    • valACYclovir (VALTREX) 500 MG tablet Take 1 tablet by mouth 3 (Three) Times a Day. 21 tablet 0    , Scheduled Meds:    amLODIPine 10 mg Oral Daily   escitalopram 10 mg Oral Daily   levothyroxine 75 mcg Oral Q AM   NIFEdipine XL 30 mg Oral Daily   sodium chloride 10 mL Intravenous Q12H   , Continuous Infusions:    pantoprazole 8 mg/hr Last Rate: 8 mg/hr (03/19/20 0839)   sodium chloride 75 mL/hr Last Rate: 75 mL/hr (03/19/20 0839)   , PRN Meds:  •  acetaminophen **OR** acetaminophen **OR** acetaminophen  •  nitroglycerin  •  ondansetron  •  sodium chloride and Allergies:  Other; Sulfa antibiotics; Ciprofloxacin; Iodine; Macrodantin [nitrofurantoin macrocrystal]; Nitrofurantoin; and Yeast-related products    Objective      Vital Signs  Temp:  [97.6 °F (36.4 °C)-100.3 °F (37.9 °C)] 97.6 °F (36.4 °C)  Heart Rate:  [64-96] 65  Resp:  [16-18] 18  BP: (128-172)/(55-87) 128/55    Physical Exam   Constitutional: She appears well-developed and well-nourished.   HENT:   Mouth/Throat: Oropharynx is clear and moist.   Eyes: Conjunctivae are normal.   Neck: Neck supple.   Cardiovascular: Normal rate and regular rhythm.   Pulmonary/Chest: Effort normal and breath sounds normal.   Abdominal: Soft.   Skin: Skin is warm and dry.   Psychiatric: She has a normal mood and affect.       Results Review:    I reviewed the patient's new clinical results.        Assessment/Plan       GI bleed    Hypertension    Hypothyroidism    DM2 (diabetes mellitus, type 2) (CMS/[facility])    Duodenitis    GIULIA (acute kidney injury) (CMS/[facility])      Assessment:  (Epistaxis  Epigastric pain  And abnormal imaging of duodenum  Heme + stools  melena).     Plan:   (  Urgent Upper tract endoscopy, risks, alternatives and benefits discussed with patient and patient is agreeable to proceed.).       I discussed  the patients findings and my recommendations with patient and nursing staff    Augustine Gallego MD  03/19/20  09:18    Time: Critical care 20 min

## 2020-03-19 NOTE — PROGRESS NOTES
Continued Stay Note  [facility]     Patient Name: Geeta Butt  MRN: [id number]  Today's Date: 3/19/2020    Admit Date: 3/18/2020    Discharge Plan     Row Name 03/19/20 0825       Plan    Plan  Plan home.  COLE Cruz RN    Plan Comments  FACE SHEET VERIFIED/ BURDEN SIGNED.  Spoke with pt at bedside.  Pt's PCP is Dr. Jimmy Ivan.   Pt lives in a two story house with spouse  (Nicolas Butt [phone]).  Pt is independent with ADL's.  Pt has a glucometer and grab bar in bathroom for home use.  Pt gets prescriptions at [facility] in [place].  Pt denies any issues affording medications.  Pt is not current with HH.  Pt has not been in SNF.  Pt states family can transport home at discharge.  Pt denies any discharge needs.  Plan home.  COLE Cruz RN        Discharge Codes    No documentation.             Georgette Cruz RN

## 2020-03-19 NOTE — ANESTHESIA PREPROCEDURE EVALUATION
Anesthesia Evaluation     Patient summary reviewed and Nursing notes reviewed   history of anesthetic complications: PONV  NPO Solid Status: > 8 hours  NPO Liquid Status: > 2 hours           Airway   Dental      Pulmonary    (+) pneumonia (hx aspiration pneumonia) resolved ,   Cardiovascular     (+) hypertension 2 medications or greater,       Neuro/Psych  (+) CVA, psychiatric history Depression,     GI/Hepatic/Renal/Endo    (+)  GERD, PUD, GI bleeding , renal disease ARF and CRI, diabetes mellitus type 2, thyroid problem hypothyroidism    Musculoskeletal     Abdominal    Substance History - negative use     OB/GYN negative ob/gyn ROS         Other   arthritis,    history of cancer (oral cancer, breast cancer)                    Anesthesia Plan    ASA 3     MAC     intravenous induction     Anesthetic plan, all risks, benefits, and alternatives have been provided, discussed and informed consent has been obtained with: patient.
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
## 2020-03-19 NOTE — PLAN OF CARE
Problem: Patient Care Overview  Goal: Plan of Care Review  Outcome: Ongoing (interventions implemented as appropriate)  Flowsheets  Taken 3/19/2020 5155  Progress: no change  Outcome Summary: Pt A&O. Admitted to the unit for GI bleed. Complaints of abdominal and neck pain. PRN Tylenol given per order. Continue IV fluids and Protonix gtt. Up with assitance x1. Awaiting stool for GI panel. Gastro consulted. Vital signs stable. No acute distress noted. Will continue to monitor.  Taken 3/19/2020 0011  Plan of Care Reviewed With: patient     Problem: Patient Care Overview  Goal: Individualization and Mutuality  Outcome: Ongoing (interventions implemented as appropriate)     Problem: Patient Care Overview  Goal: Discharge Needs Assessment  Outcome: Ongoing (interventions implemented as appropriate)     Problem: Patient Care Overview  Goal: Interprofessional Rounds/Family Conf  Outcome: Ongoing (interventions implemented as appropriate)     Problem: Fall Risk (Adult)  Goal: Identify Related Risk Factors and Signs and Symptoms  Outcome: Ongoing (interventions implemented as appropriate)     Problem: Fall Risk (Adult)  Goal: Absence of Fall  Outcome: Ongoing (interventions implemented as appropriate)     Problem: Skin Injury Risk (Adult)  Goal: Identify Related Risk Factors and Signs and Symptoms  Outcome: Ongoing (interventions implemented as appropriate)     Problem: Skin Injury Risk (Adult)  Goal: Skin Health and Integrity  Outcome: Ongoing (interventions implemented as appropriate)     Problem: Gastrointestinal Bleeding (Adult)  Goal: Signs and Symptoms of Listed Potential Problems Will be Absent, Minimized or Managed (Gastrointestinal Bleeding)  Outcome: Ongoing (interventions implemented as appropriate)

## 2020-03-19 NOTE — PROGRESS NOTES
Name: Geeta Butt ADMIT: 3/18/2020   : 1935  PCP: Jimmy Ivan Jr., MD    MRN: [id number] LOS: 0 days   AGE/SEX: 84 y.o. female  ROOM: [facility]     Subjective   Subjective   Feels okay today.  No bloody stools. She is nauseated, without  vomiting/hematochezia. No pain but does have some abdominal and epigastric tenderness.  Denies any further nosebleeds.  No change in the color of her urine.   No chest pain, SOA, dizziness/lightheadedness.     Objective   Objective   Vital Signs  Temp:  [97.6 °F (36.4 °C)-100.3 °F (37.9 °C)] 97.6 °F (36.4 °C)  Heart Rate:  [64-96] 65  Resp:  [16-18] 18  BP: (128-172)/(55-87) 128/55  SpO2:  [94 %-100 %] 95 %  on   ;   Device (Oxygen Therapy): room air  Body mass index is 29.26 kg/m².  Physical Exam   Constitutional: She appears well-nourished. No distress.   Cardiovascular: Normal rate, regular rhythm and intact distal pulses.   No murmur heard.  NSR on the monitor   Pulmonary/Chest: Effort normal and breath sounds normal. No respiratory distress.   Abdominal: Soft. Bowel sounds are normal. She exhibits no distension. There is tenderness.   Musculoskeletal: She exhibits no edema.   Skin: Skin is warm and dry. Capillary refill takes less than 2 seconds.   Psychiatric: She has a normal mood and affect. Her behavior is normal.       Results Review:       I reviewed the patient's new clinical results.  Results from last 7 days   Lab Units 20  0736 20  1731   WBC 10*3/mm3  --  10.48 11.46*   HEMOGLOBIN g/dL 11.6* 11.9* 13.8   PLATELETS 10*3/mm3  --  322 389     Results from last 7 days   Lab Units 20  0447 20  1731   SODIUM mmol/L 134* 131*   POTASSIUM mmol/L 4.4 4.6   CHLORIDE mmol/L 104 95*   CO2 mmol/L 21.6* 24.5   BUN mg/dL 18 26*   CREATININE mg/dL 0.88 1.28*   GLUCOSE mg/dL 107* 105*   Estimated Creatinine Clearance: 44.4 mL/min (by C-G formula based on SCr of 0.88 mg/dL).  Results from last 7 days   Lab Units 20  3549    ALBUMIN g/dL 4.20   BILIRUBIN mg/dL 0.3   ALK PHOS U/L 66   AST (SGOT) U/L 17   ALT (SGPT) U/L 20     Results from last 7 days   Lab Units 03/19/20  0447 03/18/20  1731   CALCIUM mg/dL 8.2* 8.7   ALBUMIN g/dL  --  4.20     Results from last 7 days   Lab Units 03/18/20  1854   LACTATE mmol/L 1.0     Imaging Results (Last 48 Hours)     Procedure Component Value Units Date/Time    CT Abdomen Pelvis Without Contrast [[id number]] Collected:  03/18/20 1902     Updated:  03/18/20 1916    Narrative:       CT ABDOMEN PELVIS WO CONTRAST-     CLINICAL HISTORY: Epigastric pain. Hematochezia.     TECHNIQUE: Spiral CT images were acquired through the abdomen and pelvis  with no oral or IV contrast and were reconstructed in 3 mm thick axial  slices. Multiple coronal and sagittal and 3-D reconstructions were  produced.     Radiation dose reduction techniques were utilized, including automated  exposure control and exposure modulation based on body size.     COMPARISON: CT scan of the abdomen and pelvis dated 10/16/2017     FINDINGS: The liver, spleen, pancreas, kidneys, and internal glands  appear within normal limits. There is moderate stranding of the  intra-abdominal adipose tissue adjacent to the second portion of the  duodenum and also mild thickening of Gerota's fascia in that appears  more prominent on the previous CT scan. The findings are suspicious for  duodenitis. No obvious focal area of ulceration is identified. There is  no free air. There is no bowel distention. The gallbladder is absent.  Multiple diverticuli are noted in the left side of the colon and sigmoid  colon, most numerous in the sigmoid region. There is no CT evidence of  diverticulitis. The uterus is absent.       Impression:       CT findings suspicious for duodenitis involving the second  portion of the duodenum as described. No focal area of ulceration are  identified, although peptic ulcer disease can certainly not be excluded.  There is no free air.  Similar, but less prominent findings were present  on a previous CT in 2017. There is moderately extensive colonic  diverticulosis without evidence of diverticulitis.     This report was finalized on 3/18/2020 7:13 PM by Dr. Camron Kwong M.D.             amLODIPine 10 mg Oral Daily   escitalopram 10 mg Oral Daily   levothyroxine 75 mcg Oral Q AM   NIFEdipine XL 30 mg Oral Daily   sodium chloride 10 mL Intravenous Q12H       pantoprazole 8 mg/hr Last Rate: 8 mg/hr (03/19/20 0839)   sodium chloride 75 mL/hr Last Rate: 75 mL/hr (03/19/20 0839)   Diet Clear Liquid       Assessment/Plan     Active Hospital Problems    Diagnosis  POA   • **GI bleed [K92.2]  Yes   • GIULIA (acute kidney injury) (CMS/HCC) [N17.9]  Yes   • Duodenitis [K29.80]  Yes   • Hypertension [I10]  Yes   • Hypothyroidism [E03.9]  Yes   • DM2 (diabetes mellitus, type 2) (CMS/HCC) [E11.9]  Yes      Resolved Hospital Problems   No resolved problems to display.       84 y.o. female history of diverticular disease, colon polyps, peptic ulceration, chronic kidney disease stage III,  admitted with GI bleed.    GI bleed  Possible duodenitis evident on CT scan  Hemoglobin stable. 11.8  Monitor H&H q8hrs  Dr. Augustine Gallego will scope this patient  Continue Protonix drip  Zofran as needed for nausea    Acute kidney injury  Creatinine improving 0.88, down from 1.28  Will continue IV fluids  Will monitor BMP    Hyponatremia  Improved today, 134 up from 131.   Continue IV fluids  Monitor BMP    Hypertension  Blood pressure stable  Continue amlodipine and procardia    Diabetes  Her blood glucose has not exceeded 120 during this admission  Not currently on any antidiabetics, insulin.  We will keep the same and monitor glucose    SCDs for DVT prophylaxis.  Full code.  Discussed with patient and family.  Anticipate discharge TBD.        JESSA Herrera  [facility]  03/19/20  09:07

## 2020-03-19 NOTE — PROGRESS NOTES
Discharge Planning Assessment  [facility]     Patient Name: Geeta Butt  MRN: [id number]  Today's Date: 3/19/2020    Admit Date: 3/18/2020    Discharge Needs Assessment     Row Name 03/19/20 0822       Living Environment    Lives With  spouse    Name(s) of Who Lives With Patient  Spouse  (Nicolas Butt [phone])    Current Living Arrangements  home/apartment/condo    Primary Care Provided by  self    Provides Primary Care For  no one    Family Caregiver if Needed  child(lonnie), adult;spouse    Family Caregiver Names  Spouse  (Nicolas Butt [phone]) and daughter (Daisy Avila [phone])    Quality of Family Relationships  helpful;supportive    Able to Return to Prior Arrangements  yes    Living Arrangement Comments  Pt lives in a two story house with spouse  (Nicolas Butt [phone]).       Resource/Environmental Concerns    Resource/Environmental Concerns  none    Transportation Concerns  car, none       Transition Planning    Patient/Family Anticipates Transition to  home with family    Patient/Family Anticipated Services at Transition  none    Transportation Anticipated  family or friend will provide       Discharge Needs Assessment    Readmission Within the Last 30 Days  no previous admission in last 30 days    Concerns to be Addressed  denies needs/concerns at this time    Equipment Currently Used at Home  glucometer;grab bar    Anticipated Changes Related to Illness  none    Equipment Needed After Discharge  grab bar, tub/shower;glucometer        Discharge Plan     Row Name 03/19/20 0825       Plan    Plan  Plan home.  COLE Cruz RN    Plan Comments  FACE SHEET VERIFIED/ BURDEN SIGNED.  Spoke with pt at bedside.  Pt's PCP is Dr. Jimmy Ivan.   Pt lives in a two story house with spouse  (Nicolas Butt [phone]).  Pt is independent with ADL's.  Pt has a glucometer and grab bar in bathroom for home use.  Pt gets prescriptions at [facility] in [place].  Pt denies any issues affording  medications.  Pt is not current with HH.  Pt has not been in SNF.  Pt states family can transport home at discharge.  Pt denies any discharge needs.  Plan home.  COLE Cruz RN        Destination      Coordination has not been started for this encounter.      Durable Medical Equipment      Coordination has not been started for this encounter.      Dialysis/Infusion      Coordination has not been started for this encounter.      Home Medical Care      Coordination has not been started for this encounter.      Therapy      Coordination has not been started for this encounter.      Community Resources      Coordination has not been started for this encounter.          Demographic Summary     Row Name 03/19/20 0820       General Information    Admission Type  observation    Arrived From  emergency department    Required Notices Provided  Observation Status Notice    Referral Source  admission list    Reason for Consult  discharge planning     Used During This Interaction  no        Functional Status     Row Name 03/19/20 0822       Functional Status    Usual Activity Tolerance  good    Current Activity Tolerance  moderate       Functional Status, IADL    Medications  independent    Meal Preparation  independent    Housekeeping  independent    Laundry  independent    Shopping  independent       Mental Status    General Appearance WDL  WDL       Mental Status Summary    Recent Changes in Mental Status/Cognitive Functioning  no changes        Psychosocial    No documentation.       Abuse/Neglect    No documentation.       Legal    No documentation.       Substance Abuse    No documentation.       Patient Forms    No documentation.           Georgette Cruz, RN

## 2020-03-20 LAB
BACTERIA SPEC AEROBE CULT: NO GROWTH
CYTO UR: NORMAL
LAB AP CASE REPORT: NORMAL
PATH REPORT.FINAL DX SPEC: NORMAL
PATH REPORT.GROSS SPEC: NORMAL

## 2020-03-20 NOTE — PROGRESS NOTES
Case Management Discharge Note      Final Note: Pt discharged home on 3/19.   COLE Cruz RN         Destination      No service has been selected for the patient.      Durable Medical Equipment      No service has been selected for the patient.      Dialysis/Infusion      No service has been selected for the patient.      Home Medical Care      No service has been selected for the patient.      Therapy      No service has been selected for the patient.      Community Resources      No service has been selected for the patient.        Transportation Services  Private: Car    Final Discharge Disposition Code: 01 - home or self-care

## 2020-03-27 ENCOUNTER — OFFICE VISIT (OUTPATIENT)
Dept: FAMILY MEDICINE CLINIC | Facility: CLINIC | Age: 85
End: 2020-03-27

## 2020-03-27 VITALS
OXYGEN SATURATION: 97 % | HEART RATE: 83 BPM | SYSTOLIC BLOOD PRESSURE: 120 MMHG | TEMPERATURE: 98.9 F | WEIGHT: 159 LBS | DIASTOLIC BLOOD PRESSURE: 60 MMHG | HEIGHT: 62 IN | BODY MASS INDEX: 29.26 KG/M2

## 2020-03-27 DIAGNOSIS — D64.9 ANEMIA, UNSPECIFIED TYPE: ICD-10-CM

## 2020-03-27 DIAGNOSIS — K26.4 GASTROINTESTINAL HEMORRHAGE ASSOCIATED WITH DUODENAL ULCER: ICD-10-CM

## 2020-03-27 DIAGNOSIS — J01.01 ACUTE RECURRENT MAXILLARY SINUSITIS: Primary | ICD-10-CM

## 2020-03-27 DIAGNOSIS — N17.9 AKI (ACUTE KIDNEY INJURY) (HCC): ICD-10-CM

## 2020-03-27 DIAGNOSIS — K29.80 DUODENITIS: ICD-10-CM

## 2020-03-27 LAB
ANION GAP SERPL CALCULATED.3IONS-SCNC: 11.4 MMOL/L (ref 5–15)
BUN BLD-MCNC: 16 MG/DL (ref 8–23)
BUN/CREAT SERPL: 15 (ref 7–25)
CALCIUM SPEC-SCNC: 9.7 MG/DL (ref 8.6–10.5)
CHLORIDE SERPL-SCNC: 96 MMOL/L (ref 98–107)
CO2 SERPL-SCNC: 27.6 MMOL/L (ref 22–29)
CREAT BLD-MCNC: 1.07 MG/DL (ref 0.57–1)
ERYTHROCYTE [DISTWIDTH] IN BLOOD BY AUTOMATED COUNT: 14.3 % (ref 12.3–15.4)
GFR SERPL CREATININE-BSD FRML MDRD: 49 ML/MIN/1.73
GLUCOSE BLD-MCNC: 89 MG/DL (ref 65–99)
HCT VFR BLD AUTO: 38.5 % (ref 34–46.6)
HGB BLD-MCNC: 12.6 G/DL (ref 12–15.9)
LYMPHOCYTES # BLD AUTO: 1.4 10*3/MM3 (ref 0.7–3.1)
LYMPHOCYTES NFR BLD AUTO: 12.2 % (ref 19.6–45.3)
MCH RBC QN AUTO: 29.4 PG (ref 26.6–33)
MCHC RBC AUTO-ENTMCNC: 32.7 G/DL (ref 31.5–35.7)
MCV RBC AUTO: 89.8 FL (ref 79–97)
MONOCYTES # BLD AUTO: 0.4 10*3/MM3 (ref 0.1–0.9)
MONOCYTES NFR BLD AUTO: 3.1 % (ref 5–12)
NEUTROPHILS # BLD AUTO: 9.7 10*3/MM3 (ref 1.7–7)
NEUTROPHILS NFR BLD AUTO: 84.7 % (ref 42.7–76)
PLATELET # BLD AUTO: 420 10*3/MM3 (ref 140–450)
PMV BLD AUTO: 6.5 FL (ref 6–12)
POTASSIUM BLD-SCNC: 4.8 MMOL/L (ref 3.5–5.2)
RBC # BLD AUTO: 4.29 10*6/MM3 (ref 3.77–5.28)
SODIUM BLD-SCNC: 135 MMOL/L (ref 136–145)
WBC NRBC COR # BLD: 11.4 10*3/MM3 (ref 3.4–10.8)

## 2020-03-27 PROCEDURE — 85025 COMPLETE CBC W/AUTO DIFF WBC: CPT | Performed by: INTERNAL MEDICINE

## 2020-03-27 PROCEDURE — 80048 BASIC METABOLIC PNL TOTAL CA: CPT | Performed by: INTERNAL MEDICINE

## 2020-03-27 PROCEDURE — 36415 COLL VENOUS BLD VENIPUNCTURE: CPT | Performed by: INTERNAL MEDICINE

## 2020-03-27 PROCEDURE — 99214 OFFICE O/P EST MOD 30 MIN: CPT | Performed by: INTERNAL MEDICINE

## 2020-03-27 RX ORDER — CEFUROXIME AXETIL 250 MG/1
TABLET ORAL
Qty: 20 TABLET | Refills: 0 | Status: SHIPPED | OUTPATIENT
Start: 2020-03-27 | End: 2021-03-15

## 2020-03-27 RX ORDER — CEFUROXIME AXETIL 250 MG/1
250 TABLET ORAL 2 TIMES DAILY
Qty: 28 TABLET | Refills: 0 | Status: SHIPPED | OUTPATIENT
Start: 2020-03-27 | End: 2020-03-27

## 2020-03-27 NOTE — PROGRESS NOTES
Subjective   Geeta Butt is a 84 y.o. female.  Patient had GI bleed from duodenal ulcer and duodenitis.  Also had acute kidney injury with this currently has current sinus infection several meds have been changed she is holding her aspirin now per the recommendation she does have a follow-up scheduled Dr. Gallego her gastroenterologist.  Is currently on Protonix now no longer on AcipHex.  Body mass index is 29.56 kg/m².  History of Present Illness   Current outpatient and discharge medications have been reconciled for the patient.  Reviewed by: Jimmy Ivan Jr., MD  Duodenitis with several bleeding duodenal ulcers.  No longer having black stools is only recent resolved.  We will get updated CBC she is mildly anemic with reported have acute kidney injury will get updated BMP for that now has sinus pressure and drainage mainly maxillary sinuses reports recurrent nosebleed right nostril has already seen ENT with scope done without particular findings is not active bleeding in our office but will look to see what we can see with the otoscope piece regarding sinusitis no increased temperature this no lung involvement either.  Discharge summary from hospitalization reviewed hospitalized from 3/18/2020 to 3/19/2020.  Drug allergies are several including sulfa undefined Cipro undefined iodine undefined Macrobid causing nausea vomiting diarrhea, yeast related products undefined.  Patient's former smoker.  Family is positive for esophageal cancer stomach cancer heart disease thyroid cancer hypertension stroke diabetes leukemia  Review of Systems   HENT: Positive for congestion, nosebleeds, sinus pressure and sinus pain.    Musculoskeletal: Positive for neck pain.   All other systems reviewed and are negative.      Objective   Vitals:    03/27/20 1110   BP: 120/60   Pulse: 83   Temp: 98.9 °F (37.2 °C)   SpO2: 97%   Weight: 72.1 kg (159 lb)     Physical Exam   Constitutional: She appears well-developed and well-nourished.    HENT:   Head: Normocephalic and atraumatic.   Right Ear: External ear normal.   Left Ear: External ear normal.   Mouth/Throat: Oropharynx is clear and moist.   Pressure palpation both maxillary sinuses.  No overt bleeding site from either nares.  Not actively bleeding right now throat does not show evidence of blood posteriorly   Eyes: Pupils are equal, round, and reactive to light. Conjunctivae are normal.   Cardiovascular: Normal rate, regular rhythm and normal heart sounds.   Pulmonary/Chest: Effort normal and breath sounds normal.   Abdominal: Soft. Bowel sounds are normal.   Neurological: She is alert.   Unremarkable gait and station   Skin: Skin is warm and dry.   Nursing note and vitals reviewed.      No results found for: INR    Procedures    Assessment/Plan   1.  Duodenitis    2.  GI bleed with duodenal ulcer plan patient continue with Protonix follow-up with Dr. Gallego  Her her gastroenterologist also will do an updated CBC today    3.  Anemia plan await CBC    4.  Acute kidney injury get updated BMP    5.  Maxillary sinusitis plan Ceftin 250 twice daily for 14 days time follow-up if not well in that time.  If symptoms should worsen       Much of this encounter note is an electronic transcription/translation of spoken language to printed text.  The electronic translation of spoken language may permit erroneous, or at times, nonsensical words or phrases to be inadvertently transcribed.  Although I have reviewed the note for such errors, some may still exist. If there are questions or for further clarification, please contact me.

## 2020-04-07 ENCOUNTER — TELEHEALTH PROVIDED OTHER THAN IN PATIENT'S HOME (OUTPATIENT)
Dept: URBAN - METROPOLITAN AREA TELEHEALTH 5 | Facility: TELEHEALTH | Age: 85
End: 2020-04-07
Payer: MEDICARE

## 2020-04-07 DIAGNOSIS — K62.5 HEMORRHAGE OF ANUS AND RECTUM: ICD-10-CM

## 2020-04-07 DIAGNOSIS — K57.30 DIVERTICULOSIS OF LARGE INTESTINE WITHOUT PERFORATION OR ABS: ICD-10-CM

## 2020-04-07 DIAGNOSIS — K26.9 DUODENAL ULCER, UNSPECIFIED AS ACUTE OR CHRONIC, WITHOUT HEM: ICD-10-CM

## 2020-04-07 PROCEDURE — G2012 BRIEF CHECK IN BY MD/QHP: HCPCS | Performed by: INTERNAL MEDICINE

## 2020-04-10 RX ORDER — ESCITALOPRAM OXALATE 10 MG/1
TABLET ORAL
Qty: 30 TABLET | Refills: 0 | Status: SHIPPED | OUTPATIENT
Start: 2020-04-10 | End: 2020-05-04

## 2020-04-20 ENCOUNTER — APPOINTMENT (OUTPATIENT)
Dept: BONE DENSITY | Facility: HOSPITAL | Age: 85
End: 2020-04-20

## 2020-04-21 ENCOUNTER — TELEPHONE (OUTPATIENT)
Dept: ONCOLOGY | Facility: CLINIC | Age: 85
End: 2020-04-21

## 2020-04-21 NOTE — TELEPHONE ENCOUNTER
Patient has been put on a new medication by Dr. Augustine Gallego.  Pantoprazole.    She said the directions said it could alter results of labwork, especially if looking for tumors. She would like to know if she should hold off on taking this for a few days since she has an appointment with Dr. Valenzuela on 4/29.    [phone] If she can't answer, she asks that you please leave a message.

## 2020-04-21 NOTE — TELEPHONE ENCOUNTER
PT CALLING ABOUT TAKING PROTONIX AND HAVING TUMOR MARKERS CHECKED. PER AKANKSHA NO ISSUE NOTED. PT TO CONTINUE HER MED AS PRESCRIBED. PT MADE AWARE AND SHE V/U.

## 2020-04-27 ENCOUNTER — TELEPHONE (OUTPATIENT)
Dept: FAMILY MEDICINE CLINIC | Facility: CLINIC | Age: 85
End: 2020-04-27

## 2020-04-27 ENCOUNTER — OFFICE VISIT (OUTPATIENT)
Dept: FAMILY MEDICINE CLINIC | Facility: CLINIC | Age: 85
End: 2020-04-27

## 2020-04-27 DIAGNOSIS — M54.42 ACUTE MIDLINE LOW BACK PAIN WITH LEFT-SIDED SCIATICA: Primary | ICD-10-CM

## 2020-04-27 PROCEDURE — 99442 PR PHYS/QHP TELEPHONE EVALUATION 11-20 MIN: CPT | Performed by: INTERNAL MEDICINE

## 2020-04-27 RX ORDER — TIZANIDINE 2 MG/1
2 TABLET ORAL 2 TIMES DAILY PRN
Qty: 60 TABLET | Refills: 0 | Status: SHIPPED | OUTPATIENT
Start: 2020-04-27 | End: 2020-05-20

## 2020-04-27 RX ORDER — TRAMADOL HYDROCHLORIDE 50 MG/1
50 TABLET ORAL EVERY 6 HOURS PRN
Qty: 12 TABLET | Refills: 0 | Status: SHIPPED | OUTPATIENT
Start: 2020-04-27 | End: 2021-03-15

## 2020-04-27 RX ORDER — PREDNISONE 10 MG/1
TABLET ORAL DAILY
Qty: 21 TABLET | Refills: 0 | Status: SHIPPED | OUTPATIENT
Start: 2020-04-27 | End: 2020-06-03

## 2020-04-27 NOTE — TELEPHONE ENCOUNTER
PATIENT REPORTS THAT HER LEFT LEG IS HURTING  BAD TO THE POINT WHERE SHE CAN'T WALK. PATIENT REPORTS THAT SHE HAS BEEN IN PAIN FOR THE LAST WEEK. PATIENT WOULD LIKE A CALL BACK FROM HER DOCTOR.    PLEASE ADVISE

## 2020-04-27 NOTE — PROGRESS NOTES
Subjective   Geeta Butt is a 84 y.o. female.   There is no height or weight on file to calculate BMI. You have chosen to receive care through a telephone visit. Do you consent to use a telephone visit for your medical care today? Yes increased pain lower midline back since she tucked covers on her bed lifted mattress mildly 4 days ago is having pain down left leg as well.  History and back trouble in the past.  No falls or other problems to explain this  History of Present Illness   Lumbar pain also sciatica left leg is not easing with several days of rest and restricted activity patient did this after lifting corner of mattress putting sheets on backs been acting up ever since her last several days time no other injuries reported no other symptoms doing fairly well but not comfortable in any position including trying to sleep at night.  Does have several drug allergies which were reviewed we will let patient try prednisone she is not sick not her in any COVID patient she is aware she will need to void them at that point in time it is cervical risk-benefit therapy can increase her risk for getting sicker from a COVID-19 infection she is to stay home the people with colds etc. typical over she is to keep them out of the house and away from her.  Patient try cold medicines and she is taken Tylenol with marginal benefit but still very uncomfortable will give a short-term course of tramadol which she is not had before did discuss codeine which makes her goofy and kind of out of it.  So we will hold off on Tylenol 3 no known drug/alcohol issues with patient  Review of Systems   Musculoskeletal:        Back pain lower midline with sciatica left leg is main and only major complaint right now   All other systems reviewed and are negative.      Objective   There were no vitals filed for this visit.  No reported increased temperature but no vital signs obtain    Physical Exam    No results found for:  INR    Procedures    Assessment/Plan  This visit has been rescheduled as a phone visit to comply with patient safety concerns in accordance with CDC recommendations. Total time of discussion was 12 minutes.    Lumbar pain midline    Sciatica left leg both these were given patient prednisone 60-10 Zanaflex 2 mg every 12 hours as needed and will give patient Ultram also she is not able to sleep or get comfortable at all.  She is to stop or hold the Lexapro for the time.  She is on the Ultram if she needs more 3 days time she will need to come and get updated urine drug screen [place] and drug macrina she does have a slightly elevated creatinine so we do not limit her to the 1 Ultram 50 mg every 6 hours as needed if symptoms do not resolve 1 week or if worsens will need to come in for visit with lumbar spine x-rays and strong consideration for MRI of the LS spine also.

## 2020-04-29 ENCOUNTER — APPOINTMENT (OUTPATIENT)
Dept: LAB | Facility: HOSPITAL | Age: 85
End: 2020-04-29

## 2020-04-29 ENCOUNTER — OFFICE VISIT (OUTPATIENT)
Dept: ONCOLOGY | Facility: CLINIC | Age: 85
End: 2020-04-29

## 2020-04-29 DIAGNOSIS — C50.312 MALIGNANT NEOPLASM OF LOWER-INNER QUADRANT OF LEFT BREAST IN FEMALE, ESTROGEN RECEPTOR POSITIVE (HCC): Primary | ICD-10-CM

## 2020-04-29 DIAGNOSIS — Z17.0 MALIGNANT NEOPLASM OF LOWER-INNER QUADRANT OF LEFT BREAST IN FEMALE, ESTROGEN RECEPTOR POSITIVE (HCC): Primary | ICD-10-CM

## 2020-04-29 PROCEDURE — 99214 OFFICE O/P EST MOD 30 MIN: CPT | Performed by: INTERNAL MEDICINE

## 2020-04-29 RX ORDER — TOREMIFENE CITRATE 60 MG/1
60 TABLET ORAL DAILY
Qty: 30 TABLET | Refills: 6 | Status: SHIPPED | OUTPATIENT
Start: 2020-04-29 | End: 2021-03-15

## 2020-04-29 NOTE — PROGRESS NOTES
Subjective     REASON FOR CONSULTATION:   1. Left breast cancer [id number] ER/AR+ her2 neg post lumpectomy/SLN  3/18 and whole breast radiation completed May 2018  2.  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  Fatigue out of proportion to anemia  4.  Arimidex started in 3/18 with bone density showing normal density in the spine and osteopenia in the hips-this was self stopped due to vision changes.  5.  Aromasin initiated 10/20/18 and discontinued 1/20/19 secondary to vision changes.  6.  Tamoxifen initiated 8/20/2019 and discontinued 10/20/2019 secondary to vision changes                             REQUESTING PHYSICIAN:  Myles Soliman M.D.    History of Present Illness patient is an 84-year-old female with a T1cN I jese hormone positive left breast cancer treated with lumpectomy sentinel node biopsy and radiation.    In 10/2018 she was changed to Aromasin as she had issues previously with Arimidex with visual blurring.  The patient was then seen 1/25/19 with reports of blurred vision once again.    At that point the patient's symptoms have essentially resolved.  We therefore elected to switch her to Femara.    Unfortunately, the patient ultimately experienced similar symptoms with Femara because of muscle cramps and blurred vision.    Patient is here today having switched from Prozac which was incompatible with tamoxifen to Lexapro and tamoxifen.  When seen in August the patient had stated she was tolerating the tamoxifen well however when she saw Zuly in February she had had blurring again and stopped her medication.    She is back to normal vision at this point and her ophthalmologist is seen her twice and can find no problems with her eyes.  She only has one eye therefore she is very concerned about this as I can understand.    We talked about a trial of toremifene which is the only other medication left and I am doing this because of the microscopic node positivity and the size of the tumor but if this problem  recurs with the toremifene will not let any further treatment.  She is agreeable to this    She has recently had rectal bleeding and evaluation with CAT scans was felt to have duodenitis and probably recurrent duodenal ulcer and is back on Carafate and PPIs and we will need to watch her hemoglobin closely as she has needed IV iron in the past.    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    • Chronic kidney disease    • CKD (chronic kidney disease)     PT STATES STAGE 3   • Depression    • Diabetes mellitus (CMS/HCC)     BORDERLINE   • Diverticular disease    • Gastric ulcer     AND DUODENAL   • GERD (gastroesophageal reflux disease)    • Hearing loss     BILAT AIDES   • History of colon polyps    • History of depression    • History of GI bleed    • Hypertension    • Hypothyroidism    • Peptic ulceration    • PONV (postoperative nausea and vomiting)    • Poor vision     RIGHT EYE, HAS PERIPHERAL VISION    • Stroke (CMS/HCC) 2000     mild weakness on left, partial sight loss on right         Past Surgical History:   Procedure Laterality Date   • ABDOMINAL SURGERY     • APPENDECTOMY     • BLADDER REPAIR      AND RECTOCELE   • BREAST BIOPSY     • BREAST CYST ASPIRATION     • BREAST LUMPECTOMY WITH SENTINEL NODE BIOPSY Left 3/19/2018    Procedure: BREAST LUMPECTOMY WITH SENTINEL NODE BIOPSY AND NEEDLE LOCALIZATION;  Surgeon: Myles Soliman MD;  Location: [facility] OR [facility];  Service: General   • CHOLECYSTECTOMY     • COLONOSCOPY  2013   • COLONOSCOPY N/A 2/16/2018    Procedure: COLONOSCOPY into cecum and T.I. with biopsies;  Surgeon: Augustine Gallego MD;  Location: [facility] ENDOSCOPY;   Service:    • ENDOSCOPY N/A 10/17/2017    Procedure: ESOPHAGOGASTRODUODENOSCOPY WITH COLD BIOPSIES;  Surgeon: Augustine Gallego MD;  Location: [facility] ENDOSCOPY;  Service:    • ENDOSCOPY N/A 2/16/2018    Procedure: ESOPHAGOGASTRODUODENOSCOPY with biopsies and #54 Arguello DILATATION;  Surgeon: Augustine Gallego MD;  Location: [facility] ENDOSCOPY;  Service:    • ENDOSCOPY N/A 3/19/2020    Procedure: ESOPHAGOGASTRODUODENOSCOPY with biopsies;  Surgeon: Augustine Gallego MD;  Location: [facility] ENDOSCOPY;  Service: Gastroenterology;  Laterality: N/A;  PRE- MELENA, EPIGASTRIC PAIN  POST- eerosive gastritis, duodenal ulcer, hiatal hernia   • EYE SURGERY Left 2014    3-4 years, cornea replacement    • HYSTERECTOMY     • MOUTH SURGERY  2000    UNDER TONGUE AND LEFT FLOOR OF MOUTH FOR CA   • PARATHYROIDECTOMY      PER PT   • SINUS SURGERY     • SKIN BIOPSY     • THYROID SURGERY     • VARICOSE VEIN SURGERY      Oncological history  patient is an 82-year-old female with recent problems with abdominal pain and diarrhea and weight loss which is being evaluated by GI and finally found to have multiple ulcers in the duodenum and small bowel finally responding to treatment.  In the middle of this she went for routine mammogram a couple of months later was found to have an abnormality in the left breast at 7 o'clock position measuring about 12 mm in size that was not present last year.  The patient had previously had a left breast biopsy in 2015 with benign findings.   There was no palpable mass and biopsy was done and this revealed a grade 1 infiltrating ductal carcinoma at least 1 cm in size ER 78%/MN 17% positive HER-2 negative.  The patient was referred to Dr. Soliman who performed a lumpectomy and sentinel node biopsy with the findings of a 1.2 cm tumor grade 2 with lymphovascular invasion and clear margins with associated DCIS.  One of 2 sentinel nodes showed a micrometastasis although the pathologist said this was almost too small to be  considered a micrometastasis.  Postoperatively she has done well and thankfully her GI complains of much improved with Carafate and Protonix and her diarrhea finally resolved.    She's not had a bone density in quite a while.  She is  5 para 5  menarche at age 15 and menopause in her mid 50s although she had a hysterectomy at 30 for an ulcerated uterus.  First childbirth was at age 20 she breast-fed all 5 children.  She took hormonal therapy for at least 15 years after menopause and stopped about 10 years ago   .  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    Her father  at 52 of stomach cancer mother had thyroid cancer at age 50 her brother's daughter had breast cancer age 40 and she had a paternal aunt with leukemia is no other breast cancer or ovarian cancer uterine cancer or pancreatic cancer in the family . I did ask him to check with her niece to see if she has had genetic testing - they do not know much at all about the maternal family .    She will call for the results of the DEXA scan and if adequate we will start Arimidex which I have  already prescribed to her and see her back in 3 months to assess her tolerance.  She is in very good health and I explained to her that adjuvant therapy decreases the risk of recurrence of her cancer which might be in the 15-20% range probably down to the 10% range and if she has significant side effects we will be inclined to stop treatment.  Obviously if her GI symptoms recur with Arimidex we will have to try another medications.  We talked about the joint pains and hot flashes which are unlikely at her age and she was agreeable to a trial of Arimidex.  Radiation has been planned      In 10/2018 she was changed to Aromasin as she had issues previously with Arimidex with visual blurring.  The patient was then seen 19 with reports of blurred vision once again.    At that point the patient's  symptoms have essentially resolved.  We therefore elected to switch her to Femara.    Unfortunately, the patient ultimately experienced similar symptoms with Femara because of muscle cramps and blurred vision.      I discussed with her today that Dr. Valenzuela would like the patient to consider taking tamoxifen as yet another potential medication to benefit her in light of history of hormone positive breast cancer.  It is noted that the patient takes Prozac and has been on this for many many years.  There is a potential interaction and I plan to discuss this with Dr. Valenzuela further.  If okayed per Dr. Valenzuela, we would plan then to start the patient on this as soon as next week.        Current Outpatient Medications on File Prior to Visit   Medication Sig Dispense Refill   • amLODIPine (NORVASC) 5 MG tablet TAKE 2 TABLETS BY MOUTH EVERY  tablet 0   • cefuroxime (Ceftin) 250 MG tablet X 14 d 20 tablet 0   • Cholecalciferol (VITAMIN D3) 50 MCG (2000 UT) capsule TAKE 1 CAPSULE BY MOUTH DAILY. 100 capsule 1   • colestipol (COLESTID) 1 g tablet Take 1 g by mouth 2 (Two) Times a Day.     • dicyclomine (BENTYL) 10 MG capsule Take 10 mg by mouth Daily Before Lunch.     • escitalopram (LEXAPRO) 10 MG tablet TAKE 1 TABLET BY MOUTH EVERY DAY 30 tablet 0   • fluorometholone (FML) 0.1 % ophthalmic suspension Administer 1 drop into the left eye Daily.     • glucose blood (FREESTYLE TEST STRIPS) test strip TEST BLOOD SUGARS ONCE DAILY 50 each 12   • ketotifen (ZADITOR) 0.025 % ophthalmic solution Administer 1 drop into the left eye 2 (Two) Times a Day.     • levothyroxine (SYNTHROID, LEVOTHROID) 75 MCG tablet TAKE 1 TABLET BY MOUTH DAILY 90 tablet 3   • NIFEdipine XL (PROCARDIA XL) 30 MG 24 hr tablet TAKE 1 TABLET BY MOUTH EVERY DAY 30 tablet 1   • NON FORMULARY Administer 1 drop to both eyes 3 (Three) Times a Day. THERA TEARS     • Omega-3 Fatty Acids (FISH OIL PO) Take  by mouth Daily As Needed.     • pantoprazole (Protonix)  40 MG EC tablet Take 1 tablet by mouth Every 12 (Twelve) Hours. 60 tablet 1   • predniSONE (DELTASONE) 10 MG (21) tablet pack Take  by mouth Daily. Prednisone 10 mg 6 tablets day 1, 5 tabs day 2, 4 tablets day 3, 3 tablets day 4, 2 tablets day 5, 1 tablet day 6. 21 tablet 0   • Probiotic Product (PROBIOTIC DAILY PO) Take 1 capsule by mouth Daily.     • sodium chloride (CAYLA 128) 5 % ophthalmic solution Administer 1 drop to the right eye As Needed.     • tiZANidine (ZANAFLEX) 2 MG tablet Take 1 tablet by mouth 2 (Two) Times a Day As Needed for Muscle Spasms. 60 tablet 0   • traMADol (ULTRAM) 50 MG tablet Take 1 tablet by mouth Every 6 (Six) Hours As Needed for Moderate Pain . 12 tablet 0     No current facility-administered medications on file prior to visit.         ALLERGIES:    Allergies   Allergen Reactions   • Other Nausea Only     All mycins   • Sulfa Antibiotics Unknown - High Severity     LOST CONSCIOUSNESS AND BODY TURNED RED/ON FIRE   • Ciprofloxacin Unknown - High Severity     RED BLISTERS   • Iodine Unknown - High Severity     DECADES AGO, IODINE INJECTION CAUSED SKIN REDNESS AND BURNING   • Macrodantin [Nitrofurantoin Macrocrystal] Diarrhea and Nausea And Vomiting   • Nitrofurantoin Nausea And Vomiting   • Yeast-Related Products Unknown - High Severity     MOUTH SORES AND BLADDER INFECTION        Social History     Socioeconomic History   • Marital status:      Spouse name: Nicolas   • Number of children: Not on file   • Years of education: High school   • Highest education level: Not on file   Occupational History   • Occupation: Homemaker   Tobacco Use   • Smoking status: Former Smoker     Packs/day: 1.00     Years: 10.00     Pack years: 10.00     Types: Cigarettes     Start date:      Last attempt to quit:      Years since quittin.3   • Smokeless tobacco: Never Used   Substance and Sexual Activity   • Alcohol use: No   • Drug use: No   • Sexual activity: Defer   Lifestyle   •  Physical activity:     Days per week: Patient refused     Minutes per session: Patient refused   • Stress: Not on file   Social History Narrative    Accompanied by daughters Macrina and Daisy        Family History   Problem Relation Age of Onset   • Esophageal cancer Father    • Stomach cancer Father    • Heart disease Mother    • Thyroid cancer Mother    • Hypertension Mother    • Hypertension Sister    • Hypertension Brother    • Stroke Brother    • Heart disease Brother    • Diabetes Brother    • Leukemia Paternal Aunt    • Malig Hyperthermia Neg Hx         Review of Systems   Constitutional: Positive for fatigue. Negative for chills and fever.   HENT: Negative.    Eyes: Negative for discharge and visual disturbance.   Respiratory: Negative for chest tightness and shortness of breath.    Cardiovascular: Negative for chest pain and leg swelling.   Gastrointestinal: Negative for abdominal distention, abdominal pain and nausea.   Musculoskeletal: Positive for back pain and joint swelling.   Skin: Negative for pallor and rash.   Neurological: Negative for dizziness and numbness.   Psychiatric/Behavioral: Negative for behavioral problems and confusion.        Objective     There were no vitals filed for this visit.  Vitals not done due to telephone visit  Current Status 2/7/2020   ECOG score 0       Physical Exam   Pulmonary/Chest:           GENERAL:  Well-developed, well-nourished in no acute distress.   SKIN:  Warm, dry without rashes, purpura or petechiae.  EYES:  Pupils equal, round and reactive to light.  EOMs intact.  Conjunctivae normal.  EARS:  Hearing intact.  NOSE:  Septum midline.  No excoriations or nasal discharge.  MOUTH:  Tongue is well-papillated; no stomatitis or ulcers.  Lips normal.  THROAT:  Oropharynx without lesions or exudates.  NECK:  Supple with good range of motion; no thyromegaly or masses, no JVD.  LYMPHATICS:  No cervical, supraclavicular, axillary adenopathy.  CHEST:  Lungs clear to  auscultation. Good airflow.  BREASTS: Right breast unremarkable.  Left breast with well-healed lumpectomy scar.    CARDIAC:  Regular rate and rhythm without murmurs, rubs or gallops. Normal S1,S2.  ABDOMEN:  Soft, nontender with no hepatosplenomegaly or masses.  EXTREMITIES:  No clubbing, cyanosis or edema.  Skin bruising and hematoma from a fall on her left shin  NEUROLOGICAL:  No focal neurological deficits.  PSYCHIATRIC:  Normal affect and mood.      Physical exam not done due to telephone visit    RECENT LABS:                Lab Results   Component Value Date    IRON 98 07/03/2019    TIBC 340 07/03/2019    FERRITIN 710.30 (H) 07/03/2019       R 78% FL 17% Her 2 -neg Ki-67 7%  Final Diagnosis   1.  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                SEE SYNOPTIC REPORT (BELOW) FOR ADDITIONAL DETAILS.     2.  SENTINEL LYMPH NODE #1, LEFT AXILLA, EXCISION:                ONE LYMPH NODE, POSITIVE FOR METASTATIC CARCINOMA (MICROMETASTASIS) (SEE COMMENT).               NO EXTRANODAL EXTENSION IS IDENTIFIED.     COMMENT: Measuring the exact size of the lymph node metastasis is difficult. The lymph node demonstrates a few very small scattered foci of tumor cells, compatible with isolated tumor cells.  One slide demonstrates a single contiguous focus of tumor cells exceeding 0.2 mm; therefore, this is best considered a micrometastasis rather than isolated tumor cells.     3.  SENTINEL LYMPH NODE #2, LEFT AXILLA, EXCISION.               ONE LYMPH NODE, NEGATIVE FOR METASTATIC CARCINOMA.     SYNOPTIC REPORT (Based on [facility] cancer case summary, version January 2018):               Procedure: Excision (less than total mastectomy).               Specimen laterality: Left.                Tumor site: Not specified.                Tumor size: 1.2 x 1.1 x 1 cm.                Histologic type: Invasive carcinoma of no special type (ductal, not otherwise specified).                Histologic grade (Melony Histologic Score):                              Glandular (acinar)/tubular differentiation: Score 3.                            Nuclear pleomorphism: Score 2.                             Mitotic rate: Score 1.                            Overall grade: Grade 2 (Score 6 of 9).               Tumor focality: Single focus of invasive carcinoma.                Ductal carcinoma in situ (DCIS): Present.                            Architectural patterns: Solid.                             Nuclear grade: Grade II (intermediate).                            Necrosis: Present, central comedonecrosis.                Lobular carcinoma in situ (LCIS): No LCIS in specimen.               Margins:                            Invasive carcinoma margins: Uninvolved by invasive carcinoma.                                          Distance from closest margin: 3 mm (medial margin).                                          NOTE: Invasive carcinoma also extends to within 4 mm of superior margin and to within                                                 4 mm of lateral margin.  All additional margins are greater than 10 mm from invasive                                                 carcinoma.                              DCIS Margins: Uninvolved by DCIS.                                          Distance from closest margin: 3 mm (medial margin).                    Regional Lymph nodes: Involved by tumor cells.                             Number of lymph nodes with macrometastases: 0.                            Number of lymph nodes with micrometastases: 1.                            Size of largest metastatic deposit: 0.4 mm.                            Extranodal extension: Not identified.                            Number of lymph nodes examined: 2.                            Number of sentinel lymph nodes  examined: 2.                Treatment effect: No known presurgical therapy.                Lymphovascular invasion: Present.                Dermal Lymphovascular invasion: Not identified.                Pathologic stage classification.                            Primary tumor: pT1c.                            Regional lymph nodes: pN1mi(sn).                            Distant metastasis: Not applicable.                Additional pathologic findings:                             Ductal hyperplasia of the usual type.                             Fibrocystic changes with duct dilatation and stasis and focal apocrine metaplasia.                             Columnar cell change without atypia.                             Fibroinflammatory changes consistent with site of prior biopsy.                    Ancillary studies: ER, NV, HER-2/melanie, and Ki-67 studies performed on previous biopsy specimen at                       outside facility ([facility]).               Duplex scan of extremity veins including responses to compression and other maneuvers; bilateral   Study Impression     • Right Common Femoral: No deep vein thrombosis noted.   • Right Saphenofemoral Junction: No superficial thrombophlebitis noted.   • Right Proximal Femoral: No deep vein thrombosis noted.   • Right Mid Femoral: No deep vein thrombosis noted.   • Right Distal Femoral: No deep vein thrombosis noted.   • Right Popliteal: No deep vein thrombosis noted.   • Right Posterior Tibial: No deep vein thrombosis noted.   • Right Peroneal: No deep vein thrombosis noted.   • Right Gastrocnemius Soleal: No deep vein thrombosis noted.   • Right Greater Saphenous Above Knee: No superficial thrombophlebitis noted.   • Right Greater Saphenous Below Knee: No superficial thrombophlebitis noted.   • Left Common Femoral: No deep vein thrombosis noted.   • Left Saphenofemoral Junction: No superficial thrombophlebitis noted.   • Left Proximal Femoral: No deep vein  thrombosis noted.   • Left Mid Femoral: No deep vein thrombosis noted.   • Left Distal Femoral: No deep vein thrombosis noted.   • Left Popliteal: No deep vein thrombosis noted.   • Left Posterior Tibial: No deep vein thrombosis noted.   • Left Peroneal: No deep vein thrombosis noted.   • Left Gastrocnemius Soleal: No deep vein thrombosis noted.   • Left Greater Saphenous Above Knee: No superficial thrombophlebitis noted.   • Left Greater Saphenous Below Knee: No superficial thrombophlebitis noted.   Electronically signed by Nicolas Cotton MD on 10/4/19 at 1933 EDT                Assessment/Plan      1.  T1c N1 jese N0 ER/NV positive HER-2 negative left breast cancer post lumpectomy and radiation  · Arimidex started in 3/18 discontinued by the patient in 7/18 due to visual blurring which resolved.    · Aromasin started 11/2018. 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  She also has had weight gain. Aromasin discontinued.  · Patient seen early April 2019 and follow-up.  Patient agreeable to switch therapy to Femara 2.5 mg daily.  · Patient ultimately discontinuing Femara due to repeated arthralgias and blurred vision.  · The patient was transitioned to tamoxifen but ultimately discontinued this approximately 2 months later per her report today due to blurry vision.   · Trial toremifene in 5/20    2.  Family history of breast cancer in a niece at a young age and father with cancer at age 51?  Genetic testing    4.  History of oral cancer in 1990    5.  Anemia, recently worked up per Dr. Ivan, internal medicine.   · Patient had EGD and colonoscopy per Dr. Gallego 1 year ago with no concerning findings.  Because of her history of GI ulcers however she was started on AcipHex per Dr. Ivan.  · 3/21/2019 showed iron saturation of 8% ferritin of 18. Patient started on trial of oral iron but was intolerant due to GI  upset.  · Patient receiving 2 doses of IV Injectafer in May 2019.  Iron stores are now replete with iron saturation of 29% and hemoglobin having now normalized to 13.  · Rectal bleeding in 4/20 CAT scan shows duodenitis resumed ulcer medications    6.  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  Patient will call to get her mammogram and DEXA scan in May of these have been delayed due to the coronavirus epidemic  2.I have prescribed toremifene as a last option for adjuvant treatment due to intolerance of the other 3 medicines   3. she will return in 4 months for follow-up with Dr. Valenzuela    You have chosen to receive care through a telephone visit. Do you consent to use a telephone visit for your medical care today? Yes    Telephone visit 20 minutes    Standard precautions discussed regarding the Novel Coronavirus (COVID-19)  including patient to limit contact with others outside of home, frequent handwashing and using alcohol gel when unable to use soap and water, as well to monitoring for symptoms and notifying our office via telephone for any symptoms or exposures.     .

## 2020-05-04 ENCOUNTER — DOCUMENTATION (OUTPATIENT)
Dept: FAMILY MEDICINE CLINIC | Facility: CLINIC | Age: 85
End: 2020-05-04

## 2020-05-04 ENCOUNTER — MEDICATION THERAPY MANAGEMENT (OUTPATIENT)
Dept: PHARMACY | Facility: HOSPITAL | Age: 85
End: 2020-05-04

## 2020-05-04 ENCOUNTER — TELEPHONE (OUTPATIENT)
Dept: ONCOLOGY | Facility: HOSPITAL | Age: 85
End: 2020-05-04

## 2020-05-04 DIAGNOSIS — Z79.899 HIGH RISK MEDICATIONS (NOT ANTICOAGULANTS) LONG-TERM USE: Primary | ICD-10-CM

## 2020-05-04 RX ORDER — ESCITALOPRAM OXALATE 10 MG/1
TABLET ORAL
Qty: 30 TABLET | Refills: 0 | Status: SHIPPED | OUTPATIENT
Start: 2020-05-04 | End: 2020-06-12

## 2020-05-04 RX ORDER — NIFEDIPINE 30 MG/1
TABLET, EXTENDED RELEASE ORAL
Qty: 30 TABLET | Refills: 1 | Status: SHIPPED | OUTPATIENT
Start: 2020-05-04 | End: 2020-07-01

## 2020-05-04 NOTE — TELEPHONE ENCOUNTER
Message sent to Renetta Bowles in pharmacy to see if she can look into this.     ----- Message from Celia Valenzuela MD sent at 5/3/2020  6:40 PM EDT -----  Please ask our pharmacists Monday to se if we need to change lexapro and have them call me  ----- Message -----  From: Federica Morgan RN  Sent: 5/1/2020   2:08 PM EDT  To: MD Dr. Nicolas Webster,   We received a message from [facility] pharmacy that there was an interaction between pt's Lexapro and the Toremifene that you prescribed. Please advise. Thank you

## 2020-05-04 NOTE — PROGRESS NOTES
Note from pharmacy received stating interaction between lexapro and patient's new chemo medication. Attempted to call patient but no answer. Chart reviewed, oncology to check ekg d/t med interaction.

## 2020-05-04 NOTE — TELEPHONE ENCOUNTER
Message sent to scheduling.     ----- Message from Barbara Bowles RPH sent at 5/4/2020  9:08 AM EDT -----  Dr Valenzuela would like to being her in a month and check an EKG.  I am calling the patient to tell her to take the drug and expect a call for EKG scheduling.      ----- Message -----  From: Federica Morgan, RN  Sent: 5/4/2020   8:31 AM EDT  To: JACLYN Cleveland,   I received this message from Dr. Valenzuela this morning. We received a message Friday that pt's Lexapro has an interaction with Toremifene that was prescribed for pt. Dr. Valenzuela would appreciate it if you might be able to let her know what type of interaction this is and if it warrants changing her Lexapro. Would you please call her at EP when you are able? She requested to speak with a pharmacist. Also, if you have any questions, please let me know. I am working from home today but will be checking my messages frequently. Thank you!  ----- Message -----  From: Celia Valenzuela MD  Sent: 5/3/2020   6:40 PM EDT  To: Federica Morgan, TRUDY    Please ask our pharmacists Monday to se if we need to change lexapro and have them call me  ----- Message -----  From: Federica Morgan, RN  Sent: 5/1/2020   2:08 PM EDT  To: MD Dr. Nicolas Webster,   We received a message from [facility] pharmacy that there was an interaction between pt's Lexapro and the Toremifene that you prescribed. Please advise. Thank you

## 2020-05-04 NOTE — PROGRESS NOTES
MTM telephone encounter re DDI ( tormefine+lexapro, QTc prolongation)    Discussed with Dr Valenzuela, and EKG has been ordered in a month.  Called patient with these directions: Continue both medications and EKG will be scheduled, expect a call for appointment to set up EKG.

## 2020-05-07 ENCOUNTER — HOSPITAL ENCOUNTER (OUTPATIENT)
Dept: BONE DENSITY | Facility: HOSPITAL | Age: 85
Discharge: HOME | End: 2020-05-07
Admitting: INTERNAL MEDICINE

## 2020-05-07 ENCOUNTER — TELEPHONE (OUTPATIENT)
Dept: FAMILY MEDICINE CLINIC | Facility: CLINIC | Age: 85
End: 2020-05-07

## 2020-05-07 DIAGNOSIS — Z17.0 MALIGNANT NEOPLASM OF LOWER-INNER QUADRANT OF LEFT BREAST IN FEMALE, ESTROGEN RECEPTOR POSITIVE (HCC): ICD-10-CM

## 2020-05-07 DIAGNOSIS — C50.312 MALIGNANT NEOPLASM OF LOWER-INNER QUADRANT OF LEFT BREAST IN FEMALE, ESTROGEN RECEPTOR POSITIVE (HCC): ICD-10-CM

## 2020-05-07 DIAGNOSIS — Z09 ENCOUNTER FOR FOLLOW-UP EXAMINATION AFTER COMPLETED TREATMENT FOR CONDITIONS OTHER THAN MALIGNANT NEOPLASM: ICD-10-CM

## 2020-05-07 PROCEDURE — 77080 DXA BONE DENSITY AXIAL: CPT

## 2020-05-07 RX ORDER — VALSARTAN 160 MG/1
160 TABLET ORAL DAILY
Qty: 30 TABLET | Refills: 11 | Status: SHIPPED | OUTPATIENT
Start: 2020-05-07 | End: 2021-04-30

## 2020-05-07 NOTE — TELEPHONE ENCOUNTER
She states she has been taking Valsartan 160mg daily for years, she cant imagine its not on her chart. She double checked the bottle.

## 2020-05-07 NOTE — TELEPHONE ENCOUNTER
Patient called in requesting a re-fill for Valsartan 160 MG    [facility] [address] confirmed    Patient call back [phone]

## 2020-05-20 RX ORDER — TIZANIDINE 2 MG/1
TABLET ORAL
Qty: 60 TABLET | Refills: 0 | Status: SHIPPED | OUTPATIENT
Start: 2020-05-20 | End: 2021-03-15

## 2020-05-21 ENCOUNTER — APPOINTMENT (OUTPATIENT)
Dept: WOMENS IMAGING | Facility: HOSPITAL | Age: 85
End: 2020-05-21

## 2020-05-21 PROCEDURE — 77063 BREAST TOMOSYNTHESIS BI: CPT | Performed by: RADIOLOGY

## 2020-05-21 PROCEDURE — 77067 SCR MAMMO BI INCL CAD: CPT | Performed by: RADIOLOGY

## 2020-06-03 ENCOUNTER — LAB (OUTPATIENT)
Dept: LAB | Facility: HOSPITAL | Age: 85
End: 2020-06-03

## 2020-06-03 ENCOUNTER — OFFICE VISIT (OUTPATIENT)
Dept: ONCOLOGY | Facility: CLINIC | Age: 85
End: 2020-06-03

## 2020-06-03 ENCOUNTER — CLINICAL SUPPORT (OUTPATIENT)
Dept: ONCOLOGY | Facility: HOSPITAL | Age: 85
End: 2020-06-03

## 2020-06-03 VITALS
RESPIRATION RATE: 18 BRPM | DIASTOLIC BLOOD PRESSURE: 66 MMHG | HEIGHT: 61 IN | HEART RATE: 77 BPM | WEIGHT: 160.5 LBS | SYSTOLIC BLOOD PRESSURE: 141 MMHG | OXYGEN SATURATION: 95 % | BODY MASS INDEX: 30.3 KG/M2 | TEMPERATURE: 97.8 F

## 2020-06-03 VITALS — BODY MASS INDEX: 29.84 KG/M2 | HEIGHT: 61 IN

## 2020-06-03 DIAGNOSIS — Z79.899 HIGH RISK MEDICATION USE: Primary | ICD-10-CM

## 2020-06-03 DIAGNOSIS — C50.312 MALIGNANT NEOPLASM OF LOWER-INNER QUADRANT OF LEFT BREAST IN FEMALE, ESTROGEN RECEPTOR POSITIVE (HCC): ICD-10-CM

## 2020-06-03 DIAGNOSIS — Z79.899 LONG-TERM USE OF HIGH-RISK MEDICATION: ICD-10-CM

## 2020-06-03 DIAGNOSIS — Z17.0 MALIGNANT NEOPLASM OF LOWER-INNER QUADRANT OF LEFT BREAST IN FEMALE, ESTROGEN RECEPTOR POSITIVE (HCC): ICD-10-CM

## 2020-06-03 LAB
ALBUMIN SERPL-MCNC: 4.3 G/DL (ref 3.5–5.2)
ALBUMIN/GLOB SERPL: 1.5 G/DL (ref 1.1–2.4)
ALP SERPL-CCNC: 52 U/L (ref 38–116)
ALT SERPL W P-5'-P-CCNC: 14 U/L (ref 0–33)
ANION GAP SERPL CALCULATED.3IONS-SCNC: 13 MMOL/L (ref 5–15)
AST SERPL-CCNC: 15 U/L (ref 0–32)
BASOPHILS # BLD AUTO: 0.09 10*3/MM3 (ref 0–0.2)
BASOPHILS NFR BLD AUTO: 1 % (ref 0–1.5)
BILIRUB SERPL-MCNC: <0.2 MG/DL (ref 0.2–1.2)
BUN BLD-MCNC: 17 MG/DL (ref 6–20)
BUN/CREAT SERPL: 17.5 (ref 7.3–30)
CALCIUM SPEC-SCNC: 9.8 MG/DL (ref 8.5–10.2)
CHLORIDE SERPL-SCNC: 99 MMOL/L (ref 98–107)
CO2 SERPL-SCNC: 23 MMOL/L (ref 22–29)
CREAT BLD-MCNC: 0.97 MG/DL (ref 0.6–1.1)
DEPRECATED RDW RBC AUTO: 42.6 FL (ref 37–54)
EOSINOPHIL # BLD AUTO: 0.16 10*3/MM3 (ref 0–0.4)
EOSINOPHIL NFR BLD AUTO: 1.8 % (ref 0.3–6.2)
ERYTHROCYTE [DISTWIDTH] IN BLOOD BY AUTOMATED COUNT: 12.9 % (ref 12.3–15.4)
GFR SERPL CREATININE-BSD FRML MDRD: 55 ML/MIN/1.73
GLOBULIN UR ELPH-MCNC: 2.9 GM/DL (ref 1.8–3.5)
GLUCOSE BLD-MCNC: 130 MG/DL (ref 74–124)
HCT VFR BLD AUTO: 35.3 % (ref 34–46.6)
HGB BLD-MCNC: 11.8 G/DL (ref 12–15.9)
IMM GRANULOCYTES # BLD AUTO: 0.04 10*3/MM3 (ref 0–0.05)
IMM GRANULOCYTES NFR BLD AUTO: 0.5 % (ref 0–0.5)
LYMPHOCYTES # BLD AUTO: 1.74 10*3/MM3 (ref 0.7–3.1)
LYMPHOCYTES NFR BLD AUTO: 19.7 % (ref 19.6–45.3)
MCH RBC QN AUTO: 30.5 PG (ref 26.6–33)
MCHC RBC AUTO-ENTMCNC: 33.4 G/DL (ref 31.5–35.7)
MCV RBC AUTO: 91.2 FL (ref 79–97)
MONOCYTES # BLD AUTO: 0.71 10*3/MM3 (ref 0.1–0.9)
MONOCYTES NFR BLD AUTO: 8 % (ref 5–12)
NEUTROPHILS # BLD AUTO: 6.08 10*3/MM3 (ref 1.7–7)
NEUTROPHILS NFR BLD AUTO: 69 % (ref 42.7–76)
NRBC BLD AUTO-RTO: 0 /100 WBC (ref 0–0.2)
PLATELET # BLD AUTO: 303 10*3/MM3 (ref 140–450)
PMV BLD AUTO: 8.8 FL (ref 6–12)
POTASSIUM BLD-SCNC: 4.4 MMOL/L (ref 3.5–4.7)
PROT SERPL-MCNC: 7.2 G/DL (ref 6.3–8)
RBC # BLD AUTO: 3.87 10*6/MM3 (ref 3.77–5.28)
SODIUM BLD-SCNC: 135 MMOL/L (ref 134–145)
WBC NRBC COR # BLD: 8.82 10*3/MM3 (ref 3.4–10.8)

## 2020-06-03 PROCEDURE — 85025 COMPLETE CBC W/AUTO DIFF WBC: CPT

## 2020-06-03 PROCEDURE — 80053 COMPREHEN METABOLIC PANEL: CPT

## 2020-06-03 PROCEDURE — 93005 ELECTROCARDIOGRAM TRACING: CPT | Performed by: NURSE PRACTITIONER

## 2020-06-03 PROCEDURE — 36415 COLL VENOUS BLD VENIPUNCTURE: CPT

## 2020-06-03 PROCEDURE — 93010 ELECTROCARDIOGRAM REPORT: CPT | Performed by: INTERNAL MEDICINE

## 2020-06-03 PROCEDURE — 99213 OFFICE O/P EST LOW 20 MIN: CPT | Performed by: NURSE PRACTITIONER

## 2020-06-03 RX ORDER — SUCRALFATE ORAL 1 G/10ML
SUSPENSION ORAL
COMMUNITY
Start: 2020-05-19

## 2020-06-03 RX ORDER — OMEPRAZOLE 40 MG/1
CAPSULE, DELAYED RELEASE ORAL
COMMUNITY
Start: 2020-05-27 | End: 2021-03-15

## 2020-06-03 NOTE — PROGRESS NOTES
Subjective     REASON FOR CONSULTATION:   1. Left breast cancer [id number] ER/NC+ her2 neg post lumpectomy/SLN  3/18 and whole breast radiation completed May 2018  2.  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  Fatigue out of proportion to anemia  4.  Arimidex started in 3/18 with bone density showing normal density in the spine and osteopenia in the hips-this was self stopped due to vision changes.  5.  Aromasin initiated 10/20/18 and discontinued 1/20/19 secondary to vision changes.  6.  Tamoxifen initiated 8/20/2019 and discontinued 10/20/2019 secondary to vision changes                             REQUESTING PHYSICIAN:  Myles Soliman M.D.    History of Present Illness patient is an 84-year-old female with the above-mentioned history who is here today for what supposed to be a toxicity check, after Dr. Valenzuela prescribed toremifene on 4/29/2020.  The patient states, at the same time she was also prescribed Protonix, and did not want to start 2 medications at the same time.  She started the Protonix, but has not yet started the toremifene.  Unfortunately, she has struggled with side effects of the Protonix causing significant diarrhea.  Dr. gale discontinued Protonix, and has now switched her to Prilosec.  She initially had issues with diarrhea from the Prilosec, discontinued it for 1 week, and just restarted it a few days ago.  She is not sure how she will tolerate that medication.    Still, the patient has not started taking the toremifene, but states that she has every intention of starting it.      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    • Chronic kidney disease    • CKD (chronic kidney disease)     PT STATES STAGE 3   • Depression    • Diabetes mellitus (CMS/HCC)     BORDERLINE    • Diverticular disease    • Gastric ulcer     AND DUODENAL   • GERD (gastroesophageal reflux disease)    • Hearing loss     BILAT AIDES   • History of colon polyps    • History of depression    • History of GI bleed    • Hypertension    • Hypothyroidism    • Peptic ulceration    • PONV (postoperative nausea and vomiting)    • Poor vision     RIGHT EYE, HAS PERIPHERAL VISION    • Stroke (CMS/HCC) 2000     mild weakness on left, partial sight loss on right         Past Surgical History:   Procedure Laterality Date   • ABDOMINAL SURGERY     • APPENDECTOMY     • BLADDER REPAIR      AND RECTOCELE   • BREAST BIOPSY     • BREAST CYST ASPIRATION     • BREAST LUMPECTOMY WITH SENTINEL NODE BIOPSY Left 3/19/2018    Procedure: BREAST LUMPECTOMY WITH SENTINEL NODE BIOPSY AND NEEDLE LOCALIZATION;  Surgeon: Myles Soliman MD;  Location:  [facility];  Service: General   • CHOLECYSTECTOMY     • COLONOSCOPY  2013   • COLONOSCOPY N/A 2/16/2018    Procedure: COLONOSCOPY into cecum and T.I. with biopsies;  Surgeon: Augustine Gallego MD;  Location: [facility] ENDOSCOPY;  Service:    • ENDOSCOPY N/A 10/17/2017    Procedure: ESOPHAGOGASTRODUODENOSCOPY WITH COLD BIOPSIES;  Surgeon: Augustine Gallego MD;  Location: [facility] ENDOSCOPY;  Service:    • ENDOSCOPY N/A 2/16/2018    Procedure: ESOPHAGOGASTRODUODENOSCOPY with biopsies and #54 [address] DILATATION;  Surgeon: Augustine Gallego MD;  Location: [facility] ENDOSCOPY;  Service:    • ENDOSCOPY N/A 3/19/2020    Procedure: ESOPHAGOGASTRODUODENOSCOPY with biopsies;  Surgeon: Augustine Gallego MD;  Location: [facility] ENDOSCOPY;  Service: Gastroenterology;  Laterality: N/A;  PRE- MELENA, EPIGASTRIC PAIN  POST- eerosive gastritis, duodenal ulcer, hiatal hernia   • EYE SURGERY Left 2014    3-4 years, cornea replacement    • HYSTERECTOMY     • MOUTH SURGERY  2000    UNDER TONGUE AND LEFT FLOOR OF MOUTH FOR CA   • PARATHYROIDECTOMY      PER PT   • SINUS SURGERY     • SKIN BIOPSY     • THYROID SURGERY     •  VARICOSE VEIN SURGERY      Oncological history  patient is an 82-year-old female with recent problems with abdominal pain and diarrhea and weight loss which is being evaluated by GI and finally found to have multiple ulcers in the duodenum and small bowel finally responding to treatment.  In the middle of this she went for routine mammogram a couple of months later was found to have an abnormality in the left breast at 7 o'clock position measuring about 12 mm in size that was not present last year.  The patient had previously had a left breast biopsy in 2015 with benign findings.   There was no palpable mass and biopsy was done and this revealed a grade 1 infiltrating ductal carcinoma at least 1 cm in size ER 78%/CO 17% positive HER-2 negative.  The patient was referred to Dr. Soliman who performed a lumpectomy and sentinel node biopsy with the findings of a 1.2 cm tumor grade 2 with lymphovascular invasion and clear margins with associated DCIS.  One of 2 sentinel nodes showed a micrometastasis although the pathologist said this was almost too small to be considered a micrometastasis.  Postoperatively she has done well and thankfully her GI complains of much improved with Carafate and Protonix and her diarrhea finally resolved.    She's not had a bone density in quite a while.  She is  5 para 5  menarche at age 15 and menopause in her mid 50s although she had a hysterectomy at 30 for an ulcerated uterus.  First childbirth was at age 20 she breast-fed all 5 children.  She took hormonal therapy for at least 15 years after menopause and stopped about 10 years ago   .  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    Her father  at 52 of stomach cancer mother had thyroid cancer at age 50 her brother's daughter had breast cancer age 40 and she had a paternal aunt with leukemia is no other breast cancer or ovarian cancer uterine cancer or pancreatic  cancer in the family . I did ask him to check with her niece to see if she has had genetic testing - they do not know much at all about the maternal family .    She will call for the results of the DEXA scan and if adequate we will start Arimidex which I have  already prescribed to her and see her back in 3 months to assess her tolerance.  She is in very good health and I explained to her that adjuvant therapy decreases the risk of recurrence of her cancer which might be in the 15-20% range probably down to the 10% range and if she has significant side effects we will be inclined to stop treatment.  Obviously if her GI symptoms recur with Arimidex we will have to try another medications.  We talked about the joint pains and hot flashes which are unlikely at her age and she was agreeable to a trial of Arimidex.  Radiation has been planned    1/19  In 10/2018 she was changed to Aromasin as she had issues previously with Arimidex with visual blurring.  The patient was then seen 1/25/19 with reports of blurred vision once again.    At that point the patient's symptoms have essentially resolved.  We therefore elected to switch her to Femara.    Unfortunately, the patient ultimately experienced similar symptoms with Femara because of muscle cramps and blurred vision.      I discussed with her today that Dr. Valenzuela would like the patient to consider taking tamoxifen as yet another potential medication to benefit her in light of history of hormone positive breast cancer.  It is noted that the patient takes Prozac and has been on this for many many years.  There is a potential interaction and I plan to discuss this with Dr. Valenzuela further.  If okayed per Dr. Valenzuela, we would plan then to start the patient on this as soon as next week.        Current Outpatient Medications on File Prior to Visit   Medication Sig Dispense Refill   • amLODIPine (NORVASC) 5 MG tablet TAKE 2 TABLETS BY MOUTH EVERY  tablet 0   • cefuroxime  (Ceftin) 250 MG tablet X 14 d 20 tablet 0   • Cholecalciferol (VITAMIN D3) 50 MCG (2000 UT) capsule TAKE 1 CAPSULE BY MOUTH DAILY. 100 capsule 1   • colestipol (COLESTID) 1 g tablet Take 1 g by mouth 2 (Two) Times a Day.     • dicyclomine (BENTYL) 10 MG capsule Take 10 mg by mouth Daily Before Lunch.     • escitalopram (LEXAPRO) 10 MG tablet TAKE 1 TABLET BY MOUTH EVERY DAY 30 tablet 0   • fluorometholone (FML) 0.1 % ophthalmic suspension Administer 1 drop into the left eye Daily.     • glucose blood (FREESTYLE TEST STRIPS) test strip TEST BLOOD SUGARS ONCE DAILY 50 each 12   • ketotifen (ZADITOR) 0.025 % ophthalmic solution Administer 1 drop into the left eye 2 (Two) Times a Day.     • levothyroxine (SYNTHROID, LEVOTHROID) 75 MCG tablet TAKE 1 TABLET BY MOUTH DAILY 90 tablet 3   • NIFEdipine XL (PROCARDIA XL) 30 MG 24 hr tablet TAKE 1 TABLET BY MOUTH EVERY DAY 30 tablet 1   • NON FORMULARY Administer 1 drop to both eyes 3 (Three) Times a Day. THERA TEARS     • Omega-3 Fatty Acids (FISH OIL PO) Take  by mouth Daily As Needed.     • omeprazole (priLOSEC) 40 MG capsule      • Probiotic Product (PROBIOTIC DAILY PO) Take 1 capsule by mouth Daily.     • sodium chloride (CAYLA 128) 5 % ophthalmic solution Administer 1 drop to the right eye As Needed.     • sucralfate (CARAFATE) 1 GM/10ML suspension TAKE 10 ML BY MOUTH 3 TIMES A DAY FOR 30 MINUTES BEFORE MEALS     • tiZANidine (ZANAFLEX) 2 MG tablet TAKE 1 TABLET BY MOUTH 2 TIMES A DAY AS NEEDED FOR MUSCLE SPASMS. 60 tablet 0   • toremifene citrate (FARESTON) 60 MG tablet tablet Take 1 tablet by mouth Daily. 30 tablet 6   • traMADol (ULTRAM) 50 MG tablet Take 1 tablet by mouth Every 6 (Six) Hours As Needed for Moderate Pain . 12 tablet 0   • valsartan (DIOVAN) 160 MG tablet Take 1 tablet by mouth Daily. 30 tablet 11   • [DISCONTINUED] predniSONE (DELTASONE) 10 MG (21) tablet pack Take  by mouth Daily. Prednisone 10 mg 6 tablets day 1, 5 tabs day 2, 4 tablets day 3, 3  tablets day 4, 2 tablets day 5, 1 tablet day 6. 21 tablet 0   • [DISCONTINUED] pantoprazole (Protonix) 40 MG EC tablet Take 1 tablet by mouth Every 12 (Twelve) Hours. 60 tablet 1     No current facility-administered medications on file prior to visit.         ALLERGIES:    Allergies   Allergen Reactions   • Other Nausea Only     All mycins   • Sulfa Antibiotics Unknown - High Severity     LOST CONSCIOUSNESS AND BODY TURNED RED/ON FIRE   • Ciprofloxacin Unknown - High Severity     RED BLISTERS   • Iodine Unknown - High Severity     DECADES AGO, IODINE INJECTION CAUSED SKIN REDNESS AND BURNING   • Macrodantin [Nitrofurantoin Macrocrystal] Diarrhea and Nausea And Vomiting   • Nitrofurantoin Nausea And Vomiting   • Yeast-Related Products Unknown - High Severity     MOUTH SORES AND BLADDER INFECTION        Social History     Socioeconomic History   • Marital status:      Spouse name: Nicolas   • Number of children: Not on file   • Years of education: High school   • Highest education level: Not on file   Occupational History   • Occupation: Homemaker   Tobacco Use   • Smoking status: Former Smoker     Packs/day: 1.00     Years: 10.00     Pack years: 10.00     Types: Cigarettes     Start date:      Last attempt to quit:      Years since quittin.   • Smokeless tobacco: Never Used   Substance and Sexual Activity   • Alcohol use: No   • Drug use: No   • Sexual activity: Defer   Lifestyle   • Physical activity:     Days per week: Patient refused     Minutes per session: Patient refused   • Stress: Not on file   Social History Narrative    Accompanied by daughters Macrina and Daisy        Family History   Problem Relation Age of Onset   • Esophageal cancer Father    • Stomach cancer Father    • Heart disease Mother    • Thyroid cancer Mother    • Hypertension Mother    • Hypertension Sister    • Hypertension Brother    • Stroke Brother    • Heart disease Brother    • Diabetes Brother    • Leukemia  "Paternal Aunt    • Malig Hyperthermia Neg Hx         Review of Systems   Constitutional: Positive for fatigue. Negative for chills and fever.   Eyes: Negative for discharge and visual disturbance.   Respiratory: Negative for chest tightness and shortness of breath.    Cardiovascular: Negative for chest pain and leg swelling.   Gastrointestinal: Positive for diarrhea (see HPI). Negative for abdominal distention, abdominal pain and nausea.   Musculoskeletal: Positive for back pain and joint swelling.   Skin: Negative for pallor and rash.   Neurological: Negative for dizziness and numbness.   Psychiatric/Behavioral: Negative for behavioral problems and confusion.   6/3/2020 ROS unchanged from above except as updated.    Objective     Vitals:    06/03/20 1358   BP: 141/66   Pulse: 77   Resp: 18   Temp: 97.8 °F (36.6 °C)   TempSrc: Oral   SpO2: 95%   Weight: 72.8 kg (160 lb 8 oz)   Height: 156.2 cm (61.5\")   PainSc: 0-No pain       Current Status 6/3/2020   ECOG score 0       Physical Exam   Constitutional: She is oriented to person, place, and time. She appears well-developed and well-nourished. No distress.   Pulmonary/Chest: Effort normal. No respiratory distress.       Musculoskeletal: She exhibits no edema.   Neurological: She is alert and oriented to person, place, and time.   Skin: Skin is warm and dry.   Psychiatric: She has a normal mood and affect.         RECENT LABS:    Results from last 7 days   Lab Units 06/03/20  1348   WBC 10*3/mm3 8.82   NEUTROS ABS 10*3/mm3 6.08   HEMOGLOBIN g/dL 11.8*   HEMATOCRIT % 35.3   PLATELETS 10*3/mm3 303     Results from last 7 days   Lab Units 06/03/20  1348   SODIUM mmol/L 135   POTASSIUM mmol/L 4.4   CHLORIDE mmol/L 99   CO2 mmol/L 23.0   BUN mg/dL 17   CREATININE mg/dL 0.97   CALCIUM mg/dL 9.8   ALBUMIN g/dL 4.30   BILIRUBIN mg/dL <0.2*   ALK PHOS U/L 52   ALT (SGPT) U/L 14   AST (SGOT) U/L 15   GLUCOSE mg/dL 130*         Lab Results   Component Value Date    IRON 98 " 07/03/2019    [facility] 340 07/03/2019    FERRITIN 710.30 (H) 07/03/2019       R 78% CT 17% Her 2 -neg Ki-67 7%  Final Diagnosis   1.  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                SEE SYNOPTIC REPORT (BELOW) FOR ADDITIONAL DETAILS.     2.  SENTINEL LYMPH NODE #1, LEFT AXILLA, EXCISION:                ONE LYMPH NODE, POSITIVE FOR METASTATIC CARCINOMA (MICROMETASTASIS) (SEE COMMENT).               NO EXTRANODAL EXTENSION IS IDENTIFIED.     COMMENT: Measuring the exact size of the lymph node metastasis is difficult. The lymph node demonstrates a few very small scattered foci of tumor cells, compatible with isolated tumor cells.  One slide demonstrates a single contiguous focus of tumor cells exceeding 0.2 mm; therefore, this is best considered a micrometastasis rather than isolated tumor cells.     3.  SENTINEL LYMPH NODE #2, LEFT AXILLA, EXCISION.               ONE LYMPH NODE, NEGATIVE FOR METASTATIC CARCINOMA.     SYNOPTIC REPORT (Based on [facility] cancer case summary, version January 2018):               Procedure: Excision (less than total mastectomy).               Specimen laterality: Left.                Tumor site: Not specified.                Tumor size: 1.2 x 1.1 x 1 cm.               Histologic type: Invasive carcinoma of no special type (ductal, not otherwise specified).                Histologic grade (Melony Histologic Score):                              Glandular (acinar)/tubular differentiation: Score 3.                            Nuclear pleomorphism: Score 2.                             Mitotic rate: Score 1.                            Overall grade: Grade 2 (Score 6 of 9).               Tumor focality: Single focus of invasive carcinoma.                Ductal carcinoma in situ (DCIS): Present.                            Architectural patterns:  Solid.                             Nuclear grade: Grade II (intermediate).                            Necrosis: Present, central comedonecrosis.                Lobular carcinoma in situ (LCIS): No LCIS in specimen.               Margins:                            Invasive carcinoma margins: Uninvolved by invasive carcinoma.                                          Distance from closest margin: 3 mm (medial margin).                                          NOTE: Invasive carcinoma also extends to within 4 mm of superior margin and to within                                                 4 mm of lateral margin.  All additional margins are greater than 10 mm from invasive                                                 carcinoma.                              DCIS Margins: Uninvolved by DCIS.                                          Distance from closest margin: 3 mm (medial margin).                    Regional Lymph nodes: Involved by tumor cells.                             Number of lymph nodes with macrometastases: 0.                            Number of lymph nodes with micrometastases: 1.                            Size of largest metastatic deposit: 0.4 mm.                            Extranodal extension: Not identified.                            Number of lymph nodes examined: 2.                            Number of sentinel lymph nodes examined: 2.                Treatment effect: No known presurgical therapy.                Lymphovascular invasion: Present.                Dermal Lymphovascular invasion: Not identified.                Pathologic stage classification.                            Primary tumor: pT1c.                            Regional lymph nodes: pN1mi(sn).                            Distant metastasis: Not applicable.                Additional pathologic findings:                             Ductal hyperplasia of the usual type.                             Fibrocystic changes with duct  dilatation and stasis and focal apocrine metaplasia.                             Columnar cell change without atypia.                             Fibroinflammatory changes consistent with site of prior biopsy.                    Ancillary studies: ER, [place], HER-2/melanie, and Ki-67 studies performed on previous biopsy specimen at                       outside facility ([facility]).               Duplex scan of extremity veins including responses to compression and other maneuvers; bilateral   Study Impression     • Right Common Femoral: No deep vein thrombosis noted.   • Right Saphenofemoral Junction: No superficial thrombophlebitis noted.   • Right Proximal Femoral: No deep vein thrombosis noted.   • Right Mid Femoral: No deep vein thrombosis noted.   • Right Distal Femoral: No deep vein thrombosis noted.   • Right Popliteal: No deep vein thrombosis noted.   • Right Posterior Tibial: No deep vein thrombosis noted.   • Right Peroneal: No deep vein thrombosis noted.   • Right Gastrocnemius Soleal: No deep vein thrombosis noted.   • Right Greater Saphenous Above Knee: No superficial thrombophlebitis noted.   • Right Greater Saphenous Below Knee: No superficial thrombophlebitis noted.   • Left Common Femoral: No deep vein thrombosis noted.   • Left Saphenofemoral Junction: No superficial thrombophlebitis noted.   • Left Proximal Femoral: No deep vein thrombosis noted.   • Left Mid Femoral: No deep vein thrombosis noted.   • Left Distal Femoral: No deep vein thrombosis noted.   • Left Popliteal: No deep vein thrombosis noted.   • Left Posterior Tibial: No deep vein thrombosis noted.   • Left Peroneal: No deep vein thrombosis noted.   • Left Gastrocnemius Soleal: No deep vein thrombosis noted.   • Left Greater Saphenous Above Knee: No superficial thrombophlebitis noted.   • Left Greater Saphenous Below Knee: No superficial thrombophlebitis noted.   Electronically signed by Nicolas Cotton MD on 10/4/19 at 1933  EDT    5/29/2020 bilateral screening mammogram negative.    At baseline EKG was done in the office today which shows normal sinus rhythm with a rate of 75.  QT/QTc 368/411.        Assessment/Plan      1.  T1c N1 jese N0 ER/AK positive HER-2 negative left breast cancer post lumpectomy and radiation  · Arimidex started in 3/18 discontinued by the patient in 7/18 due to visual blurring which resolved.    · Aromasin started 11/2018. 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  She also has had weight gain. Aromasin discontinued.  · Patient seen early April 2019 and follow-up.  Patient agreeable to switch therapy to Femara 2.5 mg daily.  · Patient ultimately discontinuing Femara due to repeated arthralgias and blurred vision.  · The patient was transitioned to tamoxifen but ultimately discontinued this approximately 2 months later per her report today due to blurry vision.   · Recommend patient start trial toremifene.  She is hoping to be able to started early next week.  · Will need to return 1 month after starting for toxicity check as well as repeat EKG.      2.  Family history of breast cancer in a niece at a young age and father with cancer at age 51?  Genetic testing    4.  History of oral cancer in 1990    5.  Anemia, recently worked up per Dr. Ivan, internal medicine.   · Patient had EGD and colonoscopy per Dr. Gallego 1 year ago with no concerning findings.  Because of her history of GI ulcers however she was started on AcipHex per Dr. Ivan.  · 3/21/2019 showed iron saturation of 8% ferritin of 18. Patient started on trial of oral iron but was intolerant due to GI upset.  · Patient receiving 2 doses of IV Injectafer in May 2019.  Iron stores are now replete with iron saturation of 29% and hemoglobin having now normalized to 13.  · Rectal bleeding in 4/20 CAT scan shows duodenitis resumed ulcer medications    6.  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  Trial of toremifene 60 mg daily.  2.  Will need follow up and toxicity check in about one month with repeat EKG.   .

## 2020-06-12 RX ORDER — ESCITALOPRAM OXALATE 10 MG/1
TABLET ORAL
Qty: 30 TABLET | Refills: 0 | Status: SHIPPED | OUTPATIENT
Start: 2020-06-12 | End: 2020-07-06

## 2020-06-19 ENCOUNTER — TELEPHONE (OUTPATIENT)
Dept: FAMILY MEDICINE CLINIC | Facility: CLINIC | Age: 85
End: 2020-06-19

## 2020-06-19 ENCOUNTER — LAB (OUTPATIENT)
Dept: FAMILY MEDICINE CLINIC | Facility: CLINIC | Age: 85
End: 2020-06-19

## 2020-06-19 DIAGNOSIS — R30.0 DYSURIA: Primary | ICD-10-CM

## 2020-06-19 LAB
BACTERIA UR QL AUTO: NORMAL /HPF
BILIRUB UR QL STRIP: NEGATIVE
CLARITY UR: CLEAR
COLOR UR: YELLOW
GLUCOSE UR STRIP-MCNC: NEGATIVE MG/DL
HGB UR QL STRIP.AUTO: NEGATIVE
KETONES UR QL STRIP: NEGATIVE
LEUKOCYTE ESTERASE UR QL STRIP.AUTO: NEGATIVE
NITRITE UR QL STRIP: NEGATIVE
PH UR STRIP.AUTO: 6 [PH] (ref 4.6–8)
PROT UR QL STRIP: NEGATIVE
RBC # UR: NORMAL /HPF
REF LAB TEST METHOD: NORMAL
SP GR UR STRIP: 1.01 (ref 1–1.03)
SQUAMOUS #/AREA URNS HPF: NORMAL /HPF
UROBILINOGEN UR QL STRIP: NORMAL
WBC UR QL AUTO: NORMAL /HPF

## 2020-06-19 PROCEDURE — 81001 URINALYSIS AUTO W/SCOPE: CPT | Performed by: INTERNAL MEDICINE

## 2020-06-19 NOTE — TELEPHONE ENCOUNTER
PATIENT CALLED IN AND STATED SHE SUSPECTS SHE HAS A BLADDER INFECTION AND WANTS TO KNOW IF SHE SHOULD LEAVE A SPECIMEN. PLEASE CALL PATIENT BACK AT  [phone]

## 2020-06-22 ENCOUNTER — TELEPHONE (OUTPATIENT)
Dept: FAMILY MEDICINE CLINIC | Facility: CLINIC | Age: 85
End: 2020-06-22

## 2020-06-25 ENCOUNTER — OFFICE (OUTPATIENT)
Dept: URBAN - METROPOLITAN AREA CLINIC 66 | Facility: CLINIC | Age: 85
End: 2020-06-25
Payer: MEDICARE

## 2020-06-25 VITALS
HEART RATE: 76 BPM | DIASTOLIC BLOOD PRESSURE: 48 MMHG | HEIGHT: 61 IN | SYSTOLIC BLOOD PRESSURE: 126 MMHG | TEMPERATURE: 97.8 F | WEIGHT: 162 LBS

## 2020-06-25 DIAGNOSIS — I10 ESSENTIAL (PRIMARY) HYPERTENSION: ICD-10-CM

## 2020-06-25 DIAGNOSIS — K21.9 GASTRO-ESOPHAGEAL REFLUX DISEASE WITHOUT ESOPHAGITIS: ICD-10-CM

## 2020-06-25 DIAGNOSIS — R10.11 RIGHT UPPER QUADRANT PAIN: ICD-10-CM

## 2020-06-25 DIAGNOSIS — K57.30 DIVERTICULOSIS OF LARGE INTESTINE WITHOUT PERFORATION OR ABS: ICD-10-CM

## 2020-06-25 DIAGNOSIS — R10.31 RIGHT LOWER QUADRANT PAIN: ICD-10-CM

## 2020-06-25 DIAGNOSIS — R19.8 OTHER SPECIFIED SYMPTOMS AND SIGNS INVOLVING THE DIGESTIVE S: ICD-10-CM

## 2020-06-25 PROCEDURE — 99214 OFFICE O/P EST MOD 30 MIN: CPT | Performed by: INTERNAL MEDICINE

## 2020-06-25 RX ORDER — OMEPRAZOLE 40 MG/1
80 CAPSULE, DELAYED RELEASE ORAL
Qty: 60 | Refills: 12 | Status: COMPLETED
Start: 2020-05-04 | End: 2020-06-25

## 2020-06-25 RX ORDER — ESOMEPRAZOLE MAGNESIUM 40 MG/1
80 CAPSULE, DELAYED RELEASE ORAL
Qty: 60 | Refills: 12 | Status: COMPLETED
Start: 2020-04-29 | End: 2020-06-25

## 2020-07-01 RX ORDER — NIFEDIPINE 30 MG/1
TABLET, FILM COATED, EXTENDED RELEASE ORAL
Qty: 30 TABLET | Refills: 1 | Status: SHIPPED | OUTPATIENT
Start: 2020-07-01 | End: 2020-08-17

## 2020-07-06 RX ORDER — ESCITALOPRAM OXALATE 10 MG/1
TABLET ORAL
Qty: 30 TABLET | Refills: 0 | Status: SHIPPED | OUTPATIENT
Start: 2020-07-06 | End: 2020-08-03 | Stop reason: SDUPTHER

## 2020-07-07 ENCOUNTER — TELEPHONE (OUTPATIENT)
Dept: FAMILY MEDICINE CLINIC | Facility: CLINIC | Age: 85
End: 2020-07-07

## 2020-07-07 RX ORDER — LEVOTHYROXINE SODIUM 0.07 MG/1
75 TABLET ORAL DAILY
Qty: 90 TABLET | Refills: 3 | Status: SHIPPED | OUTPATIENT
Start: 2020-07-07 | End: 2021-03-15

## 2020-07-07 RX ORDER — LANCETS 28 GAUGE
EACH MISCELLANEOUS
Qty: 100 EACH | Refills: 3 | Status: SHIPPED | OUTPATIENT
Start: 2020-07-07 | End: 2022-06-29

## 2020-07-22 ENCOUNTER — TRANSCRIBE ORDERS (OUTPATIENT)
Dept: SLEEP MEDICINE | Facility: HOSPITAL | Age: 85
End: 2020-07-22

## 2020-07-22 DIAGNOSIS — Z01.818 OTHER SPECIFIED PRE-OPERATIVE EXAMINATION: Primary | ICD-10-CM

## 2020-07-27 ENCOUNTER — LAB (OUTPATIENT)
Dept: LAB | Facility: HOSPITAL | Age: 85
End: 2020-07-27

## 2020-07-27 DIAGNOSIS — Z01.818 OTHER SPECIFIED PRE-OPERATIVE EXAMINATION: ICD-10-CM

## 2020-07-27 PROCEDURE — U0004 COV-19 TEST NON-CDC HGH THRU: HCPCS

## 2020-07-27 PROCEDURE — C9803 HOPD COVID-19 SPEC COLLECT: HCPCS

## 2020-07-28 LAB
REF LAB TEST METHOD: NORMAL
SARS-COV-2 RNA RESP QL NAA+PROBE: NOT DETECTED

## 2020-07-29 ENCOUNTER — ON CAMPUS - OUTPATIENT (OUTPATIENT)
Dept: URBAN - METROPOLITAN AREA HOSPITAL 114 | Facility: HOSPITAL | Age: 85
End: 2020-07-29
Payer: MEDICARE

## 2020-07-29 ENCOUNTER — HOSPITAL ENCOUNTER (OUTPATIENT)
Facility: HOSPITAL | Age: 85
Setting detail: HOSPITAL OUTPATIENT SURGERY
Discharge: HOME | End: 2020-07-29
Attending: INTERNAL MEDICINE | Admitting: INTERNAL MEDICINE

## 2020-07-29 ENCOUNTER — ANESTHESIA (OUTPATIENT)
Dept: GASTROENTEROLOGY | Facility: HOSPITAL | Age: 85
End: 2020-07-29

## 2020-07-29 ENCOUNTER — ANESTHESIA EVENT (OUTPATIENT)
Dept: GASTROENTEROLOGY | Facility: HOSPITAL | Age: 85
End: 2020-07-29

## 2020-07-29 VITALS
RESPIRATION RATE: 16 BRPM | HEART RATE: 74 BPM | HEIGHT: 61 IN | SYSTOLIC BLOOD PRESSURE: 149 MMHG | DIASTOLIC BLOOD PRESSURE: 67 MMHG | TEMPERATURE: 97.8 F | BODY MASS INDEX: 29.45 KG/M2 | WEIGHT: 156 LBS | OXYGEN SATURATION: 98 %

## 2020-07-29 DIAGNOSIS — K29.50 UNSPECIFIED CHRONIC GASTRITIS WITHOUT BLEEDING: ICD-10-CM

## 2020-07-29 DIAGNOSIS — K64.9 UNSPECIFIED HEMORRHOIDS: ICD-10-CM

## 2020-07-29 DIAGNOSIS — K92.1 MELENA: ICD-10-CM

## 2020-07-29 DIAGNOSIS — K27.5 CHRONIC OR UNSPECIFIED PEPTIC ULCER, SITE UNSPECIFIED, WITH: ICD-10-CM

## 2020-07-29 DIAGNOSIS — R10.11 RIGHT UPPER QUADRANT PAIN: ICD-10-CM

## 2020-07-29 DIAGNOSIS — K57.30 DIVERTICULOSIS OF LARGE INTESTINE WITHOUT PERFORATION OR ABS: ICD-10-CM

## 2020-07-29 DIAGNOSIS — K44.9 DIAPHRAGMATIC HERNIA WITHOUT OBSTRUCTION OR GANGRENE: ICD-10-CM

## 2020-07-29 DIAGNOSIS — Z87.898 HX OF ULCER DISEASE: ICD-10-CM

## 2020-07-29 LAB — GLUCOSE BLDC GLUCOMTR-MCNC: 117 MG/DL (ref 70–130)

## 2020-07-29 PROCEDURE — 82962 GLUCOSE BLOOD TEST: CPT

## 2020-07-29 PROCEDURE — 43239 EGD BIOPSY SINGLE/MULTIPLE: CPT | Performed by: INTERNAL MEDICINE

## 2020-07-29 PROCEDURE — 87071 CULTURE AEROBIC QUANT OTHER: CPT | Performed by: INTERNAL MEDICINE

## 2020-07-29 PROCEDURE — 88305 TISSUE EXAM BY PATHOLOGIST: CPT | Performed by: INTERNAL MEDICINE

## 2020-07-29 PROCEDURE — 25010000002 PROPOFOL 10 MG/ML EMULSION: Performed by: NURSE ANESTHETIST, CERTIFIED REGISTERED

## 2020-07-29 PROCEDURE — 45331 SIGMOIDOSCOPY AND BIOPSY: CPT | Performed by: INTERNAL MEDICINE

## 2020-07-29 RX ORDER — LIDOCAINE HYDROCHLORIDE 20 MG/ML
INJECTION, SOLUTION INFILTRATION; PERINEURAL AS NEEDED
Status: DISCONTINUED | OUTPATIENT
Start: 2020-07-29 | End: 2020-07-29 | Stop reason: SURG

## 2020-07-29 RX ORDER — PROPOFOL 10 MG/ML
VIAL (ML) INTRAVENOUS AS NEEDED
Status: DISCONTINUED | OUTPATIENT
Start: 2020-07-29 | End: 2020-07-29 | Stop reason: SURG

## 2020-07-29 RX ORDER — PROPOFOL 10 MG/ML
VIAL (ML) INTRAVENOUS CONTINUOUS PRN
Status: DISCONTINUED | OUTPATIENT
Start: 2020-07-29 | End: 2020-07-29 | Stop reason: SURG

## 2020-07-29 RX ORDER — SODIUM CHLORIDE 9 MG/ML
30 INJECTION, SOLUTION INTRAVENOUS CONTINUOUS PRN
Status: DISCONTINUED | OUTPATIENT
Start: 2020-07-29 | End: 2020-07-29 | Stop reason: HOSPADM

## 2020-07-29 RX ADMIN — SODIUM CHLORIDE 30 ML/HR: 9 INJECTION, SOLUTION INTRAVENOUS at 09:51

## 2020-07-29 RX ADMIN — PROPOFOL 70 MG: 10 INJECTION, EMULSION INTRAVENOUS at 10:05

## 2020-07-29 RX ADMIN — PROPOFOL 180 MCG/KG/MIN: 10 INJECTION, EMULSION INTRAVENOUS at 10:05

## 2020-07-29 RX ADMIN — LIDOCAINE HYDROCHLORIDE 100 MG: 20 INJECTION, SOLUTION INFILTRATION; PERINEURAL at 10:05

## 2020-07-29 NOTE — ANESTHESIA PREPROCEDURE EVALUATION
Anesthesia Evaluation     Patient summary reviewed and Nursing notes reviewed   history of anesthetic complications: PONV  NPO Solid Status: > 8 hours  NPO Liquid Status: > 2 hours           Airway   Mallampati: II  TM distance: >3 FB  Neck ROM: full  Dental - normal exam     Pulmonary - normal exam   (+) a smoker Former,   Cardiovascular - normal exam    (+) hypertension,       Neuro/Psych  (+) CVA, psychiatric history Depression,     GI/Hepatic/Renal/Endo    (+)  GERD, PUD, GI bleeding , renal disease CRI, diabetes mellitus type 2,     Musculoskeletal     Abdominal    Substance History - negative use     OB/GYN negative ob/gyn ROS         Other   arthritis,    history of cancer                    Anesthesia Plan    ASA 3     MAC       Anesthetic plan, all risks, benefits, and alternatives have been provided, discussed and informed consent has been obtained with: patient.

## 2020-07-29 NOTE — ANESTHESIA POSTPROCEDURE EVALUATION
"Patient: Geeta Butt    Procedure Summary     Date:  07/29/20 Room / Location:  [facility] ENDOSCOPY 10 /  [facility]    Anesthesia Start:  1002 Anesthesia Stop:  1043    Procedures:       ESOPHAGOGASTRODUODENOSCOPY WITH DUODENAL ASPIRATE AND COLD BIOPSIES (N/A Esophagus)      FLEXIBLE SIGMOIDOSCOPY TO DESCENDING COLON WITH COLD BIOPSIES (N/A ) Diagnosis:      Surgeon:  Augustine Gallego MD Provider:  Ti Adhikari MD    Anesthesia Type:  MAC ASA Status:  3          Anesthesia Type: MAC    Vitals  Vitals Value Taken Time   /67 7/29/2020 10:58 AM   Temp     Pulse 74 7/29/2020 10:58 AM   Resp 16 7/29/2020 10:58 AM   SpO2 98 % 7/29/2020 10:58 AM           Post Anesthesia Care and Evaluation    Patient location during evaluation: bedside  Patient participation: complete - patient participated  Level of consciousness: awake  Pain score: 2  Pain management: adequate  Airway patency: patent  Anesthetic complications: No anesthetic complications  PONV Status: none  Cardiovascular status: acceptable  Respiratory status: acceptable  Hydration status: acceptable    Comments: /67   Pulse 74   Temp 36.6 °C (97.8 °F) (Oral)   Resp 16   Ht 154.9 cm (61\")   Wt 70.8 kg (156 lb)   SpO2 98%   BMI 29.48 kg/m²         "

## 2020-07-30 LAB
CYTO UR: NORMAL
LAB AP CASE REPORT: NORMAL
PATH REPORT.FINAL DX SPEC: NORMAL
PATH REPORT.GROSS SPEC: NORMAL

## 2020-07-31 LAB
BACTERIA SPEC AEROBE CULT: ABNORMAL

## 2020-08-03 ENCOUNTER — TELEPHONE (OUTPATIENT)
Dept: FAMILY MEDICINE CLINIC | Facility: CLINIC | Age: 85
End: 2020-08-03

## 2020-08-03 RX ORDER — ESCITALOPRAM OXALATE 10 MG/1
10 TABLET ORAL DAILY
Qty: 90 TABLET | Refills: 1 | Status: SHIPPED | OUTPATIENT
Start: 2020-08-03 | End: 2021-01-29

## 2020-08-17 RX ORDER — NIFEDIPINE 30 MG/1
TABLET, FILM COATED, EXTENDED RELEASE ORAL
Qty: 30 TABLET | Refills: 1 | Status: SHIPPED | OUTPATIENT
Start: 2020-08-17 | End: 2020-09-08

## 2020-08-21 ENCOUNTER — OFFICE VISIT (OUTPATIENT)
Dept: ONCOLOGY | Facility: CLINIC | Age: 85
End: 2020-08-21

## 2020-08-21 ENCOUNTER — LAB (OUTPATIENT)
Dept: LAB | Facility: HOSPITAL | Age: 85
End: 2020-08-21

## 2020-08-21 VITALS
HEART RATE: 85 BPM | WEIGHT: 160.5 LBS | TEMPERATURE: 98.6 F | RESPIRATION RATE: 18 BRPM | SYSTOLIC BLOOD PRESSURE: 156 MMHG | BODY MASS INDEX: 30.3 KG/M2 | HEIGHT: 61 IN | DIASTOLIC BLOOD PRESSURE: 65 MMHG | OXYGEN SATURATION: 96 %

## 2020-08-21 DIAGNOSIS — Z17.0 MALIGNANT NEOPLASM OF LOWER-INNER QUADRANT OF LEFT BREAST IN FEMALE, ESTROGEN RECEPTOR POSITIVE (HCC): Primary | ICD-10-CM

## 2020-08-21 DIAGNOSIS — C50.312 MALIGNANT NEOPLASM OF LOWER-INNER QUADRANT OF LEFT BREAST IN FEMALE, ESTROGEN RECEPTOR POSITIVE (HCC): ICD-10-CM

## 2020-08-21 DIAGNOSIS — Z17.0 MALIGNANT NEOPLASM OF LOWER-INNER QUADRANT OF LEFT BREAST IN FEMALE, ESTROGEN RECEPTOR POSITIVE (HCC): ICD-10-CM

## 2020-08-21 DIAGNOSIS — C50.312 MALIGNANT NEOPLASM OF LOWER-INNER QUADRANT OF LEFT BREAST IN FEMALE, ESTROGEN RECEPTOR POSITIVE (HCC): Primary | ICD-10-CM

## 2020-08-21 LAB
ALBUMIN SERPL-MCNC: 4.3 G/DL (ref 3.5–5.2)
ALBUMIN/GLOB SERPL: 1.5 G/DL (ref 1.1–2.4)
ALP SERPL-CCNC: 52 U/L (ref 38–116)
ALT SERPL W P-5'-P-CCNC: 14 U/L (ref 0–33)
ANION GAP SERPL CALCULATED.3IONS-SCNC: 12.2 MMOL/L (ref 5–15)
AST SERPL-CCNC: 17 U/L (ref 0–32)
BASOPHILS # BLD AUTO: 0.11 10*3/MM3 (ref 0–0.2)
BASOPHILS NFR BLD AUTO: 1.7 % (ref 0–1.5)
BILIRUB SERPL-MCNC: 0.2 MG/DL (ref 0.2–1.2)
BUN SERPL-MCNC: 17 MG/DL (ref 6–20)
BUN/CREAT SERPL: 17.7 (ref 7.3–30)
CALCIUM SPEC-SCNC: 9.8 MG/DL (ref 8.5–10.2)
CHLORIDE SERPL-SCNC: 95 MMOL/L (ref 98–107)
CO2 SERPL-SCNC: 23.8 MMOL/L (ref 22–29)
CREAT SERPL-MCNC: 0.96 MG/DL (ref 0.6–1.1)
DEPRECATED RDW RBC AUTO: 43.1 FL (ref 37–54)
EOSINOPHIL # BLD AUTO: 0.31 10*3/MM3 (ref 0–0.4)
EOSINOPHIL NFR BLD AUTO: 4.8 % (ref 0.3–6.2)
ERYTHROCYTE [DISTWIDTH] IN BLOOD BY AUTOMATED COUNT: 12.9 % (ref 12.3–15.4)
GFR SERPL CREATININE-BSD FRML MDRD: 55 ML/MIN/1.73
GLOBULIN UR ELPH-MCNC: 2.8 GM/DL (ref 1.8–3.5)
GLUCOSE SERPL-MCNC: 129 MG/DL (ref 74–124)
HCT VFR BLD AUTO: 37.9 % (ref 34–46.6)
HGB BLD-MCNC: 12.4 G/DL (ref 12–15.9)
IMM GRANULOCYTES # BLD AUTO: 0.04 10*3/MM3 (ref 0–0.05)
IMM GRANULOCYTES NFR BLD AUTO: 0.6 % (ref 0–0.5)
LYMPHOCYTES # BLD AUTO: 1.46 10*3/MM3 (ref 0.7–3.1)
LYMPHOCYTES NFR BLD AUTO: 22.5 % (ref 19.6–45.3)
MCH RBC QN AUTO: 29.9 PG (ref 26.6–33)
MCHC RBC AUTO-ENTMCNC: 32.7 G/DL (ref 31.5–35.7)
MCV RBC AUTO: 91.3 FL (ref 79–97)
MONOCYTES # BLD AUTO: 0.53 10*3/MM3 (ref 0.1–0.9)
MONOCYTES NFR BLD AUTO: 8.2 % (ref 5–12)
NEUTROPHILS NFR BLD AUTO: 4.04 10*3/MM3 (ref 1.7–7)
NEUTROPHILS NFR BLD AUTO: 62.2 % (ref 42.7–76)
NRBC BLD AUTO-RTO: 0 /100 WBC (ref 0–0.2)
PLATELET # BLD AUTO: 309 10*3/MM3 (ref 140–450)
PMV BLD AUTO: 9 FL (ref 6–12)
POTASSIUM SERPL-SCNC: 4.4 MMOL/L (ref 3.5–4.7)
PROT SERPL-MCNC: 7.1 G/DL (ref 6.3–8)
RBC # BLD AUTO: 4.15 10*6/MM3 (ref 3.77–5.28)
SODIUM SERPL-SCNC: 131 MMOL/L (ref 134–145)
WBC # BLD AUTO: 6.49 10*3/MM3 (ref 3.4–10.8)

## 2020-08-21 PROCEDURE — 36415 COLL VENOUS BLD VENIPUNCTURE: CPT

## 2020-08-21 PROCEDURE — 99213 OFFICE O/P EST LOW 20 MIN: CPT | Performed by: INTERNAL MEDICINE

## 2020-08-21 PROCEDURE — 80053 COMPREHEN METABOLIC PANEL: CPT

## 2020-08-21 PROCEDURE — 85025 COMPLETE CBC W/AUTO DIFF WBC: CPT

## 2020-08-21 NOTE — PROGRESS NOTES
Subjective     REASON FOR CONSULTATION:   1. Left breast cancer [id number] ER/NV+ her2 neg post lumpectomy/SLN  3/18 and whole breast radiation completed May 2018  2.  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  Fatigue out of proportion to anemia  4.  Arimidex started in 3/18 with bone density showing normal density in the spine and osteopenia in the hips-this was self stopped due to vision changes.  5.  Aromasin initiated 10/20/18 and discontinued 1/20/19 secondary to vision changes.  6.  Tamoxifen initiated 8/20/2019 and discontinued 10/20/2019 secondary to vision changes                             REQUESTING PHYSICIAN:  Myles Soliman M.D.    History of Present Illness patient is an 84-year-old female with the above-mentioned history who is here today for reevaluation assessing her tolerance of prescribed toremifene on 4/29/2020.       Patient is here today stating she never took the terbinafine and is very comfortable with stopping all hormonal therapy at this point based on her age and comorbidities and I think this is very reasonable also.     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    • Chronic kidney disease    • CKD (chronic kidney disease)     PT STATES STAGE 3   • Depression    • Diabetes mellitus (CMS/HCC)     BORDERLINE   • Diverticular disease    • Gastric ulcer     AND DUODENAL   • GERD (gastroesophageal reflux disease)    • Hearing loss     BILAT AIDES   • History of colon polyps    • History of depression    • History of GI  bleed    • Hypertension    • Hypothyroidism    • Peptic ulceration    • PONV (postoperative nausea and vomiting)    • Poor vision     RIGHT EYE, HAS PERIPHERAL VISION    • Stroke (CMS/HCC) 2000     mild weakness on left, partial sight loss on right         Past Surgical History:   Procedure Laterality Date   • ABDOMINAL SURGERY     • APPENDECTOMY     • BLADDER REPAIR      AND RECTOCELE   • BREAST BIOPSY     • BREAST CYST ASPIRATION     • BREAST LUMPECTOMY WITH SENTINEL NODE BIOPSY Left 3/19/2018    Procedure: BREAST LUMPECTOMY WITH SENTINEL NODE BIOPSY AND NEEDLE LOCALIZATION;  Surgeon: Myles Soliman MD;  Location: [facility] [place] [facility];  Service: General   • CHOLECYSTECTOMY     • COLONOSCOPY  2013   • COLONOSCOPY N/A 2/16/2018    Procedure: COLONOSCOPY into cecum and T.I. with biopsies;  Surgeon: Augustine Gallego MD;  [facility];  Service:    • ENDOSCOPY N/A 10/17/2017    Procedure: ESOPHAGOGASTRODUODENOSCOPY WITH COLD BIOPSIES;  Surgeon: Augustine Gallego MD;  Location: [facility] ENDOSCOPY;  Service:    • ENDOSCOPY N/A 2/16/2018    Procedure: ESOPHAGOGASTRODUODENOSCOPY with biopsies and #54 [address] DILATATION;  Surgeon: Augustine Gallego MD;  Location: [facility] ENDOSCOPY;  Service:    • ENDOSCOPY N/A 3/19/2020    Procedure: ESOPHAGOGASTRODUODENOSCOPY with biopsies;  Surgeon: Augustine Gallego MD;  Location: [facility] ENDOSCOPY;  Service: Gastroenterology;  Laterality: N/A;  PRE- MELENA, EPIGASTRIC PAIN  POST- eerosive gastritis, duodenal ulcer, hiatal hernia   • ENDOSCOPY N/A 7/29/2020    Procedure: ESOPHAGOGASTRODUODENOSCOPY WITH DUODENAL ASPIRATE AND COLD BIOPSIES;  Surgeon: Augustine Gallego MD;  Location: [facility] ENDOSCOPY;  Service: Gastroenterology;  Laterality: N/A;  PRE: HX ULCER DISEASE  POST: GASTRITIS, HEALED ULCER   • EYE SURGERY Left 2014    3-4 years, cornea replacement    • HYSTERECTOMY     • MOUTH SURGERY  2000    UNDER TONGUE AND LEFT FLOOR OF MOUTH FOR CA   • PARATHYROIDECTOMY      PER PT   •  SIGMOIDOSCOPY N/A 2020    Procedure: FLEXIBLE SIGMOIDOSCOPY TO DESCENDING COLON WITH COLD BIOPSIES;  Surgeon: Augustine Gallego MD;  Location: [facility] ENDOSCOPY;  Service: Gastroenterology;  Laterality: N/A;  PRE: RECTAL BLEEDING  POST: DIVERTICULOSIS, HEMORRHOIDS, MINIMAL PROCTITIS   • SINUS SURGERY     • SKIN BIOPSY     • THYROID SURGERY     • VARICOSE VEIN SURGERY      Oncological history  patient is an 82-year-old female with recent problems with abdominal pain and diarrhea and weight loss which is being evaluated by GI and finally found to have multiple ulcers in the duodenum and small bowel finally responding to treatment.  In the middle of this she went for routine mammogram a couple of months later was found to have an abnormality in the left breast at 7 o'clock position measuring about 12 mm in size that was not present last year.  The patient had previously had a left breast biopsy in  with benign findings.   There was no palpable mass and biopsy was done and this revealed a grade 1 infiltrating ductal carcinoma at least 1 cm in size ER 78%/MO 17% positive HER-2 negative.  The patient was referred to Dr. Soliman who performed a lumpectomy and sentinel node biopsy with the findings of a 1.2 cm tumor grade 2 with lymphovascular invasion and clear margins with associated DCIS.  One of 2 sentinel nodes showed a micrometastasis although the pathologist said this was almost too small to be considered a micrometastasis.  Postoperatively she has done well and thankfully her GI complains of much improved with Carafate and Protonix and her diarrhea finally resolved.    She's not had a bone density in quite a while.  She is  5 para 5  menarche at age 15 and menopause in her mid 50s although she had a hysterectomy at 30 for an ulcerated uterus.  First childbirth was at age 20 she breast-fed all 5 children.  She took hormonal therapy for at least 15 years after menopause and stopped about 10 years ago    .  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    Her father  at 52 of stomach cancer mother had thyroid cancer at age 50 her brother's daughter had breast cancer age 40 and she had a paternal aunt with leukemia is no other breast cancer or ovarian cancer uterine cancer or pancreatic cancer in the family . I did ask him to check with her niece to see if she has had genetic testing - they do not know much at all about the maternal family .    She will call for the results of the DEXA scan and if adequate we will start Arimidex which I have  already prescribed to her and see her back in 3 months to assess her tolerance.  She is in very good health and I explained to her that adjuvant therapy decreases the risk of recurrence of her cancer which might be in the 15-20% range probably down to the 10% range and if she has significant side effects we will be inclined to stop treatment.  Obviously if her GI symptoms recur with Arimidex we will have to try another medications.  We talked about the joint pains and hot flashes which are unlikely at her age and she was agreeable to a trial of Arimidex.  Radiation has been planned      In 10/2018 she was changed to Aromasin as she had issues previously with Arimidex with visual blurring.  The patient was then seen 19 with reports of blurred vision once again.    At that point the patient's symptoms have essentially resolved.  We therefore elected to switch her to Femara.    Unfortunately, the patient ultimately experienced similar symptoms with Femara because of muscle cramps and blurred vision.      I discussed with her today that Dr. Valenzuela would like the patient to consider taking tamoxifen as yet another potential medication to benefit her in light of history of hormone positive breast cancer.  It is noted that the patient takes Prozac and has been on this for many many years.  There is a potential  interaction and I plan to discuss this with Dr. Valenzuela further.  If okayed per Dr. Valenzuela, we would plan then to start the patient on this as soon as next week.        Current Outpatient Medications on File Prior to Visit   Medication Sig Dispense Refill   • amLODIPine (NORVASC) 5 MG tablet TAKE 2 TABLETS BY MOUTH EVERY  tablet 0   • cefuroxime (Ceftin) 250 MG tablet X 14 d 20 tablet 0   • Cholecalciferol (VITAMIN D3) 50 MCG (2000 UT) capsule TAKE 1 CAPSULE BY MOUTH DAILY. 100 capsule 1   • colestipol (COLESTID) 1 g tablet Take 1 g by mouth 2 (Two) Times a Day.     • dicyclomine (BENTYL) 10 MG capsule Take 10 mg by mouth Daily Before Lunch.     • escitalopram (LEXAPRO) 10 MG tablet Take 1 tablet by mouth Daily. 90 tablet 1   • fluorometholone (FML) 0.1 % ophthalmic suspension Administer 1 drop into the left eye Daily.     • glucose blood (FREESTYLE TEST STRIPS) test strip TEST BLOOD SUGARS ONCE DAILY 50 each 12   • ketotifen (ZADITOR) 0.025 % ophthalmic solution Administer 1 drop into the left eye 2 (Two) Times a Day.     • Lancets (FREESTYLE) lancets Test glucose once daily 100 each 3   • levothyroxine (SYNTHROID, LEVOTHROID) 75 MCG tablet Take 1 tablet by mouth Daily. 90 tablet 3   • NIFEdipine CC (ADALAT CC) 30 MG 24 hr tablet TAKE 1 TABLET BY MOUTH EVERY DAY 30 tablet 1   • NON FORMULARY Administer 1 drop to both eyes 3 (Three) Times a Day. THERA TEARS     • Omega-3 Fatty Acids (FISH OIL PO) Take  by mouth Daily As Needed.     • omeprazole (priLOSEC) 40 MG capsule      • Probiotic Product (PROBIOTIC DAILY PO) Take 1 capsule by mouth Daily.     • sodium chloride (CAYLA 128) 5 % ophthalmic solution Administer 1 drop to the right eye As Needed.     • sucralfate (CARAFATE) 1 GM/10ML suspension TAKE 10 ML BY MOUTH 3 TIMES A DAY FOR 30 MINUTES BEFORE MEALS     • tiZANidine (ZANAFLEX) 2 MG tablet TAKE 1 TABLET BY MOUTH 2 TIMES A DAY AS NEEDED FOR MUSCLE SPASMS. 60 tablet 0   • toremifene citrate (FARESTON) 60 MG  tablet tablet Take 1 tablet by mouth Daily. 30 tablet 6   • traMADol (ULTRAM) 50 MG tablet Take 1 tablet by mouth Every 6 (Six) Hours As Needed for Moderate Pain . 12 tablet 0   • valsartan (DIOVAN) 160 MG tablet Take 1 tablet by mouth Daily. 30 tablet 11     No current facility-administered medications on file prior to visit.         ALLERGIES:    Allergies   Allergen Reactions   • Other Nausea Only     All mycins   • Sulfa Antibiotics Unknown - High Severity     LOST CONSCIOUSNESS AND BODY TURNED RED/ON FIRE   • Ciprofloxacin Unknown - High Severity     RED BLISTERS   • Iodine Unknown - High Severity     DECADES AGO, IODINE INJECTION CAUSED SKIN REDNESS AND BURNING   • Macrodantin [Nitrofurantoin Macrocrystal] Diarrhea and Nausea And Vomiting   • Nitrofurantoin Nausea And Vomiting   • Yeast-Related Products Unknown - High Severity     MOUTH SORES AND BLADDER INFECTION        Social History     Socioeconomic History   • Marital status:      Spouse name: Nicolas   • Number of children: Not on file   • Years of education: High school   • Highest education level: Not on file   Occupational History   • Occupation: Homemaker   Tobacco Use   • Smoking status: Former Smoker     Packs/day: 1.00     Years: 10.00     Pack years: 10.00     Types: Cigarettes     Start date:      Last attempt to quit:      Years since quittin.   • Smokeless tobacco: Never Used   Substance and Sexual Activity   • Alcohol use: No   • Drug use: No   • Sexual activity: Defer   Lifestyle   • Physical activity:     Days per week: Patient refused     Minutes per session: Patient refused   • Stress: Not on file   Social History Narrative    Accompanied by daughters Macrina and Daisy        Family History   Problem Relation Age of Onset   • Esophageal cancer Father    • Stomach cancer Father    • Heart disease Mother    • Thyroid cancer Mother    • Hypertension Mother    • Hypertension Sister    • Hypertension Brother    • Stroke  "Brother    • Heart disease Brother    • Diabetes Brother    • Leukemia Paternal Aunt    • Malig Hyperthermia Neg Hx         Review of Systems   Constitutional: Positive for fatigue. Negative for chills and fever.   Eyes: Negative for discharge and visual disturbance.   Respiratory: Negative for chest tightness and shortness of breath.    Cardiovascular: Negative for chest pain and leg swelling.   Gastrointestinal: Positive for diarrhea (see HPI). Negative for abdominal distention, abdominal pain and nausea.   Musculoskeletal: Positive for back pain and joint swelling.   Skin: Negative for pallor and rash.   Neurological: Negative for dizziness and numbness.   Psychiatric/Behavioral: Negative for behavioral problems and confusion.   6/3/2020 ROS unchanged from above except as updated.    Objective     Vitals:    08/21/20 1048   Height: 154.9 cm (60.98\")       Current Status 6/3/2020   ECOG score 0       Physical Exam   Constitutional: She is oriented to person, place, and time. She appears well-developed and well-nourished. No distress.   Pulmonary/Chest: Effort normal. No respiratory distress.       Musculoskeletal: She exhibits no edema.   Neurological: She is alert and oriented to person, place, and time.   Skin: Skin is warm and dry.   Psychiatric: She has a normal mood and affect.       I have reexamined the patient and the results are consistent with the previously documented exam. Omi Valenzuela MD     RECENT LABS:    Results from last 7 days   Lab Units 08/21/20  1027   WBC 10*3/mm3 6.49   NEUTROS ABS 10*3/mm3 4.04   HEMOGLOBIN g/dL 12.4   HEMATOCRIT % 37.9   PLATELETS 10*3/mm3 309             Lab Results   Component Value Date    IRON 98 07/03/2019    TIBC 340 07/03/2019    FERRITIN 710.30 (H) 07/03/2019       R 78% HI 17% Her 2 -neg Ki-67 7%  Final Diagnosis   1.  BREAST, LEFT, LUMPECTOMY:                INVASIVE MAMMARY CARCINOMA OF NO SPECIAL TYPE (INVASIVE DUCTAL CARCINOMA) AND                      "  FOCAL ASSOCIATED DUCTAL CARCINOMA IN SITU (DCIS).                SEE SYNOPTIC REPORT (BELOW) FOR ADDITIONAL DETAILS.     2.  SENTINEL LYMPH NODE #1, LEFT AXILLA, EXCISION:                ONE LYMPH NODE, POSITIVE FOR METASTATIC CARCINOMA (MICROMETASTASIS) (SEE COMMENT).               NO EXTRANODAL EXTENSION IS IDENTIFIED.     COMMENT: Measuring the exact size of the lymph node metastasis is difficult. The lymph node demonstrates a few very small scattered foci of tumor cells, compatible with isolated tumor cells.  One slide demonstrates a single contiguous focus of tumor cells exceeding 0.2 mm; therefore, this is best considered a micrometastasis rather than isolated tumor cells.     3.  SENTINEL LYMPH NODE #2, LEFT AXILLA, EXCISION.               ONE LYMPH NODE, NEGATIVE FOR METASTATIC CARCINOMA.     SYNOPTIC REPORT (Based on [facility] cancer case summary, version January 2018):               Procedure: Excision (less than total mastectomy).               Specimen laterality: Left.                Tumor site: Not specified.                Tumor size: 1.2 x 1.1 x 1 cm.               Histologic type: Invasive carcinoma of no special type (ductal, not otherwise specified).                Histologic grade (Melony Histologic Score):                              Glandular (acinar)/tubular differentiation: Score 3.                            Nuclear pleomorphism: Score 2.                             Mitotic rate: Score 1.                            Overall grade: Grade 2 (Score 6 of 9).               Tumor focality: Single focus of invasive carcinoma.                Ductal carcinoma in situ (DCIS): Present.                            Architectural patterns: Solid.                             Nuclear grade: Grade II (intermediate).                            Necrosis: Present, central comedonecrosis.                Lobular carcinoma in situ (LCIS): No LCIS in specimen.               Margins:                             Invasive carcinoma margins: Uninvolved by invasive carcinoma.                                          Distance from closest margin: 3 mm (medial margin).                                          NOTE: Invasive carcinoma also extends to within 4 mm of superior margin and to within                                                 4 mm of lateral margin.  All additional margins are greater than 10 mm from invasive                                                 carcinoma.                              DCIS Margins: Uninvolved by DCIS.                                          Distance from closest margin: 3 mm (medial margin).                    Regional Lymph nodes: Involved by tumor cells.                             Number of lymph nodes with macrometastases: 0.                            Number of lymph nodes with micrometastases: 1.                            Size of largest metastatic deposit: 0.4 mm.                            Extranodal extension: Not identified.                            Number of lymph nodes examined: 2.                            Number of sentinel lymph nodes examined: 2.                Treatment effect: No known presurgical therapy.                Lymphovascular invasion: Present.                Dermal Lymphovascular invasion: Not identified.                Pathologic stage classification.                            Primary tumor: pT1c.                            Regional lymph nodes: pN1mi(sn).                            Distant metastasis: Not applicable.                Additional pathologic findings:                             Ductal hyperplasia of the usual type.                             Fibrocystic changes with duct dilatation and stasis and focal apocrine metaplasia.                             Columnar cell change without atypia.                             Fibroinflammatory changes consistent with site of prior biopsy.                    Ancillary studies: ER, TN, HER-2/melanie, and  Ki-67 studies performed on previous biopsy specimen at                       outside facility ([facility]).               Duplex scan of extremity veins including responses to compression and other maneuvers; bilateral   Study Impression     • Right Common Femoral: No deep vein thrombosis noted.   • Right Saphenofemoral Junction: No superficial thrombophlebitis noted.   • Right Proximal Femoral: No deep vein thrombosis noted.   • Right Mid Femoral: No deep vein thrombosis noted.   • Right Distal Femoral: No deep vein thrombosis noted.   • Right Popliteal: No deep vein thrombosis noted.   • Right Posterior Tibial: No deep vein thrombosis noted.   • Right Peroneal: No deep vein thrombosis noted.   • Right Gastrocnemius Soleal: No deep vein thrombosis noted.   • Right Greater Saphenous Above Knee: No superficial thrombophlebitis noted.   • Right Greater Saphenous Below Knee: No superficial thrombophlebitis noted.   • Left Common Femoral: No deep vein thrombosis noted.   • Left Saphenofemoral Junction: No superficial thrombophlebitis noted.   • Left Proximal Femoral: No deep vein thrombosis noted.   • Left Mid Femoral: No deep vein thrombosis noted.   • Left Distal Femoral: No deep vein thrombosis noted.   • Left Popliteal: No deep vein thrombosis noted.   • Left Posterior Tibial: No deep vein thrombosis noted.   • Left Peroneal: No deep vein thrombosis noted.   • Left Gastrocnemius Soleal: No deep vein thrombosis noted.   • Left Greater Saphenous Above Knee: No superficial thrombophlebitis noted.   • Left Greater Saphenous Below Knee: No superficial thrombophlebitis noted.   Electronically signed by Nicolas Cotton MD on 10/4/19 at 1933 EDT    5/29/2020 bilateral screening mammogram negative.    At baseline EKG was done in the office today which shows normal sinus rhythm with a rate of 75.  QT/QTc 368/411.        Assessment/Plan      1.  T1c N1 jese N0 ER/NC positive HER-2 negative left breast cancer post  lumpectomy and radiation  · Arimidex started in 3/18 discontinued by the patient in 7/18 due to visual blurring which resolved.    · Aromasin started 11/2018. 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  She also has had weight gain. Aromasin discontinued.  · Patient seen early April 2019 and follow-up.  Patient agreeable to switch therapy to Femara 2.5 mg daily.  · Patient ultimately discontinuing Femara due to repeated arthralgias and blurred vision.  · The patient was transitioned to tamoxifen but ultimately discontinued this approximately 2 months later per her report today due to blurry vision.   · Recommend patient start trial toremifene.  She is hoping to be able to started early next week.  · Patient decided not to take toremifene and stop all hormonal blockade in 5/20    2.  Family history of breast cancer in a niece at a young age and father with cancer at age 51?  Genetic testing    4.  History of oral cancer in 1990    5.  Anemia, recently worked up per Dr. Ivan, internal medicine.   · Patient had EGD and colonoscopy per Dr. Gallego 1 year ago with no concerning findings.  Because of her history of GI ulcers however she was started on AcipHex per Dr. Ivan.  · 3/21/2019 showed iron saturation of 8% ferritin of 18. Patient started on trial of oral iron but was intolerant due to GI upset.  · Patient receiving 2 doses of IV Injectafer in May 2019.  Iron stores are now replete with iron saturation of 29% and hemoglobin having now normalized to 13.  · Rectal bleeding in 4/20 CAT scan shows duodenitis resumed ulcer medications    6.  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  Return in 6 months for follow-up no treatment plan adjuvantly

## 2020-09-08 RX ORDER — NIFEDIPINE 30 MG/1
TABLET, FILM COATED, EXTENDED RELEASE ORAL
Qty: 30 TABLET | Refills: 1 | Status: SHIPPED | OUTPATIENT
Start: 2020-09-08 | End: 2020-10-07

## 2020-10-07 RX ORDER — NIFEDIPINE 30 MG/1
TABLET, FILM COATED, EXTENDED RELEASE ORAL
Qty: 30 TABLET | Refills: 1 | Status: SHIPPED | OUTPATIENT
Start: 2020-10-07 | End: 2020-12-09

## 2020-12-09 RX ORDER — NIFEDIPINE 30 MG/1
TABLET, FILM COATED, EXTENDED RELEASE ORAL
Qty: 30 TABLET | Refills: 1 | Status: SHIPPED | OUTPATIENT
Start: 2020-12-09 | End: 2021-01-07

## 2021-01-06 ENCOUNTER — OFFICE (OUTPATIENT)
Dept: URBAN - METROPOLITAN AREA CLINIC 64 | Facility: CLINIC | Age: 86
End: 2021-01-06
Payer: MEDICARE

## 2021-01-06 VITALS — HEIGHT: 61 IN

## 2021-01-06 DIAGNOSIS — K58.0 IRRITABLE BOWEL SYNDROME WITH DIARRHEA: ICD-10-CM

## 2021-01-06 DIAGNOSIS — R15.9 FULL INCONTINENCE OF FECES: ICD-10-CM

## 2021-01-06 DIAGNOSIS — K21.9 GASTRO-ESOPHAGEAL REFLUX DISEASE WITHOUT ESOPHAGITIS: ICD-10-CM

## 2021-01-06 PROCEDURE — 99212 OFFICE O/P EST SF 10 MIN: CPT | Mod: 95 | Performed by: INTERNAL MEDICINE

## 2021-01-07 RX ORDER — NIFEDIPINE 30 MG/1
TABLET, FILM COATED, EXTENDED RELEASE ORAL
Qty: 30 TABLET | Refills: 1 | Status: SHIPPED | OUTPATIENT
Start: 2021-01-07 | End: 2021-01-29

## 2021-01-29 RX ORDER — NIFEDIPINE 30 MG/1
TABLET, FILM COATED, EXTENDED RELEASE ORAL
Qty: 90 TABLET | Refills: 0 | Status: SHIPPED | OUTPATIENT
Start: 2021-01-29 | End: 2021-05-10

## 2021-01-29 RX ORDER — ESCITALOPRAM OXALATE 10 MG/1
TABLET ORAL
Qty: 90 TABLET | Refills: 0 | Status: SHIPPED | OUTPATIENT
Start: 2021-01-29 | End: 2021-04-22

## 2021-02-26 ENCOUNTER — APPOINTMENT (OUTPATIENT)
Dept: LAB | Facility: HOSPITAL | Age: 86
End: 2021-02-26

## 2021-03-10 ENCOUNTER — TELEPHONE (OUTPATIENT)
Dept: ONCOLOGY | Facility: CLINIC | Age: 86
End: 2021-03-10

## 2021-03-10 NOTE — TELEPHONE ENCOUNTER
Pt's daughter, calling.    She received letter in [facility] saying that her father, who has lung cancer, can get a COVID-19 vaccine at [facility].    She is wondering if her mother can also get the vaccine at the same time.    Please call her at- [phone]

## 2021-03-10 NOTE — TELEPHONE ENCOUNTER
Pt's daughter called asking it we could schedule her mother the same time as her father's vaccine at [facility].  Explained that we aren't able to schedule patients.  Encouraged her to check [employer] to schedule her mother's apt.  Daughter v/u.

## 2021-03-12 RX ORDER — LEVOTHYROXINE SODIUM 0.05 MG/1
TABLET ORAL
COMMUNITY
Start: 2021-01-28 | End: 2021-07-22 | Stop reason: SDUPTHER

## 2021-03-12 RX ORDER — RABEPRAZOLE SODIUM 20 MG/1
TABLET, DELAYED RELEASE ORAL
COMMUNITY
Start: 2021-02-24 | End: 2021-05-26 | Stop reason: SDUPTHER

## 2021-03-12 RX ORDER — DICYCLOMINE HCL 20 MG
TABLET ORAL
Status: ON HOLD | COMMUNITY
Start: 2020-12-03 | End: 2021-08-01

## 2021-03-15 ENCOUNTER — IMMUNIZATION (OUTPATIENT)
Dept: VACCINE CLINIC | Facility: HOSPITAL | Age: 86
End: 2021-03-15

## 2021-03-15 ENCOUNTER — OFFICE VISIT (OUTPATIENT)
Dept: INTERNAL MEDICINE | Age: 86
End: 2021-03-15

## 2021-03-15 VITALS
TEMPERATURE: 98 F | WEIGHT: 150.8 LBS | HEIGHT: 61 IN | OXYGEN SATURATION: 96 % | DIASTOLIC BLOOD PRESSURE: 50 MMHG | SYSTOLIC BLOOD PRESSURE: 128 MMHG | HEART RATE: 68 BPM | BODY MASS INDEX: 28.47 KG/M2

## 2021-03-15 DIAGNOSIS — I10 ESSENTIAL HYPERTENSION: ICD-10-CM

## 2021-03-15 DIAGNOSIS — Z76.89 ENCOUNTER TO ESTABLISH CARE WITH NEW DOCTOR: ICD-10-CM

## 2021-03-15 DIAGNOSIS — E78.5 HYPERLIPIDEMIA, UNSPECIFIED HYPERLIPIDEMIA TYPE: ICD-10-CM

## 2021-03-15 DIAGNOSIS — E03.9 HYPOTHYROIDISM, UNSPECIFIED TYPE: Primary | ICD-10-CM

## 2021-03-15 DIAGNOSIS — R73.01 ELEVATED FASTING GLUCOSE: ICD-10-CM

## 2021-03-15 PROBLEM — E11.9 DM2 (DIABETES MELLITUS, TYPE 2): Status: RESOLVED | Noted: 2017-10-16 | Resolved: 2021-03-15

## 2021-03-15 PROCEDURE — 91300 HC SARSCOV02 VAC 30MCG/0.3ML IM: CPT | Performed by: INTERNAL MEDICINE

## 2021-03-15 PROCEDURE — 0001A: CPT | Performed by: INTERNAL MEDICINE

## 2021-03-15 PROCEDURE — 99214 OFFICE O/P EST MOD 30 MIN: CPT | Performed by: NURSE PRACTITIONER

## 2021-03-15 RX ORDER — ASPIRIN 81 MG/1
81 TABLET, CHEWABLE ORAL DAILY
COMMUNITY
End: 2021-08-06 | Stop reason: HOSPADM

## 2021-03-15 NOTE — PROGRESS NOTES
I N T E R N A L  M E D I C I N E  SARAH VELÁSQUEZ APRSIDDHARTHA      ENCOUNTER DATE:  03/15/2021    Geeta Butt / 85 y.o. / female      CHIEF COMPLAINT / REASON FOR OFFICE VISIT     Establish Care      ASSESSMENT & PLAN     1. Hypothyroidism, unspecified type  - Continue levothyroxine 50mcg daily   - CBC & Differential  - Comprehensive Metabolic Panel  - Lipid Panel With / Chol / HDL Ratio  - TSH+Free T4    2. Essential hypertension  - Continue amlodipine 5 mg twice daily, nifedipine 30mg daily and valsartan 160mg daily   - CBC & Differential  - Comprehensive Metabolic Panel  - Lipid Panel With / Chol / HDL Ratio  - TSH+Free T4  - Urinalysis With Microscopic If Indicated (No Culture) - Urine, Clean Catch    3. Hyperlipidemia, unspecified hyperlipidemia type  - Continue diet modification with inability to tolerate statin therapy   - CBC & Differential  - Comprehensive Metabolic Panel  - Lipid Panel With / Chol / HDL Ratio  - TSH+Free T4    4. Elevated fasting glucose  - decrease carbohydrate, sugars, fats  - CBC & Differential  - Comprehensive Metabolic Panel  - Hemoglobin A1c  - Lipid Panel With / Chol / HDL Ratio    5. Establish care with new provider in office    6. Depression  - Continue lexapro 10mg daily     7. GERD  - Continue carafate as needed, rabeprazole 20mg daily    Orders Placed This Encounter   Procedures   • Comprehensive Metabolic Panel   • Hemoglobin A1c   • Lipid Panel With / Chol / HDL Ratio   • TSH+Free T4   • Urinalysis With Microscopic If Indicated (No Culture) - Urine, Clean Catch   • CBC & Differential     No orders of the defined types were placed in this encounter.      SUMMARY/DISCUSSION  • Lab updates today, follow-up in 6 months for annual medicare wellness and chronic medical follow-up pending results      Next Appointment with me: Visit date not found    Return in about 6 months (around 9/15/2021) for Medicare Wellness next scheduled follow-up .      VITAL SIGNS     Visit Vitals  /50  "  Pulse 68   Temp 98 °F (36.7 °C) (Temporal)   Ht 154.9 cm (60.98\")   Wt 68.4 kg (150 lb 12.8 oz)   SpO2 96%   BMI 28.51 kg/m²         Wt Readings from Last 3 Encounters:   03/15/21 68.4 kg (150 lb 12.8 oz)   08/21/20 72.8 kg (160 lb 8 oz)   07/29/20 70.8 kg (156 lb)     Body mass index is 28.51 kg/m².      MEDICATIONS AT THE TIME OF OFFICE VISIT     Current Outpatient Medications on File Prior to Visit   Medication Sig   • amLODIPine (NORVASC) 5 MG tablet TAKE 2 TABLETS BY MOUTH EVERY DAY   • aspirin 81 MG chewable tablet Chew 81 mg Daily.   • Cholecalciferol (VITAMIN D3) 50 MCG (2000 UT) capsule TAKE 1 CAPSULE BY MOUTH DAILY.   • dicyclomine (BENTYL) 20 MG tablet TAKE 1 CAPSULE BY MOUTH TWICE A DAY BEFORE BREAKFAST AND DINNER FOR 30 DAYS   • escitalopram (LEXAPRO) 10 MG tablet TAKE 1 TABLET BY MOUTH EVERY DAY   • fluorometholone (FML) 0.1 % ophthalmic suspension Administer 1 drop into the left eye Daily.   • glucose blood (FREESTYLE TEST STRIPS) test strip TEST BLOOD SUGARS ONCE DAILY   • Lancets (FREESTYLE) lancets Test glucose once daily   • NIFEdipine CC (ADALAT CC) 30 MG 24 hr tablet TAKE 1 TABLET BY MOUTH EVERY DAY   • Omega-3 Fatty Acids (FISH OIL PO) Take  by mouth Daily As Needed.   • Probiotic Product (PROBIOTIC DAILY PO) Take 1 capsule by mouth Daily.   • RABEprazole (ACIPHEX) 20 MG EC tablet    • sodium chloride (CAYLA 128) 5 % ophthalmic solution Administer 1 drop to the right eye As Needed.   • sucralfate (CARAFATE) 1 GM/10ML suspension TAKE 10 ML BY MOUTH 3 TIMES A DAY FOR 30 MINUTES BEFORE MEALS   • valsartan (DIOVAN) 160 MG tablet Take 1 tablet by mouth Daily.   • [DISCONTINUED] dicyclomine (BENTYL) 10 MG capsule Take 10 mg by mouth Daily Before Lunch.   • [DISCONTINUED] ketotifen (ZADITOR) 0.025 % ophthalmic solution Administer 1 drop into the left eye 2 (Two) Times a Day.   • [DISCONTINUED] levothyroxine (SYNTHROID, LEVOTHROID) 75 MCG tablet Take 1 tablet by mouth Daily.   • [DISCONTINUED] NON " FORMULARY Administer 1 drop to both eyes 3 (Three) Times a Day. THERA TEARS   • levothyroxine (SYNTHROID, LEVOTHROID) 50 MCG tablet TAKE 1 TABLET BY MOUTH EVERY DAY IN THE MORNING ON EMPTY STOMACH   • [DISCONTINUED] cefuroxime (Ceftin) 250 MG tablet X 14 d   • [DISCONTINUED] colestipol (COLESTID) 1 g tablet Take 1 g by mouth 2 (Two) Times a Day.   • [DISCONTINUED] omeprazole (priLOSEC) 40 MG capsule    • [DISCONTINUED] tiZANidine (ZANAFLEX) 2 MG tablet TAKE 1 TABLET BY MOUTH 2 TIMES A DAY AS NEEDED FOR MUSCLE SPASMS.   • [DISCONTINUED] toremifene citrate (FARESTON) 60 MG tablet tablet Take 1 tablet by mouth Daily.   • [DISCONTINUED] traMADol (ULTRAM) 50 MG tablet Take 1 tablet by mouth Every 6 (Six) Hours As Needed for Moderate Pain .     No current facility-administered medications on file prior to visit.         HISTORY OF PRESENT ILLNESS     Patient presents to establish care with new provider and office. Previous patient of Dr. Ivan.     HTN: Well controlled with current regimen. Denies any chest pain, shortness of air, headache, visual disturbances, lower extremity leg swelling, or heart palpitations.     Fasting blood glucose: prediabetic range. Last HA1c in 2018. Update today.     HLD: inability to tolerate statins. Has attempted 4 medications. Vascular carotid artery studies yearly. 50-60% in internal carotid artery. Takes fish oil daily.     Hypothyroidism: Well controlled on current dose of levothyroxine.     Pain Management: steroidal injection Q3 months for right knee arthritis.  is in hospice and does not want to proceed with surgery at this time. Dr. Brandt with ortho followed prior to pain management.     Oncology: Dr. Valenzuela, last breast cancer treatment three months prior. Performed dexa. Osteopenia, especially in hip. Vitamin D 2000IU daily. No prescription medication at this time.     Depression: stable on lexapro 10mg daily. No suicidal thoughts or self-harm.     Heartburn/GERD:  sucralfate as needed, rabeprazole daily. No issues.     Corneal transplant. Lalitha and FML ophthalmic drops daily.         REVIEW OF SYSTEMS     Constitutional neg except per HPI   Resp neg  CV neg  Musc chronic right knee pain     PHYSICAL EXAMINATION     Physical Exam  Constitutional  No distress  Cardiovascular Rate  normal . Rhythm: regular . Heart sounds:  normal  Pulmonary/Chest  Effort normal. Breath sounds:  normal  Psychiatric  Alert. Judgment and thought content normal. Mood normal     REVIEWED DATA     Labs:   Lab Results   Component Value Date    GLUCOSE 129 (H) 08/21/2020    BUN 17 08/21/2020    CREATININE 0.96 08/21/2020    EGFRIFNONA 55 (L) 08/21/2020    EGFRIFAFRI 62 01/14/2017    BCR 17.7 08/21/2020    K 4.4 08/21/2020    CO2 23.8 08/21/2020    CALCIUM 9.8 08/21/2020    PROTENTOTREF 7.0 06/29/2018    ALBUMIN 4.30 08/21/2020    LABIL2 1.3 06/29/2018    AST 17 08/21/2020    ALT 14 08/21/2020     Lab Results   Component Value Date    CHOL 241 (H) 02/24/2020    TRIG 122 02/24/2020    HDL 70 (H) 02/24/2020     (H) 02/24/2020     Lab Results   Component Value Date    HGBA1C 5.88 (H) 07/10/2018     Lab Results   Component Value Date    TSH 1.570 02/24/2020    THYROIDAB <6 09/30/2015       Imaging:           Medical Tests:             Summary of old records / correspondence / consultant report:           Request outside records:           *Examiner was wearing medical surgical mask, face shield and exam gloves during the entire duration of the visit. Patient was masked the entire time.   Minimum social distance of 6 ft maintained entire visit except if physical contact was necessary as documented.     **Dragon Disclaimer:   Much of this encounter note is an electronic transcription/translation of spoken language to printed text. The electronic translation of spoken language may permit erroneous, or at times, nonsensical words or phrases to be inadvertently transcribed. Although I have reviewed the note  for such errors, some may still exist.

## 2021-03-16 LAB
ALBUMIN SERPL-MCNC: 4.5 G/DL (ref 3.6–4.6)
ALBUMIN/GLOB SERPL: 1.7 {RATIO} (ref 1.2–2.2)
ALP SERPL-CCNC: 57 IU/L (ref 39–117)
ALT SERPL-CCNC: 16 IU/L (ref 0–32)
APPEARANCE UR: CLEAR
AST SERPL-CCNC: 23 IU/L (ref 0–40)
BASOPHILS # BLD AUTO: 0.1 X10E3/UL (ref 0–0.2)
BASOPHILS NFR BLD AUTO: 1 %
BILIRUB SERPL-MCNC: 0.2 MG/DL (ref 0–1.2)
BILIRUB UR QL STRIP: NEGATIVE
BUN SERPL-MCNC: 15 MG/DL (ref 8–27)
BUN/CREAT SERPL: 15 (ref 12–28)
CALCIUM SERPL-MCNC: 9.6 MG/DL (ref 8.7–10.3)
CHLORIDE SERPL-SCNC: 97 MMOL/L (ref 96–106)
CHOLEST SERPL-MCNC: 236 MG/DL (ref 100–199)
CHOLEST/HDLC SERPL: 3 RATIO (ref 0–4.4)
CO2 SERPL-SCNC: 24 MMOL/L (ref 20–29)
COLOR UR: YELLOW
CREAT SERPL-MCNC: 0.98 MG/DL (ref 0.57–1)
EOSINOPHIL # BLD AUTO: 0.2 X10E3/UL (ref 0–0.4)
EOSINOPHIL NFR BLD AUTO: 2 %
ERYTHROCYTE [DISTWIDTH] IN BLOOD BY AUTOMATED COUNT: 12.3 % (ref 11.7–15.4)
GLOBULIN SER CALC-MCNC: 2.6 G/DL (ref 1.5–4.5)
GLUCOSE SERPL-MCNC: 81 MG/DL (ref 65–99)
GLUCOSE UR QL: NEGATIVE
HBA1C MFR BLD: 5.6 % (ref 4.8–5.6)
HCT VFR BLD AUTO: 36.7 % (ref 34–46.6)
HDLC SERPL-MCNC: 80 MG/DL
HGB BLD-MCNC: 12.4 G/DL (ref 11.1–15.9)
HGB UR QL STRIP: NEGATIVE
IMM GRANULOCYTES # BLD AUTO: 0 X10E3/UL (ref 0–0.1)
IMM GRANULOCYTES NFR BLD AUTO: 1 %
KETONES UR QL STRIP: NEGATIVE
LDLC SERPL CALC-MCNC: 138 MG/DL (ref 0–99)
LEUKOCYTE ESTERASE UR QL STRIP: NEGATIVE
LYMPHOCYTES # BLD AUTO: 1.8 X10E3/UL (ref 0.7–3.1)
LYMPHOCYTES NFR BLD AUTO: 22 %
MCH RBC QN AUTO: 30.4 PG (ref 26.6–33)
MCHC RBC AUTO-ENTMCNC: 33.8 G/DL (ref 31.5–35.7)
MCV RBC AUTO: 90 FL (ref 79–97)
MICRO URNS: NORMAL
MONOCYTES # BLD AUTO: 0.7 X10E3/UL (ref 0.1–0.9)
MONOCYTES NFR BLD AUTO: 9 %
NEUTROPHILS # BLD AUTO: 5.5 X10E3/UL (ref 1.4–7)
NEUTROPHILS NFR BLD AUTO: 65 %
NITRITE UR QL STRIP: NEGATIVE
PH UR STRIP: 6 [PH] (ref 5–7.5)
PLATELET # BLD AUTO: 354 X10E3/UL (ref 150–450)
POTASSIUM SERPL-SCNC: 5 MMOL/L (ref 3.5–5.2)
PROT SERPL-MCNC: 7.1 G/DL (ref 6–8.5)
PROT UR QL STRIP: NEGATIVE
RBC # BLD AUTO: 4.08 X10E6/UL (ref 3.77–5.28)
SODIUM SERPL-SCNC: 135 MMOL/L (ref 134–144)
SP GR UR: 1.01 (ref 1–1.03)
T4 FREE SERPL-MCNC: 1.11 NG/DL (ref 0.82–1.77)
TRIGL SERPL-MCNC: 103 MG/DL (ref 0–149)
TSH SERPL DL<=0.005 MIU/L-ACNC: 1.21 UIU/ML (ref 0.45–4.5)
UROBILINOGEN UR STRIP-MCNC: 0.2 MG/DL (ref 0.2–1)
VLDLC SERPL CALC-MCNC: 18 MG/DL (ref 5–40)
WBC # BLD AUTO: 8.4 X10E3/UL (ref 3.4–10.8)

## 2021-03-26 ENCOUNTER — OFFICE VISIT (OUTPATIENT)
Dept: ONCOLOGY | Facility: CLINIC | Age: 86
End: 2021-03-26

## 2021-03-26 ENCOUNTER — LAB (OUTPATIENT)
Dept: LAB | Facility: HOSPITAL | Age: 86
End: 2021-03-26

## 2021-03-26 VITALS
SYSTOLIC BLOOD PRESSURE: 129 MMHG | TEMPERATURE: 97.8 F | WEIGHT: 151.2 LBS | RESPIRATION RATE: 17 BRPM | HEART RATE: 71 BPM | BODY MASS INDEX: 28.55 KG/M2 | DIASTOLIC BLOOD PRESSURE: 63 MMHG | OXYGEN SATURATION: 97 % | HEIGHT: 61 IN

## 2021-03-26 DIAGNOSIS — C50.312 MALIGNANT NEOPLASM OF LOWER-INNER QUADRANT OF LEFT BREAST IN FEMALE, ESTROGEN RECEPTOR POSITIVE (HCC): Primary | ICD-10-CM

## 2021-03-26 DIAGNOSIS — Z17.0 MALIGNANT NEOPLASM OF LOWER-INNER QUADRANT OF LEFT BREAST IN FEMALE, ESTROGEN RECEPTOR POSITIVE (HCC): ICD-10-CM

## 2021-03-26 DIAGNOSIS — Z17.0 MALIGNANT NEOPLASM OF LOWER-INNER QUADRANT OF LEFT BREAST IN FEMALE, ESTROGEN RECEPTOR POSITIVE (HCC): Primary | ICD-10-CM

## 2021-03-26 DIAGNOSIS — C50.312 MALIGNANT NEOPLASM OF LOWER-INNER QUADRANT OF LEFT BREAST IN FEMALE, ESTROGEN RECEPTOR POSITIVE (HCC): ICD-10-CM

## 2021-03-26 DIAGNOSIS — Z12.31 ENCOUNTER FOR SCREENING MAMMOGRAM FOR MALIGNANT NEOPLASM OF BREAST: ICD-10-CM

## 2021-03-26 LAB
ALBUMIN SERPL-MCNC: 4.4 G/DL (ref 3.5–5.2)
ALBUMIN/GLOB SERPL: 1.6 G/DL (ref 1.1–2.4)
ALP SERPL-CCNC: 54 U/L (ref 38–116)
ALT SERPL W P-5'-P-CCNC: 15 U/L (ref 0–33)
ANION GAP SERPL CALCULATED.3IONS-SCNC: 8.8 MMOL/L (ref 5–15)
AST SERPL-CCNC: 16 U/L (ref 0–32)
BASOPHILS # BLD AUTO: 0.11 10*3/MM3 (ref 0–0.2)
BASOPHILS NFR BLD AUTO: 1.1 % (ref 0–1.5)
BILIRUB SERPL-MCNC: 0.2 MG/DL (ref 0.2–1.2)
BUN SERPL-MCNC: 16 MG/DL (ref 6–20)
BUN/CREAT SERPL: 15.4 (ref 7.3–30)
CALCIUM SPEC-SCNC: 9.9 MG/DL (ref 8.5–10.2)
CHLORIDE SERPL-SCNC: 98 MMOL/L (ref 98–107)
CO2 SERPL-SCNC: 28.2 MMOL/L (ref 22–29)
CREAT SERPL-MCNC: 1.04 MG/DL (ref 0.6–1.1)
DEPRECATED RDW RBC AUTO: 45.1 FL (ref 37–54)
EOSINOPHIL # BLD AUTO: 0.2 10*3/MM3 (ref 0–0.4)
EOSINOPHIL NFR BLD AUTO: 1.9 % (ref 0.3–6.2)
ERYTHROCYTE [DISTWIDTH] IN BLOOD BY AUTOMATED COUNT: 13.2 % (ref 12.3–15.4)
GFR SERPL CREATININE-BSD FRML MDRD: 50 ML/MIN/1.73
GLOBULIN UR ELPH-MCNC: 2.8 GM/DL (ref 1.8–3.5)
GLUCOSE SERPL-MCNC: 86 MG/DL (ref 74–124)
HCT VFR BLD AUTO: 36.5 % (ref 34–46.6)
HGB BLD-MCNC: 12 G/DL (ref 12–15.9)
IMM GRANULOCYTES # BLD AUTO: 0.04 10*3/MM3 (ref 0–0.05)
IMM GRANULOCYTES NFR BLD AUTO: 0.4 % (ref 0–0.5)
LYMPHOCYTES # BLD AUTO: 1.76 10*3/MM3 (ref 0.7–3.1)
LYMPHOCYTES NFR BLD AUTO: 17 % (ref 19.6–45.3)
MCH RBC QN AUTO: 30.8 PG (ref 26.6–33)
MCHC RBC AUTO-ENTMCNC: 32.9 G/DL (ref 31.5–35.7)
MCV RBC AUTO: 93.6 FL (ref 79–97)
MONOCYTES # BLD AUTO: 0.79 10*3/MM3 (ref 0.1–0.9)
MONOCYTES NFR BLD AUTO: 7.6 % (ref 5–12)
NEUTROPHILS NFR BLD AUTO: 7.43 10*3/MM3 (ref 1.7–7)
NEUTROPHILS NFR BLD AUTO: 72 % (ref 42.7–76)
NRBC BLD AUTO-RTO: 0 /100 WBC (ref 0–0.2)
PLATELET # BLD AUTO: 315 10*3/MM3 (ref 140–450)
PMV BLD AUTO: 9 FL (ref 6–12)
POTASSIUM SERPL-SCNC: 4.6 MMOL/L (ref 3.5–4.7)
PROT SERPL-MCNC: 7.2 G/DL (ref 6.3–8)
RBC # BLD AUTO: 3.9 10*6/MM3 (ref 3.77–5.28)
SODIUM SERPL-SCNC: 135 MMOL/L (ref 134–145)
WBC # BLD AUTO: 10.33 10*3/MM3 (ref 3.4–10.8)

## 2021-03-26 PROCEDURE — 85025 COMPLETE CBC W/AUTO DIFF WBC: CPT

## 2021-03-26 PROCEDURE — 80053 COMPREHEN METABOLIC PANEL: CPT

## 2021-03-26 PROCEDURE — 36415 COLL VENOUS BLD VENIPUNCTURE: CPT

## 2021-03-26 PROCEDURE — 99213 OFFICE O/P EST LOW 20 MIN: CPT | Performed by: INTERNAL MEDICINE

## 2021-03-26 NOTE — PROGRESS NOTES
Subjective     REASON FOR CONSULTATION:   1. Left breast cancer T1wO2txg ER/[place]+ her2 neg post lumpectomy/SLN  3/18 and whole breast radiation completed May 2018  2.  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  Fatigue out of proportion to anemia  4.  Arimidex started in 3/18 with bone density showing normal density in the spine and osteopenia in the hips-this was self stopped due to vision changes.  5.  Aromasin initiated 10/20/18 and discontinued 1/20/19 secondary to vision changes.  6.  Tamoxifen initiated 8/20/2019 and discontinued 10/20/2019 secondary to vision changes                             REQUESTING PHYSICIAN:  Myles Soliman M.D.    History of Present Illness patient is an 85-year-old female with the above-mentioned history who is here today for reevaluation    She is off all medications and doing well overall.     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    • Chronic kidney disease    • CKD (chronic kidney disease)     PT STATES STAGE 3   • Depression    • Diverticular disease    • Gastric ulcer     AND DUODENAL   • GERD (gastroesophageal reflux disease)    • Hearing loss     BILAT AIDES   • History of colon polyps    • History of depression    • History of GI bleed    • Hypertension    • Hypothyroidism    • Peptic ulceration    • PONV (postoperative nausea and vomiting)    • Poor vision     RIGHT EYE, HAS PERIPHERAL VISION    • Stroke (CMS/HCC) 2000     mild weakness on left,  partial sight loss on right         Past Surgical History:   Procedure Laterality Date   • ABDOMINAL SURGERY     • APPENDECTOMY     • BLADDER REPAIR      AND RECTOCELE   • BREAST BIOPSY     • BREAST CYST ASPIRATION     • BREAST LUMPECTOMY WITH SENTINEL NODE BIOPSY Left 3/19/2018    Procedure: BREAST LUMPECTOMY WITH SENTINEL NODE BIOPSY AND NEEDLE LOCALIZATION;  Surgeon: Myles Soliman MD;  Location:  [facility];  Service: General   • CHOLECYSTECTOMY     • COLONOSCOPY  2013   • COLONOSCOPY N/A 2/16/2018    Procedure: COLONOSCOPY into cecum and T.I. with biopsies;  Surgeon: Augustine Gallego MD;  [facility];  Service:    • ENDOSCOPY N/A 10/17/2017    Procedure: ESOPHAGOGASTRODUODENOSCOPY WITH COLD BIOPSIES;  Surgeon: Augustine Gallego MD;  [facility];  Service:    • ENDOSCOPY N/A 2/16/2018    Procedure: ESOPHAGOGASTRODUODENOSCOPY with biopsies and #54 Ar[address]ello DILATATION;  Surgeon: Augustine Gallego MD;  [facility];  Service:    • ENDOSCOPY N/A 3/19/2020    Procedure: ESOPHAGOGASTRODUODENOSCOPY with biopsies;  Surgeon: Augustine Gallego MD;  [facility];  Service: Gastroenterology;  Laterality: N/A;  PRE- MELENA, EPIGASTRIC PAIN  POST- eerosive gastritis, duodenal ulcer, hiatal hernia   • ENDOSCOPY N/A 7/29/2020    Procedure: ESOPHAGOGASTRODUODENOSCOPY WITH DUODENAL ASPIRATE AND COLD BIOPSIES;  Surgeon: Augustine Gallego MD;  Location: [facility] ENDOSCOPY;  Service: Gastroenterology;  Laterality: N/A;  PRE: HX ULCER DISEASE  POST: GASTRITIS, HEALED ULCER   • EYE SURGERY Left 2014    3-4 years, cornea replacement    • HYSTERECTOMY     • MOUTH SURGERY  2000    UNDER TONGUE AND LEFT FLOOR OF MOUTH FOR CA   • PARATHYROIDECTOMY      PER PT   • SIGMOIDOSCOPY N/A 7/29/2020    Procedure: FLEXIBLE SIGMOIDOSCOPY TO DESCENDING COLON WITH COLD BIOPSIES;  Surgeon: Augustine Gallego MD;  Location: [facility] ENDOSCOPY;  Service: Gastroenterology;  Laterality: N/A;  PRE: RECTAL  BLEEDING  POST: DIVERTICULOSIS, HEMORRHOIDS, MINIMAL PROCTITIS   • SINUS SURGERY     • SKIN BIOPSY     • THYROID SURGERY     • VARICOSE VEIN SURGERY      Oncological history  patient is an 82-year-old female with recent problems with abdominal pain and diarrhea and weight loss which is being evaluated by GI and finally found to have multiple ulcers in the duodenum and small bowel finally responding to treatment.  In the middle of this she went for routine mammogram a couple of months later was found to have an abnormality in the left breast at 7 o'clock position measuring about 12 mm in size that was not present last year.  The patient had previously had a left breast biopsy in 2015 with benign findings.   There was no palpable mass and biopsy was done and this revealed a grade 1 infiltrating ductal carcinoma at least 1 cm in size ER 78%/MI 17% positive HER-2 negative.  The patient was referred to Dr. Soliman who performed a lumpectomy and sentinel node biopsy with the findings of a 1.2 cm tumor grade 2 with lymphovascular invasion and clear margins with associated DCIS.  One of 2 sentinel nodes showed a micrometastasis although the pathologist said this was almost too small to be considered a micrometastasis.  Postoperatively she has done well and thankfully her GI complains of much improved with Carafate and Protonix and her diarrhea finally resolved.    She's not had a bone density in quite a while.  She is  5 para 5  menarche at age 15 and menopause in her mid 50s although she had a hysterectomy at 30 for an ulcerated uterus.  First childbirth was at age 20 she breast-fed all 5 children.  She took hormonal therapy for at least 15 years after menopause and stopped about 10 years ago   .  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    Her father  at 52 of stomach cancer mother had thyroid cancer at age 50 her brother's daughter had breast  cancer age 40 and she had a paternal aunt with leukemia is no other breast cancer or ovarian cancer uterine cancer or pancreatic cancer in the family . I did ask him to check with her niece to see if she has had genetic testing - they do not know much at all about the maternal family .    She will call for the results of the DEXA scan and if adequate we will start Arimidex which I have  already prescribed to her and see her back in 3 months to assess her tolerance.  She is in very good health and I explained to her that adjuvant therapy decreases the risk of recurrence of her cancer which might be in the 15-20% range probably down to the 10% range and if she has significant side effects we will be inclined to stop treatment.  Obviously if her GI symptoms recur with Arimidex we will have to try another medications.  We talked about the joint pains and hot flashes which are unlikely at her age and she was agreeable to a trial of Arimidex.  Radiation has been planned    1/19  In 10/2018 she was changed to Aromasin as she had issues previously with Arimidex with visual blurring.  The patient was then seen 1/25/19 with reports of blurred vision once again.    At that point the patient's symptoms have essentially resolved.  We therefore elected to switch her to Femara.    Unfortunately, the patient ultimately experienced similar symptoms with Femara because of muscle cramps and blurred vision.      I discussed with her today that Dr. Valenzuela would like the patient to consider taking tamoxifen as yet another potential medication to benefit her in light of history of hormone positive breast cancer.  It is noted that the patient takes Prozac and has been on this for many many years.  There is a potential interaction and I plan to discuss this with Dr. Valenzuela further.  If okayed per Dr. Valenzuela, we would plan then to start the patient on this as soon as next week.        Current Outpatient Medications on File Prior to Visit    Medication Sig Dispense Refill   • amLODIPine (NORVASC) 5 MG tablet TAKE 2 TABLETS BY MOUTH EVERY  tablet 0   • aspirin 81 MG chewable tablet Chew 81 mg Daily.     • Cholecalciferol (VITAMIN D3) 50 MCG (2000 UT) capsule TAKE 1 CAPSULE BY MOUTH DAILY. 100 capsule 1   • dicyclomine (BENTYL) 20 MG tablet TAKE 1 CAPSULE BY MOUTH TWICE A DAY BEFORE BREAKFAST AND DINNER FOR 30 DAYS     • escitalopram (LEXAPRO) 10 MG tablet TAKE 1 TABLET BY MOUTH EVERY DAY 90 tablet 0   • fluorometholone (FML) 0.1 % ophthalmic suspension Administer 1 drop into the left eye Daily.     • glucose blood (FREESTYLE TEST STRIPS) test strip TEST BLOOD SUGARS ONCE DAILY 50 each 12   • Lancets (FREESTYLE) lancets Test glucose once daily 100 each 3   • levothyroxine (SYNTHROID, LEVOTHROID) 50 MCG tablet TAKE 1 TABLET BY MOUTH EVERY DAY IN THE MORNING ON EMPTY STOMACH     • NIFEdipine CC (ADALAT CC) 30 MG 24 hr tablet TAKE 1 TABLET BY MOUTH EVERY DAY 90 tablet 0   • Omega-3 Fatty Acids (FISH OIL PO) Take  by mouth Daily As Needed.     • Probiotic Product (PROBIOTIC DAILY PO) Take 1 capsule by mouth Daily.     • RABEprazole (ACIPHEX) 20 MG EC tablet      • sodium chloride (CAYLA 128) 5 % ophthalmic solution Administer 1 drop to the right eye As Needed.     • sucralfate (CARAFATE) 1 GM/10ML suspension TAKE 10 ML BY MOUTH 3 TIMES A DAY FOR 30 MINUTES BEFORE MEALS     • valsartan (DIOVAN) 160 MG tablet Take 1 tablet by mouth Daily. 30 tablet 11     No current facility-administered medications on file prior to visit.        ALLERGIES:    Allergies   Allergen Reactions   • Other Nausea Only     All mycins   • Sulfa Antibiotics Unknown - High Severity     LOST CONSCIOUSNESS AND BODY TURNED RED/ON FIRE   • Ciprofloxacin Unknown - High Severity     RED BLISTERS   • Iodine Unknown - High Severity     DECADES AGO, IODINE INJECTION CAUSED SKIN REDNESS AND BURNING   • Macrodantin [Nitrofurantoin Macrocrystal] Diarrhea and Nausea And Vomiting   •  "Nitrofurantoin Nausea And Vomiting   • Yeast-Related Products Unknown - High Severity     MOUTH SORES AND BLADDER INFECTION        Social History     Socioeconomic History   • Marital status:      Spouse name: Nicolas   • Number of children: Not on file   • Years of education: High school   • Highest education level: Not on file   Tobacco Use   • Smoking status: Former Smoker     Packs/day: 1.00     Years: 10.00     Pack years: 10.00     Types: Cigarettes     Start date:      Quit date:      Years since quittin.2   • Smokeless tobacco: Never Used   Vaping Use   • Vaping Use: Never used   Substance and Sexual Activity   • Alcohol use: No   • Drug use: No   • Sexual activity: Defer        Family History   Problem Relation Age of Onset   • Esophageal cancer Father    • Stomach cancer Father    • Heart disease Mother    • Thyroid cancer Mother    • Hypertension Mother    • Hypertension Sister    • Hypertension Brother    • Stroke Brother    • Heart disease Brother    • Diabetes Brother    • Leukemia Paternal Aunt    • Malig Hyperthermia Neg Hx         Review of Systems   Constitutional: Positive for fatigue. Negative for chills and fever.   Eyes: Negative for discharge and visual disturbance.   Respiratory: Negative for chest tightness and shortness of breath.    Cardiovascular: Negative for chest pain and leg swelling.   Gastrointestinal: Positive for diarrhea (see HPI). Negative for abdominal distention, abdominal pain and nausea.   Musculoskeletal: Positive for back pain and joint swelling.   Skin: Negative for pallor and rash.   Neurological: Negative for dizziness and numbness.   Psychiatric/Behavioral: Negative for behavioral problems and confusion.   6/3/2020 ROS unchanged from above except as updated.    Objective     Vitals:    21 1415   BP: 129/63   Pulse: 71   Resp: 17   Temp: 97.8 °F (36.6 °C)   TempSrc: Skin   SpO2: 97%   Weight: 68.6 kg (151 lb 3.2 oz)   Height: 154.9 cm (60.98\") "   PainSc:   7   PainLoc: Back  Comment: lumbar region       Current Status 3/26/2021   ECOG score 0       Physical Exam   Constitutional: She is oriented to person, place, and time. She appears well-developed. No distress.   Pulmonary/Chest: Effort normal. No respiratory distress.       Neurological: She is alert and oriented to person, place, and time.   Skin: Skin is warm and dry.   Breast exam is benign with no new masses  I have reexamined the patient and the results are consistent with the previously documented exam. Omi Valenzuela MD     RECENT LABS:    Results from last 7 days   Lab Units 03/26/21  1405   WBC 10*3/mm3 10.33   NEUTROS ABS 10*3/mm3 7.43*   HEMOGLOBIN g/dL 12.0   HEMATOCRIT % 36.5   PLATELETS 10*3/mm3 315             Lab Results   Component Value Date    IRON 98 07/03/2019    TIBC 340 07/03/2019    FERRITIN 710.30 (H) 07/03/2019       R 78% IN 17% Her 2 -neg Ki-67 7%  Final Diagnosis   1.  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                SEE SYNOPTIC REPORT (BELOW) FOR ADDITIONAL DETAILS.     2.  SENTINEL LYMPH NODE #1, LEFT AXILLA, EXCISION:                ONE LYMPH NODE, POSITIVE FOR METASTATIC CARCINOMA (MICROMETASTASIS) (SEE COMMENT).               NO EXTRANODAL EXTENSION IS IDENTIFIED.     COMMENT: Measuring the exact size of the lymph node metastasis is difficult. The lymph node demonstrates a few very small scattered foci of tumor cells, compatible with isolated tumor cells.  One slide demonstrates a single contiguous focus of tumor cells exceeding 0.2 mm; therefore, this is best considered a micrometastasis rather than isolated tumor cells.     3.  SENTINEL LYMPH NODE #2, LEFT AXILLA, EXCISION.               ONE LYMPH NODE, NEGATIVE FOR METASTATIC CARCINOMA.     SYNOPTIC REPORT (Based on [facility] cancer case summary, version January  2018):               Procedure: Excision (less than total mastectomy).               Specimen laterality: Left.                Tumor site: Not specified.                Tumor size: 1.2 x 1.1 x 1 cm.               Histologic type: Invasive carcinoma of no special type (ductal, not otherwise specified).                Histologic grade (Melony Histologic Score):                              Glandular (acinar)/tubular differentiation: Score 3.                            Nuclear pleomorphism: Score 2.                             Mitotic rate: Score 1.                            Overall grade: Grade 2 (Score 6 of 9).               Tumor focality: Single focus of invasive carcinoma.                Ductal carcinoma in situ (DCIS): Present.                            Architectural patterns: Solid.                             Nuclear grade: Grade II (intermediate).                            Necrosis: Present, central comedonecrosis.                Lobular carcinoma in situ (LCIS): No LCIS in specimen.               Margins:                            Invasive carcinoma margins: Uninvolved by invasive carcinoma.                                          Distance from closest margin: 3 mm (medial margin).                                          NOTE: Invasive carcinoma also extends to within 4 mm of superior margin and to within                                                 4 mm of lateral margin.  All additional margins are greater than 10 mm from invasive                                                 carcinoma.                              DCIS Margins: Uninvolved by DCIS.                                          Distance from closest margin: 3 mm (medial margin).                    Regional Lymph nodes: Involved by tumor cells.                             Number of lymph nodes with macrometastases: 0.                            Number of lymph nodes with micrometastases: 1.                            Size of largest  metastatic deposit: 0.4 mm.                            Extranodal extension: Not identified.                            Number of lymph nodes examined: 2.                            Number of sentinel lymph nodes examined: 2.                Treatment effect: No known presurgical therapy.                Lymphovascular invasion: Present.                Dermal Lymphovascular invasion: Not identified.                Pathologic stage classification.                            Primary tumor: pT1c.                            Regional lymph nodes: pN1mi(sn).                            Distant metastasis: Not applicable.                Additional pathologic findings:                             Ductal hyperplasia of the usual type.                             Fibrocystic changes with duct dilatation and stasis and focal apocrine metaplasia.                             Columnar cell change without atypia.                             Fibroinflammatory changes consistent with site of prior biopsy.                    Ancillary studies: ER, CT, HER-2/melanie, and Ki-67 studies performed on previous biopsy specimen at                       outside facility ([facility]).               Duplex scan of extremity veins including responses to compression and other maneuvers; bilateral   Study Impression     • Right Common Femoral: No deep vein thrombosis noted.   • Right Saphenofemoral Junction: No superficial thrombophlebitis noted.   • Right Proximal Femoral: No deep vein thrombosis noted.   • Right Mid Femoral: No deep vein thrombosis noted.   • Right Distal Femoral: No deep vein thrombosis noted.   • Right Popliteal: No deep vein thrombosis noted.   • Right Posterior Tibial: No deep vein thrombosis noted.   • Right Peroneal: No deep vein thrombosis noted.   • Right Gastrocnemius Soleal: No deep vein thrombosis noted.   • Right Greater Saphenous Above Knee: No superficial thrombophlebitis noted.   • Right Greater Saphenous Below Knee:  No superficial thrombophlebitis noted.   • Left Common Femoral: No deep vein thrombosis noted.   • Left Saphenofemoral Junction: No superficial thrombophlebitis noted.   • Left Proximal Femoral: No deep vein thrombosis noted.   • Left Mid Femoral: No deep vein thrombosis noted.   • Left Distal Femoral: No deep vein thrombosis noted.   • Left Popliteal: No deep vein thrombosis noted.   • Left Posterior Tibial: No deep vein thrombosis noted.   • Left Peroneal: No deep vein thrombosis noted.   • Left Gastrocnemius Soleal: No deep vein thrombosis noted.   • Left Greater Saphenous Above Knee: No superficial thrombophlebitis noted.   • Left Greater Saphenous Below Knee: No superficial thrombophlebitis noted.   Electronically signed by Nicolas Cotton MD on 10/4/19 at 1933 EDT    5/29/2020 bilateral screening mammogram negative.    At baseline EKG was done in the office today which shows normal sinus rhythm with a rate of 75.  QT/QTc 368/411.        Assessment/Plan      1.  T1c N1 jese N0 ER/ND positive HER-2 negative left breast cancer post lumpectomy and radiation  · Arimidex started in 3/18 discontinued by the patient in 7/18 due to visual blurring which resolved.    · Aromasin started 11/2018. 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  She also has had weight gain. Aromasin discontinued.  · Patient seen early April 2019 and follow-up.  Patient agreeable to switch therapy to Femara 2.5 mg daily.  · Patient ultimately discontinuing Femara due to repeated arthralgias and blurred vision.  · The patient was transitioned to tamoxifen but ultimately discontinued this approximately 2 months later per her report today due to blurry vision.   · Recommend patient start trial toremifene.  She is hoping to be able to started early next week.  · Patient decided not to take toremifene and stop all hormonal blockade in 5/20    2.  Family  history of breast cancer in a niece at a young age and father with cancer at age 51?  Genetic testing    4.  History of oral cancer in 1990    5.  Anemia, recently worked up per Dr. Ivan, internal medicine.   · Patient had EGD and colonoscopy per Dr. Gallego 1 year ago with no concerning findings.  Because of her history of GI ulcers however she was started on AcipHex per Dr. Ivan.  · 3/21/2019 showed iron saturation of 8% ferritin of 18. Patient started on trial of oral iron but was intolerant due to GI upset.  · Patient receiving 2 doses of IV Injectafer in May 2019.  Iron stores are now replete with iron saturation of 29% and hemoglobin having now normalized to 13.  · Rectal bleeding in 4/20 CAT scan shows duodenitis resumed ulcer medications    6.  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  Return in 6 months for follow-up no treatment plan adjuvantly  2.  Mammogram scheduled in June

## 2021-03-30 ENCOUNTER — OFFICE (OUTPATIENT)
Dept: URBAN - METROPOLITAN AREA CLINIC 66 | Facility: CLINIC | Age: 86
End: 2021-03-30
Payer: MEDICARE

## 2021-03-30 VITALS — HEIGHT: 61 IN | WEIGHT: 162 LBS

## 2021-03-30 DIAGNOSIS — K21.9 GASTRO-ESOPHAGEAL REFLUX DISEASE WITHOUT ESOPHAGITIS: ICD-10-CM

## 2021-03-30 DIAGNOSIS — R15.9 FULL INCONTINENCE OF FECES: ICD-10-CM

## 2021-03-30 DIAGNOSIS — A04.9 BACTERIAL INTESTINAL INFECTION, UNSPECIFIED: ICD-10-CM

## 2021-03-30 DIAGNOSIS — R19.7 DIARRHEA, UNSPECIFIED: ICD-10-CM

## 2021-03-30 PROCEDURE — 99442: CPT | Performed by: INTERNAL MEDICINE

## 2021-03-30 PROCEDURE — 99212 OFFICE O/P EST SF 10 MIN: CPT | Mod: 95 | Performed by: INTERNAL MEDICINE

## 2021-04-05 ENCOUNTER — IMMUNIZATION (OUTPATIENT)
Dept: VACCINE CLINIC | Facility: HOSPITAL | Age: 86
End: 2021-04-05

## 2021-04-05 PROCEDURE — 91300 HC SARSCOV02 VAC 30MCG/0.3ML IM: CPT | Performed by: INTERNAL MEDICINE

## 2021-04-05 PROCEDURE — 0002A: CPT | Performed by: INTERNAL MEDICINE

## 2021-04-22 RX ORDER — ESCITALOPRAM OXALATE 10 MG/1
TABLET ORAL
Qty: 90 TABLET | Refills: 0 | Status: SHIPPED | OUTPATIENT
Start: 2021-04-22 | End: 2021-07-22 | Stop reason: SDUPTHER

## 2021-04-28 ENCOUNTER — TELEPHONE (OUTPATIENT)
Dept: INTERNAL MEDICINE | Age: 86
End: 2021-04-28

## 2021-04-28 NOTE — TELEPHONE ENCOUNTER
Daughter called . Stating pt has vomiting diarrhea . Blister      Informed judy is 1/2 day today she is gone , also gail off today . Dr alejandre schedule full    Advised to get pt to [facility] to get evaluated today . Daughter verbalized understanding.

## 2021-04-30 RX ORDER — VALSARTAN 160 MG/1
TABLET ORAL
Qty: 30 TABLET | Refills: 5 | Status: SHIPPED | OUTPATIENT
Start: 2021-04-30 | End: 2021-07-22 | Stop reason: SDUPTHER

## 2021-05-03 ENCOUNTER — TELEPHONE (OUTPATIENT)
Dept: INTERNAL MEDICINE | Age: 86
End: 2021-05-03

## 2021-05-03 NOTE — TELEPHONE ENCOUNTER
Pt daughter called stated pt fell in the weekend     Has vomiting diarrhea, may be flu or stomach bug, lethargic . Back pain     Pt also has ulcer     Pt daughter informed need to be seen in ER   Daughter said unable to get her dad is sick     Informed to call EMS       Also was advised last week to take pt to [facility] but pt refused .

## 2021-05-10 RX ORDER — NIFEDIPINE 30 MG/1
TABLET, FILM COATED, EXTENDED RELEASE ORAL
Qty: 90 TABLET | Refills: 0 | Status: SHIPPED | OUTPATIENT
Start: 2021-05-10 | End: 2021-07-07 | Stop reason: HOSPADM

## 2021-05-26 RX ORDER — RABEPRAZOLE SODIUM 20 MG/1
20 TABLET, DELAYED RELEASE ORAL DAILY
Qty: 90 TABLET | Refills: 3 | Status: SHIPPED | OUTPATIENT
Start: 2021-05-26 | End: 2021-05-28 | Stop reason: SDUPTHER

## 2021-05-28 RX ORDER — RABEPRAZOLE SODIUM 20 MG/1
20 TABLET, DELAYED RELEASE ORAL DAILY
Qty: 90 TABLET | Refills: 3 | Status: SHIPPED | OUTPATIENT
Start: 2021-05-28 | End: 2021-07-22 | Stop reason: SDUPTHER

## 2021-05-28 NOTE — TELEPHONE ENCOUNTER
Patient stated that the below pharmacy is not filling the below med       Stating that it go rejected .     RABEprazole (ACIPHEX) 20 MG EC tablet     [facility]/pharmacy #[id number] - [place], [place] - [address] AT [facility] - [phone]  - [phone]   [address] [place] [id number]   Phone:  [phone]  Fax:  [phone]       This is the new pharmacy   [facility]/pharmacy #[id number] - [place], [place] - [address] - [phone] [facility] [phone]    [address] [place] [address], [place] [address]   Phone:  [phone]  Fax:  [phone]     If It can be sent the new one above if possible .

## 2021-06-09 ENCOUNTER — APPOINTMENT (OUTPATIENT)
Dept: WOMENS IMAGING | Facility: HOSPITAL | Age: 86
End: 2021-06-09

## 2021-06-09 PROCEDURE — 77063 BREAST TOMOSYNTHESIS BI: CPT | Performed by: RADIOLOGY

## 2021-06-09 PROCEDURE — 77067 SCR MAMMO BI INCL CAD: CPT | Performed by: RADIOLOGY

## 2021-07-02 ENCOUNTER — APPOINTMENT (OUTPATIENT)
Dept: GENERAL RADIOLOGY | Facility: HOSPITAL | Age: 86
End: 2021-07-02

## 2021-07-02 ENCOUNTER — APPOINTMENT (OUTPATIENT)
Dept: MRI IMAGING | Facility: HOSPITAL | Age: 86
End: 2021-07-02

## 2021-07-02 ENCOUNTER — HOSPITAL ENCOUNTER (OUTPATIENT)
Facility: HOSPITAL | Age: 86
Setting detail: OBSERVATION
Discharge: HOME HEALTH | End: 2021-07-07
Attending: EMERGENCY MEDICINE | Admitting: INTERNAL MEDICINE

## 2021-07-02 DIAGNOSIS — M54.42 ACUTE LEFT-SIDED LOW BACK PAIN WITH LEFT-SIDED SCIATICA: Primary | ICD-10-CM

## 2021-07-02 DIAGNOSIS — M51.37 DDD (DEGENERATIVE DISC DISEASE), LUMBOSACRAL: ICD-10-CM

## 2021-07-02 PROBLEM — K21.9 GERD WITHOUT ESOPHAGITIS: Status: ACTIVE | Noted: 2021-07-02

## 2021-07-02 PROBLEM — R73.9 HYPERGLYCEMIA: Status: ACTIVE | Noted: 2021-07-02

## 2021-07-02 PROBLEM — M51.379 DDD (DEGENERATIVE DISC DISEASE), LUMBOSACRAL: Status: ACTIVE | Noted: 2021-07-02

## 2021-07-02 PROBLEM — R07.2 PRECORDIAL CHEST PAIN: Status: ACTIVE | Noted: 2021-07-02

## 2021-07-02 LAB
ALBUMIN SERPL-MCNC: 4.6 G/DL (ref 3.5–5.2)
ALBUMIN/GLOB SERPL: 1.5 G/DL
ALP SERPL-CCNC: 62 U/L (ref 39–117)
ALT SERPL W P-5'-P-CCNC: 26 U/L (ref 1–33)
ANION GAP SERPL CALCULATED.3IONS-SCNC: 10 MMOL/L (ref 5–15)
AST SERPL-CCNC: 21 U/L (ref 1–32)
BASOPHILS # BLD AUTO: 0.08 10*3/MM3 (ref 0–0.2)
BASOPHILS NFR BLD AUTO: 1.1 % (ref 0–1.5)
BILIRUB SERPL-MCNC: 0.3 MG/DL (ref 0–1.2)
BUN SERPL-MCNC: 12 MG/DL (ref 8–23)
BUN/CREAT SERPL: 15 (ref 7–25)
CALCIUM SPEC-SCNC: 9.8 MG/DL (ref 8.6–10.5)
CHLORIDE SERPL-SCNC: 100 MMOL/L (ref 98–107)
CO2 SERPL-SCNC: 26 MMOL/L (ref 22–29)
CREAT SERPL-MCNC: 0.8 MG/DL (ref 0.57–1)
CRP SERPL-MCNC: <0.3 MG/DL (ref 0–0.5)
D DIMER PPP FEU-MCNC: 0.49 MCGFEU/ML (ref 0–0.49)
DEPRECATED RDW RBC AUTO: 41.7 FL (ref 37–54)
EOSINOPHIL # BLD AUTO: 0.1 10*3/MM3 (ref 0–0.4)
EOSINOPHIL NFR BLD AUTO: 1.3 % (ref 0.3–6.2)
ERYTHROCYTE [DISTWIDTH] IN BLOOD BY AUTOMATED COUNT: 12.6 % (ref 12.3–15.4)
ERYTHROCYTE [SEDIMENTATION RATE] IN BLOOD: 44 MM/HR (ref 0–30)
GFR SERPL CREATININE-BSD FRML MDRD: 68 ML/MIN/1.73
GLOBULIN UR ELPH-MCNC: 3 GM/DL
GLUCOSE SERPL-MCNC: 113 MG/DL (ref 65–99)
HCT VFR BLD AUTO: 39.6 % (ref 34–46.6)
HGB BLD-MCNC: 13.3 G/DL (ref 12–15.9)
IMM GRANULOCYTES # BLD AUTO: 0.03 10*3/MM3 (ref 0–0.05)
IMM GRANULOCYTES NFR BLD AUTO: 0.4 % (ref 0–0.5)
LYMPHOCYTES # BLD AUTO: 1.44 10*3/MM3 (ref 0.7–3.1)
LYMPHOCYTES NFR BLD AUTO: 19.2 % (ref 19.6–45.3)
MCH RBC QN AUTO: 30.3 PG (ref 26.6–33)
MCHC RBC AUTO-ENTMCNC: 33.6 G/DL (ref 31.5–35.7)
MCV RBC AUTO: 90.2 FL (ref 79–97)
MONOCYTES # BLD AUTO: 0.56 10*3/MM3 (ref 0.1–0.9)
MONOCYTES NFR BLD AUTO: 7.5 % (ref 5–12)
NEUTROPHILS NFR BLD AUTO: 5.3 10*3/MM3 (ref 1.7–7)
NEUTROPHILS NFR BLD AUTO: 70.5 % (ref 42.7–76)
NRBC BLD AUTO-RTO: 0 /100 WBC (ref 0–0.2)
PLATELET # BLD AUTO: 419 10*3/MM3 (ref 140–450)
PMV BLD AUTO: 9.3 FL (ref 6–12)
POTASSIUM SERPL-SCNC: 4 MMOL/L (ref 3.5–5.2)
PROT SERPL-MCNC: 7.6 G/DL (ref 6–8.5)
QT INTERVAL: 372 MS
RBC # BLD AUTO: 4.39 10*6/MM3 (ref 3.77–5.28)
SARS-COV-2 ORF1AB RESP QL NAA+PROBE: NOT DETECTED
SODIUM SERPL-SCNC: 136 MMOL/L (ref 136–145)
TROPONIN T SERPL-MCNC: <0.01 NG/ML (ref 0–0.03)
TROPONIN T SERPL-MCNC: <0.01 NG/ML (ref 0–0.03)
WBC # BLD AUTO: 7.51 10*3/MM3 (ref 3.4–10.8)

## 2021-07-02 PROCEDURE — 25010000002 MORPHINE PER 10 MG: Performed by: NURSE PRACTITIONER

## 2021-07-02 PROCEDURE — 84484 ASSAY OF TROPONIN QUANT: CPT | Performed by: NURSE PRACTITIONER

## 2021-07-02 PROCEDURE — 25010000002 MORPHINE PER 10 MG: Performed by: EMERGENCY MEDICINE

## 2021-07-02 PROCEDURE — 80053 COMPREHEN METABOLIC PANEL: CPT | Performed by: EMERGENCY MEDICINE

## 2021-07-02 PROCEDURE — 96375 TX/PRO/DX INJ NEW DRUG ADDON: CPT

## 2021-07-02 PROCEDURE — 0 GADOBENATE DIMEGLUMINE 529 MG/ML SOLUTION: Performed by: INTERNAL MEDICINE

## 2021-07-02 PROCEDURE — 96376 TX/PRO/DX INJ SAME DRUG ADON: CPT

## 2021-07-02 PROCEDURE — G0378 HOSPITAL OBSERVATION PER HR: HCPCS

## 2021-07-02 PROCEDURE — 96372 THER/PROPH/DIAG INJ SC/IM: CPT

## 2021-07-02 PROCEDURE — 25010000002 ONDANSETRON PER 1 MG: Performed by: EMERGENCY MEDICINE

## 2021-07-02 PROCEDURE — C9803 HOPD COVID-19 SPEC COLLECT: HCPCS

## 2021-07-02 PROCEDURE — 25010000002 ONDANSETRON PER 1 MG: Performed by: NURSE PRACTITIONER

## 2021-07-02 PROCEDURE — A9577 INJ MULTIHANCE: HCPCS | Performed by: INTERNAL MEDICINE

## 2021-07-02 PROCEDURE — 25010000002 ENOXAPARIN PER 10 MG: Performed by: INTERNAL MEDICINE

## 2021-07-02 PROCEDURE — 72110 X-RAY EXAM L-2 SPINE 4/>VWS: CPT

## 2021-07-02 PROCEDURE — 93005 ELECTROCARDIOGRAM TRACING: CPT | Performed by: NURSE PRACTITIONER

## 2021-07-02 PROCEDURE — 71045 X-RAY EXAM CHEST 1 VIEW: CPT

## 2021-07-02 PROCEDURE — 85652 RBC SED RATE AUTOMATED: CPT | Performed by: NEUROLOGICAL SURGERY

## 2021-07-02 PROCEDURE — 85379 FIBRIN DEGRADATION QUANT: CPT | Performed by: INTERNAL MEDICINE

## 2021-07-02 PROCEDURE — 25010000002 METHYLPREDNISOLONE PER 125 MG: Performed by: EMERGENCY MEDICINE

## 2021-07-02 PROCEDURE — U0004 COV-19 TEST NON-CDC HGH THRU: HCPCS | Performed by: EMERGENCY MEDICINE

## 2021-07-02 PROCEDURE — 85025 COMPLETE CBC W/AUTO DIFF WBC: CPT | Performed by: EMERGENCY MEDICINE

## 2021-07-02 PROCEDURE — 96374 THER/PROPH/DIAG INJ IV PUSH: CPT

## 2021-07-02 PROCEDURE — 25010000002 METHYLPREDNISOLONE PER 40 MG: Performed by: INTERNAL MEDICINE

## 2021-07-02 PROCEDURE — 93010 ELECTROCARDIOGRAM REPORT: CPT | Performed by: INTERNAL MEDICINE

## 2021-07-02 PROCEDURE — 99285 EMERGENCY DEPT VISIT HI MDM: CPT

## 2021-07-02 PROCEDURE — 86140 C-REACTIVE PROTEIN: CPT | Performed by: NEUROLOGICAL SURGERY

## 2021-07-02 PROCEDURE — 72158 MRI LUMBAR SPINE W/O & W/DYE: CPT

## 2021-07-02 RX ORDER — MORPHINE SULFATE 2 MG/ML
2 INJECTION, SOLUTION INTRAMUSCULAR; INTRAVENOUS ONCE
Status: COMPLETED | OUTPATIENT
Start: 2021-07-02 | End: 2021-07-02

## 2021-07-02 RX ORDER — PANTOPRAZOLE SODIUM 40 MG/10ML
40 INJECTION, POWDER, LYOPHILIZED, FOR SOLUTION INTRAVENOUS
Status: DISCONTINUED | OUTPATIENT
Start: 2021-07-02 | End: 2021-07-07 | Stop reason: HOSPADM

## 2021-07-02 RX ORDER — BISACODYL 5 MG/1
5 TABLET, DELAYED RELEASE ORAL DAILY PRN
Status: DISCONTINUED | OUTPATIENT
Start: 2021-07-02 | End: 2021-07-07 | Stop reason: HOSPADM

## 2021-07-02 RX ORDER — ASPIRIN 81 MG/1
81 TABLET, CHEWABLE ORAL DAILY
Status: DISCONTINUED | OUTPATIENT
Start: 2021-07-02 | End: 2021-07-03

## 2021-07-02 RX ORDER — NALOXONE HCL 0.4 MG/ML
0.4 VIAL (ML) INJECTION
Status: DISCONTINUED | OUTPATIENT
Start: 2021-07-02 | End: 2021-07-07 | Stop reason: HOSPADM

## 2021-07-02 RX ORDER — METHYLPREDNISOLONE SODIUM SUCCINATE 125 MG/2ML
125 INJECTION, POWDER, LYOPHILIZED, FOR SOLUTION INTRAMUSCULAR; INTRAVENOUS ONCE
Status: COMPLETED | OUTPATIENT
Start: 2021-07-02 | End: 2021-07-02

## 2021-07-02 RX ORDER — LEVOTHYROXINE SODIUM 0.05 MG/1
50 TABLET ORAL
Status: DISCONTINUED | OUTPATIENT
Start: 2021-07-03 | End: 2021-07-07 | Stop reason: HOSPADM

## 2021-07-02 RX ORDER — ONDANSETRON 4 MG/1
4 TABLET, FILM COATED ORAL EVERY 6 HOURS PRN
Status: DISCONTINUED | OUTPATIENT
Start: 2021-07-02 | End: 2021-07-07 | Stop reason: HOSPADM

## 2021-07-02 RX ORDER — CARVEDILOL 6.25 MG/1
6.25 TABLET ORAL 2 TIMES DAILY WITH MEALS
Status: DISCONTINUED | OUTPATIENT
Start: 2021-07-02 | End: 2021-07-07 | Stop reason: HOSPADM

## 2021-07-02 RX ORDER — ONDANSETRON 2 MG/ML
4 INJECTION INTRAMUSCULAR; INTRAVENOUS EVERY 6 HOURS PRN
Status: DISCONTINUED | OUTPATIENT
Start: 2021-07-02 | End: 2021-07-07 | Stop reason: HOSPADM

## 2021-07-02 RX ORDER — SODIUM CHLORIDE 0.9 % (FLUSH) 0.9 %
10 SYRINGE (ML) INJECTION EVERY 12 HOURS SCHEDULED
Status: DISCONTINUED | OUTPATIENT
Start: 2021-07-02 | End: 2021-07-07 | Stop reason: HOSPADM

## 2021-07-02 RX ORDER — ESCITALOPRAM OXALATE 10 MG/1
10 TABLET ORAL DAILY
Status: DISCONTINUED | OUTPATIENT
Start: 2021-07-02 | End: 2021-07-07 | Stop reason: HOSPADM

## 2021-07-02 RX ORDER — AMLODIPINE BESYLATE 10 MG/1
10 TABLET ORAL DAILY
Status: DISCONTINUED | OUTPATIENT
Start: 2021-07-02 | End: 2021-07-07 | Stop reason: HOSPADM

## 2021-07-02 RX ORDER — AMOXICILLIN 250 MG
2 CAPSULE ORAL 2 TIMES DAILY
Status: DISCONTINUED | OUTPATIENT
Start: 2021-07-02 | End: 2021-07-07 | Stop reason: HOSPADM

## 2021-07-02 RX ORDER — NIFEDIPINE 30 MG/1
30 TABLET, EXTENDED RELEASE ORAL DAILY
Status: DISCONTINUED | OUTPATIENT
Start: 2021-07-02 | End: 2021-07-02

## 2021-07-02 RX ORDER — NALOXONE HCL 0.4 MG/ML
0.4 VIAL (ML) INJECTION
Status: DISCONTINUED | OUTPATIENT
Start: 2021-07-02 | End: 2021-07-02

## 2021-07-02 RX ORDER — CELECOXIB 200 MG/1
200 CAPSULE ORAL DAILY
Status: DISCONTINUED | OUTPATIENT
Start: 2021-07-02 | End: 2021-07-03

## 2021-07-02 RX ORDER — MORPHINE SULFATE 2 MG/ML
2 INJECTION, SOLUTION INTRAMUSCULAR; INTRAVENOUS EVERY 4 HOURS PRN
Status: DISCONTINUED | OUTPATIENT
Start: 2021-07-02 | End: 2021-07-07 | Stop reason: HOSPADM

## 2021-07-02 RX ORDER — GABAPENTIN 100 MG/1
100 CAPSULE ORAL 3 TIMES DAILY
Status: DISCONTINUED | OUTPATIENT
Start: 2021-07-02 | End: 2021-07-07 | Stop reason: HOSPADM

## 2021-07-02 RX ORDER — BISACODYL 10 MG
10 SUPPOSITORY, RECTAL RECTAL DAILY PRN
Status: DISCONTINUED | OUTPATIENT
Start: 2021-07-02 | End: 2021-07-07 | Stop reason: HOSPADM

## 2021-07-02 RX ORDER — MORPHINE SULFATE 2 MG/ML
1 INJECTION, SOLUTION INTRAMUSCULAR; INTRAVENOUS EVERY 4 HOURS PRN
Status: DISCONTINUED | OUTPATIENT
Start: 2021-07-02 | End: 2021-07-02

## 2021-07-02 RX ORDER — LIDOCAINE 50 MG/G
1 PATCH TOPICAL
Status: DISCONTINUED | OUTPATIENT
Start: 2021-07-02 | End: 2021-07-07 | Stop reason: HOSPADM

## 2021-07-02 RX ORDER — HYDROCODONE BITARTRATE AND ACETAMINOPHEN 10; 325 MG/1; MG/1
1 TABLET ORAL EVERY 6 HOURS PRN
Status: DISCONTINUED | OUTPATIENT
Start: 2021-07-02 | End: 2021-07-07 | Stop reason: HOSPADM

## 2021-07-02 RX ORDER — SODIUM CHLORIDE 0.9 % (FLUSH) 0.9 %
10 SYRINGE (ML) INJECTION AS NEEDED
Status: DISCONTINUED | OUTPATIENT
Start: 2021-07-02 | End: 2021-07-07 | Stop reason: HOSPADM

## 2021-07-02 RX ORDER — METHYLPREDNISOLONE SODIUM SUCCINATE 40 MG/ML
20 INJECTION, POWDER, LYOPHILIZED, FOR SOLUTION INTRAMUSCULAR; INTRAVENOUS EVERY 8 HOURS
Status: DISCONTINUED | OUTPATIENT
Start: 2021-07-02 | End: 2021-07-04

## 2021-07-02 RX ORDER — VALSARTAN 160 MG/1
160 TABLET ORAL DAILY
Status: DISCONTINUED | OUTPATIENT
Start: 2021-07-02 | End: 2021-07-07 | Stop reason: HOSPADM

## 2021-07-02 RX ORDER — CYCLOBENZAPRINE HCL 10 MG
5 TABLET ORAL 3 TIMES DAILY
Status: DISCONTINUED | OUTPATIENT
Start: 2021-07-02 | End: 2021-07-07 | Stop reason: HOSPADM

## 2021-07-02 RX ORDER — ONDANSETRON 2 MG/ML
4 INJECTION INTRAMUSCULAR; INTRAVENOUS ONCE
Status: COMPLETED | OUTPATIENT
Start: 2021-07-02 | End: 2021-07-02

## 2021-07-02 RX ORDER — HYDROCODONE BITARTRATE AND ACETAMINOPHEN 7.5; 325 MG/1; MG/1
1 TABLET ORAL EVERY 4 HOURS PRN
Status: DISCONTINUED | OUTPATIENT
Start: 2021-07-02 | End: 2021-07-02

## 2021-07-02 RX ORDER — POLYETHYLENE GLYCOL 3350 17 G/17G
17 POWDER, FOR SOLUTION ORAL DAILY PRN
Status: DISCONTINUED | OUTPATIENT
Start: 2021-07-02 | End: 2021-07-07 | Stop reason: HOSPADM

## 2021-07-02 RX ADMIN — HYDROCODONE BITARTRATE AND ACETAMINOPHEN 1 TABLET: 10; 325 TABLET ORAL at 22:42

## 2021-07-02 RX ADMIN — SODIUM CHLORIDE, PRESERVATIVE FREE 10 ML: 5 INJECTION INTRAVENOUS at 20:50

## 2021-07-02 RX ADMIN — MORPHINE SULFATE 2 MG: 2 INJECTION, SOLUTION INTRAMUSCULAR; INTRAVENOUS at 20:24

## 2021-07-02 RX ADMIN — ONDANSETRON 4 MG: 2 INJECTION INTRAMUSCULAR; INTRAVENOUS at 08:23

## 2021-07-02 RX ADMIN — METHYLPREDNISOLONE SODIUM SUCCINATE 20 MG: 40 INJECTION, POWDER, FOR SOLUTION INTRAMUSCULAR; INTRAVENOUS at 20:59

## 2021-07-02 RX ADMIN — GADOBENATE DIMEGLUMINE 14 ML: 529 INJECTION, SOLUTION INTRAVENOUS at 17:55

## 2021-07-02 RX ADMIN — PANTOPRAZOLE SODIUM 40 MG: 40 INJECTION, POWDER, FOR SOLUTION INTRAVENOUS at 20:50

## 2021-07-02 RX ADMIN — ASPIRIN 81 MG: 81 TABLET, CHEWABLE ORAL at 16:30

## 2021-07-02 RX ADMIN — MORPHINE SULFATE 2 MG: 2 INJECTION, SOLUTION INTRAMUSCULAR; INTRAVENOUS at 08:24

## 2021-07-02 RX ADMIN — CYCLOBENZAPRINE 5 MG: 10 TABLET, FILM COATED ORAL at 20:48

## 2021-07-02 RX ADMIN — HYDROCODONE BITARTRATE AND ACETAMINOPHEN 1 TABLET: 10; 325 TABLET ORAL at 16:43

## 2021-07-02 RX ADMIN — CARVEDILOL 6.25 MG: 6.25 TABLET, FILM COATED ORAL at 21:09

## 2021-07-02 RX ADMIN — LIDOCAINE 1 PATCH: 50 PATCH TOPICAL at 21:08

## 2021-07-02 RX ADMIN — MORPHINE SULFATE 1 MG: 2 INJECTION, SOLUTION INTRAMUSCULAR; INTRAVENOUS at 14:32

## 2021-07-02 RX ADMIN — HYDROCODONE BITARTRATE AND ACETAMINOPHEN 1 TABLET: 7.5; 325 TABLET ORAL at 12:45

## 2021-07-02 RX ADMIN — AMLODIPINE BESYLATE 10 MG: 10 TABLET ORAL at 16:30

## 2021-07-02 RX ADMIN — VALSARTAN 160 MG: 160 TABLET, FILM COATED ORAL at 16:30

## 2021-07-02 RX ADMIN — NIFEDIPINE 30 MG: 30 TABLET, FILM COATED, EXTENDED RELEASE ORAL at 16:30

## 2021-07-02 RX ADMIN — ENOXAPARIN SODIUM 40 MG: 40 INJECTION SUBCUTANEOUS at 20:48

## 2021-07-02 RX ADMIN — ESCITALOPRAM 10 MG: 10 TABLET, FILM COATED ORAL at 16:30

## 2021-07-02 RX ADMIN — MORPHINE SULFATE 2 MG: 2 INJECTION, SOLUTION INTRAMUSCULAR; INTRAVENOUS at 10:00

## 2021-07-02 RX ADMIN — ONDANSETRON 4 MG: 2 INJECTION INTRAMUSCULAR; INTRAVENOUS at 20:24

## 2021-07-02 RX ADMIN — CELECOXIB 200 MG: 200 CAPSULE ORAL at 21:09

## 2021-07-02 RX ADMIN — METHYLPREDNISOLONE SODIUM SUCCINATE 125 MG: 125 INJECTION, POWDER, FOR SOLUTION INTRAMUSCULAR; INTRAVENOUS at 11:22

## 2021-07-02 RX ADMIN — GABAPENTIN 100 MG: 100 CAPSULE ORAL at 20:48
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
## 2021-07-02 NOTE — ED PROVIDER NOTES
EMERGENCY DEPARTMENT ENCOUNTER    Room Number:  40/40  Date of encounter:  7/2/2021  PCP: Daniel Villgaomez APRN  Historian: Patient      HPI:  Chief Complaint: Low back pain  A complete HPI/ROS/PMH/PSH/SH/FH are unobtainable due to: N/A    Context: Geeta Butt is a 85 y.o. female who presents to the ED c/o gradual onset, aching, increasing left lower lumbar spine pain which radiates into the hip and leg.  The symptoms have been constant and are worsening.  They are exacerbated with standing or walking.  There are no alleviating factors-she has tried over-the-counter Tylenol without relief.  She fell about a month ago, but she landed on the opposite side and does not think she injured herself at that time.  No numbness or tingling in the lower extremity.  No prior history of back surgery.  No fevers or chills.  No cough, congestion or trouble breathing.  No nausea, vomiting or diarrhea.      The patient was placed in a mask in triage, hand hygiene was performed before and after my interaction with the patient.  I wore a mask, safety glasses and gloves during my entire interaction with the patient.    PAST MEDICAL HISTORY  Active Ambulatory Problems     Diagnosis Date Noted   • Hypertension 10/16/2017   • Hypothyroidism 10/16/2017   • Black stool 10/16/2017   • Diarrhea 10/16/2017   • Nausea & vomiting 10/16/2017   • RUQ pain 10/16/2017   • Leukocytosis 10/16/2017   • UTI (urinary tract infection) 10/17/2017   • Duodenitis 10/17/2017   • Foot pain, bilateral 10/19/2017   • Malignant neoplasm of lower-inner quadrant of left breast in female, estrogen receptor positive (CMS/[facility]) 02/27/2018   • Iron deficiency anemia 04/04/2019   • Adverse effect of iron 05/09/2019   • GI bleed 03/18/2020   • GIULIA (acute kidney injury) (CMS/[facility]) 03/18/2020   • Gastrointestinal hemorrhage associated with duodenitis 03/18/2020   • Hematochezia 03/19/2020   • Duodenal ulcer 03/19/2020   • Long-term use of high-risk medication 06/03/2020      Resolved Ambulatory Problems     Diagnosis Date Noted   • DM2 (diabetes mellitus, type 2) (CMS/HCC) 10/16/2017     Past Medical History:   Diagnosis Date   • Anesthesia complication    • Arthritis    • Aspiration into airway    • Breast cancer (CMS/HCC)    • Cancer (CMS/[facility]) 1990   • Chronic kidney disease    • CKD (chronic kidney disease)    • Depression    • Diverticular disease    • Gastric ulcer    • GERD (gastroesophageal reflux disease)    • Hearing loss    • History of colon polyps    • History of depression    • History of GI bleed    • Peptic ulceration    • PONV (postoperative nausea and vomiting)    • Poor vision    • Stroke (CMS/[facility]) 2000         PAST SURGICAL HISTORY  Past Surgical History:   Procedure Laterality Date   • ABDOMINAL SURGERY     • APPENDECTOMY     • BLADDER REPAIR      AND RECTOCELE   • BREAST BIOPSY     • BREAST CYST ASPIRATION     • BREAST LUMPECTOMY WITH SENTINEL NODE BIOPSY Left 3/19/2018    Procedure: BREAST LUMPECTOMY WITH SENTINEL NODE BIOPSY AND NEEDLE LOCALIZATION;  Surgeon: Myles Soliman MD;  [facility];  Service: General   • CHOLECYSTECTOMY     • COLONOSCOPY  2013   • COLONOSCOPY N/A 2/16/2018    Procedure: COLONOSCOPY into cecum and T.I. with biopsies;  Surgeon: Augustine Gallego MD;  Location: [facility] ENDOSCOPY;  Service:    • ENDOSCOPY N/A 10/17/2017    Procedure: ESOPHAGOGASTRODUODENOSCOPY WITH COLD BIOPSIES;  Surgeon: Augustien Gallego MD;  [facility];  Service:    • ENDOSCOPY N/A 2/16/2018    Procedure: ESOPHAGOGASTRODUODENOSCOPY with biopsies and #54 [address] DILATATION;  Surgeon: Augustine Gallego MD;  Location: [facility] ENDOSCOPY;  Service:    • ENDOSCOPY N/A 3/19/2020    Procedure: ESOPHAGOGASTRODUODENOSCOPY with biopsies;  Surgeon: Augustine Gallego MD;  Location: [facility] ENDOSCOPY;  Service: Gastroenterology;  Laterality: N/A;  PRE- MELENA, EPIGASTRIC PAIN  POST- eerosive gastritis, duodenal ulcer, hiatal hernia   • ENDOSCOPY N/A  2020    Procedure: ESOPHAGOGASTRODUODENOSCOPY WITH DUODENAL ASPIRATE AND COLD BIOPSIES;  Surgeon: Augustine Gallego MD;  Location: [facility] ENDOSCOPY;  Service: Gastroenterology;  Laterality: N/A;  PRE: HX ULCER DISEASE  POST: GASTRITIS, HEALED ULCER   • EYE SURGERY Left     3-4 years, cornea replacement    • HYSTERECTOMY     • MOUTH SURGERY      UNDER TONGUE AND LEFT FLOOR OF MOUTH FOR CA   • PARATHYROIDECTOMY      PER PT   • SIGMOIDOSCOPY N/A 2020    Procedure: FLEXIBLE SIGMOIDOSCOPY TO DESCENDING COLON WITH COLD BIOPSIES;  Surgeon: Augustine Gallego MD;  Location: [facility] ENDOSCOPY;  Service: Gastroenterology;  Laterality: N/A;  PRE: RECTAL BLEEDING  POST: DIVERTICULOSIS, HEMORRHOIDS, MINIMAL PROCTITIS   • SINUS SURGERY     • SKIN BIOPSY     • THYROID SURGERY     • VARICOSE VEIN SURGERY           FAMILY HISTORY  Family History   Problem Relation Age of Onset   • Esophageal cancer Father    • Stomach cancer Father    • Heart disease Mother    • Thyroid cancer Mother    • Hypertension Mother    • Hypertension Sister    • Hypertension Brother    • Stroke Brother    • Heart disease Brother    • Diabetes Brother    • Leukemia Paternal Aunt    • Malig Hyperthermia Neg Hx          SOCIAL HISTORY  Social History     Socioeconomic History   • Marital status:      Spouse name: Nicolas   • Number of children: Not on file   • Years of education: High school   • Highest education level: Not on file   Tobacco Use   • Smoking status: Former Smoker     Packs/day: 1.00     Years: 10.00     Pack years: 10.00     Types: Cigarettes     Start date:      Quit date:      Years since quittin.5   • Smokeless tobacco: Never Used   Vaping Use   • Vaping Use: Never used   Substance and Sexual Activity   • Alcohol use: No   • Drug use: No   • Sexual activity: Defer         ALLERGIES  Other, Sulfa antibiotics, Ciprofloxacin, Iodine, Macrodantin [nitrofurantoin macrocrystal], Nitrofurantoin, and Yeast-related  products        REVIEW OF SYSTEMS  Review of Systems   Constitutional: Negative for chills and fever.   Respiratory: Negative for chest tightness and shortness of breath.    Cardiovascular: Negative for chest pain.   Gastrointestinal: Negative for abdominal pain, diarrhea, nausea and vomiting.   Musculoskeletal: Positive for back pain and gait problem.        All systems reviewed and negative except for those discussed in HPI.       PHYSICAL EXAM    I have reviewed the triage vital signs and nursing notes.    ED Triage Vitals [07/02/21 0755]   Temp Heart Rate Resp BP SpO2   97.3 °F (36.3 °C) 79 16 (!) 194/94 100 %      Temp src Heart Rate Source Patient Position BP Location FiO2 (%)   Tympanic Monitor -- -- --       Physical Exam   Constitutional: Pt. is oriented to person, place, and time and well-developed, well-nourished, and in no distress.  She appears uncomfortable.  HENT: Normocephalic and atraumatic. Oropharynx moist/nonerythematous.  Neck: Normal range of motion. Neck supple. No JVD present.   Cardiovascular: Normal rate, regular rhythm and normal heart sounds. Exam reveals no gallop and no friction rub.   No murmur heard.  Pulmonary/Chest: Effort normal and breath sounds normal. No stridor. No respiratory distress. No wheezes, no rales.   Abdominal: Soft. Bowel sounds are normal. No distension. There is no tenderness. There is no rebound and no guarding.   Musculoskeletal: She has tenderness in the lower left lumbar spine.  No step-offs or deformities are noted.  Normal range of motion. No edema, tenderness or deformity.  She has good PROM of the left hip and knee.  Neurological: Pt. is alert and oriented to person, place, and time.  She has no focal neurologic deficits  Skin: Skin is warm and dry. No rash noted. Pt. is not diaphoretic. No erythema.   Psychiatric: Mood, affect and judgment normal.  She is pleasant and cooperative.  Nursing note and vitals reviewed.        LAB RESULTS  Recent Results (from  the past 24 hour(s))   Comprehensive Metabolic Panel    Collection Time: 07/02/21  8:12 AM    Specimen: Blood   Result Value Ref Range    Glucose 113 (H) 65 - 99 mg/dL    BUN 12 8 - 23 mg/dL    Creatinine 0.80 0.57 - 1.00 mg/dL    Sodium 136 136 - 145 mmol/L    Potassium 4.0 3.5 - 5.2 mmol/L    Chloride 100 98 - 107 mmol/L    CO2 26.0 22.0 - 29.0 mmol/L    Calcium 9.8 8.6 - 10.5 mg/dL    Total Protein 7.6 6.0 - 8.5 g/dL    Albumin 4.60 3.50 - 5.20 g/dL    ALT (SGPT) 26 1 - 33 U/L    AST (SGOT) 21 1 - 32 U/L    Alkaline Phosphatase 62 39 - 117 U/L    Total Bilirubin 0.3 0.0 - 1.2 mg/dL    eGFR Non African Amer 68 >60 mL/min/1.73    Globulin 3.0 gm/dL    A/G Ratio 1.5 g/dL    BUN/Creatinine Ratio 15.0 7.0 - 25.0    Anion Gap 10.0 5.0 - 15.0 mmol/L   CBC Auto Differential    Collection Time: 07/02/21  8:12 AM    Specimen: Blood   Result Value Ref Range    WBC 7.51 3.40 - 10.80 10*3/mm3    RBC 4.39 3.77 - 5.28 10*6/mm3    Hemoglobin 13.3 12.0 - 15.9 g/dL    Hematocrit 39.6 34.0 - 46.6 %    MCV 90.2 79.0 - 97.0 fL    MCH 30.3 26.6 - 33.0 pg    MCHC 33.6 31.5 - 35.7 g/dL    RDW 12.6 12.3 - 15.4 %    RDW-SD 41.7 37.0 - 54.0 fl    MPV 9.3 6.0 - 12.0 fL    Platelets 419 140 - 450 10*3/mm3    Neutrophil % 70.5 42.7 - 76.0 %    Lymphocyte % 19.2 (L) 19.6 - 45.3 %    Monocyte % 7.5 5.0 - 12.0 %    Eosinophil % 1.3 0.3 - 6.2 %    Basophil % 1.1 0.0 - 1.5 %    Immature Grans % 0.4 0.0 - 0.5 %    Neutrophils, Absolute 5.30 1.70 - 7.00 10*3/mm3    Lymphocytes, Absolute 1.44 0.70 - 3.10 10*3/mm3    Monocytes, Absolute 0.56 0.10 - 0.90 10*3/mm3    Eosinophils, Absolute 0.10 0.00 - 0.40 10*3/mm3    Basophils, Absolute 0.08 0.00 - 0.20 10*3/mm3    Immature Grans, Absolute 0.03 0.00 - 0.05 10*3/mm3    nRBC 0.0 0.0 - 0.2 /100 WBC       Ordered the above labs and independently reviewed the results.        RADIOLOGY  XR Spine Lumbar Complete 4+VW    Result Date: 7/2/2021  LUMBAR SPINE PLAIN FILM SERIES  CLINICAL HISTORY: Low back pain and  left leg pain.  FINDINGS: AP, lateral, and bilateral oblique projections of the lumbar spine were obtained. There is loss of the usual lordotic curvature of the lumbar spine. These demonstrate mild dextroconvex scoliotic curvature of the lower thoracic spine. There are advanced degenerative disc changes identified at the L4-L5 and L5-S1 levels. Prominent osteophytic changes are identified at L1-L2, L2-L3, L3-L4, L4-L5. Degenerative disc changes are most prominently seen at the L4-L5 level. Facet hypertrophic changes are most prominently noted at L4-L5. Otherwise, there is maintenance of vertebral body height. There is no convincing evidence for an acute abnormality on these plain films. Further evaluation could be performed with MR imaging, as clinically indicated.  This report was finalized on 7/2/2021 9:10 AM by Dr. Yared Hidalgo M.D.        I ordered the above noted radiological studies. Reviewed by me and discussed with radiologist.  See dictation for official radiology interpretation.      PROCEDURES    Procedures      MEDICATIONS GIVEN IN ER    Medications   methylPREDNISolone sodium succinate (SOLU-Medrol) injection 125 mg (has no administration in time range)   morphine injection 2 mg (2 mg Intravenous Given 7/2/21 0824)   ondansetron (ZOFRAN) injection 4 mg (4 mg Intravenous Given 7/2/21 0823)   morphine injection 2 mg (2 mg Intravenous Given 7/2/21 1000)         PROGRESS, DATA ANALYSIS, CONSULTS, AND MEDICAL DECISION MAKING    Any/all labs have been independently reviewed by me.  Any/all radiology studies have been reviewed by me and discussed with radiologist dictating the report.   EKG's independently viewed and interpreted by me.  Discussion below represents my analysis of pertinent findings related to patient's condition, differential diagnosis, treatment plan and final disposition.      ED Course as of Jul 02 1024   Fri Jul 02, 2021   0916 Lumbar spine films/interpretation reviewed.    [WC]   7447  Still having a significant amount of pain after first dose of morphine.  Additional morphine has been ordered.    [WC]   1019 Case discussed with JESSA Milligan (on-call for A)-she accepts patient for observation to [facility] floor on behalf of Dr. Crenshaw.    [WC]      ED Course User Index  [WC] Myles Pierce MD       AS OF 10:24 EDT VITALS:    BP - (!) 194/94  HR - 79  TEMP - 97.3 °F (36.3 °C) (Tympanic)  02 SATS - 100%        DIAGNOSIS  Final diagnoses:   Acute left-sided low back pain with left-sided sciatica         DISPOSITION  Observation - [facility]           Myles iPerce MD  07/02/21 1024

## 2021-07-02 NOTE — ED TRIAGE NOTES
Left hip pain radiates to back x several weeks.  Intensified overnight.  unable to sit up.  Has taken muscle relaxer and 3 extra strength tylenol since 4am with no relief.  nki    Patient was placed in face mask during first look triage.  Patient was wearing a face mask throughout encounter.  I wore personal protective equipment throughout the encounter.  Hand hygiene was performed before and after patient encounter.

## 2021-07-02 NOTE — PLAN OF CARE
See below.    Problem: Adult Inpatient Plan of Care  Goal: Plan of Care Review  Outcome: Ongoing, Progressing  Flowsheets (Taken 7/2/2021 2448)  Progress: improving  Plan of Care Reviewed With: patient  Outcome Summary: 85/F presenting with acute LBP radiating down LLE that has been going on for about 1 week now via ED.  ALOx4, RA, lungs clear, BS normoactive x4, voiding independently.  Up x1-2 BSC with walker/gait belt.  2+ pedal pulses noted bilaterally, no c/o numbness/tingling in BLE.  Pain well-controlled with combination PO/IV pain meds at this time.  PIV saline locked.  MRI Lumbar Spine scheduled for this evening, will CTM.

## 2021-07-03 LAB
ANION GAP SERPL CALCULATED.3IONS-SCNC: 10.4 MMOL/L (ref 5–15)
BUN SERPL-MCNC: 19 MG/DL (ref 8–23)
BUN/CREAT SERPL: 25 (ref 7–25)
CALCIUM SPEC-SCNC: 9.5 MG/DL (ref 8.6–10.5)
CHLORIDE SERPL-SCNC: 97 MMOL/L (ref 98–107)
CO2 SERPL-SCNC: 24.6 MMOL/L (ref 22–29)
CREAT SERPL-MCNC: 0.76 MG/DL (ref 0.57–1)
DEPRECATED RDW RBC AUTO: 39.8 FL (ref 37–54)
ERYTHROCYTE [DISTWIDTH] IN BLOOD BY AUTOMATED COUNT: 12.2 % (ref 12.3–15.4)
GFR SERPL CREATININE-BSD FRML MDRD: 73 ML/MIN/1.73
GLUCOSE SERPL-MCNC: 143 MG/DL (ref 65–99)
HBA1C MFR BLD: 5.7 % (ref 4.8–5.6)
HCT VFR BLD AUTO: 37.6 % (ref 34–46.6)
HGB BLD-MCNC: 12.5 G/DL (ref 12–15.9)
MCH RBC QN AUTO: 30 PG (ref 26.6–33)
MCHC RBC AUTO-ENTMCNC: 33.2 G/DL (ref 31.5–35.7)
MCV RBC AUTO: 90.2 FL (ref 79–97)
PLATELET # BLD AUTO: 409 10*3/MM3 (ref 140–450)
PMV BLD AUTO: 9.3 FL (ref 6–12)
POTASSIUM SERPL-SCNC: 4.2 MMOL/L (ref 3.5–5.2)
RBC # BLD AUTO: 4.17 10*6/MM3 (ref 3.77–5.28)
SODIUM SERPL-SCNC: 132 MMOL/L (ref 136–145)
TSH SERPL DL<=0.05 MIU/L-ACNC: 0.4 UIU/ML (ref 0.27–4.2)
WBC # BLD AUTO: 9.82 10*3/MM3 (ref 3.4–10.8)

## 2021-07-03 PROCEDURE — 84443 ASSAY THYROID STIM HORMONE: CPT | Performed by: NURSE PRACTITIONER

## 2021-07-03 PROCEDURE — G0378 HOSPITAL OBSERVATION PER HR: HCPCS

## 2021-07-03 PROCEDURE — 83036 HEMOGLOBIN GLYCOSYLATED A1C: CPT | Performed by: INTERNAL MEDICINE

## 2021-07-03 PROCEDURE — 25010000002 METHYLPREDNISOLONE PER 40 MG: Performed by: INTERNAL MEDICINE

## 2021-07-03 PROCEDURE — 99204 OFFICE O/P NEW MOD 45 MIN: CPT | Performed by: NEUROLOGICAL SURGERY

## 2021-07-03 PROCEDURE — 97166 OT EVAL MOD COMPLEX 45 MIN: CPT

## 2021-07-03 PROCEDURE — 85027 COMPLETE CBC AUTOMATED: CPT | Performed by: NURSE PRACTITIONER

## 2021-07-03 PROCEDURE — 25010000002 MORPHINE PER 10 MG: Performed by: NURSE PRACTITIONER

## 2021-07-03 PROCEDURE — 25010000002 ENOXAPARIN PER 10 MG: Performed by: INTERNAL MEDICINE

## 2021-07-03 PROCEDURE — 80048 BASIC METABOLIC PNL TOTAL CA: CPT | Performed by: NURSE PRACTITIONER

## 2021-07-03 PROCEDURE — 96372 THER/PROPH/DIAG INJ SC/IM: CPT

## 2021-07-03 PROCEDURE — 36415 COLL VENOUS BLD VENIPUNCTURE: CPT | Performed by: NURSE PRACTITIONER

## 2021-07-03 PROCEDURE — 96376 TX/PRO/DX INJ SAME DRUG ADON: CPT

## 2021-07-03 PROCEDURE — 97530 THERAPEUTIC ACTIVITIES: CPT

## 2021-07-03 RX ADMIN — METHYLPREDNISOLONE SODIUM SUCCINATE 20 MG: 40 INJECTION, POWDER, FOR SOLUTION INTRAMUSCULAR; INTRAVENOUS at 12:00

## 2021-07-03 RX ADMIN — SODIUM CHLORIDE, PRESERVATIVE FREE 10 ML: 5 INJECTION INTRAVENOUS at 09:37

## 2021-07-03 RX ADMIN — HYDROCODONE BITARTRATE AND ACETAMINOPHEN 1 TABLET: 10; 325 TABLET ORAL at 04:39

## 2021-07-03 RX ADMIN — ESCITALOPRAM 10 MG: 10 TABLET, FILM COATED ORAL at 09:35

## 2021-07-03 RX ADMIN — MORPHINE SULFATE 2 MG: 2 INJECTION, SOLUTION INTRAMUSCULAR; INTRAVENOUS at 12:55

## 2021-07-03 RX ADMIN — CYCLOBENZAPRINE 5 MG: 10 TABLET, FILM COATED ORAL at 09:38

## 2021-07-03 RX ADMIN — MORPHINE SULFATE 2 MG: 2 INJECTION, SOLUTION INTRAMUSCULAR; INTRAVENOUS at 06:14

## 2021-07-03 RX ADMIN — DOCUSATE SODIUM 50MG AND SENNOSIDES 8.6MG 2 TABLET: 8.6; 5 TABLET, FILM COATED ORAL at 21:10

## 2021-07-03 RX ADMIN — GABAPENTIN 100 MG: 100 CAPSULE ORAL at 17:00

## 2021-07-03 RX ADMIN — AMLODIPINE BESYLATE 10 MG: 10 TABLET ORAL at 09:35

## 2021-07-03 RX ADMIN — LIDOCAINE 1 PATCH: 50 PATCH TOPICAL at 10:00

## 2021-07-03 RX ADMIN — CARVEDILOL 6.25 MG: 6.25 TABLET, FILM COATED ORAL at 08:30

## 2021-07-03 RX ADMIN — CYCLOBENZAPRINE 5 MG: 10 TABLET, FILM COATED ORAL at 21:11

## 2021-07-03 RX ADMIN — HYDROCODONE BITARTRATE AND ACETAMINOPHEN 1 TABLET: 10; 325 TABLET ORAL at 10:47

## 2021-07-03 RX ADMIN — GABAPENTIN 100 MG: 100 CAPSULE ORAL at 09:38

## 2021-07-03 RX ADMIN — METHYLPREDNISOLONE SODIUM SUCCINATE 20 MG: 40 INJECTION, POWDER, FOR SOLUTION INTRAMUSCULAR; INTRAVENOUS at 21:12

## 2021-07-03 RX ADMIN — CELECOXIB 200 MG: 200 CAPSULE ORAL at 09:35

## 2021-07-03 RX ADMIN — SODIUM CHLORIDE, PRESERVATIVE FREE 10 ML: 5 INJECTION INTRAVENOUS at 21:12

## 2021-07-03 RX ADMIN — GABAPENTIN 100 MG: 100 CAPSULE ORAL at 21:10

## 2021-07-03 RX ADMIN — VALSARTAN 160 MG: 160 TABLET, FILM COATED ORAL at 09:35

## 2021-07-03 RX ADMIN — HYDROCODONE BITARTRATE AND ACETAMINOPHEN 1 TABLET: 10; 325 TABLET ORAL at 23:28

## 2021-07-03 RX ADMIN — CYCLOBENZAPRINE 5 MG: 10 TABLET, FILM COATED ORAL at 17:00

## 2021-07-03 RX ADMIN — DOCUSATE SODIUM 50MG AND SENNOSIDES 8.6MG 2 TABLET: 8.6; 5 TABLET, FILM COATED ORAL at 09:35

## 2021-07-03 RX ADMIN — HYDROCODONE BITARTRATE AND ACETAMINOPHEN 1 TABLET: 10; 325 TABLET ORAL at 17:23

## 2021-07-03 RX ADMIN — CARVEDILOL 6.25 MG: 6.25 TABLET, FILM COATED ORAL at 17:00

## 2021-07-03 RX ADMIN — LEVOTHYROXINE SODIUM 50 MCG: 0.05 TABLET ORAL at 06:09

## 2021-07-03 RX ADMIN — MORPHINE SULFATE 2 MG: 2 INJECTION, SOLUTION INTRAMUSCULAR; INTRAVENOUS at 01:36

## 2021-07-03 RX ADMIN — ENOXAPARIN SODIUM 40 MG: 40 INJECTION SUBCUTANEOUS at 21:12

## 2021-07-03 RX ADMIN — METHYLPREDNISOLONE SODIUM SUCCINATE 20 MG: 40 INJECTION, POWDER, FOR SOLUTION INTRAMUSCULAR; INTRAVENOUS at 04:38

## 2021-07-03 RX ADMIN — PANTOPRAZOLE SODIUM 40 MG: 40 INJECTION, POWDER, FOR SOLUTION INTRAVENOUS at 06:09

## 2021-07-03 NOTE — PROGRESS NOTES
Name: Geeta Butt ADMIT: 2021   : 1935  PCP: Daniel Villagomez APRN    MRN: [id number] LOS: 0 days   AGE/SEX: 85 y.o. female  ROOM: [facility]     Subjective   Subjective   Patient reports mild decrease in the low back pain grading it now 8/10.  Positive radiation of the pain to the left lower extremity.  No lower extremity numbness or weakness.  No abdominal pain.  Positive nausea but no vomiting.  No dysuria or hematuria or urinary incontinence.    Review of Systems  Cardiovascular/respiratory.  No chest pain.  No shortness of breath.  Positive occasional dry cough.  No hemoptysis.  No palpitation.  No fever or chills.  GI.  Positive nausea but no vomiting.     Objective   Objective   Vital Signs  Temp:  [97 °F (36.1 °C)-97.5 °F (36.4 °C)] 97 °F (36.1 °C)  Heart Rate:  [67-86] 70  Resp:  [16] 16  BP: (113-192)/(56-91) 133/62  SpO2:  [89 %-98 %] 92 %  on  Flow (L/min):  [2] 2;   Device (Oxygen Therapy): room air    Intake/Output Summary (Last 24 hours) at 7/3/2021 1415  Last data filed at 7/3/2021 1300  Gross per 24 hour   Intake 720 ml   Output 1600 ml   Net -880 ml     Body mass index is 28.74 kg/m².      21  1204   Weight: 69 kg (152 lb 1.9 oz)     Physical Exam  General.  Elderly female.  Alert and oriented x3.  No respiratory distress.    No obvious pain.    Eyes.  Pupils equal round and reactive.  Intact extraocular musculature.  No pallor or jaundice.  Normal conjunctivae and lids.    Oral cavity.  Moist mucous membrane.  Neck.  Supple.  No JVD.  No lymphadenopathy or thyromegaly.  Cardiovascular.  Regular rate and rhythm.  Grade 2 systolic murmur.  Chest.  Clear to auscultation bilaterally with poor bilateral air entry and no added sounds.  Abdomen.  Soft lax.  No tenderness.  No organomegaly.  No guarding or rebound.  Extremities.  No clubbing cyanosis or edema.  Normal straight leg raising bilaterally.  No lower extremity neuro deficits.  Spine.  No localized tenderness.  CNS.  No acute  focal neurological deficits    Results Review:      Results from last 7 days   Lab Units 07/03/21 0537 07/02/21  0812   SODIUM mmol/L 132* 136   POTASSIUM mmol/L 4.2 4.0   CHLORIDE mmol/L 97* 100   CO2 mmol/L 24.6 26.0   BUN mg/dL 19 12   CREATININE mg/dL 0.76 0.80   GLUCOSE mg/dL 143* 113*   CALCIUM mg/dL 9.5 9.8   AST (SGOT) U/L  --  21   ALT (SGPT) U/L  --  26     Estimated Creatinine Clearance: 46.5 mL/min (by C-G formula based on SCr of 0.76 mg/dL).  Results from last 7 days   Lab Units 07/03/21  0537   HEMOGLOBIN A1C % 5.70*         Results from last 7 days   Lab Units 07/02/21 2001 07/02/21  1558   TROPONIN T ng/mL <0.010 <0.010         Results from last 7 days   Lab Units 07/03/21 0537   TSH uIU/mL 0.404           Invalid input(s):  PHOS        Invalid input(s): LDLCALC  Results from last 7 days   Lab Units 07/03/21 0537 07/02/21 0812   WBC 10*3/mm3 9.82 7.51   HEMOGLOBIN g/dL 12.5 13.3   HEMATOCRIT % 37.6 39.6   PLATELETS 10*3/mm3 409 419   MCV fL 90.2 90.2   MCH pg 30.0 30.3   MCHC g/dL 33.2 33.6   RDW % 12.2* 12.6   RDW-SD fl 39.8 41.7   MPV fL 9.3 9.3   NEUTROPHIL % %  --  70.5   LYMPHOCYTE % %  --  19.2*   MONOCYTES % %  --  7.5   EOSINOPHIL % %  --  1.3   BASOPHIL % %  --  1.1   IMM GRAN % %  --  0.4   NEUTROS ABS 10*3/mm3  --  5.30   LYMPHS ABS 10*3/mm3  --  1.44   MONOS ABS 10*3/mm3  --  0.56   EOS ABS 10*3/mm3  --  0.10   BASOS ABS 10*3/mm3  --  0.08   IMMATURE GRANS (ABS) 10*3/mm3  --  0.03   NRBC /100 WBC  --  0.0                 Results from last 7 days   Lab Units 07/02/21 2001   SED RATE mm/hr 44*   CRP mg/dL <0.30                           Imaging:  Imaging Results (Last 24 Hours)     Procedure Component Value Units Date/Time    XR Chest 1 View [[id number]] Collected: 07/02/21 1949     Updated: 07/02/21 1953    Narrative:      XR CHEST 1 VW-     HISTORY: Female who is 85 years-old,  chest pain     TECHNIQUE: Frontal view of the chest     COMPARISON: 03/14/2008     FINDINGS: The heart size  is normal. Aorta is tortuous, calcified.  Pulmonary vasculature is unremarkable. No focal pulmonary consolidation,  pleural effusion, or pneumothorax. No acute osseous process.       Impression:      No focal pulmonary consolidation. Tortuous aorta. Follow-up  as clinically indicated.     This report was finalized on 7/2/2021 7:50 PM by Dr. Dominic Cosby M.D.       MRI Lumbar Spine With & Without Contrast [[id number]] Collected: 07/02/21 1824     Updated: 07/02/21 1848    Narrative:      MRI LUMBAR SPINE W WO CONTRAST-     INDICATIONS: Low back pain, neurologic deficit     TECHNIQUE: Noncontrast and enhanced MRI of the lumbar spine     COMPARISON: 07/02/2021 x-ray     FINDINGS:        Prominent Modic 1 (enhancing) endplate changes are apparent at L1/2, and  to a lesser degree L4/5, versus osteomyelitis on both sides of the  corresponding disc spaces, correlate clinically. No acute fracture is  identified.     Alignment is in range of normal.     The conus medullaris appears unremarkable. No enhancing intracanalicular  collections or lesions.     The paraspinal soft tissues are notable for colonic diverticulosis.     Axial levels:     T12/L1: No significant disc bulge, central or neural foraminal stenosis.     L1/2: Broad-based disc bulge, focally more prominent at the neural  foramina (left more than right) results in mild central stenosis and,  with facet hypertrophy, mild right, prominent left neuroforaminal  narrowing. At the left anterior aspect of the disc space, a disc  herniation with surrounding hyperemia appears to be new from the prior  CT exam.     L2/3: Broad-based disc bulge contributes to mild central stenosis and,  with facet hypertrophy, moderate bilateral neuroforaminal narrowing.     L3/4: Broad-based disc bulge results in moderate central stenosis and,  with facet and ligamentum flavum hypertrophy, prominent right moderate  to prominent left neuroforaminal narrowing.     L4/5: Broad-based  disc bulge, more prominent at the right neural  foramen, results in moderate central stenosis, partial effacement of  right lateral recess, and, with facet hypertrophy, prominent right and  moderate left neuroforaminal narrowing.     L5/S1: Broad-based disc bulge and facet ligamentum flavum hypertrophy  result in mild central stenosis, and moderate to prominent bilateral  neuroforaminal narrowing.             Impression:         Multilevel lumbar spondyloarthropathy, as detailed above.     A new anterior left disc extrusion at L1/2 with surrounding hyperemia,  may be a source of pain.      Prominent Modic 1 (enhancing) endplate changes are apparent at L1/2, and  to a lesser degree L4/5, versus osteomyelitis on both sides of the  corresponding disc spaces, correlate clinically.     Discussed by telephone with patient's nurse, Stoney, at time of  interpretation, 1840, 07/02/2021.           This report was finalized on 7/2/2021 6:45 PM by Dr. Dominic Cosby M.D.                I reviewed the patient's new clinical results / labs / tests / procedures      Assessment/Plan     Active Hospital Problems    Diagnosis  POA   • Acute left-sided low back pain with left-sided sciatica [M54.42]  Yes   • Precordial chest pain [R07.2]  Yes   • DDD (degenerative disc disease), lumbosacral [M51.37]  Yes   • Hyperglycemia [R73.9]  Yes   • GERD without esophagitis [K21.9]  Yes   • Hypertension [I10]  Yes   • Hypothyroidism [E03.9]  Yes      Resolved Hospital Problems   No resolved problems to display.       1.  Acute low back pain with left sciatica in a patient with a history of degenerative disc disease of the lumbar spine.    MRI with degenerative disc disease and osteoarthritis.  Normal C-reactive protein.  No fever or leukocytosis to indicate osteomyelitis or discitis. Patient has positive radiculopathy on the left but no myelopathy objectively.  Neurosurgery saw the patient and recommends conservative treatment and nerve  blocks.  Continue narcotics/Neurontin/Flexeril//IV steroids/Lidoderm patch.  Will DC none steroidal anti-inflammatory drug as the patient might take an epidural block next Tuesday.  Physical therapy worked with the patient the only activity she could do is set at the bed side..    I believe that there is no improvement she will need a skilled facility placement.  2.  History of hypertension.  Improved blood pressure.  Resolved atypical chest pain.  No evidence of congestive heart failure.  We will continue Norvasc/losartan/Coreg.  EKG and troponins are negative for ischemia.    Normal D-dimer.  Chest x-ray is negative.  3.  History of GERD.  Plan IV Protonix.  No GI complications and benign GI examination.  4.  History of hypothyroidism.  Clinically euthyroid on replacement we will normal TSH.  5.  Hyperglycemia/prediabetes..    A1c 5.7.  6.  History of stage III chronic renal failure.  Normal renal function at this time.  Patient appears euvolemic.  7.  VTE prophylaxis with Lovenox which will have to be stopped in the morning of the epidural block plan tentatively on Tuesday..    · Discussed with patient.      Klaudia Dickey MD  [facility]  07/03/21  14:15 EDT

## 2021-07-03 NOTE — THERAPY EVALUATION
Patient Name: Geeta Butt  : 1935    MRN: [id number]                              Today's Date: 7/3/2021       Admit Date: 2021    Visit Dx:     ICD-10-CM ICD-9-CM   1. Acute left-sided low back pain with left-sided sciatica  M54.42 724.2     724.3     Patient Active Problem List   Diagnosis   • Hypertension   • Hypothyroidism   • Black stool   • Diarrhea   • Nausea & vomiting   • RUQ pain   • Leukocytosis   • UTI (urinary tract infection)   • Duodenitis   • Foot pain, bilateral   • Malignant neoplasm of lower-inner quadrant of left breast in female, estrogen receptor positive (CMS/HCC)   • Iron deficiency anemia   • Adverse effect of iron   • GI bleed   • GIULIA (acute kidney injury) (CMS/HCC)   • Gastrointestinal hemorrhage associated with duodenitis   • Hematochezia   • Duodenal ulcer   • Long-term use of high-risk medication   • Acute left-sided low back pain with left-sided sciatica   • Precordial chest pain   • DDD (degenerative disc disease), lumbosacral   • Hyperglycemia   • GERD without esophagitis     Past Medical History:   Diagnosis Date   • Anesthesia complication     ASPIRATION INTO AIRWAY WITH TRACH PRESENT IN PAST (WITH ORAL CANCER SURGERY(   • Arthritis    • Aspiration into airway     post anesthesia   • Breast cancer (CMS/HCC)    • Cancer (CMS/HCC)     mouth cancer    • Chronic kidney disease    • CKD (chronic kidney disease)     PT STATES STAGE 3   • Depression    • Diverticular disease    • Gastric ulcer     AND DUODENAL   • GERD (gastroesophageal reflux disease)    • Hearing loss     BILAT AIDES   • History of colon polyps    • History of depression    • History of GI bleed    • Hypertension    • Hypothyroidism    • Peptic ulceration    • PONV (postoperative nausea and vomiting)    • Poor vision     RIGHT EYE, HAS PERIPHERAL VISION    • Stroke (CMS/HCC)      mild weakness on left, partial sight loss on right      Past Surgical History:   Procedure Laterality Date   • ABDOMINAL  SURGERY     • APPENDECTOMY     • BLADDER REPAIR      AND RECTOCELE   • BREAST BIOPSY     • BREAST CYST ASPIRATION     • BREAST LUMPECTOMY WITH SENTINEL NODE BIOPSY Left 3/19/2018    Procedure: BREAST LUMPECTOMY WITH SENTINEL NODE BIOPSY AND NEEDLE LOCALIZATION;  Surgeon: Myles Soliman MD;  Location: [facility] OR [facility];  Service: General   • CHOLECYSTECTOMY     • COLONOSCOPY  2013   • COLONOSCOPY N/A 2/16/2018    Procedure: COLONOSCOPY into cecum and T.I. with biopsies;  Surgeon: Augustine Gallego MD;  [facility];  Service:    • ENDOSCOPY N/A 10/17/2017    Procedure: ESOPHAGOGASTRODUODENOSCOPY WITH COLD BIOPSIES;  Surgeon: Augustine Gallego MD;  Location: [facility] ENDOSCOPY;  Service:    • ENDOSCOPY N/A 2/16/2018    Procedure: ESOPHAGOGASTRODUODENOSCOPY with biopsies and #54 [place] DILATATION;  Surgeon: Augustine Gallego MD;  Location: [facility] ENDOSCOPY;  Service:    • ENDOSCOPY N/A 3/19/2020    Procedure: ESOPHAGOGASTRODUODENOSCOPY with biopsies;  Surgeon: Augustine Gallego MD;  Location: [facility] ENDOSCOPY;  Service: Gastroenterology;  Laterality: N/A;  PRE- MELENA, EPIGASTRIC PAIN  POST- eerosive gastritis, duodenal ulcer, hiatal hernia   • ENDOSCOPY N/A 7/29/2020    Procedure: ESOPHAGOGASTRODUODENOSCOPY WITH DUODENAL ASPIRATE AND COLD BIOPSIES;  Surgeon: Augustine Gallego MD;  Location: [facility] ENDOSCOPY;  Service: Gastroenterology;  Laterality: N/A;  PRE: HX ULCER DISEASE  POST: GASTRITIS, HEALED ULCER   • EYE SURGERY Left 2014    3-4 years, cornea replacement    • HYSTERECTOMY     • MOUTH SURGERY  2000    UNDER TONGUE AND LEFT FLOOR OF MOUTH FOR CA   • PARATHYROIDECTOMY      PER PT   • SIGMOIDOSCOPY N/A 7/29/2020    Procedure: FLEXIBLE SIGMOIDOSCOPY TO DESCENDING COLON WITH COLD BIOPSIES;  Surgeon: Augustine Gallego MD;  Location: [facility] ENDOSCOPY;  Service: Gastroenterology;  Laterality: N/A;  PRE: RECTAL BLEEDING  POST: DIVERTICULOSIS, HEMORRHOIDS, MINIMAL PROCTITIS   • SINUS SURGERY     • SKIN BIOPSY      • THYROID SURGERY     • VARICOSE VEIN SURGERY       General Information     [facility] Name 07/03/21 [facility]          OT Time and Intention    Document Type  evaluation  -     Mode of Treatment  individual therapy;occupational therapy  -     Row Name 07/03/21 [facility]          General Information    Patient Profile Reviewed  yes  -BL     Prior Level of Function  independent:;ADL's;feeding;grooming;dressing;bathing  -     Existing Precautions/Restrictions  left;fall  -     Barriers to Rehab  none identified  -     Row Name 07/03/21 [facility]          Living Environment    Lives With  spouse  -[facility] Name 07/03/21 [facility]          Cognition    Orientation Status (Cognition)  oriented x 3  -[facility] Name 07/03/21 [facility]          Safety Issues, Functional Mobility    Safety Issues Affecting Function (Mobility)  insight into deficits/self-awareness;awareness of need for assistance;judgment;problem-solving;safety precaution awareness;safety precautions follow-through/compliance  -     Impairments Affecting Function (Mobility)  balance;endurance/activity tolerance;strength;pain;postural/trunk control  -       User Key  (r) = Recorded By, (t) = Taken By, (c) = Cosigned By    Initials Name Provider Type     Dvain Middleton OT Occupational Therapist          Mobility/ADL's     Row Name 07/03/21 [facility]          Bed Mobility    Bed Mobility  supine-sit;sit-supine  -     Supine-Sit [place] (Bed Mobility)  supervision;verbal cues  -BL     Sit-Supine [place] (Bed Mobility)  minimum assist (75% patient effort);verbal cues  -     Assistive Device (Bed Mobility)  bed rails;head of bed elevated  -[facility] Name 07/03/21 [facility]          Transfers    Transfers  sit-stand transfer  -     Comment (Transfers)  Pt performed sit>stand transition with use of standard walker to side steps towards HOB with Min A and verbal cues  -BL     Sit-Stand [place] (Transfers)  minimum assist (75% patient effort);verbal cues  -BL      [facility] Name 07/03/21 [facility]          Sit-Stand Transfer    Assistive Device (Sit-Stand Transfers)  walker, standard  -BL     Row Name 07/03/21 1353          Activities of Daily Living    BADL Assessment/Intervention  lower body dressing  -BL     Row Name 07/03/21 [facility]          Lower Body Dressing Assessment/Training    [place] Level (Lower Body Dressing)  don;socks;maximum assist (25% patient effort)  -BL     Position (Lower Body Dressing)  supine  -BL       User Key  (r) = Recorded By, (t) = Taken By, (c) = Cosigned By    Initials Name Provider Type    BL Davin Middleton OT Occupational Therapist        Obj/Interventions     [facility] Name 07/03/21 1355          Sensory Assessment (Somatosensory)    Sensory Assessment (Somatosensory)  UE sensation intact  -BL     Row Name 07/03/21 [facility]          Vision Assessment/Intervention    Visual Impairment/Limitations  WFL  -BL     Row Name 07/03/21 1355          Range of Motion Comprehensive    General Range of Motion  bilateral upper extremity ROM WFL  -BL     Row Name 07/03/21 1355          Strength Comprehensive (MMT)    Comment, General Manual Muscle Testing (MMT) Assessment  BUE 3+/5  -BL     Row Name 07/03/21 1355          Motor Skills    Motor Skills  coordination  -     Coordination  WFL;dysdiadochokinesia;dysmetria;bilateral  -BL     Row Name 07/03/21 1355          Balance    Balance Assessment  sitting static balance;sitting dynamic balance;sit to stand dynamic balance;standing static balance;standing dynamic balance  -BL     Static Sitting Balance  mild impairment;unsupported;sitting, edge of bed  -BL     Dynamic Sitting Balance  mild impairment;unsupported;sitting, edge of bed  -BL     Sit to Stand Dynamic Balance  mild impairment;supported;standing  -BL     Static Standing Balance  mild impairment;supported;standing  -BL     Dynamic Standing Balance  mild impairment;supported;standing  -BL     Balance Interventions  sitting;standing;sit to  stand;supported;static;dynamic;occupation based/functional task  -BL       User Key  (r) = Recorded By, (t) = Taken By, (c) = Cosigned By    Initials Name Provider Type    Davin Allen OT Occupational Therapist        Goals/Plan     Row Name 07/03/21 1359          Bed Mobility Goal 1 (OT)    Activity/Assistive Device (Bed Mobility Goal 1, OT)  bed mobility activities, all  -BL     [place] Level/Cues Needed (Bed Mobility Goal 1, OT)  standby assist  -BL     Time Frame (Bed Mobility Goal 1, OT)  short term goal (STG);2 weeks  -BL     Progress/Outcomes (Bed Mobility Goal 1, OT)  continuing progress toward goal  -BL     Row Name 07/03/21 1359          Transfer Goal 1 (OT)    Activity/Assistive Device (Transfer Goal 1, OT)  sit-to-stand/stand-to-sit;bed-to-chair/chair-to-bed;commode, 3-in-1;commode  -BL     [place] Level/Cues Needed (Transfer Goal 1, OT)  supervision required  -BL     Time Frame (Transfer Goal 1, OT)  short term goal (STG);2 weeks  -BL     Progress/Outcome (Transfer Goal 1, OT)  continuing progress toward goal  -BL     Row Name 07/03/21 1359          Dressing Goal 1 (OT)    Activity/Device (Dressing Goal 1, OT)  upper body dressing;lower body dressing  -BL     [place]/Cues Needed (Dressing Goal 1, OT)  minimum assist (75% or more patient effort)  -BL     Time Frame (Dressing Goal 1, OT)  short term goal (STG);2 weeks  -BL     Progress/Outcome (Dressing Goal 1, OT)  continuing progress toward goal  -BL     Row Name 07/03/21 1359          Toileting Goal 1 (OT)    Activity/Device (Toileting Goal 1, OT)  adjust/manage clothing;perform perineal hygiene  -BL     [place] Level/Cues Needed (Toileting Goal 1, OT)  minimum assist (75% or more patient effort)  -BL     Time Frame (Toileting Goal 1, OT)  short term goal (STG);2 weeks  -BL     Progress/Outcome (Toileting Goal 1, OT)  continuing progress toward goal  -BL     Row Name 07/03/21 1359          Grooming Goal 1 (OT)     Activity/Device (Grooming Goal 1, OT)  grooming skills, all  -BL     [place] (Grooming Goal 1, OT)  set-up required  -BL     Time Frame (Grooming Goal 1, OT)  short term goal (STG);2 weeks  -BL     Progress/Outcome (Grooming Goal 1, OT)  continuing progress toward goal  -BL     Row Name 07/03/21 1353          Strength Goal 1 (OT)    Strength Goal 1 (OT)  Pt will be independent with HEP prior to D/C.  -BL     Time Frame (Strength Goal 1, OT)  short term goal (STG);2 weeks  -BL     Progress/Outcome (Strength Goal 1, OT)  continuing progress toward goal  -BL     Row Name 07/03/21 1353          Therapy Assessment/Plan (OT)    Planned Therapy Interventions (OT)  activity tolerance training;functional balance retraining;occupation/activity based interventions;IADL retraining;BADL retraining;patient/caregiver education/training;transfer/mobility retraining;strengthening exercise;ROM/therapeutic exercise  -BL       User Key  (r) = Recorded By, (t) = Taken By, (c) = Cosigned By    Initials Name Provider Type    BL aDvin Middleton, OT Occupational Therapist        Clinical Impression     Row Name 07/03/21 1356          Pain Assessment    Additional Documentation  Pain Scale: Numbers Pre/Post-Treatment (Group)  -BL     [facility] Name 07/03/21 1356          Pain Scale: Numbers Pre/Post-Treatment    Pretreatment Pain Rating  6/10  -BL     Posttreatment Pain Rating  6/10  -BL     Pain Location - Side  Left  -BL     Pain Location - Orientation  generalized  -BL     Pain Location  back;extremity  -BL     Pain Intervention(s)  Repositioned  -     Row Name 07/03/21 1356          Plan of Care Review    Plan of Care Reviewed With  patient;daughter  -BL     Progress  improving  -BL     Outcome Summary  Pt seen by OT this date for evaluation. Upon arrival pt lying supine in bed, daughter at bedside, A&O x3, 6/10 pain in L lower back and leg. Pt performed bed mobility with SBA-Min A this date. Pt performed sit>stand transition with use  of standard walker to side step towards HOB with Min A and verbal cues. Pt Max A for LB dressing. Pt left in supine, needs in reach, alarm on, daughter at bedside. Pt voices concern about not wanting rehab but having to do HHOT at D/C due to  requiring her care. OT rec HHOT vs SNF [facility]. Pt to benefit from skilled OT services to address goals and deficits. OT wore mask, gloves, glasses, hand hygiene performed.  -     Row Name 07/03/21 135          Therapy Assessment/Plan (OT)    Rehab Potential (OT)  good, to achieve stated therapy goals  -     Criteria for Skilled Therapeutic Interventions Met (OT)  skilled treatment is necessary;yes  -BL     Therapy Frequency (OT)  3 times/wk  -     Row Name 07/03/21 1356          Therapy Plan Review/Discharge Plan (OT)    Anticipated Discharge Disposition (OT)  home with home health;inpatient rehabilitation facility  -     Row Name 07/03/21 1356          Vital Signs    Pre Patient Position  Supine  -BL     Intra Patient Position  Standing  -BL     Post Patient Position  Supine  -BL     Row Name 07/03/21 1356          Positioning and Restraints    Pre-Treatment Position  in bed  -BL     Post Treatment Position  bed  -BL     In Bed  notified nsg;supine;call light within reach;encouraged to call for assist;exit alarm on;with family/caregiver  -       User Key  (r) = Recorded By, (t) = Taken By, (c) = Cosigned By    Initials Name Provider Type    BL Davin Middleton, [place] Occupational Therapist        Outcome Measures     Row Name 07/03/21 4244          How much help from another is currently needed...    Putting on and taking off regular lower body clothing?  2  -BL     Bathing (including washing, rinsing, and drying)  2  -BL     Toileting (which includes using toilet bed pan or urinal)  2  -BL     Putting on and taking off regular upper body clothing  3  -BL     Taking care of personal grooming (such as brushing teeth)  3  -BL     Eating meals  3  -BL     AM-PAC 6  Clicks Score (OT)  15  -BL     Row Name 07/03/21 1401          Functional Assessment    Outcome Measure Options  AM-PAC 6 Clicks Daily Activity (OT)  -BL       User Key  (r) = Recorded By, (t) = Taken By, (c) = Cosigned By    Initials Name Provider Type     Davin Middleton OT Occupational Therapist        Occupational Therapy Education                 Title: PT OT SLP Therapies (In Progress)     Topic: Occupational Therapy (In Progress)     Point: ADL training (Done)     Description:   Instruct learner(s) on proper safety adaptation and remediation techniques during self care or transfers.   Instruct in proper use of assistive devices.              Learning Progress Summary           Patient Acceptance, E, VU by BL at 7/3/2021 1401    Comment: The role of OT   Family Acceptance, E, VU by BL at 7/3/2021 1401    Comment: The role of OT                   Point: Home exercise program (Not Started)     Description:   Instruct learner(s) on appropriate technique for monitoring, assisting and/or progressing therapeutic exercises/activities.              Learner Progress:  Not documented in this visit.          Point: Precautions (Not Started)     Description:   Instruct learner(s) on prescribed precautions during self-care and functional transfers.              Learner Progress:  Not documented in this visit.          Point: Body mechanics (Not Started)     Description:   Instruct learner(s) on proper positioning and spine alignment during self-care, functional mobility activities and/or exercises.              Learner Progress:  Not documented in this visit.                      User Key     Initials Effective Dates Name Provider Type [facility] 01/05/21 -  Davin Middleton OT Occupational Therapist OT              OT Recommendation and Plan  Planned Therapy Interventions (OT): activity tolerance training, functional balance retraining, occupation/activity based interventions, IADL retraining, BADL retraining,  patient/caregiver education/training, transfer/mobility retraining, strengthening exercise, ROM/therapeutic exercise  Therapy Frequency (OT): 3 times/wk  Plan of Care Review  Plan of Care Reviewed With: patient, daughter  Progress: improving  Outcome Summary: Pt seen by OT this date for evaluation. Upon arrival pt lying supine in bed, daughter at bedside, A&O x3, 6/10 pain in L lower back and leg. Pt performed bed mobility with SBA-Min A this date. Pt performed sit>stand transition with use of standard walker to side step towards HOB with Min A and verbal cues. Pt Max A for LB dressing. Pt left in supine, needs in reach, alarm on, daughter at bedside. Pt voices concern about not wanting rehab but having to do HHOT at D/C due to  requiring her care. OT rec HHOT vs SNF at [facility]. Pt to benefit from skilled OT services to address goals and deficits. OT wore mask, gloves, glasses, hand hygiene performed.     Time Calculation:   Time Calculation- OT     Row Name 07/03/21 1402             Time Calculation- OT    OT Start Time  1311  -BL      OT Stop Time  1332  -BL      OT Time Calculation (min)  21 min  -BL      Total Timed Code Minutes- OT  17 minute(s)  -BL      OT Received On  07/03/21  -BL      OT - Next Appointment  07/05/21  -BL      OT Goal Re-Cert Due Date  07/17/21  -BL         Timed Charges    12216 - OT Therapeutic Activity Minutes  17  -BL         Untimed Charges    OT Eval/Re-eval Minutes  4  -BL         Total Minutes    Timed Charges Total Minutes  17  -BL      Untimed Charges Total Minutes  4  -BL       Total Minutes  21  -BL        User Key  (r) = Recorded By, (t) = Taken By, (c) = Cosigned By    Initials Name Provider Type    BL Davin Middleton OT Occupational Therapist        Therapy Charges for Today     Code Description Service Date Service Provider Modifiers Qty    31448997482 HC OT THERAPEUTIC ACT EA 15 MIN 7/3/2021 Davin Middleton OT GO 1    [id number] HC OT EVAL MOD COMPLEXITY 2 7/3/2021  Davin Middleton, OT GO 1               Davin Middleton, OT  7/3/2021

## 2021-07-03 NOTE — PLAN OF CARE
Goal Outcome Evaluation:  Plan of Care Reviewed With: patient        Progress: improving  Outcome Summary: vss, reports improvement in low back pain and left sciatica pain, voiding, on iv steroids now, neurosurgery to see today in consult, edcuated on monitoring b/p due to hx hypertension, discharge plans pending

## 2021-07-03 NOTE — PLAN OF CARE
Goal Outcome Evaluation:              Outcome Summary: Pt with low back pain, VSS, on room air,. not in distress, saline locked, with lidocaine patch on, on IV steroids, PRN pain medicine given, ASA held as ordered r/t epidural pain mgt planned on Tuesday.Seen by OT today, needs attended, will continue to monitor.

## 2021-07-03 NOTE — CONSULTS
NEUROSURGERY CONSULT      Geeta Butt  1935  [id number]    Referring Provider: Klaudia Dickey MD  [address] [place] [address]  Reason for Consultation: Low back pain    Patient Care Team:  Daniel Villagomez APRN as PCP - General (Internal Medicine)  Omi Valenzuela MD as Consulting Physician (Hematology and Oncology)  Sidney Campbell MD as Referring Physician (Dermatology)  Amador Brandt MD as Consulting Physician (Orthopedic Surgery)    Chief Complaint: Low back pain into the left hip    Subjective .     History of Present Illness: Geeta Butt is an 85-year-old female who is a longstanding patient of Dr. Mejia who was hoping he would be on call to evaluate her this weekend.  I explained that I am covering for him this weekend but he will return on Tuesday.  She has chronic low back pain and was scheduled with Dr. Pierce on July 19 to have an epidural steroid injection for her persistent low back pain.  She was trying to get by with physical therapy but the physical therapy stopped due to the accelerated pain awaiting Dr. Pierce's epidural injection.  She has been having to help her  who is under hospice care over the last several weeks and has had an acceleration in her neck and low back pain.  Got to the point she was having difficulty with mobility and presented to the emergency room for accelerated evaluation and treatment last night.  She does have left lower extremity pain but also has a left knee problem for which she gets left knee injections.  She also has an aspect of left hip pain that she is not sure if that is the hip or if that is coming from the back.  She denies any weakness or numbness in the lower extremities.  She denies bowel or bladder symptomatology.  She says she is quite a bit better with the medical management overnight.    At the end of my evaluation the daughter came in to visit for the morning and I explained that we had discussed and  found.    While in the room and during my examination of the patient I wore a mask and eye protection.  I washed my hands before and after this patient encounter.  The patient was also wearing a mask.    Review of Systems  Review of Systems   Constitutional: Positive for activity change. Negative for fever.   Musculoskeletal: Positive for arthralgias, back pain, neck pain and neck stiffness.   Neurological: Negative for weakness and numbness.   All other systems reviewed and are negative.      History  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    • Chronic kidney disease    • CKD (chronic kidney disease)     PT STATES STAGE 3   • Depression    • Diverticular disease    • Gastric ulcer     AND DUODENAL   • GERD (gastroesophageal reflux disease)    • Hearing loss     BILAT AIDES   • History of colon polyps    • History of depression    • History of GI bleed    • Hypertension    • Hypothyroidism    • Peptic ulceration    • PONV (postoperative nausea and vomiting)    • Poor vision     RIGHT EYE, HAS PERIPHERAL VISION    • Stroke (CMS/HCC) 2000     mild weakness on left, partial sight loss on right    ,   Past Surgical History:   Procedure Laterality Date   • ABDOMINAL SURGERY     • APPENDECTOMY     • BLADDER REPAIR      AND RECTOCELE   • BREAST BIOPSY     • BREAST CYST ASPIRATION     • BREAST LUMPECTOMY WITH SENTINEL NODE BIOPSY Left 3/19/2018    Procedure: BREAST LUMPECTOMY WITH SENTINEL NODE BIOPSY AND NEEDLE LOCALIZATION;  Surgeon: Myles Soliman MD;  [place] [facility] [place] [facility] [place]  Service: General   • CHOLECYSTECTOMY     • COLONOSCOPY  2013   • COLONOSCOPY N/A 2/16/2018    Procedure: COLONOSCOPY into cecum and T.I. with biopsies;  Surgeon: Augustine Gallego MD;  Location: [facility]  ENDOSCOPY;  Service:    • ENDOSCOPY N/A 10/17/2017    Procedure: ESOPHAGOGASTRODUODENOSCOPY WITH COLD BIOPSIES;  Surgeon: Augustine Gallego MD;  Location: [facility] ENDOSCOPY;  Service:    • ENDOSCOPY N/A 2/16/2018    Procedure: ESOPHAGOGASTRODUODENOSCOPY with biopsies and #54 Ar[address]ello DILATATION;  Surgeon: Augustine Gallego MD;  Location: [facility] ENDOSCOPY;  Service:    • ENDOSCOPY N/A 3/19/2020    Procedure: ESOPHAGOGASTRODUODENOSCOPY with biopsies;  Surgeon: Augustine Gallego MD;  Location: [facility] ENDOSCOPY;  Service: Gastroenterology;  Laterality: N/A;  PRE- MELENA, EPIGASTRIC PAIN  POST- eerosive gastritis, duodenal ulcer, hiatal hernia   • ENDOSCOPY N/A 7/29/2020    Procedure: ESOPHAGOGASTRODUODENOSCOPY WITH DUODENAL ASPIRATE AND COLD BIOPSIES;  Surgeon: Augustine Gallego MD;  Location: [facility] ENDOSCOPY;  Service: Gastroenterology;  Laterality: N/A;  PRE: HX ULCER DISEASE  POST: GASTRITIS, HEALED ULCER   • EYE SURGERY Left 2014    3-4 years, cornea replacement    • HYSTERECTOMY     • MOUTH SURGERY  2000    UNDER TONGUE AND LEFT FLOOR OF MOUTH FOR CA   • PARATHYROIDECTOMY      PER PT   • SIGMOIDOSCOPY N/A 7/29/2020    Procedure: FLEXIBLE SIGMOIDOSCOPY TO DESCENDING COLON WITH COLD BIOPSIES;  Surgeon: Augustine Gallego MD;  Location: [facility] ENDOSCOPY;  Service: Gastroenterology;  Laterality: N/A;  PRE: RECTAL BLEEDING  POST: DIVERTICULOSIS, HEMORRHOIDS, MINIMAL PROCTITIS   • SINUS SURGERY     • SKIN BIOPSY     • THYROID SURGERY     • VARICOSE VEIN SURGERY     ,   Family History   Problem Relation Age of Onset   • Esophageal cancer Father    • Stomach cancer Father    • Heart disease Mother    • Thyroid cancer Mother    • Hypertension Mother    • Hypertension Sister    • Hypertension Brother    • Stroke Brother    • Heart disease Brother    • Diabetes Brother    • Leukemia Paternal Aunt    • Malig Hyperthermia Neg Hx    ,   Social History     Socioeconomic History   • Marital status:      Spouse name: Nicolas   •  Number of children: Not on file   • Years of education: High school   • Highest education level: Not on file   Tobacco Use   • Smoking status: Former Smoker     Packs/day: 1.00     Years: 10.00     Pack years: 10.00     Types: Cigarettes     Start date:      Quit date:      Years since quittin.5   • Smokeless tobacco: Never Used   Vaping Use   • Vaping Use: Never used   Substance and Sexual Activity   • Alcohol use: No   • Drug use: No   • Sexual activity: Defer     E-cigarette/Vaping   • E-cigarette/Vaping Use Never User      E-cigarette/Vaping Substances     E-cigarette/Vaping Devices       ,   Medications Prior to Admission   Medication Sig Dispense Refill Last Dose   • amLODIPine (NORVASC) 5 MG tablet TAKE 2 TABLETS BY MOUTH EVERY  tablet 0 2021 at Unknown time   • aspirin 81 MG chewable tablet Chew 81 mg Daily.   2021 at Unknown time   • Cholecalciferol (VITAMIN D3) 50 MCG (2000 UT) capsule TAKE 1 CAPSULE BY MOUTH DAILY. 100 capsule 1 2021 at Unknown time   • dicyclomine (BENTYL) 20 MG tablet TAKE 1 CAPSULE BY MOUTH TWICE A DAY BEFORE BREAKFAST AND DINNER FOR 30 DAYS   Past Month at Unknown time   • escitalopram (LEXAPRO) 10 MG tablet TAKE 1 TABLET BY MOUTH EVERY DAY 90 tablet 0 2021 at Unknown time   • fluorometholone (FML) 0.1 % ophthalmic suspension Administer 1 drop into the left eye Daily.   2021 at Unknown time   • glucose blood (FREESTYLE TEST STRIPS) test strip TEST BLOOD SUGARS ONCE DAILY 50 each 12 2021 at Unknown time   • Lancets (FREESTYLE) lancets Test glucose once daily 100 each 3 2021 at Unknown time   • levothyroxine (SYNTHROID, LEVOTHROID) 50 MCG tablet TAKE 1 TABLET BY MOUTH EVERY DAY IN THE MORNING ON EMPTY STOMACH   2021 at Unknown time   • NIFEdipine CC (ADALAT CC) 30 MG 24 hr tablet TAKE 1 TABLET BY MOUTH EVERY DAY 90 tablet 0 2021 at Unknown time   • Omega-3 Fatty Acids (FISH OIL PO) Take  by mouth Daily As Needed.   2021 at  Unknown time   • Probiotic Product (PROBIOTIC DAILY PO) Take 1 capsule by mouth Daily.   7/1/2021 at Unknown time   • RABEprazole (ACIPHEX) 20 MG EC tablet Take 1 tablet by mouth Daily. 90 tablet 3 7/1/2021 at Unknown time   • sodium chloride (CAYLA 128) 5 % ophthalmic solution Administer 1 drop to the right eye As Needed.   7/1/2021 at Unknown time   • sucralfate (CARAFATE) 1 GM/10ML suspension TAKE 10 ML BY MOUTH 3 TIMES A DAY FOR 30 MINUTES BEFORE MEALS   Past Week at Unknown time   • valsartan (DIOVAN) 160 MG tablet TAKE 1 TABLET BY MOUTH EVERY DAY 30 tablet 5 7/1/2021 at Unknown time   , Scheduled Meds:  amLODIPine, 10 mg, Oral, Daily  aspirin, 81 mg, Oral, Daily  carvedilol, 6.25 mg, Oral, BID With Meals  celecoxib, 200 mg, Oral, Daily  cyclobenzaprine, 5 mg, Oral, TID  enoxaparin, 40 mg, Subcutaneous, Q24H  escitalopram, 10 mg, Oral, Daily  gabapentin, 100 mg, Oral, TID  levothyroxine, 50 mcg, Oral, Q AM  lidocaine, 1 patch, Transdermal, Q24H  methylPREDNISolone sodium succinate, 20 mg, Intravenous, Q8H  pantoprazole, 40 mg, Intravenous, Q AM  senna-docusate sodium, 2 tablet, Oral, BID  sodium chloride, 10 mL, Intravenous, Q12H  valsartan, 160 mg, Oral, Daily    , Continuous Infusions:   , PRN Meds:  senna-docusate sodium **AND** polyethylene glycol **AND** bisacodyl **AND** bisacodyl  •  HYDROcodone-acetaminophen  •  Morphine **AND** naloxone  •  ondansetron **OR** ondansetron  •  sodium chloride and Allergies:  Other, Sulfa antibiotics, Ciprofloxacin, Iodine, Macrodantin [nitrofurantoin macrocrystal], Nitrofurantoin, and Yeast-related products    Objective     Vital Signs   Temp:  [97 °F (36.1 °C)-97.7 °F (36.5 °C)] 97 °F (36.1 °C)  Heart Rate:  [67-86] 67  Resp:  [16] 16  BP: (113-192)/(56-91) 113/56  Body mass index is 28.74 kg/m².    Physical Exam    CONSTITUTIONAL: This 85 year old   female appears well developed, well-nourished and in no acute distress sitting up preparing to eat breakfast  relatively comfortably.    HEAD & FACE: the head and face are symmetric, normocephalic and atraumatic.    EYES: Inspection of the conjunctivae and lids reveals no swelling, erythema or discharge.  Pupils are round, equal and reactive to light and there is no scleral icterus.    FUNDOSCOPIC:  There are no retinal hemorrhages bilaterally.  There is no papilledema bilaterally.    EARS, NOSE, MOUTH & THROAT: On inspection, the ears, nose and oral cavity are within normal limits.    NECK: the neck is supple and symmetric. The trachea is midline with no masses.    PULMONARY: Respiratory effort is normal with no increased work of breathing or signs of respiratory distress.    CARDIOVASCULAR: Pedal pulses are +1/4 bilaterally. Examination of the extremities shows no edema or varicosities.    LYMPHATIC: There is no palpable lymphadenopathy of the neck.    MUSCULOSKELETAL: Gait and station are are not assessed this morning as the patient was in the middle of her meal. The spine is nontender to palpation    SKIN: The skin is warm, dry and intact    NEUROLOGIC:    Cranial Nerves 2 through 12 are grossly intact  Normal motor strength noted in the upper and lower extremities. Muscle bulk and tone are normal.  Sensory exam is normal to fine touch to confrontational testing bilaterally.  Reflexes on the right side demonstrates 1/4 Knee Jerk Reflex, 0/4 Ankle Jerk Reflex and no ankle clonus on the right.   Reflexes on the left side demonstrates 1/4 Knee Jerk Reflex, 0/4 Ankle Jerk Reflex and no ankle clonus on the left.  Superficial/Primitive Reflexes: primitive reflexes were absent including Kim's, Babinski and clonus bilateral  Straight leg raising test negative bilaterally with some accelerated back pain when lifting the left leg, Xander's test is negative but she does have pain in the left knee with manipulation of the left leg  No coordination deficit observed.  Cortical function is intact and without deficits. Speech is  normal.    PSYCHIATRIC: oriented to person, place and time. Patient's mood and affect are normal.      Results Review:   I reviewed the patient's new clinical results.    Images:    I personally reviewed the images from the following radiographic studies.    Lumbar radiographs done on 7/2/2021 at [facility] reveals mild dextroconvex scoliosis of the lower thoracic and upper lumbar spine.  Prominent osteophytic changes are seen from L1-L5 throughout the lumbar spine.  Less prominent degenerative changes seen at the L4-5 level with facet hypertrophic changes.    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  There is significant Modic changes at L1-L2 and L4-5 suggestive of advanced inflammatory change and unlikely but possible infection.  Due to the degenerative change there is mild central stenosis at L1-L2 where most of the inflammatory changes seen and some left neuroforaminal narrowing at that level.  There appears to be a fairly acute anterior disc herniation at the L1-L2 level that would not threaten the nerve roots being anterior to the spine into the retroperitoneum but it may be what has exacerbated her pain.  She has fairly severe lumbar stenosis at L4-5 but does not describe neurogenic claudication or specific radiculopathy that would correlate with that stenosis.    Lab Results (last 24 hours)     Procedure Component Value Units Date/Time    COVID PRE-OP / PRE-PROCEDURE SCREENING ORDER (NO ISOLATION) - Swab, Nasopharynx [[id number]]  (Normal) Collected: 07/02/21 1117    Specimen: Swab from Nasopharynx Updated: 07/02/21 2719    Narrative:      The following orders were created for panel order COVID PRE-OP / PRE-PROCEDURE SCREENING ORDER (NO ISOLATION) - Swab, Nasopharynx.  Procedure                               Abnormality         Status                     ---------                                -----------         ------                     COVID-19,APTIMA PANTHER,...[[id number]]  Normal              Final result                 Please view results for these tests on the individual orders.    COVID-19,APTIMA PANTHER,[facility] IN-HOUSE, NP/OP SWAB IN UTM/VTM/SALINE TRANSPORT MEDIA,24 HR TAT - Swab, Nasopharynx [[id number]]  (Normal) Collected: 07/02/21 1117    Specimen: Swab from Nasopharynx Updated: 07/02/21 1649     COVID19 Not Detected    Narrative:      Fact sheet for providers: https://www.fda.gov/media/067385/download     Fact sheet for patients: https://www.fda.gov/media/436076/download    Test performed by RT PCR.    Troponin [[id number]]  (Normal) Collected: 07/02/21 1558    Specimen: Blood Updated: 07/02/21 1752     Troponin T <0.010 ng/mL     Narrative:      Troponin T Reference Range:  <= 0.03 ng/mL-   Negative for AMI  >0.03 ng/mL-     Abnormal for myocardial necrosis.  Clinicians would have to utilize clinical acumen, EKG, Troponin and serial changes to determine if it is an Acute Myocardial Infarction or myocardial injury due to an underlying chronic condition.       Results may be falsely decreased if patient taking Biotin.      Troponin [[id number]]  (Normal) Collected: 07/02/21 2001    Specimen: Blood Updated: 07/02/21 2110     Troponin T <0.010 ng/mL     Narrative:      Troponin T Reference Range:  <= 0.03 ng/mL-   Negative for AMI  >0.03 ng/mL-     Abnormal for myocardial necrosis.  Clinicians would have to utilize clinical acumen, EKG, Troponin and serial changes to determine if it is an Acute Myocardial Infarction or myocardial injury due to an underlying chronic condition.       Results may be falsely decreased if patient taking Biotin.      D-dimer, Quantitative [[id number]]  (Normal) Collected: 07/02/21 2001    Specimen: Blood from Arm, Left Updated: 07/02/21 2100     D-Dimer, Quantitative 0.49 MCGFEU/mL     Narrative:      The Stago D-Dimer test used in conjunction with  a clinical pretest probability (PTP) assessment model, has been approved by the FDA to rule out the presence of venous thromboembolism (VTE) in outpatients suspected of deep venous thrombosis (DVT) or pulmonary embolism (PE). The cut-off for negative predictive value is <0.50 MCGFEU/mL.    C-reactive Protein [[id number]]  (Normal) Collected: 07/02/21 2001    Specimen: Blood Updated: 07/02/21 2122     C-Reactive Protein <0.30 mg/dL     Sedimentation Rate [[id number]]  (Abnormal) Collected: 07/02/21 2001    Specimen: Blood Updated: 07/02/21 2059     Sed Rate 44 mm/hr     Basic Metabolic Panel [[id number]]  (Abnormal) Collected: 07/03/21 0537    Specimen: Blood Updated: 07/03/21 0714     Glucose 143 mg/dL      BUN 19 mg/dL      Creatinine 0.76 mg/dL      Sodium 132 mmol/L      Potassium 4.2 mmol/L      Chloride 97 mmol/L      CO2 24.6 mmol/L      Calcium 9.5 mg/dL      eGFR Non African Amer 73 mL/min/1.73      BUN/Creatinine Ratio 25.0     Anion Gap 10.4 mmol/L     Narrative:      GFR Normal >60  Chronic Kidney Disease <60  Kidney Failure <15      CBC (No Diff) [[id number]]  (Abnormal) Collected: 07/03/21 0537    Specimen: Blood Updated: 07/03/21 0614     WBC 9.82 10*3/mm3      RBC 4.17 10*6/mm3      Hemoglobin 12.5 g/dL      Hematocrit 37.6 %      MCV 90.2 fL      MCH 30.0 pg      MCHC 33.2 g/dL      RDW 12.2 %      RDW-SD 39.8 fl      MPV 9.3 fL      Platelets 409 10*3/mm3     TSH [[id number]]  (Normal) Collected: 07/03/21 0537    Specimen: Blood Updated: 07/03/21 0719     TSH 0.404 uIU/mL     Hemoglobin A1c [[id number]]  (Abnormal) Collected: 07/03/21 0537    Specimen: Blood Updated: 07/03/21 0700     Hemoglobin A1C 5.70 %     Narrative:      Hemoglobin A1C Ranges:    Increased Risk for Diabetes  5.7% to 6.4%  Diabetes                     >= 6.5%  Diabetic Goal                < 7.0%          Assessment/Plan       Hypertension    Hypothyroidism    Acute left-sided low back pain with left-sided sciatica    Precordial chest  pain    DDD (degenerative disc disease), lumbosacral    Hyperglycemia    GERD without esophagitis      Patient has acute on chronic low back pain with signs of increased inflammatory change in several areas in her lumbar spine.  I asked nursing to obtain sed rate and C-reactive protein last night and the C-reactive protein is normal, the sedimentation rate is only mildly elevated, and her white blood cell count is normal.  Therefore does not appear there is signs of deep-seated or systemic infection accounting for any aspect of her pain.  I believe this is acute on chronic inflammatory changes of the lumbar spine and may respond quite favorably to interventional pain management technique.    The patient was really hoping to be evaluated by Dr. Mejia and I reassured her that Dr. Mejia nor I do interventional pain management but we can arrange for that to be done after 4 July holiday on Tuesday, July 6.  We will keep her comfortable in the hospital and have her evaluated by physical therapy while we arrange for the injection to be done next week.  I reassured her she can continue to maintain her relationship with Dr. Pierce to continue her long-term follow-up for treatment of her neck and back pain.  Obviously if she does not respond to the interventional pain management Dr. Mejia will be available at that point to provide consultation.  Since she is a bit better with medical management will have physical therapy see if they can mobilize her this weekend.    I discussed the patients findings and my recommendations with patient and family    Nhan Gonsalez MD  07/03/21  11:07 EDT

## 2021-07-03 NOTE — PLAN OF CARE
Goal Outcome Evaluation:  Plan of Care Reviewed With: patient, daughter        Progress: improving  Outcome Summary: Pt seen by OT this date for evaluation. Upon arrival pt lying supine in bed, daughter at bedside, A&O x3, 6/10 pain in L lower back and leg. Pt performed bed mobility with SBA-Min A this date. Pt performed sit>stand transition with use of standard walker to side step towards HOB with Min A and verbal cues. Pt Max A for LB dressing. Pt left in supine, needs in reach, alarm on, daughter at bedside. Pt voices concern about not wanting rehab but having to do HHOT at D/C due to  requiring her care. OT rec HHOT vs SNF at [facility]. Pt to benefit from skilled OT services to address goals and deficits. OT wore mask, gloves, glasses, hand hygiene performed.

## 2021-07-04 PROBLEM — E87.1 HYPONATREMIA: Status: ACTIVE | Noted: 2021-07-04

## 2021-07-04 LAB
ANION GAP SERPL CALCULATED.3IONS-SCNC: 11.3 MMOL/L (ref 5–15)
BUN SERPL-MCNC: 31 MG/DL (ref 8–23)
BUN/CREAT SERPL: 38.3 (ref 7–25)
CALCIUM SPEC-SCNC: 9.4 MG/DL (ref 8.6–10.5)
CHLORIDE SERPL-SCNC: 93 MMOL/L (ref 98–107)
CO2 SERPL-SCNC: 23.7 MMOL/L (ref 22–29)
CREAT SERPL-MCNC: 0.81 MG/DL (ref 0.57–1)
CREAT UR-MCNC: 33.1 MG/DL
GFR SERPL CREATININE-BSD FRML MDRD: 67 ML/MIN/1.73
GLUCOSE SERPL-MCNC: 144 MG/DL (ref 65–99)
OSMOLALITY UR: 311 MOSM/KG
POTASSIUM SERPL-SCNC: 4.9 MMOL/L (ref 3.5–5.2)
SODIUM SERPL-SCNC: 128 MMOL/L (ref 136–145)
SODIUM UR-SCNC: <20 MMOL/L

## 2021-07-04 PROCEDURE — 99213 OFFICE O/P EST LOW 20 MIN: CPT | Performed by: NURSE PRACTITIONER

## 2021-07-04 PROCEDURE — G0378 HOSPITAL OBSERVATION PER HR: HCPCS

## 2021-07-04 PROCEDURE — 83935 ASSAY OF URINE OSMOLALITY: CPT | Performed by: INTERNAL MEDICINE

## 2021-07-04 PROCEDURE — 96376 TX/PRO/DX INJ SAME DRUG ADON: CPT

## 2021-07-04 PROCEDURE — 25010000002 METHYLPREDNISOLONE PER 40 MG: Performed by: INTERNAL MEDICINE

## 2021-07-04 PROCEDURE — 97110 THERAPEUTIC EXERCISES: CPT

## 2021-07-04 PROCEDURE — 97162 PT EVAL MOD COMPLEX 30 MIN: CPT

## 2021-07-04 PROCEDURE — 80048 BASIC METABOLIC PNL TOTAL CA: CPT | Performed by: INTERNAL MEDICINE

## 2021-07-04 PROCEDURE — 84300 ASSAY OF URINE SODIUM: CPT | Performed by: INTERNAL MEDICINE

## 2021-07-04 PROCEDURE — 82570 ASSAY OF URINE CREATININE: CPT | Performed by: INTERNAL MEDICINE

## 2021-07-04 PROCEDURE — 94799 UNLISTED PULMONARY SVC/PX: CPT

## 2021-07-04 RX ORDER — METHYLPREDNISOLONE SODIUM SUCCINATE 40 MG/ML
20 INJECTION, POWDER, LYOPHILIZED, FOR SOLUTION INTRAMUSCULAR; INTRAVENOUS EVERY 12 HOURS
Status: DISCONTINUED | OUTPATIENT
Start: 2021-07-04 | End: 2021-07-05

## 2021-07-04 RX ADMIN — AMLODIPINE BESYLATE 10 MG: 10 TABLET ORAL at 08:28

## 2021-07-04 RX ADMIN — PANTOPRAZOLE SODIUM 40 MG: 40 INJECTION, POWDER, FOR SOLUTION INTRAVENOUS at 05:54

## 2021-07-04 RX ADMIN — HYDROCODONE BITARTRATE AND ACETAMINOPHEN 1 TABLET: 10; 325 TABLET ORAL at 14:07

## 2021-07-04 RX ADMIN — LEVOTHYROXINE SODIUM 50 MCG: 0.05 TABLET ORAL at 05:54

## 2021-07-04 RX ADMIN — SODIUM CHLORIDE, PRESERVATIVE FREE 10 ML: 5 INJECTION INTRAVENOUS at 09:06

## 2021-07-04 RX ADMIN — CARVEDILOL 6.25 MG: 6.25 TABLET, FILM COATED ORAL at 16:48

## 2021-07-04 RX ADMIN — GABAPENTIN 100 MG: 100 CAPSULE ORAL at 20:10

## 2021-07-04 RX ADMIN — LIDOCAINE 1 PATCH: 50 PATCH TOPICAL at 08:25

## 2021-07-04 RX ADMIN — GABAPENTIN 100 MG: 100 CAPSULE ORAL at 08:26

## 2021-07-04 RX ADMIN — DOCUSATE SODIUM 50MG AND SENNOSIDES 8.6MG 2 TABLET: 8.6; 5 TABLET, FILM COATED ORAL at 08:26

## 2021-07-04 RX ADMIN — HYDROCODONE BITARTRATE AND ACETAMINOPHEN 1 TABLET: 10; 325 TABLET ORAL at 08:26

## 2021-07-04 RX ADMIN — METHYLPREDNISOLONE SODIUM SUCCINATE 20 MG: 40 INJECTION, POWDER, FOR SOLUTION INTRAMUSCULAR; INTRAVENOUS at 04:46

## 2021-07-04 RX ADMIN — CYCLOBENZAPRINE 5 MG: 10 TABLET, FILM COATED ORAL at 20:10

## 2021-07-04 RX ADMIN — VALSARTAN 160 MG: 160 TABLET, FILM COATED ORAL at 08:26

## 2021-07-04 RX ADMIN — CYCLOBENZAPRINE 5 MG: 10 TABLET, FILM COATED ORAL at 16:48

## 2021-07-04 RX ADMIN — HYDROCODONE BITARTRATE AND ACETAMINOPHEN 1 TABLET: 10; 325 TABLET ORAL at 20:10

## 2021-07-04 RX ADMIN — CARVEDILOL 6.25 MG: 6.25 TABLET, FILM COATED ORAL at 08:26

## 2021-07-04 RX ADMIN — DOCUSATE SODIUM 50MG AND SENNOSIDES 8.6MG 2 TABLET: 8.6; 5 TABLET, FILM COATED ORAL at 20:10

## 2021-07-04 RX ADMIN — METHYLPREDNISOLONE SODIUM SUCCINATE 20 MG: 40 INJECTION, POWDER, FOR SOLUTION INTRAMUSCULAR; INTRAVENOUS at 22:18

## 2021-07-04 RX ADMIN — GABAPENTIN 100 MG: 100 CAPSULE ORAL at 16:48

## 2021-07-04 RX ADMIN — SODIUM CHLORIDE, PRESERVATIVE FREE 10 ML: 5 INJECTION INTRAVENOUS at 20:11

## 2021-07-04 RX ADMIN — CYCLOBENZAPRINE 5 MG: 10 TABLET, FILM COATED ORAL at 08:26

## 2021-07-04 RX ADMIN — ESCITALOPRAM 10 MG: 10 TABLET, FILM COATED ORAL at 08:26

## 2021-07-04 RX ADMIN — METHYLPREDNISOLONE SODIUM SUCCINATE 20 MG: 40 INJECTION, POWDER, FOR SOLUTION INTRAMUSCULAR; INTRAVENOUS at 10:49

## 2021-07-04 NOTE — PLAN OF CARE
Goal Outcome Evaluation:  Plan of Care Reviewed With: patient        Progress: no change  Outcome Summary: admit from ER on 7/2 for back pain. vss. RA. lidocaine patch on left lower back. pain tolerated with po prn meds. ambulates with x1 assist and walker with brp. plan for epidural tuesday 7/6. dc plan tbd. educated on bp monitoring.

## 2021-07-04 NOTE — PLAN OF CARE
Goal Outcome Evaluation:      Patient is a pleasant 85 y.o. female admitted to [facility] for Acute left-sided low back pain with left-sided sciatica on 7/2/2021- noted planned epidural for this upcoming Tuesday. 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 Today, patient performed bed mobility with SBA, required CGA for transfers, and ambulated 20' CGA with a RW- limited d/t radiating leg pain. Patient may benefit from skilled PT services acutely to address functional deficits as well as improve level of independence prior to discharge. Anticipate returning home upon DC- will continue to follow.

## 2021-07-04 NOTE — PROGRESS NOTES
Name: Geeta Butt ADMIT: 2021   : 1935  PCP: Daniel Villagomez APRN    MRN: [id number] LOS: 0 days   AGE/SEX: 85 y.o. female  ROOM: [facility]     Subjective   Subjective   Patient reports improvement of the left-sided low back pain now great 7/10.  Positive radiation of the pain to the left lower extremity.  No lower extremity numbness or weakness.  No abdominal pain.  No nausea or vomiting.  No dysuria or hematuria or urinary incontinence.    Review of Systems  Cardiovascular/respiratory.  No chest pain.  No shortness of breath.  No cough no hemoptysis.  No palpitation.  No fever or chills.  GI.  No nausea or vomiting.    Objective   Objective   Vital Signs  Temp:  [97.3 °F (36.3 °C)-98.1 °F (36.7 °C)] 98.1 °F (36.7 °C)  Heart Rate:  [65-70] 67  Resp:  [16] 16  BP: (126-147)/(61-67) 126/66  SpO2:  [93 %-96 %] 93 %  on   ;   Device (Oxygen Therapy): room air    Intake/Output Summary (Last 24 hours) at 2021 1640  Last data filed at 2021 1533  Gross per 24 hour   Intake 360 ml   Output 700 ml   Net -340 ml     Body mass index is 28.74 kg/m².      21  1204   Weight: 69 kg (152 lb 1.9 oz)     Physical Exam    General.  Elderly female.  Alert and oriented x3.  No respiratory distress.    No obvious pain.    Eyes.  Pupils equal round and reactive.  Intact extraocular musculature.  No pallor or jaundice.  Normal conjunctivae and lids.    Oral cavity.  Moist mucous membrane.  Neck.  Supple.  No JVD.  No lymphadenopathy or thyromegaly.  Cardiovascular.  Regular rate and rhythm.  Grade 2 systolic murmur.  Chest.    Poor bilateral air entry with scattered bilateral rhonchi..  Abdomen.  Soft lax.  No tenderness.  No organomegaly.  No guarding or rebound.  Extremities.  No clubbing cyanosis or edema.  Normal straight leg raising bilaterally.  No lower extremity neuro deficits.  Spine.  No localized tenderness.  CNS.  No acute focal neurological deficits    Results Review:      Results from last 7 days    Lab Units 07/04/21  0448 07/03/21  0537 07/02/21  0812   SODIUM mmol/L 128* 132* 136   POTASSIUM mmol/L 4.9 4.2 4.0   CHLORIDE mmol/L 93* 97* 100   CO2 mmol/L 23.7 24.6 26.0   BUN mg/dL 31* 19 12   CREATININE mg/dL 0.81 0.76 0.80   GLUCOSE mg/dL 144* 143* 113*   CALCIUM mg/dL 9.4 9.5 9.8   AST (SGOT) U/L  --   --  21   ALT (SGPT) U/L  --   --  26     Estimated Creatinine Clearance: 46 mL/min (by C-G formula based on SCr of 0.81 mg/dL).  Results from last 7 days   Lab Units 07/03/21  0537   HEMOGLOBIN A1C % 5.70*         Results from last 7 days   Lab Units 07/02/21 2001 07/02/21  1558   TROPONIN T ng/mL <0.010 <0.010         Results from last 7 days   Lab Units 07/03/21  0537   TSH uIU/mL 0.404           Invalid input(s):  PHOS        Invalid input(s): LDLCALC  Results from last 7 days   Lab Units 07/03/21  0537 07/02/21  0812   WBC 10*3/mm3 9.82 7.51   HEMOGLOBIN g/dL 12.5 13.3   HEMATOCRIT % 37.6 39.6   PLATELETS 10*3/mm3 409 419   MCV fL 90.2 90.2   MCH pg 30.0 30.3   MCHC g/dL 33.2 33.6   RDW % 12.2* 12.6   RDW-SD fl 39.8 41.7   MPV fL 9.3 9.3   NEUTROPHIL % %  --  70.5   LYMPHOCYTE % %  --  19.2*   MONOCYTES % %  --  7.5   EOSINOPHIL % %  --  1.3   BASOPHIL % %  --  1.1   IMM GRAN % %  --  0.4   NEUTROS ABS 10*3/mm3  --  5.30   LYMPHS ABS 10*3/mm3  --  1.44   MONOS ABS 10*3/mm3  --  0.56   EOS ABS 10*3/mm3  --  0.10   BASOS ABS 10*3/mm3  --  0.08   IMMATURE GRANS (ABS) 10*3/mm3  --  0.03   NRBC /100 WBC  --  0.0                 Results from last 7 days   Lab Units 07/02/21 2001   SED RATE mm/hr 44*   CRP mg/dL <0.30                           Imaging:  Imaging Results (Last 24 Hours)     ** No results found for the last 24 hours. **             I reviewed the patient's new clinical results / labs / tests / procedures      Assessment/Plan     Active Hospital Problems    Diagnosis  POA   • **Acute left-sided low back pain with left-sided sciatica [M54.42]  Yes   • Hyponatremia [E87.1]  No   • Precordial chest  pain [R07.2]  Yes   • DDD (degenerative disc disease), lumbosacral [M51.37]  Yes   • Hyperglycemia [R73.9]  Yes   • GERD without esophagitis [K21.9]  Yes   • Hypertension [I10]  Yes   • Hypothyroidism [E03.9]  Yes      Resolved Hospital Problems   No resolved problems to display.       1.  Acute low back pain with left sciatica in a patient with a history of degenerative disc disease of the lumbar spine.    MRI with degenerative disc disease and osteoarthritis.  Normal C-reactive protein.  No fever or leukocytosis to indicate osteomyelitis or discitis. Patient has positive radiculopathy on the left but no myelopathy objectively.  Neurosurgery saw the patient and recommends conservative treatment and nerve blocks.  Continue narcotics/Neurontin/Flexeril//IV steroids/Lidoderm patch.  Nonsteroidal anti-inflammatory drugs have been stopped.  I will decrease the IV steroids..  Physical therapy worked with the patient and her motility has improved.  Scheduled for epidural block on Tuesday.    2.  History of hypertension.  Improved blood pressure.  Resolved atypical chest pain.  No evidence of congestive heart failure.  We will continue Norvasc/losartan/Coreg.  EKG and troponins are negative for ischemia.    Normal D-dimer.  Chest x-ray is negative.  3.  History of GERD.  Plan IV Protonix.  No GI complications and benign GI examination.  4.  History of hypothyroidism.  Clinically euthyroid on replacement.  Normal TSH.  5.  Hyperglycemia/prediabetes..    A1c 5.7.  6.  History of stage III chronic renal failure.  Normal renal function at this time.  Patient appears euvolemic.  7.  VTE prophylaxis with Lovenox I will stop after today's dose in preparation for an epidural block on Tuesday.    8.  Hyponatremia.  Normal TSH.  Patient appears to be euvolemic.  I will check the urine electrolytes.  But I believe this is inappropriate antidiuretic hormone secretion secondary to water retention because of the steroids.  Will place on  fluid restriction.  Will decrease IV steroids.      · Discussed with patient.      Klaudia Dickey MD  [facility]  07/04/21  16:40 EDT

## 2021-07-04 NOTE — THERAPY EVALUATION
Patient Name: Geeta Butt  : 1935    MRN: [id number]                              Today's Date: 2021       Admit Date: 2021    Visit Dx:     ICD-10-CM ICD-9-CM   1. Acute left-sided low back pain with left-sided sciatica  M54.42 724.2     724.3     Patient Active Problem List   Diagnosis   • Hypertension   • Hypothyroidism   • Black stool   • Diarrhea   • Nausea & vomiting   • RUQ pain   • Leukocytosis   • UTI (urinary tract infection)   • Duodenitis   • Foot pain, bilateral   • Malignant neoplasm of lower-inner quadrant of left breast in female, estrogen receptor positive (CMS/HCC)   • Iron deficiency anemia   • Adverse effect of iron   • GI bleed   • GIULIA (acute kidney injury) (CMS/HCC)   • Gastrointestinal hemorrhage associated with duodenitis   • Hematochezia   • Duodenal ulcer   • Long-term use of high-risk medication   • Acute left-sided low back pain with left-sided sciatica   • Precordial chest pain   • DDD (degenerative disc disease), lumbosacral   • Hyperglycemia   • GERD without esophagitis     Past Medical History:   Diagnosis Date   • Anesthesia complication     ASPIRATION INTO AIRWAY WITH TRACH PRESENT IN PAST (WITH ORAL CANCER SURGERY(   • Arthritis    • Aspiration into airway     post anesthesia   • Breast cancer (CMS/HCC)    • Cancer (CMS/HCC)     mouth cancer    • Chronic kidney disease    • CKD (chronic kidney disease)     PT STATES STAGE 3   • Depression    • Diverticular disease    • Gastric ulcer     AND DUODENAL   • GERD (gastroesophageal reflux disease)    • Hearing loss     BILAT AIDES   • History of colon polyps    • History of depression    • History of GI bleed    • Hypertension    • Hypothyroidism    • Peptic ulceration    • PONV (postoperative nausea and vomiting)    • Poor vision     RIGHT EYE, HAS PERIPHERAL VISION    • Stroke (CMS/HCC)      mild weakness on left, partial sight loss on right      Past Surgical History:   Procedure Laterality Date   • ABDOMINAL  SURGERY     • APPENDECTOMY     • BLADDER REPAIR      AND RECTOCELE   • BREAST BIOPSY     • BREAST CYST ASPIRATION     • BREAST LUMPECTOMY WITH SENTINEL NODE BIOPSY Left 3/19/2018    Procedure: BREAST LUMPECTOMY WITH SENTINEL NODE BIOPSY AND NEEDLE LOCALIZATION;  Surgeon: Myles Soliman MD;  [place] [facility] [place] [facility] [place]  Service: General   • CHOLECYSTECTOMY     • COLONOSCOPY  2013   • COLONOSCOPY N/A 2/16/2018    Procedure: COLONOSCOPY into cecum and T.I. with biopsies;  Surgeon: Augustine Gallego MD;  [facility];  Service:    • ENDOSCOPY N/A 10/17/2017    Procedure: ESOPHAGOGASTRODUODENOSCOPY WITH COLD BIOPSIES;  Surgeon: Augustine Gallego MD;  Location: [facility] ENDOSCOPY;  Service:    • ENDOSCOPY N/A 2/16/2018    Procedure: ESOPHAGOGASTRODUODENOSCOPY with biopsies and #54 [place] DILATATION;  Surgeon: Augustine Gallego MD;  Location: [facility] ENDOSCOPY;  Service:    • ENDOSCOPY N/A 3/19/2020    Procedure: ESOPHAGOGASTRODUODENOSCOPY with biopsies;  Surgeon: Augustine Gallego MD;  Location: [facility] ENDOSCOPY;  Service: Gastroenterology;  Laterality: N/A;  PRE- MELENA, EPIGASTRIC PAIN  POST- eerosive gastritis, duodenal ulcer, hiatal hernia   • ENDOSCOPY N/A 7/29/2020    Procedure: ESOPHAGOGASTRODUODENOSCOPY WITH DUODENAL ASPIRATE AND COLD BIOPSIES;  Surgeon: Augustine Gallego MD;  Location: [facility] ENDOSCOPY;  Service: Gastroenterology;  Laterality: N/A;  PRE: HX ULCER DISEASE  POST: GASTRITIS, HEALED ULCER   • EYE SURGERY Left 2014    3-4 years, cornea replacement    • HYSTERECTOMY     • MOUTH SURGERY  2000    UNDER TONGUE AND LEFT FLOOR OF MOUTH FOR CA   • PARATHYROIDECTOMY      PER PT   • SIGMOIDOSCOPY N/A 7/29/2020    Procedure: FLEXIBLE SIGMOIDOSCOPY TO DESCENDING COLON WITH COLD BIOPSIES;  Surgeon: Augustine Gallego MD;  Location: [facility] ENDOSCOPY;  Service: Gastroenterology;  Laterality: N/A;  PRE: RECTAL BLEEDING  POST: DIVERTICULOSIS, HEMORRHOIDS, MINIMAL PROCTITIS   • SINUS SURGERY     • SKIN BIOPSY      • THYROID SURGERY     • VARICOSE VEIN SURGERY       General Information     Row Name 07/04/21 1049          Physical Therapy Time and Intention    Document Type  evaluation  -MS     Mode of Treatment  physical therapy  -MS     Row Name 07/04/21 1049          General Information    Patient Profile Reviewed  yes  -MS     Prior Level of Function  independent:;all household mobility access to standard walker and rollator if needed  -MS     Existing Precautions/Restrictions  fall;spinal  -MS     Barriers to Rehab  none identified  -MS     Row Name 07/04/21 1049          Living Environment    Lives With  spouse  -MS     Row Name 07/04/21 1049          Home Main Entrance    Number of Stairs, Main Entrance  other (see comments) 3 steps no rail or 5 steps w HR  -MS     Row Name 07/04/21 1049          Cognition    Orientation Status (Cognition)  oriented x 3  -MS     Row Name 07/04/21 1049          Safety Issues, Functional Mobility    Safety Issues Affecting Function (Mobility)  judgment;positioning of assistive device;sequencing abilities  -MS     Impairments Affecting Function (Mobility)  balance;endurance/activity tolerance;strength;pain  -MS       User Key  (r) = Recorded By, (t) = Taken By, (c) = Cosigned By    Initials Name Provider Type    MS Radha Campbell, PT Physical Therapist        Mobility     Row Name 07/04/21 1051          Bed Mobility    Bed Mobility  supine-sit;sit-supine  -MS     Supine-Sit [place] (Bed Mobility)  standby assist;verbal cues  -MS     Sit-Supine [place] (Bed Mobility)  standby assist;verbal cues  -MS     Assistive Device (Bed Mobility)  bed rails;head of bed elevated  -MS     Comment (Bed Mobility)  Reviewed log roll technique.  -MS     Row Name 07/04/21 1051          Sit-Stand Transfer    Sit-Stand [place] (Transfers)  contact guard;verbal cues  -MS     Assistive Device (Sit-Stand Transfers)  walker, front-wheeled  -MS     Row Name 07/04/21 1051          Gait/Stairs  (Locomotion)    [place] Level (Gait)  contact guard;verbal cues  -MS     Assistive Device (Gait)  walker, front-wheeled  -MS     Distance in Feet (Gait)  20' total  -MS     Deviations/Abnormal Patterns (Gait)  noam decreased;gait speed decreased  -MS     Bilateral Gait Deviations  forward flexed posture  -MS     Comment (Gait/Stairs)  Limited d/t radiating pain, mildly unsteady throughout.  -MS       User Key  (r) = Recorded By, (t) = Taken By, (c) = Cosigned By    Initials Name Provider Type    Radha Tijerina, PT Physical Therapist        Obj/Interventions     Row Name 07/04/21 1052          Range of Motion Comprehensive    Comment, General Range of Motion  B LEs grossly WFL  -MS     Row Name 07/04/21 1052          Strength Comprehensive (MMT)    Comment, General Manual Muscle Testing (MMT) Assessment  B LEs grossly at least 3/5  -MS     Row Name 07/04/21 1052          Balance    Static Sitting Balance  WFL;sitting, edge of bed  -MS     Dynamic Sitting Balance  WFL;sitting, edge of bed  -MS     Static Standing Balance  mild impairment;supported  -MS     Dynamic Standing Balance  mild impairment;supported  -MS     Row Name 07/04/21 1052          Sensory Assessment (Somatosensory)    Sensory Assessment (Somatosensory)  LE sensation intact  -MS       User Key  (r) = Recorded By, (t) = Taken By, (c) = Cosigned By    Initials Name Provider Type    Radha Tijerina, PT Physical Therapist        Goals/Plan     Row Name 07/04/21 1053          Bed Mobility Goal 1 (PT)    Activity/Assistive Device (Bed Mobility Goal 1, PT)  bed mobility activities, all  -MS     [place] Level/Cues Needed (Bed Mobility Goal 1, PT)  supervision required  -MS     Time Frame (Bed Mobility Goal 1, PT)  1 week  -MS     Row Name 07/04/21 1053          Transfer Goal 1 (PT)    Activity/Assistive Device (Transfer Goal 1, PT)  transfers, all LRAD  -MS     [place] Level/Cues Needed (Transfer Goal 1, PT)  supervision required   -MS     Time Frame (Transfer Goal 1, PT)  1 week  -MS     Row Name 07/04/21 1053          Gait Training Goal 1 (PT)    Activity/Assistive Device (Gait Training Goal 1, PT)  gait (walking locomotion) LRAD  -MS     [place] Level (Gait Training Goal 1, PT)  supervision required  -MS     Distance (Gait Training Goal 1, PT)  100'  -MS     Time Frame (Gait Training Goal 1, PT)  1 week  -MS     Row Name 07/04/21 1053          Stairs Goal 1 (PT)    Activity/Assistive Device (Stairs Goal 1, PT)  stairs, all skills  -MS     [place] Level/Cues Needed (Stairs Goal 1, PT)  contact guard assist  -MS     Number of Stairs (Stairs Goal 1, PT)  5  -MS     Time Frame (Stairs Goal 1, PT)  1 week  -MS       User Key  (r) = Recorded By, (t) = Taken By, (c) = Cosigned By    Initials Name Provider Type    Radha Tijerina, PT Physical Therapist        Clinical Impression     Row Name 07/04/21 1052          Pain Scale: Numbers Pre/Post-Treatment    Pretreatment Pain Rating  5/10  -MS     Posttreatment Pain Rating  5/10  -MS     Pain Location - Orientation  lower  -MS     Pain Location  back  -MS     Pain Intervention(s)  Ambulation/increased activity;Repositioned;Rest  -MS     Row Name 07/04/21 1052          Therapy Assessment/Plan (PT)    Rehab Potential (PT)  good, to achieve stated therapy goals  -MS     Criteria for Skilled Interventions Met (PT)  yes;meets criteria  -MS     Row Name 07/04/21 1052          Vital Signs    O2 Delivery Pre Treatment  room air  -MS     Row Name 07/04/21 1052          Positioning and Restraints    Pre-Treatment Position  in bed  -MS     Post Treatment Position  bed  -MS     In Bed  notified nsg;fowlers;call light within reach;encouraged to call for assist;exit alarm on  -MS       User Key  (r) = Recorded By, (t) = Taken By, (c) = Cosigned By    Initials Name Provider Type    Radha Tijerina, PT Physical Therapist        Outcome Measures     Row Name 07/04/21 1054          How much help  from another person do you currently need...    Turning from your back to your side while in flat bed without using bedrails?  4  -MS     Moving from lying on back to sitting on the side of a flat bed without bedrails?  3  -MS     Moving to and from a bed to a chair (including a wheelchair)?  3  -MS     Standing up from a chair using your arms (e.g., wheelchair, bedside chair)?  3  -MS     Climbing 3-5 steps with a railing?  2  -MS     To walk in hospital room?  3  -MS     AM-PAC 6 Clicks Score (PT)  18  -MS     Row Name 07/04/21 1054          Functional Assessment    Outcome Measure Options  AM-PAC 6 Clicks Basic Mobility (PT)  -MS       User Key  (r) = Recorded By, (t) = Taken By, (c) = Cosigned By    Initials Name Provider Type    Radha Tijerina, PT Physical Therapist        Physical Therapy Education                 Title: PT OT SLP Therapies (In Progress)     Topic: Physical Therapy (In Progress)     Point: Mobility training (Done)     Learning Progress Summary           Patient Acceptance, E,TB, VU,NR by MS at 7/4/2021 1054                   Point: Home exercise program (Not Started)     Learner Progress:  Not documented in this visit.          Point: Body mechanics (Done)     Learning Progress Summary           Patient Acceptance, E,TB, VU,NR by MS at 7/4/2021 1054                   Point: Precautions (Done)     Learning Progress Summary           Patient Acceptance, E,TB, VU,NR by MS at 7/4/2021 1054                               User Key     Initials Effective Dates Name Provider Type Discipline    MS 06/16/21 -  Radha Campbell, PT Physical Therapist PT              PT Recommendation and Plan  Planned Therapy Interventions (PT): balance training, bed mobility training, gait training, home exercise program, patient/family education, postural re-education, ROM (range of motion), stair training, strengthening, transfer training        Time Calculation:   PT Charges     Row Name 07/04/21 1044              Time Calculation    Start Time  0944  -MS      Stop Time  1001  -MS      Time Calculation (min)  17 min  -MS      PT Received On  07/04/21  -MS      PT - Next Appointment  07/05/21  -MS      PT Goal Re-Cert Due Date  07/11/21  -MS         Time Calculation- PT    Total Timed Code Minutes- PT  12 minute(s)  -MS        User Key  (r) = Recorded By, (t) = Taken By, (c) = Cosigned By    Initials Name Provider Type    Radha Tijerina, PT Physical Therapist        Therapy Charges for Today     Code Description Service Date Service Provider Modifiers Qty    21224225809 HC PT EVAL MOD COMPLEXITY 2 7/4/2021 Radha Campbell, PT GP 1    39083815402 HC PT THER PROC EA 15 MIN 7/4/2021 Radha Campbell, PT GP 1          PT G-Codes  Outcome Measure Options: AM-PAC 6 Clicks Basic Mobility (PT)  AM-PAC 6 Clicks Score (PT): 18  AM-PAC 6 Clicks Score (OT): 15    Radha Campbell PT  7/4/2021

## 2021-07-04 NOTE — PROGRESS NOTES
[facility] NEUROSURGERY PROGRESS NOTE      CC: Back pain       Subjective     Interval History: Pain managed with pain medication at this time.     ROS:  Constitutional: No fever, chills  GI: No nausea, vomiting  MS: Back pain  Neuro: No numbness, tingling, or weakness,    : No difficulty voiding, no incontinence    Objective     Vital signs in last 24 hours:  Temp:  [97 °F (36.1 °C)-97.8 °F (36.6 °C)] 97.8 °F (36.6 °C)  Heart Rate:  [65-70] 65  Resp:  [16] 16  BP: (128-147)/(62-67) 140/67    Intake/Output this shift:  No intake/output data recorded.    LABS:  Results from last 7 days   Lab Units 07/03/21  0537 07/02/21  0812   WBC 10*3/mm3 9.82 7.51   HEMOGLOBIN g/dL 12.5 13.3   HEMATOCRIT % 37.6 39.6   PLATELETS 10*3/mm3 409 419     Results from last 7 days   Lab Units 07/04/21  0448 07/03/21  0537 07/02/21  0812   SODIUM mmol/L 128* 132* 136   POTASSIUM mmol/L 4.9 4.2 4.0   CHLORIDE mmol/L 93* 97* 100   CO2 mmol/L 23.7 24.6 26.0   BUN mg/dL 31* 19 12   CREATININE mg/dL 0.81 0.76 0.80   CALCIUM mg/dL 9.4 9.5 9.8   BILIRUBIN mg/dL  --   --  0.3   ALK PHOS U/L  --   --  62   ALT (SGPT) U/L  --   --  26   AST (SGOT) U/L  --   --  21   GLUCOSE mg/dL 144* 143* 113*     7/3/21 TSH: 0.404  7/3/21 HGBA1C: 5.70  7/2/21 D-dimer 0.49  7/2/21 CRP: <0.30  7/2/21 Sed Rate 44      IMAGING STUDIES:  7/2/21 Lumbar MRI: Per report, new anterior left disc extrusion at L1-2 with surrounding hyperemia. Prominent endplate changes at L1-2 and L4-5.     I personally viewed and interpreted the patient's chart.    Meds reviewed/changed: Yes    Current Facility-Administered Medications:   •  amLODIPine (NORVASC) tablet 10 mg, 10 mg, Oral, Daily, Srini Ny APRN, 10 mg at 07/04/21 0828  •  sennosides-docusate (PERICOLACE) 8.6-50 MG per tablet 2 tablet, 2 tablet, Oral, BID, 2 tablet at 07/04/21 0826 **AND** polyethylene glycol (MIRALAX) packet 17 g, 17 g, Oral, Daily PRN **AND** bisacodyl (DULCOLAX) EC tablet 5 mg, 5 mg, Oral, Daily PRN  **AND** bisacodyl (DULCOLAX) suppository 10 mg, 10 mg, Rectal, Daily PRN, Srini Ny APRN  •  carvedilol (COREG) tablet 6.25 mg, 6.25 mg, Oral, BID With Meals, Klaudia Dickey MD, 6.25 mg at 07/04/21 0826  •  cyclobenzaprine (FLEXERIL) tablet 5 mg, 5 mg, Oral, TID, Klaudia Dickey MD, 5 mg at 07/04/21 0826  •  enoxaparin (LOVENOX) syringe 40 mg, 40 mg, Subcutaneous, Q24H, Klaudia Dickey MD, 40 mg at 07/03/21 2112  •  escitalopram (LEXAPRO) tablet 10 mg, 10 mg, Oral, Daily, Srini Ny APRN, 10 mg at 07/04/21 0826  •  gabapentin (NEURONTIN) capsule 100 mg, 100 mg, Oral, TID, Klaudia Dickey MD, 100 mg at 07/04/21 0826  •  HYDROcodone-acetaminophen (NORCO)  MG per tablet 1 tablet, 1 tablet, Oral, Q6H PRN, Srini Ny APRN, 1 tablet at 07/04/21 0826  •  levothyroxine (SYNTHROID, LEVOTHROID) tablet 50 mcg, 50 mcg, Oral, Q AM, Srini Ny APRN, 50 mcg at 07/04/21 0554  •  lidocaine (LIDODERM) 5 % 1 patch, 1 patch, Transdermal, Q24H, Klaudia Dickey MD, 1 patch at 07/04/21 0825  •  methylPREDNISolone sodium succinate (SOLU-Medrol) injection 20 mg, 20 mg, Intravenous, Q8H, Klaudia Dickey MD, 20 mg at 07/04/21 0446  •  morphine injection 2 mg, 2 mg, Intravenous, Q4H PRN, 2 mg at 07/03/21 1255 **AND** naloxone (NARCAN) injection 0.4 mg, 0.4 mg, Intravenous, Q5 Min PRN, Srini Ny APRN  •  ondansetron (ZOFRAN) tablet 4 mg, 4 mg, Oral, Q6H PRN **OR** ondansetron (ZOFRAN) injection 4 mg, 4 mg, Intravenous, Q6H PRN, Srini Ny APRN, 4 mg at 07/02/21 2024  •  pantoprazole (PROTONIX) injection 40 mg, 40 mg, Intravenous, Q AM, Klaudia Dickey MD, 40 mg at 07/04/21 0554  •  sodium chloride 0.9 % flush 10 mL, 10 mL, Intravenous, Q12H, Srini Ny APRN, 10 mL at 07/04/21 0906  •  sodium chloride 0.9 % flush 10 mL, 10 mL, Intravenous, PRN, Srini Ny APRN  •  valsartan (DIOVAN) tablet 160 mg, 160 mg, Oral, Daily, Srini Ny APRN, 160 mg at 07/04/21 0826    Physical  Exam:    General:   Awake, alert, oriented x3. Speech clear with no aphasia  Back:    Low  to palpation.    Motor: Normal muscle strength, bulk and tone in upper and lower extremities.  No fasciculations, rigidity, spasticity, or abnormal movements.  Sensation: Normal to light touch  Station and Gait:             Ambulating 20 feet with RW and CGA per PT  Extremities:   Wearing SCD      Assessment/Plan     ASSESSMENT:      Hypertension    Hypothyroidism    Acute left-sided low back pain with left-sided sciatica    Precordial chest pain    DDD (degenerative disc disease), lumbosacral    Hyperglycemia    GERD without esophagitis      PLAN: The patient reports pain LLE. Pain management was consulted for LESI. PT will see her this weekend for mobilization.      She will be NPO on Monday night for possible LESI Tuesday.     I discussed the patient's findings and my recommendations with patient and Dr. Gonsalez, on-call for Dr. Mejia.        LOS: 0 days       JESSA Yanes  7/4/2021  08:48 EDT

## 2021-07-05 LAB
ANION GAP SERPL CALCULATED.3IONS-SCNC: 11.4 MMOL/L (ref 5–15)
BUN SERPL-MCNC: 29 MG/DL (ref 8–23)
BUN/CREAT SERPL: 43.3 (ref 7–25)
CALCIUM SPEC-SCNC: 9.3 MG/DL (ref 8.6–10.5)
CHLORIDE SERPL-SCNC: 95 MMOL/L (ref 98–107)
CO2 SERPL-SCNC: 24.6 MMOL/L (ref 22–29)
CREAT SERPL-MCNC: 0.67 MG/DL (ref 0.57–1)
DEPRECATED RDW RBC AUTO: 39.3 FL (ref 37–54)
ERYTHROCYTE [DISTWIDTH] IN BLOOD BY AUTOMATED COUNT: 11.8 % (ref 12.3–15.4)
GFR SERPL CREATININE-BSD FRML MDRD: 84 ML/MIN/1.73
GLUCOSE SERPL-MCNC: 127 MG/DL (ref 65–99)
HCT VFR BLD AUTO: 36.5 % (ref 34–46.6)
HGB BLD-MCNC: 12.1 G/DL (ref 12–15.9)
MCH RBC QN AUTO: 30.1 PG (ref 26.6–33)
MCHC RBC AUTO-ENTMCNC: 33.2 G/DL (ref 31.5–35.7)
MCV RBC AUTO: 90.8 FL (ref 79–97)
PLATELET # BLD AUTO: 387 10*3/MM3 (ref 140–450)
PMV BLD AUTO: 9.8 FL (ref 6–12)
POTASSIUM SERPL-SCNC: 4.8 MMOL/L (ref 3.5–5.2)
RBC # BLD AUTO: 4.02 10*6/MM3 (ref 3.77–5.28)
SODIUM SERPL-SCNC: 131 MMOL/L (ref 136–145)
WBC # BLD AUTO: 14.69 10*3/MM3 (ref 3.4–10.8)

## 2021-07-05 PROCEDURE — 85027 COMPLETE CBC AUTOMATED: CPT | Performed by: INTERNAL MEDICINE

## 2021-07-05 PROCEDURE — 99213 OFFICE O/P EST LOW 20 MIN: CPT | Performed by: NEUROLOGICAL SURGERY

## 2021-07-05 PROCEDURE — 80048 BASIC METABOLIC PNL TOTAL CA: CPT | Performed by: INTERNAL MEDICINE

## 2021-07-05 PROCEDURE — 25010000002 METHYLPREDNISOLONE PER 40 MG: Performed by: INTERNAL MEDICINE

## 2021-07-05 PROCEDURE — 96376 TX/PRO/DX INJ SAME DRUG ADON: CPT

## 2021-07-05 PROCEDURE — G0378 HOSPITAL OBSERVATION PER HR: HCPCS

## 2021-07-05 RX ORDER — METHYLPREDNISOLONE SODIUM SUCCINATE 40 MG/ML
20 INJECTION, POWDER, LYOPHILIZED, FOR SOLUTION INTRAMUSCULAR; INTRAVENOUS DAILY
Status: DISCONTINUED | OUTPATIENT
Start: 2021-07-06 | End: 2021-07-06

## 2021-07-05 RX ADMIN — HYDROCODONE BITARTRATE AND ACETAMINOPHEN 1 TABLET: 10; 325 TABLET ORAL at 14:48

## 2021-07-05 RX ADMIN — GABAPENTIN 100 MG: 100 CAPSULE ORAL at 20:58

## 2021-07-05 RX ADMIN — LEVOTHYROXINE SODIUM 50 MCG: 0.05 TABLET ORAL at 06:12

## 2021-07-05 RX ADMIN — VALSARTAN 160 MG: 160 TABLET, FILM COATED ORAL at 08:14

## 2021-07-05 RX ADMIN — CARVEDILOL 6.25 MG: 6.25 TABLET, FILM COATED ORAL at 17:07

## 2021-07-05 RX ADMIN — GABAPENTIN 100 MG: 100 CAPSULE ORAL at 08:15

## 2021-07-05 RX ADMIN — SODIUM CHLORIDE, PRESERVATIVE FREE 10 ML: 5 INJECTION INTRAVENOUS at 20:58

## 2021-07-05 RX ADMIN — CYCLOBENZAPRINE 5 MG: 10 TABLET, FILM COATED ORAL at 15:38

## 2021-07-05 RX ADMIN — PANTOPRAZOLE SODIUM 40 MG: 40 INJECTION, POWDER, FOR SOLUTION INTRAVENOUS at 06:12

## 2021-07-05 RX ADMIN — CYCLOBENZAPRINE 5 MG: 10 TABLET, FILM COATED ORAL at 08:14

## 2021-07-05 RX ADMIN — LIDOCAINE 1 PATCH: 50 PATCH TOPICAL at 08:16

## 2021-07-05 RX ADMIN — AMLODIPINE BESYLATE 10 MG: 10 TABLET ORAL at 08:15

## 2021-07-05 RX ADMIN — ESCITALOPRAM 10 MG: 10 TABLET, FILM COATED ORAL at 08:14

## 2021-07-05 RX ADMIN — CYCLOBENZAPRINE 5 MG: 10 TABLET, FILM COATED ORAL at 20:57

## 2021-07-05 RX ADMIN — HYDROCODONE BITARTRATE AND ACETAMINOPHEN 1 TABLET: 10; 325 TABLET ORAL at 20:58

## 2021-07-05 RX ADMIN — GABAPENTIN 100 MG: 100 CAPSULE ORAL at 15:38

## 2021-07-05 RX ADMIN — CARVEDILOL 6.25 MG: 6.25 TABLET, FILM COATED ORAL at 08:15

## 2021-07-05 RX ADMIN — HYDROCODONE BITARTRATE AND ACETAMINOPHEN 1 TABLET: 10; 325 TABLET ORAL at 08:16

## 2021-07-05 RX ADMIN — METHYLPREDNISOLONE SODIUM SUCCINATE 20 MG: 40 INJECTION, POWDER, FOR SOLUTION INTRAMUSCULAR; INTRAVENOUS at 11:42

## 2021-07-05 RX ADMIN — DOCUSATE SODIUM 50MG AND SENNOSIDES 8.6MG 2 TABLET: 8.6; 5 TABLET, FILM COATED ORAL at 08:14

## 2021-07-05 NOTE — PROGRESS NOTES
NEUROSURGERY PROGRESS NOTE     LOS: 0 days   Patient Care Team:  Daniel Villagomez APRN as PCP - General (Internal Medicine)  Omi Valenzuela MD as Consulting Physician (Hematology and Oncology)  Sidney Campbell MD as Referring Physician (Dermatology)  Amador Brandt MD as Consulting Physician (Orthopedic Surgery)    Chief Complaint: Back and left hip pain    Subjective     Interval History:     Patient certainly notes improvement since admission but still has significant low back pain and left hip pain.  She denies weakness or numbness in both lower extremities.  She has been able to get out of bed with assistance without significant change in the pattern of pain.    While in the room and during my examination of the patient I wore a mask and eye protection.  I washed my hands before and after this patient encounter.  The patient was also wearing a mask.     History taken from: patient    Objective      Vital Signs  Temp:  [96.9 °F (36.1 °C)-98.1 °F (36.7 °C)] 96.9 °F (36.1 °C)  Heart Rate:  [62-73] 64  Resp:  [12-18] 12  BP: (114-155)/(54-69) 155/69  Body mass index is 28.74 kg/m².    Intake/Output last 3 shifts:  I/O last 3 completed shifts:  In: 840 [P.O.:840]  Out: 3050 [Urine:3050]    Intake/Output this shift:  I/O this shift:  In: 240 [P.O.:240]  Out: 350 [Urine:350]    Physical    Alert and oriented x3 and comfortable sitting in bed upright  Motor exam 5 5 symmetrical in the upper lower extremities  Sensory exam normal to light touch bilaterally in both lower extremities  No edema of the lower extremities    Results Review:     I reviewed the patient's new clinical results.    Labs:    Lab Results (last 24 hours)     Procedure Component Value Units Date/Time    Osmolality, Urine - Urine, Clean Catch [[id number]] Collected: 07/04/21 1712    Specimen: Urine, Clean Catch Updated: 07/04/21 1811     Osmolality, Urine 311 mOsm/kg     Narrative:      Osmo Normal Reference Ranges:    Random:  mOsm/kg  H2O, depending on fluid intake.  Random: >850 mOsm/kg H20, after 12 hour fluid restriction.    24 Hour: 300-900 mOsm/kg H2O.    Creatinine, Urine, Random - Urine, Clean Catch [[id number]] Collected: 07/04/21 1712    Specimen: Urine, Clean Catch Updated: 07/04/21 1742     Creatinine, Urine 33.1 mg/dL     Narrative:      Reference intervals for random urine have not been established.  Clinical usage is dependent upon physician's interpretation in combination with other laboratory tests.       Sodium, Urine, Random - Urine, Clean Catch [[id number]] Collected: 07/04/21 1712    Specimen: Urine, Clean Catch Updated: 07/04/21 1747     Sodium, Urine <20 mmol/L     Narrative:      Reference intervals for random urine have not been established.  Clinical usage is dependent upon physician's interpretation in combination with other laboratory tests.       CBC (No Diff) [[id number]]  (Abnormal) Collected: 07/05/21 0443    Specimen: Blood Updated: 07/05/21 0536     WBC 14.69 10*3/mm3      RBC 4.02 10*6/mm3      Hemoglobin 12.1 g/dL      Hematocrit 36.5 %      MCV 90.8 fL      MCH 30.1 pg      MCHC 33.2 g/dL      RDW 11.8 %      RDW-SD 39.3 fl      MPV 9.8 fL      Platelets 387 10*3/mm3     Basic Metabolic Panel [[id number]]  (Abnormal) Collected: 07/05/21 0443    Specimen: Blood Updated: 07/05/21 0603     Glucose 127 mg/dL      BUN 29 mg/dL      Creatinine 0.67 mg/dL      Sodium 131 mmol/L      Potassium 4.8 mmol/L      Comment: Slight hemolysis detected by analyzer. Results may be affected.        Chloride 95 mmol/L      CO2 24.6 mmol/L      Calcium 9.3 mg/dL      eGFR Non African Amer 84 mL/min/1.73      BUN/Creatinine Ratio 43.3     Anion Gap 11.4 mmol/L     Narrative:      GFR Normal >60  Chronic Kidney Disease <60  Kidney Failure <15              Current Medications:   Scheduled Meds:amLODIPine, 10 mg, Oral, Daily  carvedilol, 6.25 mg, Oral, BID With Meals  cyclobenzaprine, 5 mg, Oral, TID  escitalopram, 10 mg, Oral,  Daily  gabapentin, 100 mg, Oral, TID  levothyroxine, 50 mcg, Oral, Q AM  lidocaine, 1 patch, Transdermal, Q24H  methylPREDNISolone sodium succinate, 20 mg, Intravenous, Q12H  pantoprazole, 40 mg, Intravenous, Q AM  senna-docusate sodium, 2 tablet, Oral, BID  sodium chloride, 10 mL, Intravenous, Q12H  valsartan, 160 mg, Oral, Daily      Continuous Infusions:     Assessment/Plan       Acute left-sided low back pain with left-sided sciatica    Hypertension    Hypothyroidism    Precordial chest pain    DDD (degenerative disc disease), lumbosacral    Hyperglycemia    GERD without esophagitis    Hyponatremia      Plan: We will have patient be n.p.o. after midnight tonight and she has been off aspirin through the weekend.  Hopefully she will get a significant response to the epidural steroid injection tomorrow to be able to be more mobile and perform better with physical therapy.  She would like every effort to try to avoid any thought of surgical intervention.  She has requested Dr. Mejia to continue her neurosurgical care upon his return tomorrow.      Nhan Gonsalez MD  07/05/21  11:29 EDT

## 2021-07-05 NOTE — NURSING NOTE
I fully agree with the documentation presented in this chart completed by Nusrat Mcghee RN.    Cosigned:  JAYA AmaralN, RN

## 2021-07-05 NOTE — PROGRESS NOTES
Name: Geeta Butt ADMIT: 2021   : 1935  PCP: Daniel Villagomez APRN    MRN: [id number] LOS: 0 days   AGE/SEX: 85 y.o. female  ROOM: [facility]     Subjective   Subjective   No change and the left-sided low back pain now great 7/10.  Positive radiation of the pain to the left lower extremity.  No lower extremity numbness or weakness.  No abdominal pain.  No nausea or vomiting.  No dysuria or hematuria or urinary incontinence.    Review of Systems  Cardiovascular/respiratory.  No chest pain.  No shortness of breath.  No cough no hemoptysis.  No palpitation.  No fever or chills.  GI.  No nausea or vomiting.    Objective   Objective   Vital Signs  Temp:  [96.9 °F (36.1 °C)-97.3 °F (36.3 °C)] 97.1 °F (36.2 °C)  Heart Rate:  [60-67] 67  Resp:  [12-18] 16  BP: (114-155)/(54-69) 155/67  SpO2:  [93 %-95 %] 93 %  on   ;   Device (Oxygen Therapy): room air    Intake/Output Summary (Last 24 hours) at 2021  Last data filed at 2021 181  Gross per 24 hour   Intake 1200 ml   Output 2700 ml   Net -1500 ml     Body mass index is 28.74 kg/m².      21  1204   Weight: 69 kg (152 lb 1.9 oz)     Physical Exam    General.  Elderly female.  Alert and oriented x3.  No respiratory distress.    No obvious pain.    Eyes.  Pupils equal round and reactive.  Intact extraocular musculature.  No pallor or jaundice.  Normal conjunctivae and lids.    Oral cavity.  Moist mucous membrane.  Neck.  Supple.  No JVD.  No lymphadenopathy or thyromegaly.  Cardiovascular.  Regular rate and rhythm.  Grade 2 systolic murmur.  Chest.    Poor bilateral air entry with scattered bilateral rhonchi..  Abdomen.  Soft lax.  No tenderness.  No organomegaly.  No guarding or rebound.  Extremities.  No clubbing cyanosis or edema.  Normal straight leg raising bilaterally.  No lower extremity neuro deficits.  Normal straight leg raising bilaterally  Spine.  No localized tenderness.  CNS.  No acute focal neurological deficits    Results Review:       Results from last 7 days   Lab Units 07/05/21 0443 07/04/21 0448 07/03/21  0537 07/02/21  0812   SODIUM mmol/L 131* 128* 132* 136   POTASSIUM mmol/L 4.8 4.9 4.2 4.0   CHLORIDE mmol/L 95* 93* 97* 100   CO2 mmol/L 24.6 23.7 24.6 26.0   BUN mg/dL 29* 31* 19 12   CREATININE mg/dL 0.67 0.81 0.76 0.80   GLUCOSE mg/dL 127* 144* 143* 113*   CALCIUM mg/dL 9.3 9.4 9.5 9.8   AST (SGOT) U/L  --   --   --  21   ALT (SGPT) U/L  --   --   --  26     Estimated Creatinine Clearance: 46.5 mL/min (by C-G formula based on SCr of 0.67 mg/dL).  Results from last 7 days   Lab Units 07/03/21  0537   HEMOGLOBIN A1C % 5.70*         Results from last 7 days   Lab Units 07/02/21 2001 07/02/21  1558   TROPONIN T ng/mL <0.010 <0.010         Results from last 7 days   Lab Units 07/03/21  0537   TSH uIU/mL 0.404           Invalid input(s):  PHOS        Invalid input(s): LDLCALC  Results from last 7 days   Lab Units 07/05/21 0443 07/03/21 0537 07/02/21  0812   WBC 10*3/mm3 14.69* 9.82 7.51   HEMOGLOBIN g/dL 12.1 12.5 13.3   HEMATOCRIT % 36.5 37.6 39.6   PLATELETS 10*3/mm3 387 409 419   MCV fL 90.8 90.2 90.2   MCH pg 30.1 30.0 30.3   MCHC g/dL 33.2 33.2 33.6   RDW % 11.8* 12.2* 12.6   RDW-SD fl 39.3 39.8 41.7   MPV fL 9.8 9.3 9.3   NEUTROPHIL % %  --   --  70.5   LYMPHOCYTE % %  --   --  19.2*   MONOCYTES % %  --   --  7.5   EOSINOPHIL % %  --   --  1.3   BASOPHIL % %  --   --  1.1   IMM GRAN % %  --   --  0.4   NEUTROS ABS 10*3/mm3  --   --  5.30   LYMPHS ABS 10*3/mm3  --   --  1.44   MONOS ABS 10*3/mm3  --   --  0.56   EOS ABS 10*3/mm3  --   --  0.10   BASOS ABS 10*3/mm3  --   --  0.08   IMMATURE GRANS (ABS) 10*3/mm3  --   --  0.03   NRBC /100 WBC  --   --  0.0                 Results from last 7 days   Lab Units 07/02/21 2001   SED RATE mm/hr 44*   CRP mg/dL <0.30                     Results from last 7 days   Lab Units 07/04/21  1712   SODIUM UR mmol/L <20   CREATININE UR mg/dL 33.1   OSMOLALITY UR mOsm/kg 311       Imaging:  Imaging  Results (Last 24 Hours)     ** No results found for the last 24 hours. **             I reviewed the patient's new clinical results / labs / tests / procedures      Assessment/Plan     Active Hospital Problems    Diagnosis  POA   • **Acute left-sided low back pain with left-sided sciatica [M54.42]  Yes   • Hyponatremia [E87.1]  No   • Precordial chest pain [R07.2]  Yes   • DDD (degenerative disc disease), lumbosacral [M51.37]  Yes   • Hyperglycemia [R73.9]  Yes   • GERD without esophagitis [K21.9]  Yes   • Hypertension [I10]  Yes   • Hypothyroidism [E03.9]  Yes      Resolved Hospital Problems   No resolved problems to display.       1.  Acute low back pain with left sciatica in a patient with a history of degenerative disc disease of the lumbar spine.    MRI with degenerative disc disease and osteoarthritis.  Normal C-reactive protein.  No fever or leukocytosis to indicate osteomyelitis or discitis. Patient has positive radiculopathy on the left but no myelopathy objectively.  Neurosurgery saw the patient and recommends conservative treatment and nerve blocks.  Continue narcotics/Neurontin/Flexeril//IV steroids/Lidoderm patch.  Nonsteroidal anti-inflammatory drugs have been stopped.  I will decrease the IV steroids.. Patient on physical therapy.  The scheduled for epidural block tomorrow.    2.  History of hypertension.  Improved blood pressure.  Resolved atypical chest pain.  No evidence of congestive heart failure.  We will continue Norvasc/losartan/Coreg.  EKG and troponins are negative for ischemia.    Normal D-dimer.  Chest x-ray is negative.  3.  History of GERD.  Plan IV Protonix.  No GI complications and benign GI examination.  4.  History of hypothyroidism.  Clinically euthyroid on replacement.  Normal TSH.  5.  Hyperglycemia/prediabetes..    A1c 5.7.  6.  History of stage III chronic renal failure.  Normal renal function at this time.  Patient appears euvolemic.  7.  VTE prophylaxis with Lovenox I will  stop after today's dose in preparation for an epidural block on Tuesday.    8.  Hyponatremia.  Normal TSH.  Patient appears to be euvolemic.  Mostly inappropriate antidiuretic hormone secretion secondary to water retention because of the steroids.  Improved.  Continue fluid restriction and continue decreasing IV steroids.  9.  Steroid-induced leukocytosis    · Discussed with patient.      Klaudia Dickey MD  [facility]  07/05/21  18:11 EDT

## 2021-07-05 NOTE — PLAN OF CARE
See below  Problem: Adult Inpatient Plan of Care  Goal: Plan of Care Review  7/5/2021 1715 by Nusrat Mcghee, RN  Outcome: Ongoing, Progressing  Flowsheets (Taken 7/5/2021 1715)  Progress: improving  Plan of Care Reviewed With:   patient   daughter  Outcome Summary: 86 y/o female presenting with acute low back pain and sciatica down left side. VSS, Ra, up with assist x1, walker and gait belt. pt reports pain increasing during activity. Plan is to have an epidural on 7/6. pt kept on fluid restriction 1500 cc.

## 2021-07-05 NOTE — PLAN OF CARE
Goal Outcome Evaluation:  Plan of Care Reviewed With: patient        Progress: no change  Outcome Summary: vitals stable.  pt expressed pain.  meds given per orders.  up with 1 person assist.  plan is to get an epidural injection on 7/6.  will continue to monitor.

## 2021-07-06 ENCOUNTER — ANESTHESIA EVENT (OUTPATIENT)
Dept: PAIN MEDICINE | Facility: HOSPITAL | Age: 86
End: 2021-07-06

## 2021-07-06 ENCOUNTER — APPOINTMENT (OUTPATIENT)
Dept: GENERAL RADIOLOGY | Facility: HOSPITAL | Age: 86
End: 2021-07-06

## 2021-07-06 ENCOUNTER — APPOINTMENT (OUTPATIENT)
Dept: PAIN MEDICINE | Facility: HOSPITAL | Age: 86
End: 2021-07-06

## 2021-07-06 ENCOUNTER — ANESTHESIA (OUTPATIENT)
Dept: PAIN MEDICINE | Facility: HOSPITAL | Age: 86
End: 2021-07-06

## 2021-07-06 LAB
ANION GAP SERPL CALCULATED.3IONS-SCNC: 8.3 MMOL/L (ref 5–15)
BUN SERPL-MCNC: 30 MG/DL (ref 8–23)
BUN/CREAT SERPL: 37 (ref 7–25)
CALCIUM SPEC-SCNC: 9.4 MG/DL (ref 8.6–10.5)
CHLORIDE SERPL-SCNC: 96 MMOL/L (ref 98–107)
CO2 SERPL-SCNC: 27.7 MMOL/L (ref 22–29)
CREAT SERPL-MCNC: 0.81 MG/DL (ref 0.57–1)
DEPRECATED RDW RBC AUTO: 39.3 FL (ref 37–54)
ERYTHROCYTE [DISTWIDTH] IN BLOOD BY AUTOMATED COUNT: 12 % (ref 12.3–15.4)
GFR SERPL CREATININE-BSD FRML MDRD: 67 ML/MIN/1.73
GLUCOSE SERPL-MCNC: 92 MG/DL (ref 65–99)
HCT VFR BLD AUTO: 35.2 % (ref 34–46.6)
HGB BLD-MCNC: 11.8 G/DL (ref 12–15.9)
MCH RBC QN AUTO: 29.9 PG (ref 26.6–33)
MCHC RBC AUTO-ENTMCNC: 33.5 G/DL (ref 31.5–35.7)
MCV RBC AUTO: 89.3 FL (ref 79–97)
PLATELET # BLD AUTO: 351 10*3/MM3 (ref 140–450)
PMV BLD AUTO: 9.8 FL (ref 6–12)
POTASSIUM SERPL-SCNC: 4.5 MMOL/L (ref 3.5–5.2)
RBC # BLD AUTO: 3.94 10*6/MM3 (ref 3.77–5.28)
SODIUM SERPL-SCNC: 132 MMOL/L (ref 136–145)
WBC # BLD AUTO: 12.39 10*3/MM3 (ref 3.4–10.8)

## 2021-07-06 PROCEDURE — 25010000002 MIDAZOLAM PER 1 MG: Performed by: ANESTHESIOLOGY

## 2021-07-06 PROCEDURE — 80048 BASIC METABOLIC PNL TOTAL CA: CPT | Performed by: INTERNAL MEDICINE

## 2021-07-06 PROCEDURE — 85027 COMPLETE CBC AUTOMATED: CPT | Performed by: INTERNAL MEDICINE

## 2021-07-06 PROCEDURE — 99212 OFFICE O/P EST SF 10 MIN: CPT | Performed by: NURSE PRACTITIONER

## 2021-07-06 PROCEDURE — G0378 HOSPITAL OBSERVATION PER HR: HCPCS

## 2021-07-06 PROCEDURE — 25010000002 METHYLPREDNISOLONE PER 40 MG: Performed by: INTERNAL MEDICINE

## 2021-07-06 PROCEDURE — 25010000002 METHYLPREDNISOLONE PER 80 MG: Performed by: ANESTHESIOLOGY

## 2021-07-06 PROCEDURE — 96376 TX/PRO/DX INJ SAME DRUG ADON: CPT

## 2021-07-06 PROCEDURE — 77003 FLUOROGUIDE FOR SPINE INJECT: CPT

## 2021-07-06 PROCEDURE — 97116 GAIT TRAINING THERAPY: CPT

## 2021-07-06 RX ORDER — MIDAZOLAM HYDROCHLORIDE 1 MG/ML
1 INJECTION INTRAMUSCULAR; INTRAVENOUS
Status: DISCONTINUED | OUTPATIENT
Start: 2021-07-06 | End: 2021-07-06

## 2021-07-06 RX ORDER — METHYLPREDNISOLONE ACETATE 80 MG/ML
80 INJECTION, SUSPENSION INTRA-ARTICULAR; INTRALESIONAL; INTRAMUSCULAR; SOFT TISSUE ONCE
Status: COMPLETED | OUTPATIENT
Start: 2021-07-06 | End: 2021-07-06

## 2021-07-06 RX ORDER — LIDOCAINE HYDROCHLORIDE 10 MG/ML
1 INJECTION, SOLUTION INFILTRATION; PERINEURAL ONCE
Status: DISCONTINUED | OUTPATIENT
Start: 2021-07-06 | End: 2021-07-07 | Stop reason: HOSPADM

## 2021-07-06 RX ORDER — FENTANYL CITRATE 50 UG/ML
50 INJECTION, SOLUTION INTRAMUSCULAR; INTRAVENOUS AS NEEDED
Status: DISCONTINUED | OUTPATIENT
Start: 2021-07-06 | End: 2021-07-06

## 2021-07-06 RX ADMIN — MIDAZOLAM 0.5 MG: 1 INJECTION INTRAMUSCULAR; INTRAVENOUS at 11:07

## 2021-07-06 RX ADMIN — CYCLOBENZAPRINE 5 MG: 10 TABLET, FILM COATED ORAL at 16:37

## 2021-07-06 RX ADMIN — HYDROCODONE BITARTRATE AND ACETAMINOPHEN 1 TABLET: 10; 325 TABLET ORAL at 16:37

## 2021-07-06 RX ADMIN — CYCLOBENZAPRINE 5 MG: 10 TABLET, FILM COATED ORAL at 20:44

## 2021-07-06 RX ADMIN — METHYLPREDNISOLONE SODIUM SUCCINATE 20 MG: 40 INJECTION, POWDER, FOR SOLUTION INTRAMUSCULAR; INTRAVENOUS at 08:15

## 2021-07-06 RX ADMIN — METHYLPREDNISOLONE ACETATE 80 MG: 80 INJECTION, SUSPENSION INTRA-ARTICULAR; INTRALESIONAL; INTRAMUSCULAR; SOFT TISSUE at 11:08

## 2021-07-06 RX ADMIN — GABAPENTIN 100 MG: 100 CAPSULE ORAL at 16:37

## 2021-07-06 RX ADMIN — SODIUM CHLORIDE, PRESERVATIVE FREE 10 ML: 5 INJECTION INTRAVENOUS at 20:44

## 2021-07-06 RX ADMIN — CARVEDILOL 6.25 MG: 6.25 TABLET, FILM COATED ORAL at 08:15

## 2021-07-06 RX ADMIN — CARVEDILOL 6.25 MG: 6.25 TABLET, FILM COATED ORAL at 18:12

## 2021-07-06 RX ADMIN — GABAPENTIN 100 MG: 100 CAPSULE ORAL at 20:46

## 2021-07-06 NOTE — THERAPY TREATMENT NOTE
Patient Name: Geeta Butt  : 1935    MRN: [id number]                              Today's Date: 2021       Admit Date: 2021    Visit Dx:     ICD-10-CM ICD-9-CM   1. Acute left-sided low back pain with left-sided sciatica  M54.42 724.2     724.3     Patient Active Problem List   Diagnosis   • Hypertension   • Hypothyroidism   • Black stool   • Diarrhea   • Nausea & vomiting   • RUQ pain   • Leukocytosis   • UTI (urinary tract infection)   • Duodenitis   • Foot pain, bilateral   • Malignant neoplasm of lower-inner quadrant of left breast in female, estrogen receptor positive (CMS/HCC)   • Iron deficiency anemia   • Adverse effect of iron   • GI bleed   • GIULIA (acute kidney injury) (CMS/HCC)   • Gastrointestinal hemorrhage associated with duodenitis   • Hematochezia   • Duodenal ulcer   • Long-term use of high-risk medication   • Acute left-sided low back pain with left-sided sciatica   • Precordial chest pain   • DDD (degenerative disc disease), lumbosacral   • Hyperglycemia   • GERD without esophagitis   • Hyponatremia     Past Medical History:   Diagnosis Date   • Anesthesia complication     ASPIRATION INTO AIRWAY WITH TRACH PRESENT IN PAST (WITH ORAL CANCER SURGERY(   • Arthritis    • Aspiration into airway     post anesthesia   • Breast cancer (CMS/HCC)    • Cancer (CMS/HCC)     mouth cancer    • Chronic kidney disease    • CKD (chronic kidney disease)     PT STATES STAGE 3   • Depression    • Diverticular disease    • Gastric ulcer     AND DUODENAL   • GERD (gastroesophageal reflux disease)    • Hearing loss     BILAT AIDES   • History of colon polyps    • History of depression    • History of GI bleed    • Hypertension    • Hypothyroidism    • Peptic ulceration    • PONV (postoperative nausea and vomiting)    • Poor vision     RIGHT EYE, HAS PERIPHERAL VISION    • Stroke (CMS/HCC)      mild weakness on left, partial sight loss on right      Past Surgical History:   Procedure Laterality  Date   • ABDOMINAL SURGERY     • APPENDECTOMY     • BLADDER REPAIR      AND RECTOCELE   • BREAST BIOPSY     • BREAST CYST ASPIRATION     • BREAST LUMPECTOMY WITH SENTINEL NODE BIOPSY Left 3/19/2018    Procedure: BREAST LUMPECTOMY WITH SENTINEL NODE BIOPSY AND NEEDLE LOCALIZATION;  Surgeon: Myles Soliman MD;  [place]  [facility] [place] [facility] [place]  Service: General   • CHOLECYSTECTOMY     • COLONOSCOPY  2013   • COLONOSCOPY N/A 2/16/2018    Procedure: COLONOSCOPY into cecum and T.I. with biopsies;  Surgeon: Augustine Gallego MD;  [facility];  Service:    • ENDOSCOPY N/A 10/17/2017    Procedure: ESOPHAGOGASTRODUODENOSCOPY WITH COLD BIOPSIES;  Surgeon: Augustine Glalego MD;  [facility];  Service:    • ENDOSCOPY N/A 2/16/2018    Procedure: ESOPHAGOGASTRODUODENOSCOPY with biopsies and #54 [address] DILATATION;  Surgeon: Augustine Gallego MD;  Location: [facility] ENDOSCOPY;  Service:    • ENDOSCOPY N/A 3/19/2020    Procedure: ESOPHAGOGASTRODUODENOSCOPY with biopsies;  Surgeon: Augustine Gallego MD;  Location: [facility] ENDOSCOPY;  Service: Gastroenterology;  Laterality: N/A;  PRE- MELENA, EPIGASTRIC PAIN  POST- eerosive gastritis, duodenal ulcer, hiatal hernia   • ENDOSCOPY N/A 7/29/2020    Procedure: ESOPHAGOGASTRODUODENOSCOPY WITH DUODENAL ASPIRATE AND COLD BIOPSIES;  Surgeon: Augustine Gallego MD;  Location: [facility] ENDOSCOPY;  Service: Gastroenterology;  Laterality: N/A;  PRE: HX ULCER DISEASE  POST: GASTRITIS, HEALED ULCER   • EYE SURGERY Left 2014    3-4 years, cornea replacement    • HYSTERECTOMY     • MOUTH SURGERY  2000    UNDER TONGUE AND LEFT FLOOR OF MOUTH FOR CA   • PARATHYROIDECTOMY      PER PT   • SIGMOIDOSCOPY N/A 7/29/2020    Procedure: FLEXIBLE SIGMOIDOSCOPY TO DESCENDING COLON WITH COLD BIOPSIES;  Surgeon: Augustine Gallego MD;  Location: [facility] ENDOSCOPY;  Service: Gastroenterology;  Laterality: N/A;  PRE: RECTAL BLEEDING  POST: DIVERTICULOSIS, HEMORRHOIDS, MINIMAL PROCTITIS   • SINUS SURGERY      • SKIN BIOPSY     • THYROID SURGERY     • VARICOSE VEIN SURGERY       General Information     [facility] Name 07/06/21 1450          Physical Therapy Time and Intention    Document Type  therapy note (daily note)  -AE     Mode of Treatment  individual therapy;physical therapy  -AE       User Key  (r) = Recorded By, (t) = Taken By, (c) = Cosigned By    Initials Name Provider Type    AE Mandi Isabel, PT Physical Therapist        Mobility     Row Name 07/06/21 1450          Bed Mobility    Comment (Bed Mobility)  up in bathroom  -AE     Row Name 07/06/21 1450          Transfers    Comment (Transfers)  CGA without walker, SBA with walker  -AE     Row Name 07/06/21 1450          Sit-Stand Transfer    Sit-Stand Sac (Transfers)  contact guard  -AE     Assistive Device (Sit-Stand Transfers)  walker, front-wheeled  -AE     [facility] Name 07/06/21 1450          Gait/Stairs (Locomotion)    Sac Level (Gait)  contact guard  -AE     Assistive Device (Gait)  walker, front-wheeled  -AE     Distance in Feet (Gait)  50ft x 2  -AE       User Key  (r) = Recorded By, (t) = Taken By, (c) = Cosigned By    Initials Name Provider Type    AE Mandi Isabel, PT Physical Therapist        Obj/Interventions    No documentation.       Goals/Plan    No documentation.       Clinical Impression     [facility] Name 07/06/21 1453          Pain    Additional Documentation  Pain Scale: Numbers Pre/Post-Treatment (Group)  -AE     Row Name 07/06/21 1453          Pain Scale: Numbers Pre/Post-Treatment    Pretreatment Pain Rating  5/10  -AE     Posttreatment Pain Rating  5/10  -AE       User Key  (r) = Recorded By, (t) = Taken By, (c) = Cosigned By    Initials Name Provider Type    AE Mandi Isabel, VITO Physical Therapist        Outcome Measures     Row Name 07/06/21 1453          Functional Assessment    Outcome Measure Options  AM-PAC 6 Clicks Basic Mobility (PT)  -AE       User Key  (r) = Recorded By, (t) = Taken By, (c) = Cosigned By     Initials Name Provider Type    Mandi Anaya PT Physical Therapist        Physical Therapy Education                 Title: PT OT SLP Therapies (In Progress)     Topic: Physical Therapy (In Progress)     Point: Mobility training (Done)     Learning Progress Summary           Patient Acceptance, E,TB, VU,NR by MS at 7/4/2021 1054                   Point: Home exercise program (Not Started)     Learner Progress:  Not documented in this visit.          Point: Body mechanics (Done)     Learning Progress Summary           Patient Acceptance, E,TB, VU,NR by MS at 7/4/2021 1054                   Point: Precautions (Done)     Learning Progress Summary           Patient Acceptance, E,TB, VU,NR by MS at 7/4/2021 1054                               User Key     Initials Effective Dates Name Provider Type Discipline    MS 06/16/21 -  Radha Campbell PT Physical Therapist PT              PT Recommendation and Plan           Time Calculation:   PT Charges     Row Name 07/06/21 1457 07/06/21 1432          Time Calculation    Start Time  1330  -AE  --     Stop Time  1400  -AE  --     Time Calculation (min)  30 min  -AE  --     PT Received On  07/06/21  -AE  07/06/21  -AE     PT - Next Appointment  --  07/07/21  -AE        Time Calculation- PT    Total Timed Code Minutes- PT  25 minute(s)  -AE  --       User Key  (r) = Recorded By, (t) = Taken By, (c) = Cosigned By    Initials Name Provider Type    Mandi Anaya PT Physical Therapist        Therapy Charges for Today     Code Description Service Date Service Provider Modifiers Qty    [id number] HC GAIT TRAINING EA 15 MIN 7/6/2021 Mandi Isabel PT GP 2          PT G-Codes  Outcome Measure Options: AM-PAC 6 Clicks Basic Mobility (PT)  AM-PAC 6 Clicks Score (PT): 18  AM-PAC 6 Clicks Score (OT): 15    Mandi Isabel PT  7/6/2021

## 2021-07-06 NOTE — CASE MANAGEMENT/SOCIAL WORK
Discharge Planning Assessment  [facility]     Patient Name: Geeta Butt  MRN: [id number]  Today's Date: 7/6/2021    Admit Date: 7/2/2021    Discharge Needs Assessment     Row Name 07/06/21 1659       Living Environment    Lives With  spouse    Current Living Arrangements  home/apartment/condo    Primary Care Provided by  self;child(lonnie)    Provides Primary Care For  spouse    Family Caregiver if Needed  child(lonnie), adult    Quality of Family Relationships  helpful;involved;supportive    Able to Return to Prior Arrangements  yes       Resource/Environmental Concerns    Transportation Concerns  car, none       Transition Planning    Patient/Family Anticipates Transition to  home with family    Patient/Family Anticipated Services at Transition      Transportation Anticipated  family or friend will provide;health plan transportation       Discharge Needs Assessment    Readmission Within the Last 30 Days  no previous admission in last 30 days    Discharge Facility/Level of Care Needs  home with home health        Discharge Plan     Row Name 07/06/21 1700       Plan    Plan  Home with family support & [facility].    Patient/Family in Agreement with Plan  yes    Plan Comments  Spoke with the patient, verified current information and explained the role of the CCP. Patient said she has family support. She lives with her /Nicolas who is in Hospice and their daughter/Daisy provided help as needed (with regards to running errands, etc). She's IADL and has no history with DME/RH/HH. Physical Therapy eval/rec noted. Patient said she plans to d/c home with family support & HH PT. She requests a referral to [facility]. Referral sent in [facility]. Patient also inquired about in-home caregivers for her  -- CCP gave her a list of In-Home Care Agencies. Updated Dr. Dickey. No other needs identified at this time. CCP following should additional d/c needs arise.        Continued Care and Services - Admitted  Since 7/2/2021     Home Medical Care     Service Provider Request Status Selected Services Address Phone Fax Patient Preferred    [facility]  Pending - Request Sent N/A [address] [id number] [phone] --                Demographic Summary     Row Name 07/06/21 1651       General Information    Admission Type  inpatient    Reason for Consult  discharge planning    Preferred Language  English     Used During This Interaction  no       Contact Information    Permission Granted to Share Info With  ;family/designee        Functional Status     Row Name 07/06/21 1659       Functional Status    Usual Activity Tolerance  good    Current Activity Tolerance  moderate       Functional Status, IADL    Medications  independent    Meal Preparation  assistive equipment    Housekeeping  assistive equipment    Laundry  assistive equipment    Shopping  assistive equipment       Mental Status Summary    Recent Changes in Mental Status/Cognitive Functioning  no changes        Psychosocial     Row Name 07/06/21 1659       Intellectual Performance WDL    Level of Consciousness  Alert       Coping/Stress    Patient Personal Strengths  able to adapt    Sources of Support  adult child(lonnie);spouse    Reaction to Health Status  accepting    Understanding of Condition and Treatment  adequate understanding of medical condition       Developmental Stage (Eriksson's)    Developmental Stage  Stage 8 (66 years-death/Late Adulthood) Integrity vs. Despair        Abuse/Neglect    No documentation.       Legal    No documentation.       Substance Abuse    No documentation.       Patient Forms    No documentation.           María Woods RN

## 2021-07-06 NOTE — PROGRESS NOTES
[facility] PROGRESS NOTE      CC:back pain      Subjective     Interval History: NEGIN 7/6/21, feeling much better, c/o pain in back with lifting left leg, currently no left hip pain    ROS:  Constitutional: No fever, chills  GI: No nausea, vomiting  MS:  back pain  Neuro: No numbness, tingling, or weakness,  balance difficulties  : No difficulty voiding, no incontinence    Objective     Vital signs in last 24 hours:  Temp:  [96.9 °F (36.1 °C)-98.6 °F (37 °C)] 98.6 °F (37 °C)  Heart Rate:  [62-68] 62  Resp:  [14-16] 16  BP: (117-168)/(57-84) 143/65    Intake/Output this shift:  No intake/output data recorded.    LABS:  Results from last 7 days   Lab Units 07/06/21  0459 07/05/21 0443 07/03/21  0537   WBC 10*3/mm3 12.39* 14.69* 9.82   HEMOGLOBIN g/dL 11.8* 12.1 12.5   HEMATOCRIT % 35.2 36.5 37.6   PLATELETS 10*3/mm3 351 387 409     Results from last 7 days   Lab Units 07/06/21  0459 07/05/21  0443 07/04/21  0448 07/02/21  0812   SODIUM mmol/L 132* 131* 128* 136   POTASSIUM mmol/L 4.5 4.8 4.9 4.0   CHLORIDE mmol/L 96* 95* 93* 100   CO2 mmol/L 27.7 24.6 23.7 26.0   BUN mg/dL 30* 29* 31* 12   CREATININE mg/dL 0.81 0.67 0.81 0.80   CALCIUM mg/dL 9.4 9.3 9.4 9.8   BILIRUBIN mg/dL  --   --   --  0.3   ALK PHOS U/L  --   --   --  62   ALT (SGPT) U/L  --   --   --  26   AST (SGOT) U/L  --   --   --  21   GLUCOSE mg/dL 92 127* 144* 113*       IMAGING STUDIES:  No new imaging    Meds reviewed/changed: Yes    Current Facility-Administered Medications:   •  amLODIPine (NORVASC) tablet 10 mg, 10 mg, Oral, Daily, Srini Ny APRN, Stopped at 07/06/21 0900  •  sennosides-docusate (PERICOLACE) 8.6-50 MG per tablet 2 tablet, 2 tablet, Oral, BID, Stopped at 07/06/21 0900 **AND** polyethylene glycol (MIRALAX) packet 17 g, 17 g, Oral, Daily PRN **AND** bisacodyl (DULCOLAX) EC tablet 5 mg, 5 mg, Oral, Daily PRN **AND** bisacodyl (DULCOLAX) suppository 10 mg, 10 mg, Rectal, Daily PRN, Srini Ny, APRN  •  carvedilol  (COREG) tablet 6.25 mg, 6.25 mg, Oral, BID With Meals, Klaudia Dickey MD, 6.25 mg at 07/06/21 0815  •  cyclobenzaprine (FLEXERIL) tablet 5 mg, 5 mg, Oral, TID, Klaudia Dickey MD, Stopped at 07/06/21 0900  •  escitalopram (LEXAPRO) tablet 10 mg, 10 mg, Oral, Daily, Srini Ny APRN, Stopped at 07/06/21 0900  •  fentaNYL citrate (PF) (SUBLIMAZE) injection 50 mcg, 50 mcg, Intravenous, PRN, Osmin Burks MD  •  gabapentin (NEURONTIN) capsule 100 mg, 100 mg, Oral, TID, Klaudia Dickey MD, Stopped at 07/06/21 0900  •  HYDROcodone-acetaminophen (NORCO)  MG per tablet 1 tablet, 1 tablet, Oral, Q6H PRN, Srini Ny APRN, 1 tablet at 07/05/21 2058  •  iopamidol (ISOVUE-M 200) injection 41%, 3 mL, Epidural, Once in imaging, Osmin Burks MD  •  levothyroxine (SYNTHROID, LEVOTHROID) tablet 50 mcg, 50 mcg, Oral, Q AM, Srini Ny APRN, 50 mcg at 07/05/21 0612  •  lidocaine (LIDODERM) 5 % 1 patch, 1 patch, Transdermal, Q24H, Klaudia Dickey MD, 1 patch at 07/05/21 0816  •  lidocaine (XYLOCAINE) 1 % injection 1 mL, 1 mL, Intradermal, Once, Osmin Burks MD  •  methylPREDNISolone sodium succinate (SOLU-Medrol) injection 20 mg, 20 mg, Intravenous, Daily, Klaudia Dickey MD, 20 mg at 07/06/21 0815  •  midazolam (VERSED) injection 1 mg, 1 mg, Intravenous, Q5 Min PRN, Osmin Burks MD, 0.5 mg at 07/06/21 1107  •  morphine injection 2 mg, 2 mg, Intravenous, Q4H PRN, 2 mg at 07/03/21 1255 **AND** naloxone (NARCAN) injection 0.4 mg, 0.4 mg, Intravenous, Q5 Min PRN, Srini Ny APRN  •  ondansetron (ZOFRAN) tablet 4 mg, 4 mg, Oral, Q6H PRN **OR** ondansetron (ZOFRAN) injection 4 mg, 4 mg, Intravenous, Q6H PRN, Srini Ny APRN, 4 mg at 07/02/21 2024  •  pantoprazole (PROTONIX) injection 40 mg, 40 mg, Intravenous, Q AM, Klaudia Dickey MD, 40 mg at 07/05/21 0612  •  sodium chloride 0.9 % flush 10 mL, 10 mL, Intravenous, Q12H, Srini Ny APRN, 10 mL at 07/05/21 2058  •  sodium  "chloride 0.9 % flush 10 mL, 10 mL, Intravenous, PRN, Srini Ny, JESSA  •  valsartan (DIOVAN) tablet 160 mg, 160 mg, Oral, Daily, Srini Ny APRN, Stopped at 07/06/21 0900      Physical Exam:    General:   Sleepy. Speech clear  Back:    Back pain to center of thoracic area, with palpation, lumbar puncture from SRINIVAS with band aid, no drainage  Motor: Normal muscle strength, bulk and tone in upper and lower extremities.  No fasciculations, rigidity, spasticity, or abnormal movements.  Sensation: Normal to light touch  Coordination: Moving all extremities  Station and Gait:             Ambulating 20' with CGA and Rwx, per PT  Extremities:   Wearing SCD      Assessment/Plan     ASSESSMENT:  Lying in bed, sleeping, moving all extremities.  Denies any pain, except with palpation to thoracic spine and SLR on left causes mild pain in low back.  Dtr at bedside and states before SRINIVAS, she could hardly turn over in the bed, and would c/o left hip pain and leg pain.  Currently not having any left hip pain, and is able to turn on left side.        Acute left-sided low back pain with left-sided sciatica    Hypertension    Hypothyroidism    Precordial chest pain    DDD (degenerative disc disease), lumbosacral    Hyperglycemia    GERD without esophagitis    Hyponatremia      PLAN:   Continue to follow up with Dr. Pierce, as dtr states that surgery is not an option for her.  States that patient's  is under [facility] care and the plan is to take her back home.    She had just been referred to Pain Management, Dr. Pierce and has an upcoming SRINIVAS for her cervical neck.  Had an appointment for NEGIN, but she ended up coming to the hospital.  We will continue to follow and check progress in AM     I discussed the patient's findings and my recommendations with dtr, patient, nursing staff and Dr. Mejia       LOS: 1 day       JESSA Clinton  7/6/2021  12:05 EDT    \"Dictated utilizing Dragon dictation\".      "

## 2021-07-06 NOTE — PLAN OF CARE
Goal Outcome Evaluation:               Spoke with pt at bedside, pt reports she is feeling better after epidural earlier today, politely requests to be discharged from OT as she reports she is no longer having diffiuclty with self care tasks and continues to make progress with PT. Pt denies any dc needs for DME/AE needs and OT reports she will sign off based in pt's request.

## 2021-07-06 NOTE — ANESTHESIA PROCEDURE NOTES
PAIN Epidural block    Pre-sedation assessment completed: 7/6/2021 11:00 AM    Patient reassessed immediately prior to procedure    Patient location during procedure: pain clinic  Start Time: 7/6/2021 11:00 AM  Stop Time: 7/6/2021 11:09 AM  Indication:at surgeon's request and procedure for pain  Performed By  Anesthesiologist: Osmin Burks MD  Preanesthetic Checklist  Completed: patient identified, IV checked, site marked, risks and benefits discussed, surgical consent, monitors and equipment checked, pre-op evaluation and timeout performed  Additional Notes  Dx:  Post-Op Diagnosis Codes:     * Lumbar radiculopathy (M54.16)     * Lumbar disc displacement without myelopathy (M51.26)  80 mg depomedrol in epid    Plan : return to clinic as needed  Prep:  Pt Position:prone (prone)  Sterile Tech:cap, gloves, mask and sterile barrier  Prep:chlorhexidine gluconate and isopropyl alcohol  Monitoring:blood pressure monitoring, EKG and continuous pulse oximetry  Procedure:Sedation: yes     Approach:left paramedian  Guidance: fluoroscopy and c arm pa and lat and loss of resistance  Location:lumbar  Level:4-5 (interlaminar)  Needle Type:[employer]  Needle Gauge:20  Aspiration:negative  Medications:  Depomedrol:80 mg  Preservative Free Saline:3mL  Comments:No dye - allergy  Post Assessment:  Post-procedure: bandaid.  Pt Tolerance:patient tolerated the procedure well with no apparent complications  Complications:no

## 2021-07-06 NOTE — NURSING NOTE
Verbal repor tto Nusrat of pt status and sedation and medication in epidural.Nusrat verbalized understanding. Pt is stable condition resting and oriented x3 transport at bediside and taking pt to p883.

## 2021-07-06 NOTE — H&P
"[facility]    History and Physical    Patient Name: Geeta Butt  :  1935  MRN:  [id number]  Date of Admission: 2021    Subjective     Patient is a 85 y.o. female presents with chief complaint of chronic, constant, severe, 9/10, unknown etiology, stabbing, aching,  low back and leg: left pain.  Onset of symptoms was gradual starting \" a long time ago \" . Symptoms are associated/aggravated by sitting, standing, straining, twisting or walking for more than a few minutes. Symptoms improve with pain medication    The following portions of the patients history were reviewed and updated as appropriate: current medications, allergies, past medical history, past surgical history, past family history, past social history and problem list                Objective     Past Medical History:   Past Medical History:   Diagnosis Date   • Anesthesia complication     ASPIRATION INTO AIRWAY WITH TRACH PRESENT IN PAST (WITH ORAL CANCER SURGERY(   • Arthritis    • Aspiration into airway     post anesthesia   • Breast cancer (CMS/HCC)    • Cancer (CMS/HCC)     mouth cancer    • Chronic kidney disease    • CKD (chronic kidney disease)     PT STATES STAGE 3   • Depression    • Diverticular disease    • Gastric ulcer     AND DUODENAL   • GERD (gastroesophageal reflux disease)    • Hearing loss     BILAT AIDES   • History of colon polyps    • History of depression    • History of GI bleed    • Hypertension    • Hypothyroidism    • Peptic ulceration    • PONV (postoperative nausea and vomiting)    • Poor vision     RIGHT EYE, HAS PERIPHERAL VISION    • Stroke (CMS/HCC)      mild weakness on left, partial sight loss on right      Past Surgical History:   Past Surgical History:   Procedure Laterality Date   • ABDOMINAL SURGERY     • APPENDECTOMY     • BLADDER REPAIR      AND RECTOCELE   • BREAST BIOPSY     • BREAST CYST ASPIRATION     • BREAST LUMPECTOMY WITH SENTINEL NODE BIOPSY Left 3/19/2018    Procedure: BREAST " LUMPECTOMY WITH SENTINEL NODE BIOPSY AND NEEDLE LOCALIZATION;  Surgeon: Myles Soliman MD;  [place]  [facility] [place]  Service: General   • CHOLECYSTECTOMY     • COLONOSCOPY  2013   • COLONOSCOPY N/A 2/16/2018    Procedure: COLONOSCOPY into cecum and T.I. with biopsies;  Surgeon: Augustine Gallego MD;  [facility];  Service:    • ENDOSCOPY N/A 10/17/2017    Procedure: ESOPHAGOGASTRODUODENOSCOPY WITH COLD BIOPSIES;  Surgeon: Augustine Gallego MD;  [facility];  Service:    • ENDOSCOPY N/A 2/16/2018    Procedure: ESOPHAGOGASTRODUODENOSCOPY with biopsies and #54 [address] DILATATION;  Surgeon: Augustine Gallego MD;  [facility];  Service:    • ENDOSCOPY N/A 3/19/2020    Procedure: ESOPHAGOGASTRODUODENOSCOPY with biopsies;  Surgeon: Augustine Gallego MD;  [facility];  Service: Gastroenterology;  Laterality: N/A;  PRE- MELENA, EPIGASTRIC PAIN  POST- eerosive gastritis, duodenal ulcer, hiatal hernia   • ENDOSCOPY N/A 7/29/2020    Procedure: ESOPHAGOGASTRODUODENOSCOPY WITH DUODENAL ASPIRATE AND COLD BIOPSIES;  Surgeon: Augustine Gallego MD;  [facility];  Service: Gastroenterology;  Laterality: N/A;  PRE: HX ULCER DISEASE  POST: GASTRITIS, HEALED ULCER   • EYE SURGERY Left 2014    3-4 years, cornea replacement    • HYSTERECTOMY     • MOUTH SURGERY  2000    UNDER TONGUE AND LEFT FLOOR OF MOUTH FOR CA   • PARATHYROIDECTOMY      PER PT   • SIGMOIDOSCOPY N/A 7/29/2020    Procedure: FLEXIBLE SIGMOIDOSCOPY TO DESCENDING COLON WITH COLD BIOPSIES;  Surgeon: Augustine Gallego MD;  Location: [facility] ENDOSCOPY;  Service: Gastroenterology;  Laterality: N/A;  PRE: RECTAL BLEEDING  POST: DIVERTICULOSIS, HEMORRHOIDS, MINIMAL PROCTITIS   • SINUS SURGERY     • SKIN BIOPSY     • THYROID SURGERY     • VARICOSE VEIN SURGERY       Family History:   Family History   Problem Relation Age of Onset   • Esophageal cancer Father    • Stomach cancer Father    • Heart disease Mother   "  • Thyroid cancer Mother    • Hypertension Mother    • Hypertension Sister    • Hypertension Brother    • Stroke Brother    • Heart disease Brother    • Diabetes Brother    • Leukemia Paternal Aunt    • Malig Hyperthermia Neg Hx      Social History:   Social History     Socioeconomic History   • Marital status:      Spouse name: Nicolas   • Number of children: Not on file   • Years of education: High school   • Highest education level: Not on file   Tobacco Use   • Smoking status: Former Smoker     Packs/day: 1.00     Years: 10.00     Pack years: 10.00     Types: Cigarettes     Start date:      Quit date:      Years since quittin.   • Smokeless tobacco: Never Used   Vaping Use   • Vaping Use: Never used   Substance and Sexual Activity   • Alcohol use: No   • Drug use: No   • Sexual activity: Defer       Vital Signs Range for the last 24 hours  Temperature: Temp:  [36.1 °C (96.9 °F)-37 °C (98.6 °F)] 37 °C (98.6 °F)   Temp Source: Temp src: Infrared   BP: BP: (117-168)/(57-71) 168/71   Pulse: Heart Rate:  [60-68] 62   Respirations: Resp:  [12-16] 16   SPO2: SpO2:  [93 %-97 %] 97 %   O2 Amount (l/min):     O2 Devices Device (Oxygen Therapy): room air   Weight:       Flowsheet Rows      First Filed Value   Admission Height  154.9 cm (61\") Documented at 2021 1204   Admission Weight  69 kg (152 lb 1.9 oz) Documented at 2021 1204          --------------------------------------------------------------------------------    Current Facility-Administered Medications   Medication Dose Route Frequency Provider Last Rate Last Admin   • amLODIPine (NORVASC) tablet 10 mg  10 mg Oral Daily Srini Ny APRN   Stopped at 21 09   • sennosides-docusate (PERICOLACE) 8.6-50 MG per tablet 2 tablet  2 tablet Oral BID Srini Ny APRN   Stopped at 21 0900    And   • polyethylene glycol (MIRALAX) packet 17 g  17 g Oral Daily PRN Srini Ny APRN        And   • bisacodyl (DULCOLAX) " EC tablet 5 mg  5 mg Oral Daily PRN Srini Ny APRN        And   • bisacodyl (DULCOLAX) suppository 10 mg  10 mg Rectal Daily PRN Srini Ny APRN       • carvedilol (COREG) tablet 6.25 mg  6.25 mg Oral BID With Meals Klaudia Dickey MD   6.25 mg at 07/06/21 0815   • cyclobenzaprine (FLEXERIL) tablet 5 mg  5 mg Oral TID Klaudia Dickey MD   Stopped at 07/06/21 0900   • escitalopram (LEXAPRO) tablet 10 mg  10 mg Oral Daily Srini Ny APRN   Stopped at 07/06/21 0900   • fentaNYL citrate (PF) (SUBLIMAZE) injection 50 mcg  50 mcg Intravenous PRN Osmin Burks MD       • gabapentin (NEURONTIN) capsule 100 mg  100 mg Oral TID Klaudia Dickey MD   Stopped at 07/06/21 0900   • HYDROcodone-acetaminophen (NORCO)  MG per tablet 1 tablet  1 tablet Oral Q6H PRN Srini Ny APRN   1 tablet at 07/05/21 2058   • iopamidol (ISOVUE-M 200) injection 41%  3 mL Epidural Once in imaging Osmin Burks MD       • levothyroxine (SYNTHROID, LEVOTHROID) tablet 50 mcg  50 mcg Oral Q AM Srini Ny APRN   50 mcg at 07/05/21 0612   • lidocaine (LIDODERM) 5 % 1 patch  1 patch Transdermal Q24H Klaudia Dickey MD   1 patch at 07/05/21 0816   • lidocaine (XYLOCAINE) 1 % injection 1 mL  1 mL Intradermal Once Osmin Burks MD       • methylPREDNISolone acetate (DEPO-medrol) injection 80 mg  80 mg Epidural Once Osmin Burks MD       • methylPREDNISolone sodium succinate (SOLU-Medrol) injection 20 mg  20 mg Intravenous Daily Klaudia Dickey MD   20 mg at 07/06/21 0815   • midazolam (VERSED) injection 1 mg  1 mg Intravenous Q5 Min PRN Osmin Burks MD       • morphine injection 2 mg  2 mg Intravenous Q4H PRN Srini Ny APRN   2 mg at 07/03/21 1255    And   • naloxone (NARCAN) injection 0.4 mg  0.4 mg Intravenous Q5 Min PRN Srini Ny APRN       • ondansetron (ZOFRAN) tablet 4 mg  4 mg Oral Q6H PRN Srini Ny APRN        Or   • ondansetron (ZOFRAN) injection 4 mg  4 mg  Intravenous Q6H PRN Srini Ny APRN   4 mg at 07/02/21 2024   • pantoprazole (PROTONIX) injection 40 mg  40 mg Intravenous Q AM Klaudia Dickey MD   40 mg at 07/05/21 0612   • sodium chloride 0.9 % flush 10 mL  10 mL Intravenous Q12H Srini Ny APRN   10 mL at 07/05/21 2058   • sodium chloride 0.9 % flush 10 mL  10 mL Intravenous PRN Srini Ny APRN       • valsartan (DIOVAN) tablet 160 mg  160 mg Oral Daily Srini Ny APRN   Stopped at 07/06/21 0900       --------------------------------------------------------------------------------  Assessment/Plan      Anesthesia Evaluation     Patient summary reviewed and Nursing notes reviewed         Pain impairs ability to perform ADLs: Bathing, Dressing, Housekeeping, Ambulation, Sleeping and Exercise/Activity  Modalities previously tried to control pain with limited effectiveness within the last 4-6 weeks: Ice, Rest, Heat, OTC medications, Steroids, Prescription medications, Physical therapy and Other     Airway   Mallampati: II  Dental      Pulmonary - negative pulmonary ROS   Cardiovascular     (+) hypertension,       Neuro/Psych- negative ROS  GI/Hepatic/Renal/Endo    (+)  PUD,  thyroid problem     Musculoskeletal (-) negative ROS    Abdominal    Substance History - negative use     OB/GYN negative ob/gyn ROS         Other                 Diagnosis and Plan    Treatment Plan  ASA 3      Procedures: Lumbar Epidural Steroid Injection(LESI), With fluoroscopy,               Diagnosis     * Lumbar radiculopathy [M54.16]     * Lumbar disc displacement without myelopathy [M51.26]            CHIEF COMPLAINT:       HISTORY OF PRESENT ILLNESS:      PAST MEDICAL HISTORY:  No current facility-administered medications on file prior to encounter.     Current Outpatient Medications on File Prior to Encounter   Medication Sig Dispense Refill   • amLODIPine (NORVASC) 5 MG tablet TAKE 2 TABLETS BY MOUTH EVERY  tablet 0   • aspirin 81 MG chewable tablet  Chew 81 mg Daily.     • Cholecalciferol (VITAMIN D3) 50 MCG (2000 UT) capsule TAKE 1 CAPSULE BY MOUTH DAILY. 100 capsule 1   • dicyclomine (BENTYL) 20 MG tablet TAKE 1 CAPSULE BY MOUTH TWICE A DAY BEFORE BREAKFAST AND DINNER FOR 30 DAYS     • escitalopram (LEXAPRO) 10 MG tablet TAKE 1 TABLET BY MOUTH EVERY DAY 90 tablet 0   • fluorometholone (FML) 0.1 % ophthalmic suspension Administer 1 drop into the left eye Daily.     • glucose blood (FREESTYLE TEST STRIPS) test strip TEST BLOOD SUGARS ONCE DAILY 50 each 12   • Lancets (FREESTYLE) lancets Test glucose once daily 100 each 3   • levothyroxine (SYNTHROID, LEVOTHROID) 50 MCG tablet TAKE 1 TABLET BY MOUTH EVERY DAY IN THE MORNING ON EMPTY STOMACH     • NIFEdipine CC (ADALAT CC) 30 MG 24 hr tablet TAKE 1 TABLET BY MOUTH EVERY DAY 90 tablet 0   • Omega-3 Fatty Acids (FISH OIL PO) Take  by mouth Daily As Needed.     • Probiotic Product (PROBIOTIC DAILY PO) Take 1 capsule by mouth Daily.     • RABEprazole (ACIPHEX) 20 MG EC tablet Take 1 tablet by mouth Daily. 90 tablet 3   • sodium chloride (CAYLA 128) 5 % ophthalmic solution Administer 1 drop to the right eye As Needed.     • sucralfate (CARAFATE) 1 GM/10ML suspension TAKE 10 ML BY MOUTH 3 TIMES A DAY FOR 30 MINUTES BEFORE MEALS     • valsartan (DIOVAN) 160 MG tablet TAKE 1 TABLET BY MOUTH EVERY DAY 30 tablet 5       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    • Chronic kidney disease    • CKD (chronic kidney disease)     PT STATES STAGE 3   • Depression    • Diverticular disease    • Gastric ulcer     AND DUODENAL   • GERD (gastroesophageal reflux disease)    • Hearing loss     BILAT AIDES   • History of colon polyps    • History of depression    • History of GI bleed    •  "Hypertension    • Hypothyroidism    • Peptic ulceration    • PONV (postoperative nausea and vomiting)    • Poor vision     RIGHT EYE, HAS PERIPHERAL VISION    • Stroke (CMS/HCC) 2000     mild weakness on left, partial sight loss on right          SOCIAL HISTORY:  No tobacco    REVIEW OF SYSTEMS:  No hematologic infectious or constitutional symptoms  Other review of systems non-contributory    PHYSICAL EXAM:  /71 (BP Location: Left arm, Patient Position: Lying)   Pulse 62   Temp 37 °C (98.6 °F) (Infrared)   Resp 16   Ht 154.9 cm (61\")   Wt 69 kg (152 lb 1.9 oz)   SpO2 97%   BMI 28.74 kg/m²   Well-developed well-nourished no acute distress  Extra ocular movements intact  Mallampati class 2 airway  Cardiac:  Regular rate and rhythm  Lungs:  Clear to auscultation bilaterally with good effort  Alert and oriented ×3  Deep tendon reflexes normal in the bilateral patella  Negative straight leg raise bilaterally  5 out of 5 strength bilateral upper and lower extremities  Lumbar spine without obvious deformities ecchymoses  Lumbar spine nontender to palpation      DIAGNOSIS:  Post-Op Diagnosis Codes:     * Lumbar radiculopathy [M54.16]     * Lumbar disc displacement without myelopathy [M51.26]    PLAN:  1.  Lumbar 4 epidural steroid injections, up to 3, spaced 1-2 weeks apart.  If pain control is acceptable after 1 or 2 injections, it was discussed with the patient that they may return for the subsequent injections if and when their pain returns.  The risks were discussed with the patient including failure of relief, worsening pain, Headache (post dural puncture headache), bleeding (epidural hematoma) and infection (epidural abscess or skin infection).  2.  Physical therapy exercises at home as prescribed by physical therapy or from the pain clinic handout (given to the patient).  Continuation of these exercises every day, or multiple times per week, even when the patient has good pain relief, was stressed to the " patient as a preventative measure to decrease the frequency and severity of future pain episodes.  3.  Continue pain medicines as already prescribed.  If patient not currently taking any, it is recommended to begin Acetaminophen 1000 mg po q 8 hours.  If other medicines containing Acetaminophen are currently prescribed, maintain daily dose at 3000 mg.    4.  If they can tolerate NSAIDS, it is recommended to take Ibuprofen 600 mg po q 6 hours for 7 days during pain exacerbations.  Alternatively, they may substitute an NSAID of their choice (e.g. Aleve).  This may be taken at the same time as Acetaminophen.  5.  Heat and ice to the affected area as tolerated for pain control.  It was discussed that heating pads can cause burns.  6.  Daily low impact exercise such as walking or water exercise was recommended to maintain overall health and aid in weight control.   7.  Follow up as needed for subsequent injections.  8.  Patient was counseled to abstain from tobacco products.    Target : L 4-5    Time :   24   min

## 2021-07-06 NOTE — PROGRESS NOTES
Name: Geeta Butt ADMIT: 2021   : 1935  PCP: Daniel Villagomez APRN    MRN: [id number] LOS: 1 days   AGE/SEX: 85 y.o. female  ROOM: [facility]     Subjective   Subjective   Decreased low back pain.  And now pain level is 5/10..  Positive radiation of the pain to the left lower extremity.  No lower extremity numbness or weakness.  No abdominal pain.  No nausea or vomiting.  No dysuria or hematuria or urinary incontinence.    Review of Systems  Cardiovascular/respiratory.  No chest pain.  No shortness of breath.  No cough no hemoptysis.  No palpitation.  No fever or chills.  GI.  No nausea or vomiting.    Objective   Objective   Vital Signs  Temp:  [96.9 °F (36.1 °C)-98.6 °F (37 °C)] 97.4 °F (36.3 °C)  Heart Rate:  [62-71] 71  Resp:  [14-18] 18  BP: (117-168)/(55-84) 140/55  SpO2:  [92 %-97 %] 92 %  on   ;   Device (Oxygen Therapy): room air    Intake/Output Summary (Last 24 hours) at 2021 1651  Last data filed at 2021 1349  Gross per 24 hour   Intake 460 ml   Output 1150 ml   Net -690 ml     Body mass index is 28.74 kg/m².      21  1204   Weight: 69 kg (152 lb 1.9 oz)     Physical Exam    General.  Elderly female.  Alert and oriented x3.  No respiratory distress.    No obvious pain.    Eyes.  Pupils equal round and reactive.  Intact extraocular musculature.  No pallor or jaundice.  Normal conjunctivae and lids.    Oral cavity.  Moist mucous membrane.  Neck.  Supple.  No JVD.  No lymphadenopathy or thyromegaly.  Cardiovascular.  Regular rate and rhythm.  Grade 2 systolic murmur.  Chest.    Poor bilateral air entry with scattered bilateral rhonchi..  Abdomen.  Soft lax.  No tenderness.  No organomegaly.  No guarding or rebound.  Extremities.  No clubbing cyanosis or edema.  Normal straight leg raising bilaterally.  No lower extremity neuro deficits.  Normal straight leg raising bilaterally  Spine.  No localized tenderness.  CNS.  No acute focal neurological deficits    Results Review:       Results from last 7 days   Lab Units 07/06/21 0459 07/05/21 0443 07/04/21 0448 07/03/21  0537 07/02/21  0812   SODIUM mmol/L 132* 131* 128* 132* 136   POTASSIUM mmol/L 4.5 4.8 4.9 4.2 4.0   CHLORIDE mmol/L 96* 95* 93* 97* 100   CO2 mmol/L 27.7 24.6 23.7 24.6 26.0   BUN mg/dL 30* 29* 31* 19 12   CREATININE mg/dL 0.81 0.67 0.81 0.76 0.80   GLUCOSE mg/dL 92 127* 144* 143* 113*   CALCIUM mg/dL 9.4 9.3 9.4 9.5 9.8   AST (SGOT) U/L  --   --   --   --  21   ALT (SGPT) U/L  --   --   --   --  26     Estimated Creatinine Clearance: 46 mL/min (by C-G formula based on SCr of 0.81 mg/dL).  Results from last 7 days   Lab Units 07/03/21  0537   HEMOGLOBIN A1C % 5.70*         Results from last 7 days   Lab Units 07/02/21 2001 07/02/21  1558   TROPONIN T ng/mL <0.010 <0.010         Results from last 7 days   Lab Units 07/03/21  0537   TSH uIU/mL 0.404           Invalid input(s):  PHOS        Invalid input(s): LDLCALC  Results from last 7 days   Lab Units 07/06/21 0459 07/05/21 0443 07/03/21  0537 07/02/21  0812   WBC 10*3/mm3 12.39* 14.69* 9.82 7.51   HEMOGLOBIN g/dL 11.8* 12.1 12.5 13.3   HEMATOCRIT % 35.2 36.5 37.6 39.6   PLATELETS 10*3/mm3 351 387 409 419   MCV fL 89.3 90.8 90.2 90.2   MCH pg 29.9 30.1 30.0 30.3   MCHC g/dL 33.5 33.2 33.2 33.6   RDW % 12.0* 11.8* 12.2* 12.6   RDW-SD fl 39.3 39.3 39.8 41.7   MPV fL 9.8 9.8 9.3 9.3   NEUTROPHIL % %  --   --   --  70.5   LYMPHOCYTE % %  --   --   --  19.2*   MONOCYTES % %  --   --   --  7.5   EOSINOPHIL % %  --   --   --  1.3   BASOPHIL % %  --   --   --  1.1   IMM GRAN % %  --   --   --  0.4   NEUTROS ABS 10*3/mm3  --   --   --  5.30   LYMPHS ABS 10*3/mm3  --   --   --  1.44   MONOS ABS 10*3/mm3  --   --   --  0.56   EOS ABS 10*3/mm3  --   --   --  0.10   BASOS ABS 10*3/mm3  --   --   --  0.08   IMMATURE GRANS (ABS) 10*3/mm3  --   --   --  0.03   NRBC /100 WBC  --   --   --  0.0                 Results from last 7 days   Lab Units 07/02/21 2001   SED RATE mm/hr 44*    CRP mg/dL <0.30                     Results from last 7 days   Lab Units 07/04/21  1712   SODIUM UR mmol/L <20   CREATININE UR mg/dL 33.1   OSMOLALITY UR mOsm/kg 311       Imaging:  Imaging Results (Last 24 Hours)     Procedure Component Value Units Date/Time    [place] [facility] Spine [[id number]] Resulted: 07/06/21 1111     Updated: 07/06/21 1111    Narrative:      This procedure was auto-finalized with no dictation required.             I reviewed the patient's new clinical results / labs / tests / procedures      Assessment/Plan     Active Hospital Problems    Diagnosis  POA   • **Acute left-sided low back pain with left-sided sciatica [M54.42]  Yes   • Hyponatremia [E87.1]  No   • Precordial chest pain [R07.2]  Yes   • DDD (degenerative disc disease), lumbosacral [M51.37]  Yes   • Hyperglycemia [R73.9]  Yes   • GERD without esophagitis [K21.9]  Yes   • Hypertension [I10]  Yes   • Hypothyroidism [E03.9]  Yes      Resolved Hospital Problems   No resolved problems to display.       1.  Acute low back pain with left sciatica in a patient with a history of degenerative disc disease of the lumbar spine.    MRI with degenerative disc disease and osteoarthritis.  Normal C-reactive protein.  No fever or leukocytosis to indicate osteomyelitis or discitis. Patient has positive radiculopathy on the left but no myelopathy objectively.  Neurosurgery saw the patient and recommends conservative treatment and nerve blocks.  Continue narcotics/Neurontin/Flexeril//Lidoderm patch.  Nonsteroidal anti-inflammatory drugs have been stopped.  I will stop IV steroids.. Patient on physical therapy.  Status post epidural block.  Improved patient's symptoms.  Occupational Therapy is no longer needed.  Physical therapy recommends home with home health and physical therapy and front wheel walker at discharge.  Initiate Celebrex at discharge tomorrow.  2.  History of hypertension.  Improved blood pressure.  Resolved atypical chest  pain.  No evidence of congestive heart failure.  We will continue Norvasc/losartan/Coreg.  EKG and troponins are negative for ischemia.    Normal D-dimer.  Chest x-ray is negative.  3.  History of GERD.  Plan IV Protonix.  No GI complications and benign GI examination.  4.  History of hypothyroidism.  Clinically euthyroid on replacement.  Normal TSH.  5.  Hyperglycemia/prediabetes..    A1c 5.7.  6.  History of stage III chronic renal failure.  Normal renal function at this time.  Patient appears euvolemic.  7.  VTE prophylaxis with Lovenox I will stop after today's dose in preparation for an epidural block on Tuesday.    8.  Hyponatremia.  Normal TSH.  Patient appears to be euvolemic.  Mostly inappropriate antidiuretic hormone secretion secondary to water retention because of the steroids.  Improved.  Continue fluid restriction and DC IV steroids.    9.  Steroid-induced leukocytosis.  Improved.  DC IV steroids.  No fever or infection.    · Discussed with patient.  · Disposition.  Anticipate discharge tomorrow with home health physical therapy.      Klaudia Dickey MD  [facility]  07/06/21  16:51 EDT

## 2021-07-06 NOTE — PLAN OF CARE
Goal Outcome Evaluation:  Plan of Care Reviewed With: patient, daughter        Progress: improving  Outcome Summary: VSS. Pt got epidural steroid injection. Complains of pain in left thigh, denies n/t. Assist x1 with walker and gait belt. Pt on regular diet. No ice or heat to area. Pt and daughter educated on possibility of epidural taking 48 hr for effect. Discharge plans pending.

## 2021-07-06 NOTE — PLAN OF CARE
Pt seen ~1.5 hours after her epidural. Pt reports some improvement in pain however continues to report some burning down the anterior portion of her thigh. Pt up in bathroom with standard walker picking it up and walking without it on the ground. Performed gait training with 50ft with no AD, pt had moderate path deviations and 1-2 episodes of near LOB requiring therapist assistance to correct. Pt had 2-3 min sitting rest break. Given front wheeled walker (FWW) and pt able to perform with smoother gait and SBA x 50ft. Skilled PT needed to address above impairments. Plans for DC home 7/7, will need FWW and HHPT to follow. Educated to continue to ambulate 2-3x/day with RN staff.  Did report some paresthesia during sensation testing at L L1 level only.      Problem: Adult Inpatient Plan of Care  Goal: Plan of Care Review  Outcome: Ongoing, Progressing  Flowsheets (Taken 7/6/2021 1031 by Brenda Flood, RN)  Plan of Care Reviewed With: patient   Goal Outcome Evaluation:

## 2021-07-06 NOTE — NURSING NOTE
I fully agree with the documentation completed on this patient by Nusrat Mcghee RN.    Signed,    JAYA AmaralN, RN

## 2021-07-06 NOTE — PLAN OF CARE
Goal Outcome Evaluation:  Plan of Care Reviewed With: patient        Progress: improving  Outcome Summary: vss,l cont to c/o left lower back pain that radiates to left leg, denies n/t, amb to bathroom with asst of one, voiding and has had BM this shift, has been npo since midnight-epidural steroid injection planned for today, pt on fluid restriction, educated on monitoring b/p due to htn hx, discharge plans pending

## 2021-07-07 ENCOUNTER — READMISSION MANAGEMENT (OUTPATIENT)
Dept: CALL CENTER | Facility: HOSPITAL | Age: 86
End: 2021-07-07

## 2021-07-07 ENCOUNTER — TELEPHONE (OUTPATIENT)
Dept: NEUROSURGERY | Facility: CLINIC | Age: 86
End: 2021-07-07

## 2021-07-07 ENCOUNTER — PRIOR AUTHORIZATION (OUTPATIENT)
Dept: SOCIAL WORK | Facility: HOSPITAL | Age: 86
End: 2021-07-07

## 2021-07-07 VITALS
HEART RATE: 67 BPM | BODY MASS INDEX: 28.72 KG/M2 | HEIGHT: 61 IN | RESPIRATION RATE: 12 BRPM | DIASTOLIC BLOOD PRESSURE: 70 MMHG | SYSTOLIC BLOOD PRESSURE: 134 MMHG | OXYGEN SATURATION: 95 % | WEIGHT: 152.12 LBS | TEMPERATURE: 97.1 F

## 2021-07-07 PROBLEM — R07.2 PRECORDIAL CHEST PAIN: Status: RESOLVED | Noted: 2021-07-02 | Resolved: 2021-07-07

## 2021-07-07 LAB
ANION GAP SERPL CALCULATED.3IONS-SCNC: 8.9 MMOL/L (ref 5–15)
BASOPHILS # BLD AUTO: 0.02 10*3/MM3 (ref 0–0.2)
BASOPHILS NFR BLD AUTO: 0.1 % (ref 0–1.5)
BUN SERPL-MCNC: 26 MG/DL (ref 8–23)
BUN/CREAT SERPL: 37.7 (ref 7–25)
CALCIUM SPEC-SCNC: 9.4 MG/DL (ref 8.6–10.5)
CHLORIDE SERPL-SCNC: 99 MMOL/L (ref 98–107)
CO2 SERPL-SCNC: 27.1 MMOL/L (ref 22–29)
CREAT SERPL-MCNC: 0.69 MG/DL (ref 0.57–1)
DEPRECATED RDW RBC AUTO: 41.2 FL (ref 37–54)
EOSINOPHIL # BLD AUTO: 0.06 10*3/MM3 (ref 0–0.4)
EOSINOPHIL NFR BLD AUTO: 0.4 % (ref 0.3–6.2)
ERYTHROCYTE [DISTWIDTH] IN BLOOD BY AUTOMATED COUNT: 12.2 % (ref 12.3–15.4)
GFR SERPL CREATININE-BSD FRML MDRD: 81 ML/MIN/1.73
GLUCOSE SERPL-MCNC: 109 MG/DL (ref 65–99)
HCT VFR BLD AUTO: 39.8 % (ref 34–46.6)
HGB BLD-MCNC: 13.2 G/DL (ref 12–15.9)
IMM GRANULOCYTES # BLD AUTO: 0.13 10*3/MM3 (ref 0–0.05)
IMM GRANULOCYTES NFR BLD AUTO: 0.9 % (ref 0–0.5)
LYMPHOCYTES # BLD AUTO: 1.89 10*3/MM3 (ref 0.7–3.1)
LYMPHOCYTES NFR BLD AUTO: 12.7 % (ref 19.6–45.3)
MCH RBC QN AUTO: 30.4 PG (ref 26.6–33)
MCHC RBC AUTO-ENTMCNC: 33.2 G/DL (ref 31.5–35.7)
MCV RBC AUTO: 91.7 FL (ref 79–97)
MONOCYTES # BLD AUTO: 1.25 10*3/MM3 (ref 0.1–0.9)
MONOCYTES NFR BLD AUTO: 8.4 % (ref 5–12)
NEUTROPHILS NFR BLD AUTO: 11.58 10*3/MM3 (ref 1.7–7)
NEUTROPHILS NFR BLD AUTO: 77.5 % (ref 42.7–76)
NRBC BLD AUTO-RTO: 0 /100 WBC (ref 0–0.2)
PLATELET # BLD AUTO: 370 10*3/MM3 (ref 140–450)
PMV BLD AUTO: 9.5 FL (ref 6–12)
POTASSIUM SERPL-SCNC: 4.5 MMOL/L (ref 3.5–5.2)
RBC # BLD AUTO: 4.34 10*6/MM3 (ref 3.77–5.28)
SODIUM SERPL-SCNC: 135 MMOL/L (ref 136–145)
WBC # BLD AUTO: 14.93 10*3/MM3 (ref 3.4–10.8)

## 2021-07-07 PROCEDURE — 97530 THERAPEUTIC ACTIVITIES: CPT

## 2021-07-07 PROCEDURE — 85025 COMPLETE CBC W/AUTO DIFF WBC: CPT | Performed by: INTERNAL MEDICINE

## 2021-07-07 PROCEDURE — G0378 HOSPITAL OBSERVATION PER HR: HCPCS

## 2021-07-07 PROCEDURE — 80048 BASIC METABOLIC PNL TOTAL CA: CPT | Performed by: INTERNAL MEDICINE

## 2021-07-07 PROCEDURE — 99213 OFFICE O/P EST LOW 20 MIN: CPT | Performed by: NURSE PRACTITIONER

## 2021-07-07 PROCEDURE — 96376 TX/PRO/DX INJ SAME DRUG ADON: CPT

## 2021-07-07 RX ORDER — GABAPENTIN 100 MG/1
100 CAPSULE ORAL 3 TIMES DAILY
Qty: 30 CAPSULE | Refills: 0 | Status: SHIPPED | OUTPATIENT
Start: 2021-07-07 | End: 2021-07-22 | Stop reason: SDUPTHER

## 2021-07-07 RX ORDER — LIDOCAINE 50 MG/G
1 PATCH TOPICAL
Qty: 30 PATCH | Refills: 3 | Status: SHIPPED | OUTPATIENT
Start: 2021-07-08 | End: 2021-07-22

## 2021-07-07 RX ORDER — CELECOXIB 200 MG/1
200 CAPSULE ORAL DAILY
Qty: 30 CAPSULE | Refills: 3 | Status: SHIPPED | OUTPATIENT
Start: 2021-07-07 | End: 2021-07-22 | Stop reason: SDUPTHER

## 2021-07-07 RX ORDER — HYDROCODONE BITARTRATE AND ACETAMINOPHEN 10; 325 MG/1; MG/1
1 TABLET ORAL EVERY 6 HOURS PRN
Qty: 10 TABLET | Refills: 0 | Status: SHIPPED | OUTPATIENT
Start: 2021-07-07 | End: 2021-07-09

## 2021-07-07 RX ORDER — CYCLOBENZAPRINE HCL 5 MG
5 TABLET ORAL 3 TIMES DAILY PRN
Qty: 90 TABLET | Refills: 3 | Status: SHIPPED | OUTPATIENT
Start: 2021-07-07 | End: 2021-07-22 | Stop reason: SDUPTHER

## 2021-07-07 RX ORDER — CARVEDILOL 6.25 MG/1
6.25 TABLET ORAL 2 TIMES DAILY WITH MEALS
Qty: 60 TABLET | Refills: 3 | Status: SHIPPED | OUTPATIENT
Start: 2021-07-07 | End: 2021-07-22 | Stop reason: SDUPTHER

## 2021-07-07 RX ADMIN — GABAPENTIN 100 MG: 100 CAPSULE ORAL at 09:06

## 2021-07-07 RX ADMIN — CARVEDILOL 6.25 MG: 6.25 TABLET, FILM COATED ORAL at 09:06

## 2021-07-07 RX ADMIN — CYCLOBENZAPRINE 5 MG: 10 TABLET, FILM COATED ORAL at 09:06

## 2021-07-07 RX ADMIN — VALSARTAN 160 MG: 160 TABLET, FILM COATED ORAL at 09:06

## 2021-07-07 RX ADMIN — HYDROCODONE BITARTRATE AND ACETAMINOPHEN 1 TABLET: 10; 325 TABLET ORAL at 02:54

## 2021-07-07 RX ADMIN — LIDOCAINE 1 PATCH: 50 PATCH TOPICAL at 09:06

## 2021-07-07 RX ADMIN — HYDROCODONE BITARTRATE AND ACETAMINOPHEN 1 TABLET: 10; 325 TABLET ORAL at 09:06

## 2021-07-07 RX ADMIN — PANTOPRAZOLE SODIUM 40 MG: 40 INJECTION, POWDER, FOR SOLUTION INTRAVENOUS at 09:06

## 2021-07-07 RX ADMIN — LEVOTHYROXINE SODIUM 50 MCG: 0.05 TABLET ORAL at 06:30

## 2021-07-07 RX ADMIN — ESCITALOPRAM 10 MG: 10 TABLET, FILM COATED ORAL at 09:06

## 2021-07-07 RX ADMIN — DOCUSATE SODIUM 50MG AND SENNOSIDES 8.6MG 2 TABLET: 8.6; 5 TABLET, FILM COATED ORAL at 09:12

## 2021-07-07 RX ADMIN — SODIUM CHLORIDE, PRESERVATIVE FREE 10 ML: 5 INJECTION INTRAVENOUS at 09:06

## 2021-07-07 RX ADMIN — AMLODIPINE BESYLATE 10 MG: 10 TABLET ORAL at 09:06

## 2021-07-07 NOTE — TELEPHONE ENCOUNTER
----- Message from Margy Paul MA sent at 7/7/2021  2:25 PM EDT -----  Regarding: FW: f/up  I cannot call the patient while at [place].  ----- Message -----  From: Kayla Bourne APRN  Sent: 7/7/2021  10:39 AM EDT  To: Margy Paul MA  Subject: f/up                                             Will need to follow-up with Dr. Mejia, or any APC in 4 to 6 weeks.  Had lumbar pain some spinal stenosis and slight disc bulging at L1-2.  Epidural injection was given in hospital.  Thank you.

## 2021-07-07 NOTE — THERAPY DISCHARGE NOTE
Patient Name: Geeta Butt  : 1935    MRN: [id number]                              Today's Date: 2021       Admit Date: 2021    Visit Dx:     ICD-10-CM ICD-9-CM   1. Acute left-sided low back pain with left-sided sciatica  M54.42 724.2     724.3   2. DDD (degenerative disc disease), lumbosacral  M51.37 722.52     Patient Active Problem List   Diagnosis   • Hypertension   • Hypothyroidism   • Black stool   • Diarrhea   • Nausea & vomiting   • RUQ pain   • Leukocytosis   • UTI (urinary tract infection)   • Duodenitis   • Foot pain, bilateral   • Malignant neoplasm of lower-inner quadrant of left breast in female, estrogen receptor positive (CMS/HCC)   • Iron deficiency anemia   • Adverse effect of iron   • GI bleed   • GIULIA (acute kidney injury) (CMS/HCC)   • Gastrointestinal hemorrhage associated with duodenitis   • Hematochezia   • Duodenal ulcer   • Long-term use of high-risk medication   • Acute left-sided low back pain with left-sided sciatica   • DDD (degenerative disc disease), lumbosacral   • Hyperglycemia   • GERD without esophagitis   • Hyponatremia     Past Medical History:   Diagnosis Date   • Anesthesia complication     ASPIRATION INTO AIRWAY WITH TRACH PRESENT IN PAST (WITH ORAL CANCER SURGERY(   • Arthritis    • Aspiration into airway     post anesthesia   • Breast cancer (CMS/HCC)    • Cancer (CMS/HCC)     mouth cancer    • Chronic kidney disease    • CKD (chronic kidney disease)     PT STATES STAGE 3   • Depression    • Diverticular disease    • Gastric ulcer     AND DUODENAL   • GERD (gastroesophageal reflux disease)    • Hearing loss     BILAT AIDES   • History of colon polyps    • History of depression    • History of GI bleed    • Hypertension    • Hypothyroidism    • Peptic ulceration    • PONV (postoperative nausea and vomiting)    • Poor vision     RIGHT EYE, HAS PERIPHERAL VISION    • Stroke (CMS/HCC)      mild weakness on left, partial sight loss on right      Past  Surgical History:   Procedure Laterality Date   • ABDOMINAL SURGERY     • APPENDECTOMY     • BLADDER REPAIR      AND RECTOCELE   • BREAST BIOPSY     • BREAST CYST ASPIRATION     • BREAST LUMPECTOMY WITH SENTINEL NODE BIOPSY Left 3/19/2018    Procedure: BREAST LUMPECTOMY WITH SENTINEL NODE BIOPSY AND NEEDLE LOCALIZATION;  Surgeon: Myles Soliman MD;  Location:  [facility];  Service: General   • CHOLECYSTECTOMY     • COLONOSCOPY  2013   • COLONOSCOPY N/A 2/16/2018    Procedure: COLONOSCOPY into cecum and T.I. with biopsies;  Surgeon: Augustine Gallego MD;  Location: [facility] ENDOSCOPY;  Service:    • ENDOSCOPY N/A 10/17/2017    Procedure: ESOPHAGOGASTRODUODENOSCOPY WITH COLD BIOPSIES;  Surgeon: Augustine Gallego MD;  Location: [facility] ENDOSCOPY;  Service:    • ENDOSCOPY N/A 2/16/2018    Procedure: ESOPHAGOGASTRODUODENOSCOPY with biopsies and #54 [place] DILATATION;  Surgeon: Augustine Gallego MD;  Location: [facility] ENDOSCOPY;  Service:    • ENDOSCOPY N/A 3/19/2020    Procedure: ESOPHAGOGASTRODUODENOSCOPY with biopsies;  Surgeon: Augustine Gallego MD;  Location: [facility] ENDOSCOPY;  Service: Gastroenterology;  Laterality: N/A;  PRE- MELENA, EPIGASTRIC PAIN  POST- eerosive gastritis, duodenal ulcer, hiatal hernia   • ENDOSCOPY N/A 7/29/2020    Procedure: ESOPHAGOGASTRODUODENOSCOPY WITH DUODENAL ASPIRATE AND COLD BIOPSIES;  Surgeon: Augustine Gallego MD;  Location: [facility] ENDOSCOPY;  Service: Gastroenterology;  Laterality: N/A;  PRE: HX ULCER DISEASE  POST: GASTRITIS, HEALED ULCER   • EYE SURGERY Left 2014    3-4 years, cornea replacement    • HYSTERECTOMY     • MOUTH SURGERY  2000    UNDER TONGUE AND LEFT FLOOR OF MOUTH FOR CA   • PARATHYROIDECTOMY      PER PT   • SIGMOIDOSCOPY N/A 7/29/2020    Procedure: FLEXIBLE SIGMOIDOSCOPY TO DESCENDING COLON WITH COLD BIOPSIES;  Surgeon: Augustine Gallego MD;  Location: [facility] ENDOSCOPY;  Service: Gastroenterology;  Laterality: N/A;  PRE: RECTAL BLEEDING  POST: DIVERTICULOSIS,  HEMORRHOIDS, MINIMAL PROCTITIS   • SINUS SURGERY     • SKIN BIOPSY     • THYROID SURGERY     • VARICOSE VEIN SURGERY       General Information     Row Name 07/07/21 1129          Physical Therapy Time and Intention    Document Type  discharge evaluation/summary  -CB     Mode of Treatment  individual therapy;physical therapy  -CB     Row Name 07/07/21 1129          General Information    Patient Profile Reviewed  yes  -CB     Existing Precautions/Restrictions  fall;spinal  -CB     Row Name 07/07/21 1129          Cognition    Orientation Status (Cognition)  oriented x 3  -CB     Row Name 07/07/21 1129          Safety Issues, Functional Mobility    Impairments Affecting Function (Mobility)  endurance/activity tolerance;strength;pain  -CB     Comment, Safety Issues/Impairments (Mobility)  gait belt and non skid socks  -CB       User Key  (r) = Recorded By, (t) = Taken By, (c) = Cosigned By    Initials Name Provider Type    CB Shantell Newton Physical Therapist        Mobility     Row Name 07/07/21 1129          Bed Mobility    Bed Mobility  supine-sit  -CB     Supine-Sit [place] (Bed Mobility)  modified independence  -CB     Assistive Device (Bed Mobility)  head of bed elevated  -CB     Row Name 07/07/21 1129          Sit-Stand Transfer    Sit-Stand [place] (Transfers)  standby assist STSx2  -CB     Assistive Device (Sit-Stand Transfers)  walker, front-wheeled  -CB     Row Name 07/07/21 1129          Gait/Stairs (Locomotion)    [place] Level (Gait)  standby assist  -CB     Assistive Device (Gait)  walker, front-wheeled  -CB     Distance in Feet (Gait)  85ft  -CB     Deviations/Abnormal Patterns (Gait)  noam decreased;gait speed decreased;stride length decreased  -CB     Bilateral Gait Deviations  forward flexed posture  -CB     Comment (Gait/Stairs)  no LOB or unsteadiness noted during ambulation. cues for upright posture and to decrease UT involvement during ambulation  -CB       User Key  (r) =  Recorded By, (t) = Taken By, (c) = Cosigned By    Initials Name Provider Type    Shantell Cornejo Physical Therapist        Obj/Interventions     Row Name 07/07/21 1131          Motor Skills    Therapeutic Exercise  -- seated DF/PF, SPENSER briggs x10 reps  -CB     Row Name 07/07/21 1131          Balance    Balance Assessment  sitting static balance;standing static balance;standing dynamic balance  -CB     Static Sitting Balance  WFL;unsupported;sitting, edge of bed  -CB     Dynamic Sitting Balance  WFL  -CB     Static Standing Balance  WFL;supported;standing  -CB     Dynamic Standing Balance  WFL;supported;standing  -CB       User Key  (r) = Recorded By, (t) = Taken By, (c) = Cosigned By    Initials Name Provider Type    Shantell Cornejo Physical Therapist        Goals/Plan    No documentation.       Clinical Impression     Row Name 07/07/21 1131          Pain    Additional Documentation  Pain Scale: Numbers Pre/Post-Treatment (Group)  -CB     Row Name 07/07/21 1131          Pain Scale: Numbers Pre/Post-Treatment    Pretreatment Pain Rating  6/10  -CB     Posttreatment Pain Rating  6/10  -CB     Pre/Posttreatment Pain Comment  back, stomach, LLE  -CB     Pain Intervention(s)  Ambulation/increased activity;Rest;Repositioned  -CB     Row Name 07/07/21 1131          Plan of Care Review    Plan of Care Reviewed With  patient;daughter  -STEPHEN     Progress  improving  -     Outcome Summary  Patient is agreeable to PT and increased overall functional mobility today. The patient was educated regarding log rolling and use of rwx during ambulation for safety. The patient completed supine to sitting with Melissa, STS to rwx with SBA, and ambulated 85ft with rwx and SBA. No LOB or unsteadiness noted during ambulation this date. Pt limited by fatigue and fear of increased back pain with further ambulation. No further acute PT needs at this time. Pt will have assist from daughter and will benefit from home health PT.  -STEPHEN Anderson  Name 07/07/21 1131          Positioning and Restraints    Pre-Treatment Position  in bed  -CB     Post Treatment Position  chair  -CB     In Chair  notified nsg;reclined;call light within reach;encouraged to call for assist;exit alarm on;with family/caregiver  -CB       User Key  (r) = Recorded By, (t) = Taken By, (c) = Cosigned By    Initials Name Provider Type    Shantell Cornejo Physical Therapist        Outcome Measures     Row Name 07/07/21 1135          How much help from another person do you currently need...    Turning from your back to your side while in flat bed without using bedrails?  4  -CB     Moving from lying on back to sitting on the side of a flat bed without bedrails?  4  -CB     Moving to and from a bed to a chair (including a wheelchair)?  3  -CB     Standing up from a chair using your arms (e.g., wheelchair, bedside chair)?  3  -CB     Climbing 3-5 steps with a railing?  3  -CB     To walk in hospital room?  3  -CB     AM-PAC 6 Clicks Score (PT)  20  -CB     Row Name 07/07/21 1135          Functional Assessment    Outcome Measure Options  AM-PAC 6 Clicks Basic Mobility (PT)  -CB       User Key  (r) = Recorded By, (t) = Taken By, (c) = Cosigned By    Initials Name Provider Type    Shantell Cornejo Physical Therapist        Physical Therapy Education                 Title: PT OT SLP Therapies (Done)     Topic: Physical Therapy (Done)     Point: Mobility training (Done)     Learning Progress Summary           Patient Acceptance, E,TB,D, VU,DU,NR by CB at 7/7/2021 1136    Acceptance, E,TB, VU,NR by MS at 7/4/2021 1054   Family Acceptance, E,TB,D, VU,DU,NR by CB at 7/7/2021 1136                   Point: Home exercise program (Done)     Learning Progress Summary           Patient Acceptance, E,TB,D, VU,DU,NR by CB at 7/7/2021 1136   Family Acceptance, E,TB,D, VU,DU,NR by CB at 7/7/2021 1136                   Point: Body mechanics (Done)     Learning Progress Summary           Patient Acceptance,  E,TB,D, VU,DU,NR by CB at 7/7/2021 1136    Acceptance, E,TB, VU,NR by MS at 7/4/2021 1054   Family Acceptance, E,TB,D, VU,DU,NR by CB at 7/7/2021 1136                   Point: Precautions (Done)     Learning Progress Summary           Patient Acceptance, E,TB,D, VU,DU,NR by CB at 7/7/2021 1136    Acceptance, E,TB, VU,NR by MS at 7/4/2021 1054   Family Acceptance, E,TB,D, VU,DU,NR by CB at 7/7/2021 1136                               User Key     Initials Effective Dates Name Provider Type Discipline    MS 06/16/21 -  Radha Campbell, PT Physical Therapist PT    CB 12/30/20 -  Shantell Newton Physical Therapist PT              PT Recommendation and Plan     Plan of Care Reviewed With: patient, daughter  Progress: improving  Outcome Summary: Patient is agreeable to PT and increased overall functional mobility today. The patient was educated regarding log rolling and use of rwx during ambulation for safety. The patient completed supine to sitting with Melissa, STS to rwx with SBA, and ambulated 85ft with rwx and SBA. No LOB or unsteadiness noted during ambulation this date. Pt limited by fatigue and fear of increased back pain with further ambulation. No further acute PT needs at this time. Pt will have assist from daughter and will benefit from home health PT.     Time Calculation:   PT Charges     Row Name 07/07/21 1136             Time Calculation    Start Time  1046  -CB      Stop Time  1117  -CB      Time Calculation (min)  31 min  -CB      PT Received On  07/07/21  -CB         Time Calculation- PT    Total Timed Code Minutes- PT  28 minute(s)  -CB         Timed Charges    07393 - PT Therapeutic Activity Minutes  28  -CB         Total Minutes    Timed Charges Total Minutes  28  -CB       Total Minutes  28  -CB        User Key  (r) = Recorded By, (t) = Taken By, (c) = Cosigned By    Initials Name Provider Type    CB Shantell Newton Physical Therapist        Therapy Charges for Today     Code Description Service Date  Service Provider Modifiers Qty    58935522498 HC PT THERAPEUTIC ACT EA 15 MIN 7/7/2021 Shantell Newton GP 2          PT G-Codes  Outcome Measure Options: AM-PAC 6 Clicks Basic Mobility (PT)  AM-PAC 6 Clicks Score (PT): 20  AM-PAC 6 Clicks Score (OT): 15    PT Discharge Summary  Anticipated Discharge Disposition (PT): home with assist, home with home health    Shantell Nweton  7/7/2021
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
## 2021-07-07 NOTE — PROGRESS NOTES
[facility] PROGRESS NOTE      CC:back pain      Subjective     Interval History: LESI 7/6/21, feeling much better, c/o pain in back with lifting left leg, currently no left hip pain, feeling some better, no events reported overnight    ROS:  Constitutional: No fever, chills  GI: No nausea, vomiting  MS:  back pain  Neuro: No numbness, tingling, or weakness,  balance difficulties  : No difficulty voiding, no incontinence    Objective     Vital signs in last 24 hours:  Temp:  [96.9 °F (36.1 °C)-97.4 °F (36.3 °C)] 96.9 °F (36.1 °C)  Heart Rate:  [62-71] 69  Resp:  [14-18] 18  BP: (108-178)/(55-84) 127/73    Intake/Output this shift:  I/O this shift:  In: 240 [P.O.:240]  Out: 350 [Urine:350]    LABS:  Results from last 7 days   Lab Units 07/07/21 0720 07/06/21 0459 07/05/21 0443   WBC 10*3/mm3 14.93* 12.39* 14.69*   HEMOGLOBIN g/dL 13.2 11.8* 12.1   HEMATOCRIT % 39.8 35.2 36.5   PLATELETS 10*3/mm3 370 351 387     Results from last 7 days   Lab Units 07/07/21  0720 07/06/21 0459 07/05/21 0443 07/02/21  0812   SODIUM mmol/L 135* 132* 131* 136   POTASSIUM mmol/L 4.5 4.5 4.8 4.0   CHLORIDE mmol/L 99 96* 95* 100   CO2 mmol/L 27.1 27.7 24.6 26.0   BUN mg/dL 26* 30* 29* 12   CREATININE mg/dL 0.69 0.81 0.67 0.80   CALCIUM mg/dL 9.4 9.4 9.3 9.8   BILIRUBIN mg/dL  --   --   --  0.3   ALK PHOS U/L  --   --   --  62   ALT (SGPT) U/L  --   --   --  26   AST (SGOT) U/L  --   --   --  21   GLUCOSE mg/dL 109* 92 127* 113*       IMAGING STUDIES:  No new imaging    Meds reviewed/changed: Yes    Current Facility-Administered Medications:   •  amLODIPine (NORVASC) tablet 10 mg, 10 mg, Oral, Daily, Srini Ny APRN, 10 mg at 07/07/21 0906  •  sennosides-docusate (PERICOLACE) 8.6-50 MG per tablet 2 tablet, 2 tablet, Oral, BID, 2 tablet at 07/07/21 0912 **AND** polyethylene glycol (MIRALAX) packet 17 g, 17 g, Oral, Daily PRN **AND** bisacodyl (DULCOLAX) EC tablet 5 mg, 5 mg, Oral, Daily PRN **AND** bisacodyl (DULCOLAX)  suppository 10 mg, 10 mg, Rectal, Daily PRN, Srini Ny APRN  •  carvedilol (COREG) tablet 6.25 mg, 6.25 mg, Oral, BID With Meals, Klaudia Dickey MD, 6.25 mg at 07/07/21 0906  •  cyclobenzaprine (FLEXERIL) tablet 5 mg, 5 mg, Oral, TID, Klaudia Dickey MD, 5 mg at 07/07/21 0906  •  escitalopram (LEXAPRO) tablet 10 mg, 10 mg, Oral, Daily, Srini Ny APRN, 10 mg at 07/07/21 0906  •  gabapentin (NEURONTIN) capsule 100 mg, 100 mg, Oral, TID, Klaudia Dickey MD, 100 mg at 07/07/21 0906  •  HYDROcodone-acetaminophen (NORCO)  MG per tablet 1 tablet, 1 tablet, Oral, Q6H PRN, Srini Ny APRN, 1 tablet at 07/07/21 0906  •  iopamidol (ISOVUE-M 200) injection 41%, 3 mL, Epidural, Once in imaging, Osmin Burks MD  •  levothyroxine (SYNTHROID, LEVOTHROID) tablet 50 mcg, 50 mcg, Oral, Q AM, Srini Ny APRN, 50 mcg at 07/07/21 0630  •  lidocaine (LIDODERM) 5 % 1 patch, 1 patch, Transdermal, Q24H, Klaudia Dickey MD, 1 patch at 07/07/21 0906  •  lidocaine (XYLOCAINE) 1 % injection 1 mL, 1 mL, Intradermal, Once, Osmin Burks MD  •  morphine injection 2 mg, 2 mg, Intravenous, Q4H PRN, 2 mg at 07/03/21 1255 **AND** naloxone (NARCAN) injection 0.4 mg, 0.4 mg, Intravenous, Q5 Min PRN, Srini Ny APRN  •  ondansetron (ZOFRAN) tablet 4 mg, 4 mg, Oral, Q6H PRN **OR** ondansetron (ZOFRAN) injection 4 mg, 4 mg, Intravenous, Q6H PRN, Srini Ny APRN, 4 mg at 07/02/21 2024  •  pantoprazole (PROTONIX) injection 40 mg, 40 mg, Intravenous, Q AM, Klaudia Dickey MD, 40 mg at 07/07/21 0906  •  sodium chloride 0.9 % flush 10 mL, 10 mL, Intravenous, Q12H, Srini Ny APRN, 10 mL at 07/07/21 0906  •  sodium chloride 0.9 % flush 10 mL, 10 mL, Intravenous, PRN, Srini Ny APRN  •  valsartan (DIOVAN) tablet 160 mg, 160 mg, Oral, Daily, Srini Ny APRN, 160 mg at 07/07/21 0906      Physical Exam:    General:   Sleepy. Speech clear  Back: Back pain  Motor: Normal muscle strength,  "bulk and tone in upper and lower extremities.  No fasciculations, rigidity, spasticity, or abnormal movements.  Sensation: Normal to light touch  Coordination: Moving all extremities  Station and Gait:             Ambulating 50' with CGA and Rwx, per PT  Extremities:   Wearing SCD      Assessment/Plan     ASSESSMENT:  Lying in bed, sleeping, moving all extremities.  No complaint of pain, at this AM.  Daughter does state that she was complaining some of pain earlier and had been given some pain medication.  She states that her mother had rated her pain 9/10 in the low back, although she stated her mother says her pain was better since the epidural injection.      Acute left-sided low back pain with left-sided sciatica    Hypertension    Hypothyroidism    Precordial chest pain    DDD (degenerative disc disease), lumbosacral    Hyperglycemia    GERD without esophagitis    Hyponatremia      PLAN:   Continue to follow up with Dr. Pierce  Will have her follow-up in our office in 4 to 6 weeks, explained that if she is feeling better she can reschedule.  Okay from JENSEN standpoint to discharge home.  We will sign off, and be available for any questions, or concerns.      I discussed the patient's findings and my recommendations with dtr, patient, nursing staff and Dr. Mejia       LOS: 2 days       Kayla Bourne, APRN  7/7/2021  10:35 EDT    \"Dictated utilizing Dragon dictation\".      "

## 2021-07-07 NOTE — PLAN OF CARE
Goal Outcome Evaluation:  Plan of Care Reviewed With: patient        Progress: improving  Outcome Summary: vss, bandaide over LESI injection site c/d/i, ambulated in hallway, no neuro deficits, states pain overall has improved, discharge is planned for home and possibly today, educated on moitoriing b/p due to htn hx

## 2021-07-07 NOTE — TELEPHONE ENCOUNTER
PA request came in for Lidocaine 5% patch.  I submitted the PA through covermymeds.[email or web address] and it was approved.

## 2021-07-07 NOTE — TELEPHONE ENCOUNTER
I called and left message on patient's voicemail regarding going over upcoming appointment. The appointment will show up in my chart and i'm mailing an appointment reminder.

## 2021-07-07 NOTE — OUTREACH NOTE
Prep Survey      Responses   [facility] patient discharged from?  [place]   Is LACE score < 7 ?  No   Emergency Room discharge w/ pulse ox?  No   Eligibility  [facility]   Date of Admission  07/02/21   Date of Discharge  07/07/21   Discharge Disposition  Home-Health Care S[facility]   Discharge diagnosis  Acute left-sided low back pain [place] [facility]    Does the patient have one of the following disease processes/diagnoses(primary or secondary)?  Other   Does the patient have Home health ordered?  Yes   What is the Home health agency?    [facility]   Is there a DME ordered?  No   Prep survey completed?  Yes          Geeta Monk RN

## 2021-07-07 NOTE — PLAN OF CARE
Goal Outcome Evaluation:  Plan of Care Reviewed With: patient, daughter        Progress: improving  Outcome Summary: Patient is agreeable to PT and increased overall functional mobility today. The patient was educated regarding log rolling and use of rwx during ambulation for safety. The patient completed supine to sitting with Melissa, STS to rwx with SBA, and ambulated 85ft with rwx and SBA. No LOB or unsteadiness noted during ambulation this date. Pt limited by fatigue and fear of increased back pain with further ambulation. No further acute PT needs at this time. Pt will have assist from daughter and will benefit from home health PT.    Mask and gloves donned.

## 2021-07-08 ENCOUNTER — TRANSITIONAL CARE MANAGEMENT TELEPHONE ENCOUNTER (OUTPATIENT)
Dept: CALL CENTER | Facility: HOSPITAL | Age: 86
End: 2021-07-08

## 2021-07-08 NOTE — OUTREACH NOTE
Call Center TCM Note      Responses   [facility] patient discharged from?  [place]   Does the patient have one of the following disease processes/diagnoses(primary or secondary)?  Other   TCM attempt successful?  Yes   Discharge diagnosis  Acute left-sided low back pain [place] [facility]    Meds reviewed with patient/caregiver?  Yes   Does the patient have all medications ordered at discharge?  Yes   Is the patient taking all medications as directed (includes completed medication regime)?  Yes   Does the patient have a primary care provider?   Yes   Does the patient have an appointment with their PCP within 7 days of discharge?  No   Comments regarding PCP  PCP SARAH VELÁSQUEZ HAS NO APPTS AVAIL WITHIN TCM TIME FRAME. I AM ASKING [facility] TO REVIEW SCHED AND CALL PT FOR TCM FWP TO BE COMPLETED BY 07/21/2021. PT WILL SEE NEUROSGN ON 08/18/2021.   Has the patient kept scheduled appointments due by today?  N/A   What is the [facility] health agency?    [facility]   Home health comments  Pt has not heard from [facility] yet but has info to call if needed   What DME was ordered?  walker   Has all DME been delivered?  Yes   Psychosocial issues?  No   Did the patient receive a copy of their discharge instructions?  Yes   Nursing interventions  Reviewed instructions with patient   What is the patient's perception of their health status since discharge?  Improving   Is the patient/caregiver able to teach back signs and symptoms related to disease process for when to call PCP?  Yes   Is the patient/caregiver able to teach back signs and symptoms related to disease process for when to call 911?  Yes   Is the patient/caregiver able to teach back the hierarchy of who to call/visit for symptoms/problems? PCP, Specialist, Home health nurse, Urgent Care, ED, 911  Yes   If the patient is a current smoker, are they able to teach back resources for cessation?  Not a smoker   TCM call completed?  Yes   Wrap up additional comments   Pt unsure if lumbar epidural in hospital helped as she is having to take all the new meds, pain, nerve pain and muscle relaxers, but managing with these. Pt deos have family with her. No questions at this time. PCP SARAH VELÁSQUEZ HAS NO APPTS AVAIL WITHIN TCM TIME FRAME. I AM ASKING [facility] TO REVIEW SCHED AND CALL PT FOR TCM FWP TO BE COMPLETED BY 07/21/2021. PT WILL SEE NEUROSGN ON 08/18/2021.          Geeta Weaver MA    7/8/2021, 16:24 EDT

## 2021-07-08 NOTE — OUTREACH NOTE
Call Center TCM Note      Responses   [facility] patient discharged from?  [place]   Does the patient have one of the following disease processes/diagnoses(primary or secondary)?  Other   TCM attempt successful?  No   Unsuccessful attempts  Attempt 1          Geeta Weaver MA    7/8/2021, 15:25 EDT

## 2021-07-08 NOTE — CASE MANAGEMENT/SOCIAL WORK
Continued Stay Note  [facility]     Patient Name: Geeta Butt  MRN: [id number]  Today's Date: 7/8/2021    Admit Date: 7/2/2021    Discharge Plan     Row Name 07/08/21 1712       Plan    Plan Comments  Spoke with [facility] who said they do not take the patient's insurance. Referral sent to [facility]. Called and left a message for [facility] to inform of referral. CCP will f/u first thing in the AM.        Discharge Codes    No documentation.       Expected Discharge Date and Time     Expected Discharge Date Expected Discharge Time    Jul 7, 2021             María Woods RN     Colchicine Counseling:  Patient counseled regarding adverse effects including but not limited to stomach upset (nausea, vomiting, stomach pain, or diarrhea).  Patient instructed to limit alcohol consumption while taking this medication.  Colchicine may reduce blood counts especially with prolonged use.  The patient understands that monitoring of kidney function and blood counts may be required, especially at baseline. The patient verbalized understanding of the proper use and possible adverse effects of colchicine.  All of the patient's questions and concerns were addressed.

## 2021-07-09 ENCOUNTER — HOME HEALTH ADMISSION (OUTPATIENT)
Dept: HOME HEALTH SERVICES | Facility: HOME HEALTHCARE | Age: 86
End: 2021-07-09

## 2021-07-09 NOTE — PROGRESS NOTES
Tried to reach patient she was unavailable. Will try to call patient again.     Patient needs hospital F/U scheduled. Can use new patient appt.     OKAY FOR HUB TO SHARE

## 2021-07-09 NOTE — CASE MANAGEMENT/SOCIAL WORK
Continued Stay Note  [facility]     Patient Name: Geeta Butt  MRN: [id number]  Today's Date: 7/9/2021    Admit Date: 7/2/2021    Discharge Plan     Row Name 07/09/21 0951       Plan    Plan Comments  [facility] spoke with the patient's son who's agreeable. No other needs identified.    Final Discharge Disposition Code  06 - home with home health care    Final Note  Home with [facility].        Discharge Codes    No documentation.       Expected Discharge Date and Time     Expected Discharge Date Expected Discharge Time    Jul 7, 2021             María Woods RN

## 2021-07-10 ENCOUNTER — HOME CARE VISIT (OUTPATIENT)
Dept: HOME HEALTH SERVICES | Facility: HOME HEALTHCARE | Age: 86
End: 2021-07-10

## 2021-07-10 NOTE — HOME HEALTH
SUBJECTIVE:     DIAGNOSIS: Acute left-sided low back pain with left-sided sciatica; DDD lumbar spine; had epidural block while in hospital at [facility] [id number]    PAST MEDICAL HX:    PRIOR LEVEL OF FUNCTION

## 2021-07-14 ENCOUNTER — HOME CARE VISIT (OUTPATIENT)
Dept: HOME HEALTH SERVICES | Facility: HOME HEALTHCARE | Age: 86
End: 2021-07-14

## 2021-07-14 NOTE — CASE COMMUNICATION
"I went to change her contact type to make her non admit instead of [facility] after finding out today that her  had passed away so did not want me to return until next week. However would not let me change it. Nita RN was on call manager on Sat and aware of situation, when I had opened her case and was in middle of taking hx with patient when spouse had medical emergency so I had to leave without completing visit. Nita instructed me to leave visit open and to try to see her again in 5 day Window to complete [facility] but she had refused Sun/Mon due to her spouse's status and when I called today spoke with daughter who informed PT that her dad/patient's spouse had passed away yesterday. Daughter indicated they do want her to have the therapy but would need to wait till next week once  services are over and patient can grieve rest of this week. PT told daughter we would call to set up Mon or Tues next week with daughter in agreement.     I went to change visit type from opened visit to non-admission but it said I could not do because it had entered \"RAP review\". Please advise what I should do.   "

## 2021-07-15 NOTE — CASE COMMUNICATION
PT was at home for scheduled SOC visit reviewing hx and getting paperwork ready to be signed by patient when her spouse had medical emergency and PT had to leave so SOC was not completed. Had contacted Nita MA RN on call who instructed to leave visit open in case could see her again in 5 day window to complete [facility]. However, they refused Sun and Monday and then when I called [phone] spouse had passed away so patient unavailable due to  services this week. Daughter indicated via phone that they still want het to have therapy so to callback next week and check if ready to start. Patient not admitted to agency.

## 2021-07-16 ENCOUNTER — READMISSION MANAGEMENT (OUTPATIENT)
Dept: CALL CENTER | Facility: HOSPITAL | Age: 86
End: 2021-07-16

## 2021-07-16 NOTE — OUTREACH NOTE
Medical Week 2 Survey      Responses   [facility] patient discharged from?  [place]   Does the patient have one of the following disease processes/diagnoses(primary or secondary)?  Other   Week 2 attempt successful?  No   Unsuccessful attempts  Attempt 1          Geeta Lowe RN

## 2021-07-19 ENCOUNTER — TELEPHONE (OUTPATIENT)
Dept: INTERNAL MEDICINE | Age: 86
End: 2021-07-19

## 2021-07-19 DIAGNOSIS — Z86.73 HISTORY OF ISCHEMIC STROKE: Primary | ICD-10-CM

## 2021-07-19 NOTE — TELEPHONE ENCOUNTER
[facility]     Orders   Have them fax new referral    For home physical therapy   They were not able to get the previous one set up due to patients spouse passing away   Rep name  -frances - [facility]      Fax [phone]    Reps number is listed below   [phone]

## 2021-07-20 ENCOUNTER — READMISSION MANAGEMENT (OUTPATIENT)
Dept: CALL CENTER | Facility: HOSPITAL | Age: 86
End: 2021-07-20

## 2021-07-20 ENCOUNTER — HOME HEALTH ADMISSION (OUTPATIENT)
Dept: HOME HEALTH SERVICES | Facility: HOME HEALTHCARE | Age: 86
End: 2021-07-20

## 2021-07-20 NOTE — PROGRESS NOTES
Chief Complaint  Establish Care    Subjective          Geeta Butt presents to [facility]  History of Present Illness     Prior PCP Dahlia    Hypertension - stable.  Patient taking medication as prescribed.  Denies chest pain, shortness of breath, headache, lower extremity edema.  Patient is taking amlodipine, carvedilol, valsartan hand.    Hypothyroidism - Stable.  Patient taking levothyroxine.  Patient denying any symptoms of hypothyroidism such as cold intolerance, constipation, mood swings.  TSH done on 7/3/21 and was stable at 0.404.      Impaired fasting glucose - stable.  A1c check on 7/3/21 and was good at 5.70.  On no glucose lowering medications.    Also takes AcipHex for reflux symptoms.  They have been controlled.  If tries to avoid triggers in her diet.    Reviewed labs from July/hospitalization in July and were mostly stable.  MRI of the lumbar spine showed multilevel lumbar spondyloarthropathy as well as a new anterior left disc extrusion at L1-L2 with some rounding hyperemia.    Of note, she was recently in the hospital on July 2, 2021 and was hospitalized until July 7, 2021 at [facility].  Her diagnoses were acute left-sided low back pain with left-sided sciatica, hyponatremia.  She also had precordial chest pain which was resolved by the time she left.  She had presented to the emergency room with intractable left-sided low back pain radiating to the left lower extremity.  She was admitted for work-up and pain control.  She is to follow-up with neurosurgery and pain clinic for continuation of the epidural blocks.  She has a history of chronic degenerative disc disease of the lumbar spine.  She has an appointment with Dr. Mejia on August 18, 2021.  Epidural on 8/3/2021 will be next intervention.  Taking gabapentin as well for this sciatica.      Per chart review and patient history, issues with kidneys over time.  Hospital report says CKD stage III.   "      Objective   Vital Signs:   /78 (BP Location: Right arm, Patient Position: Sitting, Cuff Size: Adult)   Pulse 57   Temp 97.3 °F (36.3 °C) (Temporal)   Resp 16   Ht 154.9 cm (61\")   Wt 67.9 kg (149 lb 9.6 oz)   SpO2 96%   BMI 28.27 kg/m²     Physical Exam  Vitals and nursing note reviewed.   Constitutional:       General: She is not in acute distress.     Appearance: Normal appearance.   HENT:      Nose:      Right Sinus: Maxillary sinus tenderness and frontal sinus tenderness present.      Left Sinus: Maxillary sinus tenderness and frontal sinus tenderness present.   Cardiovascular:      Rate and Rhythm: Normal rate and regular rhythm.      Heart sounds: Murmur heard.   Systolic murmur is present with a grade of 1/6.     Pulmonary:      Effort: Pulmonary effort is normal.      Breath sounds: Normal breath sounds.   Neurological:      Mental Status: She is alert.        Result Review :   The following data was reviewed by: Grzegorz Nguyen MD on 07/22/2021:  Common labs    Common Labsle 7/5/21 7/5/21 7/6/21 7/6/21 7/7/21 7/7/21    0443 0443 0459 0459 0720 0720   Glucose  127 (A)  92  109 (A)   BUN  29 (A)  30 (A)  26 (A)   Creatinine  0.67  0.81  0.69   eGFR Non  Am  84  67  81   Sodium  131 (A)  132 (A)  135 (A)   Potassium  4.8  4.5  4.5   Chloride  95 (A)  96 (A)  99   Calcium  9.3  9.4  9.4   WBC 14.69 (A)  12.39 (A)  14.93 (A)    Hemoglobin 12.1  11.8 (A)  13.2    Hematocrit 36.5  35.2  39.8    Platelets 387  351  370    (A) Abnormal value       Comments are available for some flowsheets but are not being displayed.                     Assessment and Plan    Diagnoses and all orders for this visit:    1. Establishing care with new doctor, encounter for (Primary)    2. Hospital discharge follow-up    3. Acute left-sided low back pain with left-sided sciatica  -     cyclobenzaprine (FLEXERIL) 5 MG tablet; Take 1 tablet by mouth 3 (Three) Times a Day As Needed for Muscle Spasms.  Dispense: " 90 tablet; Refill: 3  -     gabapentin (NEURONTIN) 100 MG capsule; Take 1 capsule by mouth 3 (Three) Times a Day.  Dispense: 30 capsule; Refill: 0    4. Essential hypertension  -     amLODIPine (NORVASC) 10 MG tablet; Take 1 tablet by mouth Daily.  Dispense: 90 tablet; Refill: 3  -     valsartan (DIOVAN) 160 MG tablet; Take 1 tablet by mouth Daily.  Dispense: 90 tablet; Refill: 3  -     carvedilol (COREG) 6.25 MG tablet; Take 1 tablet by mouth 2 (Two) Times a Day With Meals.  Dispense: 60 tablet; Refill: 3    5. Hypothyroidism, unspecified type  -     levothyroxine (SYNTHROID, LEVOTHROID) 50 MCG tablet; Take 1 tablet by mouth Daily.  Dispense: 90 tablet; Refill: 3    6. Hyperlipidemia, unspecified hyperlipidemia type    7. Impaired fasting glucose    8. DDD (degenerative disc disease), lumbosacral  -     cyclobenzaprine (FLEXERIL) 5 MG tablet; Take 1 tablet by mouth 3 (Three) Times a Day As Needed for Muscle Spasms.  Dispense: 90 tablet; Refill: 3  -     gabapentin (NEURONTIN) 100 MG capsule; Take 1 capsule by mouth 3 (Three) Times a Day.  Dispense: 30 capsule; Refill: 0  -     celecoxib (CeleBREX) 200 MG capsule; Take 1 capsule by mouth Daily As Needed for Moderate Pain .  Dispense: 90 capsule; Refill: 3    9. GERD without esophagitis  -     RABEprazole (ACIPHEX) 20 MG EC tablet; Take 1 tablet by mouth Daily.  Dispense: 90 tablet; Refill: 3    10. Chronic kidney disease, unspecified CKD stage    11. Depressive disorder  -     escitalopram (LEXAPRO) 10 MG tablet; Take 1 tablet by mouth Daily.  Dispense: 90 tablet; Refill: 3    12. Systolic murmur of aorta    13. Acute bacterial sinusitis  -     amoxicillin (AMOXIL) 500 MG capsule; Take 2 capsules by mouth 3 (Three) Times a Day for 10 days.  Dispense: 60 capsule; Refill: 0      Will use labs from recent hospitalization.  Refilled all medications as above.  I gave her a temporary supply of gabapentin.  PDMP was reviewed and appropriate.  Further care per spine team.   We will plan on getting labs in about 3 months for other chronic conditions especially the chronic kidney disease.  Noted systolic ejection murmur around the aorta but it is not new so will just follow it for now.  Patient also mention that she is having a sinus infection and is very tender around the maxillary sinuses.  Prescribed amoxicillin to treat that.  Everything else as above.    I spent 40 minutes caring for Geeta on this date of service. This time includes time spent by me in the following activities:preparing for the visit, reviewing tests, obtaining and/or reviewing a separately obtained history, performing a medically appropriate examination and/or evaluation , counseling and educating the patient/family/caregiver, ordering medications, tests, or procedures, documenting information in the medical record, independently interpreting results and communicating that information with the patient/family/caregiver and New patient to me and hospital follow-up.  Follow Up   Return in about 6 months (around 1/22/2022) for Recheck - HTN, HLD, IFG.  Patient was given instructions and counseling regarding her condition or for health maintenance advice. Please see specific information pulled into the AVS if appropriate.

## 2021-07-20 NOTE — OUTREACH NOTE
Medical Week 2 Survey      Responses   [facility] patient discharged from?  [place]   Does the patient have one of the following disease processes/diagnoses(primary or secondary)?  Other   Week 2 attempt successful?  Yes   Call start time  1026   Discharge diagnosis  Acute left-sided low back pain [place] [facility]    Call end time  1036   Person spoke with today (if not patient) and relationship  deana Villa   Meds reviewed with patient/caregiver?  Yes   Is the patient taking all medications as directed (includes completed medication regime)?  Yes   Does the patient have a primary care provider?   Yes   Does the patient have an appointment with their PCP within 7 days of discharge?  Greater than 7 days   What is preventing the patient from scheduling follow up appointments within 7 days of discharge?  Earlier appointment not available   Has the patient kept scheduled appointments due by today?  Yes   Comments  Has had 1 injection per Dr Mejia, 08/03/2021 next injection      New PCP appt 07/22/2021   [facility] the [facility]   Has home health visited the patient within 72 hours of discharge?  Yes   Home health comments  Lapse with PT due to loss of . PT to come today or tomorrow.   What DME was ordered?  Able to ambulate without walker   Psychosocial comments  Recent loss of , the day after she was discharged.   What is the patient's perception of their health status since discharge?  New symptoms unrelated to diagnosis [Grieving loss of spouse 2 weeks ago.]   Additional teach back comments  She is very tired, poor appetite, grieving loss of .   Week 2 Call Completed?  Yes          Arianne Crenshaw RN

## 2021-07-21 ENCOUNTER — HOME CARE VISIT (OUTPATIENT)
Dept: HOME HEALTH SERVICES | Facility: HOME HEALTHCARE | Age: 86
End: 2021-07-21

## 2021-07-21 VITALS
RESPIRATION RATE: 18 BRPM | SYSTOLIC BLOOD PRESSURE: 126 MMHG | OXYGEN SATURATION: 97 % | TEMPERATURE: 97.5 F | HEART RATE: 65 BPM | DIASTOLIC BLOOD PRESSURE: 60 MMHG

## 2021-07-21 PROCEDURE — G0151 HHCP-SERV OF PT,EA 15 MIN: HCPCS

## 2021-07-21 NOTE — HOME HEALTH
"SUBJECTIVE: \"I'm better than I was at the hospital.\" Per daughter:\"We will try the exercises on our own and then wait for the next epidural on Aug 3rd, till we have you come back.\"    DIAGNOSIS: Acute left-sided low back pain with left-sided sciatica hospitalized at [facility] [id number] with epidural lumbar given while in hospital; she was originally reffered to home PT [id number] but spouse had medical emergency and passed away last week with  so new referral was needed and was ready to start PT today     PAST MEDICAL HX: HTN, hypothyroidism, DDD lumbar spine    PRIOR LEVEL OF FUNCTION: Independent and was taking care of her spouse who passed away last week      PLAN FOR NEXT VISIT: patient/daughter want to wait for next PT visit until after next epidural scheduled Aug 3rd so will contact them in 2 weeks, putting PT on hold till then"

## 2021-07-22 ENCOUNTER — OFFICE VISIT (OUTPATIENT)
Dept: INTERNAL MEDICINE | Facility: CLINIC | Age: 86
End: 2021-07-22

## 2021-07-22 VITALS
BODY MASS INDEX: 28.25 KG/M2 | DIASTOLIC BLOOD PRESSURE: 78 MMHG | SYSTOLIC BLOOD PRESSURE: 132 MMHG | WEIGHT: 149.6 LBS | RESPIRATION RATE: 16 BRPM | HEIGHT: 61 IN | TEMPERATURE: 97.3 F | OXYGEN SATURATION: 96 % | HEART RATE: 57 BPM

## 2021-07-22 DIAGNOSIS — R73.01 IMPAIRED FASTING GLUCOSE: ICD-10-CM

## 2021-07-22 DIAGNOSIS — B96.89 ACUTE BACTERIAL SINUSITIS: ICD-10-CM

## 2021-07-22 DIAGNOSIS — K21.9 GERD WITHOUT ESOPHAGITIS: ICD-10-CM

## 2021-07-22 DIAGNOSIS — J01.90 ACUTE BACTERIAL SINUSITIS: ICD-10-CM

## 2021-07-22 DIAGNOSIS — M51.37 DDD (DEGENERATIVE DISC DISEASE), LUMBOSACRAL: ICD-10-CM

## 2021-07-22 DIAGNOSIS — Z09 HOSPITAL DISCHARGE FOLLOW-UP: ICD-10-CM

## 2021-07-22 DIAGNOSIS — E03.9 HYPOTHYROIDISM, UNSPECIFIED TYPE: ICD-10-CM

## 2021-07-22 DIAGNOSIS — N18.9 CHRONIC KIDNEY DISEASE, UNSPECIFIED CKD STAGE: ICD-10-CM

## 2021-07-22 DIAGNOSIS — I35.8 SYSTOLIC MURMUR OF AORTA: ICD-10-CM

## 2021-07-22 DIAGNOSIS — E78.5 HYPERLIPIDEMIA, UNSPECIFIED HYPERLIPIDEMIA TYPE: ICD-10-CM

## 2021-07-22 DIAGNOSIS — M54.42 ACUTE LEFT-SIDED LOW BACK PAIN WITH LEFT-SIDED SCIATICA: ICD-10-CM

## 2021-07-22 DIAGNOSIS — Z76.89 ESTABLISHING CARE WITH NEW DOCTOR, ENCOUNTER FOR: Primary | ICD-10-CM

## 2021-07-22 DIAGNOSIS — I10 ESSENTIAL HYPERTENSION: ICD-10-CM

## 2021-07-22 DIAGNOSIS — F32.A DEPRESSIVE DISORDER: ICD-10-CM

## 2021-07-22 PROBLEM — M51.379 DDD (DEGENERATIVE DISC DISEASE), LUMBOSACRAL: Chronic | Status: ACTIVE | Noted: 2021-07-02

## 2021-07-22 PROBLEM — R73.9 HYPERGLYCEMIA: Chronic | Status: ACTIVE | Noted: 2021-07-02

## 2021-07-22 PROCEDURE — 99215 OFFICE O/P EST HI 40 MIN: CPT | Performed by: FAMILY MEDICINE

## 2021-07-22 RX ORDER — LEVOTHYROXINE SODIUM 0.05 MG/1
50 TABLET ORAL DAILY
Qty: 90 TABLET | Refills: 3 | Status: SHIPPED | OUTPATIENT
Start: 2021-07-22 | End: 2022-08-28

## 2021-07-22 RX ORDER — CYCLOBENZAPRINE HCL 5 MG
5 TABLET ORAL 3 TIMES DAILY PRN
Qty: 90 TABLET | Refills: 3 | Status: SHIPPED | OUTPATIENT
Start: 2021-07-22 | End: 2021-10-29

## 2021-07-22 RX ORDER — AMLODIPINE BESYLATE 10 MG/1
10 TABLET ORAL DAILY
Qty: 90 TABLET | Refills: 3 | Status: SHIPPED | OUTPATIENT
Start: 2021-07-22 | End: 2021-08-26 | Stop reason: HOSPADM

## 2021-07-22 RX ORDER — GABAPENTIN 100 MG/1
100 CAPSULE ORAL 3 TIMES DAILY
Qty: 30 CAPSULE | Refills: 0 | Status: ON HOLD | OUTPATIENT
Start: 2021-07-22 | End: 2021-08-26 | Stop reason: SDUPTHER

## 2021-07-22 RX ORDER — ESCITALOPRAM OXALATE 10 MG/1
10 TABLET ORAL DAILY
Qty: 90 TABLET | Refills: 3 | Status: SHIPPED | OUTPATIENT
Start: 2021-07-22 | End: 2021-08-06 | Stop reason: HOSPADM

## 2021-07-22 RX ORDER — VALSARTAN 160 MG/1
160 TABLET ORAL DAILY
Qty: 90 TABLET | Refills: 3 | Status: SHIPPED | OUTPATIENT
Start: 2021-07-22 | End: 2021-08-26 | Stop reason: HOSPADM

## 2021-07-22 RX ORDER — RABEPRAZOLE SODIUM 20 MG/1
20 TABLET, DELAYED RELEASE ORAL DAILY
Qty: 90 TABLET | Refills: 3 | Status: SHIPPED | OUTPATIENT
Start: 2021-07-22 | End: 2022-08-28

## 2021-07-22 RX ORDER — FLUTICASONE PROPIONATE 50 MCG
1 SPRAY, SUSPENSION (ML) NASAL DAILY
COMMUNITY
Start: 2021-05-03 | End: 2022-06-29

## 2021-07-22 RX ORDER — CELECOXIB 200 MG/1
200 CAPSULE ORAL DAILY PRN
Qty: 90 CAPSULE | Refills: 3 | Status: SHIPPED | OUTPATIENT
Start: 2021-07-22 | End: 2021-08-06 | Stop reason: HOSPADM

## 2021-07-22 RX ORDER — AMOXICILLIN 500 MG/1
1000 CAPSULE ORAL 3 TIMES DAILY
Qty: 60 CAPSULE | Refills: 0 | Status: SHIPPED | OUTPATIENT
Start: 2021-07-22 | End: 2021-08-06 | Stop reason: HOSPADM

## 2021-07-22 RX ORDER — CARVEDILOL 6.25 MG/1
6.25 TABLET ORAL 2 TIMES DAILY WITH MEALS
Qty: 60 TABLET | Refills: 3 | Status: SHIPPED | OUTPATIENT
Start: 2021-07-22 | End: 2022-03-07 | Stop reason: HOSPADM

## 2021-07-22 NOTE — PATIENT INSTRUCTIONS
Prediabetes Eating Plan  Prediabetes is a condition that causes blood sugar (glucose) levels to be higher than normal. This increases the risk for developing diabetes. In order to prevent diabetes from developing, your health care provider may recommend a diet and other lifestyle changes to help you:  · Control your blood glucose levels.  · Improve your cholesterol levels.  · Manage your blood pressure.  Your health care provider may recommend working with a diet and nutrition specialist (dietitian) to make a meal plan that is best for you.  What are tips for following this plan?  Lifestyle  · Set weight loss goals with the help of your health care team. It is recommended that most people with prediabetes lose 7% of their current body weight.  · Exercise for at least 30 minutes at least 5 days a week.  · Attend a support group or seek ongoing support from a mental health counselor.  · Take over-the-counter and prescription medicines only as told by your health care provider.  Reading food labels  · Read food labels to check the amount of fat, salt (sodium), and sugar in prepackaged foods. Avoid foods that have:  ? Saturated fats.  ? Trans fats.  ? Added sugars.  · Avoid foods that have more than 300 milligrams (mg) of sodium per serving. Limit your daily sodium intake to less than 2,300 mg each day.  Shopping  · Avoid buying pre-made and processed foods.  Cooking  · Cook with olive oil. Do not use butter, lard, or ghee.  · Bake, broil, grill, or boil foods. Avoid frying.  Meal planning    · Work with your dietitian to develop an eating plan that is right for you. This may include:  ? Tracking how many calories you take in. Use a food diary, notebook, or mobile application to track what you eat at each meal.  ? Using the glycemic index (GI) to plan your meals. The index tells you how quickly a food will raise your blood glucose. Choose low-GI foods. These foods take a longer time to raise blood glucose.  · Consider  following a Mediterranean diet. This diet includes:  ? Several servings each day of fresh fruits and vegetables.  ? Eating fish at least twice a week.  ? Several servings each day of whole grains, beans, nuts, and seeds.  ? Using olive oil instead of other fats.  ? Moderate alcohol consumption.  ? Eating small amounts of red meat and whole-fat dairy.  · If you have high blood pressure, you may need to limit your sodium intake or follow a diet such as the DASH eating plan. DASH is an eating plan that aims to lower high blood pressure.  What foods are recommended?  The items listed below may not be a complete list. Talk with your dietitian about what dietary choices are best for you.  Grains  Whole grains, such as whole-wheat or whole-grain breads, crackers, cereals, and pasta. Unsweetened oatmeal. Bulgur. Barley. Quinoa. Brown rice. Corn or whole-wheat flour tortillas or taco shells.  Vegetables  Lettuce. Spinach. Peas. Beets. Cauliflower. Cabbage. Broccoli. Carrots. Tomatoes. Squash. Eggplant. Herbs. Peppers. Onions. Cucumbers. [place] sprouts.  Fruits  Berries. Bananas. Apples. Oranges. Grapes. Papaya. [place]. Pomegranate. Kiwi. Grapefruit. Cherries.  Meats and other protein foods  Seafood. Poultry without skin. Lean cuts of pork and beef. Tofu. Eggs. Nuts. Beans.  Dairy  Low-fat or fat-free dairy products, such as yogurt, cottage cheese, and cheese.  Beverages  Water. Tea. Coffee. Sugar-free or diet soda. [place] water. Lowfat or no-fat milk. Milk alternatives, such as soy or almond milk.  Fats and oils  Olive oil. Canola oil. Sunflower oil. Grapeseed oil. Avocado. Walnuts.  Sweets and desserts  Sugar-free or low-fat pudding. Sugar-free or low-fat ice cream and other frozen treats.  Seasoning and other foods  Herbs. Sodium-free spices. Mustard. Relish. Low-fat, low-sugar ketchup. Low-fat, low-sugar barbecue sauce. Low-fat or fat-free mayonnaise.  What foods are not recommended?  The items listed below may not be a  complete list. Talk with your dietitian about what dietary choices are best for you.  Grains  Refined white flour and flour products, such as bread, pasta, snack foods, and cereals.  Vegetables  Canned vegetables. Frozen vegetables with butter or cream sauce.  Fruits  Fruits canned with syrup.  Meats and other protein foods  Fatty cuts of meat. Poultry with skin. Breaded or fried meat. Processed meats.  Dairy  Full-fat yogurt, cheese, or milk.  Beverages  Sweetened drinks, such as sweet iced tea and soda.  Fats and oils  Butter. Lard. Ghee.  Sweets and desserts  Baked goods, such as cake, cupcakes, pastries, cookies, and cheesecake.  Seasoning and other foods  Spice mixes with added salt. Ketchup. Barbecue sauce. Mayonnaise.  Summary  · To prevent diabetes from developing, you may need to make diet and other lifestyle changes to help control blood sugar, improve cholesterol levels, and manage your blood pressure.  · Set weight loss goals with the help of your health care team. It is recommended that most people with prediabetes lose 7 percent of their current body weight.  · Consider following a Mediterranean diet that includes plenty of fresh fruits and vegetables, whole grains, beans, nuts, seeds, fish, lean meat, low-fat dairy, and healthy oils.  This information is not intended to replace advice given to you by your health care provider. Make sure you discuss any questions you have with your health care provider.  Document Revised: 04/10/2020 Document Reviewed: 02/21/2018  [employer] Patient Education © 2021 [employer]        Chronic Kidney Disease, Adult  Chronic kidney disease (CKD) happens when the kidneys are damaged over a long period of time. The kidneys are two organs that help with:  · Getting rid of waste and extra fluid from the blood.  · Making hormones that maintain the amount of fluid in your tissues and blood vessels.  · Making sure that the body has the right amount of fluids and chemicals.  Most  of the time, CKD does not go away, but it can usually be controlled. Steps must be taken to slow down the kidney damage or to stop it from getting worse. If this is not done, the kidneys may stop working.  Follow these instructions at home:  Medicines  · Take over-the-counter and prescription medicines only as told by your doctor. You may need to change the amount of medicines you take.  · Do not take any new medicines unless your doctor says it is okay. Many medicines can make your kidney damage worse.  · Do not take any vitamin and supplements unless your doctor says it is okay. Many vitamins and supplements can make your kidney damage worse.  General instructions  · Follow a diet as told by your doctor. You may need to stay away from:  ? Alcohol.  ? Salty foods.  ? Foods that are high in:  § Potassium.  § Calcium.  § Protein.  · Do not use any products that contain nicotine or tobacco, such as cigarettes and e-cigarettes. If you need help quitting, ask your doctor.  · Keep track of your blood pressure at home. Tell your doctor about any changes.  · If you have diabetes, keep track of your blood sugar as told by your doctor.  · Try to stay at a healthy weight. If you need help, ask your doctor.  · Exercise at least 30 minutes a day, 5 days a week.  · Stay up-to-date with your shots (immunizations) as told by your doctor.  · Keep all follow-up visits as told by your doctor. This is important.  Contact a doctor if:  · Your symptoms get worse.  · You have new symptoms.  Get help right away if:  · You have symptoms of end-stage kidney disease. These may include:  ? Headaches.  ? Numbness in your hands or feet.  ? Easy bruising.  ? Having hiccups often.  ? Chest pain.  ? Shortness of breath.  ? Stopping of menstrual periods in women.  · You have a fever.  · You have very little pee (urine).  · You have pain or bleeding when you pee.  Summary  · Chronic kidney disease (CKD) happens when the kidneys are damaged over a  long period of time.  · Most of the time, this condition does not go away, but it can usually be controlled. Steps must be taken to slow down the kidney damage or to stop it from getting worse.  · Treatment may include a combination of medicines and lifestyle changes.  This information is not intended to replace advice given to you by your health care provider. Make sure you discuss any questions you have with your health care provider.  Document Revised: 11/30/2018 Document Reviewed: 01/22/2018  [employer]vier Patient Education © 2020 [employer]

## 2021-07-22 NOTE — CASE COMMUNICATION
Didn't know who to send since Fernanda is off    [facility]    [facility] – [facility] START OF CARE    Caregiver Status: daughter present and comes over to assist patient as needed  Availability:  Ability to meet patient's needs: good  Willingness: good    Risk for Hospitalization: low    Patient's goal(s): to walk again pain-free    Services required to achieve goals: PT    Potential Issues for goal attainment: patient's spouse passed away last week so will montiro her as she is grieving process    Discipline Assessment Updates:     Problems identified: pain left thigh/low back but much better than when hospitalized per patient and had epidural while in hospital    Disease processes:  Acuity and severity:  Comorbidities:  Level of independence with management:     Knowledge deficits: none  Current conditions:  Medications: understands with daughter  Care procedures:    Functional status and safety: supervision with gait without device; has walker/cane but doesn't like to use    Mobility: supervision as needed    Self-care: daughter supervises    ADLs: daughter supervises as needed    Any Duplication of Services:    Environmental issues: stairs in home and out of home  physical environment:  set up/compatibility with patients needs  accessibility and safety    Equipment/Adjunct Services:  Devices for care present in the home:  Devices needed and not present:     Community resources involved/needed:NA  Dialysis:  Transportation:   Meals on Wheels:    Any additional needs:    Based upon record review and collaboration conference, the recommended frequency for this patient is:     SN-----    PT----- SOC visit; patient/daughter requested to wait next visit until next epidural [id number] so then 2wk2 week starting [id number]    OT----    ST-----    HHA---    MSW---         Please note that above is a recommendation only and that the plan of care should reflect the patient's clinical needs and goals. If in agreement, please complete  note. If a different frequency is necessary, please explain in note box and proceed per patients clinical needs.

## 2021-07-29 ENCOUNTER — READMISSION MANAGEMENT (OUTPATIENT)
Dept: CALL CENTER | Facility: HOSPITAL | Age: 86
End: 2021-07-29

## 2021-07-31 ENCOUNTER — APPOINTMENT (OUTPATIENT)
Dept: GENERAL RADIOLOGY | Facility: HOSPITAL | Age: 86
End: 2021-07-31

## 2021-07-31 ENCOUNTER — HOSPITAL ENCOUNTER (INPATIENT)
Facility: HOSPITAL | Age: 86
LOS: 5 days | Discharge: NURSING, REHAB OR LONG-TERM CARE | End: 2021-08-06
Attending: EMERGENCY MEDICINE | Admitting: ORTHOPAEDIC SURGERY

## 2021-07-31 ENCOUNTER — APPOINTMENT (OUTPATIENT)
Dept: CT IMAGING | Facility: HOSPITAL | Age: 86
End: 2021-07-31

## 2021-07-31 DIAGNOSIS — J01.90 ACUTE NON-RECURRENT SINUSITIS, UNSPECIFIED LOCATION: ICD-10-CM

## 2021-07-31 DIAGNOSIS — E87.1 HYPONATREMIA: ICD-10-CM

## 2021-07-31 DIAGNOSIS — D72.823 LEUKEMOID REACTION: ICD-10-CM

## 2021-07-31 DIAGNOSIS — S72.001S CLOSED FRACTURE OF RIGHT HIP, SEQUELA: ICD-10-CM

## 2021-07-31 DIAGNOSIS — T79.6XXA TRAUMATIC RHABDOMYOLYSIS, INITIAL ENCOUNTER (HCC): ICD-10-CM

## 2021-07-31 DIAGNOSIS — S72.001A CLOSED FRACTURE OF RIGHT HIP, INITIAL ENCOUNTER (HCC): Primary | ICD-10-CM

## 2021-07-31 LAB
ALBUMIN SERPL-MCNC: 4.5 G/DL (ref 3.5–5.2)
ALBUMIN/GLOB SERPL: 1.7 G/DL
ALP SERPL-CCNC: 83 U/L (ref 39–117)
ALT SERPL W P-5'-P-CCNC: 27 U/L (ref 1–33)
ANION GAP SERPL CALCULATED.3IONS-SCNC: 14.9 MMOL/L (ref 5–15)
AST SERPL-CCNC: 34 U/L (ref 1–32)
BACTERIA UR QL AUTO: ABNORMAL /HPF
BASOPHILS # BLD AUTO: 0.08 10*3/MM3 (ref 0–0.2)
BASOPHILS NFR BLD AUTO: 0.4 % (ref 0–1.5)
BILIRUB SERPL-MCNC: 0.6 MG/DL (ref 0–1.2)
BILIRUB UR QL STRIP: NEGATIVE
BUN SERPL-MCNC: 14 MG/DL (ref 8–23)
BUN/CREAT SERPL: 19.2 (ref 7–25)
CALCIUM SPEC-SCNC: 9.9 MG/DL (ref 8.6–10.5)
CHLORIDE SERPL-SCNC: 94 MMOL/L (ref 98–107)
CK SERPL-CCNC: 783 U/L (ref 20–180)
CLARITY UR: CLEAR
CO2 SERPL-SCNC: 23.1 MMOL/L (ref 22–29)
COLOR UR: YELLOW
CREAT SERPL-MCNC: 0.73 MG/DL (ref 0.57–1)
D-LACTATE SERPL-SCNC: 1.7 MMOL/L (ref 0.5–2)
DEPRECATED RDW RBC AUTO: 42.9 FL (ref 37–54)
EOSINOPHIL # BLD AUTO: 0.04 10*3/MM3 (ref 0–0.4)
EOSINOPHIL NFR BLD AUTO: 0.2 % (ref 0.3–6.2)
ERYTHROCYTE [DISTWIDTH] IN BLOOD BY AUTOMATED COUNT: 13 % (ref 12.3–15.4)
GFR SERPL CREATININE-BSD FRML MDRD: 76 ML/MIN/1.73
GLOBULIN UR ELPH-MCNC: 2.7 GM/DL
GLUCOSE SERPL-MCNC: 124 MG/DL (ref 65–99)
GLUCOSE UR STRIP-MCNC: NEGATIVE MG/DL
HCT VFR BLD AUTO: 42.2 % (ref 34–46.6)
HGB BLD-MCNC: 13.8 G/DL (ref 12–15.9)
HGB UR QL STRIP.AUTO: ABNORMAL
HYALINE CASTS UR QL AUTO: ABNORMAL /LPF
IMM GRANULOCYTES # BLD AUTO: 0.14 10*3/MM3 (ref 0–0.05)
IMM GRANULOCYTES NFR BLD AUTO: 0.7 % (ref 0–0.5)
KETONES UR QL STRIP: ABNORMAL
LEUKOCYTE ESTERASE UR QL STRIP.AUTO: NEGATIVE
LYMPHOCYTES # BLD AUTO: 0.56 10*3/MM3 (ref 0.7–3.1)
LYMPHOCYTES NFR BLD AUTO: 2.7 % (ref 19.6–45.3)
MCH RBC QN AUTO: 29.9 PG (ref 26.6–33)
MCHC RBC AUTO-ENTMCNC: 32.7 G/DL (ref 31.5–35.7)
MCV RBC AUTO: 91.5 FL (ref 79–97)
MONOCYTES # BLD AUTO: 0.76 10*3/MM3 (ref 0.1–0.9)
MONOCYTES NFR BLD AUTO: 3.6 % (ref 5–12)
NEUTROPHILS NFR BLD AUTO: 19.28 10*3/MM3 (ref 1.7–7)
NEUTROPHILS NFR BLD AUTO: 92.4 % (ref 42.7–76)
NITRITE UR QL STRIP: NEGATIVE
NRBC BLD AUTO-RTO: 0 /100 WBC (ref 0–0.2)
NT-PROBNP SERPL-MCNC: 1092 PG/ML (ref 0–1800)
PH UR STRIP.AUTO: 6 [PH] (ref 5–8)
PLATELET # BLD AUTO: 325 10*3/MM3 (ref 140–450)
PMV BLD AUTO: 9.2 FL (ref 6–12)
POTASSIUM SERPL-SCNC: 3.8 MMOL/L (ref 3.5–5.2)
PROT SERPL-MCNC: 7.2 G/DL (ref 6–8.5)
PROT UR QL STRIP: ABNORMAL
RBC # BLD AUTO: 4.61 10*6/MM3 (ref 3.77–5.28)
RBC # UR: ABNORMAL /HPF
REF LAB TEST METHOD: ABNORMAL
SODIUM SERPL-SCNC: 132 MMOL/L (ref 136–145)
SP GR UR STRIP: 1.02 (ref 1–1.03)
SQUAMOUS #/AREA URNS HPF: ABNORMAL /HPF
TROPONIN T SERPL-MCNC: 0.02 NG/ML (ref 0–0.03)
UROBILINOGEN UR QL STRIP: ABNORMAL
WBC # BLD AUTO: 20.86 10*3/MM3 (ref 3.4–10.8)
WBC UR QL AUTO: ABNORMAL /HPF

## 2021-07-31 PROCEDURE — 71045 X-RAY EXAM CHEST 1 VIEW: CPT

## 2021-07-31 PROCEDURE — 93005 ELECTROCARDIOGRAM TRACING: CPT | Performed by: EMERGENCY MEDICINE

## 2021-07-31 PROCEDURE — 87040 BLOOD CULTURE FOR BACTERIA: CPT | Performed by: EMERGENCY MEDICINE

## 2021-07-31 PROCEDURE — 70450 CT HEAD/BRAIN W/O DYE: CPT

## 2021-07-31 PROCEDURE — 93010 ELECTROCARDIOGRAM REPORT: CPT | Performed by: INTERNAL MEDICINE

## 2021-07-31 PROCEDURE — 80053 COMPREHEN METABOLIC PANEL: CPT | Performed by: EMERGENCY MEDICINE

## 2021-07-31 PROCEDURE — 72125 CT NECK SPINE W/O DYE: CPT

## 2021-07-31 PROCEDURE — 83880 ASSAY OF NATRIURETIC PEPTIDE: CPT | Performed by: EMERGENCY MEDICINE

## 2021-07-31 PROCEDURE — 84484 ASSAY OF TROPONIN QUANT: CPT | Performed by: EMERGENCY MEDICINE

## 2021-07-31 PROCEDURE — 99285 EMERGENCY DEPT VISIT HI MDM: CPT

## 2021-07-31 PROCEDURE — 25010000002 CEFTRIAXONE PER 250 MG: Performed by: EMERGENCY MEDICINE

## 2021-07-31 PROCEDURE — U0004 COV-19 TEST NON-CDC HGH THRU: HCPCS | Performed by: EMERGENCY MEDICINE

## 2021-07-31 PROCEDURE — 82550 ASSAY OF CK (CPK): CPT | Performed by: EMERGENCY MEDICINE

## 2021-07-31 PROCEDURE — 36415 COLL VENOUS BLD VENIPUNCTURE: CPT

## 2021-07-31 PROCEDURE — 73523 X-RAY EXAM HIPS BI 5/> VIEWS: CPT

## 2021-07-31 PROCEDURE — 81001 URINALYSIS AUTO W/SCOPE: CPT | Performed by: EMERGENCY MEDICINE

## 2021-07-31 PROCEDURE — 83605 ASSAY OF LACTIC ACID: CPT | Performed by: EMERGENCY MEDICINE

## 2021-07-31 PROCEDURE — 85025 COMPLETE CBC W/AUTO DIFF WBC: CPT | Performed by: EMERGENCY MEDICINE

## 2021-07-31 RX ORDER — SODIUM CHLORIDE 0.9 % (FLUSH) 0.9 %
10 SYRINGE (ML) INJECTION AS NEEDED
Status: DISCONTINUED | OUTPATIENT
Start: 2021-07-31 | End: 2021-08-06 | Stop reason: HOSPADM

## 2021-07-31 RX ORDER — CEFTRIAXONE SODIUM 1 G/50ML
1 INJECTION, SOLUTION INTRAVENOUS ONCE
Status: COMPLETED | OUTPATIENT
Start: 2021-07-31 | End: 2021-08-01

## 2021-07-31 RX ADMIN — CEFTRIAXONE SODIUM 1 G: 1 INJECTION, SOLUTION INTRAVENOUS at 23:36

## 2021-07-31 RX ADMIN — SODIUM CHLORIDE 1000 ML: 9 INJECTION, SOLUTION INTRAVENOUS at 23:03

## 2021-08-01 LAB
ANION GAP SERPL CALCULATED.3IONS-SCNC: 10.4 MMOL/L (ref 5–15)
BUN SERPL-MCNC: 16 MG/DL (ref 8–23)
BUN/CREAT SERPL: 23.9 (ref 7–25)
CALCIUM SPEC-SCNC: 9.1 MG/DL (ref 8.6–10.5)
CHLORIDE SERPL-SCNC: 99 MMOL/L (ref 98–107)
CK SERPL-CCNC: 639 U/L (ref 20–180)
CO2 SERPL-SCNC: 21.6 MMOL/L (ref 22–29)
CREAT SERPL-MCNC: 0.67 MG/DL (ref 0.57–1)
DEPRECATED RDW RBC AUTO: 41.3 FL (ref 37–54)
ERYTHROCYTE [DISTWIDTH] IN BLOOD BY AUTOMATED COUNT: 12.9 % (ref 12.3–15.4)
GFR SERPL CREATININE-BSD FRML MDRD: 84 ML/MIN/1.73
GLUCOSE SERPL-MCNC: 114 MG/DL (ref 65–99)
HCT VFR BLD AUTO: 34.5 % (ref 34–46.6)
HGB BLD-MCNC: 11.7 G/DL (ref 12–15.9)
MCH RBC QN AUTO: 30.2 PG (ref 26.6–33)
MCHC RBC AUTO-ENTMCNC: 33.9 G/DL (ref 31.5–35.7)
MCV RBC AUTO: 89.1 FL (ref 79–97)
PLATELET # BLD AUTO: 279 10*3/MM3 (ref 140–450)
PMV BLD AUTO: 9 FL (ref 6–12)
POTASSIUM SERPL-SCNC: 3.4 MMOL/L (ref 3.5–5.2)
PROCALCITONIN SERPL-MCNC: 0.19 NG/ML (ref 0–0.25)
QT INTERVAL: 360 MS
RBC # BLD AUTO: 3.87 10*6/MM3 (ref 3.77–5.28)
SARS-COV-2 ORF1AB RESP QL NAA+PROBE: NOT DETECTED
SODIUM SERPL-SCNC: 131 MMOL/L (ref 136–145)
WBC # BLD AUTO: 17.78 10*3/MM3 (ref 3.4–10.8)

## 2021-08-01 PROCEDURE — 82550 ASSAY OF CK (CPK): CPT | Performed by: NURSE PRACTITIONER

## 2021-08-01 PROCEDURE — 25010000002 MORPHINE PER 10 MG: Performed by: NURSE PRACTITIONER

## 2021-08-01 PROCEDURE — 25010000002 SODIUM CHLORIDE 0.9 % WITH KCL 20 MEQ 20-0.9 MEQ/L-% SOLUTION: Performed by: HOSPITALIST

## 2021-08-01 PROCEDURE — 80048 BASIC METABOLIC PNL TOTAL CA: CPT | Performed by: NURSE PRACTITIONER

## 2021-08-01 PROCEDURE — 84145 PROCALCITONIN (PCT): CPT | Performed by: NURSE PRACTITIONER

## 2021-08-01 PROCEDURE — 85027 COMPLETE CBC AUTOMATED: CPT | Performed by: NURSE PRACTITIONER

## 2021-08-01 PROCEDURE — 25010000003 POTASSIUM CHLORIDE 10 MEQ/100ML SOLUTION: Performed by: HOSPITALIST

## 2021-08-01 RX ORDER — NITROGLYCERIN 0.4 MG/1
0.4 TABLET SUBLINGUAL
Status: DISCONTINUED | OUTPATIENT
Start: 2021-08-01 | End: 2021-08-06 | Stop reason: HOSPADM

## 2021-08-01 RX ORDER — CYCLOBENZAPRINE HCL 10 MG
5 TABLET ORAL 3 TIMES DAILY PRN
Status: DISCONTINUED | OUTPATIENT
Start: 2021-08-01 | End: 2021-08-06 | Stop reason: HOSPADM

## 2021-08-01 RX ORDER — POTASSIUM CHLORIDE 7.45 MG/ML
10 INJECTION INTRAVENOUS ONCE
Status: COMPLETED | OUTPATIENT
Start: 2021-08-01 | End: 2021-08-01

## 2021-08-01 RX ORDER — SUCRALFATE 1 G/1
1 TABLET ORAL
Status: DISCONTINUED | OUTPATIENT
Start: 2021-08-01 | End: 2021-08-06 | Stop reason: HOSPADM

## 2021-08-01 RX ORDER — L.ACID,PARA/B.BIFIDUM/S.THERM 8B CELL
1 CAPSULE ORAL DAILY
Status: DISCONTINUED | OUTPATIENT
Start: 2021-08-01 | End: 2021-08-06 | Stop reason: HOSPADM

## 2021-08-01 RX ORDER — SODIUM CHLORIDE 0.9 % (FLUSH) 0.9 %
10 SYRINGE (ML) INJECTION EVERY 12 HOURS SCHEDULED
Status: DISCONTINUED | OUTPATIENT
Start: 2021-08-01 | End: 2021-08-06 | Stop reason: HOSPADM

## 2021-08-01 RX ORDER — ONDANSETRON 2 MG/ML
4 INJECTION INTRAMUSCULAR; INTRAVENOUS EVERY 6 HOURS PRN
Status: DISCONTINUED | OUTPATIENT
Start: 2021-08-01 | End: 2021-08-06 | Stop reason: HOSPADM

## 2021-08-01 RX ORDER — FLUTICASONE PROPIONATE 50 MCG
1 SPRAY, SUSPENSION (ML) NASAL DAILY
Status: DISCONTINUED | OUTPATIENT
Start: 2021-08-01 | End: 2021-08-06 | Stop reason: HOSPADM

## 2021-08-01 RX ORDER — ACETAMINOPHEN 650 MG/1
650 SUPPOSITORY RECTAL EVERY 4 HOURS PRN
Status: DISCONTINUED | OUTPATIENT
Start: 2021-08-01 | End: 2021-08-06 | Stop reason: HOSPADM

## 2021-08-01 RX ORDER — CARVEDILOL 6.25 MG/1
6.25 TABLET ORAL 2 TIMES DAILY WITH MEALS
Status: DISCONTINUED | OUTPATIENT
Start: 2021-08-01 | End: 2021-08-06 | Stop reason: HOSPADM

## 2021-08-01 RX ORDER — AMLODIPINE BESYLATE 10 MG/1
10 TABLET ORAL DAILY
Status: DISCONTINUED | OUTPATIENT
Start: 2021-08-01 | End: 2021-08-06 | Stop reason: HOSPADM

## 2021-08-01 RX ORDER — SODIUM CHLORIDE 9 MG/ML
75 INJECTION, SOLUTION INTRAVENOUS CONTINUOUS
Status: DISCONTINUED | OUTPATIENT
Start: 2021-08-01 | End: 2021-08-01

## 2021-08-01 RX ORDER — CALCIUM CARBONATE 200(500)MG
2 TABLET,CHEWABLE ORAL 2 TIMES DAILY PRN
Status: DISCONTINUED | OUTPATIENT
Start: 2021-08-01 | End: 2021-08-06 | Stop reason: HOSPADM

## 2021-08-01 RX ORDER — VALSARTAN 160 MG/1
160 TABLET ORAL NIGHTLY
Status: DISCONTINUED | OUTPATIENT
Start: 2021-08-01 | End: 2021-08-06 | Stop reason: HOSPADM

## 2021-08-01 RX ORDER — FLUOROMETHOLONE 0.1 %
1 SUSPENSION, DROPS(FINAL DOSAGE FORM)(ML) OPHTHALMIC (EYE) DAILY
Status: DISCONTINUED | OUTPATIENT
Start: 2021-08-01 | End: 2021-08-06 | Stop reason: HOSPADM

## 2021-08-01 RX ORDER — ACETAMINOPHEN 160 MG/5ML
650 SOLUTION ORAL EVERY 4 HOURS PRN
Status: DISCONTINUED | OUTPATIENT
Start: 2021-08-01 | End: 2021-08-06 | Stop reason: HOSPADM

## 2021-08-01 RX ORDER — CEFTRIAXONE SODIUM 1 G/50ML
1 INJECTION, SOLUTION INTRAVENOUS EVERY 24 HOURS
Status: DISCONTINUED | OUTPATIENT
Start: 2021-08-01 | End: 2021-08-01

## 2021-08-01 RX ORDER — SODIUM CHLORIDE AND POTASSIUM CHLORIDE 150; 900 MG/100ML; MG/100ML
75 INJECTION, SOLUTION INTRAVENOUS CONTINUOUS
Status: DISCONTINUED | OUTPATIENT
Start: 2021-08-01 | End: 2021-08-02

## 2021-08-01 RX ORDER — LEVOTHYROXINE SODIUM 0.05 MG/1
50 TABLET ORAL
Status: DISCONTINUED | OUTPATIENT
Start: 2021-08-01 | End: 2021-08-06 | Stop reason: HOSPADM

## 2021-08-01 RX ORDER — SODIUM CHLORIDE 0.9 % (FLUSH) 0.9 %
10 SYRINGE (ML) INJECTION AS NEEDED
Status: DISCONTINUED | OUTPATIENT
Start: 2021-08-01 | End: 2021-08-06 | Stop reason: HOSPADM

## 2021-08-01 RX ORDER — GABAPENTIN 100 MG/1
100 CAPSULE ORAL 3 TIMES DAILY
Status: DISCONTINUED | OUTPATIENT
Start: 2021-08-01 | End: 2021-08-06 | Stop reason: HOSPADM

## 2021-08-01 RX ORDER — PANTOPRAZOLE SODIUM 40 MG/1
40 TABLET, DELAYED RELEASE ORAL EVERY MORNING
Refills: 3 | Status: DISCONTINUED | OUTPATIENT
Start: 2021-08-01 | End: 2021-08-06 | Stop reason: HOSPADM

## 2021-08-01 RX ORDER — VALSARTAN 160 MG/1
160 TABLET ORAL DAILY
Status: DISCONTINUED | OUTPATIENT
Start: 2021-08-01 | End: 2021-08-01

## 2021-08-01 RX ORDER — ACETAMINOPHEN 325 MG/1
650 TABLET ORAL EVERY 4 HOURS PRN
Status: DISCONTINUED | OUTPATIENT
Start: 2021-08-01 | End: 2021-08-06 | Stop reason: HOSPADM

## 2021-08-01 RX ORDER — POTASSIUM CHLORIDE 1.5 G/1.77G
40 POWDER, FOR SOLUTION ORAL ONCE
Status: DISCONTINUED | OUTPATIENT
Start: 2021-08-01 | End: 2021-08-01

## 2021-08-01 RX ORDER — ONDANSETRON 4 MG/1
4 TABLET, FILM COATED ORAL EVERY 6 HOURS PRN
Status: DISCONTINUED | OUTPATIENT
Start: 2021-08-01 | End: 2021-08-06 | Stop reason: HOSPADM

## 2021-08-01 RX ORDER — ESCITALOPRAM OXALATE 10 MG/1
10 TABLET ORAL DAILY
Status: DISCONTINUED | OUTPATIENT
Start: 2021-08-01 | End: 2021-08-03

## 2021-08-01 RX ORDER — MORPHINE SULFATE 2 MG/ML
2 INJECTION, SOLUTION INTRAMUSCULAR; INTRAVENOUS
Status: DISCONTINUED | OUTPATIENT
Start: 2021-08-01 | End: 2021-08-06 | Stop reason: HOSPADM

## 2021-08-01 RX ADMIN — SUCRALFATE 1 G: 1 TABLET ORAL at 06:45

## 2021-08-01 RX ADMIN — GABAPENTIN 100 MG: 100 CAPSULE ORAL at 15:42

## 2021-08-01 RX ADMIN — LEVOTHYROXINE SODIUM 50 MCG: 0.05 TABLET ORAL at 06:45

## 2021-08-01 RX ADMIN — MORPHINE SULFATE 2 MG: 2 INJECTION, SOLUTION INTRAMUSCULAR; INTRAVENOUS at 10:00

## 2021-08-01 RX ADMIN — CARVEDILOL 6.25 MG: 6.25 TABLET, FILM COATED ORAL at 09:21

## 2021-08-01 RX ADMIN — POTASSIUM CHLORIDE 10 MEQ: 7.46 INJECTION, SOLUTION INTRAVENOUS at 09:20

## 2021-08-01 RX ADMIN — SODIUM CHLORIDE, PRESERVATIVE FREE 10 ML: 5 INJECTION INTRAVENOUS at 20:26

## 2021-08-01 RX ADMIN — SUCRALFATE 1 G: 1 TABLET ORAL at 16:35

## 2021-08-01 RX ADMIN — POTASSIUM CHLORIDE AND SODIUM CHLORIDE 75 ML/HR: 900; 150 INJECTION, SOLUTION INTRAVENOUS at 09:20

## 2021-08-01 RX ADMIN — SODIUM CHLORIDE, PRESERVATIVE FREE 10 ML: 5 INJECTION INTRAVENOUS at 02:44

## 2021-08-01 RX ADMIN — GABAPENTIN 100 MG: 100 CAPSULE ORAL at 20:26

## 2021-08-01 RX ADMIN — ESCITALOPRAM 10 MG: 10 TABLET, FILM COATED ORAL at 15:42

## 2021-08-01 RX ADMIN — FLUTICASONE PROPIONATE 1 SPRAY: 50 SPRAY, METERED NASAL at 09:21

## 2021-08-01 RX ADMIN — MORPHINE SULFATE 2 MG: 2 INJECTION, SOLUTION INTRAMUSCULAR; INTRAVENOUS at 20:30

## 2021-08-01 RX ADMIN — AMLODIPINE BESYLATE 10 MG: 10 TABLET ORAL at 15:53

## 2021-08-01 RX ADMIN — VALSARTAN 160 MG: 160 TABLET, FILM COATED ORAL at 20:26

## 2021-08-01 RX ADMIN — PANTOPRAZOLE SODIUM 40 MG: 40 TABLET, DELAYED RELEASE ORAL at 06:44

## 2021-08-01 RX ADMIN — MORPHINE SULFATE 2 MG: 2 INJECTION, SOLUTION INTRAMUSCULAR; INTRAVENOUS at 02:44

## 2021-08-01 RX ADMIN — SODIUM CHLORIDE, PRESERVATIVE FREE 10 ML: 5 INJECTION INTRAVENOUS at 09:20

## 2021-08-01 RX ADMIN — MORPHINE SULFATE 2 MG: 2 INJECTION, SOLUTION INTRAMUSCULAR; INTRAVENOUS at 16:35

## 2021-08-01 RX ADMIN — MORPHINE SULFATE 2 MG: 2 INJECTION, SOLUTION INTRAMUSCULAR; INTRAVENOUS at 13:39

## 2021-08-01 RX ADMIN — FLUOROMETHOLONE 1 DROP: 1 SOLUTION/ DROPS OPHTHALMIC at 09:21

## 2021-08-01 RX ADMIN — SODIUM CHLORIDE 75 ML/HR: 9 INJECTION, SOLUTION INTRAVENOUS at 06:44

## 2021-08-01 RX ADMIN — Medication 1 CAPSULE: at 15:42
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
## 2021-08-01 NOTE — PLAN OF CARE
Goal Outcome Evaluation:  Plan of Care Reviewed With: patient, daughter        Progress: no change  Outcome Summary: Pt surgery canceled today. Dr. Martin trying to schedule surgery for Monday or Tuesday. MD D/C antibiotics. Pt given morphine per PRN orders. Pt received K+ replacement with IVPB. Pt VSS, 2L NC, AxOx4, NSR on monitor. Will continue to monitor.

## 2021-08-01 NOTE — PROGRESS NOTES
"Due to staffing issues in the operating room and not having enough help available, surgery has been canceled for today.  I am going to try to get her on the schedule for tomorrow morning, but I do not know for now.  Hopefully we can get her hip fixed tomorrow when I am at the [facility] of the OR schedule at this point.    R \"Casey\" Mario GRULLON MD  Orthopaedic Surgery  [facility]  [phone] - [place] Office  [phone] - [place] Office    "

## 2021-08-01 NOTE — ED PROVIDER NOTES
EMERGENCY DEPARTMENT ENCOUNTER    Room Number:  1515  Date of encounter:  2021  PCP: Grzegorz Nguyen MD  Patient Care Team:  Grzegorz Nguyen MD as PCP - General (Family Medicine)  Omi Valenzuela MD as Consulting Physician (Hematology and Oncology)  Sidney Campbell MD as Referring Physician (Dermatology)  Amador Brandt MD as Consulting Physician (Orthopedic Surgery)   Historian: Patient, family, EMS    HPI:  Chief Complaint: Fall  A complete HPI/ROS/PMH/PSH/SH/FH are unobtainable due to: Nothing    Context: Geeta Butt is a 85 y.o. female who presents to the ED c/o having a fall. Family reports that they last saw her at 6 PM yesterday. She has been living alone since her   earlier this year. Family called her today and she did not answer but he went to check on her and found her on the ground. It is unclear how long she had been there. She reports pain to her bilateral hips with her right worse than her left. She states that she lost her balance which is what caused her to fall. She states she was too weak to get up. She does not remember hitting her head. She reports chronic low back pain which is unchanged. She was found to have a low-grade fever upon arrival here. Daughter reports that she has been using a walker to get around at home. Daughter reports that she currently is on antibiotics for a sinus infection.    Prior record review: Discharge summary 2021 for low back pain with left-sided sciatica, hyponatremia. Primary care visit 2021 diagnosed with bacterial sinusitis and placed on amoxicillin.    PAST MEDICAL HISTORY  Active Ambulatory Problems     Diagnosis Date Noted   • Essential hypertension 10/16/2017   • Hypothyroidism 10/16/2017   • Black stool 10/16/2017   • Diarrhea 10/16/2017   • Nausea & vomiting 10/16/2017   • RUQ pain 10/16/2017   • Leukocytosis 10/16/2017   • UTI (urinary tract infection) 10/17/2017   • Duodenitis 10/17/2017   • Foot pain, bilateral  10/19/2017   • Malignant neoplasm of lower-inner quadrant of left breast in female, estrogen receptor positive (CMS/[facility]) 02/27/2018   • Iron deficiency anemia 04/04/2019   • Adverse effect of iron 05/09/2019   • GI bleed 03/18/2020   • GIULIA (acute kidney injury) (CMS/HCC) 03/18/2020   • Gastrointestinal hemorrhage associated with duodenitis 03/18/2020   • Hematochezia 03/19/2020   • Duodenal ulcer 03/19/2020   • Long-term use of high-risk medication 06/03/2020   • Acute left-sided low back pain with left-sided sciatica 07/02/2021   • DDD (degenerative disc disease), lumbosacral 07/02/2021   • Hyperglycemia 07/02/2021   • GERD without esophagitis 07/02/2021   • Hyponatremia 07/04/2021   • Hyperlipidemia 07/22/2021   • Impaired fasting glucose 07/22/2021   • Depressive disorder 07/22/2021   • Chronic kidney disease 07/22/2021     Resolved Ambulatory Problems     Diagnosis Date Noted   • DM2 (diabetes mellitus, type 2) (CMS/[facility]) 10/16/2017   • Precordial chest pain 07/02/2021     Past Medical History:   Diagnosis Date   • Anesthesia complication    • Arthritis    • Aspiration into airway    • Breast cancer (CMS/[facility])    • Cancer (CMS/[facility]) 1990   • CKD (chronic kidney disease)    • Depression    • Diverticular disease    • Gastric ulcer    • GERD (gastroesophageal reflux disease)    • Hearing loss    • History of colon polyps    • History of depression    • History of GI bleed    • Hypertension    • Peptic ulceration    • PONV (postoperative nausea and vomiting)    • Poor vision    • Stroke (CMS/HCC) 2000         PAST SURGICAL HISTORY  Past Surgical History:   Procedure Laterality Date   • ABDOMINAL SURGERY     • APPENDECTOMY     • BLADDER REPAIR      AND RECTOCELE   • BREAST BIOPSY     • BREAST CYST ASPIRATION     • BREAST LUMPECTOMY WITH SENTINEL NODE BIOPSY Left 3/19/2018    Procedure: BREAST LUMPECTOMY WITH SENTINEL NODE BIOPSY AND NEEDLE LOCALIZATION;  Surgeon: Myles Soliman MD;  [place] [facility] [place] [facility] [place]   Service: General   • CHOLECYSTECTOMY     • COLONOSCOPY  2013   • COLONOSCOPY N/A 2/16/2018    Procedure: COLONOSCOPY into cecum and T.I. with biopsies;  Surgeon: Augustine Gallego MD;  Location: [facility] ENDOSCOPY;  Service:    • ENDOSCOPY N/A 10/17/2017    Procedure: ESOPHAGOGASTRODUODENOSCOPY WITH COLD BIOPSIES;  Surgeon: Augustine Gallego MD;  Location: [facility] ENDOSCOPY;  Service:    • ENDOSCOPY N/A 2/16/2018    Procedure: ESOPHAGOGASTRODUODENOSCOPY with biopsies and #54 [address] DILATATION;  Surgeon: Augustine Gallego MD;  Location: [facility] ENDOSCOPY;  Service:    • ENDOSCOPY N/A 3/19/2020    Procedure: ESOPHAGOGASTRODUODENOSCOPY with biopsies;  Surgeon: Augustine Gallego MD;  Location: [facility] ENDOSCOPY;  Service: Gastroenterology;  Laterality: N/A;  PRE- MELENA, EPIGASTRIC PAIN  POST- eerosive gastritis, duodenal ulcer, hiatal hernia   • ENDOSCOPY N/A 7/29/2020    Procedure: ESOPHAGOGASTRODUODENOSCOPY WITH DUODENAL ASPIRATE AND COLD BIOPSIES;  Surgeon: Augustine Gallego MD;  Location: [facility] ENDOSCOPY;  Service: Gastroenterology;  Laterality: N/A;  PRE: HX ULCER DISEASE  POST: GASTRITIS, HEALED ULCER   • EYE SURGERY Left 2014    3-4 years, cornea replacement    • HYSTERECTOMY     • MOUTH SURGERY  2000    UNDER TONGUE AND LEFT FLOOR OF MOUTH FOR CA   • PARATHYROIDECTOMY      PER PT   • SIGMOIDOSCOPY N/A 7/29/2020    Procedure: FLEXIBLE SIGMOIDOSCOPY TO DESCENDING COLON WITH COLD BIOPSIES;  Surgeon: Augustine Gallego MD;  Location: [facility] ENDOSCOPY;  Service: Gastroenterology;  Laterality: N/A;  PRE: RECTAL BLEEDING  POST: DIVERTICULOSIS, HEMORRHOIDS, MINIMAL PROCTITIS   • SINUS SURGERY     • SKIN BIOPSY     • THYROID SURGERY     • VARICOSE VEIN SURGERY           FAMILY HISTORY  Family History   Problem Relation Age of Onset   • Esophageal cancer Father    • Stomach cancer Father    • Heart disease Mother    • Thyroid cancer Mother    • Hypertension Mother    • Hypertension Sister    • Hypertension Brother    • Stroke Brother     • Heart disease Brother    • Diabetes Brother    • Leukemia Paternal Aunt    • Malig Hyperthermia Neg Hx          SOCIAL HISTORY  Social History     Socioeconomic History   • Marital status:      Spouse name: Nicolas   • Number of children: Not on file   • Years of education: High school   • Highest education level: Not on file   Tobacco Use   • Smoking status: Former Smoker     Packs/day: 1.00     Years: 10.00     Pack years: 10.00     Types: Cigarettes     Start date:      Quit date:      Years since quittin.6   • Smokeless tobacco: Never Used   Vaping Use   • Vaping Use: Never used   Substance and Sexual Activity   • Alcohol use: No   • Drug use: No   • Sexual activity: Defer         ALLERGIES  Other, Sulfa antibiotics, Ciprofloxacin, Iodine, Macrodantin [nitrofurantoin macrocrystal], Nitrofurantoin, and Yeast-related products        REVIEW OF SYSTEMS  Review of Systems   No back pain, no weakness, no numbness, no shortness of breath, no fever, no cough  All systems reviewed and negative except for those discussed in HPI.       PHYSICAL EXAM    I have reviewed the triage vital signs and nursing notes.    ED Triage Vitals [21]   Temp Heart Rate Resp BP SpO2   100.1 °F (37.8 °C) 98 16 (!) 185/84 94 %      Temp src Heart Rate Source Patient Position BP Location FiO2 (%)   Tympanic Monitor -- -- --       Physical Exam  GENERAL: Awake, alert, no acute distress, low-grade temperature  SKIN: Warm, dry  HENT: Normocephalic, atraumatic  EYES: no scleral icterus  CV: regular rhythm, regular rate  RESPIRATORY: normal effort, lungs diminished  ABDOMEN: soft, nontender, nondistended  MUSCULOSKELETAL: no deformity. No thoracic or lumbar tenderness. Mild diffuse cervical tenderness. Mild bilateral hip tenderness. Right leg externally rotated.  NEURO: alert, moves all extremities, follows commands          LAB RESULTS  Recent Results (from the past 24 hour(s))   Comprehensive Metabolic Panel     Collection Time: 07/31/21  9:40 PM    Specimen: Blood   Result Value Ref Range    Glucose 124 (H) 65 - 99 mg/dL    BUN 14 8 - 23 mg/dL    Creatinine 0.73 0.57 - 1.00 mg/dL    Sodium 132 (L) 136 - 145 mmol/L    Potassium 3.8 3.5 - 5.2 mmol/L    Chloride 94 (L) 98 - 107 mmol/L    CO2 23.1 22.0 - 29.0 mmol/L    Calcium 9.9 8.6 - 10.5 mg/dL    Total Protein 7.2 6.0 - 8.5 g/dL    Albumin 4.50 3.50 - 5.20 g/dL    ALT (SGPT) 27 1 - 33 U/L    AST (SGOT) 34 (H) 1 - 32 U/L    Alkaline Phosphatase 83 39 - 117 U/L    Total Bilirubin 0.6 0.0 - 1.2 mg/dL    eGFR Non African Amer 76 >60 mL/min/1.73    Globulin 2.7 gm/dL    A/G Ratio 1.7 g/dL    BUN/Creatinine Ratio 19.2 7.0 - 25.0    Anion Gap 14.9 5.0 - 15.0 mmol/L   CK    Collection Time: 07/31/21  9:40 PM    Specimen: Blood   Result Value Ref Range    Creatine Kinase 783 (H) 20 - 180 U/L   Lactic Acid, Plasma    Collection Time: 07/31/21  9:40 PM    Specimen: Blood   Result Value Ref Range    Lactate 1.7 0.5 - 2.0 mmol/L   Troponin    Collection Time: 07/31/21  9:40 PM    Specimen: Blood   Result Value Ref Range    Troponin T 0.019 0.000 - 0.030 ng/mL   BNP    Collection Time: 07/31/21  9:40 PM    Specimen: Blood   Result Value Ref Range    proBNP 1,092.0 0.0-1,800.0 pg/mL   CBC Auto Differential    Collection Time: 07/31/21  9:40 PM    Specimen: Blood   Result Value Ref Range    WBC 20.86 (H) 3.40 - 10.80 10*3/mm3    RBC 4.61 3.77 - 5.28 10*6/mm3    Hemoglobin 13.8 12.0 - 15.9 g/dL    Hematocrit 42.2 34.0 - 46.6 %    MCV 91.5 79.0 - 97.0 fL    MCH 29.9 26.6 - 33.0 pg    MCHC 32.7 31.5 - 35.7 g/dL    RDW 13.0 12.3 - 15.4 %    RDW-SD 42.9 37.0 - 54.0 fl    MPV 9.2 6.0 - 12.0 fL    Platelets 325 140 - 450 10*3/mm3    Neutrophil % 92.4 (H) 42.7 - 76.0 %    Lymphocyte % 2.7 (L) 19.6 - 45.3 %    Monocyte % 3.6 (L) 5.0 - 12.0 %    Eosinophil % 0.2 (L) 0.3 - 6.2 %    Basophil % 0.4 0.0 - 1.5 %    Immature Grans % 0.7 (H) 0.0 - 0.5 %    Neutrophils, Absolute 19.28 (H) 1.70 - 7.00  10*3/mm3    Lymphocytes, Absolute 0.56 (L) 0.70 - 3.10 10*3/mm3    Monocytes, Absolute 0.76 0.10 - 0.90 10*3/mm3    Eosinophils, Absolute 0.04 0.00 - 0.40 10*3/mm3    Basophils, Absolute 0.08 0.00 - 0.20 10*3/mm3    Immature Grans, Absolute 0.14 (H) 0.00 - 0.05 10*3/mm3    nRBC 0.0 0.0 - 0.2 /100 WBC   ECG 12 Lead    Collection Time: 07/31/21  9:51 PM   Result Value Ref Range    QT Interval 360 ms   Urinalysis With Culture If Indicated - Urine, Clean Catch    Collection Time: 07/31/21 11:01 PM    Specimen: Urine, Clean Catch   Result Value Ref Range    Color, UA Yellow Yellow, Straw    Appearance, UA Clear Clear    pH, UA 6.0 5.0 - 8.0    Specific Gravity, UA 1.019 1.005 - 1.030    Glucose, UA Negative Negative    Ketones, UA 15 mg/dL (1+) (A) Negative    Bilirubin, UA Negative Negative    Blood, UA Small (1+) (A) Negative    Protein, UA >=300 mg/dL (3+) (A) Negative    Leuk Esterase, UA Negative Negative    Nitrite, UA Negative Negative    Urobilinogen, UA 1.0 E.U./dL 0.2 - 1.0 E.U./dL   Urinalysis, Microscopic Only - Urine, Clean Catch    Collection Time: 07/31/21 11:01 PM    Specimen: Urine, Clean Catch   Result Value Ref Range    RBC, UA 3-5 (A) None Seen, 0-2 /HPF    WBC, UA 0-2 None Seen, 0-2 /HPF    Bacteria, UA None Seen None Seen /HPF    Squamous Epithelial Cells, UA 3-6 (A) None Seen, 0-2 /HPF    Hyaline Casts, UA 3-6 None Seen /LPF    Methodology Automated Microscopy        Ordered the above labs and independently reviewed the results.        RADIOLOGY  CT Head Without Contrast    Result Date: 7/31/2021  Patient: CHRIS WHEATLEY  Time Out: 22:41 Exam(s): CT HEAD Without Contrast EXAM:   CT Head Without Intravenous Contrast CLINICAL HISTORY:    Reason for exam: Head trauma, minor (Age >= 65y). TECHNIQUE:   Axial computed tomography images of the head brain without intravenous contrast.  CTDI is 54.80 mGy and DLP is 1040.20 mGy-cm.  This CT exam was performed according to the principle of ALARA (As Low As  Reasonably Achievable) by using one or more of the following dose reduction techniques: automated exposure control, adjustment of the mA and or kV according to patient size, and or use of iterative reconstruction technique. COMPARISON:   No relevant prior studies available. FINDINGS:   Brain:  No acute intracranial hemorrhage or cortical ischemia.  Chronic small vessel ischemic changes.   Ventricles:  Unremarkable.   Bones joints:  Unremarkable.  No acute fracture.   Soft tissues:  Right scalp hematoma.   Sinuses:  Unremarkable as visualized.   Mastoid air cells:  Unremarkable as visualized. IMPRESSION:       No acute intracranial hemorrhage or skull fracture.     Electronically signed by Grzegorz David M.D. on 07-31-21 at 2241    XR Chest 1 View    Result Date: 7/31/2021  Patient: CHRIS WHEATLEY  Time Out: 22:36 Exam(s): FILM CXR 1 VIEW EXAM:   XR Chest, 1 View CLINICAL HISTORY:    Reason for exam: fever. TECHNIQUE:   Frontal view of the chest. COMPARISON:   No relevant prior studies available. FINDINGS:   Lungs:  Unremarkable.  No consolidation.   Pleural space:  Unremarkable.  No pneumothorax.   Heart:  Unremarkable.   Mediastinum:  Unremarkable.   Bones joints:  Unremarkable. IMPRESSION:       No acute cardiopulmonary disease.     Electronically signed by Grzegorz David M.D. on 07-31-21 at 2236      I ordered the above noted radiological studies. Reviewed by me and discussed with radiologist.  See dictation for official radiology interpretation.      PROCEDURES    Procedures      MEDICATIONS GIVEN IN ER    Medications   sodium chloride 0.9 % flush 10 mL (has no administration in time range)   cefTRIAXone (ROCEPHIN) IVPB 1 g (1 g Intravenous New Bag 7/31/21 2336)   sodium chloride 0.9 % bolus 1,000 mL (1,000 mL Intravenous New Bag 7/31/21 2303)         PROGRESS, DATA ANALYSIS, CONSULTS, AND MEDICAL DECISION MAKING    All labs have been independently reviewed by me.  All radiology studies have been reviewed by me and  discussed with radiologist dictating the report.   EKG's independently viewed and interpreted by me.  Discussion below represents my analysis of pertinent findings related to patient's condition, differential diagnosis, treatment plan and final disposition.    Differential diagnosis includes but is not limited to UTI, pneumonia, intra-abdominal infection, hip fracture, cervical spine fracture, intracranial hemorrhage.    ED Course as of Jul 31 2343   Sat Jul 31, 2021 2147 Temp: 100.1 °F (37.8 °C) [TR]   2147 SpO2: 91 % [TR]   2154 EKG          EKG time: 2151  Rhythm/Rate: Normal sinus, rate 97  P waves and OR: Normal P, normal OR  QRS, axis: Narrow QRS, normal axis  ST and T waves: Normal ST and T waves accounting for artifact except for some possible lateral minimal ST depression    Interpreted Contemporaneously by me, independently viewed  Not significantly changed compared to prior 7-2-21      [TR]   2342 Speaking with [place] with [facility].  Will admit to Dr. Doherty.    [TR]      ED Course User Index  [TR] Tolu Murphy MD           PPE: Both the patient and I wore a surgical mask throughout the entire patient encounter. I wore protective goggles.       AS OF 23:43 EDT VITALS:    BP - 178/81  HR - 95  TEMP - 98.4 °F (36.9 °C) (Oral)  O2 SATS - 93%        DIAGNOSIS  Final diagnoses:   Closed fracture of right hip, initial encounter (CMS/[facility])   Traumatic rhabdomyolysis, initial encounter (CMS/[facility])   Acute non-recurrent sinusitis, unspecified location         DISPOSITION  ED Disposition     ED Disposition Condition Comment    Decision to Admit  Level of Care: Telemetry [5]   Diagnosis: Closed fracture of right hip, initial encounter (CMS/[facility]) [221331]   Admitting Physician: ANDRA DOHERTY [[id number]]   Attending Physician: ANDRA DOHERTY [[id number]]                  Tolu Murphy MD  07/31/21 0951

## 2021-08-01 NOTE — PLAN OF CARE
Goal Outcome Evaluation:  Plan of Care Reviewed With: patient        Progress: no change  Outcome Summary: Pt admitted this shift for closed fracture of Right hip and rhabdomyolitis. Pt A&O, on 2L NC during sleep. Ortho consult placed. NPO at this time. Pt c/o hip pain, treated with Morphine. Daughter at bedside. VSS. Will continue to monitor.

## 2021-08-01 NOTE — ED NOTES
Pt arrives via EMS w urine/stool soiled clothes. Given permission to cut off and throw away. +bb for transport, log rolled w cspine precations by rnx2 and ems x2. Denies back pain but reports mid neck pain w palpation so cc applied.     Reports falling after losing her balance. Recently lives alone after her spouse passing away. Family lives near and checks on pt frequently.     No skin break down appreciated. Pt most sore to right hip area and unable to straighten legs at this time.     Multiple warm blankets applied.             Senia Boyce, RN  07/31/21 [id number]
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
## 2021-08-02 LAB
ANION GAP SERPL CALCULATED.3IONS-SCNC: 6.8 MMOL/L (ref 5–15)
BASOPHILS # BLD AUTO: 0.09 10*3/MM3 (ref 0–0.2)
BASOPHILS NFR BLD AUTO: 0.6 % (ref 0–1.5)
BUN SERPL-MCNC: 18 MG/DL (ref 8–23)
BUN/CREAT SERPL: 26.5 (ref 7–25)
CALCIUM SPEC-SCNC: 8.5 MG/DL (ref 8.6–10.5)
CHLORIDE SERPL-SCNC: 103 MMOL/L (ref 98–107)
CO2 SERPL-SCNC: 21.2 MMOL/L (ref 22–29)
CREAT SERPL-MCNC: 0.68 MG/DL (ref 0.57–1)
DEPRECATED RDW RBC AUTO: 42.9 FL (ref 37–54)
EOSINOPHIL # BLD AUTO: 0.57 10*3/MM3 (ref 0–0.4)
EOSINOPHIL NFR BLD AUTO: 4 % (ref 0.3–6.2)
ERYTHROCYTE [DISTWIDTH] IN BLOOD BY AUTOMATED COUNT: 12.6 % (ref 12.3–15.4)
GFR SERPL CREATININE-BSD FRML MDRD: 82 ML/MIN/1.73
GLUCOSE BLDC GLUCOMTR-MCNC: 107 MG/DL (ref 70–130)
GLUCOSE SERPL-MCNC: 117 MG/DL (ref 65–99)
HCT VFR BLD AUTO: 33 % (ref 34–46.6)
HGB BLD-MCNC: 10.7 G/DL (ref 12–15.9)
IMM GRANULOCYTES # BLD AUTO: 0.1 10*3/MM3 (ref 0–0.05)
IMM GRANULOCYTES NFR BLD AUTO: 0.7 % (ref 0–0.5)
LYMPHOCYTES # BLD AUTO: 1.34 10*3/MM3 (ref 0.7–3.1)
LYMPHOCYTES NFR BLD AUTO: 9.5 % (ref 19.6–45.3)
MCH RBC QN AUTO: 30.5 PG (ref 26.6–33)
MCHC RBC AUTO-ENTMCNC: 32.4 G/DL (ref 31.5–35.7)
MCV RBC AUTO: 94 FL (ref 79–97)
MONOCYTES # BLD AUTO: 1.09 10*3/MM3 (ref 0.1–0.9)
MONOCYTES NFR BLD AUTO: 7.7 % (ref 5–12)
NEUTROPHILS NFR BLD AUTO: 10.9 10*3/MM3 (ref 1.7–7)
NEUTROPHILS NFR BLD AUTO: 77.5 % (ref 42.7–76)
NRBC BLD AUTO-RTO: 0 /100 WBC (ref 0–0.2)
OSMOLALITY UR: 614 MOSM/KG
PLATELET # BLD AUTO: 249 10*3/MM3 (ref 140–450)
PMV BLD AUTO: 9.5 FL (ref 6–12)
POTASSIUM SERPL-SCNC: 4.9 MMOL/L (ref 3.5–5.2)
RBC # BLD AUTO: 3.51 10*6/MM3 (ref 3.77–5.28)
SODIUM SERPL-SCNC: 131 MMOL/L (ref 136–145)
SODIUM UR-SCNC: 95 MMOL/L
WBC # BLD AUTO: 14.09 10*3/MM3 (ref 3.4–10.8)

## 2021-08-02 PROCEDURE — 82962 GLUCOSE BLOOD TEST: CPT

## 2021-08-02 PROCEDURE — 80048 BASIC METABOLIC PNL TOTAL CA: CPT | Performed by: HOSPITALIST

## 2021-08-02 PROCEDURE — 25010000002 MORPHINE PER 10 MG: Performed by: NURSE PRACTITIONER

## 2021-08-02 PROCEDURE — 25010000002 SODIUM CHLORIDE 0.9 % WITH KCL 20 MEQ 20-0.9 MEQ/L-% SOLUTION: Performed by: HOSPITALIST

## 2021-08-02 PROCEDURE — 85025 COMPLETE CBC W/AUTO DIFF WBC: CPT | Performed by: HOSPITALIST

## 2021-08-02 PROCEDURE — 84300 ASSAY OF URINE SODIUM: CPT | Performed by: HOSPITALIST

## 2021-08-02 PROCEDURE — 83935 ASSAY OF URINE OSMOLALITY: CPT | Performed by: HOSPITALIST

## 2021-08-02 RX ORDER — POTASSIUM CHLORIDE 1.5 G/1.77G
40 POWDER, FOR SOLUTION ORAL ONCE
Status: COMPLETED | OUTPATIENT
Start: 2021-08-02 | End: 2021-08-02

## 2021-08-02 RX ADMIN — POTASSIUM CHLORIDE 40 MEQ: 1.5 POWDER, FOR SOLUTION ORAL at 11:51

## 2021-08-02 RX ADMIN — VALSARTAN 160 MG: 160 TABLET, FILM COATED ORAL at 20:24

## 2021-08-02 RX ADMIN — SODIUM CHLORIDE, PRESERVATIVE FREE 10 ML: 5 INJECTION INTRAVENOUS at 09:32

## 2021-08-02 RX ADMIN — SUCRALFATE 1 G: 1 TABLET ORAL at 18:04

## 2021-08-02 RX ADMIN — FLUOROMETHOLONE 1 DROP: 1 SOLUTION/ DROPS OPHTHALMIC at 09:33

## 2021-08-02 RX ADMIN — GABAPENTIN 100 MG: 100 CAPSULE ORAL at 18:04

## 2021-08-02 RX ADMIN — POTASSIUM CHLORIDE AND SODIUM CHLORIDE 75 ML/HR: 900; 150 INJECTION, SOLUTION INTRAVENOUS at 00:31

## 2021-08-02 RX ADMIN — SUCRALFATE 1 G: 1 TABLET ORAL at 11:51

## 2021-08-02 RX ADMIN — FLUTICASONE PROPIONATE 1 SPRAY: 50 SPRAY, METERED NASAL at 09:33

## 2021-08-02 RX ADMIN — ESCITALOPRAM 10 MG: 10 TABLET, FILM COATED ORAL at 09:32

## 2021-08-02 RX ADMIN — SODIUM CHLORIDE, PRESERVATIVE FREE 10 ML: 5 INJECTION INTRAVENOUS at 20:24

## 2021-08-02 RX ADMIN — MORPHINE SULFATE 2 MG: 2 INJECTION, SOLUTION INTRAMUSCULAR; INTRAVENOUS at 18:05

## 2021-08-02 RX ADMIN — Medication 1 CAPSULE: at 09:32

## 2021-08-02 RX ADMIN — GABAPENTIN 100 MG: 100 CAPSULE ORAL at 09:32

## 2021-08-02 RX ADMIN — MORPHINE SULFATE 2 MG: 2 INJECTION, SOLUTION INTRAMUSCULAR; INTRAVENOUS at 15:09

## 2021-08-02 RX ADMIN — AMLODIPINE BESYLATE 10 MG: 10 TABLET ORAL at 09:32

## 2021-08-02 RX ADMIN — GABAPENTIN 100 MG: 100 CAPSULE ORAL at 20:24

## 2021-08-02 RX ADMIN — MORPHINE SULFATE 2 MG: 2 INJECTION, SOLUTION INTRAMUSCULAR; INTRAVENOUS at 00:31

## 2021-08-02 RX ADMIN — MORPHINE SULFATE 2 MG: 2 INJECTION, SOLUTION INTRAMUSCULAR; INTRAVENOUS at 21:23

## 2021-08-02 RX ADMIN — MORPHINE SULFATE 2 MG: 2 INJECTION, SOLUTION INTRAMUSCULAR; INTRAVENOUS at 07:18

## 2021-08-02 RX ADMIN — CARVEDILOL 6.25 MG: 6.25 TABLET, FILM COATED ORAL at 18:04

## 2021-08-02 RX ADMIN — CARVEDILOL 6.25 MG: 6.25 TABLET, FILM COATED ORAL at 09:32

## 2021-08-02 RX ADMIN — MORPHINE SULFATE 2 MG: 2 INJECTION, SOLUTION INTRAMUSCULAR; INTRAVENOUS at 11:51

## 2021-08-02 NOTE — PROGRESS NOTES
"DAILY PROGRESS NOTE  [facility]    Patient Identification:  Name: Geeta Butt  Age: 85 y.o.  Sex: female  :  1935  MRN: [id number]         Primary Care Physician: Grzegorz Nguyen MD      Subjective  Patient with no new complaints.  Reasonable pain control.    Objective:  General Appearance:  Comfortable, well-appearing, in no acute distress and not in pain.    Vital signs: (most recent): Blood pressure 130/61, pulse 73, temperature 97.5 °F (36.4 °C), temperature source Oral, resp. rate 18, height 154.9 cm (61\"), weight 68.4 kg (150 lb 14.4 oz), SpO2 95 %.    Lungs:  Normal effort and normal respiratory rate.  Breath sounds clear to auscultation.    Heart: Normal rate.  Regular rhythm.    Extremities: There is no dependent edema.    Neurological: Patient is alert and oriented to person, place and time.    Skin:  Warm and dry.                Vital signs in last 24 hours:  Temp:  [97.5 °F (36.4 °C)-98.8 °F (37.1 °C)] 97.5 °F (36.4 °C)  Heart Rate:  [71-77] 73  Resp:  [18] 18  BP: (111-137)/(45-82) 130/61    Intake/Output:    Intake/Output Summary (Last 24 hours) at 2021 1128  Last data filed at 2021 1001  Gross per 24 hour   Intake 1400 ml   Output 700 ml   Net 700 ml         Results from last 7 days   Lab Units 21  2140   WBC 10*3/mm3 17.78* 20.86*   HEMOGLOBIN g/dL 11.7* 13.8   PLATELETS 10*3/mm3 279 325     Results from last 7 days   Lab Units 2128 21  2140   SODIUM mmol/L 131* 132*   POTASSIUM mmol/L 3.4* 3.8   CHLORIDE mmol/L 99 94*   CO2 mmol/L 21.6* 23.1   BUN mg/dL 16 14   CREATININE mg/dL 0.67 0.73   GLUCOSE mg/dL 114* 124*   Estimated Creatinine Clearance: 46.3 mL/min (by C-G formula based on SCr of 0.67 mg/dL).  Results from last 7 days   Lab Units 21  0628 21  2140   CALCIUM mg/dL 9.1 9.9   ALBUMIN g/dL  --  4.50     Results from last 7 days   Lab Units 21  2140   ALBUMIN g/dL 4.50   BILIRUBIN mg/dL 0.6   ALK PHOS U/L " 83   AST (SGOT) U/L 34*   ALT (SGPT) U/L 27       Assessment:  Closed fracture right hip: Orthopedic input appreciated.  Unfortunately will not be able to go to the OR till tomorrow.  Resume oral intake then n.p.o. after midnight.  Leukocytosis: Combination of recent epidural with steroids and stress leukocytosis.  Improved today.  No evidence of bacterial infection on evaluation.  Continue to monitor.  Proceed with the usual perioperative antibiotics.  Hypertension: Controlled.  Hypokalemia: Oral replacement.  Hyponatremia: Chronic.  No records of recent diuretics.  Will check urine osmolalities and sodium studies.  Note that she is on Lexapro which has been associated with hyponatremia.  Monitor.  Hypothyroidism: Continue levothyroxine.  TSH within normal limits.  Continue home meds.  Low vitamin D levels on most recent check: We will recheck vitamin D levels in the morning.    VTE Prophylaxis - SCDs.  Code Status - Full code.      Plan:  Please see above.  Discussed with patient and daughter.  Discussed with RN.    Montez Robertson MD  8/2/2021  11:28 EDT

## 2021-08-02 NOTE — CONSULTS
Ch visited with patient. Pt's daughter and friend were also in the room. Pt expressed grief over the recent death of her  and talked in length about caring for him and their relationship. Ch prayed with pt. Pt also requested communion so ch arranged for a [facility] ch to visit.

## 2021-08-02 NOTE — CASE MANAGEMENT/SOCIAL WORK
Discharge Planning Assessment  [facility]     Patient Name: Geeta Btut  MRN: [id number]  Today's Date: 2021    Admit Date: 2021    Discharge Needs Assessment     Row Name 21 1032       Living Environment    Lives With  alone    Name(s) of Who Lives With Patient  Spouse  2021    Current Living Arrangements  home/apartment/condo    Primary Care Provided by  self    Provides Primary Care For  no one    Family Caregiver if Needed  child(lonnie), adult    Family Caregiver Names  Daughter Daisy Vincent [phone]    Quality of Family Relationships  helpful    Able to Return to Prior Arrangements  yes       Resource/Environmental Concerns    Resource/Environmental Concerns  none       Transition Planning    Patient/Family Anticipates Transition to  inpatient rehabilitation facility    Patient/Family Anticipated Services at Transition  rehabilitation services;skilled nursing    Transportation Anticipated  family or friend will provide;health plan transportation       Discharge Needs Assessment    Readmission Within the Last 30 Days  current reason for admission unrelated to previous admission    Equipment Currently Used at Home  none Does have a walker and grab bars if needed    Concerns to be Addressed  basic needs;discharge planning    Anticipated Changes Related to Illness  inability to care for self    Equipment Needed After Discharge  commode    Provided Post Acute Provider List?  Yes    Post Acute Provider List  Nursing Home;Home Health    Provided Post Acute Provider Quality & Resource List?  Yes    Post Acute Provider Quality and Resource List  Nursing Home;Home Health    Delivered To  Support Person    Support Person  Emmett Daisy    Method of Delivery  In person        Discharge Plan     Row Name 21 1035       Plan    Plan  Return home    Patient/Family in Agreement with Plan  yes    Plan Comments  Spoke with patient and daughter Daisy Margarita [phone] at  bedside.  Patient lives alone (spouse  ~3 weeks ago), is IADL, she does have a walker and grab bars in the BR.  She has used [facility] in the past and has never been to SNF.  PCP is Dr. Grzegorz Nguyen and pharmacy is [facility] on [address].  Patient is aware she will likely need SNF.  She is interested in [facility] - message to Claudia, [facility] [place] - message to Vivian and [facility] - message to Danii.  Plan for OR tomorrow.  CCP will F/U after OR.  BHumeniuk RN        Continued Care and Services - Admitted Since 2021     Destination     Service Provider Request Status Selected Services Address Phone Fax Patient Preferred    [facility]  Pending - Request Sent N/A [address] [facility] [address] [phone] --    [facility]  Pending - Request Sent N/A [address] [facility] [address] [phone] --    [facility]  Pending - Request Sent N/A [address] , [facility] [id number] [phone] --            Selected Continued Care - Prior Encounters Includes selections from prior encounters from 2021 to 2021    Discharged on 2021 Admission date: 2021 - Discharge disposition: Home-Health Care Svc    Home Medical Care     Service Provider Selected Services Address Phone Fax Patient Preferred     [facility]  Home Health Services [address] [address] [phone] --                      Demographic Summary     Row Name 21 1031       General Information    Admission Type  inpatient    Arrived From  home    Referral Source  admission list    Reason for Consult  discharge planning    Preferred Language  English     Used During This Interaction  no        Functional Status     Row Name 21 1031       Functional Status    Usual Activity Tolerance  moderate    Current Activity Tolerance  poor       Functional Status, IADL     Medications  independent    Meal Preparation  independent    Housekeeping  independent    Laundry  independent    Shopping  independent       Mental Status    General Appearance WDL  WDL       Mental Status Summary    Recent Changes in Mental Status/Cognitive Functioning  no changes                Becky S. Humeniuk, RN

## 2021-08-02 NOTE — PLAN OF CARE
Goal Outcome Evaluation:  Plan of Care Reviewed With: patient, daughter        Progress: no change  Pt did not have surgery today. The plan if for surgery tomorrow at 1400. Pt given morphine regularly for pain. VSS, 2L NC, AxOx4, normal sinus on the monitor. Will ct to monitor.

## 2021-08-02 NOTE — DISCHARGE PLACEMENT REQUEST
"Chris Butt (85 y.o. Female)     Date of Birth Social Security Number Address Home Phone MRN    1935  [address] [address] [phone] [id number]    Protestant Marital Status          Islam        Admission Date Admission Type Admitting Provider Attending Provider Department, Room/Bed    7/31/21 Emergency Tuan Avila MD Broaddus, Emmett J., MD [address], [address]/1    Discharge Date Discharge Disposition Discharge Destination                       Attending Provider: Montez Robertson MD    Allergies: Other, Sulfa Antibiotics, Ciprofloxacin, Iodine, Macrodantin [Nitrofurantoin Macrocrystal], Nitrofurantoin, Yeast-related Products    Isolation: None   Infection: None   Code Status: CPR    Ht: 154.9 cm (61\")   Wt: 68.4 kg (150 lb 14.4 oz)    Admission Cmt: None   Principal Problem: Closed fracture of right hip (CMS/[facility]) [S72.001A]                 Active Insurance as of 7/31/2021     Primary Coverage     Payor Plan Insurance Group Employer/Plan Group    [facility] MEDICARE REPLACEMENT UNITED [facility] MEDICARE REPLACEMENT [id number]     Payor Plan Address Payor Plan Phone Number Payor Plan Fax Number Effective Dates    [address] [address]   1/1/2021 - None Entered    [place] [id number]       Subscriber Name Subscriber Birth Date Member ID       CHRIS BUTT 1935 [id number]                 Emergency Contacts      (Rel.) Home Phone Work Phone Mobile Phone    MargaritaDaisy (Daughter) [phone] -- --    Nicolas Butt (Spouse) [phone] -- --              "

## 2021-08-03 ENCOUNTER — APPOINTMENT (OUTPATIENT)
Dept: GENERAL RADIOLOGY | Facility: HOSPITAL | Age: 86
End: 2021-08-03

## 2021-08-03 ENCOUNTER — ANESTHESIA EVENT (OUTPATIENT)
Dept: PERIOP | Facility: HOSPITAL | Age: 86
End: 2021-08-03

## 2021-08-03 ENCOUNTER — HOME CARE VISIT (OUTPATIENT)
Dept: HOME HEALTH SERVICES | Facility: HOME HEALTHCARE | Age: 86
End: 2021-08-03

## 2021-08-03 ENCOUNTER — ANESTHESIA (OUTPATIENT)
Dept: PERIOP | Facility: HOSPITAL | Age: 86
End: 2021-08-03

## 2021-08-03 LAB
25(OH)D3 SERPL-MCNC: 47.2 NG/ML (ref 30–100)
ANION GAP SERPL CALCULATED.3IONS-SCNC: 8.7 MMOL/L (ref 5–15)
BASOPHILS # BLD AUTO: 0.09 10*3/MM3 (ref 0–0.2)
BASOPHILS NFR BLD AUTO: 0.7 % (ref 0–1.5)
BUN SERPL-MCNC: 17 MG/DL (ref 8–23)
BUN/CREAT SERPL: 35.4 (ref 7–25)
CALCIUM SPEC-SCNC: 8.9 MG/DL (ref 8.6–10.5)
CHLORIDE SERPL-SCNC: 97 MMOL/L (ref 98–107)
CO2 SERPL-SCNC: 23.3 MMOL/L (ref 22–29)
CREAT SERPL-MCNC: 0.48 MG/DL (ref 0.57–1)
DEPRECATED RDW RBC AUTO: 40.5 FL (ref 37–54)
EOSINOPHIL # BLD AUTO: 0.68 10*3/MM3 (ref 0–0.4)
EOSINOPHIL NFR BLD AUTO: 5.1 % (ref 0.3–6.2)
ERYTHROCYTE [DISTWIDTH] IN BLOOD BY AUTOMATED COUNT: 12.3 % (ref 12.3–15.4)
GFR SERPL CREATININE-BSD FRML MDRD: 123 ML/MIN/1.73
GLUCOSE BLDC GLUCOMTR-MCNC: 126 MG/DL (ref 70–130)
GLUCOSE SERPL-MCNC: 104 MG/DL (ref 65–99)
HCT VFR BLD AUTO: 29.4 % (ref 34–46.6)
HGB BLD-MCNC: 9.8 G/DL (ref 12–15.9)
IMM GRANULOCYTES # BLD AUTO: 0.11 10*3/MM3 (ref 0–0.05)
IMM GRANULOCYTES NFR BLD AUTO: 0.8 % (ref 0–0.5)
LYMPHOCYTES # BLD AUTO: 1.41 10*3/MM3 (ref 0.7–3.1)
LYMPHOCYTES NFR BLD AUTO: 10.5 % (ref 19.6–45.3)
MCH RBC QN AUTO: 30 PG (ref 26.6–33)
MCHC RBC AUTO-ENTMCNC: 33.3 G/DL (ref 31.5–35.7)
MCV RBC AUTO: 89.9 FL (ref 79–97)
MONOCYTES # BLD AUTO: 1.11 10*3/MM3 (ref 0.1–0.9)
MONOCYTES NFR BLD AUTO: 8.3 % (ref 5–12)
NEUTROPHILS NFR BLD AUTO: 10.01 10*3/MM3 (ref 1.7–7)
NEUTROPHILS NFR BLD AUTO: 74.6 % (ref 42.7–76)
NRBC BLD AUTO-RTO: 0 /100 WBC (ref 0–0.2)
PLATELET # BLD AUTO: 254 10*3/MM3 (ref 140–450)
PMV BLD AUTO: 9.3 FL (ref 6–12)
POTASSIUM SERPL-SCNC: 4.7 MMOL/L (ref 3.5–5.2)
RBC # BLD AUTO: 3.27 10*6/MM3 (ref 3.77–5.28)
SODIUM SERPL-SCNC: 129 MMOL/L (ref 136–145)
WBC # BLD AUTO: 13.41 10*3/MM3 (ref 3.4–10.8)

## 2021-08-03 PROCEDURE — 25010000002 ONDANSETRON PER 1 MG: Performed by: NURSE ANESTHETIST, CERTIFIED REGISTERED

## 2021-08-03 PROCEDURE — 85025 COMPLETE CBC W/AUTO DIFF WBC: CPT | Performed by: HOSPITALIST

## 2021-08-03 PROCEDURE — 25010000002 PROPOFOL 10 MG/ML EMULSION: Performed by: NURSE ANESTHETIST, CERTIFIED REGISTERED

## 2021-08-03 PROCEDURE — C1713 ANCHOR/SCREW BN/BN,TIS/BN: HCPCS | Performed by: ORTHOPAEDIC SURGERY

## 2021-08-03 PROCEDURE — 25010000002 DEXAMETHASONE PER 1 MG: Performed by: NURSE ANESTHETIST, CERTIFIED REGISTERED

## 2021-08-03 PROCEDURE — 73501 X-RAY EXAM HIP UNI 1 VIEW: CPT

## 2021-08-03 PROCEDURE — 82962 GLUCOSE BLOOD TEST: CPT

## 2021-08-03 PROCEDURE — 0QS636Z REPOSITION RIGHT UPPER FEMUR WITH INTRAMEDULLARY INTERNAL FIXATION DEVICE, PERCUTANEOUS APPROACH: ICD-10-PCS | Performed by: ORTHOPAEDIC SURGERY

## 2021-08-03 PROCEDURE — 80048 BASIC METABOLIC PNL TOTAL CA: CPT | Performed by: HOSPITALIST

## 2021-08-03 PROCEDURE — 25010000002 FENTANYL CITRATE (PF) 50 MCG/ML SOLUTION: Performed by: ANESTHESIOLOGY

## 2021-08-03 PROCEDURE — 25010000002 MORPHINE PER 10 MG: Performed by: NURSE PRACTITIONER

## 2021-08-03 PROCEDURE — 82306 VITAMIN D 25 HYDROXY: CPT | Performed by: HOSPITALIST

## 2021-08-03 PROCEDURE — 76000 FLUOROSCOPY <1 HR PHYS/QHP: CPT

## 2021-08-03 PROCEDURE — 25010000002 FENTANYL CITRATE (PF) 50 MCG/ML SOLUTION: Performed by: NURSE ANESTHETIST, CERTIFIED REGISTERED

## 2021-08-03 PROCEDURE — 73502 X-RAY EXAM HIP UNI 2-3 VIEWS: CPT

## 2021-08-03 PROCEDURE — 25010000002 HYDROMORPHONE PER 4 MG: Performed by: NURSE ANESTHETIST, CERTIFIED REGISTERED

## 2021-08-03 PROCEDURE — G0180 MD CERTIFICATION HHA PATIENT: HCPCS | Performed by: NURSE PRACTITIONER

## 2021-08-03 PROCEDURE — 25010000003 CEFAZOLIN IN DEXTROSE 2-4 GM/100ML-% SOLUTION: Performed by: ORTHOPAEDIC SURGERY

## 2021-08-03 DEVICE — TRIGEN LOW PROFILE SCREW 5.0MM X 32.5MM
Type: IMPLANTABLE DEVICE | Site: FEMUR | Status: FUNCTIONAL
Brand: TRIGEN

## 2021-08-03 DEVICE — TRIGEN INTERTAN 10S 10MM X 18CM 125DEGREE
Type: IMPLANTABLE DEVICE | Site: FEMUR | Status: FUNCTIONAL
Brand: TRIGEN

## 2021-08-03 DEVICE — INTERTAN LAG/COMPRESSION SCREW KIT                                    85MM / 80MM
Type: IMPLANTABLE DEVICE | Site: FEMUR | Status: FUNCTIONAL
Brand: TRIGEN

## 2021-08-03 RX ORDER — DROPERIDOL 2.5 MG/ML
0.62 INJECTION, SOLUTION INTRAMUSCULAR; INTRAVENOUS ONCE AS NEEDED
Status: DISCONTINUED | OUTPATIENT
Start: 2021-08-03 | End: 2021-08-03 | Stop reason: HOSPADM

## 2021-08-03 RX ORDER — DEXAMETHASONE SODIUM PHOSPHATE 10 MG/ML
INJECTION INTRAMUSCULAR; INTRAVENOUS AS NEEDED
Status: DISCONTINUED | OUTPATIENT
Start: 2021-08-03 | End: 2021-08-03 | Stop reason: SURG

## 2021-08-03 RX ORDER — FENTANYL CITRATE 50 UG/ML
50 INJECTION, SOLUTION INTRAMUSCULAR; INTRAVENOUS
Status: DISCONTINUED | OUTPATIENT
Start: 2021-08-03 | End: 2021-08-03 | Stop reason: HOSPADM

## 2021-08-03 RX ORDER — DIPHENHYDRAMINE HCL 25 MG
25 CAPSULE ORAL
Status: DISCONTINUED | OUTPATIENT
Start: 2021-08-03 | End: 2021-08-03 | Stop reason: HOSPADM

## 2021-08-03 RX ORDER — SODIUM CHLORIDE 0.9 % (FLUSH) 0.9 %
3-10 SYRINGE (ML) INJECTION AS NEEDED
Status: DISCONTINUED | OUTPATIENT
Start: 2021-08-03 | End: 2021-08-03 | Stop reason: HOSPADM

## 2021-08-03 RX ORDER — NALOXONE HCL 0.4 MG/ML
0.2 VIAL (ML) INJECTION AS NEEDED
Status: DISCONTINUED | OUTPATIENT
Start: 2021-08-03 | End: 2021-08-03 | Stop reason: HOSPADM

## 2021-08-03 RX ORDER — BUPIVACAINE HYDROCHLORIDE AND EPINEPHRINE 5; 5 MG/ML; UG/ML
INJECTION, SOLUTION EPIDURAL; INTRACAUDAL; PERINEURAL AS NEEDED
Status: DISCONTINUED | OUTPATIENT
Start: 2021-08-03 | End: 2021-08-03 | Stop reason: HOSPADM

## 2021-08-03 RX ORDER — PROMETHAZINE HYDROCHLORIDE 25 MG/1
25 TABLET ORAL ONCE AS NEEDED
Status: DISCONTINUED | OUTPATIENT
Start: 2021-08-03 | End: 2021-08-03 | Stop reason: HOSPADM

## 2021-08-03 RX ORDER — ROCURONIUM BROMIDE 10 MG/ML
INJECTION, SOLUTION INTRAVENOUS AS NEEDED
Status: DISCONTINUED | OUTPATIENT
Start: 2021-08-03 | End: 2021-08-03 | Stop reason: SURG

## 2021-08-03 RX ORDER — FENTANYL CITRATE 50 UG/ML
INJECTION, SOLUTION INTRAMUSCULAR; INTRAVENOUS AS NEEDED
Status: DISCONTINUED | OUTPATIENT
Start: 2021-08-03 | End: 2021-08-03 | Stop reason: SURG

## 2021-08-03 RX ORDER — HYDRALAZINE HYDROCHLORIDE 20 MG/ML
5 INJECTION INTRAMUSCULAR; INTRAVENOUS
Status: DISCONTINUED | OUTPATIENT
Start: 2021-08-03 | End: 2021-08-03 | Stop reason: HOSPADM

## 2021-08-03 RX ORDER — MIDAZOLAM HYDROCHLORIDE 1 MG/ML
0.5 INJECTION INTRAMUSCULAR; INTRAVENOUS
Status: DISCONTINUED | OUTPATIENT
Start: 2021-08-03 | End: 2021-08-03 | Stop reason: HOSPADM

## 2021-08-03 RX ORDER — LABETALOL HYDROCHLORIDE 5 MG/ML
5 INJECTION, SOLUTION INTRAVENOUS
Status: DISCONTINUED | OUTPATIENT
Start: 2021-08-03 | End: 2021-08-03 | Stop reason: HOSPADM

## 2021-08-03 RX ORDER — ASPIRIN 325 MG
325 TABLET ORAL DAILY
Status: DISCONTINUED | OUTPATIENT
Start: 2021-08-04 | End: 2021-08-06 | Stop reason: HOSPADM

## 2021-08-03 RX ORDER — HYDROMORPHONE HYDROCHLORIDE 1 MG/ML
0.5 INJECTION, SOLUTION INTRAMUSCULAR; INTRAVENOUS; SUBCUTANEOUS
Status: DISCONTINUED | OUTPATIENT
Start: 2021-08-03 | End: 2021-08-03 | Stop reason: HOSPADM

## 2021-08-03 RX ORDER — EPHEDRINE SULFATE 50 MG/ML
INJECTION, SOLUTION INTRAVENOUS AS NEEDED
Status: DISCONTINUED | OUTPATIENT
Start: 2021-08-03 | End: 2021-08-03 | Stop reason: SURG

## 2021-08-03 RX ORDER — DROPERIDOL 2.5 MG/ML
1.25 INJECTION, SOLUTION INTRAMUSCULAR; INTRAVENOUS ONCE AS NEEDED
Status: DISCONTINUED | OUTPATIENT
Start: 2021-08-03 | End: 2021-08-03 | Stop reason: HOSPADM

## 2021-08-03 RX ORDER — ACETAMINOPHEN 325 MG/1
650 TABLET ORAL ONCE AS NEEDED
Status: DISCONTINUED | OUTPATIENT
Start: 2021-08-03 | End: 2021-08-03 | Stop reason: HOSPADM

## 2021-08-03 RX ORDER — LIDOCAINE HYDROCHLORIDE 10 MG/ML
0.5 INJECTION, SOLUTION EPIDURAL; INFILTRATION; INTRACAUDAL; PERINEURAL ONCE AS NEEDED
Status: DISCONTINUED | OUTPATIENT
Start: 2021-08-03 | End: 2021-08-03 | Stop reason: HOSPADM

## 2021-08-03 RX ORDER — CEFAZOLIN SODIUM 2 G/100ML
2 INJECTION, SOLUTION INTRAVENOUS EVERY 8 HOURS
Status: COMPLETED | OUTPATIENT
Start: 2021-08-03 | End: 2021-08-04

## 2021-08-03 RX ORDER — IBUPROFEN 600 MG/1
600 TABLET ORAL ONCE AS NEEDED
Status: DISCONTINUED | OUTPATIENT
Start: 2021-08-03 | End: 2021-08-03 | Stop reason: HOSPADM

## 2021-08-03 RX ORDER — LIDOCAINE HYDROCHLORIDE 20 MG/ML
INJECTION, SOLUTION INFILTRATION; PERINEURAL AS NEEDED
Status: DISCONTINUED | OUTPATIENT
Start: 2021-08-03 | End: 2021-08-03 | Stop reason: SURG

## 2021-08-03 RX ORDER — ONDANSETRON 2 MG/ML
4 INJECTION INTRAMUSCULAR; INTRAVENOUS ONCE AS NEEDED
Status: COMPLETED | OUTPATIENT
Start: 2021-08-03 | End: 2021-08-03

## 2021-08-03 RX ORDER — SODIUM CHLORIDE, SODIUM LACTATE, POTASSIUM CHLORIDE, CALCIUM CHLORIDE 600; 310; 30; 20 MG/100ML; MG/100ML; MG/100ML; MG/100ML
9 INJECTION, SOLUTION INTRAVENOUS CONTINUOUS
Status: DISCONTINUED | OUTPATIENT
Start: 2021-08-03 | End: 2021-08-06 | Stop reason: HOSPADM

## 2021-08-03 RX ORDER — HYDROCODONE BITARTRATE AND ACETAMINOPHEN 7.5; 325 MG/1; MG/1
1 TABLET ORAL ONCE AS NEEDED
Status: DISCONTINUED | OUTPATIENT
Start: 2021-08-03 | End: 2021-08-03 | Stop reason: HOSPADM

## 2021-08-03 RX ORDER — DIPHENHYDRAMINE HYDROCHLORIDE 50 MG/ML
12.5 INJECTION INTRAMUSCULAR; INTRAVENOUS
Status: DISCONTINUED | OUTPATIENT
Start: 2021-08-03 | End: 2021-08-03 | Stop reason: HOSPADM

## 2021-08-03 RX ORDER — FAMOTIDINE 10 MG/ML
20 INJECTION, SOLUTION INTRAVENOUS ONCE
Status: COMPLETED | OUTPATIENT
Start: 2021-08-03 | End: 2021-08-03

## 2021-08-03 RX ORDER — SODIUM CHLORIDE 0.9 % (FLUSH) 0.9 %
3 SYRINGE (ML) INJECTION EVERY 12 HOURS SCHEDULED
Status: DISCONTINUED | OUTPATIENT
Start: 2021-08-03 | End: 2021-08-03 | Stop reason: HOSPADM

## 2021-08-03 RX ORDER — CEFAZOLIN SODIUM 2 G/100ML
2 INJECTION, SOLUTION INTRAVENOUS ONCE
Status: COMPLETED | OUTPATIENT
Start: 2021-08-03 | End: 2021-08-03

## 2021-08-03 RX ORDER — ONDANSETRON 2 MG/ML
INJECTION INTRAMUSCULAR; INTRAVENOUS AS NEEDED
Status: DISCONTINUED | OUTPATIENT
Start: 2021-08-03 | End: 2021-08-03 | Stop reason: SURG

## 2021-08-03 RX ORDER — PROPOFOL 10 MG/ML
VIAL (ML) INTRAVENOUS AS NEEDED
Status: DISCONTINUED | OUTPATIENT
Start: 2021-08-03 | End: 2021-08-03 | Stop reason: SURG

## 2021-08-03 RX ORDER — EPHEDRINE SULFATE 50 MG/ML
5 INJECTION, SOLUTION INTRAVENOUS ONCE AS NEEDED
Status: DISCONTINUED | OUTPATIENT
Start: 2021-08-03 | End: 2021-08-03 | Stop reason: HOSPADM

## 2021-08-03 RX ORDER — MAGNESIUM HYDROXIDE 1200 MG/15ML
LIQUID ORAL AS NEEDED
Status: DISCONTINUED | OUTPATIENT
Start: 2021-08-03 | End: 2021-08-03 | Stop reason: HOSPADM

## 2021-08-03 RX ORDER — PROMETHAZINE HYDROCHLORIDE 25 MG/1
25 SUPPOSITORY RECTAL ONCE AS NEEDED
Status: DISCONTINUED | OUTPATIENT
Start: 2021-08-03 | End: 2021-08-03 | Stop reason: HOSPADM

## 2021-08-03 RX ORDER — FLUMAZENIL 0.1 MG/ML
0.2 INJECTION INTRAVENOUS AS NEEDED
Status: DISCONTINUED | OUTPATIENT
Start: 2021-08-03 | End: 2021-08-03 | Stop reason: HOSPADM

## 2021-08-03 RX ADMIN — SODIUM CHLORIDE, PRESERVATIVE FREE 10 ML: 5 INJECTION INTRAVENOUS at 20:00

## 2021-08-03 RX ADMIN — FENTANYL CITRATE 25 MCG: 50 INJECTION INTRAMUSCULAR; INTRAVENOUS at 12:45

## 2021-08-03 RX ADMIN — PROPOFOL 120 MG: 10 INJECTION, EMULSION INTRAVENOUS at 12:45

## 2021-08-03 RX ADMIN — CEFAZOLIN SODIUM 2 G: 2 INJECTION, SOLUTION INTRAVENOUS at 12:35

## 2021-08-03 RX ADMIN — ONDANSETRON 4 MG: 2 INJECTION INTRAMUSCULAR; INTRAVENOUS at 14:30

## 2021-08-03 RX ADMIN — FENTANYL CITRATE 25 MCG: 50 INJECTION INTRAMUSCULAR; INTRAVENOUS at 13:37

## 2021-08-03 RX ADMIN — FLUOROMETHOLONE 1 DROP: 1 SOLUTION/ DROPS OPHTHALMIC at 08:24

## 2021-08-03 RX ADMIN — GABAPENTIN 100 MG: 100 CAPSULE ORAL at 18:29

## 2021-08-03 RX ADMIN — CARVEDILOL 6.25 MG: 6.25 TABLET, FILM COATED ORAL at 08:23

## 2021-08-03 RX ADMIN — HYDROMORPHONE HYDROCHLORIDE 0.5 MG: 1 INJECTION, SOLUTION INTRAMUSCULAR; INTRAVENOUS; SUBCUTANEOUS at 14:06

## 2021-08-03 RX ADMIN — FLUTICASONE PROPIONATE 1 SPRAY: 50 SPRAY, METERED NASAL at 08:24

## 2021-08-03 RX ADMIN — SUGAMMADEX 150 MG: 100 INJECTION, SOLUTION INTRAVENOUS at 13:25

## 2021-08-03 RX ADMIN — FENTANYL CITRATE 50 MCG: 50 INJECTION, SOLUTION INTRAMUSCULAR; INTRAVENOUS at 11:50

## 2021-08-03 RX ADMIN — DEXAMETHASONE SODIUM PHOSPHATE 8 MG: 10 INJECTION INTRAMUSCULAR; INTRAVENOUS at 12:58

## 2021-08-03 RX ADMIN — FAMOTIDINE 20 MG: 10 INJECTION INTRAVENOUS at 11:50

## 2021-08-03 RX ADMIN — VALSARTAN 160 MG: 160 TABLET, FILM COATED ORAL at 22:21

## 2021-08-03 RX ADMIN — EPHEDRINE SULFATE 5 MG: 50 INJECTION INTRAVENOUS at 12:56

## 2021-08-03 RX ADMIN — ROCURONIUM BROMIDE 30 MG: 50 INJECTION INTRAVENOUS at 12:45

## 2021-08-03 RX ADMIN — FENTANYL CITRATE 50 MCG: 50 INJECTION, SOLUTION INTRAMUSCULAR; INTRAVENOUS at 13:46

## 2021-08-03 RX ADMIN — ONDANSETRON 4 MG: 2 INJECTION INTRAMUSCULAR; INTRAVENOUS at 13:09

## 2021-08-03 RX ADMIN — CEFAZOLIN SODIUM 2 G: 2 INJECTION, SOLUTION INTRAVENOUS at 19:46

## 2021-08-03 RX ADMIN — SODIUM CHLORIDE, PRESERVATIVE FREE 10 ML: 5 INJECTION INTRAVENOUS at 08:24

## 2021-08-03 RX ADMIN — MORPHINE SULFATE 2 MG: 2 INJECTION, SOLUTION INTRAMUSCULAR; INTRAVENOUS at 08:22

## 2021-08-03 RX ADMIN — FENTANYL CITRATE 25 MCG: 50 INJECTION INTRAMUSCULAR; INTRAVENOUS at 13:29

## 2021-08-03 RX ADMIN — SODIUM CHLORIDE, POTASSIUM CHLORIDE, SODIUM LACTATE AND CALCIUM CHLORIDE 9 ML/HR: 600; 310; 30; 20 INJECTION, SOLUTION INTRAVENOUS at 11:45

## 2021-08-03 RX ADMIN — GABAPENTIN 100 MG: 100 CAPSULE ORAL at 21:58

## 2021-08-03 RX ADMIN — LIDOCAINE HYDROCHLORIDE 60 MG: 20 INJECTION, SOLUTION INFILTRATION; PERINEURAL at 12:45

## 2021-08-03 RX ADMIN — MORPHINE SULFATE 2 MG: 2 INJECTION, SOLUTION INTRAMUSCULAR; INTRAVENOUS at 04:24

## 2021-08-03 NOTE — PROGRESS NOTES
"DAILY PROGRESS NOTE  [facility]    Patient Identification:  Name: Geeta Butt  Age: 85 y.o.  Sex: female  :  1935  MRN: [id number]         Primary Care Physician: Grzegorz Nguyen MD      Subjective  Patient with no unusual postop complaints.    Objective:  General Appearance:  Comfortable, well-appearing, in no acute distress and not in pain.    Vital signs: (most recent): Blood pressure 111/54, pulse 66, temperature 97.5 °F (36.4 °C), temperature source Oral, resp. rate 16, height 154.9 cm (61\"), weight 68.4 kg (150 lb 14.4 oz), SpO2 95 %.    Lungs:  Normal effort and normal respiratory rate.  Breath sounds clear to auscultation.    Heart: Normal rate.  Regular rhythm.    Extremities: There is no dependent edema.    Neurological: (A little on the drowsy side from the anesthesia.  Oriented to person and place however.  Cooperative and pleasant.).    Skin:  Warm and dry.                Vital signs in last 24 hours:  Temp:  [97.5 °F (36.4 °C)-98.4 °F (36.9 °C)] 97.5 °F (36.4 °C)  Heart Rate:  [64-75] 66  Resp:  [16-18] 16  BP: (111-151)/(39-64) 111/54    Intake/Output:    Intake/Output Summary (Last 24 hours) at 8/3/2021 1633  Last data filed at 8/3/2021 1450  Gross per 24 hour   Intake 740 ml   Output 900 ml   Net -160 ml         Results from last 7 days   Lab Units 21  0621  1244 21  2140   WBC 10*3/mm3 13.41* 14.09* 17.78* 20.86*   HEMOGLOBIN g/dL 9.8* 10.7* 11.7* 13.8   PLATELETS 10*3/mm3 254 249 279 325     Results from last 7 days   Lab Units 21  0621  1244 21  2140   SODIUM mmol/L 129* 131* 131* 132*   POTASSIUM mmol/L 4.7 4.9 3.4* 3.8   CHLORIDE mmol/L 97* 103 99 94*   CO2 mmol/L 23.3 21.2* 21.6* 23.1   BUN mg/dL 17 18 16 14   CREATININE mg/dL 0.48* 0.68 0.67 0.73   GLUCOSE mg/dL 104* 117* 114* 124*   Estimated Creatinine Clearance: 46.3 mL/min (A) (by C-G formula based on SCr of 0.48 mg/dL (L)).  Results from " last 7 days   Lab Units 08/03/21  0612 08/02/21  1244 08/01/21  0628 07/31/21  2140   CALCIUM mg/dL 8.9 8.5* 9.1 9.9   ALBUMIN g/dL  --   --   --  4.50     Results from last 7 days   Lab Units 07/31/21  2140   ALBUMIN g/dL 4.50   BILIRUBIN mg/dL 0.6   ALK PHOS U/L 83   AST (SGOT) U/L 34*   ALT (SGPT) U/L 27       Assessment:  Closed fracture right hip:  Status post intramedullary nailing 8/3/2021.   Leukocytosis: Combination of recent epidural with steroids and stress leukocytosis.  Improved today.  No evidence of bacterial infection on evaluation.  Improving but will likely see a transient postop bump in the WBC.    Hypertension: Controlled.  Hypokalemia: Oral replacement.  Hyponatremia:  Acute on chronic.  Urine studies consistent with SIADH.  Hold Lexapro.  Conservative fluid restriction.  Monitor.  Hypothyroidism: Continue levothyroxine.  TSH within normal limits.  Continue home meds.  Repeat vitamin D levels normal: We will recheck vitamin D levels in the morning.     VTE Prophylaxis - SCDs.  Code Status - Full code.      Plan:  Please see above.  Discussed with family at bedside.    Montez Robertson MD  8/3/2021  16:33 EDT

## 2021-08-03 NOTE — ANESTHESIA POSTPROCEDURE EVALUATION
Patient: Geeta Butt    Procedure Summary     Date: 08/03/21 Room / Location: [facility] OR [address] [place] [address] MAIN OR    Anesthesia Start: 1237 Anesthesia Stop: 1345    Procedure: FEMUR INTRAMEDULLARY NAILING/RODDING (Right Thigh) Diagnosis:     Surgeons: Garett Martin II, MD Provider: Ray Morton MD    Anesthesia Type: general ASA Status: 4 - Emergent          Anesthesia Type: general    Vitals  Vitals Value Taken Time   /64 08/03/21 1405   Temp 36.9 °C (98.4 °F) 08/03/21 1340   Pulse 68 08/03/21 1409   Resp 16 08/03/21 1400   SpO2 98 % 08/03/21 1409   Vitals shown include unvalidated device data.        Post Anesthesia Care and Evaluation    Patient location during evaluation: PACU  Patient participation: complete - patient participated  Level of consciousness: awake and alert  Pain management: adequate  Airway patency: patent  Anesthetic complications: No anesthetic complications    Cardiovascular status: acceptable  Respiratory status: acceptable  Hydration status: acceptable    Comments: --------------------            08/03/21               1400     --------------------   BP:       125/53     Pulse:      66       Resp:       16       Temp:                SpO2:      100%     --------------------

## 2021-08-03 NOTE — ANESTHESIA PREPROCEDURE EVALUATION
Anesthesia Evaluation     Patient summary reviewed and Nursing notes reviewed   history of anesthetic complications: PONV  NPO Solid Status: > 8 hours  NPO Liquid Status: > 2 hours           Airway   Mallampati: II  TM distance: >3 FB  Neck ROM: full  Dental - normal exam     Pulmonary - normal exam   (+) a smoker Former,   Cardiovascular - normal exam    (+) hypertension, hyperlipidemia,       Neuro/Psych  (+) CVA (left sided [place]) residual symptoms, psychiatric history Depression,     GI/Hepatic/Renal/Endo    (+)  GERD, PUD, GI bleeding , renal disease CRI, diabetes mellitus type 2,     Musculoskeletal     Abdominal    Substance History - negative use     OB/GYN negative ob/gyn ROS         Other   arthritis,    history of cancer                      Anesthesia Plan    ASA 4 - emergent     general   (I discussed with the patient the relevant risks of general anesthesia including, but not limited to, nausea, vomiting, disorientation, post-op delirium, nerve injury, oral/dental injury, awareness, stroke, and death.  I also reviewed any clinically relevant lab and imaging results.    Patient states that she has a hx of aspiration after anesthesia)  intravenous induction     Anesthetic plan, all risks, benefits, and alternatives have been provided, discussed and informed consent has been obtained with: patient.    Plan discussed with CRNA.

## 2021-08-03 NOTE — OP NOTE
Intertrochanteric/Subtrochanteric Fracture Operative Note  Dr. KRISTINE Martin II  [phone]    PATIENT NAME: Geeta Butt  MRN: [id number]  : 1935 AGE: 85 y.o. GENDER: female  DATE OF OPERATION: 8/3/2021  PREOPERATIVE DIAGNOSIS: Hip Fracture  POSTOPERATIVE DIAGNOSIS: Hip Fracture  OPERATION PERFORMED: Right Intramedullary Nailing of the Intertrochanteric Hip Fracture.  SURGEON: Garett Martin MD  Circulator: Anant Nicolas RN; Odessa Matthews RN  Radiology Technologist: Concepcion Giraldo  Scrub Person: sAhanti Mack  Vendor Representative: Osmin Crooks  Assistant: Bebe Mckeon PA  ANESTHESIA: General  ASSISTANT: Bebe Mckeon. This case would not have been possible without another set of skilled surgical hands for retraction, use of instrumentation, and general assistance.  This assistance was vital to the success of the case.   ESTIMATED BLOOD LOSS: 50cc  SPONGE AND NEEDLE COUNT: Correct  INDICATIONS: This patient was found to have an Intertrochanteric Hip Fracture after evaluation in the ER. An orthopaedic consult was placed for management. A discussion of operative versus nonoperative treatment was had with the patient and/or family. They elected to undergo intramedullary nailing of the hip fracture. The risks of surgery were discussed and included the risk of anesthesia, nonunion, malunion, infection, damage to neurovascular structures, implant loosening/fialure, fracture, hardware prominence, the need for further procedures, medical complications, and others. No guarantees were made. The patient wished to proceed with surgery and a surgical consent was signed.  COMPONENTS:   · [employer] InterTAN nail    PERTINENT FINDINGS: Hip Fracture    DETAILS OF PROCEDURE:   The patient was met in the preoperative area. The site was marked. The consent and H&P were reviewed. The patient was then wheeled back to the operative suite and underwent anesthesia. The [place] table boots were secured to  the patients’ feet. The patient was moved onto the [place] table and secured in the supine position. The perineal post was inserted and the boots were secured into the leg holders. Surgical alcohol was used to thoroughly clean the operative area.    REDUCTION  Fluoroscopy was used to visualize the fracture on both an AP and lateral.  Traction, rotation, flexion/extension, abduction/abduction was used as needed to adequately reduce the hip fracture.    The hip and leg were then prepped in the normal sterile fashion, which included Chloroprep and multiple layers of sterile drapes. A surgical timeout was performed in which administration of preoperative antibiotics and the surgical site were confirmed.    A small incision was made just proximal to the greater trochanter and a starting pin was drilled into the tip of the greater trochanter using fluoroscopy to confirm proper location. The opening reamer was then used to open the canal down to the lesser trochanter, visualizing propper position again using fluoroscopy.     OPEN REDUCTION  As fluoroscopic images on both an AP and lateral demonstrated adequate reduction of the fracture, no open techniques needed to be utilized for this case.     NAIL INSERTION  SHORT NAIL: A short nail was then inserted into the femur through the hole made by the opening reamer. The lag screw and compression screw were placed into the femoral head after being drilled and measured, again using fluoroscopy to place the screws in optimal position within the femoral head on both the AP and Lateral views, close to center/center position. Traction was released at this point and the fracture was compressed through the nail. Finally the short nail was secured with one distal interlocking screw which was placed using the jig assembly for the short nail. Final X-Rays showed the fracture reduction to have been maintained and the implant in optimal position.     The wounds were irrigated and closed in  "layers.  The wound was injected with an analgesic coctail.  A sterile dressing was then placed over the incisions. The patient was moved from the [place] table to the [facility] where the boots were removed. The patient was taken to the recovery room in stable condition. There were no complications and the patient tolerated the procedure well.    R \"Casey\" Mario GRULLON MD  Orthopaedic Surgery  [facility]  [phone]    "

## 2021-08-03 NOTE — CASE MANAGEMENT/SOCIAL WORK
Continued Stay Note  [facility]     Patient Name: Geeta Butt  MRN: [id number]  Today's Date: 8/3/2021    Admit Date: 7/31/2021    Discharge Plan     Row Name 08/03/21 0921       Plan    Plan  [place]- will need [facility] pre-cert    Patient/Family in Agreement with Plan  yes    Plan Comments  Spoke with patient and daughter at bedside.  They both confirm the plan is for patient to go to [place] @ [place].  Daughter asked about transport @ [place].  Explained that CCP will follow up after surgery to see how the patient is progressing and to help determine the safest way to transport the patient to rehab.  Transfer packet started and in cubbie. Carina Alvarez RN        Discharge Codes    No documentation.       Expected Discharge Date and Time     Expected Discharge Date Expected Discharge Time    Aug 6, 2021             Carina Alvarez RN

## 2021-08-03 NOTE — PLAN OF CARE
Goal Outcome Evaluation:  Plan of Care Reviewed With: patient        Progress: no change  Outcome Summary: Pt on 1L NC, pt c/o pain, treated with Morphine. Q2 turns. Rested well this shift. NPO after midnight for hip surgery today. VSS. Will continue to monitor.

## 2021-08-03 NOTE — ANESTHESIA PROCEDURE NOTES
Airway  Urgency: elective    Date/Time: 8/3/2021 12:50 PM  Airway not difficult    General Information and Staff    Patient location during procedure: OR  Anesthesiologist: Ray Morton MD  CRNA: Malini Alcantar CRNA    Indications and Patient Condition  Indications for airway management: airway protection    Preoxygenated: yes (pt pre-O2 with 100% O2)  Mask difficulty assessment: 2 - vent by mask + OA or adjuvant +/- NMBA (easy BMV )    Final Airway Details  Final airway type: endotracheal airway      Successful airway: ETT  Cuffed: yes   Successful intubation technique: direct laryngoscopy  Endotracheal tube insertion site: oral  Blade: Ariana  Blade size: 3  ETT size (mm): 6.5  Cormack-Lehane Classification: grade I - full view of glottis  Placement verified by: chest auscultation and capnometry   Cuff volume (mL): 7  Measured from: lips  ETT/EBT  to lips (cm): 21  Number of attempts at approach: 1  Assessment: lips, teeth, and gum same as pre-op and atraumatic intubation    Additional Comments  ATOETx1. No change in dentition.

## 2021-08-03 NOTE — PLAN OF CARE
Goal Outcome Evaluation:           Progress: improving  Outcome Summary: 86 y/o s/POD 0 R femur IM nailing. Upon admission pt drowsy, unable to ambulate. Pt assist x2 to bsc w/ wt-bearing as tolerated. Pt voiding in small amounts per pure wick. Upper bordered dsg reinforced and changed. Dsg intact currently. Neuro checks WNL. Dorsi/plantar flexion weak. On 2L O2 humidified. No C/O pain. C/O Nausea, fluids provided and then lowered to KVO, effective. Pt on Fluid restrictions, see order. VSS. Educated pt on falls safety. D/C plan in progress. Will CTM.

## 2021-08-04 LAB
ANION GAP SERPL CALCULATED.3IONS-SCNC: 12.1 MMOL/L (ref 5–15)
BASOPHILS # BLD AUTO: 0.02 10*3/MM3 (ref 0–0.2)
BASOPHILS NFR BLD AUTO: 0.2 % (ref 0–1.5)
BUN SERPL-MCNC: 18 MG/DL (ref 8–23)
BUN/CREAT SERPL: 26.1 (ref 7–25)
CALCIUM SPEC-SCNC: 9 MG/DL (ref 8.6–10.5)
CHLORIDE SERPL-SCNC: 97 MMOL/L (ref 98–107)
CO2 SERPL-SCNC: 21.9 MMOL/L (ref 22–29)
CREAT SERPL-MCNC: 0.69 MG/DL (ref 0.57–1)
DEPRECATED RDW RBC AUTO: 39.8 FL (ref 37–54)
EOSINOPHIL # BLD AUTO: 0 10*3/MM3 (ref 0–0.4)
EOSINOPHIL NFR BLD AUTO: 0 % (ref 0.3–6.2)
ERYTHROCYTE [DISTWIDTH] IN BLOOD BY AUTOMATED COUNT: 12.3 % (ref 12.3–15.4)
GFR SERPL CREATININE-BSD FRML MDRD: 81 ML/MIN/1.73
GLUCOSE SERPL-MCNC: 181 MG/DL (ref 65–99)
HCT VFR BLD AUTO: 30.8 % (ref 34–46.6)
HGB BLD-MCNC: 10 G/DL (ref 12–15.9)
IMM GRANULOCYTES # BLD AUTO: 0.12 10*3/MM3 (ref 0–0.05)
IMM GRANULOCYTES NFR BLD AUTO: 1.2 % (ref 0–0.5)
LYMPHOCYTES # BLD AUTO: 0.5 10*3/MM3 (ref 0.7–3.1)
LYMPHOCYTES NFR BLD AUTO: 5 % (ref 19.6–45.3)
MCH RBC QN AUTO: 29.3 PG (ref 26.6–33)
MCHC RBC AUTO-ENTMCNC: 32.5 G/DL (ref 31.5–35.7)
MCV RBC AUTO: 90.3 FL (ref 79–97)
MONOCYTES # BLD AUTO: 0.4 10*3/MM3 (ref 0.1–0.9)
MONOCYTES NFR BLD AUTO: 4 % (ref 5–12)
NEUTROPHILS NFR BLD AUTO: 89.6 % (ref 42.7–76)
NEUTROPHILS NFR BLD AUTO: 9.02 10*3/MM3 (ref 1.7–7)
NRBC BLD AUTO-RTO: 0 /100 WBC (ref 0–0.2)
PLATELET # BLD AUTO: 263 10*3/MM3 (ref 140–450)
PMV BLD AUTO: 9.3 FL (ref 6–12)
POTASSIUM SERPL-SCNC: 4.7 MMOL/L (ref 3.5–5.2)
RBC # BLD AUTO: 3.41 10*6/MM3 (ref 3.77–5.28)
SODIUM SERPL-SCNC: 131 MMOL/L (ref 136–145)
WBC # BLD AUTO: 10.06 10*3/MM3 (ref 3.4–10.8)

## 2021-08-04 PROCEDURE — 97162 PT EVAL MOD COMPLEX 30 MIN: CPT

## 2021-08-04 PROCEDURE — 85025 COMPLETE CBC W/AUTO DIFF WBC: CPT | Performed by: HOSPITALIST

## 2021-08-04 PROCEDURE — 97110 THERAPEUTIC EXERCISES: CPT

## 2021-08-04 PROCEDURE — 25010000003 CEFAZOLIN IN DEXTROSE 2-4 GM/100ML-% SOLUTION: Performed by: ORTHOPAEDIC SURGERY

## 2021-08-04 PROCEDURE — 80048 BASIC METABOLIC PNL TOTAL CA: CPT | Performed by: HOSPITALIST

## 2021-08-04 RX ORDER — HYDROCODONE BITARTRATE AND ACETAMINOPHEN 5; 325 MG/1; MG/1
1 TABLET ORAL EVERY 4 HOURS PRN
Status: DISCONTINUED | OUTPATIENT
Start: 2021-08-04 | End: 2021-08-06 | Stop reason: HOSPADM

## 2021-08-04 RX ORDER — HYDROCODONE BITARTRATE AND ACETAMINOPHEN 5; 325 MG/1; MG/1
2 TABLET ORAL EVERY 4 HOURS PRN
Status: DISCONTINUED | OUTPATIENT
Start: 2021-08-04 | End: 2021-08-06 | Stop reason: HOSPADM

## 2021-08-04 RX ADMIN — CARVEDILOL 6.25 MG: 6.25 TABLET, FILM COATED ORAL at 17:27

## 2021-08-04 RX ADMIN — SUCRALFATE 1 G: 1 TABLET ORAL at 12:03

## 2021-08-04 RX ADMIN — ASPIRIN 325 MG: 325 TABLET ORAL at 10:03

## 2021-08-04 RX ADMIN — CARVEDILOL 6.25 MG: 6.25 TABLET, FILM COATED ORAL at 10:03

## 2021-08-04 RX ADMIN — PANTOPRAZOLE SODIUM 40 MG: 40 TABLET, DELAYED RELEASE ORAL at 06:46

## 2021-08-04 RX ADMIN — GABAPENTIN 100 MG: 100 CAPSULE ORAL at 15:15

## 2021-08-04 RX ADMIN — VALSARTAN 160 MG: 160 TABLET, FILM COATED ORAL at 20:46

## 2021-08-04 RX ADMIN — HYDROCODONE BITARTRATE AND ACETAMINOPHEN 1 TABLET: 5; 325 TABLET ORAL at 15:15

## 2021-08-04 RX ADMIN — HYDROCODONE BITARTRATE AND ACETAMINOPHEN 1 TABLET: 5; 325 TABLET ORAL at 20:44

## 2021-08-04 RX ADMIN — AMLODIPINE BESYLATE 10 MG: 10 TABLET ORAL at 10:03

## 2021-08-04 RX ADMIN — ACETAMINOPHEN 650 MG: 325 TABLET, FILM COATED ORAL at 00:38

## 2021-08-04 RX ADMIN — LEVOTHYROXINE SODIUM 50 MCG: 0.05 TABLET ORAL at 05:39

## 2021-08-04 RX ADMIN — SUCRALFATE 1 G: 1 TABLET ORAL at 06:46

## 2021-08-04 RX ADMIN — FLUTICASONE PROPIONATE 1 SPRAY: 50 SPRAY, METERED NASAL at 10:03

## 2021-08-04 RX ADMIN — ACETAMINOPHEN 650 MG: 325 TABLET, FILM COATED ORAL at 05:39

## 2021-08-04 RX ADMIN — SODIUM CHLORIDE, PRESERVATIVE FREE 10 ML: 5 INJECTION INTRAVENOUS at 20:45

## 2021-08-04 RX ADMIN — CEFAZOLIN SODIUM 2 G: 2 INJECTION, SOLUTION INTRAVENOUS at 04:19

## 2021-08-04 RX ADMIN — Medication 1 CAPSULE: at 10:03

## 2021-08-04 RX ADMIN — HYDROCODONE BITARTRATE AND ACETAMINOPHEN 1 TABLET: 5; 325 TABLET ORAL at 10:03

## 2021-08-04 RX ADMIN — FLUOROMETHOLONE 1 DROP: 1 SOLUTION/ DROPS OPHTHALMIC at 10:03

## 2021-08-04 RX ADMIN — GABAPENTIN 100 MG: 100 CAPSULE ORAL at 10:03

## 2021-08-04 RX ADMIN — CEFAZOLIN SODIUM 2 G: 2 INJECTION, SOLUTION INTRAVENOUS at 14:33

## 2021-08-04 RX ADMIN — GABAPENTIN 100 MG: 100 CAPSULE ORAL at 20:44

## 2021-08-04 RX ADMIN — SUCRALFATE 1 G: 1 TABLET ORAL at 17:28

## 2021-08-04 RX ADMIN — SODIUM CHLORIDE, PRESERVATIVE FREE 10 ML: 5 INJECTION INTRAVENOUS at 10:03

## 2021-08-04 NOTE — PROGRESS NOTES
"DAILY PROGRESS NOTE  [facility]    Patient Identification:  Name: Geeta Butt  Age: 85 y.o.  Sex: female  :  1935  MRN: [id number]         Primary Care Physician: Grzegorz Nguyen MD      Subjective  Patient notes a discomfort in the very upper sternal area with deep breaths.  No shortness of air.    Objective:  General Appearance:  Comfortable, well-appearing, in no acute distress and not in pain.    Vital signs: (most recent): Blood pressure 121/50, pulse 70, temperature 97.8 °F (36.6 °C), temperature source Oral, resp. rate 16, height 154.9 cm (61\"), weight 68.4 kg (150 lb 14.4 oz), SpO2 92 %.    Lungs:  Normal effort and normal respiratory rate.  Breath sounds clear to auscultation.    Heart: Normal rate.  Regular rhythm.  (No JVD.)  Extremities: There is no dependent edema.    Neurological: Patient is alert.  (Oriented to person place.).    Skin:  Warm and dry.                Vital signs in last 24 hours:  Temp:  [96.9 °F (36.1 °C)-97.8 °F (36.6 °C)] 97.8 °F (36.6 °C)  Heart Rate:  [60-71] 70  Resp:  [12-16] 16  BP: (111-141)/(50-66) 121/50    Intake/Output:    Intake/Output Summary (Last 24 hours) at 2021 1353  Last data filed at 2021 1138  Gross per 24 hour   Intake 1174 ml   Output 800 ml   Net 374 ml         Results from last 7 days   Lab Units 21  0452 21  0612 21  1244 21  2140   WBC 10*3/mm3 10.06 13.41* 14.09* 17.78* 20.86*   HEMOGLOBIN g/dL 10.0* 9.8* 10.7* 11.7* 13.8   PLATELETS 10*3/mm3 263 254 249 279 325     Results from last 7 days   Lab Units 21  0452 21  0612 21  1244 21  2140   SODIUM mmol/L 131* 129* 131* 131* 132*   POTASSIUM mmol/L 4.7 4.7 4.9 3.4* 3.8   CHLORIDE mmol/L 97* 97* 103 99 94*   CO2 mmol/L 21.9* 23.3 21.2* 21.6* 23.1   BUN mg/dL 18 17 18 16 14   CREATININE mg/dL 0.69 0.48* 0.68 0.67 0.73   GLUCOSE mg/dL 181* 104* 117* 114* 124*   Estimated Creatinine Clearance: 46.3 " mL/min (by C-G formula based on SCr of 0.69 mg/dL).  Results from last 7 days   Lab Units 08/04/21  0452 08/03/21  0612 08/02/21  1244 08/01/21  0628 07/31/21  2140   CALCIUM mg/dL 9.0 8.9 8.5* 9.1 9.9   ALBUMIN g/dL  --   --   --   --  4.50     Results from last 7 days   Lab Units 07/31/21  2140   ALBUMIN g/dL 4.50   BILIRUBIN mg/dL 0.6   ALK PHOS U/L 83   AST (SGOT) U/L 34*   ALT (SGPT) U/L 27       Assessment:  Closed fracture right hip:  Status post intramedullary nailing 8/3/2021.   Leukocytosis: Combination of recent epidural with steroids and stress leukocytosis.  Improved today.  No evidence of bacterial infection on evaluation.    Resolved   Hypertension: Controlled.  Hypokalemia: Oral replacement.  Hyponatremia/SIADH: Likely at least partially secondary to Lexapro.  Lexapro on hold.  Moderate fluid restriction.  Improved today.  Likely be able to let up in the fluid restriction tomorrow.  Discussed pros and cons of resuming Lexapro.  Hypothyroidism: Continue levothyroxine.  TSH within normal limits.  Continue home meds.  Repeat vitamin D levels normal: Vitamin D normal on recheck.  Atypical chest discomfort: Monitor.  Clinically not consistent with cardiac origin.    VTE Prophylaxis - SCDs/ASA.  Code Status - Full code.      Plan:  Please see above.    Montez Robertson MD  8/4/2021  13:53 EDT

## 2021-08-04 NOTE — THERAPY EVALUATION
Patient Name: Geeta Butt  : 1935    MRN: [id number]                              Today's Date: 2021       Admit Date: 2021    Visit Dx:     ICD-10-CM ICD-9-CM   1. Closed fracture of right hip, initial encounter (CMS/HCC)  S72.001A 820.8   2. Traumatic rhabdomyolysis, initial encounter (CMS/HCC)  T79.6XXA 958.6   3. Acute non-recurrent sinusitis, unspecified location  J01.90 461.9     Patient Active Problem List   Diagnosis   • Essential hypertension   • Hypothyroidism   • Black stool   • Diarrhea   • Nausea & vomiting   • RUQ pain   • Leukocytosis   • UTI (urinary tract infection)   • Duodenitis   • Foot pain, bilateral   • Malignant neoplasm of lower-inner quadrant of left breast in female, estrogen receptor positive (CMS/[facility])   • Iron deficiency anemia   • Adverse effect of iron   • GI bleed   • GIULIA (acute kidney injury) (CMS/HCC)   • Gastrointestinal hemorrhage associated with duodenitis   • Hematochezia   • Duodenal ulcer   • Long-term use of high-risk medication   • Acute left-sided low back pain with left-sided sciatica   • DDD (degenerative disc disease), lumbosacral   • Hyperglycemia   • GERD without esophagitis   • Hyponatremia   • Hyperlipidemia   • Impaired fasting glucose   • Depressive disorder   • Chronic kidney disease   • Closed fracture of right hip (CMS/[facility])     Past Medical History:   Diagnosis Date   • Anesthesia complication     ASPIRATION INTO AIRWAY WITH TRACH PRESENT IN PAST (WITH ORAL CANCER SURGERY(   • Arthritis    • Aspiration into airway     post anesthesia   • Breast cancer (CMS/[facility])    • Cancer (CMS/HCC)     mouth cancer    • Chronic kidney disease    • CKD (chronic kidney disease)     PT STATES STAGE 3   • Depression    • Diverticular disease    • Gastric ulcer     AND DUODENAL   • GERD (gastroesophageal reflux disease)    • Hearing loss     BILAT AIDES   • History of colon polyps    • History of depression    • History of GI bleed    • Hypertension    •  Hypothyroidism    • Peptic ulceration    • PONV (postoperative nausea and vomiting)    • Poor vision     RIGHT EYE, HAS PERIPHERAL VISION    • Stroke (CMS/HCC) 2000     mild weakness on left, partial sight loss on right      Past Surgical History:   Procedure Laterality Date   • ABDOMINAL SURGERY     • APPENDECTOMY     • BLADDER REPAIR      AND RECTOCELE   • BREAST BIOPSY     • BREAST CYST ASPIRATION     • BREAST LUMPECTOMY WITH SENTINEL NODE BIOPSY Left 3/19/2018    Procedure: BREAST LUMPECTOMY WITH SENTINEL NODE BIOPSY AND NEEDLE LOCALIZATION;  Surgeon: Myles Soliman MD;  [place] [facility] [place] [facility] [place]  Service: General   • CHOLECYSTECTOMY     • COLONOSCOPY  2013   • COLONOSCOPY N/A 2/16/2018    Procedure: COLONOSCOPY into cecum and T.I. with biopsies;  Surgeon: Augustine Gallego MD;  [facility];  Service:    • ENDOSCOPY N/A 10/17/2017    Procedure: ESOPHAGOGASTRODUODENOSCOPY WITH COLD BIOPSIES;  Surgeon: Augustine Gallego MD;  [facility];  Service:    • ENDOSCOPY N/A 2/16/2018    Procedure: ESOPHAGOGASTRODUODENOSCOPY with biopsies and #54 [place] DILATATION;  Surgeon: Augustine Gallego MD;  [facility];  Service:    • ENDOSCOPY N/A 3/19/2020    Procedure: ESOPHAGOGASTRODUODENOSCOPY with biopsies;  Surgeon: Augustine Gallego MD;  Location: [facility] ENDOSCOPY;  Service: Gastroenterology;  Laterality: N/A;  PRE- MELENA, EPIGASTRIC PAIN  POST- eerosive gastritis, duodenal ulcer, hiatal hernia   • ENDOSCOPY N/A 7/29/2020    Procedure: ESOPHAGOGASTRODUODENOSCOPY WITH DUODENAL ASPIRATE AND COLD BIOPSIES;  Surgeon: Augustine Gallego MD;  Location: [facility] ENDOSCOPY;  Service: Gastroenterology;  Laterality: N/A;  PRE: HX ULCER DISEASE  POST: GASTRITIS, HEALED ULCER   • EYE SURGERY Left 2014    3-4 years, cornea replacement    • HYSTERECTOMY     • MOUTH SURGERY  2000    UNDER TONGUE AND LEFT FLOOR OF MOUTH FOR CA   • PARATHYROIDECTOMY      PER PT   • SIGMOIDOSCOPY N/A 7/29/2020     Procedure: FLEXIBLE SIGMOIDOSCOPY TO DESCENDING COLON WITH COLD BIOPSIES;  Surgeon: Augustine Gallego MD;  Location: [facility] ENDOSCOPY;  Service: Gastroenterology;  Laterality: N/A;  PRE: RECTAL BLEEDING  POST: DIVERTICULOSIS, HEMORRHOIDS, MINIMAL PROCTITIS   • SINUS SURGERY     • SKIN BIOPSY     • THYROID SURGERY     • VARICOSE VEIN SURGERY       General Information     Row Name 08/04/21 1231          Physical Therapy Time and Intention    Document Type  evaluation  -DB     Mode of Treatment  individual therapy;physical therapy  -DB     Row Name 08/04/21 1231          General Information    Patient Profile Reviewed  yes  -DB     Prior Level of Function  independent:  -DB     Existing Precautions/Restrictions  fall  -DB     Row Name 08/04/21 1231          Living Environment    Lives With  alone  -DB     Row Name 08/04/21 1231          Home Main Entrance    Number of Stairs, Main Entrance  five  -DB     Stair Railings, Main Entrance  railings safe and in good condition  -DB     Row Name 08/04/21 1231          Stairs Within Home, Primary    Number of Stairs, Within Home, Primary  other (see comments) 14 MARIE  -DB     Row Name 08/04/21 1231          Cognition    Orientation Status (Cognition)  oriented x 3  -DB     Row Name 08/04/21 1231          Safety Issues, Functional Mobility    Impairments Affecting Function (Mobility)  balance;pain;strength;endurance/activity tolerance  -DB       User Key  (r) = Recorded By, (t) = Taken By, (c) = Cosigned By    Initials Name Provider Type    DB Berenice Rice PT Physical Therapist        Mobility     Row Name 08/04/21 1235          Bed Mobility    Bed Mobility  supine-sit;sit-supine  -DB     Supine-Sit [place] (Bed Mobility)  minimum assist (75% patient effort);verbal cues  -DB     Sit-Supine [place] (Bed Mobility)  moderate assist (50% patient effort);verbal cues  -DB     Assistive Device (Bed Mobility)  bed rails;head of bed elevated  -DB     Row Name 08/04/21 1230           Sit-Stand Transfer    Sit-Stand [place] (Transfers)  maximum assist (25% patient effort);verbal cues  -DB     Assistive Device (Sit-Stand Transfers)  walker, front-wheeled  -DB       User Key  (r) = Recorded By, (t) = Taken By, (c) = Cosigned By    Initials Name Provider Type    DB Berenice Rice PT Physical Therapist        Obj/Interventions     Row Name 08/04/21 1247          Range of Motion Comprehensive    General Range of Motion  no range of motion deficits identified  -DB     Row Name 08/04/21 1247          Strength Comprehensive (MMT)    Comment, General Manual Muscle Testing (MMT) Assessment  R hip flex 3-/5. all other LE strength >3/5 MMT  -DB     Row Name 08/04/21 1247          Motor Skills    Therapeutic Exercise  other (see comments) seated AP, LAQ, MIP  -DB     Row Name 08/04/21 1247          Balance    Balance Assessment  sitting static balance;sitting dynamic balance;standing static balance  -DB     Static Sitting Balance  WFL  -DB     Dynamic Sitting Balance  WFL  -DB     Static Standing Balance  mild impairment  -DB     Balance Interventions  sitting;standing;sit to stand  -DB       User Key  (r) = Recorded By, (t) = Taken By, (c) = Cosigned By    Initials Name Provider Type    DB Berenice Rice, VITO Physical Therapist        Goals/Plan     Row Name 08/04/21 1255          Bed Mobility Goal 1 (PT)    Activity/Assistive Device (Bed Mobility Goal 1, PT)  bed mobility activities, all  -DB     [place] Level/Cues Needed (Bed Mobility Goal 1, PT)  contact guard assist  -DB     Time Frame (Bed Mobility Goal 1, PT)  1 week  -DB     Row Name 08/04/21 1255          Transfer Goal 1 (PT)    Activity/Assistive Device (Transfer Goal 1, PT)  transfers, all  -DB     [place] Level/Cues Needed (Transfer Goal 1, PT)  minimum assist (75% or more patient effort)  -DB     Time Frame (Transfer Goal 1, PT)  1 week  -DB     Row Name 08/04/21 1255          Gait Training Goal 1 (PT)     Activity/Assistive Device (Gait Training Goal 1, PT)  gait (walking locomotion)  -DB     [place] Level (Gait Training Goal 1, PT)  minimum assist (75% or more patient effort)  -DB     Time Frame (Gait Training Goal 1, PT)  1 week  -DB       User Key  (r) = Recorded By, (t) = Taken By, (c) = Cosigned By    Initials Name Provider Type    Berenice Almaguer PT Physical Therapist        Clinical Impression     Row Name 08/04/21 1249          Plan of Care Review    Plan of Care Reviewed With  patient  -DB     Outcome Summary  Pt arrives to [facility] following a fall at home, s/p R femur IM nailing on 8/3. Pt agreeable to PT today. Pt able to perform sup<>sit with Joaquin and VC's. Pt seated EOB and performed exercises. Pt demo'd dec'd strength on R compared to L. Pt was able to stand this date at  with max A. Worked on weight shift, but unable to takes steps. Pt returned to bed with modA. Pt demo's decreased strength, endurance, and inc'd pain and will benefit from skilled PT. Recommending D/C to SNF/IRF.  -DB     Row Name 08/04/21 1249          Therapy Assessment/Plan (PT)    Rehab Potential (PT)  good, to achieve stated therapy goals  -DB     Criteria for Skilled Interventions Met (PT)  yes  -DB     Row Name 08/04/21 1249          Vital Signs    O2 Delivery Pre Treatment  supplemental O2  -DB     O2 Delivery Intra Treatment  supplemental O2  -DB     O2 Delivery Post Treatment  supplemental O2  -DB     Pre Patient Position  Supine  -DB     Intra Patient Position  Sitting  -DB     Post Patient Position  Supine  -DB     Row Name 08/04/21 1249          Positioning and Restraints    Pre-Treatment Position  in bed  -DB     Post Treatment Position  bed  -DB     In Bed  supine;call light within reach;encouraged to call for assist;exit alarm on  -DB       User Key  (r) = Recorded By, (t) = Taken By, (c) = Cosigned By    Initials Name Provider Type    Berenice Almaguer PT Physical Therapist        Outcome Measures     Row  Name 08/04/21 1255          How much help from another person do you currently need...    Turning from your back to your side while in flat bed without using bedrails?  2  -DB     Moving from lying on back to sitting on the side of a flat bed without bedrails?  2  -DB     Moving to and from a bed to a chair (including a wheelchair)?  1  -DB     Standing up from a chair using your arms (e.g., wheelchair, bedside chair)?  2  -DB     Climbing 3-5 steps with a railing?  1  -DB     To walk in hospital room?  1  -DB     AM-PAC 6 Clicks Score (PT)  9  -DB     Row Name 08/04/21 1255          Functional Assessment    Outcome Measure Options  AM-PAC 6 Clicks Basic Mobility (PT)  -DB       User Key  (r) = Recorded By, (t) = Taken By, (c) = Cosigned By    Initials Name Provider Type    DB Berenice Rice, PT Physical Therapist                       Physical Therapy Education                 Title: PT OT SLP Therapies (Done)     Topic: Physical Therapy (Done)     Point: Mobility training (Done)     Learning Progress Summary           Patient Acceptance, E, VU by DB at 8/4/2021 1256                   Point: Home exercise program (Done)     Learning Progress Summary           Patient Acceptance, E, VU by DB at 8/4/2021 1256                   Point: Body mechanics (Done)     Learning Progress Summary           Patient Acceptance, E, VU by DB at 8/4/2021 1256                   Point: Precautions (Done)     Learning Progress Summary           Patient Acceptance, E, VU by DB at 8/4/2021 1256                               User Key     Initials Effective Dates Name Provider Type Discipline    DB 06/16/21 -  Berenice Rice PT Physical Therapist PT              PT Recommendation and Plan  Planned Therapy Interventions (PT): balance training, bed mobility training, gait training, home exercise program, manual therapy techniques, neuromuscular re-education, patient/family education, strengthening, stair training, transfer training  Plan  of Care Reviewed With: patient  Outcome Summary: Pt arrives to [facility] following a fall at home, s/p R femur IM nailing on 8/3. Pt agreeable to PT today. Pt able to perform sup<>sit with Joaquin and VC's. Pt seated EOB and performed exercises. Pt demo'd dec'd strength on R compared to L. Pt was able to stand this date at  with max A. Worked on weight shift, but unable to takes steps. Pt returned to bed with modA. Pt demo's decreased strength, endurance, and inc'd pain and will benefit from skilled PT. Recommending D/C to SNF/IRF.     Time Calculation:   PT Charges     Row Name 08/04/21 1256             Time Calculation    Start Time  0902  -DB      Stop Time  0934  -DB      Time Calculation (min)  32 min  -DB      PT Received On  08/04/21  -DB      PT - Next Appointment  08/05/21  -DB      PT Goal Re-Cert Due Date  08/11/21  -DB         Time Calculation- PT    Total Timed Code Minutes- PT  23 minute(s)  -DB        User Key  (r) = Recorded By, (t) = Taken By, (c) = Cosigned By    Initials Name Provider Type    DB Berenice Rice, PT Physical Therapist        Therapy Charges for Today     Code Description Service Date Service Provider Modifiers Qty    38155258774 HC PT EVAL MOD COMPLEXITY 2 8/4/2021 Berenice Rice, PT GP 1    27064307477 HC PT THER PROC EA 15 MIN 8/4/2021 Berenice Rice, PT GP 2          PT G-Codes  Outcome Measure Options: AM-PAC 6 Clicks Basic Mobility (PT)  AM-PAC 6 Clicks Score (PT): 9    Berenice Rice PT  8/4/2021

## 2021-08-04 NOTE — PLAN OF CARE
Goal Outcome Evaluation:  Plan of Care Reviewed With: patient           Outcome Summary: Pt arrives to [facility] following a fall at home, s/p R femur IM nailing on 8/3. Pt agreeable to PT today. Pt able to perform sup<>sit with Joaquin and VC's. Pt seated EOB and performed exercises. Pt demo'd dec'd strength on R compared to L. Pt was able to stand this date at  with max A. Worked on weight shift, but unable to takes steps. Pt returned to bed with modA. Pt demo's decreased strength, endurance, and inc'd pain and will benefit from skilled PT. Recommending D/C to SNF/IRF.

## 2021-08-04 NOTE — PROGRESS NOTES
"This patient is postop day 1 hip nailing.  Overall doing well.  Minimal complaints of pain at rest.  Has not yet started physical therapy.  Aspirin for DVT prophylaxis.  Hemoglobin stable.  Discharge to SNF when appropriate.  Follow-up in the office in 3 weeks for x-ray and wound check    R \"Casey\" Mario GRULLON MD  Orthopaedic Surgery  [facility]  [phone] - [place] Office  [phone] - [place] Office    "

## 2021-08-05 LAB
ANION GAP SERPL CALCULATED.3IONS-SCNC: 12.2 MMOL/L (ref 5–15)
BACTERIA SPEC AEROBE CULT: NORMAL
BACTERIA SPEC AEROBE CULT: NORMAL
BUN SERPL-MCNC: 23 MG/DL (ref 8–23)
BUN/CREAT SERPL: 32.9 (ref 7–25)
CALCIUM SPEC-SCNC: 9.4 MG/DL (ref 8.6–10.5)
CHLORIDE SERPL-SCNC: 91 MMOL/L (ref 98–107)
CO2 SERPL-SCNC: 25.8 MMOL/L (ref 22–29)
CREAT SERPL-MCNC: 0.7 MG/DL (ref 0.57–1)
GFR SERPL CREATININE-BSD FRML MDRD: 80 ML/MIN/1.73
GLUCOSE SERPL-MCNC: 106 MG/DL (ref 65–99)
POTASSIUM SERPL-SCNC: 5.2 MMOL/L (ref 3.5–5.2)
SODIUM SERPL-SCNC: 129 MMOL/L (ref 136–145)

## 2021-08-05 PROCEDURE — 63710000001 ONDANSETRON PER 8 MG: Performed by: ORTHOPAEDIC SURGERY

## 2021-08-05 PROCEDURE — 97110 THERAPEUTIC EXERCISES: CPT

## 2021-08-05 PROCEDURE — 80048 BASIC METABOLIC PNL TOTAL CA: CPT | Performed by: HOSPITALIST

## 2021-08-05 RX ADMIN — Medication 1 CAPSULE: at 08:24

## 2021-08-05 RX ADMIN — SUCRALFATE 1 G: 1 TABLET ORAL at 06:25

## 2021-08-05 RX ADMIN — SODIUM CHLORIDE, PRESERVATIVE FREE 10 ML: 5 INJECTION INTRAVENOUS at 21:51

## 2021-08-05 RX ADMIN — FLUOROMETHOLONE 1 DROP: 1 SOLUTION/ DROPS OPHTHALMIC at 10:16

## 2021-08-05 RX ADMIN — GABAPENTIN 100 MG: 100 CAPSULE ORAL at 08:23

## 2021-08-05 RX ADMIN — HYDROCODONE BITARTRATE AND ACETAMINOPHEN 2 TABLET: 5; 325 TABLET ORAL at 08:24

## 2021-08-05 RX ADMIN — SUCRALFATE 1 G: 1 TABLET ORAL at 11:18

## 2021-08-05 RX ADMIN — CARVEDILOL 6.25 MG: 6.25 TABLET, FILM COATED ORAL at 08:24

## 2021-08-05 RX ADMIN — GABAPENTIN 100 MG: 100 CAPSULE ORAL at 16:35

## 2021-08-05 RX ADMIN — ONDANSETRON HYDROCHLORIDE 4 MG: 4 TABLET, FILM COATED ORAL at 15:07

## 2021-08-05 RX ADMIN — HYDROCODONE BITARTRATE AND ACETAMINOPHEN 1 TABLET: 5; 325 TABLET ORAL at 16:35

## 2021-08-05 RX ADMIN — GABAPENTIN 100 MG: 100 CAPSULE ORAL at 21:50

## 2021-08-05 RX ADMIN — CARVEDILOL 6.25 MG: 6.25 TABLET, FILM COATED ORAL at 16:35

## 2021-08-05 RX ADMIN — AMLODIPINE BESYLATE 10 MG: 10 TABLET ORAL at 08:24

## 2021-08-05 RX ADMIN — VALSARTAN 160 MG: 160 TABLET, FILM COATED ORAL at 21:50

## 2021-08-05 RX ADMIN — HYDROCODONE BITARTRATE AND ACETAMINOPHEN 1 TABLET: 5; 325 TABLET ORAL at 21:54

## 2021-08-05 RX ADMIN — ASPIRIN 325 MG: 325 TABLET ORAL at 08:24

## 2021-08-05 RX ADMIN — SODIUM CHLORIDE, PRESERVATIVE FREE 10 ML: 5 INJECTION INTRAVENOUS at 08:24

## 2021-08-05 RX ADMIN — PANTOPRAZOLE SODIUM 40 MG: 40 TABLET, DELAYED RELEASE ORAL at 06:25

## 2021-08-05 RX ADMIN — SUCRALFATE 1 G: 1 TABLET ORAL at 16:35

## 2021-08-05 RX ADMIN — LEVOTHYROXINE SODIUM 50 MCG: 0.05 TABLET ORAL at 06:25

## 2021-08-05 RX ADMIN — HYDROCODONE BITARTRATE AND ACETAMINOPHEN 1 TABLET: 5; 325 TABLET ORAL at 04:35

## 2021-08-05 RX ADMIN — HYDROCODONE BITARTRATE AND ACETAMINOPHEN 2 TABLET: 5; 325 TABLET ORAL at 12:22

## 2021-08-05 NOTE — THERAPY TREATMENT NOTE
Patient Name: Geeta Butt  : 1935    MRN: [id number]                              Today's Date: 2021       Admit Date: 2021    Visit Dx:     ICD-10-CM ICD-9-CM   1. Closed fracture of right hip, initial encounter (CMS/HCC)  S72.001A 820.8   2. Traumatic rhabdomyolysis, initial encounter (CMS/HCC)  T79.6XXA 958.6   3. Acute non-recurrent sinusitis, unspecified location  J01.90 461.9     Patient Active Problem List   Diagnosis   • Essential hypertension   • Hypothyroidism   • Black stool   • Diarrhea   • Nausea & vomiting   • RUQ pain   • Leukocytosis   • UTI (urinary tract infection)   • Duodenitis   • Foot pain, bilateral   • Malignant neoplasm of lower-inner quadrant of left breast in female, estrogen receptor positive (CMS/[facility])   • Iron deficiency anemia   • Adverse effect of iron   • GI bleed   • GIULIA (acute kidney injury) (CMS/HCC)   • Gastrointestinal hemorrhage associated with duodenitis   • Hematochezia   • Duodenal ulcer   • Long-term use of high-risk medication   • Acute left-sided low back pain with left-sided sciatica   • DDD (degenerative disc disease), lumbosacral   • Hyperglycemia   • GERD without esophagitis   • Hyponatremia   • Hyperlipidemia   • Impaired fasting glucose   • Depressive disorder   • Chronic kidney disease   • Closed fracture of right hip (CMS/[facility])     Past Medical History:   Diagnosis Date   • Anesthesia complication     ASPIRATION INTO AIRWAY WITH TRACH PRESENT IN PAST (WITH ORAL CANCER SURGERY(   • Arthritis    • Aspiration into airway     post anesthesia   • Breast cancer (CMS/[facility])    • Cancer (CMS/HCC)     mouth cancer    • Chronic kidney disease    • CKD (chronic kidney disease)     PT STATES STAGE 3   • Depression    • Diverticular disease    • Gastric ulcer     AND DUODENAL   • GERD (gastroesophageal reflux disease)    • Hearing loss     BILAT AIDES   • History of colon polyps    • History of depression    • History of GI bleed    • Hypertension    •  Hypothyroidism    • Peptic ulceration    • PONV (postoperative nausea and vomiting)    • Poor vision     RIGHT EYE, HAS PERIPHERAL VISION    • Stroke (CMS/HCC) 2000     mild weakness on left, partial sight loss on right      Past Surgical History:   Procedure Laterality Date   • ABDOMINAL SURGERY     • APPENDECTOMY     • BLADDER REPAIR      AND RECTOCELE   • BREAST BIOPSY     • BREAST CYST ASPIRATION     • BREAST LUMPECTOMY WITH SENTINEL NODE BIOPSY Left 3/19/2018    Procedure: BREAST LUMPECTOMY WITH SENTINEL NODE BIOPSY AND NEEDLE LOCALIZATION;  Surgeon: Myles Soliman MD;  [facility] [place];  Service: General   • CHOLECYSTECTOMY     • COLONOSCOPY  2013   • COLONOSCOPY N/A 2/16/2018    Procedure: COLONOSCOPY into cecum and T.I. with biopsies;  Surgeon: Augustine Gallego MD;  [facility];  Service:    • ENDOSCOPY N/A 10/17/2017    Procedure: ESOPHAGOGASTRODUODENOSCOPY WITH COLD BIOPSIES;  Surgeon: Augustine Gallego MD;  [facility];  Service:    • ENDOSCOPY N/A 2/16/2018    Procedure: ESOPHAGOGASTRODUODENOSCOPY with biopsies and #54 [address] DILATATION;  Surgeon: Aguustine Gallego MD;  [facility];  Service:    • ENDOSCOPY N/A 3/19/2020    Procedure: ESOPHAGOGASTRODUODENOSCOPY with biopsies;  Surgeon: Augustine Gallego MD;  Location: [facility] ENDOSCOPY;  Service: Gastroenterology;  Laterality: N/A;  PRE- MELENA, EPIGASTRIC PAIN  POST- eerosive gastritis, duodenal ulcer, hiatal hernia   • ENDOSCOPY N/A 7/29/2020    Procedure: ESOPHAGOGASTRODUODENOSCOPY WITH DUODENAL ASPIRATE AND COLD BIOPSIES;  Surgeon: Augustine Gallego MD;  Location: [facility] ENDOSCOPY;  Service: Gastroenterology;  Laterality: N/A;  PRE: HX ULCER DISEASE  POST: GASTRITIS, HEALED ULCER   • EYE SURGERY Left 2014    3-4 years, cornea replacement    • HYSTERECTOMY     • MOUTH SURGERY  2000    UNDER TONGUE AND LEFT FLOOR OF MOUTH FOR CA   • PARATHYROIDECTOMY      PER PT   • SIGMOIDOSCOPY N/A 7/29/2020     Procedure: FLEXIBLE SIGMOIDOSCOPY TO DESCENDING COLON WITH COLD BIOPSIES;  Surgeon: Augustine Gallego MD;  Location: [facility] ENDOSCOPY;  Service: Gastroenterology;  Laterality: N/A;  PRE: RECTAL BLEEDING  POST: DIVERTICULOSIS, HEMORRHOIDS, MINIMAL PROCTITIS   • SINUS SURGERY     • SKIN BIOPSY     • THYROID SURGERY     • VARICOSE VEIN SURGERY       General Information     Row Name 08/05/21 1603          Physical Therapy Time and Intention    Document Type  therapy note (daily note)  -DB     Mode of Treatment  individual therapy;physical therapy  -DB     Row Name 08/05/21 1603          General Information    Patient Profile Reviewed  yes  -DB     Existing Precautions/Restrictions  fall  -DB       User Key  (r) = Recorded By, (t) = Taken By, (c) = Cosigned By    Initials Name Provider Type    DB Berenice Rice PT Physical Therapist        Mobility     Row Name 08/05/21 1603          Bed Mobility    Bed Mobility  supine-sit;sit-supine  -DB     Supine-Sit [place] (Bed Mobility)  minimum assist (75% patient effort);2 person assist;verbal cues  -DB     Assistive Device (Bed Mobility)  bed rails;head of bed elevated  -DB     Row Name 08/05/21 1603          Sit-Stand Transfer    Sit-Stand [place] (Transfers)  maximum assist (25% patient effort);2 person assist;verbal cues  -DB     Assistive Device (Sit-Stand Transfers)  walker, front-wheeled  -DB     Row Name 08/05/21 1603          Gait/Stairs (Locomotion)    [place] Level (Gait)  maximum assist (25% patient effort);2 person assist;verbal cues;nonverbal cues (demo/gesture)  -DB     Assistive Device (Gait)  walker, front-wheeled  -DB     Distance in Feet (Gait)  3'  -DB     Deviations/Abnormal Patterns (Gait)  festinating/shuffling;weight shifting decreased;stride length decreased;antalgic;noam decreased;gait speed decreased  -DB     Bilateral Gait Deviations  forward flexed posture;heel strike decreased  -DB     Left Sided Gait Deviations  knee buckling,  right side  -DB       User Key  (r) = Recorded By, (t) = Taken By, (c) = Cosigned By    Initials Name Provider Type    DB Berenice Rice PT Physical Therapist        Obj/Interventions     Row Name 08/05/21 1603          Balance    Balance Assessment  sitting static balance;sitting dynamic balance;standing static balance;standing dynamic balance  -DB     Static Sitting Balance  WFL  -DB     Dynamic Sitting Balance  WFL  -DB     Static Standing Balance  severe impairment  -DB     Dynamic Standing Balance  severe impairment  -DB     Balance Interventions  sitting;standing;sit to stand  -DB       User Key  (r) = Recorded By, (t) = Taken By, (c) = Cosigned By    Initials Name Provider Type    DB Berenice Rice, VITO Physical Therapist        Goals/Plan    No documentation.       Clinical Impression     Row Name 08/05/21 1601          Plan of Care Review    Plan of Care Reviewed With  patient  -DB     Progress  no change  -DB     Outcome Summary  Pt c/o significantly more pain today. Joaquin x2 for sup<>sit. Pt became nausous upon sitting EOB and RN was notified. After sitting for a few minutes, pt was able to continue to participate in therapy. STS maxA x2 to RW. Pt had difficulty standing upright and had posterior lean. Pivot transfer over to the chair was performed. Pt had difficulty with weight shift and R knee buckeling. Inc'd time to complete and maxA x2. Pt seated in chair at end of session. Continues to benefit from skilled PT.  -DB     Row Name 08/05/21 1609          Vital Signs    O2 Delivery Pre Treatment  room air  -DB     O2 Delivery Intra Treatment  room air  -DB     O2 Delivery Post Treatment  room air  -DB     Pre Patient Position  Supine  -DB     Intra Patient Position  Standing  -DB     Post Patient Position  Sitting  -DB     Row Name 08/05/21 1600          Positioning and Restraints    Pre-Treatment Position  in bed  -DB     Post Treatment Position  chair  -DB     In Chair  reclined;sitting;call light  within reach;encouraged to call for assist;exit alarm on;with family/caregiver  -DB       User Key  (r) = Recorded By, (t) = Taken By, (c) = Cosigned By    Initials Name Provider Type    Berenice Almaguer PT Physical Therapist        Outcome Measures     Row Name 08/05/21 1612          How much help from another person do you currently need...    Turning from your back to your side while in flat bed without using bedrails?  2  -DB     Moving from lying on back to sitting on the side of a flat bed without bedrails?  2  -DB     Moving to and from a bed to a chair (including a wheelchair)?  2  -DB     Standing up from a chair using your arms (e.g., wheelchair, bedside chair)?  2  -DB     Climbing 3-5 steps with a railing?  1  -DB     To walk in hospital room?  1  -DB     AM-PAC 6 Clicks Score (PT)  10  -DB     Row Name 08/05/21 1612          Functional Assessment    Outcome Measure Options  AM-PAC 6 Clicks Basic Mobility (PT)  -DB       User Key  (r) = Recorded By, (t) = Taken By, (c) = Cosigned By    Initials Name Provider Type    Berenice Almaguer PT Physical Therapist                       Physical Therapy Education                 Title: PT OT SLP Therapies (Done)     Topic: Physical Therapy (Done)     Point: Mobility training (Done)     Learning Progress Summary           Patient Acceptance, E, VU by DB at 8/5/2021 1611    Acceptance, E, VU by DB at 8/4/2021 1256                   Point: Home exercise program (Done)     Learning Progress Summary           Patient Acceptance, E, VU by DB at 8/5/2021 1611    Acceptance, E, VU by DB at 8/4/2021 1256                   Point: Body mechanics (Done)     Learning Progress Summary           Patient Acceptance, E, VU by DB at 8/5/2021 1611    Acceptance, E, VU by DB at 8/4/2021 1256                   Point: Precautions (Done)     Learning Progress Summary           Patient Acceptance, E, VU by DB at 8/5/2021 1611    Acceptance, E, VU by DB at 8/4/2021 1256                                User Key     Initials Effective Dates Name Provider Type Discipline    DB 06/16/21 -  Berenice Rice PT Physical Therapist PT              PT Recommendation and Plan  Planned Therapy Interventions (PT): balance training, bed mobility training, gait training, home exercise program, manual therapy techniques, neuromuscular re-education, patient/family education, strengthening, stair training, transfer training  Plan of Care Reviewed With: patient  Progress: no change  Outcome Summary: Pt c/o significantly more pain today. Joaquin x2 for sup<>sit. Pt became nausous upon sitting EOB and RN was notified. After sitting for a few minutes, pt was able to continue to participate in therapy. STS maxA x2 to RW. Pt had difficulty standing upright and had posterior lean. Pivot transfer over to the chair was performed. Pt had difficulty with weight shift and R knee buckeling. Inc'd time to complete and maxA x2. Pt seated in chair at end of session. Continues to benefit from skilled PT.     Time Calculation:   PT Charges     Row Name 08/05/21 1613             Time Calculation    Start Time  1446  -DB      Stop Time  1517  -DB      Time Calculation (min)  31 min  -DB      PT Received On  08/05/21  -DB      PT - Next Appointment  08/06/21  -DB         Time Calculation- PT    Total Timed Code Minutes- PT  31 minute(s)  -DB        User Key  (r) = Recorded By, (t) = Taken By, (c) = Cosigned By    Initials Name Provider Type    DB Berenice Rice, VITO Physical Therapist        Therapy Charges for Today     Code Description Service Date Service Provider Modifiers Qty    49877262678 HC PT EVAL MOD COMPLEXITY 2 8/4/2021 Berenice Rice, PT GP 1    02277880251 HC PT THER PROC EA 15 MIN 8/4/2021 Berenice Rice, PT GP 2    57944216752 HC PT THER PROC EA 15 MIN 8/5/2021 Berenice Rice, PT GP 2          PT G-Codes  Outcome Measure Options: AM-PAC 6 Clicks Basic Mobility (PT)  AM-PAC 6 Clicks Score (PT): 10    Berenice  Dwayne, PT  8/5/2021

## 2021-08-05 NOTE — PLAN OF CARE
Goal Outcome Evaluation:  Plan of Care Reviewed With: patient        Progress: improving  Outcome Summary: Pt AxO x4. VSS. NVI. Dressings on R. hip have scant, dried drainage, and intact. Pain controlled on this shift with PO pain meds. Pt worked with PT/OT, able to walk to chair today. Up with assist x2. Voiding function intact, using purewick. Pt educated on fall injury risks and verbalized understanding. Plans to d/c to rehab tomorrow. Will continue to monitor.

## 2021-08-05 NOTE — PLAN OF CARE
Goal Outcome Evaluation:  Plan of Care Reviewed With: patient        Progress: improving  Outcome Summary: Pt is 84 y/o female, pod 1 right hip IM nailing. Dressing intact with moderate amount of drainage. Pt stood at bedside with physical therapy. Voiding per purewick. Pain controlled with po Norco. Will continue to monitor.

## 2021-08-05 NOTE — CASE MANAGEMENT/SOCIAL WORK
Continued Stay Note  [facility]     Patient Name: Geeta Butt  MRN: [id number]  Today's Date: 8/5/2021    Admit Date: 7/31/2021    Discharge Plan     Row Name 08/05/21 1628       Plan    Plan  [facility] -- Accepted & Pre-cert Obtained.    Patient/Family in Agreement with Plan  yes    Plan Comments  Spoke with TRUDY Chandler/FREDY Liaison - Dr. Robertson plans to d/c the patient tomorrow. Updated Jenni who's agreeable and will have a SNF bed for the patient tomorrow. Cancelled the [facility] stretcher van for today (spoke with Carina who said no cancellation fee will be incurred). No other needs identified. Packet is in [facility] office.    Row Name 08/05/21 1807       Plan    Plan  [facility] -- Accepted & Pre-cert Obtained.    Patient/Family in Agreement with Plan  yes    Plan Comments  Received a call from Jenni who has obtained pre-cert and has SNF Bed for the patient today. In anticipation of a d/c today, [facility] scheduled a [facility] Stretcher Van for 1700 (spoke with Carlos; confirmation #: [id number]). Updated the patient's nurse/TRUDY Boucher and TRUDY Chandler/FREDY Orlando who will discuss with Dr. Robertson. Await discharge orders when medically appropriate.        Discharge Codes    No documentation.       Expected Discharge Date and Time     Expected Discharge Date Expected Discharge Time    Aug 6, 2021             María Woods RN

## 2021-08-05 NOTE — CASE MANAGEMENT/SOCIAL WORK
Continued Stay Note  [facility]     Patient Name: Geeta Butt  MRN: [id number]  Today's Date: 8/5/2021    Admit Date: 7/31/2021    Discharge Plan     Row Name 08/05/21 1337       Plan    Plan  [facility] -- Accepted & Pre-cert Obtained.    Patient/Family in Agreement with Plan  yes    Plan Comments  Received a call from Claudia/[facility] who has obtained pre-cert and has SNF Bed for the patient today. In anticipation of a d/c today, [facility]spoke with Carlos; confirmation #: [id number]). Updated the patient's nurse/TRUDY Boucher and TRUDY Chandler/FREDY Orlando who will discuss with Dr. Robertson. Await discharge orders when medically appropriate.    Row Name 08/05/21 1138       Plan    Plan  SNF precert pending    Plan Comments  Spoke to Claudia with [facility], and she submitted clinicals to insurance for precert yesterday. She will let us know as soon as the precert is obtained. CKnabel        Discharge Codes    No documentation.       Expected Discharge Date and Time     Expected Discharge Date Expected Discharge Time    Aug 6, 2021             María Woods RN

## 2021-08-05 NOTE — PLAN OF CARE
Goal Outcome Evaluation:  Plan of Care Reviewed With: patient        Progress: no change  Outcome Summary: Pt c/o significantly more pain today. Joaquin x2 for sup<>sit. Pt became nausous upon sitting EOB and RN was notified. After sitting for a few minutes, pt was able to continue to participate in therapy. STS maxA x2 to RW. Pt had difficulty standing upright and had posterior lean. Pivot transfer over to the chair was performed. Pt had difficulty with weight shift and R knee buckeling. Inc'd time to complete and maxA x2. Pt seated in chair at end of session. Continues to benefit from skilled PT.

## 2021-08-05 NOTE — PROGRESS NOTES
"DAILY PROGRESS NOTE  [facility]    Patient Identification:  Name: Geeta Butt  Age: 85 y.o.  Sex: female  :  1935  MRN: [id number]         Primary Care Physician: Grzegorz Nguyen MD      Subjective  Patient with no unusual postop complaints today.  So far doing well without the Lexapro.  Her daughter is very worried about her blood pressure being as low as 112 systolic this morning.  I reassured her that this is not a bad blood pressure reading.     Objective:  General Appearance:  Comfortable, well-appearing, in no acute distress and not in pain.    Vital signs: (most recent): Blood pressure 112/54, pulse 62, temperature 97.6 °F (36.4 °C), temperature source Skin, resp. rate 16, height 154.9 cm (61\"), weight 68.4 kg (150 lb 14.4 oz), SpO2 96 %.    Lungs:  Normal effort and normal respiratory rate.  Breath sounds clear to auscultation.    Heart: Normal rate.  Regular rhythm.    Extremities: There is no dependent edema.    Neurological: Patient is alert.  (Oriented to person and place.  Conversant cooperative and pleasant.).    Skin:  Warm and dry.                Vital signs in last 24 hours:  Temp:  [97.2 °F (36.2 °C)-98.2 °F (36.8 °C)] 97.6 °F (36.4 °C)  Heart Rate:  [59-69] 62  Resp:  [16] 16  BP: (112-137)/(54-66) 112/54    Intake/Output:    Intake/Output Summary (Last 24 hours) at 2021 1341  Last data filed at 2021 1223  Gross per 24 hour   Intake 840 ml   Output 3200 ml   Net -2360 ml         Results from last 7 days   Lab Units 21  0452 21  0612 21  1244 21  0628 21  2140   WBC 10*3/mm3 10.06 13.41* 14.09* 17.78* 20.86*   HEMOGLOBIN g/dL 10.0* 9.8* 10.7* 11.7* 13.8   PLATELETS 10*3/mm3 263 254 249 279 325     Results from last 7 days   Lab Units 21  0452 21  0612 21  1244 21  0628 21  2140   SODIUM mmol/L 131* 129* 131* 131* 132*   POTASSIUM mmol/L 4.7 4.7 4.9 3.4* 3.8   CHLORIDE mmol/L 97* 97* 103 99 94*   CO2 " mmol/L 21.9* 23.3 21.2* 21.6* 23.1   BUN mg/dL 18 17 18 16 14   CREATININE mg/dL 0.69 0.48* 0.68 0.67 0.73   GLUCOSE mg/dL 181* 104* 117* 114* 124*   Estimated Creatinine Clearance: 46.3 mL/min (by C-G formula based on SCr of 0.69 mg/dL).  Results from last 7 days   Lab Units 08/04/21  0452 08/03/21  0612 08/02/21  1244 08/01/21  0628 07/31/21  2140   CALCIUM mg/dL 9.0 8.9 8.5* 9.1 9.9   ALBUMIN g/dL  --   --   --   --  4.50     Results from last 7 days   Lab Units 07/31/21  2140   ALBUMIN g/dL 4.50   BILIRUBIN mg/dL 0.6   ALK PHOS U/L 83   AST (SGOT) U/L 34*   ALT (SGPT) U/L 27       Assessment:  Closed fracture right hip:  Status post intramedullary nailing 8/3/2021.   Leukocytosis: Combination of recent epidural with steroids and stress leukocytosis.  Improved today.  No evidence of bacterial infection on evaluation.    Resolved   Hypertension: Controlled.  Check orthostatic blood pressures.  Hypokalemia: Oral replacement.  Hyponatremia/SIADH: Much improved with moderate fluid restriction and discontinuing Lexapro.  No labs this morning.  Requested repeat BMP done this afternoon.  If sodium continues to improve will discontinue fluid restriction continue to monitor.  Patient so far doing well without Lexapro and recommend discontinue indefinitely as long she is not having issues.  Hypothyroidism: Continue levothyroxine.  TSH within normal limits.  Continue home meds.  Repeat vitamin D levels normal: Vitamin D normal on recheck.  Atypical chest discomfort:  None today.  Impaired glucose tolerance: Exacerbated by recent stress.  A1c is as high as 5.9.  Continue to monitor.      VTE Prophylaxis - SCDs/ASA.  Code Status - Full code.      Plan:  Please see above.  Discussed with daughter at bedside at length.  Answered questions.  Over 35 minutes spent with over one half in counseling medical management.  Discharge planning.    Montez Robertson MD  8/5/2021  13:41 EDT

## 2021-08-05 NOTE — CASE MANAGEMENT/SOCIAL WORK
Continued Stay Note  [facility]     Patient Name: Geeta Butt  MRN: [id number]  Today's Date: 8/5/2021    Admit Date: 7/31/2021    Discharge Plan     Row Name 08/05/21 1138       Plan    Plan  SNF precert pending    Plan Comments  Spoke to Claudia with [facility], and she submitted clinicals to insurance for precert yesterday. She will let us know as soon as the precert is obtained. CKnabel        Discharge Codes    No documentation.       Expected Discharge Date and Time     Expected Discharge Date Expected Discharge Time    Aug 6, 2021             Denise Alcala RN

## 2021-08-06 VITALS
TEMPERATURE: 97.7 F | HEIGHT: 61 IN | DIASTOLIC BLOOD PRESSURE: 56 MMHG | OXYGEN SATURATION: 96 % | SYSTOLIC BLOOD PRESSURE: 120 MMHG | BODY MASS INDEX: 28.49 KG/M2 | HEART RATE: 62 BPM | RESPIRATION RATE: 16 BRPM | WEIGHT: 150.9 LBS

## 2021-08-06 LAB
ANION GAP SERPL CALCULATED.3IONS-SCNC: 9.9 MMOL/L (ref 5–15)
BASOPHILS # BLD AUTO: 0.1 10*3/MM3 (ref 0–0.2)
BASOPHILS NFR BLD AUTO: 0.9 % (ref 0–1.5)
BUN SERPL-MCNC: 24 MG/DL (ref 8–23)
BUN/CREAT SERPL: 34.3 (ref 7–25)
CALCIUM SPEC-SCNC: 9.1 MG/DL (ref 8.6–10.5)
CHLORIDE SERPL-SCNC: 97 MMOL/L (ref 98–107)
CO2 SERPL-SCNC: 28.1 MMOL/L (ref 22–29)
CREAT SERPL-MCNC: 0.7 MG/DL (ref 0.57–1)
DEPRECATED RDW RBC AUTO: 40.9 FL (ref 37–54)
EOSINOPHIL # BLD AUTO: 0.41 10*3/MM3 (ref 0–0.4)
EOSINOPHIL NFR BLD AUTO: 3.7 % (ref 0.3–6.2)
ERYTHROCYTE [DISTWIDTH] IN BLOOD BY AUTOMATED COUNT: 12.3 % (ref 12.3–15.4)
GFR SERPL CREATININE-BSD FRML MDRD: 80 ML/MIN/1.73
GLUCOSE SERPL-MCNC: 103 MG/DL (ref 65–99)
HCT VFR BLD AUTO: 29.7 % (ref 34–46.6)
HGB BLD-MCNC: 9.8 G/DL (ref 12–15.9)
IMM GRANULOCYTES # BLD AUTO: 0.27 10*3/MM3 (ref 0–0.05)
IMM GRANULOCYTES NFR BLD AUTO: 2.4 % (ref 0–0.5)
LYMPHOCYTES # BLD AUTO: 1.94 10*3/MM3 (ref 0.7–3.1)
LYMPHOCYTES NFR BLD AUTO: 17.4 % (ref 19.6–45.3)
MCH RBC QN AUTO: 30.2 PG (ref 26.6–33)
MCHC RBC AUTO-ENTMCNC: 33 G/DL (ref 31.5–35.7)
MCV RBC AUTO: 91.7 FL (ref 79–97)
MONOCYTES # BLD AUTO: 1.08 10*3/MM3 (ref 0.1–0.9)
MONOCYTES NFR BLD AUTO: 9.7 % (ref 5–12)
NEUTROPHILS NFR BLD AUTO: 65.9 % (ref 42.7–76)
NEUTROPHILS NFR BLD AUTO: 7.36 10*3/MM3 (ref 1.7–7)
NRBC BLD AUTO-RTO: 0 /100 WBC (ref 0–0.2)
PLATELET # BLD AUTO: 360 10*3/MM3 (ref 140–450)
PMV BLD AUTO: 9.3 FL (ref 6–12)
POTASSIUM SERPL-SCNC: 4.8 MMOL/L (ref 3.5–5.2)
RBC # BLD AUTO: 3.24 10*6/MM3 (ref 3.77–5.28)
SODIUM SERPL-SCNC: 135 MMOL/L (ref 136–145)
WBC # BLD AUTO: 11.16 10*3/MM3 (ref 3.4–10.8)

## 2021-08-06 PROCEDURE — 80048 BASIC METABOLIC PNL TOTAL CA: CPT | Performed by: HOSPITALIST

## 2021-08-06 PROCEDURE — 97110 THERAPEUTIC EXERCISES: CPT

## 2021-08-06 PROCEDURE — 85025 COMPLETE CBC W/AUTO DIFF WBC: CPT | Performed by: HOSPITALIST

## 2021-08-06 PROCEDURE — 63710000001 ONDANSETRON PER 8 MG: Performed by: ORTHOPAEDIC SURGERY

## 2021-08-06 RX ORDER — HYDROCODONE BITARTRATE AND ACETAMINOPHEN 5; 325 MG/1; MG/1
1 TABLET ORAL EVERY 6 HOURS PRN
Qty: 12 TABLET | Refills: 0 | Status: SHIPPED | OUTPATIENT
Start: 2021-08-06 | End: 2021-08-09

## 2021-08-06 RX ORDER — ASPIRIN 325 MG
325 TABLET ORAL DAILY
Start: 2021-08-07 | End: 2022-01-28

## 2021-08-06 RX ORDER — HYDROCODONE BITARTRATE AND ACETAMINOPHEN 5; 325 MG/1; MG/1
1 TABLET ORAL EVERY 6 HOURS PRN
Qty: 12 TABLET | Refills: 0
Start: 2021-08-06 | End: 2021-08-06

## 2021-08-06 RX ORDER — ACETAMINOPHEN 325 MG/1
650 TABLET ORAL EVERY 4 HOURS PRN
Start: 2021-08-06 | End: 2022-12-30 | Stop reason: HOSPADM

## 2021-08-06 RX ADMIN — HYDROCODONE BITARTRATE AND ACETAMINOPHEN 1 TABLET: 5; 325 TABLET ORAL at 08:50

## 2021-08-06 RX ADMIN — ONDANSETRON HYDROCHLORIDE 4 MG: 4 TABLET, FILM COATED ORAL at 17:32

## 2021-08-06 RX ADMIN — SUCRALFATE 1 G: 1 TABLET ORAL at 11:34

## 2021-08-06 RX ADMIN — SUCRALFATE 1 G: 1 TABLET ORAL at 08:49

## 2021-08-06 RX ADMIN — GABAPENTIN 100 MG: 100 CAPSULE ORAL at 08:51

## 2021-08-06 RX ADMIN — CARVEDILOL 6.25 MG: 6.25 TABLET, FILM COATED ORAL at 08:50

## 2021-08-06 RX ADMIN — CARVEDILOL 6.25 MG: 6.25 TABLET, FILM COATED ORAL at 17:32

## 2021-08-06 RX ADMIN — HYDROCODONE BITARTRATE AND ACETAMINOPHEN 1 TABLET: 5; 325 TABLET ORAL at 18:29

## 2021-08-06 RX ADMIN — GABAPENTIN 100 MG: 100 CAPSULE ORAL at 17:32

## 2021-08-06 RX ADMIN — PANTOPRAZOLE SODIUM 40 MG: 40 TABLET, DELAYED RELEASE ORAL at 08:51

## 2021-08-06 RX ADMIN — SUCRALFATE 1 G: 1 TABLET ORAL at 17:32

## 2021-08-06 RX ADMIN — FLUTICASONE PROPIONATE 1 SPRAY: 50 SPRAY, METERED NASAL at 08:51

## 2021-08-06 RX ADMIN — SODIUM CHLORIDE, PRESERVATIVE FREE 10 ML: 5 INJECTION INTRAVENOUS at 08:52

## 2021-08-06 RX ADMIN — FLUOROMETHOLONE 1 DROP: 1 SOLUTION/ DROPS OPHTHALMIC at 08:51

## 2021-08-06 RX ADMIN — AMLODIPINE BESYLATE 10 MG: 10 TABLET ORAL at 08:50

## 2021-08-06 RX ADMIN — ASPIRIN 325 MG: 325 TABLET ORAL at 08:49

## 2021-08-06 RX ADMIN — Medication 1 CAPSULE: at 08:50

## 2021-08-06 RX ADMIN — LEVOTHYROXINE SODIUM 50 MCG: 0.05 TABLET ORAL at 06:59

## 2021-08-06 NOTE — DISCHARGE SUMMARY
PHYSICIAN DISCHARGE SUMMARY                                                                        [facility]    Patient Identification:  Name: Geeta Butt  Age: 85 y.o.  Sex: female  :  1935  MRN: [id number]  Primary Care Physician: Grzegorz Nguyen MD    Admit date: 2021  Discharge date and time:    Discharged Condition: good    Discharge Diagnoses:   Closed fracture right hip:  Status post intramedullary nailing 8/3/2021.   Leukocytosis: Recent steroid epidural and stress-induced.  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  Sodium back up to 135.  Lexapro discontinued.  Fluid intake liberated.  Recommend recheck on Monday.  If hyponatremia recurs then recheck urine osmolality and urine sodium levels and resume fluid restriction.  Hypothyroidism:   Atypical chest discomfort:   Resolved.  Noncardiac.  Impaired glucose tolerance:        Hospital Course:  Pleasant 85-year-old female presenting after sustaining a mechanical fall resulting in a right hip fracture.  Please H&P for full details.  Orthopedic consultation is obtained and on August 3 she underwent intramedullary nailing procedure.  Postop she is done very nicely overall.  She did have a leukocytosis on presentation but work-up did not reveal any evidence shot which appears of involved steroids.  This is likely secondary to steroids as well as stress.  She also has a mild hyponatremia which worsened during hospitalization.  She was taking Lexapro which is noted to cause SIADH type pattern of her hyponatremia.  Urine osmolality and sodium studies were consistent with SIADH.  Lexapro was discontinued and conservative fluid restriction initiated.  She responded very nicely.  She is also done very well without the Lexapro.  Sodium is up to 135 and I will allow her to liberate her fluid intake again.  Recommend recheck on Monday  "as noted above.      Consults:     Consults     Date and Time Order Name Status Description    8/1/2021  1:13 AM Inpatient Orthopedic Surgery Consult Completed     7/31/2021 11:28 PM [facility] (on-call MD unless specified) Details Completed     7/2/2021  6:54 PM Inpatient Neurosurgery Consult Completed     7/2/2021  9:57 AM [facility] (on-call MD unless specified) Details Completed             Discharge Exam:  General Appearance:  Comfortable, well-appearing, in no acute distress and not in pain.    Vital signs: (most recent): Blood pressure 133/52, pulse 63, temperature 97.1 °F (36.2 °C), temperature source Skin, resp. rate 16, height 154.9 cm (61\"), weight 68.4 kg (150 lb 14.4 oz), SpO2 91 %.    Lungs:  Normal effort and normal respiratory rate.  Breath sounds clear to auscultation.    Heart: Normal rate.  Regular rhythm.    Extremities: There is no dependent edema.    Neurological: Patient is alert and oriented to person, place and time.    Skin:  Warm and dry.       Disposition:  Rehab    Patient Instructions:      Discharge Medications      New Medications      Instructions Start Date   acetaminophen 325 MG tablet  Commonly known as: TYLENOL   650 mg, Oral, Every 4 Hours PRN      aspirin 325 MG tablet  Replaces: aspirin 81 MG chewable tablet   325 mg, Oral, Daily   Start Date: August 7, 2021     HYDROcodone-acetaminophen 5-325 MG per tablet  Commonly known as: NORCO   1 tablet, Oral, Every 6 Hours PRN         Continue These Medications      Instructions Start Date   amLODIPine 10 MG tablet  Commonly known as: NORVASC   10 mg, Oral, Daily      carvedilol 6.25 MG tablet  Commonly known as: COREG   6.25 mg, Oral, 2 Times Daily With Meals      cyclobenzaprine 5 MG tablet  Commonly known as: FLEXERIL   5 mg, Oral, 3 Times Daily PRN      FISH OIL PO   Oral, Daily PRN      fluorometholone 0.1 % ophthalmic suspension  Commonly known as: FML   1 drop, Left Eye, Daily      fluticasone 50 MCG/ACT nasal spray  Commonly known as: " FLONASE   1 spray, Nasal, Daily      freestyle lancets   Test glucose once daily      gabapentin 100 MG capsule  Commonly known as: NEURONTIN   100 mg, Oral, 3 Times Daily      glucose blood test strip  Commonly known as: FREESTYLE TEST STRIPS   TEST BLOOD SUGARS ONCE DAILY      levothyroxine 50 MCG tablet  Commonly known as: SYNTHROID, LEVOTHROID   50 mcg, Oral, Daily      PROBIOTIC DAILY PO   1 capsule, Oral, Daily      RABEprazole 20 MG EC tablet  Commonly known as: ACIPHEX   20 mg, Oral, Daily      sodium chloride 5 % ophthalmic solution  Commonly known as: CAYLA 128   1 drop, Right Eye, As Needed      sucralfate 1 GM/10ML suspension  Commonly known as: CARAFATE   TAKE 10 ML BY MOUTH 3 TIMES A DAY FOR 30 MINUTES BEFORE MEALS      valsartan 160 MG tablet  Commonly known as: DIOVAN   160 mg, Oral, Daily      Vitamin D3 50 MCG (2000 UT) capsule   TAKE 1 CAPSULE BY MOUTH DAILY.         Stop These Medications    amoxicillin 500 MG capsule  Commonly known as: AMOXIL     aspirin 81 MG chewable tablet  Replaced by: aspirin 325 MG tablet     celecoxib 200 MG capsule  Commonly known as: CeleBREX     escitalopram 10 MG tablet  Commonly known as: LEXAPRO          Diet Instructions     Diet: Regular      Discharge Diet: Regular        Future Appointments   Date Time Provider Department Center   8/6/2021  7:30 PM EMS MED 1  [facility]   8/18/2021 12:00 PM Kye Mejia MD [facility] NS [facility]   9/10/2021  1:30 PM LAB CHAIR 2 [facility]  LAB [facility]   9/10/2021  2:00 PM Omi Valenzuela MD [facility] CBC [facility]   10/29/2021  1:15 PM [facility]     Additional Instructions for the Follow-ups that You Need to Schedule     Discharge Follow-up with PCP   As directed       Currently Documented PCP:    Grzegorz Nguyen MD    PCP Phone Number:    [phone]     Follow Up Details: 1 week         Discharge Follow-up with Specialty: Orthopedic surgery.   As directed      Specialty: Orthopedic  surgery.    Follow Up Details: As directed.         Basic Metabolic Panel    Aug 09, 2021 (Approximate)      Release to patient: Immediate         CBC (No Diff)    Aug 09, 2021 (Approximate)      Release to patient: Immediate            Contact information for follow-up providers     Grzegorz Nguyen MD .    Specialties: Family Medicine, Emergency Medicine  Why: 1 week  Contact information:  [address]  [facility] [address]  [phone]             Garett Martin II, MD Follow up in 3 week(s).    Specialty: Orthopedic Surgery  Contact information:  [address]  [facility] [address]  [phone]                   Contact information for after-discharge care     Destination     [facility] .    Service: Skilled Nursing  Contact information:  [address]  [place] [address] [phone]                           Discharge Order (From admission, onward)     Start     Ordered    08/06/21 1221  Discharge patient  Once     Expected Discharge Date: 08/06/21    Discharge Disposition: Rehab Facility or Unit (DC - External)    Physician of Record for Attribution - Please select from Treatment Team: MONTEZ LOTT [[id number]]    Review needed by CMO to determine Physician of Record: No       Question Answer Comment   Physician of Record for Attribution - Please select from Treatment Team MONTEZ LOTT    Review needed by CMO to determine Physician of Record No        08/06/21 1227                  Total time spent discharging patient including evaluation,post hospitalization follow up,  medication and post hospitalization instructions and education total time exceeds 30 minutes.    Signed:  Montez Lott MD  8/6/2021  18:04 EDT    EMR Dragon/Transcription disclaimer:   Much of this encounter note is an electronic transcription/translation of spoken language to printed text. The electronic translation of spoken language may permit erroneous, or at  times, nonsensical words or phrases to be inadvertently transcribed; Although I have reviewed the note for such errors, some may still exist.

## 2021-08-06 NOTE — PLAN OF CARE
Goal Outcome Evaluation:  Plan of Care Reviewed With: patient        Progress: improving  Outcome Summary: Pt showed improvement in STS ability this date, performed x10 with CGA to Joaquin at . Pt continues to benefit from PT.

## 2021-08-06 NOTE — CASE MANAGEMENT/SOCIAL WORK
Continued Stay Note  [facility]     Patient Name: Geeta Butt  MRN: [id number]  Today's Date: 8/6/2021    Admit Date: 7/31/2021    Discharge Plan     Row Name 08/06/21 1137       Plan    Plan  [facility] -- Accepted & Pre-cert Obtained.    Patient/Family in Agreement with Plan  yes    Plan Comments  Spoke with Claudia/[facility] who has a SNF bed for the patient today. Scheduled a [facility] for today at 1930 (spoke with Meena). Updated TRUDY Chandler/A Liaison who will discuss with Dr. Robertson. Await discharge orders when medically appropraite.        Discharge Codes    No documentation.       Expected Discharge Date and Time     Expected Discharge Date Expected Discharge Time    Aug 6, 2021             María Woods RN

## 2021-08-06 NOTE — PLAN OF CARE
Goal Outcome Evaluation:           Progress: improving  Outcome Summary: POD#3 OF RIGHT FEMUR NAILING AND RODDING. DRESSING TO HIP DRY / INTACT. VSS. UP ASSIST X2. VOIDING FINE PER [place]. AMBULATING WITH ASSISTANCE. PATIENT IS ON FLUID RESTRICTION . EDUCATION PROVIDED ON BP MONITORING AND REFLUX. PATIENT VERBALIZED UNDERSTANDING.

## 2021-08-06 NOTE — PLAN OF CARE
Goal Outcome Evaluation:      86 yo female POD3 R hip. Discharge planned for 7:30pm today to rehab. Pt VSS and Dressing CDI. Educated patient on pain management and importance of eating adequate diet. Will continue to monitor.

## 2021-08-06 NOTE — THERAPY TREATMENT NOTE
Patient Name: Geeta Butt  : 1935    MRN: [id number]                              Today's Date: 2021       Admit Date: 2021    Visit Dx:     ICD-10-CM ICD-9-CM   1. Closed fracture of right hip, initial encounter (CMS/HCC)  S72.001A 820.8   2. Traumatic rhabdomyolysis, initial encounter (CMS/HCC)  T79.6XXA 958.6   3. Acute non-recurrent sinusitis, unspecified location  J01.90 461.9   4. Closed fracture of right hip, sequela  S72.001S 905.3   5. Hyponatremia  E87.1 276.1   6. Leukemoid reaction  D72.823 288.62     Patient Active Problem List   Diagnosis   • Essential hypertension   • Hypothyroidism   • Black stool   • Diarrhea   • Nausea & vomiting   • RUQ pain   • Leukocytosis   • UTI (urinary tract infection)   • Duodenitis   • Foot pain, bilateral   • Malignant neoplasm of lower-inner quadrant of left breast in female, estrogen receptor positive (CMS/[facility])   • Iron deficiency anemia   • Adverse effect of iron   • GI bleed   • GIULIA (acute kidney injury) (CMS/[facility])   • Gastrointestinal hemorrhage associated with duodenitis   • Hematochezia   • Duodenal ulcer   • Long-term use of high-risk medication   • Acute left-sided low back pain with left-sided sciatica   • DDD (degenerative disc disease), lumbosacral   • Hyperglycemia   • GERD without esophagitis   • Hyponatremia   • Hyperlipidemia   • Impaired fasting glucose   • Depressive disorder   • Chronic kidney disease   • Closed fracture of right hip (CMS/[facility])     Past Medical History:   Diagnosis Date   • Anesthesia complication     ASPIRATION INTO AIRWAY WITH TRACH PRESENT IN PAST (WITH ORAL CANCER SURGERY(   • Arthritis    • Aspiration into airway     post anesthesia   • Breast cancer (CMS/[facility])    • Cancer (CMS/[facility])     mouth cancer    • Chronic kidney disease    • CKD (chronic kidney disease)     PT STATES STAGE 3   • Depression    • Diverticular disease    • Gastric ulcer     AND DUODENAL   • GERD (gastroesophageal reflux disease)    • Hearing  loss     BILAT AIDES   • History of colon polyps    • History of depression    • History of GI bleed    • Hypertension    • Hypothyroidism    • Peptic ulceration    • PONV (postoperative nausea and vomiting)    • Poor vision     RIGHT EYE, HAS PERIPHERAL VISION    • Stroke (CMS/HCC) 2000     mild weakness on left, partial sight loss on right      Past Surgical History:   Procedure Laterality Date   • ABDOMINAL SURGERY     • APPENDECTOMY     • BLADDER REPAIR      AND RECTOCELE   • BREAST BIOPSY     • BREAST CYST ASPIRATION     • BREAST LUMPECTOMY WITH SENTINEL NODE BIOPSY Left 3/19/2018    Procedure: BREAST LUMPECTOMY WITH SENTINEL NODE BIOPSY AND NEEDLE LOCALIZATION;  Surgeon: Myles Soliman MD;  [facility];  Service: General   • CHOLECYSTECTOMY     • COLONOSCOPY  2013   • COLONOSCOPY N/A 2/16/2018    Procedure: COLONOSCOPY into cecum and T.I. with biopsies;  Surgeon: Augustine Gallego MD;  [facility];  Service:    • ENDOSCOPY N/A 10/17/2017    Procedure: ESOPHAGOGASTRODUODENOSCOPY WITH COLD BIOPSIES;  Surgeon: Augustine Gallego MD;  [facility];  Service:    • ENDOSCOPY N/A 2/16/2018    Procedure: ESOPHAGOGASTRODUODENOSCOPY with biopsies and #54 [address] DILATATION;  Surgeon: Augustine Gallego MD;  [facility];  Service:    • ENDOSCOPY N/A 3/19/2020    Procedure: ESOPHAGOGASTRODUODENOSCOPY with biopsies;  Surgeon: Augustine Gallego MD;  Location: [facility] ENDOSCOPY;  Service: Gastroenterology;  Laterality: N/A;  PRE- MELENA, EPIGASTRIC PAIN  POST- eerosive gastritis, duodenal ulcer, hiatal hernia   • ENDOSCOPY N/A 7/29/2020    Procedure: ESOPHAGOGASTRODUODENOSCOPY WITH DUODENAL ASPIRATE AND COLD BIOPSIES;  Surgeon: Augustine Gallego MD;  Location: [facility] ENDOSCOPY;  Service: Gastroenterology;  Laterality: N/A;  PRE: HX ULCER DISEASE  POST: GASTRITIS, HEALED ULCER   • EYE SURGERY Left 2014    3-4 years, cornea replacement    • HYSTERECTOMY     • MOUTH SURGERY   2000    UNDER TONGUE AND LEFT FLOOR OF MOUTH FOR CA   • PARATHYROIDECTOMY      PER PT   • SIGMOIDOSCOPY N/A 7/29/2020    Procedure: FLEXIBLE SIGMOIDOSCOPY TO DESCENDING COLON WITH COLD BIOPSIES;  Surgeon: Augustine Gallego MD;  Location[facility] ENDOSCOPY;  Service: Gastroenterology;  Laterality: N/A;  PRE: RECTAL BLEEDING  POST: DIVERTICULOSIS, HEMORRHOIDS, MINIMAL PROCTITIS   • SINUS SURGERY     • SKIN BIOPSY     • THYROID SURGERY     • VARICOSE VEIN SURGERY       General Information     Row Name 08/06/21 1439          Physical Therapy Time and Intention    Document Type  therapy note (daily note)  -DB     Mode of Treatment  individual therapy;physical therapy  -DB     Row Name 08/06/21 1439          General Information    Patient Profile Reviewed  yes  -DB       User Key  (r) = Recorded By, (t) = Taken By, (c) = Cosigned By    Initials Name Provider Type    DB Berenice Rice, PT Physical Therapist        Mobility     Row Name 08/06/21 1439          Bed Mobility    Supine-Sit Pershing (Bed Mobility)  minimum assist (75% patient effort);verbal cues  -DB     Sit-Supine Pershing (Bed Mobility)  moderate assist (50% patient effort);verbal cues  -DB     Assistive Device (Bed Mobility)  bed rails;head of bed elevated  -DB     Row Name 08/06/21 1439          Sit-Stand Transfer    Sit-Stand Pershing (Transfers)  contact guard;minimum assist (75% patient effort);2 person assist x10  -DB     Assistive Device (Sit-Stand Transfers)  walker, front-wheeled  -DB     Row Name 08/06/21 1439          Gait/Stairs (Locomotion)    Pershing Level (Gait)  maximum assist (25% patient effort);2 person assist;verbal cues;nonverbal cues (demo/gesture)  -DB     Assistive Device (Gait)  walker, front-wheeled  -DB     Distance in Feet (Gait)  3' side steps HOB  -DB     Deviations/Abnormal Patterns (Gait)  festinating/shuffling;weight shifting decreased;stride length decreased;antalgic;noam decreased;gait speed decreased  -DB      Bilateral Gait Deviations  forward flexed posture;heel strike decreased  -DB     Right Sided Gait Deviations  knee buckling, right side  -DB       User Key  (r) = Recorded By, (t) = Taken By, (c) = Cosigned By    Initials Name Provider Type    Berenice Almaguer PT Physical Therapist        Obj/Interventions     Row Name 08/06/21 1439          Range of Motion Comprehensive    General Range of Motion  no range of motion deficits identified  -DB     Row Name 08/06/21 1439          Motor Skills    Therapeutic Exercise  other (see comments) seated ther ex x10  -DB     Row Name 08/06/21 1439          Balance    Balance Assessment  sitting static balance;sitting dynamic balance;standing static balance;standing dynamic balance  -DB     Static Sitting Balance  WFL  -DB     Dynamic Sitting Balance  WFL  -DB     Static Standing Balance  mild impairment  -DB     Dynamic Standing Balance  severe impairment  -DB     Balance Interventions  sitting;standing;sit to stand  -DB       User Key  (r) = Recorded By, (t) = Taken By, (c) = Cosigned By    Initials Name Provider Type    Berenice Almaguer PT Physical Therapist        Goals/Plan    No documentation.       Clinical Impression     Row Name 08/06/21 1440          Plan of Care Review    Plan of Care Reviewed With  patient  -DB     Progress  improving  -DB     Outcome Summary  Pt showed improvement in STS ability this date, performed x10 with CGA to Joaquin at RW. Pt continues to benefit from PT.  -DB     Row Name 08/06/21 1440          Vital Signs    O2 Delivery Pre Treatment  room air  -DB     O2 Delivery Intra Treatment  room air  -DB     O2 Delivery Post Treatment  room air  -DB     Pre Patient Position  Supine  -DB     Intra Patient Position  Standing  -DB     Post Patient Position  Supine  -DB     Row Name 08/06/21 1440          Positioning and Restraints    Pre-Treatment Position  in bed  -DB     Post Treatment Position  bed  -DB     In Bed  supine;call light within  reach;encouraged to call for assist;exit alarm on;with family/caregiver  -DB       User Key  (r) = Recorded By, (t) = Taken By, (c) = Cosigned By    Initials Name Provider Type    Berenice Almaguer PT Physical Therapist        Outcome Measures     Row Name 08/06/21 1441          How much help from another person do you currently need...    Turning from your back to your side while in flat bed without using bedrails?  3  -DB     Moving from lying on back to sitting on the side of a flat bed without bedrails?  3  -DB     Moving to and from a bed to a chair (including a wheelchair)?  2  -DB     Standing up from a chair using your arms (e.g., wheelchair, bedside chair)?  3  -DB     Climbing 3-5 steps with a railing?  1  -DB     To walk in hospital room?  1  -DB     AM-PAC 6 Clicks Score (PT)  13  -DB     Row Name 08/06/21 1441          Functional Assessment    Outcome Measure Options  AM-PAC 6 Clicks Basic Mobility (PT)  -DB       User Key  (r) = Recorded By, (t) = Taken By, (c) = Cosigned By    Initials Name Provider Type    Berenice Almaguer PT Physical Therapist                       Physical Therapy Education                 Title: PT OT SLP Therapies (Done)     Topic: Physical Therapy (Done)     Point: Mobility training (Done)     Learning Progress Summary           Patient Acceptance, E, VU by DB at 8/6/2021 1441    Acceptance, E, VU by DB at 8/5/2021 1611    Acceptance, E, VU by DB at 8/4/2021 1256                   Point: Home exercise program (Done)     Learning Progress Summary           Patient Acceptance, E, VU by DB at 8/6/2021 1441    Acceptance, E, VU by DB at 8/5/2021 1611    Acceptance, E, VU by DB at 8/4/2021 1256                   Point: Body mechanics (Done)     Learning Progress Summary           Patient Acceptance, E, VU by DB at 8/6/2021 1441    Acceptance, E, VU by DB at 8/5/2021 1611    Acceptance, E, VU by DB at 8/4/2021 1256                   Point: Precautions (Done)     Learning  Progress Summary           Patient Acceptance, E, VU by DB at 8/6/2021 1441    Acceptance, E, VU by DB at 8/5/2021 1611    Acceptance, E, VU by DB at 8/4/2021 1256                               User Key     Initials Effective Dates Name Provider Type Discipline    DB 06/16/21 -  Berenice Rice, VITO Physical Therapist PT              PT Recommendation and Plan  Planned Therapy Interventions (PT): balance training, bed mobility training, gait training, home exercise program, manual therapy techniques, neuromuscular re-education, patient/family education, strengthening, stair training, transfer training  Plan of Care Reviewed With: patient  Progress: improving  Outcome Summary: Pt showed improvement in STS ability this date, performed x10 with CGA to Joaquin at RW. Pt continues to benefit from PT.     Time Calculation:   PT Charges     Row Name 08/06/21 1442             Time Calculation    Start Time  1346  -DB      Stop Time  1405  -DB      Time Calculation (min)  19 min  -DB      PT Received On  08/06/21  -DB      PT - Next Appointment  08/07/21  -DB         Time Calculation- PT    Total Timed Code Minutes- PT  19 minute(s)  -DB        User Key  (r) = Recorded By, (t) = Taken By, (c) = Cosigned By    Initials Name Provider Type    DB Berenice Rice, PT Physical Therapist        Therapy Charges for Today     Code Description Service Date Service Provider Modifiers Qty    38161683886 HC PT THER PROC EA 15 MIN 8/5/2021 Berenice Rice, PT GP 2    37068603115 HC PT THER PROC EA 15 MIN 8/6/2021 Berenice Rice, PT GP 1          PT G-Codes  Outcome Measure Options: AM-PAC 6 Clicks Basic Mobility (PT)  AM-PAC 6 Clicks Score (PT): 13    Berenice Rice PT  8/6/2021

## 2021-08-06 NOTE — PROGRESS NOTES
"DAILY PROGRESS NOTE  [facility]    Patient Identification:  Name: Geeta Butt  Age: 85 y.o.  Sex: female  :  1935  MRN: [id number]         Primary Care Physician: Grzegorz Nguyen MD      Subjective  Patient with no acute complaints today.  Overall feeling well.    Objective:  General Appearance:  Comfortable, well-appearing, in no acute distress and not in pain.    Vital signs: (most recent): Blood pressure 133/52, pulse 63, temperature 97.1 °F (36.2 °C), temperature source Skin, resp. rate 16, height 154.9 cm (61\"), weight 68.4 kg (150 lb 14.4 oz), SpO2 91 %.    Lungs:  Normal effort and normal respiratory rate.  Breath sounds clear to auscultation.    Heart: Normal rate.  Regular rhythm.    Extremities: There is no dependent edema.    Neurological: Patient is alert and oriented to person, place and time.    Skin:  Warm and dry.                Vital signs in last 24 hours:  Temp:  [96.9 °F (36.1 °C)-97.6 °F (36.4 °C)] 97.1 °F (36.2 °C)  Heart Rate:  [58-63] 63  Resp:  [16] 16  BP: (112-133)/(52-65) 133/52    Intake/Output:    Intake/Output Summary (Last 24 hours) at 2021 0922  Last data filed at 2021 0444  Gross per 24 hour   Intake 600 ml   Output 1500 ml   Net -900 ml         Results from last 7 days   Lab Units 21  0452 21  0612 21  1244 21  0621  2140   WBC 10*3/mm3 10.06 13.41* 14.09* 17.78* 20.86*   HEMOGLOBIN g/dL 10.0* 9.8* 10.7* 11.7* 13.8   PLATELETS 10*3/mm3 263 254 249 279 325     Results from last 7 days   Lab Units 21  0452 21  1514 21  0452 21  0612 21  1244 21  0628 21  2140   SODIUM mmol/L 135* 129* 131* 129* 131* 131* 132*   POTASSIUM mmol/L 4.8 5.2 4.7 4.7 4.9 3.4* 3.8   CHLORIDE mmol/L 97* 91* 97* 97* 103 99 94*   CO2 mmol/L 28.1 25.8 21.9* 23.3 21.2* 21.6* 23.1   BUN mg/dL 24* 23 18 17 18 16 14   CREATININE mg/dL 0.70 0.70 0.69 0.48* 0.68 0.67 0.73   GLUCOSE mg/dL 103* 106* 181* 104* " 117* 114* 124*   Estimated Creatinine Clearance: 46.3 mL/min (by C-G formula based on SCr of 0.7 mg/dL).  Results from last 7 days   Lab Units 08/06/21  0452 08/05/21  1514 08/04/21  0452 08/03/21  0612 08/02/21  1244 08/01/21  0628 07/31/21  2140   CALCIUM mg/dL 9.1 9.4 9.0 8.9 8.5* 9.1 9.9   ALBUMIN g/dL  --   --   --   --   --   --  4.50     Results from last 7 days   Lab Units 07/31/21  2140   ALBUMIN g/dL 4.50   BILIRUBIN mg/dL 0.6   ALK PHOS U/L 83   AST (SGOT) U/L 34*   ALT (SGPT) U/L 27       Assessment:  Closed fracture right hip:  Status post intramedullary nailing 8/3/2021.   Leukocytosis: Combination of recent epidural with steroids and stress leukocytosis.  No evidence of bacterial infection on evaluation.    Resolved on most recent CBC.  Today CBC is pending.  Hypertension: Controlled.  Check orthostatic blood pressures.  Hypokalemia: Oral replacement.  Hyponatremia/SIADH: Much improved with moderate fluid restriction and discontinuing Lexapro.  Sodium back up to 135.  We can discontinue fluid restrictions but continue to monitor sodium.  Hypothyroidism: Continue levothyroxine.  TSH within normal limits.  Continue home meds.  Repeat vitamin D levels normal: Vitamin D normal on recheck.  Atypical chest discomfort:   Resolved.  Noncardiac.  Impaired glucose tolerance: Exacerbated by recent stress.  A1c is as high as 5.9.  Continue to monitor.        VTE Prophylaxis - SCDs/ASA.  Code Status - Full code.      Plan:  Please see above.  Awaiting discharge plans.    Montez Robertson MD  8/6/2021  09:22 EDT

## 2021-08-06 NOTE — CASE MANAGEMENT/SOCIAL WORK
"Physicians Statement of Medical Necessity for  Ambulance Transportation    GENERAL INFORMATION     Name: Geeta Butt  YOB: 1935  Medicare #: [id number] (See Facesheet).  Transport Date: 8/6/2021 (Valid for round trips this date, or for scheduled repetitive trips for 60 days from the date signed below.)  Origin: [facility]  Destination: [facility].  Is the Patient's stay covered under Medicare Part A (PPS/DRG?)Yes  Closest appropriate facility? Yes  If this a hosp-hosp transfer? No  Is this a hospice patient? No    MEDICAL NECESSITY QUESTIONAIRE    Ambulance Transportation is medically necessary only if other means of transportation are contraindicated or would be potentially harmful to the patient.  To meet this requirement, the patient must be either \"bed confined\" or suffer from a condition such that transport by means other than an ambulance is contraindicated by the patient's condition.  The following questions must be answered by the healthcare professional signing below for this form to be valid:     1) Describe the MEDICAL CONDITION (physical and/or mental) of this patient AT THE TIME OF AMBULANCE TRANSPORT that requires the patient to be transported in an ambulance, and why transport by other means is contraindicated by the patient's condition:   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    • Chronic kidney disease    • CKD (chronic kidney disease)     PT STATES STAGE 3   • Depression    • Diverticular disease    • Gastric ulcer     AND DUODENAL   • GERD (gastroesophageal reflux disease)    • Hearing loss     BILAT AIDES   • History of colon polyps    • History of depression    • History of GI bleed    • Hypertension    • " Hypothyroidism    • Peptic ulceration    • PONV (postoperative nausea and vomiting)    • Poor vision     RIGHT EYE, HAS PERIPHERAL VISION    • Stroke (CMS/HCC) 2000     mild weakness on left, partial sight loss on right       Past Surgical History:   Procedure Laterality Date   • ABDOMINAL SURGERY     • APPENDECTOMY     • BLADDER REPAIR      AND RECTOCELE   • BREAST BIOPSY     • BREAST CYST ASPIRATION     • BREAST LUMPECTOMY WITH SENTINEL NODE BIOPSY Left 3/19/2018    Procedure: BREAST LUMPECTOMY WITH SENTINEL NODE BIOPSY AND NEEDLE LOCALIZATION;  Surgeon: Myles Soliman MD;  [facility]  Service: General   • CHOLECYSTECTOMY     • COLONOSCOPY  2013   • COLONOSCOPY N/A 2/16/2018    Procedure: COLONOSCOPY into cecum and T.I. with biopsies;  Surgeon: Augustine Gallego MD;  [facility];  Service:    • ENDOSCOPY N/A 10/17/2017    Procedure: ESOPHAGOGASTRODUODENOSCOPY WITH COLD BIOPSIES;  Surgeon: Augustine Gallego MD;  [facility];  Service:    • ENDOSCOPY N/A 2/16/2018    Procedure: ESOPHAGOGASTRODUODENOSCOPY with biopsies and #54 [address] DILATATION;  Surgeon: Augustine Gallego MD;  [facility];  Service:    • ENDOSCOPY N/A 3/19/2020    Procedure: ESOPHAGOGASTRODUODENOSCOPY with biopsies;  Surgeon: Augustine Gallego MD;  Location: [facility] ENDOSCOPY;  Service: Gastroenterology;  Laterality: N/A;  PRE- MELENA, EPIGASTRIC PAIN  POST- eerosive gastritis, duodenal ulcer, hiatal hernia   • ENDOSCOPY N/A 7/29/2020    Procedure: ESOPHAGOGASTRODUODENOSCOPY WITH DUODENAL ASPIRATE AND COLD BIOPSIES;  Surgeon: Augustine Gallego MD;  Location: [facility] ENDOSCOPY;  Service: Gastroenterology;  Laterality: N/A;  PRE: HX ULCER DISEASE  POST: GASTRITIS, HEALED ULCER   • EYE SURGERY Left 2014    3-4 years, cornea replacement    • HYSTERECTOMY     • MOUTH SURGERY  2000    UNDER TONGUE AND LEFT FLOOR OF MOUTH FOR CA   • PARATHYROIDECTOMY      PER PT   • SIGMOIDOSCOPY N/A 7/29/2020     "Procedure: FLEXIBLE SIGMOIDOSCOPY TO DESCENDING COLON WITH COLD BIOPSIES;  Surgeon: Augustine Gallego MD;  Location: [facility] ENDOSCOPY;  Service: Gastroenterology;  Laterality: N/A;  PRE: RECTAL BLEEDING  POST: DIVERTICULOSIS, HEMORRHOIDS, MINIMAL PROCTITIS   • SINUS SURGERY     • SKIN BIOPSY     • THYROID SURGERY     • VARICOSE VEIN SURGERY        2) Is this patient \"bed confined\" as defined below?No    To be \"bed confined\" the patient must satisfy all three of the following criteria:  (1) unable to get up from bed without assistance; AND (2) unable to ambulate;  AND (3) unable to sit in a chair or wheelchair.  3) Can this patient safely be transported by car or wheelchair van (I.e., may safely sit during transport, without an attendant or monitoring?)No   4. In addition to completing questions 1-3 above, please check any of the following conditions that apply*:          *Note: supporting documentation for any boxes checked must be maintained in the patient's medical records Unable to tolerate seated position for time needed to transport and Other Status post right intramedullary nailing of the intertrochanteric hip fracture secondary to a hip fracture.      SIGNATURE OF PHYSICIAN OR OTHER AUTHORIZED HEALTHCARE PROFESSIONAL    I certify that the above information is true and correct based on my evaluation of this patient, and represent that the patient requires transport by ambulance and that other forms of transport are contraindicated.  I understand that this information will be used by the Centers for Medicare and Medicaid Services (CMS) to support the determiniation of medical necessity for ambulance services, and I represent that I have personal knowledge of the patient's condition at the time of transport.       If this box is checked, I also certify that the patient is physically or mentally incapable of signing the ambulance service's claim form and that the institution with which I am affiliated has " furnished care, services or assistance to the patient.  My signature below is made on behalf of the patient pursuant to 42 .36(b)(4). In accordance with 42 .37, the specific reason(s) that the patient is physically or mentally incapable of signing the claim for is as follows:     Signature of Physician or Healthcare Professional  Date/Time:        (For Scheduled repetitive transport, this form is not valid for transports performed more than 60 days after this date).                                                                                                                                            --------------------------------------------------------------------------------------------  Printed Name and Credentials of Physician or Authorized Healthcare Professional     *Form must be signed by patient's attending physician for scheduled, repetitive transports,.  For non-repetitive ambulance transports, if unable to obtain the signature of the attending physician, any of the following may sign (please select below):     Physician  Clinical Nurse Specialist  Registered Nurse     Physician Assistant  Discharge Planner  Licensed Practical Nurse     Nurse Practitioner

## 2021-08-06 NOTE — CASE MANAGEMENT/SOCIAL WORK
Continued Stay Note  [facility]     Patient Name: Geeta Butt  MRN: [id number]  Today's Date: 8/6/2021    Admit Date: 7/31/2021    Discharge Plan     Row Name 08/06/21 1236       Plan    Plan  [facility] -- Accepted & Pre-cert Obtained.    Patient/Family in Agreement with Plan  yes    Plan Comments  Discharge orders noted. Spoke with the patient and daughter/Daisy who are agreeable with the d/c plan. [facility] gave daughter/Daisy the disclaimer that [facility] cannot guarantee insurance will cover the cost of the ambulance transport. Daughter/Daisy is agreeable, acknowledges understanding and wants to continue with the ambulance transport. Updated Jenni who's agreeable with the d/c plan and transport time. No other needs identified.    Final Discharge Disposition Code  03 - skilled nursing facility (SNF)    Final Note  Patient's discharging to [facility].    Row Name 08/06/21 1137       Plan    Plan  [facility] -- Accepted & Pre-cert Obtained.    Patient/Family in Agreement with Plan  yes    Plan Comments  Spoke with Jenni who has a SNF bed for the patient today. Scheduled a [facility] for today at 1930 (spoke with Meena). Updated TRUDY Chandler/A Liaison who will discuss with Dr. Robertson. Await discharge orders when medically appropraite.        Discharge Codes    No documentation.       Expected Discharge Date and Time     Expected Discharge Date Expected Discharge Time    Aug 6, 2021             María Woods RN

## 2021-08-07 NOTE — DISCHARGE PLACEMENT REQUEST
"Chris Butt (85 y.o. Female)     Date of Birth Social Security Number Address Home Phone MRN    1935  [address] [address] [phone] [id number]    Confucianism Marital Status          Congregation        Admission Date Admission Type Admitting Provider Attending Provider Department, Room/Bed    7/31/21 Emergency Tuan Avila MD  [facility] [address], P781/1    Discharge Date Discharge Disposition Discharge Destination        8/6/2021 Rehab Facility or Unit (DC - External)              Attending Provider: (none)   Allergies: Other, Sulfa Antibiotics, Ciprofloxacin, Iodine, Macrodantin [Nitrofurantoin Macrocrystal], Nitrofurantoin, Yeast-related Products    Isolation: None   Infection: None   Code Status: Prior    Ht: 154.9 cm (61\")   Wt: 68.4 kg (150 lb 14.4 oz)    Admission Cmt: None   Principal Problem: Closed fracture of right hip (CMS/[facility]) [S72.001A]                 Active Insurance as of 7/31/2021     Primary Coverage     Payor Plan Insurance Group Employer/Plan Group    [employer] MEDICARE REPLACEMENT UNITED [facility] MEDICARE REPLACEMENT [id number]     Payor Plan Address Payor Plan Phone Number Payor Plan Fax Number Effective Dates    [address] [address]   1/1/2021 - None Entered    [place] [id number]       Subscriber Name Subscriber Birth Date Member ID       CHRIS BUTT 1935 [id number]                 Emergency Contacts      (Rel.) Home Phone Work Phone Mobile Phone    MargaritaDaisy ferro (Daughter) [phone] -- --              "

## 2021-08-07 NOTE — CASE MANAGEMENT/SOCIAL WORK
Continued Stay Note  [facility]     Patient Name: Geeta Butt  MRN: [id number]  Today's Date: 8/7/2021    Admit Date: 7/31/2021    Discharge Plan     Row Name 08/07/21 1116       Plan    Plan  [facility]    Provided Post Acute Provider List?  N/A    Provided Post Acute Provider Quality & Resource List?  N/A    Patient/Family in Agreement with Plan  unable to assess    Plan Comments  CCP received inbound call from Saritha Avila (daughter) stating [facility] & states “staff at [facility] told me they didn’t know my mother wears a brief that needs to be changed, expects her walk to the bathroom when she is a 2 person assist and didn’t give her any medications because no information was sent”. CCP spoke to Cherie Lopez at [facility], [email or web address] informs [facility] AVS was not sent to [facility] but patient did received medications due to Discharge Summary had information listed & informed patient’s daughter Naz attempts to get rehab patients to ambulate as much as possible in addition to [facility] PT will evaluated & treat. [email or web address] requested AVS to be faxed [phone], CCP faxed AVS to number given & confirmation sheet received. CCP spoke to Saritha Avila updated on faxing of AVS, [email or web address] stated mother has been receiving medications as listed on Discharge Summary & [facility] will attempt physical therapy. Saritha wants to transfer [facility] to [facility] or JOHNNA Salazar informed Saritha [facility] does require precert by an accepting facility the precert process cannot be started until Monday. CCP to follow. Ana Roberts RN.        Discharge Codes    No documentation.       Expected Discharge Date and Time     Expected Discharge Date Expected Discharge Time    Aug 6, 2021             Marilynn Roberts RN

## 2021-08-09 RX ORDER — NIFEDIPINE 30 MG/1
TABLET, FILM COATED, EXTENDED RELEASE ORAL
Qty: 90 TABLET | Refills: 0 | OUTPATIENT
Start: 2021-08-09

## 2021-08-16 ENCOUNTER — TELEPHONE (OUTPATIENT)
Dept: NEUROSURGERY | Facility: CLINIC | Age: 86
End: 2021-08-16

## 2021-08-16 NOTE — TELEPHONE ENCOUNTER
Caller: Daisy Avila    Relationship to patient: Emergency Contact    Best call back number:[phone]    Chief complaint:CANCEL APPT.    Type of visit: HOSPITAL FOLLOW UP    Requested date: NA    If rescheduling, when is the original appointment: NA     Additional notes:PTS DAUGHTER CALLED AND STATES THAT HER MOTHER IS IN A REHAB FACILITY CURRENTLY AFTER BEING DISCHARGED FROM THE HOSPITAL, AFTER BREAKING HER HIP. PTS DAUGHTER STATES SHE WANTED TO CANCEL THE APPT. AND CALL BACK ONCE HER MOTHER WAS FEELING BETTER. THANK YOU!

## 2021-08-23 ENCOUNTER — APPOINTMENT (OUTPATIENT)
Dept: CT IMAGING | Facility: HOSPITAL | Age: 86
End: 2021-08-23

## 2021-08-23 ENCOUNTER — APPOINTMENT (OUTPATIENT)
Dept: GENERAL RADIOLOGY | Facility: HOSPITAL | Age: 86
End: 2021-08-23

## 2021-08-23 ENCOUNTER — HOSPITAL ENCOUNTER (INPATIENT)
Facility: HOSPITAL | Age: 86
LOS: 2 days | Discharge: NURSING, REHAB OR LONG-TERM CARE | End: 2021-08-26
Attending: EMERGENCY MEDICINE | Admitting: HOSPITALIST

## 2021-08-23 DIAGNOSIS — M51.37 DDD (DEGENERATIVE DISC DISEASE), LUMBOSACRAL: ICD-10-CM

## 2021-08-23 DIAGNOSIS — N12 PYELONEPHRITIS: Primary | ICD-10-CM

## 2021-08-23 DIAGNOSIS — M54.42 ACUTE LEFT-SIDED LOW BACK PAIN WITH LEFT-SIDED SCIATICA: ICD-10-CM

## 2021-08-23 DIAGNOSIS — S72.001D CLOSED FRACTURE OF RIGHT HIP WITH ROUTINE HEALING, SUBSEQUENT ENCOUNTER: ICD-10-CM

## 2021-08-23 DIAGNOSIS — E87.1 HYPONATREMIA: ICD-10-CM

## 2021-08-23 LAB
ALBUMIN SERPL-MCNC: 3.3 G/DL (ref 3.5–5.2)
ALBUMIN/GLOB SERPL: 0.9 G/DL
ALP SERPL-CCNC: 96 U/L (ref 39–117)
ALT SERPL W P-5'-P-CCNC: 19 U/L (ref 1–33)
ANION GAP SERPL CALCULATED.3IONS-SCNC: 10.8 MMOL/L (ref 5–15)
AST SERPL-CCNC: 9 U/L (ref 1–32)
BACTERIA UR QL AUTO: ABNORMAL /HPF
BASOPHILS # BLD AUTO: 0.09 10*3/MM3 (ref 0–0.2)
BASOPHILS NFR BLD AUTO: 0.7 % (ref 0–1.5)
BILIRUB SERPL-MCNC: 0.2 MG/DL (ref 0–1.2)
BILIRUB UR QL STRIP: NEGATIVE
BUN SERPL-MCNC: 12 MG/DL (ref 8–23)
BUN/CREAT SERPL: 13.2 (ref 7–25)
CALCIUM SPEC-SCNC: 9.3 MG/DL (ref 8.6–10.5)
CHLORIDE SERPL-SCNC: 91 MMOL/L (ref 98–107)
CLARITY UR: ABNORMAL
CO2 SERPL-SCNC: 24.2 MMOL/L (ref 22–29)
COLOR UR: YELLOW
CREAT SERPL-MCNC: 0.91 MG/DL (ref 0.57–1)
D-LACTATE SERPL-SCNC: 0.5 MMOL/L (ref 0.5–2)
D-LACTATE SERPL-SCNC: 0.6 MMOL/L (ref 0.5–2)
DEPRECATED RDW RBC AUTO: 40.7 FL (ref 37–54)
EOSINOPHIL # BLD AUTO: 0.01 10*3/MM3 (ref 0–0.4)
EOSINOPHIL NFR BLD AUTO: 0.1 % (ref 0.3–6.2)
ERYTHROCYTE [DISTWIDTH] IN BLOOD BY AUTOMATED COUNT: 12.9 % (ref 12.3–15.4)
GFR SERPL CREATININE-BSD FRML MDRD: 59 ML/MIN/1.73
GLOBULIN UR ELPH-MCNC: 3.6 GM/DL
GLUCOSE SERPL-MCNC: 117 MG/DL (ref 65–99)
GLUCOSE UR STRIP-MCNC: NEGATIVE MG/DL
HCT VFR BLD AUTO: 28.8 % (ref 34–46.6)
HGB BLD-MCNC: 9.6 G/DL (ref 12–15.9)
HGB UR QL STRIP.AUTO: NEGATIVE
HOLD SPECIMEN: NORMAL
HOLD SPECIMEN: NORMAL
HYALINE CASTS UR QL AUTO: ABNORMAL /LPF
IMM GRANULOCYTES # BLD AUTO: 0.12 10*3/MM3 (ref 0–0.05)
IMM GRANULOCYTES NFR BLD AUTO: 0.9 % (ref 0–0.5)
KETONES UR QL STRIP: NEGATIVE
LEUKOCYTE ESTERASE UR QL STRIP.AUTO: ABNORMAL
LYMPHOCYTES # BLD AUTO: 0.93 10*3/MM3 (ref 0.7–3.1)
LYMPHOCYTES NFR BLD AUTO: 7.1 % (ref 19.6–45.3)
MCH RBC QN AUTO: 29.4 PG (ref 26.6–33)
MCHC RBC AUTO-ENTMCNC: 33.3 G/DL (ref 31.5–35.7)
MCV RBC AUTO: 88.1 FL (ref 79–97)
MONOCYTES # BLD AUTO: 0.72 10*3/MM3 (ref 0.1–0.9)
MONOCYTES NFR BLD AUTO: 5.5 % (ref 5–12)
NEUTROPHILS NFR BLD AUTO: 11.15 10*3/MM3 (ref 1.7–7)
NEUTROPHILS NFR BLD AUTO: 85.7 % (ref 42.7–76)
NITRITE UR QL STRIP: NEGATIVE
NRBC BLD AUTO-RTO: 0 /100 WBC (ref 0–0.2)
PH UR STRIP.AUTO: 6.5 [PH] (ref 5–8)
PLATELET # BLD AUTO: 432 10*3/MM3 (ref 140–450)
PMV BLD AUTO: 8.3 FL (ref 6–12)
POTASSIUM SERPL-SCNC: 4.3 MMOL/L (ref 3.5–5.2)
PROCALCITONIN SERPL-MCNC: 0.11 NG/ML (ref 0–0.25)
PROT SERPL-MCNC: 6.9 G/DL (ref 6–8.5)
PROT UR QL STRIP: ABNORMAL
RBC # BLD AUTO: 3.27 10*6/MM3 (ref 3.77–5.28)
RBC # UR: ABNORMAL /HPF
REF LAB TEST METHOD: ABNORMAL
SARS-COV-2 ORF1AB RESP QL NAA+PROBE: NOT DETECTED
SODIUM SERPL-SCNC: 126 MMOL/L (ref 136–145)
SP GR UR STRIP: 1.02 (ref 1–1.03)
SQUAMOUS #/AREA URNS HPF: ABNORMAL /HPF
TRANS CELLS #/AREA URNS HPF: ABNORMAL /HPF
UROBILINOGEN UR QL STRIP: ABNORMAL
WBC # BLD AUTO: 13.02 10*3/MM3 (ref 3.4–10.8)
WBC UR QL AUTO: ABNORMAL /HPF
WHOLE BLOOD HOLD SPECIMEN: NORMAL
WHOLE BLOOD HOLD SPECIMEN: NORMAL

## 2021-08-23 PROCEDURE — 83540 ASSAY OF IRON: CPT | Performed by: INTERNAL MEDICINE

## 2021-08-23 PROCEDURE — 82746 ASSAY OF FOLIC ACID SERUM: CPT | Performed by: INTERNAL MEDICINE

## 2021-08-23 PROCEDURE — 81001 URINALYSIS AUTO W/SCOPE: CPT | Performed by: NURSE PRACTITIONER

## 2021-08-23 PROCEDURE — G0378 HOSPITAL OBSERVATION PER HR: HCPCS

## 2021-08-23 PROCEDURE — 83605 ASSAY OF LACTIC ACID: CPT

## 2021-08-23 PROCEDURE — 74176 CT ABD & PELVIS W/O CONTRAST: CPT

## 2021-08-23 PROCEDURE — 80053 COMPREHEN METABOLIC PANEL: CPT

## 2021-08-23 PROCEDURE — 99284 EMERGENCY DEPT VISIT MOD MDM: CPT

## 2021-08-23 PROCEDURE — 87040 BLOOD CULTURE FOR BACTERIA: CPT | Performed by: EMERGENCY MEDICINE

## 2021-08-23 PROCEDURE — 25010000002 CEFTRIAXONE PER 250 MG: Performed by: NURSE PRACTITIONER

## 2021-08-23 PROCEDURE — 83605 ASSAY OF LACTIC ACID: CPT | Performed by: NURSE PRACTITIONER

## 2021-08-23 PROCEDURE — 87040 BLOOD CULTURE FOR BACTERIA: CPT

## 2021-08-23 PROCEDURE — 82728 ASSAY OF FERRITIN: CPT | Performed by: INTERNAL MEDICINE

## 2021-08-23 PROCEDURE — U0004 COV-19 TEST NON-CDC HGH THRU: HCPCS | Performed by: NURSE PRACTITIONER

## 2021-08-23 PROCEDURE — 85025 COMPLETE CBC W/AUTO DIFF WBC: CPT

## 2021-08-23 PROCEDURE — 84466 ASSAY OF TRANSFERRIN: CPT | Performed by: INTERNAL MEDICINE

## 2021-08-23 PROCEDURE — P9612 CATHETERIZE FOR URINE SPEC: HCPCS

## 2021-08-23 PROCEDURE — 71045 X-RAY EXAM CHEST 1 VIEW: CPT

## 2021-08-23 PROCEDURE — 82607 VITAMIN B-12: CPT | Performed by: INTERNAL MEDICINE

## 2021-08-23 PROCEDURE — 71250 CT THORAX DX C-: CPT

## 2021-08-23 PROCEDURE — 87086 URINE CULTURE/COLONY COUNT: CPT | Performed by: NURSE PRACTITIONER

## 2021-08-23 PROCEDURE — 84145 PROCALCITONIN (PCT): CPT | Performed by: NURSE PRACTITIONER

## 2021-08-23 RX ORDER — ONDANSETRON 4 MG/1
4 TABLET, FILM COATED ORAL EVERY 6 HOURS PRN
Status: DISCONTINUED | OUTPATIENT
Start: 2021-08-23 | End: 2021-08-26 | Stop reason: HOSPADM

## 2021-08-23 RX ORDER — SODIUM CHLORIDE 0.9 % (FLUSH) 0.9 %
10 SYRINGE (ML) INJECTION AS NEEDED
Status: DISCONTINUED | OUTPATIENT
Start: 2021-08-23 | End: 2021-08-26 | Stop reason: HOSPADM

## 2021-08-23 RX ORDER — ONDANSETRON 2 MG/ML
4 INJECTION INTRAMUSCULAR; INTRAVENOUS EVERY 6 HOURS PRN
Status: DISCONTINUED | OUTPATIENT
Start: 2021-08-23 | End: 2021-08-26 | Stop reason: HOSPADM

## 2021-08-23 RX ORDER — ACETAMINOPHEN 500 MG
TABLET ORAL
Status: COMPLETED
Start: 2021-08-23 | End: 2021-08-23

## 2021-08-23 RX ORDER — PANTOPRAZOLE SODIUM 40 MG/1
40 TABLET, DELAYED RELEASE ORAL
Status: DISCONTINUED | OUTPATIENT
Start: 2021-08-24 | End: 2021-08-26 | Stop reason: HOSPADM

## 2021-08-23 RX ORDER — SODIUM CHLORIDE 9 MG/ML
50 INJECTION, SOLUTION INTRAVENOUS CONTINUOUS
Status: DISCONTINUED | OUTPATIENT
Start: 2021-08-23 | End: 2021-08-25

## 2021-08-23 RX ORDER — ACETAMINOPHEN 500 MG
1000 TABLET ORAL ONCE
Status: COMPLETED | OUTPATIENT
Start: 2021-08-23 | End: 2021-08-23

## 2021-08-23 RX ORDER — SODIUM CHLORIDE 0.9 % (FLUSH) 0.9 %
10 SYRINGE (ML) INJECTION EVERY 12 HOURS SCHEDULED
Status: DISCONTINUED | OUTPATIENT
Start: 2021-08-23 | End: 2021-08-26 | Stop reason: HOSPADM

## 2021-08-23 RX ORDER — CEFTRIAXONE SODIUM 2 G/50ML
2 INJECTION, SOLUTION INTRAVENOUS EVERY 24 HOURS
Status: DISCONTINUED | OUTPATIENT
Start: 2021-08-24 | End: 2021-08-26 | Stop reason: HOSPADM

## 2021-08-23 RX ORDER — SACCHAROMYCES BOULARDII 250 MG
250 CAPSULE ORAL 2 TIMES DAILY
Status: DISCONTINUED | OUTPATIENT
Start: 2021-08-23 | End: 2021-08-26 | Stop reason: HOSPADM

## 2021-08-23 RX ORDER — CEFTRIAXONE SODIUM 1 G/50ML
1 INJECTION, SOLUTION INTRAVENOUS ONCE
Status: COMPLETED | OUTPATIENT
Start: 2021-08-23 | End: 2021-08-23

## 2021-08-23 RX ORDER — ACETAMINOPHEN 325 MG/1
650 TABLET ORAL EVERY 4 HOURS PRN
Status: DISCONTINUED | OUTPATIENT
Start: 2021-08-23 | End: 2021-08-24 | Stop reason: SDUPTHER

## 2021-08-23 RX ADMIN — SODIUM CHLORIDE 500 ML: 9 INJECTION, SOLUTION INTRAVENOUS at 19:10

## 2021-08-23 RX ADMIN — CEFTRIAXONE SODIUM 1 G: 1 INJECTION, SOLUTION INTRAVENOUS at 21:02

## 2021-08-23 RX ADMIN — Medication 1000 MG: at 18:27

## 2021-08-23 RX ADMIN — ACETAMINOPHEN 1000 MG: 500 TABLET, FILM COATED ORAL at 18:27

## 2021-08-23 RX ADMIN — SODIUM CHLORIDE 500 ML: 9 INJECTION, SOLUTION INTRAVENOUS at 22:47

## 2021-08-24 PROBLEM — N39.0 SEPSIS SECONDARY TO UTI: Status: ACTIVE | Noted: 2021-08-24

## 2021-08-24 PROBLEM — N10 ACUTE PYELONEPHRITIS: Status: ACTIVE | Noted: 2021-08-23

## 2021-08-24 PROBLEM — A41.9 SEPSIS SECONDARY TO UTI (HCC): Status: ACTIVE | Noted: 2021-08-24

## 2021-08-24 LAB
ANION GAP SERPL CALCULATED.3IONS-SCNC: 11.5 MMOL/L (ref 5–15)
BACTERIA SPEC AEROBE CULT: NO GROWTH
BUN SERPL-MCNC: 11 MG/DL (ref 8–23)
BUN/CREAT SERPL: 17.5 (ref 7–25)
CALCIUM SPEC-SCNC: 9.3 MG/DL (ref 8.6–10.5)
CHLORIDE SERPL-SCNC: 100 MMOL/L (ref 98–107)
CO2 SERPL-SCNC: 22.5 MMOL/L (ref 22–29)
CREAT SERPL-MCNC: 0.63 MG/DL (ref 0.57–1)
DEPRECATED RDW RBC AUTO: 38.8 FL (ref 37–54)
ERYTHROCYTE [DISTWIDTH] IN BLOOD BY AUTOMATED COUNT: 12.4 % (ref 12.3–15.4)
GFR SERPL CREATININE-BSD FRML MDRD: 90 ML/MIN/1.73
GLUCOSE SERPL-MCNC: 104 MG/DL (ref 65–99)
HCT VFR BLD AUTO: 30.6 % (ref 34–46.6)
HGB BLD-MCNC: 10 G/DL (ref 12–15.9)
MCH RBC QN AUTO: 28.8 PG (ref 26.6–33)
MCHC RBC AUTO-ENTMCNC: 32.7 G/DL (ref 31.5–35.7)
MCV RBC AUTO: 88.2 FL (ref 79–97)
OSMOLALITY SERPL: 287 MOSM/KG (ref 280–301)
OSMOLALITY UR: 179 MOSM/KG
PLATELET # BLD AUTO: 460 10*3/MM3 (ref 140–450)
PMV BLD AUTO: 9.1 FL (ref 6–12)
POTASSIUM SERPL-SCNC: 3.9 MMOL/L (ref 3.5–5.2)
RBC # BLD AUTO: 3.47 10*6/MM3 (ref 3.77–5.28)
SODIUM SERPL-SCNC: 134 MMOL/L (ref 136–145)
TSH SERPL DL<=0.05 MIU/L-ACNC: 1.21 UIU/ML (ref 0.27–4.2)
WBC # BLD AUTO: 9.76 10*3/MM3 (ref 3.4–10.8)

## 2021-08-24 PROCEDURE — 83935 ASSAY OF URINE OSMOLALITY: CPT | Performed by: HOSPITALIST

## 2021-08-24 PROCEDURE — 85027 COMPLETE CBC AUTOMATED: CPT | Performed by: HOSPITALIST

## 2021-08-24 PROCEDURE — 83930 ASSAY OF BLOOD OSMOLALITY: CPT | Performed by: HOSPITALIST

## 2021-08-24 PROCEDURE — 25010000002 CEFTRIAXONE PER 250 MG: Performed by: HOSPITALIST

## 2021-08-24 PROCEDURE — 84443 ASSAY THYROID STIM HORMONE: CPT | Performed by: HOSPITALIST

## 2021-08-24 PROCEDURE — 80048 BASIC METABOLIC PNL TOTAL CA: CPT | Performed by: HOSPITALIST

## 2021-08-24 PROCEDURE — 25010000003 CEFTRIAXONE PER 250 MG: Performed by: HOSPITALIST

## 2021-08-24 PROCEDURE — 36415 COLL VENOUS BLD VENIPUNCTURE: CPT | Performed by: HOSPITALIST

## 2021-08-24 RX ORDER — CARVEDILOL 6.25 MG/1
6.25 TABLET ORAL 2 TIMES DAILY WITH MEALS
Status: DISCONTINUED | OUTPATIENT
Start: 2021-08-24 | End: 2021-08-26 | Stop reason: HOSPADM

## 2021-08-24 RX ORDER — AMLODIPINE BESYLATE 10 MG/1
10 TABLET ORAL DAILY
Status: DISCONTINUED | OUTPATIENT
Start: 2021-08-24 | End: 2021-08-25

## 2021-08-24 RX ORDER — FLUTICASONE PROPIONATE 50 MCG
1 SPRAY, SUSPENSION (ML) NASAL DAILY
Status: DISCONTINUED | OUTPATIENT
Start: 2021-08-24 | End: 2021-08-26 | Stop reason: HOSPADM

## 2021-08-24 RX ORDER — ASPIRIN 325 MG
325 TABLET ORAL DAILY
Status: DISCONTINUED | OUTPATIENT
Start: 2021-08-24 | End: 2021-08-24

## 2021-08-24 RX ORDER — HYDROCODONE BITARTRATE AND ACETAMINOPHEN 5; 325 MG/1; MG/1
1 TABLET ORAL ONCE AS NEEDED
Status: COMPLETED | OUTPATIENT
Start: 2021-08-24 | End: 2021-08-24

## 2021-08-24 RX ORDER — CYCLOBENZAPRINE HCL 10 MG
5 TABLET ORAL 3 TIMES DAILY PRN
Status: DISCONTINUED | OUTPATIENT
Start: 2021-08-24 | End: 2021-08-26 | Stop reason: HOSPADM

## 2021-08-24 RX ORDER — FLUOROMETHOLONE 0.1 %
1 SUSPENSION, DROPS(FINAL DOSAGE FORM)(ML) OPHTHALMIC (EYE) DAILY
Status: DISCONTINUED | OUTPATIENT
Start: 2021-08-24 | End: 2021-08-26 | Stop reason: HOSPADM

## 2021-08-24 RX ORDER — ACETAMINOPHEN 325 MG/1
650 TABLET ORAL EVERY 4 HOURS PRN
Status: DISCONTINUED | OUTPATIENT
Start: 2021-08-24 | End: 2021-08-26 | Stop reason: HOSPADM

## 2021-08-24 RX ORDER — LEVOTHYROXINE SODIUM 0.05 MG/1
50 TABLET ORAL DAILY
Status: DISCONTINUED | OUTPATIENT
Start: 2021-08-24 | End: 2021-08-26 | Stop reason: HOSPADM

## 2021-08-24 RX ORDER — GABAPENTIN 100 MG/1
100 CAPSULE ORAL 3 TIMES DAILY
Status: DISCONTINUED | OUTPATIENT
Start: 2021-08-24 | End: 2021-08-26 | Stop reason: HOSPADM

## 2021-08-24 RX ORDER — VALSARTAN 160 MG/1
160 TABLET ORAL DAILY
Status: DISCONTINUED | OUTPATIENT
Start: 2021-08-24 | End: 2021-08-25

## 2021-08-24 RX ORDER — CEFTRIAXONE SODIUM 1 G/50ML
1 INJECTION, SOLUTION INTRAVENOUS ONCE
Status: COMPLETED | OUTPATIENT
Start: 2021-08-24 | End: 2021-08-24

## 2021-08-24 RX ORDER — SUCRALFATE 1 G/1
1 TABLET ORAL
Status: DISCONTINUED | OUTPATIENT
Start: 2021-08-24 | End: 2021-08-26 | Stop reason: HOSPADM

## 2021-08-24 RX ORDER — ASPIRIN 81 MG/1
81 TABLET ORAL DAILY
Status: DISCONTINUED | OUTPATIENT
Start: 2021-08-25 | End: 2021-08-26 | Stop reason: HOSPADM

## 2021-08-24 RX ADMIN — Medication 250 MG: at 21:09

## 2021-08-24 RX ADMIN — PANTOPRAZOLE SODIUM 40 MG: 40 TABLET, DELAYED RELEASE ORAL at 06:46

## 2021-08-24 RX ADMIN — SUCRALFATE 1 G: 1 TABLET ORAL at 11:21

## 2021-08-24 RX ADMIN — LEVOTHYROXINE SODIUM 50 MCG: 0.05 TABLET ORAL at 11:21

## 2021-08-24 RX ADMIN — GABAPENTIN 100 MG: 100 CAPSULE ORAL at 09:32

## 2021-08-24 RX ADMIN — SODIUM CHLORIDE 100 ML/HR: 9 INJECTION, SOLUTION INTRAVENOUS at 10:30

## 2021-08-24 RX ADMIN — SUCRALFATE 1 G: 1 TABLET ORAL at 17:28

## 2021-08-24 RX ADMIN — Medication 250 MG: at 09:32

## 2021-08-24 RX ADMIN — CEFTRIAXONE SODIUM 1 G: 1 INJECTION, SOLUTION INTRAVENOUS at 02:05

## 2021-08-24 RX ADMIN — ACETAMINOPHEN 650 MG: 325 TABLET, FILM COATED ORAL at 10:35

## 2021-08-24 RX ADMIN — SODIUM CHLORIDE 100 ML/HR: 9 INJECTION, SOLUTION INTRAVENOUS at 00:33

## 2021-08-24 RX ADMIN — GABAPENTIN 100 MG: 100 CAPSULE ORAL at 16:05

## 2021-08-24 RX ADMIN — FLUOROMETHOLONE 1 DROP: 1 SOLUTION/ DROPS OPHTHALMIC at 11:17

## 2021-08-24 RX ADMIN — GABAPENTIN 100 MG: 100 CAPSULE ORAL at 21:09

## 2021-08-24 RX ADMIN — HYDROCODONE BITARTRATE AND ACETAMINOPHEN 1 TABLET: 5; 325 TABLET ORAL at 21:56

## 2021-08-24 RX ADMIN — SODIUM CHLORIDE, PRESERVATIVE FREE 10 ML: 5 INJECTION INTRAVENOUS at 08:51

## 2021-08-24 RX ADMIN — FLUTICASONE PROPIONATE 1 SPRAY: 50 SPRAY, METERED NASAL at 11:17

## 2021-08-24 RX ADMIN — CEFTRIAXONE SODIUM 2 G: 2 INJECTION, SOLUTION INTRAVENOUS at 21:09

## 2021-08-24 RX ADMIN — AMLODIPINE BESYLATE 10 MG: 10 TABLET ORAL at 11:21

## 2021-08-25 LAB
ANION GAP SERPL CALCULATED.3IONS-SCNC: 9.9 MMOL/L (ref 5–15)
BUN SERPL-MCNC: 10 MG/DL (ref 8–23)
BUN/CREAT SERPL: 18.5 (ref 7–25)
CALCIUM SPEC-SCNC: 8.9 MG/DL (ref 8.6–10.5)
CHLORIDE SERPL-SCNC: 101 MMOL/L (ref 98–107)
CO2 SERPL-SCNC: 22.1 MMOL/L (ref 22–29)
CREAT SERPL-MCNC: 0.54 MG/DL (ref 0.57–1)
DEPRECATED RDW RBC AUTO: 40.1 FL (ref 37–54)
ERYTHROCYTE [DISTWIDTH] IN BLOOD BY AUTOMATED COUNT: 12.9 % (ref 12.3–15.4)
FERRITIN SERPL-MCNC: 627 NG/ML (ref 13–150)
FOLATE SERPL-MCNC: 13 NG/ML (ref 4.78–24.2)
GFR SERPL CREATININE-BSD FRML MDRD: 107 ML/MIN/1.73
GLUCOSE SERPL-MCNC: 98 MG/DL (ref 65–99)
HCT VFR BLD AUTO: 24.4 % (ref 34–46.6)
HGB BLD-MCNC: 8.3 G/DL (ref 12–15.9)
IRON 24H UR-MRATE: 9 MCG/DL (ref 37–145)
IRON SATN MFR SERPL: 3 % (ref 20–50)
MCH RBC QN AUTO: 29.4 PG (ref 26.6–33)
MCHC RBC AUTO-ENTMCNC: 34 G/DL (ref 31.5–35.7)
MCV RBC AUTO: 86.5 FL (ref 79–97)
PLATELET # BLD AUTO: 461 10*3/MM3 (ref 140–450)
PMV BLD AUTO: 9.3 FL (ref 6–12)
POTASSIUM SERPL-SCNC: 3.7 MMOL/L (ref 3.5–5.2)
RBC # BLD AUTO: 2.82 10*6/MM3 (ref 3.77–5.28)
RETICS # AUTO: 0.04 10*6/MM3 (ref 0.02–0.13)
RETICS/RBC NFR AUTO: 1.35 % (ref 0.7–1.9)
SODIUM SERPL-SCNC: 133 MMOL/L (ref 136–145)
TIBC SERPL-MCNC: 267 MCG/DL (ref 298–536)
TRANSFERRIN SERPL-MCNC: 179 MG/DL (ref 200–360)
VIT B12 BLD-MCNC: 380 PG/ML (ref 211–946)
WBC # BLD AUTO: 10.51 10*3/MM3 (ref 3.4–10.8)

## 2021-08-25 PROCEDURE — 80048 BASIC METABOLIC PNL TOTAL CA: CPT | Performed by: INTERNAL MEDICINE

## 2021-08-25 PROCEDURE — 25010000003 CEFTRIAXONE PER 250 MG: Performed by: HOSPITALIST

## 2021-08-25 PROCEDURE — 97162 PT EVAL MOD COMPLEX 30 MIN: CPT

## 2021-08-25 PROCEDURE — 85045 AUTOMATED RETICULOCYTE COUNT: CPT | Performed by: INTERNAL MEDICINE

## 2021-08-25 PROCEDURE — 97110 THERAPEUTIC EXERCISES: CPT

## 2021-08-25 PROCEDURE — 85027 COMPLETE CBC AUTOMATED: CPT | Performed by: INTERNAL MEDICINE

## 2021-08-25 RX ORDER — ACETAMINOPHEN 325 MG/1
650 TABLET ORAL ONCE
Status: COMPLETED | OUTPATIENT
Start: 2021-08-26 | End: 2021-08-26

## 2021-08-25 RX ORDER — AMLODIPINE BESYLATE 5 MG/1
5 TABLET ORAL DAILY
Status: DISCONTINUED | OUTPATIENT
Start: 2021-08-25 | End: 2021-08-25

## 2021-08-25 RX ADMIN — GABAPENTIN 100 MG: 100 CAPSULE ORAL at 17:07

## 2021-08-25 RX ADMIN — ASPIRIN 81 MG: 81 TABLET, COATED ORAL at 09:57

## 2021-08-25 RX ADMIN — FLUTICASONE PROPIONATE 1 SPRAY: 50 SPRAY, METERED NASAL at 09:57

## 2021-08-25 RX ADMIN — SODIUM CHLORIDE 50 ML/HR: 9 INJECTION, SOLUTION INTRAVENOUS at 06:45

## 2021-08-25 RX ADMIN — GABAPENTIN 100 MG: 100 CAPSULE ORAL at 09:56

## 2021-08-25 RX ADMIN — PANTOPRAZOLE SODIUM 40 MG: 40 TABLET, DELAYED RELEASE ORAL at 06:45

## 2021-08-25 RX ADMIN — GABAPENTIN 100 MG: 100 CAPSULE ORAL at 20:11

## 2021-08-25 RX ADMIN — Medication 250 MG: at 09:57

## 2021-08-25 RX ADMIN — SUCRALFATE 1 G: 1 TABLET ORAL at 12:29

## 2021-08-25 RX ADMIN — SUCRALFATE 1 G: 1 TABLET ORAL at 17:07

## 2021-08-25 RX ADMIN — CARVEDILOL 6.25 MG: 6.25 TABLET, FILM COATED ORAL at 17:07

## 2021-08-25 RX ADMIN — FLUOROMETHOLONE 1 DROP: 1 SOLUTION/ DROPS OPHTHALMIC at 09:57

## 2021-08-25 RX ADMIN — LEVOTHYROXINE SODIUM 50 MCG: 0.05 TABLET ORAL at 09:56

## 2021-08-25 RX ADMIN — SUCRALFATE 1 G: 1 TABLET ORAL at 06:45

## 2021-08-25 RX ADMIN — SODIUM CHLORIDE, PRESERVATIVE FREE 10 ML: 5 INJECTION INTRAVENOUS at 20:10

## 2021-08-25 RX ADMIN — ACETAMINOPHEN 650 MG: 325 TABLET, FILM COATED ORAL at 22:59

## 2021-08-25 RX ADMIN — CEFTRIAXONE SODIUM 2 G: 2 INJECTION, SOLUTION INTRAVENOUS at 20:11

## 2021-08-25 RX ADMIN — Medication 250 MG: at 20:11

## 2021-08-26 VITALS
BODY MASS INDEX: 27.98 KG/M2 | WEIGHT: 148.2 LBS | HEIGHT: 61 IN | RESPIRATION RATE: 18 BRPM | DIASTOLIC BLOOD PRESSURE: 61 MMHG | OXYGEN SATURATION: 97 % | SYSTOLIC BLOOD PRESSURE: 132 MMHG | HEART RATE: 68 BPM | TEMPERATURE: 97.4 F

## 2021-08-26 LAB
ANION GAP SERPL CALCULATED.3IONS-SCNC: 9.5 MMOL/L (ref 5–15)
BASOPHILS # BLD AUTO: 0.09 10*3/MM3 (ref 0–0.2)
BASOPHILS NFR BLD AUTO: 1 % (ref 0–1.5)
BUN SERPL-MCNC: 8 MG/DL (ref 8–23)
BUN/CREAT SERPL: 14 (ref 7–25)
CALCIUM SPEC-SCNC: 9.1 MG/DL (ref 8.6–10.5)
CHLORIDE SERPL-SCNC: 101 MMOL/L (ref 98–107)
CO2 SERPL-SCNC: 23.5 MMOL/L (ref 22–29)
CREAT SERPL-MCNC: 0.57 MG/DL (ref 0.57–1)
DEPRECATED RDW RBC AUTO: 40.5 FL (ref 37–54)
EOSINOPHIL # BLD AUTO: 0.37 10*3/MM3 (ref 0–0.4)
EOSINOPHIL NFR BLD AUTO: 4.1 % (ref 0.3–6.2)
ERYTHROCYTE [DISTWIDTH] IN BLOOD BY AUTOMATED COUNT: 12.9 % (ref 12.3–15.4)
GFR SERPL CREATININE-BSD FRML MDRD: 101 ML/MIN/1.73
GLUCOSE SERPL-MCNC: 98 MG/DL (ref 65–99)
HCT VFR BLD AUTO: 25.5 % (ref 34–46.6)
HGB BLD-MCNC: 8.5 G/DL (ref 12–15.9)
IMM GRANULOCYTES # BLD AUTO: 0.09 10*3/MM3 (ref 0–0.05)
IMM GRANULOCYTES NFR BLD AUTO: 1 % (ref 0–0.5)
LYMPHOCYTES # BLD AUTO: 1.51 10*3/MM3 (ref 0.7–3.1)
LYMPHOCYTES NFR BLD AUTO: 16.7 % (ref 19.6–45.3)
MCH RBC QN AUTO: 29 PG (ref 26.6–33)
MCHC RBC AUTO-ENTMCNC: 33.3 G/DL (ref 31.5–35.7)
MCV RBC AUTO: 87 FL (ref 79–97)
MONOCYTES # BLD AUTO: 0.79 10*3/MM3 (ref 0.1–0.9)
MONOCYTES NFR BLD AUTO: 8.7 % (ref 5–12)
NEUTROPHILS NFR BLD AUTO: 6.18 10*3/MM3 (ref 1.7–7)
NEUTROPHILS NFR BLD AUTO: 68.5 % (ref 42.7–76)
NRBC BLD AUTO-RTO: 0 /100 WBC (ref 0–0.2)
PLATELET # BLD AUTO: 500 10*3/MM3 (ref 140–450)
PMV BLD AUTO: 9.5 FL (ref 6–12)
POTASSIUM SERPL-SCNC: 3.8 MMOL/L (ref 3.5–5.2)
RBC # BLD AUTO: 2.93 10*6/MM3 (ref 3.77–5.28)
SODIUM SERPL-SCNC: 134 MMOL/L (ref 136–145)
WBC # BLD AUTO: 9.03 10*3/MM3 (ref 3.4–10.8)

## 2021-08-26 PROCEDURE — 80048 BASIC METABOLIC PNL TOTAL CA: CPT | Performed by: INTERNAL MEDICINE

## 2021-08-26 PROCEDURE — 25010000002 IRON SUCROSE PER 1 MG: Performed by: INTERNAL MEDICINE

## 2021-08-26 PROCEDURE — 85025 COMPLETE CBC W/AUTO DIFF WBC: CPT | Performed by: INTERNAL MEDICINE

## 2021-08-26 RX ORDER — AMOXICILLIN 250 MG
1 CAPSULE ORAL 2 TIMES DAILY PRN
Start: 2021-08-26 | End: 2022-03-15

## 2021-08-26 RX ORDER — ONDANSETRON 4 MG/1
4 TABLET, FILM COATED ORAL EVERY 6 HOURS PRN
Start: 2021-08-26 | End: 2022-06-29

## 2021-08-26 RX ORDER — HYDROCODONE BITARTRATE AND ACETAMINOPHEN 5; 325 MG/1; MG/1
1 TABLET ORAL 3 TIMES DAILY PRN
Qty: 12 TABLET | Refills: 0 | Status: SHIPPED | OUTPATIENT
Start: 2021-08-26 | End: 2021-10-29

## 2021-08-26 RX ORDER — CEFDINIR 300 MG/1
300 CAPSULE ORAL 2 TIMES DAILY
Qty: 14 CAPSULE | Refills: 0
Start: 2021-08-26 | End: 2021-09-02

## 2021-08-26 RX ORDER — GABAPENTIN 100 MG/1
100 CAPSULE ORAL 2 TIMES DAILY
Qty: 15 CAPSULE | Refills: 0 | Status: SHIPPED | OUTPATIENT
Start: 2021-08-26 | End: 2021-10-29 | Stop reason: SDUPTHER

## 2021-08-26 RX ADMIN — SODIUM CHLORIDE, PRESERVATIVE FREE 10 ML: 5 INJECTION INTRAVENOUS at 09:18

## 2021-08-26 RX ADMIN — CARVEDILOL 6.25 MG: 6.25 TABLET, FILM COATED ORAL at 09:18

## 2021-08-26 RX ADMIN — LEVOTHYROXINE SODIUM 50 MCG: 0.05 TABLET ORAL at 09:18

## 2021-08-26 RX ADMIN — Medication 250 MG: at 09:18

## 2021-08-26 RX ADMIN — SUCRALFATE 1 G: 1 TABLET ORAL at 09:17

## 2021-08-26 RX ADMIN — FLUOROMETHOLONE 1 DROP: 1 SOLUTION/ DROPS OPHTHALMIC at 09:19

## 2021-08-26 RX ADMIN — GABAPENTIN 100 MG: 100 CAPSULE ORAL at 09:17

## 2021-08-26 RX ADMIN — PANTOPRAZOLE SODIUM 40 MG: 40 TABLET, DELAYED RELEASE ORAL at 06:12

## 2021-08-26 RX ADMIN — ACETAMINOPHEN 650 MG: 325 TABLET, FILM COATED ORAL at 06:12

## 2021-08-26 RX ADMIN — IRON SUCROSE 200 MG: 20 INJECTION, SOLUTION INTRAVENOUS at 06:12

## 2021-08-26 RX ADMIN — FLUTICASONE PROPIONATE 1 SPRAY: 50 SPRAY, METERED NASAL at 09:19

## 2021-08-26 RX ADMIN — ASPIRIN 81 MG: 81 TABLET, COATED ORAL at 09:17

## 2021-08-28 LAB
BACTERIA SPEC AEROBE CULT: NORMAL
BACTERIA SPEC AEROBE CULT: NORMAL

## 2021-09-08 ENCOUNTER — TELEPHONE (OUTPATIENT)
Dept: ONCOLOGY | Facility: CLINIC | Age: 86
End: 2021-09-08

## 2021-09-08 NOTE — TELEPHONE ENCOUNTER
Caller: Daisy Avila    Relationship to patient: Emergency Contact    Best call back number: [phone]    Chief complaint: PT (CHRIS ) HAS BROKE HIP AND IS IN REHAB) NEEDING TO CANCEL APPT FOR LAB AND F/U WITH DR WILLIS 09/10    Type of visit: LAB AND 6 MONTH F/U     Requested date: WILL CALL BACK TO R/S    If rescheduling, when is the original appointment: 09/10    Additional notes:    JUST NEEDING TO CANCEL F/U AND LABS FROM 09/10 , DAUGHTER WILL CALL BACK TO R/S

## 2021-09-10 ENCOUNTER — APPOINTMENT (OUTPATIENT)
Dept: LAB | Facility: HOSPITAL | Age: 86
End: 2021-09-10

## 2021-09-20 ENCOUNTER — TELEPHONE (OUTPATIENT)
Dept: NEUROSURGERY | Facility: CLINIC | Age: 86
End: 2021-09-20

## 2021-09-20 NOTE — TELEPHONE ENCOUNTER
Caller: ALEX DOHERTY     Relationship to patient: DAUGHTER     Best call back number:[phone]    Chief complaint: RESCHEDULE APPT     Type of visit: 4-6 [facility] HOSPITAL FOLLOW UP     Requested date: ASAP     If rescheduling, when is the original appointment: 08/03/21    Additional notes: PATIENT WAS IN THE HOSPITAL 07/02/21 AND HAD AN INJECTION. SHE WAS TO FOLLOW UP IN A MONTH FOR HER 2ND INJECTION 08/03/21 BUT HAD TO CANCELL APPT DUE TO BREAKING HER HIP. PATIENT DAUGHTER IS CALLING TO RESCHEDULE HER APPT WITH DR. INGRAM, SHE STATED THE PATIENT LEGS ARE GOING NUMB, ANKLES ARE SWOLLEN AND IS IN A LOT OF PAIN. SHE WOULD LIKE TO RESCHEDULE HER APPT ASAP. PLEASE ADVISE.

## 2021-10-15 ENCOUNTER — TELEPHONE (OUTPATIENT)
Dept: NEUROSURGERY | Facility: CLINIC | Age: 86
End: 2021-10-15

## 2021-10-22 NOTE — TELEPHONE ENCOUNTER
PATIENT'S DAUGHTER CALLED TODAY REQUESTING A CALLBACK, EVEN IF PATIENT CAN'T BE RESCHEDULED YET, CAN THE PATIENT'S DAUGHTER BE CALLED WITH A STATUS UPDATE ON WHERE THEY ARE IN LINE FOR RESCHEDULING?    CALLBACK #:  SUKHWINDER ESTRADA  [phone]    THANK YOU SO VERY MUCH.

## 2021-10-26 NOTE — TELEPHONE ENCOUNTER
Pt's daughter called again for a R/S appt (DR LANG had to CX her last one). CALLBACK #:  SUKHWINDER ESTRADA  [phone]

## 2021-10-29 ENCOUNTER — OFFICE VISIT (OUTPATIENT)
Dept: INTERNAL MEDICINE | Facility: CLINIC | Age: 86
End: 2021-10-29

## 2021-10-29 VITALS
WEIGHT: 146.8 LBS | SYSTOLIC BLOOD PRESSURE: 130 MMHG | RESPIRATION RATE: 16 BRPM | TEMPERATURE: 98.7 F | HEIGHT: 61 IN | OXYGEN SATURATION: 98 % | DIASTOLIC BLOOD PRESSURE: 70 MMHG | HEART RATE: 71 BPM | BODY MASS INDEX: 27.72 KG/M2

## 2021-10-29 DIAGNOSIS — B96.89 ACUTE BACTERIAL SINUSITIS: ICD-10-CM

## 2021-10-29 DIAGNOSIS — M51.37 DDD (DEGENERATIVE DISC DISEASE), LUMBOSACRAL: ICD-10-CM

## 2021-10-29 DIAGNOSIS — E03.9 HYPOTHYROIDISM, UNSPECIFIED TYPE: ICD-10-CM

## 2021-10-29 DIAGNOSIS — J01.90 ACUTE BACTERIAL SINUSITIS: ICD-10-CM

## 2021-10-29 DIAGNOSIS — I10 ESSENTIAL HYPERTENSION: Primary | ICD-10-CM

## 2021-10-29 DIAGNOSIS — M54.42 ACUTE LEFT-SIDED LOW BACK PAIN WITH LEFT-SIDED SCIATICA: ICD-10-CM

## 2021-10-29 DIAGNOSIS — R73.01 IMPAIRED FASTING GLUCOSE: ICD-10-CM

## 2021-10-29 PROCEDURE — 99215 OFFICE O/P EST HI 40 MIN: CPT | Performed by: FAMILY MEDICINE

## 2021-10-29 RX ORDER — VALSARTAN 80 MG/1
80 TABLET ORAL NIGHTLY
Qty: 90 TABLET | Refills: 3 | Status: SHIPPED | OUTPATIENT
Start: 2021-10-29 | End: 2022-01-14

## 2021-10-29 RX ORDER — TRIAMCINOLONE ACETONIDE 40 MG/ML
INJECTION, SUSPENSION INTRA-ARTICULAR; INTRAMUSCULAR
COMMUNITY
Start: 2021-09-01 | End: 2022-01-28

## 2021-10-29 RX ORDER — FLUOROMETHOLONE ACETATE 1 MG/ML
SUSPENSION/ DROPS OPHTHALMIC
COMMUNITY
Start: 2021-09-17

## 2021-10-29 RX ORDER — AMOXICILLIN 500 MG/1
1000 CAPSULE ORAL 3 TIMES DAILY
Qty: 60 CAPSULE | Refills: 0 | Status: SHIPPED | OUTPATIENT
Start: 2021-10-29 | End: 2022-03-07 | Stop reason: HOSPADM

## 2021-10-29 RX ORDER — GABAPENTIN 100 MG/1
CAPSULE ORAL
Qty: 120 CAPSULE | Refills: 5 | Status: SHIPPED | OUTPATIENT
Start: 2021-10-29 | End: 2022-05-27

## 2021-10-29 NOTE — PROGRESS NOTES
"Chief Complaint  Hypertension (3 month follow up ), Hyperlipidemia, and impaired fasting glucose    Subjective          Geeta Butt presents to [facility]  History of Present Illness     Hypertension - fluctuating frequently in the .  Patient taking medication as prescribed.  Denies chest pain, shortness of breath, headache, lower extremity edema.  Patient is taking carvedilol 6.25mg BID.  She was taken off of the nia     Hypothyroidism - Stable.  Patient taking levothyroxine.  Patient denying any symptoms of hypothyroidism such as cold intolerance, constipation, mood swings.  TSH done on 7/3/21 and was stable at 0.404.       Impaired fasting glucose - stable.  A1c check on 7/3/21 and was good at 5.70.  On no glucose lowering medications.    She had a fall on 7/30 and broke her right hip and ended up in the hospital and required surgery for repair.  Subsequently went to rehab but was feeling so ill she was having difficulty doing the rehab.  Later became septic from pyelo and was in the hospital again.  She has completed therapy again.  She has not been able to get her next back injection.  Noticing increased swelling in the lower extremities.  Interested in continuing the gabapentin 100mg TID with a possible minor increases.  PT has been working with her dismissed her today.       Sinusitis - She has been battling sinusitis for several weeks and on zyrtec and flonase. Endorses facial pain, runny nose, congestion. Denies fever or chills. Asking for antibiotics at this time.    Asking if she could increase her gabapentin a little bit for her back pain. She is currently taking 100 mg 3 times a day. Tolerating the dose well.    Objective   Vital Signs:   /70 (BP Location: Right arm, Patient Position: Sitting, Cuff Size: Adult)   Pulse 71   Temp 98.7 °F (37.1 °C) (Temporal)   Resp 16   Ht 154.9 cm (61\")   Wt 66.6 kg (146 lb 12.8 oz)   SpO2 98%   BMI 27.74 kg/m²     Physical " Exam  Vitals and nursing note reviewed.   Constitutional:       General: She is not in acute distress.     Appearance: Normal appearance.   HENT:      Right Ear: Hearing, tympanic membrane, ear canal and external ear normal.      Left Ear: Hearing, tympanic membrane, ear canal and external ear normal.      Nose:      Right Sinus: Maxillary sinus tenderness and frontal sinus tenderness present.      Left Sinus: Maxillary sinus tenderness and frontal sinus tenderness present.      Mouth/Throat:      Comments: Cobblestoning in the throat  Cardiovascular:      Rate and Rhythm: Normal rate and regular rhythm.      Heart sounds: Normal heart sounds. No murmur heard.      Pulmonary:      Effort: Pulmonary effort is normal.      Breath sounds: Normal breath sounds.   Neurological:      Mental Status: She is alert.        Result Review :   The following data was reviewed by: Grzegorz Nguyen MD on 10/29/2021:  Common labs    Common Labsle 8/24/21 8/24/21 8/25/21 8/25/21 8/26/21 8/26/21    0628 0628 0657 0657 0644 0644   Glucose  104 (A)  98  98   BUN  11  10  8   Creatinine  0.63  0.54 (A)  0.57   eGFR Non African Am  90  107  101   Sodium  134 (A)  133 (A)  134 (A)   Potassium  3.9  3.7  3.8   Chloride  100  101  101   Calcium  9.3  8.9  9.1   WBC 9.76  10.51  9.03    Hemoglobin 10.0 (A)  8.3 (A)  8.5 (A)    Hematocrit 30.6 (A)  24.4 (A)  25.5 (A)    Platelets 460 (A)  461 (A)  500 (A)    (A) Abnormal value                      Assessment and Plan    Diagnoses and all orders for this visit:    1. Essential hypertension (Primary)  -     valsartan (DIOVAN) 80 MG tablet; Take 1 tablet by mouth Every Night.  Dispense: 90 tablet; Refill: 3  -     Comprehensive Metabolic Panel  -     CBC (No Diff)    2. Hypothyroidism, unspecified type  -     TSH  -     T4, Free    3. Impaired fasting glucose  -     Hemoglobin A1c  -     Comprehensive Metabolic Panel  -     CBC (No Diff)    4. Acute left-sided low back pain with left-sided  sciatica  -     gabapentin (NEURONTIN) 100 MG capsule; Take 1 capsule three times per day.  Okay to take 1 more capsule at bedtime if needed for pain.  Dispense: 120 capsule; Refill: 5    5. DDD (degenerative disc disease), lumbosacral  -     gabapentin (NEURONTIN) 100 MG capsule; Take 1 capsule three times per day.  Okay to take 1 more capsule at bedtime if needed for pain.  Dispense: 120 capsule; Refill: 5    6. Acute bacterial sinusitis  -     amoxicillin (AMOXIL) 500 MG capsule; Take 2 capsules by mouth 3 (Three) Times a Day.  Dispense: 60 capsule; Refill: 0      Labs as above. Blood pressure stable. Continue all medications as above. We will increase the gabapentin to 1 tablet 3 times per day with the option to have 1 more capsule at bedtime. Will need drug contract when she returns as I thought we already had one but I was mistaken. I will get that upon her return.    We will treat the acute bacterial sinusitis with amoxicillin and she should continue her current therapies as listed in the HPI.      I spent 40 minutes caring for Geeta on this date of service. This time includes time spent by me in the following activities:preparing for the visit, reviewing tests, obtaining and/or reviewing a separately obtained history, performing a medically appropriate examination and/or evaluation , counseling and educating the patient/family/caregiver, ordering medications, tests, or procedures, referring and communicating with other health care professionals , documenting information in the medical record and independently interpreting results and communicating that information with the patient/family/caregiver  Follow Up   Return in about 3 months (around 1/29/2022) for Recheck - HTN, neuoropathy.  Patient was given instructions and counseling regarding her condition or for health maintenance advice. Please see specific information pulled into the AVS if appropriate.

## 2021-10-30 LAB
ALBUMIN SERPL-MCNC: 3.9 G/DL (ref 3.6–4.6)
ALBUMIN/GLOB SERPL: 1.2 {RATIO} (ref 1.2–2.2)
ALP SERPL-CCNC: 72 IU/L (ref 44–121)
ALT SERPL-CCNC: 9 IU/L (ref 0–32)
AST SERPL-CCNC: 13 IU/L (ref 0–40)
BILIRUB SERPL-MCNC: <0.2 MG/DL (ref 0–1.2)
BUN SERPL-MCNC: 20 MG/DL (ref 8–27)
BUN/CREAT SERPL: 25 (ref 12–28)
CALCIUM SERPL-MCNC: 9.9 MG/DL (ref 8.7–10.3)
CHLORIDE SERPL-SCNC: 97 MMOL/L (ref 96–106)
CO2 SERPL-SCNC: 25 MMOL/L (ref 20–29)
CREAT SERPL-MCNC: 0.8 MG/DL (ref 0.57–1)
ERYTHROCYTE [DISTWIDTH] IN BLOOD BY AUTOMATED COUNT: 14.2 % (ref 11.7–15.4)
GLOBULIN SER CALC-MCNC: 3.2 G/DL (ref 1.5–4.5)
GLUCOSE SERPL-MCNC: 88 MG/DL (ref 65–99)
HBA1C MFR BLD: 5.7 % (ref 4.8–5.6)
HCT VFR BLD AUTO: 31 % (ref 34–46.6)
HGB BLD-MCNC: 10.1 G/DL (ref 11.1–15.9)
MCH RBC QN AUTO: 27.4 PG (ref 26.6–33)
MCHC RBC AUTO-ENTMCNC: 32.6 G/DL (ref 31.5–35.7)
MCV RBC AUTO: 84 FL (ref 79–97)
PLATELET # BLD AUTO: 592 X10E3/UL (ref 150–450)
POTASSIUM SERPL-SCNC: 4.3 MMOL/L (ref 3.5–5.2)
PROT SERPL-MCNC: 7.1 G/DL (ref 6–8.5)
RBC # BLD AUTO: 3.68 X10E6/UL (ref 3.77–5.28)
SODIUM SERPL-SCNC: 135 MMOL/L (ref 134–144)
T4 FREE SERPL-MCNC: 1.28 NG/DL (ref 0.82–1.77)
TSH SERPL DL<=0.005 MIU/L-ACNC: 1.11 UIU/ML (ref 0.45–4.5)
WBC # BLD AUTO: 11.3 X10E3/UL (ref 3.4–10.8)

## 2021-11-12 ENCOUNTER — TELEPHONE (OUTPATIENT)
Dept: INTERNAL MEDICINE | Facility: CLINIC | Age: 86
End: 2021-11-12

## 2021-11-12 NOTE — TELEPHONE ENCOUNTER
Caller: Daisy Avila    Relationship: Emergency Contact    Best call back number: [phone]     What is the best time to reach you: ANYTIME    Who are you requesting to speak with (clinical staff, provider,  specific staff member): DR MURRIETA OR MA    What was the call regarding: PATIENTS DAUGHTER STATES THAT PATIENT WILL BE COMING HOME FROM NURSING HOME, POTENTIALLY NEXT WEEK, WOULD LIKE TO KNOW ABOUT INFORMATION GETTING A MEDICAL ALERT SYSTEMS FOR FALLS OR ANYTHING.    Do you require a callback: YES

## 2021-11-15 ENCOUNTER — TELEPHONE (OUTPATIENT)
Dept: INTERNAL MEDICINE | Facility: CLINIC | Age: 86
End: 2021-11-15

## 2021-11-15 ENCOUNTER — OFFICE VISIT (OUTPATIENT)
Dept: INTERNAL MEDICINE | Facility: CLINIC | Age: 86
End: 2021-11-15

## 2021-11-15 VITALS
WEIGHT: 148 LBS | DIASTOLIC BLOOD PRESSURE: 70 MMHG | HEART RATE: 78 BPM | SYSTOLIC BLOOD PRESSURE: 148 MMHG | HEIGHT: 61 IN | OXYGEN SATURATION: 98 % | BODY MASS INDEX: 27.94 KG/M2 | TEMPERATURE: 97.8 F

## 2021-11-15 DIAGNOSIS — I10 ESSENTIAL HYPERTENSION: Primary | Chronic | ICD-10-CM

## 2021-11-15 PROCEDURE — 99213 OFFICE O/P EST LOW 20 MIN: CPT | Performed by: NURSE PRACTITIONER

## 2021-11-15 NOTE — TELEPHONE ENCOUNTER
Called patient emergency contact and left a voicemail about scheduling her an appt today to see a nurse practioner this afternoon.

## 2021-11-15 NOTE — TELEPHONE ENCOUNTER
Caller: MargaritaDaisy    Relationship: Emergency Contact    Best call back number: [phone]    What medications are you currently taking:   Current Outpatient Medications on File Prior to Visit   Medication Sig Dispense Refill   • acetaminophen (TYLENOL) 325 MG tablet Take 2 tablets by mouth Every 4 (Four) Hours As Needed for Mild Pain .     • amoxicillin (AMOXIL) 500 MG capsule Take 2 capsules by mouth 3 (Three) Times a Day. 60 capsule 0   • aspirin 325 MG tablet Take 1 tablet by mouth Daily.     • carvedilol (COREG) 6.25 MG tablet Take 1 tablet by mouth 2 (Two) Times a Day With Meals. 60 tablet 3   • Cholecalciferol (VITAMIN D3) 50 MCG (2000 UT) capsule TAKE 1 CAPSULE BY MOUTH DAILY. 100 capsule 1   • Flarex 0.1 % ophthalmic suspension      • fluorometholone (FML) 0.1 % ophthalmic suspension Administer 1 drop into the left eye Daily.     • fluticasone (FLONASE) 50 MCG/ACT nasal spray 1 spray into the nostril(s) as directed by provider Daily.     • gabapentin (NEURONTIN) 100 MG capsule Take 1 capsule three times per day.  Okay to take 1 more capsule at bedtime if needed for pain. 120 capsule 5   • glucose blood (FREESTYLE TEST STRIPS) test strip TEST BLOOD SUGARS ONCE DAILY 50 each 12   • Lancets (FREESTYLE) lancets Test glucose once daily 100 each 3   • levothyroxine (SYNTHROID, LEVOTHROID) 50 MCG tablet Take 1 tablet by mouth Daily. 90 tablet 3   • ondansetron (ZOFRAN) 4 MG tablet Take 1 tablet by mouth Every 6 (Six) Hours As Needed for Nausea or Vomiting.     • Probiotic Product (PROBIOTIC DAILY PO) Take 1 capsule by mouth Daily.     • RABEprazole (ACIPHEX) 20 MG EC tablet Take 1 tablet by mouth Daily. 90 tablet 3   • sennosides-docusate (senna-docusate sodium) 8.6-50 MG per tablet Take 1 tablet by mouth 2 (Two) Times a Day As Needed for Constipation.     • sodium chloride (CAYLA 128) 5 % ophthalmic solution Administer 1 drop to the right eye As Needed.     • sucralfate (CARAFATE) 1 GM/10ML  suspension TAKE 10 ML BY MOUTH 3 TIMES A DAY FOR 30 MINUTES BEFORE MEALS     • triamcinolone acetonide (KENALOG-40) 40 MG/ML injection      • valsartan (DIOVAN) 80 MG tablet Take 1 tablet by mouth Every Night. 90 tablet 3     No current facility-administered medications on file prior to visit.      Which medication are you concerned about:   carvedilol (COREG) 6.25 MG tablet  valsartan (DIOVAN) 80 MG tablet    Who prescribed you this medication: DR MURRIETA    What are your concerns: PATIENT'S BLOOD PRESSURE IN THE MORNINGS HAS BEEN AROUND THE HIGH 180'S, AFTER TAKING MEDS IT IS IN THE HIGH 160'S. DAUGHTER IS CONCERNED THAT HER BLOOD PRESSURE IS NOT GETTING ANY BETTER. PLEASE ADVISE.

## 2021-11-18 NOTE — TELEPHONE ENCOUNTER
Insurance coverage for medical alert systems varies. The majority of health insurance providers don't cover these devices or services, but some do. Long-term care insurance is more likely to cover medical alert systems, but this varies by provider and plan. To find out if insurance company offers coverage, they can:    Visit the plan's website to review covered services  Call the number on the insurance card to speak directly to a representative.  I will call the patient and let them know.

## 2021-12-07 NOTE — ASSESSMENT & PLAN NOTE
Hypertension is unchanged. Patient's pressure today is 148/70. She does have some discomfort in her lumbar area. This can increase her pressure. We spoke about taking her BP several times a day and how to take it. If she sees a trend over 140/90's she needs to contact the office. She needs to decrease her sodium intake and increase her water intake. She also needs to start walking more to help with mobility.   Continue current treatment regimen.  Dietary sodium restriction.  Continue current medications.  Blood pressure will be reassessed at the next regular appointment.  Patient will follow up in office as needed.

## 2021-12-27 ENCOUNTER — TELEPHONE (OUTPATIENT)
Dept: INTERNAL MEDICINE | Facility: CLINIC | Age: 86
End: 2021-12-27

## 2021-12-27 NOTE — TELEPHONE ENCOUNTER
Caller: Daisy Avila    Relationship to patient: Emergency Contact    Best call back number: [phone]    Patient is needing: PATIENT'S DAUGHTER STATES PATIENT'S DAILY BLOOD PRESSURE HOVERS AROUND 180/70-80 WITH MEDICATION. IN THE PAST PATIENT WAS TAKING A HIGHER DOSAGE OF VALSARTAN (160) IN July WITH CARVEDILOL AND AMLODIPINE AND PATIENT'S BLOOD PRESSURE BUT SHE WAS UNSURE IF THE COMBINATION WAS WORKING OR NOT BECAUSE THE PATIENT WAS IN A LOT MORE PAIN DURING THAT TIME. PATIENT'S DAUGHTER WANTING TO KNOW IF DR. MURRIETA WANTS TO SEE HER AGAIN OR IF HE WANTS TO CHANGE MEDICATION OR ADD ANOTHER MEDICATION TO THE MIX.

## 2021-12-27 NOTE — TELEPHONE ENCOUNTER
Pt scheduled for Friday 1/7/21 and Daughter advised if it creeps up anymore to reach back out to the office    n/a

## 2021-12-27 NOTE — TELEPHONE ENCOUNTER
Pt not scheduled til 1/28/22, it looks like she saw Neris back in November and the note says to continue her meds and f/u with you

## 2021-12-27 NOTE — TELEPHONE ENCOUNTER
Clear to auscultation bilaterally, equal expansion bilaterally. Yes, I should have some room on the schedule for this week.

## 2022-01-11 ENCOUNTER — TELEPHONE (OUTPATIENT)
Dept: INTERNAL MEDICINE | Facility: CLINIC | Age: 87
End: 2022-01-11

## 2022-01-11 NOTE — TELEPHONE ENCOUNTER
PATIENTS DAUGHTER CALLED AND STATES THIS WASN'T A NO SHOW ON 1/7/22. IT WAS BECAUSE THE OFFICE WAS CLOSED DUE TO WEATHER. SHE DOES NOT WANT THIS TO COUNT TOWARD HER MOTHER'S APPOINTMENTS.    CALL BACK NUMBER [phone]

## 2022-01-14 ENCOUNTER — TELEPHONE (OUTPATIENT)
Dept: OBSTETRICS AND GYNECOLOGY | Age: 87
End: 2022-01-14

## 2022-01-14 ENCOUNTER — OFFICE VISIT (OUTPATIENT)
Dept: INTERNAL MEDICINE | Facility: CLINIC | Age: 87
End: 2022-01-14

## 2022-01-14 VITALS
OXYGEN SATURATION: 99 % | TEMPERATURE: 98.1 F | SYSTOLIC BLOOD PRESSURE: 132 MMHG | HEART RATE: 76 BPM | HEIGHT: 61 IN | BODY MASS INDEX: 28.51 KG/M2 | WEIGHT: 151 LBS | DIASTOLIC BLOOD PRESSURE: 64 MMHG

## 2022-01-14 DIAGNOSIS — I10 ESSENTIAL HYPERTENSION: Chronic | ICD-10-CM

## 2022-01-14 DIAGNOSIS — T14.8XXA SUPERFICIAL BRUISING: ICD-10-CM

## 2022-01-14 DIAGNOSIS — D50.9 IRON DEFICIENCY ANEMIA, UNSPECIFIED IRON DEFICIENCY ANEMIA TYPE: Chronic | ICD-10-CM

## 2022-01-14 DIAGNOSIS — R71.8 OTHER ABNORMALITY OF RED BLOOD CELLS: ICD-10-CM

## 2022-01-14 DIAGNOSIS — R21 RASH AND NONSPECIFIC SKIN ERUPTION: ICD-10-CM

## 2022-01-14 DIAGNOSIS — N81.10 FEMALE BLADDER PROLAPSE: Chronic | ICD-10-CM

## 2022-01-14 DIAGNOSIS — N30.00 ACUTE CYSTITIS WITHOUT HEMATURIA: Primary | ICD-10-CM

## 2022-01-14 LAB
BACTERIA UR QL AUTO: ABNORMAL /HPF
BILIRUB UR QL STRIP: NEGATIVE
CLARITY UR: CLEAR
COLOR UR: YELLOW
GLUCOSE UR STRIP-MCNC: NEGATIVE MG/DL
HGB UR QL STRIP.AUTO: NEGATIVE
HYALINE CASTS UR QL AUTO: ABNORMAL /LPF
KETONES UR QL STRIP: NEGATIVE
LEUKOCYTE ESTERASE UR QL STRIP.AUTO: NEGATIVE
MUCOUS THREADS URNS QL MICRO: ABNORMAL /HPF
NITRITE UR QL STRIP: NEGATIVE
PH UR STRIP.AUTO: 6 [PH] (ref 5–8)
PROT UR QL STRIP: ABNORMAL
RBC # UR STRIP: ABNORMAL /HPF
REF LAB TEST METHOD: ABNORMAL
SP GR UR STRIP: 1.01 (ref 1–1.03)
SQUAMOUS #/AREA URNS HPF: ABNORMAL /HPF
TRANS CELLS #/AREA URNS HPF: ABNORMAL /HPF
UROBILINOGEN UR QL STRIP: ABNORMAL
WBC # UR STRIP: ABNORMAL /HPF

## 2022-01-14 PROCEDURE — 99214 OFFICE O/P EST MOD 30 MIN: CPT | Performed by: NURSE PRACTITIONER

## 2022-01-14 PROCEDURE — 81001 URINALYSIS AUTO W/SCOPE: CPT | Performed by: NURSE PRACTITIONER

## 2022-01-14 RX ORDER — NYSTATIN 100000 U/G
1 OINTMENT TOPICAL 2 TIMES DAILY
Qty: 60 G | Refills: 2 | Status: SHIPPED | OUTPATIENT
Start: 2022-01-14 | End: 2022-04-14

## 2022-01-14 RX ORDER — VALSARTAN 160 MG/1
160 TABLET ORAL DAILY
Qty: 30 TABLET | Refills: 2 | Status: SHIPPED | OUTPATIENT
Start: 2022-01-14 | End: 2022-03-07 | Stop reason: HOSPADM

## 2022-01-14 RX ORDER — DOXYCYCLINE HYCLATE 100 MG/1
100 CAPSULE ORAL 2 TIMES DAILY
Qty: 14 CAPSULE | Refills: 0 | Status: SHIPPED | OUTPATIENT
Start: 2022-01-14 | End: 2022-01-21

## 2022-01-14 NOTE — PROGRESS NOTES
"Chief Complaint  Difficulty Urinating, Bleeding/Bruising (lower legs and now started on arms), Rash (under breast, groin area and creases of arm), and Hypertension (bp concerns )    Subjective          Geeta Butt presents to [facility]  History of Present Illness  This is an 85 y/o female presenting to office with complaints of difficulties with urination. Patient reports she does have a bladder prolapse. Patient has seen gynecologist. Patient reports symptoms have been ongoing x 1 week. Patient denies any fever. Patient reports some diarrhea.     Patient reports history of HTN-- patient reports BP records at home are showing 170's over 60's regarding blood pressure. Patient denies any chest pain. Patient reports some dizziness but she does have history of neck problems.     Patient has followed Dr. Valenzuela previously regarding GLADIS and has received iron transfusions previously. Patient reports she has been having some rash to her RLE-- it appears as some surface bruising lesions to her B/L legs and some on her left arm. Patient reports she does not hit things but she does have balance issues where sometimes she will lightly bump into agents at the house. Patient is noted to have increase platelet count from 8/24 through 10/29. Patient is agreeable to have blood work completed today.     Patient is also experiencing some itching burning redness under breasts, groin area, and in b/l inner elbow creases. Patient reports she has been experiencing this for a while. Patient reports she has not put any cream on this.         Objective   Vital Signs:   /64   Pulse 76   Temp 98.1 °F (36.7 °C)   Ht 154.9 cm (61\")   Wt 68.5 kg (151 lb)   SpO2 99%   BMI 28.53 kg/m²     Physical Exam  Vitals and nursing note reviewed.   Constitutional:       Appearance: Normal appearance. She is normal weight.   HENT:      Head: Normocephalic and atraumatic.   Eyes:      Extraocular Movements: " Extraocular movements intact.      Conjunctiva/sclera: Conjunctivae normal.      Pupils: Pupils are equal, round, and reactive to light.   Cardiovascular:      Rate and Rhythm: Normal rate and regular rhythm.      Pulses: Normal pulses.      Heart sounds: Normal heart sounds. No murmur heard.  No friction rub. No gallop.    Pulmonary:      Effort: Pulmonary effort is normal. No respiratory distress.      Breath sounds: Normal breath sounds. No stridor. No wheezing, rhonchi or rales.   Abdominal:      General: There is no distension.      Palpations: Abdomen is soft.      Tenderness: There is no abdominal tenderness.   Musculoskeletal:         General: Normal range of motion.      Cervical back: Normal range of motion and neck supple.   Skin:     General: Skin is warm and dry.      Capillary Refill: Capillary refill takes less than 2 seconds.          Neurological:      General: No focal deficit present.      Mental Status: She is alert and oriented to person, place, and time. Mental status is at baseline.   Psychiatric:         Mood and Affect: Mood normal.         Behavior: Behavior normal.         Thought Content: Thought content normal.         Judgment: Judgment normal.        Result Review :   The following data was reviewed by: JESSA Flood on 01/14/2022:  Common labs    Common Labsle 8/25/21 8/25/21 8/26/21 8/26/21 10/29/21 10/29/21 10/29/21    0657 0657 0644 0644 1428 1428 1428   Glucose  98  98  88    BUN  10  8  20    Creatinine  0.54 (A)  0.57  0.80    eGFR Non  Am  107  101  67    eGFR African Am      78    Sodium  133 (A)  134 (A)  135    Potassium  3.7  3.8  4.3    Chloride  101  101  97    Calcium  8.9  9.1  9.9    Total Protein      7.1    Albumin      3.9    Total Bilirubin      <0.2    Alkaline Phosphatase      72    AST (SGOT)      13    ALT (SGPT)      9    WBC 10.51  9.03    11.3 (A)   Hemoglobin 8.3 (A)  8.5 (A)    10.1 (A)   Hematocrit 24.4 (A)  25.5 (A)    31.0 (A)   Platelets  461 (A)  500 (A)    592 (A)   Hemoglobin A1C     5.7 (A)     (A) Abnormal value       Comments are available for some flowsheets but are not being displayed.                Assessment and Plan    Diagnoses and all orders for this visit:    1. Acute cystitis without hematuria (Primary)  Assessment & Plan:  Doxycycline 100mg BID x 7 days.   Culture pending.  Continue with hydration-- 8 to 10 glasses of fluid.   Will refer to gynecology for prolapsed bladder.     Orders:  -     Urinalysis With Culture If Indicated -; Future  -     Urinalysis With Culture If Indicated - Urine, Clean Catch  -     Urinalysis, Microscopic Only - Urine, Clean Catch  -     Urine Culture - Urine, Urine, Clean Catch  -     doxycycline (VIBRAMYCIN) 100 MG capsule; Take 1 capsule by mouth 2 (Two) Times a Day for 7 days.  Dispense: 14 capsule; Refill: 0    2. Female bladder prolapse  Assessment & Plan:  Referral to gynecology.     Orders:  -     Ambulatory Referral to Gynecology    3. Essential hypertension  Assessment & Plan:  Hypertension is worsening.  Continue current treatment regimen.  Dietary sodium restriction.  Weight loss.  Regular aerobic exercise.  Medication changes per orders.  Blood pressure will be reassessed at the next regular appointment.  Trial increase valsartan to 160mg every night.   Continue on coreg.     Orders:  -     valsartan (Diovan) 160 MG tablet; Take 1 tablet by mouth Daily for 90 days.  Dispense: 30 tablet; Refill: 2    4. Superficial bruising  Assessment & Plan:  Anemia panel today.   If abnormal, would recommend f/u with Dr. Valenzuela regarding this.     Orders:  -     Iron  -     Vitamin B12 and Folate  -     Ferritin  -     CBC and Differential    5. Other abnormality of red blood cells   -     Iron    6. Iron deficiency anemia, unspecified iron deficiency anemia type   -     Ferritin    7. Rash and nonspecific skin eruption  -     nystatin (MYCOSTATIN) 575231 UNIT/GM ointment; Apply 1 application topically to  the appropriate area as directed 2 (Two) Times a Day for 90 days.  Dispense: 60 g; Refill: 2      Follow Up   Return in about 3 months (around 4/14/2022), or if symptoms worsen or fail to improve.  Patient was given instructions and counseling regarding her condition or for health maintenance advice. Please see specific information pulled into the AVS if appropriate.

## 2022-01-14 NOTE — ASSESSMENT & PLAN NOTE
Hypertension is worsening.  Continue current treatment regimen.  Dietary sodium restriction.  Weight loss.  Regular aerobic exercise.  Medication changes per orders.  Blood pressure will be reassessed at the next regular appointment.  Trial increase valsartan to 160mg every night.   Continue on coreg.

## 2022-01-14 NOTE — ASSESSMENT & PLAN NOTE
Doxycycline 100mg BID x 7 days.   Culture pending.  Continue with hydration-- 8 to 10 glasses of fluid.   Will refer to gynecology for prolapsed bladder.

## 2022-01-15 LAB
BASOPHILS # BLD AUTO: 0.1 X10E3/UL (ref 0–0.2)
BASOPHILS NFR BLD AUTO: 1 %
EOSINOPHIL # BLD AUTO: 0.2 X10E3/UL (ref 0–0.4)
EOSINOPHIL NFR BLD AUTO: 2 %
ERYTHROCYTE [DISTWIDTH] IN BLOOD BY AUTOMATED COUNT: 15.7 % (ref 11.7–15.4)
FERRITIN SERPL-MCNC: 247 NG/ML (ref 15–150)
FOLATE SERPL-MCNC: 6.9 NG/ML
HCT VFR BLD AUTO: 36.8 % (ref 34–46.6)
HGB BLD-MCNC: 12 G/DL (ref 11.1–15.9)
IMM GRANULOCYTES # BLD AUTO: 0 X10E3/UL (ref 0–0.1)
IMM GRANULOCYTES NFR BLD AUTO: 0 %
IRON SERPL-MCNC: 40 UG/DL (ref 27–139)
LYMPHOCYTES # BLD AUTO: 2 X10E3/UL (ref 0.7–3.1)
LYMPHOCYTES NFR BLD AUTO: 19 %
MCH RBC QN AUTO: 27.5 PG (ref 26.6–33)
MCHC RBC AUTO-ENTMCNC: 32.6 G/DL (ref 31.5–35.7)
MCV RBC AUTO: 84 FL (ref 79–97)
MONOCYTES # BLD AUTO: 0.7 X10E3/UL (ref 0.1–0.9)
MONOCYTES NFR BLD AUTO: 7 %
NEUTROPHILS # BLD AUTO: 7.6 X10E3/UL (ref 1.4–7)
NEUTROPHILS NFR BLD AUTO: 71 %
PLATELET # BLD AUTO: 383 X10E3/UL (ref 150–450)
RBC # BLD AUTO: 4.36 X10E6/UL (ref 3.77–5.28)
VIT B12 SERPL-MCNC: 419 PG/ML (ref 232–1245)
WBC # BLD AUTO: 10.7 X10E3/UL (ref 3.4–10.8)

## 2022-01-17 LAB
BACTERIA UR CULT: ABNORMAL
BACTERIA UR CULT: ABNORMAL
OTHER ANTIBIOTIC SUSC ISLT: ABNORMAL

## 2022-01-17 NOTE — PROGRESS NOTES
Iron levels have improved. Ferritin is slighty elevated. I would recommend f/u with hematology as we discussed at visit.     Patient should continue on antibiotic for UTI.

## 2022-01-18 LAB — REF LAB TEST METHOD: NORMAL

## 2022-01-27 NOTE — PROGRESS NOTES
"Chief Complaint  Hypertension    Subjective          Geeta Butt presents to [facility]  History of Present Illness     Hypertension - stable. Patient taking medication as prescribed.  Denies chest pain, shortness of breath, headache, lower extremity edema.  Patient is taking carvedilol 6.25mg BID, valsartan 160 mg daily.        She has rashes under her bra line and in her groin area.  It sounds like they have been using Lotrimin sometimes mixed with hydrocortisone or triamcinolone.  Patient says the rash looks a little better today.  It does not sound like she has been using the Lotrimin twice daily every day for several weeks but has been using it.  Gabapentin controlling neuropathy and degenerative disc disease.    Worsening issues with bladder retention and hx of surgery in 2006 for repair.  Recent UTI infection which was treated.  Difficulty urinating and prolapse is constant at this point.  She says that the symptoms are bothersome for her and she is interested in intervention by a surgeon.  They would like referral to Dr. Sage.    Objective   Vital Signs:   /72 (BP Location: Left arm, Patient Position: Sitting, Cuff Size: Adult)   Pulse 72   Temp 97.8 °F (36.6 °C) (Temporal)   Ht 154.9 cm (61\")   Wt 68.5 kg (151 lb)   SpO2 100%   BMI 28.53 kg/m²     Physical Exam  Vitals and nursing note reviewed.   Constitutional:       General: She is not in acute distress.     Appearance: Normal appearance.   Cardiovascular:      Rate and Rhythm: Normal rate and regular rhythm.      Heart sounds: Normal heart sounds. No murmur heard.      Pulmonary:      Effort: Pulmonary effort is normal.      Breath sounds: Normal breath sounds.   Neurological:      Mental Status: She is alert.        Result Review :   The following data was reviewed by: Grzegorz Nguyen MD on 01/28/2022:  Common labs    Common Labsle 8/26/21 8/26/21 10/29/21 10/29/21 10/29/21 1/14/22    0644 0644 1428 1428 " 1428    Glucose  98  88     BUN  8  20     Creatinine  0.57  0.80     eGFR Non  Am  101  67     eGFR African Am    78     Sodium  134 (A)  135     Potassium  3.8  4.3     Chloride  101  97     Calcium  9.1  9.9     Total Protein    7.1     Albumin    3.9     Total Bilirubin    <0.2     Alkaline Phosphatase    72     AST (SGOT)    13     ALT (SGPT)    9     WBC 9.03    11.3 (A) 10.7   Hemoglobin 8.5 (A)    10.1 (A) 12.0   Hematocrit 25.5 (A)    31.0 (A) 36.8   Platelets 500 (A)    592 (A) 383   Hemoglobin A1C   5.7 (A)      (A) Abnormal value       Comments are available for some flowsheets but are not being displayed.                        Brief Urine Lab Results  (Last result in the past 365 days)      Color   Clarity   Blood   Leuk Est   Nitrite   Protein   CREAT   Urine HCG        01/28/22 1530 Yellow   Clear   Negative   Negative   Negative   Negative                   Assessment and Plan    Diagnoses and all orders for this visit:    1. Essential hypertension (Primary)    2. Neuropathy    3. DDD (degenerative disc disease), lumbosacral    4. Tinea    5. Bladder prolapse, female, acquired  -     Ambulatory Referral to Gynecologic Urology  -     Urinalysis With Microscopic If Indicated (No Culture) - Urine, Clean Catch    6. Hx of bladder repair surgery  -     Ambulatory Referral to Gynecologic Urology  -     Urinalysis With Microscopic If Indicated (No Culture) - Urine, Clean Catch    7. Urinary retention with incomplete bladder emptying  -     Ambulatory Referral to Gynecologic Urology  -     Urinalysis With Microscopic If Indicated (No Culture) - Urine, Clean Catch    8. Acute cystitis without hematuria  -     Urinalysis With Microscopic If Indicated (No Culture) - Urine, Clean Catch    Blood pressure is stable continue current medication.  Continue gabapentin for the pain.  Recommended continue Lotrimin twice daily every day at least for a few more weeks.  I will place a referral to Dr. Sage and see  what could be done about the bladder prolapse.  We will check urinalysis to see if she is doing better from the UTI.    Urinalysis results returned and urine is normal.  This information was passed to the patient via [employer].        Follow Up   Return in about 3 months (around 4/28/2022) for Recheck - HTN.  Patient was given instructions and counseling regarding her condition or for health maintenance advice. Please see specific information pulled into the AVS if appropriate.

## 2022-01-28 ENCOUNTER — OFFICE VISIT (OUTPATIENT)
Dept: INTERNAL MEDICINE | Facility: CLINIC | Age: 87
End: 2022-01-28

## 2022-01-28 VITALS
OXYGEN SATURATION: 100 % | DIASTOLIC BLOOD PRESSURE: 72 MMHG | SYSTOLIC BLOOD PRESSURE: 124 MMHG | HEART RATE: 72 BPM | TEMPERATURE: 97.8 F | HEIGHT: 61 IN | WEIGHT: 151 LBS | BODY MASS INDEX: 28.51 KG/M2

## 2022-01-28 DIAGNOSIS — G62.9 NEUROPATHY: ICD-10-CM

## 2022-01-28 DIAGNOSIS — R33.9 URINARY RETENTION WITH INCOMPLETE BLADDER EMPTYING: ICD-10-CM

## 2022-01-28 DIAGNOSIS — M51.37 DDD (DEGENERATIVE DISC DISEASE), LUMBOSACRAL: ICD-10-CM

## 2022-01-28 DIAGNOSIS — B35.9 TINEA: ICD-10-CM

## 2022-01-28 DIAGNOSIS — N81.10 BLADDER PROLAPSE, FEMALE, ACQUIRED: ICD-10-CM

## 2022-01-28 DIAGNOSIS — Z98.890 HX OF BLADDER REPAIR SURGERY: ICD-10-CM

## 2022-01-28 DIAGNOSIS — N30.00 ACUTE CYSTITIS WITHOUT HEMATURIA: ICD-10-CM

## 2022-01-28 DIAGNOSIS — I10 ESSENTIAL HYPERTENSION: Primary | ICD-10-CM

## 2022-01-28 LAB
BILIRUB UR QL STRIP: NEGATIVE
CLARITY UR: CLEAR
COLOR UR: YELLOW
GLUCOSE UR STRIP-MCNC: NEGATIVE MG/DL
HGB UR QL STRIP.AUTO: NEGATIVE
KETONES UR QL STRIP: NEGATIVE
LEUKOCYTE ESTERASE UR QL STRIP.AUTO: NEGATIVE
NITRITE UR QL STRIP: NEGATIVE
PH UR STRIP.AUTO: <=5 [PH] (ref 5–8)
PROT UR QL STRIP: NEGATIVE
SP GR UR STRIP: 1.01 (ref 1–1.03)
UROBILINOGEN UR QL STRIP: NORMAL

## 2022-01-28 PROCEDURE — 81003 URINALYSIS AUTO W/O SCOPE: CPT | Performed by: FAMILY MEDICINE

## 2022-01-28 PROCEDURE — 99214 OFFICE O/P EST MOD 30 MIN: CPT | Performed by: FAMILY MEDICINE

## 2022-01-28 RX ORDER — ASPIRIN 81 MG/1
81 TABLET, CHEWABLE ORAL DAILY
COMMUNITY

## 2022-02-25 ENCOUNTER — TELEPHONE (OUTPATIENT)
Dept: OBSTETRICS AND GYNECOLOGY | Age: 87
End: 2022-02-25

## 2022-02-25 NOTE — TELEPHONE ENCOUNTER
"FYI - Pts daughter called to schedule appt for pt. Daughter stated that her mother has not been able to have a bowel movement and is also having trouble urinating, pt took milk of magnesia earlier this week and was able to have diarrhea but has not had a bowel movement since. Pts daughter said that pt is in severe pain and \"they have let this get out of control.\"   "

## 2022-02-28 ENCOUNTER — HOSPITAL ENCOUNTER (EMERGENCY)
Facility: HOSPITAL | Age: 87
Discharge: HOME | End: 2022-02-28
Attending: EMERGENCY MEDICINE | Admitting: EMERGENCY MEDICINE

## 2022-02-28 VITALS
DIASTOLIC BLOOD PRESSURE: 63 MMHG | RESPIRATION RATE: 15 BRPM | HEIGHT: 61 IN | HEART RATE: 69 BPM | OXYGEN SATURATION: 98 % | TEMPERATURE: 97.6 F | SYSTOLIC BLOOD PRESSURE: 137 MMHG | WEIGHT: 152 LBS | BODY MASS INDEX: 28.7 KG/M2

## 2022-02-28 DIAGNOSIS — L03.115 CELLULITIS OF RIGHT LOWER EXTREMITY: Primary | ICD-10-CM

## 2022-02-28 LAB
ANION GAP SERPL CALCULATED.3IONS-SCNC: 11.4 MMOL/L (ref 5–15)
BASOPHILS # BLD AUTO: 0.12 10*3/MM3 (ref 0–0.2)
BASOPHILS NFR BLD AUTO: 1 % (ref 0–1.5)
BUN SERPL-MCNC: 14 MG/DL (ref 8–23)
BUN/CREAT SERPL: 15.2 (ref 7–25)
CALCIUM SPEC-SCNC: 9.5 MG/DL (ref 8.6–10.5)
CHLORIDE SERPL-SCNC: 98 MMOL/L (ref 98–107)
CO2 SERPL-SCNC: 26.6 MMOL/L (ref 22–29)
CREAT SERPL-MCNC: 0.92 MG/DL (ref 0.57–1)
DEPRECATED RDW RBC AUTO: 45.8 FL (ref 37–54)
EGFRCR SERPLBLD CKD-EPI 2021: 61.1 ML/MIN/1.73
EOSINOPHIL # BLD AUTO: 0.39 10*3/MM3 (ref 0–0.4)
EOSINOPHIL NFR BLD AUTO: 3.2 % (ref 0.3–6.2)
ERYTHROCYTE [DISTWIDTH] IN BLOOD BY AUTOMATED COUNT: 14.8 % (ref 12.3–15.4)
GLUCOSE SERPL-MCNC: 94 MG/DL (ref 65–99)
HCT VFR BLD AUTO: 32.8 % (ref 34–46.6)
HGB BLD-MCNC: 11.3 G/DL (ref 12–15.9)
IMM GRANULOCYTES # BLD AUTO: 0.18 10*3/MM3 (ref 0–0.05)
IMM GRANULOCYTES NFR BLD AUTO: 1.5 % (ref 0–0.5)
LYMPHOCYTES # BLD AUTO: 1.72 10*3/MM3 (ref 0.7–3.1)
LYMPHOCYTES NFR BLD AUTO: 14.3 % (ref 19.6–45.3)
MCH RBC QN AUTO: 29.6 PG (ref 26.6–33)
MCHC RBC AUTO-ENTMCNC: 34.5 G/DL (ref 31.5–35.7)
MCV RBC AUTO: 85.9 FL (ref 79–97)
MONOCYTES # BLD AUTO: 0.89 10*3/MM3 (ref 0.1–0.9)
MONOCYTES NFR BLD AUTO: 7.4 % (ref 5–12)
NEUTROPHILS NFR BLD AUTO: 72.6 % (ref 42.7–76)
NEUTROPHILS NFR BLD AUTO: 8.72 10*3/MM3 (ref 1.7–7)
NRBC BLD AUTO-RTO: 0 /100 WBC (ref 0–0.2)
PLATELET # BLD AUTO: 501 10*3/MM3 (ref 140–450)
PMV BLD AUTO: 8.8 FL (ref 6–12)
POTASSIUM SERPL-SCNC: 4.7 MMOL/L (ref 3.5–5.2)
RBC # BLD AUTO: 3.82 10*6/MM3 (ref 3.77–5.28)
SODIUM SERPL-SCNC: 136 MMOL/L (ref 136–145)
WBC NRBC COR # BLD: 12.02 10*3/MM3 (ref 3.4–10.8)

## 2022-02-28 PROCEDURE — 85025 COMPLETE CBC W/AUTO DIFF WBC: CPT | Performed by: EMERGENCY MEDICINE

## 2022-02-28 PROCEDURE — 80048 BASIC METABOLIC PNL TOTAL CA: CPT | Performed by: EMERGENCY MEDICINE

## 2022-02-28 PROCEDURE — 36415 COLL VENOUS BLD VENIPUNCTURE: CPT

## 2022-02-28 PROCEDURE — 99283 EMERGENCY DEPT VISIT LOW MDM: CPT

## 2022-02-28 RX ADMIN — MUPIROCIN 1 APPLICATION: 20 OINTMENT TOPICAL at 14:45

## 2022-03-04 ENCOUNTER — APPOINTMENT (OUTPATIENT)
Dept: CARDIOLOGY | Facility: HOSPITAL | Age: 87
End: 2022-03-04

## 2022-03-04 ENCOUNTER — HOSPITAL ENCOUNTER (OUTPATIENT)
Facility: HOSPITAL | Age: 87
Setting detail: OBSERVATION
LOS: 3 days | Discharge: HOME | End: 2022-03-07
Attending: EMERGENCY MEDICINE | Admitting: HOSPITALIST

## 2022-03-04 ENCOUNTER — APPOINTMENT (OUTPATIENT)
Dept: GENERAL RADIOLOGY | Facility: HOSPITAL | Age: 87
End: 2022-03-04

## 2022-03-04 DIAGNOSIS — L03.115 CELLULITIS OF RIGHT LOWER EXTREMITY: Primary | ICD-10-CM

## 2022-03-04 DIAGNOSIS — I10 BENIGN ESSENTIAL HYPERTENSION: ICD-10-CM

## 2022-03-04 LAB
ALBUMIN SERPL-MCNC: 4.4 G/DL (ref 3.5–5.2)
ALBUMIN/GLOB SERPL: 1.5 G/DL
ALP SERPL-CCNC: 85 U/L (ref 39–117)
ALT SERPL W P-5'-P-CCNC: 16 U/L (ref 1–33)
ANION GAP SERPL CALCULATED.3IONS-SCNC: 11.3 MMOL/L (ref 5–15)
AST SERPL-CCNC: 12 U/L (ref 1–32)
BASOPHILS # BLD AUTO: 0.12 10*3/MM3 (ref 0–0.2)
BASOPHILS NFR BLD AUTO: 1.2 % (ref 0–1.5)
BH CV LOWER VASCULAR LEFT COMMON FEMORAL AUGMENT: NORMAL
BH CV LOWER VASCULAR LEFT COMMON FEMORAL COMPETENT: NORMAL
BH CV LOWER VASCULAR LEFT COMMON FEMORAL COMPRESS: NORMAL
BH CV LOWER VASCULAR LEFT COMMON FEMORAL PHASIC: NORMAL
BH CV LOWER VASCULAR LEFT COMMON FEMORAL SPONT: NORMAL
BH CV LOWER VASCULAR RIGHT COMMON FEMORAL AUGMENT: NORMAL
BH CV LOWER VASCULAR RIGHT COMMON FEMORAL COMPETENT: NORMAL
BH CV LOWER VASCULAR RIGHT COMMON FEMORAL COMPRESS: NORMAL
BH CV LOWER VASCULAR RIGHT COMMON FEMORAL PHASIC: NORMAL
BH CV LOWER VASCULAR RIGHT COMMON FEMORAL SPONT: NORMAL
BH CV LOWER VASCULAR RIGHT DISTAL FEMORAL COMPRESS: NORMAL
BH CV LOWER VASCULAR RIGHT GASTRONEMIUS COMPRESS: NORMAL
BH CV LOWER VASCULAR RIGHT GREATER SAPH AK COMPRESS: NORMAL
BH CV LOWER VASCULAR RIGHT GREATER SAPH BK COMPRESS: NORMAL
BH CV LOWER VASCULAR RIGHT LESSER SAPH COMPRESS: NORMAL
BH CV LOWER VASCULAR RIGHT MID FEMORAL AUGMENT: NORMAL
BH CV LOWER VASCULAR RIGHT MID FEMORAL COMPETENT: NORMAL
BH CV LOWER VASCULAR RIGHT MID FEMORAL COMPRESS: NORMAL
BH CV LOWER VASCULAR RIGHT MID FEMORAL PHASIC: NORMAL
BH CV LOWER VASCULAR RIGHT MID FEMORAL SPONT: NORMAL
BH CV LOWER VASCULAR RIGHT PERONEAL COMPRESS: NORMAL
BH CV LOWER VASCULAR RIGHT POPLITEAL AUGMENT: NORMAL
BH CV LOWER VASCULAR RIGHT POPLITEAL COMPETENT: NORMAL
BH CV LOWER VASCULAR RIGHT POPLITEAL COMPRESS: NORMAL
BH CV LOWER VASCULAR RIGHT POPLITEAL PHASIC: NORMAL
BH CV LOWER VASCULAR RIGHT POPLITEAL SPONT: NORMAL
BH CV LOWER VASCULAR RIGHT POSTERIOR TIBIAL COMPRESS: NORMAL
BH CV LOWER VASCULAR RIGHT PROFUNDA FEMORAL COMPRESS: NORMAL
BH CV LOWER VASCULAR RIGHT PROXIMAL FEMORAL COMPRESS: NORMAL
BH CV LOWER VASCULAR RIGHT SAPHENOFEMORAL JUNCTION COMPRESS: NORMAL
BILIRUB SERPL-MCNC: <0.2 MG/DL (ref 0–1.2)
BUN SERPL-MCNC: 14 MG/DL (ref 8–23)
BUN/CREAT SERPL: 17.9 (ref 7–25)
CALCIUM SPEC-SCNC: 9.9 MG/DL (ref 8.6–10.5)
CHLORIDE SERPL-SCNC: 98 MMOL/L (ref 98–107)
CO2 SERPL-SCNC: 28.7 MMOL/L (ref 22–29)
CREAT SERPL-MCNC: 0.78 MG/DL (ref 0.57–1)
D-LACTATE SERPL-SCNC: 0.8 MMOL/L (ref 0.5–2)
DEPRECATED RDW RBC AUTO: 47.9 FL (ref 37–54)
EGFRCR SERPLBLD CKD-EPI 2021: 74.5 ML/MIN/1.73
EOSINOPHIL # BLD AUTO: 0.25 10*3/MM3 (ref 0–0.4)
EOSINOPHIL NFR BLD AUTO: 2.5 % (ref 0.3–6.2)
ERYTHROCYTE [DISTWIDTH] IN BLOOD BY AUTOMATED COUNT: 14.7 % (ref 12.3–15.4)
GLOBULIN UR ELPH-MCNC: 3 GM/DL
GLUCOSE SERPL-MCNC: 91 MG/DL (ref 65–99)
HCT VFR BLD AUTO: 36.6 % (ref 34–46.6)
HGB BLD-MCNC: 11.9 G/DL (ref 12–15.9)
IMM GRANULOCYTES # BLD AUTO: 0.13 10*3/MM3 (ref 0–0.05)
IMM GRANULOCYTES NFR BLD AUTO: 1.3 % (ref 0–0.5)
INR PPP: 0.93 (ref 0.9–1.1)
LYMPHOCYTES # BLD AUTO: 2.09 10*3/MM3 (ref 0.7–3.1)
LYMPHOCYTES NFR BLD AUTO: 20.8 % (ref 19.6–45.3)
MAGNESIUM SERPL-MCNC: 1.8 MG/DL (ref 1.6–2.4)
MAXIMAL PREDICTED HEART RATE: 135 BPM
MCH RBC QN AUTO: 28.5 PG (ref 26.6–33)
MCHC RBC AUTO-ENTMCNC: 32.5 G/DL (ref 31.5–35.7)
MCV RBC AUTO: 87.8 FL (ref 79–97)
MONOCYTES # BLD AUTO: 0.73 10*3/MM3 (ref 0.1–0.9)
MONOCYTES NFR BLD AUTO: 7.3 % (ref 5–12)
NEUTROPHILS NFR BLD AUTO: 6.74 10*3/MM3 (ref 1.7–7)
NEUTROPHILS NFR BLD AUTO: 66.9 % (ref 42.7–76)
NRBC BLD AUTO-RTO: 0 /100 WBC (ref 0–0.2)
PLATELET # BLD AUTO: 515 10*3/MM3 (ref 140–450)
PMV BLD AUTO: 8.9 FL (ref 6–12)
POTASSIUM SERPL-SCNC: 4.4 MMOL/L (ref 3.5–5.2)
PROCALCITONIN SERPL-MCNC: 0.04 NG/ML (ref 0–0.25)
PROT SERPL-MCNC: 7.4 G/DL (ref 6–8.5)
PROTHROMBIN TIME: 12.3 SECONDS (ref 11.7–14.2)
RBC # BLD AUTO: 4.17 10*6/MM3 (ref 3.77–5.28)
SARS-COV-2 ORF1AB RESP QL NAA+PROBE: NOT DETECTED
SODIUM SERPL-SCNC: 138 MMOL/L (ref 136–145)
STRESS TARGET HR: 115 BPM
WBC NRBC COR # BLD: 10.06 10*3/MM3 (ref 3.4–10.8)

## 2022-03-04 PROCEDURE — 87040 BLOOD CULTURE FOR BACTERIA: CPT | Performed by: EMERGENCY MEDICINE

## 2022-03-04 PROCEDURE — 84145 PROCALCITONIN (PCT): CPT | Performed by: EMERGENCY MEDICINE

## 2022-03-04 PROCEDURE — G0378 HOSPITAL OBSERVATION PER HR: HCPCS

## 2022-03-04 PROCEDURE — U0004 COV-19 TEST NON-CDC HGH THRU: HCPCS | Performed by: EMERGENCY MEDICINE

## 2022-03-04 PROCEDURE — 25010000002 PIPERACILLIN SOD-TAZOBACTAM PER 1 G: Performed by: EMERGENCY MEDICINE

## 2022-03-04 PROCEDURE — 96365 THER/PROPH/DIAG IV INF INIT: CPT

## 2022-03-04 PROCEDURE — 85610 PROTHROMBIN TIME: CPT | Performed by: EMERGENCY MEDICINE

## 2022-03-04 PROCEDURE — 96367 TX/PROPH/DG ADDL SEQ IV INF: CPT

## 2022-03-04 PROCEDURE — 25010000002 VANCOMYCIN 10 G RECONSTITUTED SOLUTION: Performed by: EMERGENCY MEDICINE

## 2022-03-04 PROCEDURE — 99285 EMERGENCY DEPT VISIT HI MDM: CPT

## 2022-03-04 PROCEDURE — 73590 X-RAY EXAM OF LOWER LEG: CPT

## 2022-03-04 PROCEDURE — 96366 THER/PROPH/DIAG IV INF ADDON: CPT

## 2022-03-04 PROCEDURE — 85025 COMPLETE CBC W/AUTO DIFF WBC: CPT | Performed by: EMERGENCY MEDICINE

## 2022-03-04 PROCEDURE — 25010000002 CEFEPIME PER 500 MG: Performed by: INTERNAL MEDICINE

## 2022-03-04 PROCEDURE — 80053 COMPREHEN METABOLIC PANEL: CPT | Performed by: EMERGENCY MEDICINE

## 2022-03-04 PROCEDURE — 83735 ASSAY OF MAGNESIUM: CPT | Performed by: EMERGENCY MEDICINE

## 2022-03-04 PROCEDURE — 87070 CULTURE OTHR SPECIMN AEROBIC: CPT | Performed by: EMERGENCY MEDICINE

## 2022-03-04 PROCEDURE — 93971 EXTREMITY STUDY: CPT

## 2022-03-04 PROCEDURE — 87205 SMEAR GRAM STAIN: CPT | Performed by: EMERGENCY MEDICINE

## 2022-03-04 PROCEDURE — 83605 ASSAY OF LACTIC ACID: CPT | Performed by: EMERGENCY MEDICINE

## 2022-03-04 PROCEDURE — C9803 HOPD COVID-19 SPEC COLLECT: HCPCS

## 2022-03-04 RX ORDER — VALSARTAN 160 MG/1
160 TABLET ORAL DAILY
Status: DISCONTINUED | OUTPATIENT
Start: 2022-03-05 | End: 2022-03-05

## 2022-03-04 RX ORDER — HYDROCODONE BITARTRATE AND ACETAMINOPHEN 5; 325 MG/1; MG/1
1 TABLET ORAL EVERY 4 HOURS PRN
Status: DISCONTINUED | OUTPATIENT
Start: 2022-03-04 | End: 2022-03-07 | Stop reason: HOSPADM

## 2022-03-04 RX ORDER — AMOXICILLIN 250 MG
1 CAPSULE ORAL 2 TIMES DAILY PRN
Status: DISCONTINUED | OUTPATIENT
Start: 2022-03-04 | End: 2022-03-07 | Stop reason: HOSPADM

## 2022-03-04 RX ORDER — SILICONE ADHESIVE 1.5" X 3"
1 SHEET (EA) TOPICAL EVERY 4 HOURS
Status: DISCONTINUED | OUTPATIENT
Start: 2022-03-04 | End: 2022-03-07 | Stop reason: HOSPADM

## 2022-03-04 RX ORDER — VALSARTAN 160 MG/1
160 TABLET ORAL ONCE
Status: COMPLETED | OUTPATIENT
Start: 2022-03-04 | End: 2022-03-04

## 2022-03-04 RX ORDER — ASPIRIN 81 MG/1
81 TABLET, CHEWABLE ORAL DAILY
Status: DISCONTINUED | OUTPATIENT
Start: 2022-03-04 | End: 2022-03-07 | Stop reason: HOSPADM

## 2022-03-04 RX ORDER — MELATONIN
2000 DAILY
Status: DISCONTINUED | OUTPATIENT
Start: 2022-03-04 | End: 2022-03-07 | Stop reason: HOSPADM

## 2022-03-04 RX ORDER — CARVEDILOL 12.5 MG/1
12.5 TABLET ORAL 2 TIMES DAILY WITH MEALS
Status: DISCONTINUED | OUTPATIENT
Start: 2022-03-04 | End: 2022-03-07 | Stop reason: HOSPADM

## 2022-03-04 RX ORDER — FLUTICASONE PROPIONATE 50 MCG
1 SPRAY, SUSPENSION (ML) NASAL DAILY
Status: DISCONTINUED | OUTPATIENT
Start: 2022-03-04 | End: 2022-03-07 | Stop reason: HOSPADM

## 2022-03-04 RX ORDER — ONDANSETRON 2 MG/ML
4 INJECTION INTRAMUSCULAR; INTRAVENOUS EVERY 6 HOURS PRN
Status: DISCONTINUED | OUTPATIENT
Start: 2022-03-04 | End: 2022-03-05

## 2022-03-04 RX ORDER — GABAPENTIN 100 MG/1
100 CAPSULE ORAL EVERY 8 HOURS SCHEDULED
Status: DISCONTINUED | OUTPATIENT
Start: 2022-03-04 | End: 2022-03-07 | Stop reason: HOSPADM

## 2022-03-04 RX ORDER — L.ACID,PARA/B.BIFIDUM/S.THERM 8B CELL
1 CAPSULE ORAL DAILY
Status: DISCONTINUED | OUTPATIENT
Start: 2022-03-04 | End: 2022-03-07 | Stop reason: HOSPADM

## 2022-03-04 RX ORDER — LEVOTHYROXINE SODIUM 0.05 MG/1
50 TABLET ORAL
Status: DISCONTINUED | OUTPATIENT
Start: 2022-03-05 | End: 2022-03-07 | Stop reason: HOSPADM

## 2022-03-04 RX ORDER — ACETAMINOPHEN 325 MG/1
650 TABLET ORAL EVERY 4 HOURS PRN
Status: DISCONTINUED | OUTPATIENT
Start: 2022-03-04 | End: 2022-03-07 | Stop reason: HOSPADM

## 2022-03-04 RX ORDER — SUCRALFATE 1 G/1
1 TABLET ORAL
Status: DISCONTINUED | OUTPATIENT
Start: 2022-03-04 | End: 2022-03-07 | Stop reason: HOSPADM

## 2022-03-04 RX ORDER — NITROGLYCERIN 0.4 MG/1
0.4 TABLET SUBLINGUAL
Status: DISCONTINUED | OUTPATIENT
Start: 2022-03-04 | End: 2022-03-07 | Stop reason: HOSPADM

## 2022-03-04 RX ORDER — SODIUM CHLORIDE 0.9 % (FLUSH) 0.9 %
10 SYRINGE (ML) INJECTION AS NEEDED
Status: DISCONTINUED | OUTPATIENT
Start: 2022-03-04 | End: 2022-03-07 | Stop reason: HOSPADM

## 2022-03-04 RX ORDER — PANTOPRAZOLE SODIUM 40 MG/1
40 TABLET, DELAYED RELEASE ORAL EVERY MORNING
Refills: 3 | Status: DISCONTINUED | OUTPATIENT
Start: 2022-03-05 | End: 2022-03-07 | Stop reason: HOSPADM

## 2022-03-04 RX ORDER — CARVEDILOL 6.25 MG/1
6.25 TABLET ORAL ONCE
Status: COMPLETED | OUTPATIENT
Start: 2022-03-04 | End: 2022-03-04

## 2022-03-04 RX ORDER — SODIUM CHLORIDE 0.9 % (FLUSH) 0.9 %
10 SYRINGE (ML) INJECTION EVERY 12 HOURS SCHEDULED
Status: DISCONTINUED | OUTPATIENT
Start: 2022-03-04 | End: 2022-03-07 | Stop reason: HOSPADM

## 2022-03-04 RX ORDER — FLUOROMETHOLONE 0.1 %
1 SUSPENSION, DROPS(FINAL DOSAGE FORM)(ML) OPHTHALMIC (EYE) DAILY
Status: DISCONTINUED | OUTPATIENT
Start: 2022-03-04 | End: 2022-03-07 | Stop reason: HOSPADM

## 2022-03-04 RX ORDER — NYSTATIN 100000 U/G
1 OINTMENT TOPICAL 2 TIMES DAILY
Status: DISCONTINUED | OUTPATIENT
Start: 2022-03-04 | End: 2022-03-07 | Stop reason: HOSPADM

## 2022-03-04 RX ADMIN — ACETAMINOPHEN 650 MG: 325 TABLET ORAL at 21:33

## 2022-03-04 RX ADMIN — ASPIRIN 81 MG: 81 TABLET, CHEWABLE ORAL at 21:26

## 2022-03-04 RX ADMIN — CARVEDILOL 6.25 MG: 6.25 TABLET, FILM COATED ORAL at 16:11

## 2022-03-04 RX ADMIN — Medication 1 CAPSULE: at 21:26

## 2022-03-04 RX ADMIN — VALSARTAN 160 MG: 160 TABLET, FILM COATED ORAL at 16:11

## 2022-03-04 RX ADMIN — FLUOROMETHOLONE 1 DROP: 1 SOLUTION/ DROPS OPHTHALMIC at 21:27

## 2022-03-04 RX ADMIN — CEFEPIME HYDROCHLORIDE 2 G: 2 INJECTION, POWDER, FOR SOLUTION INTRAVENOUS at 21:24

## 2022-03-04 RX ADMIN — Medication 10 ML: at 21:30

## 2022-03-04 RX ADMIN — NYSTATIN 1 APPLICATION: 100000 OINTMENT TOPICAL at 21:30

## 2022-03-04 RX ADMIN — CARVEDILOL 12.5 MG: 12.5 TABLET, FILM COATED ORAL at 21:26

## 2022-03-04 RX ADMIN — SODIUM CHLORIDE 1 DROP: 50 SOLUTION OPHTHALMIC at 21:28

## 2022-03-04 RX ADMIN — SUCRALFATE 1 G: 1 TABLET ORAL at 21:25

## 2022-03-04 RX ADMIN — TAZOBACTAM SODIUM AND PIPERACILLIN SODIUM 3.38 G: 375; 3 INJECTION, SOLUTION INTRAVENOUS at 14:01

## 2022-03-04 RX ADMIN — VANCOMYCIN HYDROCHLORIDE 1250 MG: 10 INJECTION, POWDER, LYOPHILIZED, FOR SOLUTION INTRAVENOUS at 14:55

## 2022-03-04 RX ADMIN — Medication 2000 UNITS: at 21:26

## 2022-03-04 RX ADMIN — GABAPENTIN 100 MG: 100 CAPSULE ORAL at 21:25

## 2022-03-04 NOTE — ED NOTES
Nursing report ED to floor  Geeta Butt  86 y.o.  female    HPI :   Chief Complaint   Patient presents with   • Wound Check       Admitting doctor:   Tara Lay MD    Admitting diagnosis:   The primary encounter diagnosis was Cellulitis of right lower extremity. A diagnosis of Benign essential hypertension was also pertinent to this visit.    Code status:   Current Code Status     Date Active Code Status Order ID Comments User Context       Prior    Advance Care Planning Activity          Allergies:   Other, Sulfa antibiotics, Ciprofloxacin, Iodine, Macrodantin [nitrofurantoin macrocrystal], Nitrofurantoin, and Yeast-related products    Intake and Output    Intake/Output Summary (Last 24 hours) at 3/4/2022 1654  Last data filed at 3/4/2022 1455  Gross per 24 hour   Intake 50 ml   Output --   Net 50 ml       Weight:       03/04/22  1138   Weight: 68.5 kg (151 lb)       Most recent vitals:   Vitals:    03/04/22 1501 03/04/22 1554 03/04/22 1611 03/04/22 1631   BP: 171/70  (!) 189/76 167/67   BP Location:       Patient Position:       Pulse: 75 74     Resp:       Temp:       TempSrc:       SpO2: 97% 95%     Weight:       Height:           Active LDAs/IV Access:   Lines, Drains & Airways     Active LDAs     Name Placement date Placement time Site Days    Peripheral IV 03/04/22 1151 Right Antecubital 03/04/22  1151  Antecubital  less than 1    External Urinary Catheter 08/01/21  0045  --  215                Labs (abnormal labs have a star):   Labs Reviewed   CBC WITH AUTO DIFFERENTIAL - Abnormal; Notable for the following components:       Result Value    Hemoglobin 11.9 (*)     Platelets 515 (*)     Immature Grans % 1.3 (*)     Immature Grans, Absolute 0.13 (*)     All other components within normal limits   PROTIME-INR - Normal   PROCALCITONIN - Normal    Narrative:     As a Marker for Sepsis (Non-Neonates):    1. <0.5 ng/mL represents a low risk of severe sepsis and/or septic shock.  2. >2 ng/mL represents a high  "risk of severe sepsis and/or septic shock.    As a Marker for Lower Respiratory Tract Infections that require antibiotic therapy:    PCT on Admission    Antibiotic Therapy       6-12 Hrs later    >0.5                Strongly Recommended  >0.25 - <0.5        Recommended   0.1 - 0.25          Discouraged              Remeasure/reassess PCT  <0.1                Strongly Discouraged     Remeasure/reassess PCT    As 28 day mortality risk marker: \"Change in Procalcitonin Result\" (>80% or <=80%) if Day 0 (or Day 1) and Day 4 values are available. Refer to http://[email or web address]-pct-[email or web address]    Change in PCT <=80%  A decrease of PCT levels below or equal to 80% defines a positive change in PCT test result representing a higher risk for 28-day all-cause mortality of patients diagnosed with severe sepsis for septic shock.    Change in PCT >80%  A decrease of PCT levels of more than 80% defines a negative change in PCT result representing a lower risk for 28-day all-cause mortality of patients diagnosed with severe sepsis or septic shock.      LACTIC ACID, PLASMA - Normal   MAGNESIUM - Normal   BLOOD CULTURE   BLOOD CULTURE   WOUND CULTURE   COVID PRE-OP / PRE-PROCEDURE SCREENING ORDER (NO ISOLATION)    Narrative:     The following orders were created for panel order COVID PRE-OP / PRE-PROCEDURE SCREENING ORDER (NO ISOLATION) - Swab, Nasopharynx.  Procedure                               Abnormality         Status                     ---------                               -----------         ------                     COVID-19,APTIMA PANTHER(...[[id number]]                      In process                   Please view results for these tests on the individual orders.   COVID-19,APTIMA PANTHER ([facility], NP/OP SWAB IN UTM/VTM/SALINE TRANSPORT MEDIA,24 HR TAT   COMPREHENSIVE METABOLIC PANEL    Narrative:     GFR Normal >60  Chronic Kidney Disease <60  Kidney Failure <15     CBC AND DIFFERENTIAL    Narrative:     The " following orders were created for panel order CBC & Differential.  Procedure                               Abnormality         Status                     ---------                               -----------         ------                     CBC Auto Differential[[id number]]        Abnormal            Final result                 Please view results for these tests on the individual orders.       EKG:   No orders to display       Meds given in ED:   Medications   sodium chloride 0.9 % flush 10 mL (has no administration in time range)   piperacillin-tazobactam (ZOSYN) 3.375 g in iso-osmotic dextrose 50 ml (premix) (0 g Intravenous Stopped 3/4/22 145)   vancomycin 1250 mg/250 mL 0.9% NS IVPB ([facility]) (1,250 mg Intravenous New Bag 3/4/22 145)   valsartan (DIOVAN) tablet 160 mg (160 mg Oral Given 3/4/22 161)   carvedilol (COREG) tablet 6.25 mg (6.25 mg Oral Given 3/4/22 161)       Imaging results:  No radiology results for the last day    Ambulatory status:   - assist x1      Social issues:   Social History     Socioeconomic History   • Marital status:      Spouse name: Nicolas   • Years of education: High school   Tobacco Use   • Smoking status: Former Smoker     Packs/day: 1.00     Years: 10.00     Pack years: 10.00     Types: Cigarettes     Start date:      Quit date:      Years since quittin.1   • Smokeless tobacco: Never Used   Vaping Use   • Vaping Use: Never used   Substance and Sexual Activity   • Alcohol use: No   • Drug use: No   • Sexual activity: Defer       NIH Stroke Scale:        Nursing report ED to floor:       Nicolas Thomas RN  [phone]

## 2022-03-04 NOTE — H&P
Internal medicine history and physical  INTERNAL MEDICINE   [facility]       Patient Identification:  Name: Geeta Butt  Age: 86 y.o.  Sex: female  :  1935  MRN: [id number]                   Primary Care Physician: Grzegorz Nguyen MD                               Date of admission:3/4/2022    Chief Complaint: Worsening redness and pain in her wound in her right leg despite taking antibiotic started 5 days ago    History of Present Illness:   Patient is 86-year-old female with complicated past medical history as noted below who lives by herself and fairly independent hit her right leg to the bath tub 3 weeks ago and developed a bruise/blister in that area.  Her daughter dressed it with her Band-Aid.  Few days later when the bandage was removed it for the skin above the blister that got subsequently infected.  Patient saw her primary care provider on 2022 and was found to have cellulitis of the right lower extremity and was started on Augmentin 1 tablet twice a day for 10 days.  2 days later patient presented to the emergency room and was noted to have WBC count of 12,000.  Patient was recommended to continue Augmentin and prescription of topical Bactroban was prescribed.  Patient was instructed to follow-up in 3 to 4 days to her primary care provider.  Earlier patient was seen by her dermatologist who after evaluating the wound recommended that she needs to go to the emergency room for IV antibiotics.  Patient denies any systemic fever and chills.  Patient is able to ambulate and bear weight on the leg.      Past Medical History:  Past Medical History:   Diagnosis Date   • Anesthesia complication     ASPIRATION INTO AIRWAY WITH TRACH PRESENT IN PAST (WITH ORAL CANCER SURGERY(   • Arthritis    • Aspiration into airway     post anesthesia   • Breast cancer (HCC)    • Cancer (HCC)     mouth cancer    • Chronic kidney disease    • CKD (chronic kidney disease)     PT STATES STAGE 3    • Depression    • Diverticular disease    • Gastric ulcer     AND DUODENAL   • GERD (gastroesophageal reflux disease)    • Hearing loss     BILAT AIDES   • History of colon polyps    • History of depression    • History of GI bleed    • Hypertension    • Hypothyroidism    • Peptic ulceration    • PONV (postoperative nausea and vomiting)    • Poor vision     RIGHT EYE, HAS PERIPHERAL VISION    • Stroke (HCC) 2000     mild weakness on left, partial sight loss on right      Past Surgical History:  Past Surgical History:   Procedure Laterality Date   • ABDOMINAL SURGERY     • APPENDECTOMY     • BLADDER REPAIR      AND RECTOCELE   • BREAST BIOPSY     • BREAST CYST ASPIRATION     • BREAST LUMPECTOMY WITH SENTINEL NODE BIOPSY Left 3/19/2018    Procedure: BREAST LUMPECTOMY WITH SENTINEL NODE BIOPSY AND NEEDLE LOCALIZATION;  Surgeon: Myles Soliman MD;  [place]  [facility] [place] [facility] [place]: [place]   • CHOLECYSTECTOMY     • COLONOSCOPY  2013   • COLONOSCOPY N/A 2/16/2018    Procedure: COLONOSCOPY into cecum and T.I. with biopsies;  Surgeon: Augustine Gallego MD;  Location: [facility] ENDOSCOPY;  Service:    • ENDOSCOPY N/A 10/17/2017    Procedure: ESOPHAGOGASTRODUODENOSCOPY WITH COLD BIOPSIES;  Surgeon: Augustine Gallego MD;  Location: [facility] ENDOSCOPY;  Service:    • ENDOSCOPY N/A 2/16/2018    Procedure: ESOPHAGOGASTRODUODENOSCOPY with biopsies and #54 [address] DILATATION;  Surgeon: Augustine Gallego MD;  Location: [facility] ENDOSCOPY;  Service:    • ENDOSCOPY N/A 3/19/2020    Procedure: ESOPHAGOGASTRODUODENOSCOPY with biopsies;  Surgeon: Augustine Gallego MD;  Location: [facility] ENDOSCOPY;  Service: Gastroenterology;  Laterality: N/A;  PRE- MELENA, EPIGASTRIC PAIN  POST- eerosive gastritis, duodenal ulcer, hiatal hernia   • ENDOSCOPY N/A 7/29/2020    Procedure: ESOPHAGOGASTRODUODENOSCOPY WITH DUODENAL ASPIRATE AND COLD BIOPSIES;  Surgeon: Augustine Gallego MD;  Location: [facility] ENDOSCOPY;  Service: Gastroenterology;  Laterality: N/A;   PRE: HX ULCER DISEASE  POST: GASTRITIS, HEALED ULCER   • EYE SURGERY Left 2014    3-4 years, cornea replacement    • FEMUR IM NAILING/RODDING Right 8/3/2021    Procedure: FEMUR INTRAMEDULLARY NAILING/RODDING;  Surgeon: Garett Martin II, MD;  Location: [facility] MAIN OR;  Service: Orthopedics;  Laterality: Right;   • HIP SURGERY  08/03/2021   • HYSTERECTOMY     • MOUTH SURGERY  2000    UNDER TONGUE AND LEFT FLOOR OF MOUTH FOR CA   • PARATHYROIDECTOMY      PER PT   • SIGMOIDOSCOPY N/A 7/29/2020    Procedure: FLEXIBLE SIGMOIDOSCOPY TO DESCENDING COLON WITH COLD BIOPSIES;  Surgeon: Augustine Gallego MD;  Location: [facility] ENDOSCOPY;  Service: Gastroenterology;  Laterality: N/A;  PRE: RECTAL BLEEDING  POST: DIVERTICULOSIS, HEMORRHOIDS, MINIMAL PROCTITIS   • SINUS SURGERY     • SKIN BIOPSY     • THYROID SURGERY     • VARICOSE VEIN SURGERY        Home Meds:  Medications Prior to Admission   Medication Sig Dispense Refill Last Dose   • acetaminophen (TYLENOL) 325 MG tablet Take 2 tablets by mouth Every 4 (Four) Hours As Needed for Mild Pain .   3/4/2022 at Unknown time   • amoxicillin (AMOXIL) 500 MG capsule Take 2 capsules by mouth 3 (Three) Times a Day. 60 capsule 0 3/4/2022 at Unknown time   • Cholecalciferol (VITAMIN D3) 50 MCG (2000 UT) capsule TAKE 1 CAPSULE BY MOUTH DAILY. 100 capsule 1    • Flarex 0.1 % ophthalmic suspension    3/3/2022 at Unknown time   • fluorometholone (FML) 0.1 % ophthalmic suspension Administer 1 drop into the left eye Daily.   3/3/2022 at Unknown time   • gabapentin (NEURONTIN) 100 MG capsule Take 1 capsule three times per day.  Okay to take 1 more capsule at bedtime if needed for pain. 120 capsule 5 3/4/2022 at Unknown time   • glucose blood (FREESTYLE TEST STRIPS) test strip TEST BLOOD SUGARS ONCE DAILY 50 each 12    • Lancets (FREESTYLE) lancets Test glucose once daily 100 each 3    • levothyroxine (SYNTHROID, LEVOTHROID) 50 MCG tablet Take 1 tablet by mouth Daily. 90 tablet 3  3/4/2022 at Unknown time   • mupirocin (BACTROBAN) 2 % nasal ointment Apply  topically 3 (three) times a day 30 g 0 3/4/2022 at Unknown time   • nystatin (MYCOSTATIN) 856636 UNIT/GM ointment Apply 1 application topically to the appropriate area as directed 2 (Two) Times a Day for 90 days. 60 g 2 3/3/2022 at Unknown time   • ondansetron (ZOFRAN) 4 MG tablet Take 1 tablet by mouth Every 6 (Six) Hours As Needed for Nausea or Vomiting.   Past Week at Unknown time   • Probiotic Product (PROBIOTIC DAILY PO) Take 1 capsule by mouth Daily.   3/3/2022 at Unknown time   • RABEprazole (ACIPHEX) 20 MG EC tablet Take 1 tablet by mouth Daily. 90 tablet 3 3/4/2022 at Unknown time   • sennosides-docusate (senna-docusate sodium) 8.6-50 MG per tablet Take 1 tablet by mouth 2 (Two) Times a Day As Needed for Constipation.      • sodium chloride (CAYLA 128) 5 % ophthalmic solution Administer 1 drop to the right eye As Needed.   3/3/2022 at Unknown time   • sucralfate (CARAFATE) 1 GM/10ML suspension TAKE 10 ML BY MOUTH 3 TIMES A DAY FOR 30 MINUTES BEFORE MEALS   3/3/2022 at Unknown time   • valsartan (Diovan) 160 MG tablet Take 1 tablet by mouth Daily for 90 days. 30 tablet 2 3/4/2022 at Unknown time   • aspirin 81 MG chewable tablet Chew 81 mg Daily.   3/1/2022   • carvedilol (COREG) 6.25 MG tablet Take 1 tablet by mouth 2 (Two) Times a Day With Meals. (Patient taking differently: Take 12.5 mg by mouth 2 (Two) Times a Day With Meals.) 60 tablet 3    • fluticasone (FLONASE) 50 MCG/ACT nasal spray 1 spray into the nostril(s) as directed by provider Daily.   3/1/2022     Current Meds:     Current Facility-Administered Medications:   •  [COMPLETED] Insert peripheral IV, , , Once **AND** sodium chloride 0.9 % flush 10 mL, 10 mL, Intravenous, PRN, Markel Rosenthal MD  Allergies:  Allergies   Allergen Reactions   • Other Nausea Only     All mycins   • Sulfa Antibiotics Unknown - High Severity     LOST CONSCIOUSNESS AND BODY TURNED RED/ON FIRE  "  • Ciprofloxacin Unknown - High Severity     RED BLISTERS   • Iodine Unknown - High Severity     DECADES AGO, IODINE INJECTION CAUSED SKIN REDNESS AND BURNING   • Macrodantin [Nitrofurantoin Macrocrystal] Diarrhea and Nausea And Vomiting   • Nitrofurantoin Nausea And Vomiting   • Yeast-Related Products Unknown - High Severity     MOUTH SORES AND BLADDER INFECTION     Social History:   Social History     Tobacco Use   • Smoking status: Former Smoker     Packs/day: 1.00     Years: 10.00     Pack years: 10.00     Types: Cigarettes     Start date:      Quit date:      Years since quittin.1   • Smokeless tobacco: Never Used   Substance Use Topics   • Alcohol use: No      Family History:  Family History   Problem Relation Age of Onset   • Esophageal cancer Father    • Stomach cancer Father    • Heart disease Mother    • Thyroid cancer Mother    • Hypertension Mother    • Hypertension Sister    • Hypertension Brother    • Stroke Brother    • Heart disease Brother    • Diabetes Brother    • Leukemia Paternal Aunt    • Malig Hyperthermia Neg Hx           Review of Systems  See history of present illness and past medical history.    Remainder of ROS is negative.      Vitals:   BP (!) 194/81 (BP Location: Right arm, Patient Position: Lying) Comment: RN notified  Pulse 70   Temp 97.4 °F (36.3 °C) (Oral)   Resp 18   Ht 154.9 cm (61\")   Wt 68.5 kg (151 lb)   SpO2 95%   BMI 28.53 kg/m²   I/O:     Intake/Output Summary (Last 24 hours) at 3/4/2022 1845  Last data filed at 3/4/2022 1750  Gross per 24 hour   Intake 300 ml   Output --   Net 300 ml     Exam:  Patient is examined using the personal protective equipment as per guidelines from infection control for this particular patient as enacted.  Hand washing was performed before and after patient interaction.  General Appearance:    Alert, cooperative, no distress, appears stated age   Head:    Normocephalic, without obvious abnormality, atraumatic   Eyes:    " PERRL, conjunctiva/corneas clear, EOM's intact, both eyes   Ears:    Normal external ear canals, both ears   Nose:   Nares normal, septum midline, mucosa normal, no drainage    or sinus tenderness   Throat:   Lips, tongue, gums normal; oral mucosa pink and moist   Neck:   Supple, symmetrical, trachea midline, no adenopathy;     thyroid:  no enlargement/tenderness/nodules; no carotid    bruit or JVD   Back:     Symmetric, no curvature, ROM normal, no CVA tenderness   Lungs:     Clear to auscultation bilaterally, respirations unlabored   Chest Wall:    No tenderness or deformity    Heart:    Regular rate and rhythm, S1 and S2 normal, no murmur, rub   or gallop   Abdomen:     Soft, non-tender, bowel sounds active all four quadrants,     no masses, no hepatomegaly, no splenomegaly   Extremities:  Bilateral lower extremity edema with right lower extremity significant tenderness as noted below   Pulses:   Pulses palpable in all extremities; symmetric all extremities   Skin:                  Neurologic:  Grossly nonfocal examination.       Data Review:      I reviewed the patient's new clinical results.  Results from last 7 days   Lab Units 03/04/22  1159 02/28/22  1440   WBC 10*3/mm3 10.06 12.02*   HEMOGLOBIN g/dL 11.9* 11.3*   PLATELETS 10*3/mm3 515* 501*     Results from last 7 days   Lab Units 03/04/22  1159 02/28/22  1440   SODIUM mmol/L 138 136   POTASSIUM mmol/L 4.4 4.7   CHLORIDE mmol/L 98 98   CO2 mmol/L 28.7 26.6   BUN mg/dL 14 14   CREATININE mg/dL 0.78 0.92   CALCIUM mg/dL 9.9 9.5   GLUCOSE mg/dL 91 94     No radiology results for the last 30 days.  No orders to display       Assessment:  Active Hospital Problems    Diagnosis  POA   • **Cellulitis of right lower extremity [L03.115]  Yes   • Superficial bruising [T14.8XXA]  Yes   • Chronic kidney disease [N18.9]  Yes   • GERD without esophagitis [K21.9]  Yes   • DDD (degenerative disc disease), lumbosacral [M51.37]  Yes   • Malignant neoplasm of lower-inner  quadrant of left breast in female, estrogen receptor positive (HCC) [C50.312, Z17.0]  Not Applicable     Added automatically from request for surgery [id number]     • Hypothyroidism [E03.9]  Yes   • Essential hypertension [I10]  Yes   • Central retinal vein occlusion [H34.8192]  Yes   • Stroke (HCC) [I63.9]  Unknown      mild weakness on left, partial sight loss on right          Plan: See admitting orders  · Since patient failed outpatient treatment with Augmentin and the circumstances in which this traumatic cellulitis occurred recommend expanding the spectrum of antibiotics to cover for environmental gram-negative rods and MRSA.  · Continue with IV vancomycin and cefepime and elevation of legs and follow-up on culture results and de-escalate antibiotics based on the culture data and response to treatment.  · Continue with local wound care.    Tara Lay MD   3/4/2022  18:45 EST  Much of this encounter note is an electronic transcription/translation of spoken language to printed text. The electronic translation of spoken language may permit erroneous, or at times, nonsensical words or phrases to be inadvertently transcribed; Although I have reviewed the note for such errors, some may still exist

## 2022-03-04 NOTE — ED PROVIDER NOTES
EMERGENCY DEPARTMENT ENCOUNTER    Room Number:  34/34  Date of encounter:  3/4/2022  PCP: Grzegorz Nguyen MD  Historian: Patient and Daughter      HPI:  Chief Complaint: Worsening infection  A complete HPI/ROS/PMH/PSH/SH/FH are unobtainable due to: Not applicable  Context: Geeta Butt is a 86 y.o. female who presents to the ED c/o worsening infection to her right shin.  Patient symptoms started a little over a week ago with some pain in the right shin.  She has been taking Augmentin for almost 1 week since February 26 and is been applying Bactroban for 5 days.  She saw the dermatologist today who recommended she come to the emergency department for IV antibiotics because of worsening infection to her right shin.  She reports no fevers or chills, chest pain, shortness of breath, abdominal pain.  Patient was seen in the emergency department February 28, 2022.  I reviewed the chart.  It appears as if she has had some shin pain for 1 to 2 weeks.  Was seen at the urgent care center approximately 2 days prior to the visit in the emergency department on the 28th and diagnosed with cellulitis and prescribed Augmentin.  She had lab work which revealed a white blood cell count of 12,000.  Chemistries were unremarkable.  She is placed upon Bactroban and was going to continue the Augmentin.  Her diagnosis was cellulitis of her right lower extremity.        Previous Episodes: No  Current Symptoms: See above    MEDICAL HISTORY REVIEWED  See above    PAST MEDICAL HISTORY  Active Ambulatory Problems     Diagnosis Date Noted   • Essential hypertension 10/16/2017   • Hypothyroidism 10/16/2017   • Black stool 10/16/2017   • Diarrhea 10/16/2017   • Nausea & vomiting 10/16/2017   • RUQ pain 10/16/2017   • Leukocytosis 10/16/2017   • UTI (urinary tract infection) 10/17/2017   • Duodenitis 10/17/2017   • Foot pain, bilateral 10/19/2017   • Malignant neoplasm of lower-inner quadrant of left breast in female, estrogen receptor positive  ([facility]) 02/27/2018   • Iron deficiency anemia 04/04/2019   • Adverse effect of iron 05/09/2019   • GI bleed 03/18/2020   • GIULIA (acute kidney injury) ([facility]) 03/18/2020   • Gastrointestinal hemorrhage associated with duodenitis 03/18/2020   • Hematochezia 03/19/2020   • Duodenal ulcer 03/19/2020   • Long-term use of high-risk medication 06/03/2020   • Acute left-sided low back pain with left-sided sciatica 07/02/2021   • DDD (degenerative disc disease), lumbosacral 07/02/2021   • Hyperglycemia 07/02/2021   • GERD without esophagitis 07/02/2021   • Hyponatremia 07/04/2021   • Hyperlipidemia 07/22/2021   • Impaired fasting glucose 07/22/2021   • Depressive disorder 07/22/2021   • Chronic kidney disease 07/22/2021   • Closed fracture of right hip ([facility]) 07/31/2021   • Acute pyelonephritis 08/23/2021   • Sepsis secondary to UTI ([facility]) 08/24/2021   • Bilateral cataracts 11/07/2002   • Central retinal vein occlusion 11/07/2012   • Corneal endothelial dystrophy 11/07/2004   • Epiretinal membrane 11/07/2012   • Bladder prolapse, female, acquired 01/14/2022   • Superficial bruising 01/14/2022   • Neuropathy 01/28/2022   • Urinary retention with incomplete bladder emptying 01/28/2022   • Hx of bladder repair surgery 01/28/2022     Resolved Ambulatory Problems     Diagnosis Date Noted   • DM2 (diabetes mellitus, type 2) ([facility]) 10/16/2017   • Precordial chest pain 07/02/2021     Past Medical History:   Diagnosis Date   • Anesthesia complication    • Arthritis    • Aspiration into airway    • Breast cancer ([facility])    • Cancer ([facility]) 1990   • CKD (chronic kidney disease)    • Depression    • Diverticular disease    • Gastric ulcer    • GERD (gastroesophageal reflux disease)    • Hearing loss    • History of colon polyps    • History of depression    • History of GI bleed    • Hypertension    • Peptic ulceration    • PONV (postoperative nausea and vomiting)    • Poor vision    • Stroke ([facility]) 2000         PAST SURGICAL HISTORY  Past Surgical  History:   Procedure Laterality Date   • ABDOMINAL SURGERY     • APPENDECTOMY     • BLADDER REPAIR      AND RECTOCELE   • BREAST BIOPSY     • BREAST CYST ASPIRATION     • BREAST LUMPECTOMY WITH SENTINEL NODE BIOPSY Left 3/19/2018    Procedure: BREAST LUMPECTOMY WITH SENTINEL NODE BIOPSY AND NEEDLE LOCALIZATION;  Surgeon: Myles Soliman MD;  Location:  [facility];  Service: General   • CHOLECYSTECTOMY     • COLONOSCOPY  2013   • COLONOSCOPY N/A 2/16/2018    Procedure: COLONOSCOPY into cecum and T.I. with biopsies;  Surgeon: Augustine Gallego MD;  Location[facility];  Service:    • ENDOSCOPY N/A 10/17/2017    Procedure: ESOPHAGOGASTRODUODENOSCOPY WITH COLD BIOPSIES;  Surgeon: Augustine Gallego MD;  Location[facility];  Service:    • ENDOSCOPY N/A 2/16/2018    Procedure: ESOPHAGOGASTRODUODENOSCOPY with biopsies and #54 Ar[address]ello DILATATION;  Surgeon: Augustine Gallego MD;  Location: [facility];  Service:    • ENDOSCOPY N/A 3/19/2020    Procedure: ESOPHAGOGASTRODUODENOSCOPY with biopsies;  Surgeon: Augustine Gallego MD;  Location: [facility];  Service: Gastroenterology;  Laterality: N/A;  PRE- MELENA, EPIGASTRIC PAIN  POST- eerosive gastritis, duodenal ulcer, hiatal hernia   • ENDOSCOPY N/A 7/29/2020    Procedure: ESOPHAGOGASTRODUODENOSCOPY WITH DUODENAL ASPIRATE AND COLD BIOPSIES;  Surgeon: Augustine Gallego MD;  Location: [facility];  Service: Gastroenterology;  Laterality: N/A;  PRE: HX ULCER DISEASE  POST: GASTRITIS, HEALED ULCER   • EYE SURGERY Left 2014    3-4 years, cornea replacement    • FEMUR IM NAILING/RODDING Right 8/3/2021    Procedure: FEMUR INTRAMEDULLARY NAILING/RODDING;  Surgeon: Garett Martin II, MD;  Location: [facility] MAIN OR;  Service: Orthopedics;  Laterality: Right;   • HIP SURGERY  08/03/2021   • HYSTERECTOMY     • MOUTH SURGERY  2000    UNDER TONGUE AND LEFT FLOOR OF MOUTH FOR CA   • PARATHYROIDECTOMY      PER PT   • SIGMOIDOSCOPY N/A 7/29/2020     Procedure: FLEXIBLE SIGMOIDOSCOPY TO DESCENDING COLON WITH COLD BIOPSIES;  Surgeon: Augustine Gallego MD;  Location: [facility] END[facility]COPY;  Service: Gastroenterology;  Laterality: N/A;  PRE: RECTAL BLEEDING  POST: DIVERTICULOSIS, HEMORRHOIDS, MINIMAL PROCTITIS   • SINUS SURGERY     • SKIN BIOPSY     • THYROID SURGERY     • VARICOSE VEIN SURGERY           FAMILY HISTORY  Family History   Problem Relation Age of Onset   • Esophageal cancer Father    • Stomach cancer Father    • Heart disease Mother    • Thyroid cancer Mother    • Hypertension Mother    • Hypertension Sister    • Hypertension Brother    • Stroke Brother    • Heart disease Brother    • Diabetes Brother    • Leukemia Paternal Aunt    • Malig Hyperthermia Neg Hx          SOCIAL HISTORY  Social History     Socioeconomic History   • Marital status:      Spouse name: Nicolas   • Years of education: High school   Tobacco Use   • Smoking status: Former Smoker     Packs/day: 1.00     Years: 10.00     Pack years: 10.00     Types: Cigarettes     Start date:      Quit date:      Years since quittin.1   • Smokeless tobacco: Never Used   Vaping Use   • Vaping Use: Never used   Substance and Sexual Activity   • Alcohol use: No   • Drug use: No   • Sexual activity: Defer         ALLERGIES  Other, Sulfa antibiotics, Ciprofloxacin, Iodine, Macrodantin [nitrofurantoin macrocrystal], Nitrofurantoin, and Yeast-related products        REVIEW OF SYSTEMS  Review of Systems     All systems reviewed and negative except for those discussed in HPI.       PHYSICAL EXAM    I have reviewed the triage vital signs and nursing notes.    ED Triage Vitals   Temp Heart Rate Resp BP SpO2   22 1130 22 1122 22 1122 22 1126 22 1122   97 °F (36.1 °C) 75 16 (!) 181/84 99 %      Temp src Heart Rate Source Patient Position BP Location FiO2 (%)   22 1130 22 1122 22 1138 22 1138 --   Tympanic Monitor Lying Right arm        GENERAL:  Elderly female no acute distress.Vital signs on my initial evaluation unremarkable.  HENT: nares patent  Head/neck/ face are symmetric without gross deformity, signs of trauma, or swelling  EYES: no scleral icterus, no conjunctival pallor.  NECK: Supple, no meningismus  CV: regular rhythm, regular rate with intact distal pulses.  RESPIRATORY: normal effort and no respiratory distress.  ABDOMEN: soft and nontender.  Obese  MUSCULOSKELETAL: Please see photo of right shin below.  There is erythema, warmth and 2 areas of superficial ulcerations with some purulent drainage covering him.  There is no fluctuance or any obvious signs of abscess.  There is some tenderness with palpation.      Intact distal pulses to lower extremities that are equal strong and symmetric.  No coolness, pallor, or cyanosis to distal extremities.  NEURO: alert and appropriate, moves all extremities, follows commands.  No focal motor or sensory changes.  SKIN: warm, dry    Vital signs and nursing notes reviewed.        LAB RESULTS  Recent Results (from the past 24 hour(s))   Comprehensive Metabolic Panel    Collection Time: 03/04/22 11:59 AM    Specimen: Blood   Result Value Ref Range    Glucose 91 65 - 99 mg/dL    BUN 14 8 - 23 mg/dL    Creatinine 0.78 0.57 - 1.00 mg/dL    Sodium 138 136 - 145 mmol/L    Potassium 4.4 3.5 - 5.2 mmol/L    Chloride 98 98 - 107 mmol/L    CO2 28.7 22.0 - 29.0 mmol/L    Calcium 9.9 8.6 - 10.5 mg/dL    Total Protein 7.4 6.0 - 8.5 g/dL    Albumin 4.40 3.50 - 5.20 g/dL    ALT (SGPT) 16 1 - 33 U/L    AST (SGOT) 12 1 - 32 U/L    Alkaline Phosphatase 85 39 - 117 U/L    Total Bilirubin <0.2 0.0 - 1.2 mg/dL    Globulin 3.0 gm/dL    A/G Ratio 1.5 g/dL    BUN/Creatinine Ratio 17.9 7.0 - 25.0    Anion Gap 11.3 5.0 - 15.0 mmol/L    eGFR 74.5 >60.0 mL/min/1.73   Protime-INR    Collection Time: 03/04/22 11:59 AM    Specimen: Blood   Result Value Ref Range    Protime 12.3 11.7 - 14.2 Seconds    INR 0.93 0.90 - 1.10   Procalcitonin     Collection Time: 03/04/22 11:59 AM    Specimen: Blood   Result Value Ref Range    Procalcitonin 0.04 0.00 - 0.25 ng/mL   Lactic Acid, Plasma    Collection Time: 03/04/22 11:59 AM    Specimen: Blood   Result Value Ref Range    Lactate 0.8 0.5 - 2.0 mmol/L   Magnesium    Collection Time: 03/04/22 11:59 AM    Specimen: Blood   Result Value Ref Range    Magnesium 1.8 1.6 - 2.4 mg/dL   CBC Auto Differential    Collection Time: 03/04/22 11:59 AM    Specimen: Blood   Result Value Ref Range    WBC 10.06 3.40 - 10.80 10*3/mm3    RBC 4.17 3.77 - 5.28 10*6/mm3    Hemoglobin 11.9 (L) 12.0 - 15.9 g/dL    Hematocrit 36.6 34.0 - 46.6 %    MCV 87.8 79.0 - 97.0 fL    MCH 28.5 26.6 - 33.0 pg    MCHC 32.5 31.5 - 35.7 g/dL    RDW 14.7 12.3 - 15.4 %    RDW-SD 47.9 37.0 - 54.0 fl    MPV 8.9 6.0 - 12.0 fL    Platelets 515 (H) 140 - 450 10*3/mm3    Neutrophil % 66.9 42.7 - 76.0 %    Lymphocyte % 20.8 19.6 - 45.3 %    Monocyte % 7.3 5.0 - 12.0 %    Eosinophil % 2.5 0.3 - 6.2 %    Basophil % 1.2 0.0 - 1.5 %    Immature Grans % 1.3 (H) 0.0 - 0.5 %    Neutrophils, Absolute 6.74 1.70 - 7.00 10*3/mm3    Lymphocytes, Absolute 2.09 0.70 - 3.10 10*3/mm3    Monocytes, Absolute 0.73 0.10 - 0.90 10*3/mm3    Eosinophils, Absolute 0.25 0.00 - 0.40 10*3/mm3    Basophils, Absolute 0.12 0.00 - 0.20 10*3/mm3    Immature Grans, Absolute 0.13 (H) 0.00 - 0.05 10*3/mm3    nRBC 0.0 0.0 - 0.2 /100 WBC   Duplex Venous Lower Extremity - Right CAR    Collection Time: 03/04/22  1:16 PM   Result Value Ref Range    Target HR (85%) 115 bpm    Max. Pred. HR (100%) 135 bpm    Right Common Femoral Spont Y     Right Common Femoral Phasic Y     Right Common Femoral Augment Y     Right Common Femoral Competent Y     Right Common Femoral Compress C     Right Saphenofemoral Junction Compress C     Right Profunda Femoral Compress C     Right Proximal Femoral Compress C     Right Mid Femoral Spont Y     Right Mid Femoral Phasic Y     Right Mid Femoral Augment Y     Right Mid  Femoral Competent Y     Right Mid Femoral Compress C     Right Distal Femoral Compress C     Right Popliteal Spont Y     Right Popliteal Phasic Y     Right Popliteal Augment Y     Right Popliteal Competent Y     Right Popliteal Compress C     Right Posterior Tibial Compress C     Right Peroneal Compress C     Right Gastronemius Compress C     Right Greater Saph AK Compress C     Right Greater Saph BK Compress C     Right Lesser Saph Compress C     Left Common Femoral Spont Y     Left Common Femoral Phasic Y     Left Common Femoral Augment Y     Left Common Femoral Competent Y     Left Common Femoral Compress C        Ordered the above labs and independently reviewed the results.        RADIOLOGY  XR Tibia Fibula 2 View Right    Result Date: 3/4/2022  XR TIBIA FIBULA 2 VW RIGHT-  03/04/2022  HISTORY: Right lower leg pain and swelling.  There is some soft tissue swelling of the right lower leg. No foreign bodies are seen.  No bony abnormalities are seen.  This report was finalized on 3/4/2022 12:42 PM by Dr. Devin Nguyen M.D.      Duplex Venous Lower Extremity - Right CAR    Result Date: 3/4/2022  · Normal right lower extremity venous duplex scan.        I ordered the above noted radiological studies. Reviewed by me and discussed with radiologist.  See dictation for official radiology interpretation.      PROCEDURES    Procedures      MEDICATIONS GIVEN IN ER    Medications   sodium chloride 0.9 % flush 10 mL (has no administration in time range)   piperacillin-tazobactam (ZOSYN) 3.375 g in iso-osmotic dextrose 50 ml (premix) (0 g Intravenous Stopped 3/4/22 1455)   vancomycin 1250 mg/250 mL 0.9% NS IVPB ([facility]) (1,250 mg Intravenous New Bag 3/4/22 1455)   valsartan (DIOVAN) tablet 160 mg (160 mg Oral Given 3/4/22 1611)   carvedilol (COREG) tablet 6.25 mg (6.25 mg Oral Given 3/4/22 1611)         PROGRESS, DATA ANALYSIS, CONSULTS, AND MEDICAL DECISION MAKING    Informed patient as well as daughter in the room of my  concerns that she is got cellulitis to her right shin.  I will do a Doppler of her right lower extremity as well.  Informed him of the test that we will order and the treatment.  All questions answered at this time.    We are currently under a pandemic from the COVID19 infection.  The patient presented to the emergency department by ambulance or personal vehicle. I followed the current protocols required by Infection Control at [facility] in my evaluation and treatment of the patient. The patient was wearing a face mask during my evaluation and throughout my encounter. During my whole encounter with this patient I used appropriate personal protective equipment.  This equipment consisted of eye protection, facemask, gown, and gloves.  I applied this equipment before entering the room.      All labs have been independently reviewed by me.  All radiology studies have been reviewed by me and discussed with radiologist dictating the report.   EKG's independently viewed and interpreted by me.  Discussion below represents my analysis of pertinent findings related to patient's condition, differential diagnosis, treatment plan and final disposition.      ED Course as of 03/04/22 1647   Fri Mar 04, 2022   1418 I have looked at the x-ray and reviewed the results of the x-ray report from the radiologist.  Some soft tissue swelling.  No obvious fracture. [MM]   1426 I have reevaluated the patient spoke with the patient as well as the daughter in the room informed her and the daughter of the results of the tests initial treatment plan.  All questions answered. [MM]   1427 The venous Doppler is negative for DVT. [MM]   1503 I discussed the case with Dr. Reid who agrees to admit the patient to the hospital.  She has not had her evening blood pressure medicine.  She does have high blood pressure here.  But she is asymptomatic from it. [MM]      ED Course User Index  [MM] Markel Rosenthal MD       AS OF 16:47 EST  VITALS:    BP - 171/70  HR - 75  TEMP - 97 °F (36.1 °C) (Tympanic)  02 SATS - 97%        DIAGNOSIS  Final diagnoses:   Cellulitis of right lower extremity   Benign essential hypertension         DISPOSITION  I have reviewed the test results with my patient and explained the current treatment plan.  I answered all of the patient's questions.  The patient will be admitted to monitor bed at this time.  The patient is not hypotensive and is tolerating their current disease condition well enough for a monitored bed at this time.  The patient's current condition does not require intensive care treatment at this time.            DICTATED UTILIZING [employer] DICTATION     Markel Rosenthal MD  03/04/22 6882

## 2022-03-04 NOTE — ED TRIAGE NOTES
Pt reports she has a wound on her right leg that was noticed 2wks ago. Pt reports she was seen on Monday, but her wound has become worse.     Pt was wearing a mask during assessment.  This RN wore appropriate PPE

## 2022-03-05 LAB
ALBUMIN SERPL-MCNC: 3.7 G/DL (ref 3.5–5.2)
ALBUMIN/GLOB SERPL: 1.3 G/DL
ALP SERPL-CCNC: 73 U/L (ref 39–117)
ALT SERPL W P-5'-P-CCNC: 11 U/L (ref 1–33)
ANION GAP SERPL CALCULATED.3IONS-SCNC: 10 MMOL/L (ref 5–15)
AST SERPL-CCNC: 11 U/L (ref 1–32)
BASOPHILS # BLD AUTO: 0.12 10*3/MM3 (ref 0–0.2)
BASOPHILS NFR BLD AUTO: 1.3 % (ref 0–1.5)
BILIRUB SERPL-MCNC: <0.2 MG/DL (ref 0–1.2)
BUN SERPL-MCNC: 14 MG/DL (ref 8–23)
BUN/CREAT SERPL: 16.5 (ref 7–25)
CALCIUM SPEC-SCNC: 9.2 MG/DL (ref 8.6–10.5)
CHLORIDE SERPL-SCNC: 102 MMOL/L (ref 98–107)
CO2 SERPL-SCNC: 26 MMOL/L (ref 22–29)
CREAT SERPL-MCNC: 0.85 MG/DL (ref 0.57–1)
DEPRECATED RDW RBC AUTO: 47.7 FL (ref 37–54)
EGFRCR SERPLBLD CKD-EPI 2021: 67.2 ML/MIN/1.73
EOSINOPHIL # BLD AUTO: 0.24 10*3/MM3 (ref 0–0.4)
EOSINOPHIL NFR BLD AUTO: 2.7 % (ref 0.3–6.2)
ERYTHROCYTE [DISTWIDTH] IN BLOOD BY AUTOMATED COUNT: 14.9 % (ref 12.3–15.4)
GLOBULIN UR ELPH-MCNC: 2.9 GM/DL
GLUCOSE SERPL-MCNC: 104 MG/DL (ref 65–99)
HCT VFR BLD AUTO: 33.8 % (ref 34–46.6)
HGB BLD-MCNC: 11.1 G/DL (ref 12–15.9)
IMM GRANULOCYTES # BLD AUTO: 0.07 10*3/MM3 (ref 0–0.05)
IMM GRANULOCYTES NFR BLD AUTO: 0.8 % (ref 0–0.5)
LYMPHOCYTES # BLD AUTO: 2.34 10*3/MM3 (ref 0.7–3.1)
LYMPHOCYTES NFR BLD AUTO: 26 % (ref 19.6–45.3)
MCH RBC QN AUTO: 28.5 PG (ref 26.6–33)
MCHC RBC AUTO-ENTMCNC: 32.8 G/DL (ref 31.5–35.7)
MCV RBC AUTO: 86.9 FL (ref 79–97)
MONOCYTES # BLD AUTO: 0.62 10*3/MM3 (ref 0.1–0.9)
MONOCYTES NFR BLD AUTO: 6.9 % (ref 5–12)
NEUTROPHILS NFR BLD AUTO: 5.62 10*3/MM3 (ref 1.7–7)
NEUTROPHILS NFR BLD AUTO: 62.3 % (ref 42.7–76)
NRBC BLD AUTO-RTO: 0 /100 WBC (ref 0–0.2)
PLATELET # BLD AUTO: 494 10*3/MM3 (ref 140–450)
PMV BLD AUTO: 9.2 FL (ref 6–12)
POTASSIUM SERPL-SCNC: 4.8 MMOL/L (ref 3.5–5.2)
PROT SERPL-MCNC: 6.6 G/DL (ref 6–8.5)
RBC # BLD AUTO: 3.89 10*6/MM3 (ref 3.77–5.28)
SODIUM SERPL-SCNC: 138 MMOL/L (ref 136–145)
WBC NRBC COR # BLD: 9.01 10*3/MM3 (ref 3.4–10.8)

## 2022-03-05 PROCEDURE — 25010000002 HYDRALAZINE PER 20 MG: Performed by: HOSPITALIST

## 2022-03-05 PROCEDURE — 25010000002 ONDANSETRON PER 1 MG: Performed by: HOSPITALIST

## 2022-03-05 PROCEDURE — 96376 TX/PRO/DX INJ SAME DRUG ADON: CPT

## 2022-03-05 PROCEDURE — 85025 COMPLETE CBC W/AUTO DIFF WBC: CPT | Performed by: INTERNAL MEDICINE

## 2022-03-05 PROCEDURE — 96375 TX/PRO/DX INJ NEW DRUG ADDON: CPT

## 2022-03-05 PROCEDURE — 80053 COMPREHEN METABOLIC PANEL: CPT | Performed by: INTERNAL MEDICINE

## 2022-03-05 PROCEDURE — 25010000002 ENOXAPARIN PER 10 MG: Performed by: INTERNAL MEDICINE

## 2022-03-05 PROCEDURE — 25010000002 ONDANSETRON PER 1 MG: Performed by: INTERNAL MEDICINE

## 2022-03-05 PROCEDURE — 96372 THER/PROPH/DIAG INJ SC/IM: CPT

## 2022-03-05 PROCEDURE — 25010000002 CEFEPIME PER 500 MG: Performed by: INTERNAL MEDICINE

## 2022-03-05 PROCEDURE — 25010000002 VANCOMYCIN 10 G RECONSTITUTED SOLUTION: Performed by: INTERNAL MEDICINE

## 2022-03-05 PROCEDURE — G0378 HOSPITAL OBSERVATION PER HR: HCPCS

## 2022-03-05 RX ORDER — ONDANSETRON 2 MG/ML
4 INJECTION INTRAMUSCULAR; INTRAVENOUS EVERY 4 HOURS PRN
Status: DISCONTINUED | OUTPATIENT
Start: 2022-03-05 | End: 2022-03-07 | Stop reason: HOSPADM

## 2022-03-05 RX ORDER — MECLIZINE HCL 12.5 MG/1
12.5 TABLET ORAL EVERY 8 HOURS SCHEDULED
Status: DISCONTINUED | OUTPATIENT
Start: 2022-03-05 | End: 2022-03-07 | Stop reason: HOSPADM

## 2022-03-05 RX ORDER — HYDRALAZINE HYDROCHLORIDE 20 MG/ML
20 INJECTION INTRAMUSCULAR; INTRAVENOUS ONCE
Status: COMPLETED | OUTPATIENT
Start: 2022-03-05 | End: 2022-03-05

## 2022-03-05 RX ORDER — HYDRALAZINE HYDROCHLORIDE 20 MG/ML
10 INJECTION INTRAMUSCULAR; INTRAVENOUS EVERY 4 HOURS PRN
Status: DISCONTINUED | OUTPATIENT
Start: 2022-03-05 | End: 2022-03-07 | Stop reason: HOSPADM

## 2022-03-05 RX ORDER — CLONIDINE HYDROCHLORIDE 0.1 MG/1
0.1 TABLET ORAL ONCE
Status: COMPLETED | OUTPATIENT
Start: 2022-03-05 | End: 2022-03-05

## 2022-03-05 RX ADMIN — CARVEDILOL 12.5 MG: 12.5 TABLET, FILM COATED ORAL at 18:20

## 2022-03-05 RX ADMIN — NYSTATIN 1 APPLICATION: 100000 OINTMENT TOPICAL at 08:22

## 2022-03-05 RX ADMIN — CLONIDINE HYDROCHLORIDE 0.1 MG: 0.1 TABLET ORAL at 12:40

## 2022-03-05 RX ADMIN — SUCRALFATE 1 G: 1 TABLET ORAL at 05:17

## 2022-03-05 RX ADMIN — CEFEPIME HYDROCHLORIDE 1 G: 1 INJECTION, POWDER, FOR SOLUTION INTRAMUSCULAR; INTRAVENOUS at 05:16

## 2022-03-05 RX ADMIN — SUCRALFATE 1 G: 1 TABLET ORAL at 20:05

## 2022-03-05 RX ADMIN — ASPIRIN 81 MG: 81 TABLET, CHEWABLE ORAL at 08:20

## 2022-03-05 RX ADMIN — CEFEPIME HYDROCHLORIDE 1 G: 1 INJECTION, POWDER, FOR SOLUTION INTRAMUSCULAR; INTRAVENOUS at 18:21

## 2022-03-05 RX ADMIN — FLUOROMETHOLONE 1 DROP: 1 SOLUTION/ DROPS OPHTHALMIC at 08:21

## 2022-03-05 RX ADMIN — HYDROCODONE BITARTRATE AND ACETAMINOPHEN 1 TABLET: 5; 325 TABLET ORAL at 05:17

## 2022-03-05 RX ADMIN — Medication 10 ML: at 20:06

## 2022-03-05 RX ADMIN — ONDANSETRON 4 MG: 2 INJECTION INTRAMUSCULAR; INTRAVENOUS at 10:36

## 2022-03-05 RX ADMIN — MECLIZINE HCL 12.5 MG 12.5 MG: 12.5 TABLET ORAL at 16:04

## 2022-03-05 RX ADMIN — PANTOPRAZOLE SODIUM 40 MG: 40 TABLET, DELAYED RELEASE ORAL at 05:17

## 2022-03-05 RX ADMIN — HYDROCODONE BITARTRATE AND ACETAMINOPHEN 1 TABLET: 5; 325 TABLET ORAL at 16:04

## 2022-03-05 RX ADMIN — CARVEDILOL 12.5 MG: 12.5 TABLET, FILM COATED ORAL at 08:20

## 2022-03-05 RX ADMIN — Medication 1 CAPSULE: at 08:20

## 2022-03-05 RX ADMIN — LEVOTHYROXINE SODIUM 50 MCG: 0.05 TABLET ORAL at 05:17

## 2022-03-05 RX ADMIN — ONDANSETRON 4 MG: 2 INJECTION INTRAMUSCULAR; INTRAVENOUS at 14:52

## 2022-03-05 RX ADMIN — GABAPENTIN 100 MG: 100 CAPSULE ORAL at 05:17

## 2022-03-05 RX ADMIN — MECLIZINE HCL 12.5 MG 12.5 MG: 12.5 TABLET ORAL at 21:40

## 2022-03-05 RX ADMIN — Medication 10 ML: at 08:20

## 2022-03-05 RX ADMIN — NYSTATIN 1 APPLICATION: 100000 OINTMENT TOPICAL at 20:12

## 2022-03-05 RX ADMIN — VALSARTAN 160 MG: 160 TABLET, FILM COATED ORAL at 08:20

## 2022-03-05 RX ADMIN — ENOXAPARIN SODIUM 30 MG: 30 INJECTION SUBCUTANEOUS at 09:44

## 2022-03-05 RX ADMIN — VANCOMYCIN HYDROCHLORIDE 1250 MG: 10 INJECTION, POWDER, LYOPHILIZED, FOR SOLUTION INTRAVENOUS at 10:21

## 2022-03-05 RX ADMIN — HYDROCODONE BITARTRATE AND ACETAMINOPHEN 1 TABLET: 5; 325 TABLET ORAL at 00:51

## 2022-03-05 RX ADMIN — SODIUM CHLORIDE 1 DROP: 50 SOLUTION OPHTHALMIC at 08:21

## 2022-03-05 RX ADMIN — HYDROCODONE BITARTRATE AND ACETAMINOPHEN 1 TABLET: 5; 325 TABLET ORAL at 09:44

## 2022-03-05 RX ADMIN — GABAPENTIN 100 MG: 100 CAPSULE ORAL at 21:40

## 2022-03-05 RX ADMIN — HYDRALAZINE HYDROCHLORIDE 20 MG: 20 INJECTION INTRAMUSCULAR; INTRAVENOUS at 13:45

## 2022-03-05 RX ADMIN — HYDROCODONE BITARTRATE AND ACETAMINOPHEN 1 TABLET: 5; 325 TABLET ORAL at 20:05

## 2022-03-05 RX ADMIN — ONDANSETRON 4 MG: 2 INJECTION INTRAMUSCULAR; INTRAVENOUS at 20:05

## 2022-03-05 RX ADMIN — Medication 2000 UNITS: at 08:20

## 2022-03-05 NOTE — CONSULTS
Adult Nutrition  Assessment/PES    Patient Name:  Geeta Butt  YOB: 1935  MRN: [id number]  Admit Date:  3/4/2022    Assessment Date:  3/5/2022    Comments:  Nutrition consult per nurse admission screen.  Admitted with cellulitis of RLE.  Started on IV antibiotics.  No weight loss noted.  Adding Thomas BID to help promote wound healing.    RD to continue to follow.     Reason for Assessment     Row Name 03/05/22 0952          Reason for Assessment    Reason For Assessment nurse/nurse practitioner consult     Diagnosis infection/sepsis;other (see comments);cancer diagnosis/related complications;renal disease;psychosocial;gastrointestinal disease;cardiac disease;endocrine conditions;neurologic conditions  arthritis, breast CA, mouth CA, CKD, depression, diverticular disease, gastric ulcer, duodenal ulcer, peptic ulcer, GERD, hearing loss, colon polyps, GI bleed, HTN, hypothyroid, CVA, poor vision; adm with cellulitis of RLE                Nutrition/Diet History     Row Name 03/05/22 0954          Nutrition/Diet History    Typical Intake (Food/Fluid/EN/PN) no intake available                  Labs/Tests/Procedures/Meds     Row Name 03/05/22 0916          Labs/Procedures/Meds    Lab Results Reviewed reviewed, pertinent     Lab Results Comments Gluc, Hgb, Hct            Diagnostic Tests/Procedures    Diagnostic Test/Procedure Reviewed reviewed, pertinent            Medications    Pertinent Medications Reviewed reviewed, pertinent     Pertinent Medications Comments vit D3, lovenox, risaquad, synthroid, protonix, carafate, vanc                Physical Findings     Row Name 03/05/22 0900          Physical Findings    Overall Physical Appearance B=18, excoriation, bruised, wound; moderate R leg edema; overweight                  Nutrition Prescription Ordered     Row Name 03/05/22 0957          Nutrition Prescription PO    Current PO Diet Regular     Common Modifiers Cardiac                Evaluation of  Received Nutrient/Fluid Intake     Row Name 03/05/22 0957          PO Evaluation    Number of Days PO Intake Evaluated Insufficient Data                     Problem/Interventions:   Problem 1     Row Name 03/05/22 0958          Nutrition Diagnoses Problem 1    Problem 1 Increased Nutrient Needs     Etiology (related to) Medical Diagnosis     Skin Cellulitis;Non healing wound     Signs/Symptoms (evidenced by) Report/Observation                      Intervention Goal     Row Name 03/05/22 0958          Intervention Goal    General Maintain nutrition;Disease management/therapy;Meet nutritional needs for age/condition     PO Establish PO;Tolerate PO;PO intake (%)     PO Intake % 75 %     Weight No significant weight loss                Nutrition Intervention     Row Name 03/05/22 0958          Nutrition Intervention    RD/Tech Action Follow Tx progress;Care plan reviewd;Recommend/ordered     Recommended/Ordered Supplement                Nutrition Prescription     Row Name 03/05/22 0958          Nutrition Prescription PO    PO Prescription Begin/change supplement     Supplement Thomas     Supplement Frequency 2 times a day     New PO Prescription Ordered? Yes                Education/Evaluation     Row Name 03/05/22 0958          Education    Education Will Instruct as appropriate            Monitor/Evaluation    Monitor Per protocol;PO intake;Supplement intake;Pertinent labs;Weight;Skin status;Symptoms                 Electronically signed by:  Promise Stokes RD  03/05/22 09:59 EST

## 2022-03-05 NOTE — PROGRESS NOTES
"DAILY PROGRESS NOTE  [facility]    Patient Identification:  Name: Geeta Butt  Age: 86 y.o.  Sex: female  :  1935  MRN: [id number]         Primary Care Physician: Grzegorz Nguyen MD      Subjective  Patient with a number of issues at present.  On questioning she notes her leg feels about the same.  There is been an issue with nausea and vomiting.  She was given a dose of Catapres which she immediately vomited back up.  She states she has been feeling nauseated for most of the day.  RN notes that the patient complained of feeling dizzy when they sat her up.  The patient confirms this and states that it was a spinning feeling.  No vertigo or spinning feeling if she is lying still.  Blood pressure has been high.  On questioning patient she states it runs high at home also.  She usually runs in 170 systolic.  She states that diastolic usually in very reasonable range however.  She is pretty sure she did not vomit her morning blood pressure pills however.  She believes she has had issues with nausea and vomiting vancomycin in the past.    Objective:  General Appearance:  Comfortable, well-appearing, in no acute distress and not in pain.    Vital signs: (most recent): Blood pressure (!) 198/80, pulse 64, temperature 97.4 °F (36.3 °C), temperature source Oral, resp. rate 18, height 154.9 cm (61\"), weight 68.5 kg (151 lb), SpO2 95 %.    Lungs:  Normal effort and normal respiratory rate.  Breath sounds clear to auscultation.    Heart: Normal rate.  Regular rhythm.    Extremities: There is no dependent edema.  (Dressing was removed.  The intensity of erythema as well to surface area does appear to have improved a fair amount since the picture that was taken yesterday.  No drainage.)  Neurological: Patient is alert and oriented to person, place and time.  (Extraocular movements intact with no nystagmus with the patient lying in bed initially.  With a modified Radha-Hallpike maneuver she does develop " severe spinning feeling with significant vertigo to leftward gaze.  It abates and under a minute.).    Skin:  Warm and dry.                Vital signs in last 24 hours:  Temp:  [97.4 °F (36.3 °C)-98.2 °F (36.8 °C)] 97.4 °F (36.3 °C)  Heart Rate:  [64-79] 64  Resp:  [18] 18  BP: (149-208)/(57-93) 198/80    Intake/Output:    Intake/Output Summary (Last 24 hours) at 3/5/2022 1439  Last data filed at 3/5/2022 0820  Gross per 24 hour   Intake 780 ml   Output --   Net 780 ml         Results from last 7 days   Lab Units 03/05/22 0348 03/04/22 1159 02/28/22  1440   WBC 10*3/mm3 9.01 10.06 12.02*   HEMOGLOBIN g/dL 11.1* 11.9* 11.3*   PLATELETS 10*3/mm3 494* 515* 501*     Results from last 7 days   Lab Units 03/05/22 0348 03/04/22  1159 02/28/22  1440   SODIUM mmol/L 138 138 136   POTASSIUM mmol/L 4.8 4.4 4.7   CHLORIDE mmol/L 102 98 98   CO2 mmol/L 26.0 28.7 26.6   BUN mg/dL 14 14 14   CREATININE mg/dL 0.85 0.78 0.92   GLUCOSE mg/dL 104* 91 94   Estimated Creatinine Clearance: 42.9 mL/min (by C-G formula based on SCr of 0.85 mg/dL).  Results from last 7 days   Lab Units 03/05/22 0348 03/04/22 1159 02/28/22  1440   CALCIUM mg/dL 9.2 9.9 9.5   ALBUMIN g/dL 3.70 4.40  --    MAGNESIUM mg/dL  --  1.8  --      Results from last 7 days   Lab Units 03/05/22 0348 03/04/22  1159   ALBUMIN g/dL 3.70 4.40   BILIRUBIN mg/dL <0.2 <0.2   ALK PHOS U/L 73 85   AST (SGOT) U/L 11 12   ALT (SGPT) U/L 11 16       Assessment:  Cellulitis right lower extremity: Clinically appears to have improved.  Noting an nausea and vomiting and this being a common reaction to vancomycin ongoing had switch over to doxycycline starting tomorrow morning.  Continue wound care.  Monitor.  Hypertension: Poor control.  20 mg of hydralazine given.  Will write to have this as needed with parameters.  Modifying home medication regime.  BPPV: Add Antivert to the regime.  Vestibular rehab if symptoms persist.  Nausea vomiting: Both the vancomycin as well to be BPV  may be contributing to this.  Increase frequency of as needed Zofran.  Antivert should be of help also.    All problems new to this examiner.    Plan:  Please see above.  Discussed with patient.  Discussed with RN.    Montez Robertson MD  3/5/2022  14:39 EST

## 2022-03-05 NOTE — PLAN OF CARE
Goal Outcome Evaluation:  Plan of Care Reviewed With: patient        Progress: improving  Outcome Evaluation: Tolerated IV abx well, PRN pain meds for R leg pain.

## 2022-03-05 NOTE — SIGNIFICANT NOTE
Attempted to perform PT evaluation in AM and PM on 3/5/22; however, when consulting with nurse, pt has been up to bedside commode multiple times today and gets nauseous with activity.  Working to get nausea/vomitting under control, nurse requests evaluation be held until 3/6/22.

## 2022-03-05 NOTE — PROGRESS NOTES
"[facility] Clinical Pharmacy Services: Vancomycin Pharmacokinetic Initial Consult Note    Geeta Butt is a 86 y.o. female who is on day 1 of pharmacy to dose vancomycin.    Indication: SSTI  Consulting Provider: Dr Tara Lay  Planned Duration of Therapy: 7 dasy  Loading Dose Given: 1250 mg on 3/4/22 at 1455  Target: -600 mg/L.hr   Other Antimicrobials: Cefepime    Vitals/Labs  Ht: 154.9 cm (61\"); Wt: 68.5 kg (151 lb)  Temp Readings from Last 1 Encounters:   03/04/22 97.4 °F (36.3 °C) (Oral)    Estimated Creatinine Clearance: 45.5 mL/min (by C-G formula based on SCr of 0.78 mg/dL).        Results from last 7 days   Lab Units 03/04/22  1159 02/28/22  1440   CREATININE mg/dL 0.78 0.92   WBC 10*3/mm3 10.06 12.02*     Assessment/Plan:    1. Vancomycin Dose:   1250 mg IV every  24  hours  2. Predictive AUC level for the dose ordered is 493 mg/L.hr, which is within the target of 400-600 mg/L.hr  3. Vanc Trough has been ordered for 3/7/22 at 0930     Pharmacy will follow patient's kidney function and will adjust doses and obtain levels as necessary. Thank you for involving pharmacy in this patient's care. Please contact pharmacy with any questions or concerns.                           Garett Montiel, [facility]  Clinical Pharmacist      "

## 2022-03-05 NOTE — PLAN OF CARE
Problem: Adult Inpatient Plan of Care  Goal: Optimal Comfort and Wellbeing  Intervention: Provide Person-Centered Care  Recent Flowsheet Documentation  Taken 3/4/2022 1835 by Eligio Liu RN  Trust Relationship/Rapport: care explained   Goal Outcome Evaluation:  Plan of Care Reviewed With: patient        Progress: no change  Outcome Summary: New admission Dx Cellulitis right lower leg with laceration .New orders obtained received vancomycin in ER AO x $ able to make her need know ambulated with assistant take med whole only big pill need to take with applesauce. will continue monitor

## 2022-03-06 LAB
ANION GAP SERPL CALCULATED.3IONS-SCNC: 10 MMOL/L (ref 5–15)
BASOPHILS # BLD AUTO: 0.11 10*3/MM3 (ref 0–0.2)
BASOPHILS NFR BLD AUTO: 1.3 % (ref 0–1.5)
BUN SERPL-MCNC: 14 MG/DL (ref 8–23)
BUN/CREAT SERPL: 18.7 (ref 7–25)
CALCIUM SPEC-SCNC: 9.8 MG/DL (ref 8.6–10.5)
CHLORIDE SERPL-SCNC: 95 MMOL/L (ref 98–107)
CO2 SERPL-SCNC: 26 MMOL/L (ref 22–29)
CREAT SERPL-MCNC: 0.75 MG/DL (ref 0.57–1)
DEPRECATED RDW RBC AUTO: 46.3 FL (ref 37–54)
EGFRCR SERPLBLD CKD-EPI 2021: 78.1 ML/MIN/1.73
EOSINOPHIL # BLD AUTO: 0.25 10*3/MM3 (ref 0–0.4)
EOSINOPHIL NFR BLD AUTO: 2.9 % (ref 0.3–6.2)
ERYTHROCYTE [DISTWIDTH] IN BLOOD BY AUTOMATED COUNT: 14.7 % (ref 12.3–15.4)
GLUCOSE SERPL-MCNC: 105 MG/DL (ref 65–99)
HCT VFR BLD AUTO: 34.7 % (ref 34–46.6)
HGB BLD-MCNC: 11.4 G/DL (ref 12–15.9)
IMM GRANULOCYTES # BLD AUTO: 0.04 10*3/MM3 (ref 0–0.05)
IMM GRANULOCYTES NFR BLD AUTO: 0.5 % (ref 0–0.5)
LYMPHOCYTES # BLD AUTO: 2 10*3/MM3 (ref 0.7–3.1)
LYMPHOCYTES NFR BLD AUTO: 23.4 % (ref 19.6–45.3)
MCH RBC QN AUTO: 28.2 PG (ref 26.6–33)
MCHC RBC AUTO-ENTMCNC: 32.9 G/DL (ref 31.5–35.7)
MCV RBC AUTO: 85.9 FL (ref 79–97)
MONOCYTES # BLD AUTO: 0.68 10*3/MM3 (ref 0.1–0.9)
MONOCYTES NFR BLD AUTO: 8 % (ref 5–12)
NEUTROPHILS NFR BLD AUTO: 5.47 10*3/MM3 (ref 1.7–7)
NEUTROPHILS NFR BLD AUTO: 63.9 % (ref 42.7–76)
NRBC BLD AUTO-RTO: 0 /100 WBC (ref 0–0.2)
PLATELET # BLD AUTO: 479 10*3/MM3 (ref 140–450)
PMV BLD AUTO: 9 FL (ref 6–12)
POTASSIUM SERPL-SCNC: 4.2 MMOL/L (ref 3.5–5.2)
RBC # BLD AUTO: 4.04 10*6/MM3 (ref 3.77–5.28)
SODIUM SERPL-SCNC: 131 MMOL/L (ref 136–145)
WBC NRBC COR # BLD: 8.55 10*3/MM3 (ref 3.4–10.8)

## 2022-03-06 PROCEDURE — 25010000002 CEFEPIME PER 500 MG: Performed by: INTERNAL MEDICINE

## 2022-03-06 PROCEDURE — G0378 HOSPITAL OBSERVATION PER HR: HCPCS

## 2022-03-06 PROCEDURE — 97161 PT EVAL LOW COMPLEX 20 MIN: CPT | Performed by: PHYSICAL THERAPIST

## 2022-03-06 PROCEDURE — 25010000002 ENOXAPARIN PER 10 MG: Performed by: INTERNAL MEDICINE

## 2022-03-06 PROCEDURE — 85025 COMPLETE CBC W/AUTO DIFF WBC: CPT | Performed by: HOSPITALIST

## 2022-03-06 PROCEDURE — 97110 THERAPEUTIC EXERCISES: CPT | Performed by: PHYSICAL THERAPIST

## 2022-03-06 PROCEDURE — 94799 UNLISTED PULMONARY SVC/PX: CPT

## 2022-03-06 PROCEDURE — 96372 THER/PROPH/DIAG INJ SC/IM: CPT

## 2022-03-06 PROCEDURE — 80048 BASIC METABOLIC PNL TOTAL CA: CPT | Performed by: HOSPITALIST

## 2022-03-06 RX ORDER — CEPHALEXIN 500 MG/1
500 CAPSULE ORAL EVERY 8 HOURS SCHEDULED
Status: DISCONTINUED | OUTPATIENT
Start: 2022-03-06 | End: 2022-03-07 | Stop reason: HOSPADM

## 2022-03-06 RX ADMIN — MECLIZINE HCL 12.5 MG 12.5 MG: 12.5 TABLET ORAL at 13:39

## 2022-03-06 RX ADMIN — MECLIZINE HCL 12.5 MG 12.5 MG: 12.5 TABLET ORAL at 06:18

## 2022-03-06 RX ADMIN — CEPHALEXIN 500 MG: 500 CAPSULE ORAL at 21:47

## 2022-03-06 RX ADMIN — CEPHALEXIN 500 MG: 500 CAPSULE ORAL at 13:38

## 2022-03-06 RX ADMIN — SUCRALFATE 1 G: 1 TABLET ORAL at 06:18

## 2022-03-06 RX ADMIN — MECLIZINE HCL 12.5 MG 12.5 MG: 12.5 TABLET ORAL at 21:47

## 2022-03-06 RX ADMIN — Medication 10 ML: at 08:08

## 2022-03-06 RX ADMIN — LEVOTHYROXINE SODIUM 50 MCG: 0.05 TABLET ORAL at 06:18

## 2022-03-06 RX ADMIN — PANTOPRAZOLE SODIUM 40 MG: 40 TABLET, DELAYED RELEASE ORAL at 06:19

## 2022-03-06 RX ADMIN — GABAPENTIN 100 MG: 100 CAPSULE ORAL at 13:39

## 2022-03-06 RX ADMIN — ASPIRIN 81 MG: 81 TABLET, CHEWABLE ORAL at 08:08

## 2022-03-06 RX ADMIN — SUCRALFATE 1 G: 1 TABLET ORAL at 10:37

## 2022-03-06 RX ADMIN — SODIUM CHLORIDE 1 DROP: 50 SOLUTION OPHTHALMIC at 08:06

## 2022-03-06 RX ADMIN — SUCRALFATE 1 G: 1 TABLET ORAL at 21:47

## 2022-03-06 RX ADMIN — FLUOROMETHOLONE 1 DROP: 1 SOLUTION/ DROPS OPHTHALMIC at 08:06

## 2022-03-06 RX ADMIN — CARVEDILOL 12.5 MG: 12.5 TABLET, FILM COATED ORAL at 18:24

## 2022-03-06 RX ADMIN — CEFEPIME HYDROCHLORIDE 1 G: 1 INJECTION, POWDER, FOR SOLUTION INTRAMUSCULAR; INTRAVENOUS at 06:18

## 2022-03-06 RX ADMIN — ENOXAPARIN SODIUM 30 MG: 30 INJECTION SUBCUTANEOUS at 08:06

## 2022-03-06 RX ADMIN — GABAPENTIN 100 MG: 100 CAPSULE ORAL at 21:47

## 2022-03-06 RX ADMIN — SUCRALFATE 1 G: 1 TABLET ORAL at 15:39

## 2022-03-06 RX ADMIN — Medication 2000 UNITS: at 08:08

## 2022-03-06 RX ADMIN — NYSTATIN 1 APPLICATION: 100000 OINTMENT TOPICAL at 08:08

## 2022-03-06 RX ADMIN — Medication 1 CAPSULE: at 08:08

## 2022-03-06 RX ADMIN — ACETAMINOPHEN 650 MG: 325 TABLET ORAL at 13:39

## 2022-03-06 RX ADMIN — GABAPENTIN 100 MG: 100 CAPSULE ORAL at 06:18

## 2022-03-06 RX ADMIN — VALSARTAN 240 MG: 160 TABLET, FILM COATED ORAL at 08:09

## 2022-03-06 RX ADMIN — CARVEDILOL 12.5 MG: 12.5 TABLET, FILM COATED ORAL at 08:08

## 2022-03-06 NOTE — THERAPY EVALUATION
Patient Name: Geeta Butt  : 1935    MRN: [id number]                              Today's Date: 3/6/2022       Admit Date: 3/4/2022    Visit Dx:     ICD-10-CM ICD-9-CM   1. Cellulitis of right lower extremity  L03.115 682.6   2. Benign essential hypertension  I10 401.1     Patient Active Problem List   Diagnosis   • Essential hypertension   • Hypothyroidism   • Black stool   • Diarrhea   • Nausea & vomiting   • RUQ pain   • Leukocytosis   • UTI (urinary tract infection)   • Duodenitis   • Foot pain, bilateral   • Malignant neoplasm of lower-inner quadrant of left breast in female, estrogen receptor positive (HCC)   • Iron deficiency anemia   • Adverse effect of iron   • GI bleed   • GIULIA (acute kidney injury) (HCC)   • Gastrointestinal hemorrhage associated with duodenitis   • Hematochezia   • Duodenal ulcer   • Long-term use of high-risk medication   • Acute left-sided low back pain with left-sided sciatica   • DDD (degenerative disc disease), lumbosacral   • Hyperglycemia   • GERD without esophagitis   • Hyponatremia   • Hyperlipidemia   • Impaired fasting glucose   • Depressive disorder   • Chronic kidney disease   • Closed fracture of right hip (HCC)   • Acute pyelonephritis   • Sepsis secondary to UTI (HCC)   • Bilateral cataracts   • Central retinal vein occlusion   • Corneal endothelial dystrophy   • Epiretinal membrane   • Bladder prolapse, female, acquired   • Superficial bruising   • Neuropathy   • Urinary retention with incomplete bladder emptying   • Hx of bladder repair surgery   • Cellulitis of right lower extremity   • Stroke (HCC)     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    • Chronic kidney disease    • CKD (chronic kidney disease)     PT STATES STAGE 3   •  Depression    • Diverticular disease    • Gastric ulcer     AND DUODENAL   • GERD (gastroesophageal reflux disease)    • Hearing loss     BILAT AIDES   • History of colon polyps    • History of depression    • History of GI bleed    • Hypertension    • Hypothyroidism    • Peptic ulceration    • PONV (postoperative nausea and vomiting)    • Poor vision     RIGHT EYE, HAS PERIPHERAL VISION    • Stroke (HCC) 2000     mild weakness on left, partial sight loss on right      Past Surgical History:   Procedure Laterality Date   • ABDOMINAL SURGERY     • APPENDECTOMY     • BLADDER REPAIR      AND RECTOCELE   • BREAST BIOPSY     • BREAST CYST ASPIRATION     • BREAST LUMPECTOMY WITH SENTINEL NODE BIOPSY Left 3/19/2018    Procedure: BREAST LUMPECTOMY WITH SENTINEL NODE BIOPSY AND NEEDLE LOCALIZATION;  Surgeon: Myles Soliman MD;  [place] [facility] [place] [facility] [place]  Service: General   • CHOLECYSTECTOMY     • COLONOSCOPY  2013   • COLONOSCOPY N/A 2/16/2018    Procedure: COLONOSCOPY into cecum and T.I. with biopsies;  Surgeon: Augustine Gallego MD;  Location: [facility] ENDOSCOPY;  Service:    • ENDOSCOPY N/A 10/17/2017    Procedure: ESOPHAGOGASTRODUODENOSCOPY WITH COLD BIOPSIES;  Surgeon: Augustine Gallego MD;  [facility];  Service:    • ENDOSCOPY N/A 2/16/2018    Procedure: ESOPHAGOGASTRODUODENOSCOPY with biopsies and #54 [place] DILATATION;  Surgeon: Augustine Gallego MD;  Location: [facility] ENDOSCOPY;  Service:    • ENDOSCOPY N/A 3/19/2020    Procedure: ESOPHAGOGASTRODUODENOSCOPY with biopsies;  Surgeon: Augustine Gallego MD;  Location: [facility] ENDOSCOPY;  Service: Gastroenterology;  Laterality: N/A;  PRE- MELENA, EPIGASTRIC PAIN  POST- eerosive gastritis, duodenal ulcer, hiatal hernia   • ENDOSCOPY N/A 7/29/2020    Procedure: ESOPHAGOGASTRODUODENOSCOPY WITH DUODENAL ASPIRATE AND COLD BIOPSIES;  Surgeon: Augustine Gallego MD;  Location: [facility] ENDOSCOPY;  Service: Gastroenterology;  Laterality: N/A;  PRE: HX ULCER DISEASE  POST:  GASTRITIS, HEALED ULCER   • EYE SURGERY Left 2014    3-4 years, cornea replacement    • FEMUR IM NAILING/RODDING Right 8/3/2021    Procedure: FEMUR INTRAMEDULLARY NAILING/RODDING;  Surgeon: Garett Martin II, MD;  Location: [facility] MAIN OR;  Service: Orthopedics;  Laterality: Right;   • HIP SURGERY  08/03/2021   • HYSTERECTOMY     • MOUTH SURGERY  2000    UNDER TONGUE AND LEFT FLOOR OF MOUTH FOR CA   • PARATHYROIDECTOMY      PER PT   • SIGMOIDOSCOPY N/A 7/29/2020    Procedure: FLEXIBLE SIGMOIDOSCOPY TO DESCENDING COLON WITH COLD BIOPSIES;  Surgeon: Augustine Gallego MD;  Location: [facility] ENDOSCOPY;  Service: Gastroenterology;  Laterality: N/A;  PRE: RECTAL BLEEDING  POST: DIVERTICULOSIS, HEMORRHOIDS, MINIMAL PROCTITIS   • SINUS SURGERY     • SKIN BIOPSY     • THYROID SURGERY     • VARICOSE VEIN SURGERY        General Information     Row Name 03/06/22 0835          Physical Therapy Time and Intention    Document Type evaluation  -SC     Mode of Treatment individual therapy;physical therapy  -SC     Row Name 03/06/22 0835          General Information    Patient Profile Reviewed yes  -SC     Prior Level of Function independent:;all household mobility;min assist:;mod assist:;bathing  -SC     Existing Precautions/Restrictions fall  -SC     Barriers to Rehab none identified  -SC     Row Name 03/06/22 0835          Living Environment    People in Home alone  daughter to assist  -SC     Row Name 03/06/22 0835          Home Main Entrance    Number of Stairs, Main Entrance three  -SC     Stair Railings, Main Entrance railings safe and in good condition  -SC     Row Name 03/06/22 0835          Stairs Within Home, Primary    Number of Stairs, Within Home, Primary none  -SC     Stairs Comment, Within Home, Primary lives first floor  -SC     Row Name 03/06/22 0835          Cognition    Orientation Status (Cognition) oriented x 4  -SC     Row Name 03/06/22 0835          Safety Issues, Functional Mobility    Impairments  Affecting Function (Mobility) balance;endurance/activity tolerance;pain;strength  -SC     Comment, Safety Issues/Impairments (Mobility) gait belt and non skid socks utilized for safety  -SC           User Key  (r) = Recorded By, (t) = Taken By, (c) = Cosigned By    Initials Name Provider Type    SC Anabella Flores PT Physical Therapist               Mobility     Row Name 03/06/22 0835          Bed Mobility    Bed Mobility supine-sit;sit-supine  -SC     Supine-Sit [place] (Bed Mobility) set up;verbal cues;nonverbal cues (demo/gesture);contact guard;1 person assist  -SC     Sit-Supine [place] (Bed Mobility) set up;verbal cues;nonverbal cues (demo/gesture);contact guard;1 person assist  -SC     Assistive Device (Bed Mobility) head of bed elevated  -SC     Row Name 03/06/22 0835          Sit-Stand Transfer    Sit-Stand [place] (Transfers) set up;verbal cues;nonverbal cues (demo/gesture);contact guard;1 person assist  -SC     Assistive Device (Sit-Stand Transfers) walker, 4-wheeled  personal rollator  -SC     Row Name 03/06/22 0835          Gait/Stairs (Locomotion)    [place] Level (Gait) set up;verbal cues;nonverbal cues (demo/gesture);contact guard;1 person assist  -SC     Assistive Device (Gait) walker, front-wheeled  personal rollator  -SC     Distance in Feet (Gait) 200 feet  -SC     Deviations/Abnormal Patterns (Gait) antalgic;base of support, narrow;noam decreased;gait speed decreased;stride length decreased  -SC     Bilateral Gait Deviations forward flexed posture  -SC     [place] Level (Stairs) not tested  -SC           User Key  (r) = Recorded By, (t) = Taken By, (c) = Cosigned By    Initials Name Provider Type    SC Anabella Flores PT Physical Therapist               Obj/Interventions     Row Name 03/06/22 0835          Range of Motion Comprehensive    General Range of Motion no range of motion deficits identified  -SC     Comment, General Range of Motion functional for  bed mobility, transfers, toileting, and gait  -[facility] Name 03/06/22 0835          Strength Comprehensive (MMT)    Comment, General Manual Muscle Testing (MMT) Assessment generalized weakness however grossly B UEs/LEs >/=3+/5 for functional mobility tasks including bed mobility, transfers, toileting and gait  -[facility] Name 03/06/22 0835          Motor Skills    Therapeutic Exercise hip;knee;ankle  -SC     Additional Documentation Advanced Stepping/Walking Interventions (Group)  -[facility] Name 03/06/22 08          Ankle (Therapeutic Exercise)    Ankle (Therapeutic Exercise) AROM (active range of motion)  -SC     Ankle AROM (Therapeutic Exercise) bilateral;dorsiflexion;plantarflexion;supine;10 repetitions  -[facility] Name 03/06/22 0835          Balance    Balance Assessment sitting static balance;sitting dynamic balance;sit to stand dynamic balance;standing static balance;standing dynamic balance  -SC     Static Sitting Balance supervision;1 person to manage equipment  -SC     Dynamic Sitting Balance supervision;contact guard;1-person assist  -SC     Position, Sitting Balance unsupported;sitting edge of bed  -SC     Static Standing Balance set-up;verbal cues;non-verbal cues (demo/gesture);contact guard  -SC     Dynamic Standing Balance set-up;verbal cues;non-verbal cues (demo/gesture);contact guard;minimal assist  -SC     Position/Device Used, Standing Balance supported;walker, 4-wheeled  -SC     Balance Interventions sitting;standing;sit to stand;supported;static;minimal challenge  -SC           User Key  (r) = Recorded By, (t) = Taken By, (c) = Cosigned By    Initials Name Provider Type    SC Anabella Flores, PT Physical Therapist               Goals/Plan     [facility] Name 03/06/22 0835          Bed Mobility Goal 1 (PT)    Activity/Assistive Device (Bed Mobility Goal 1, PT) bed mobility activities, all  -SC     [place] Level/Cues Needed (Bed Mobility Goal 1, PT) modified independence;standby assist   -SC     Time Frame (Bed Mobility Goal 1, PT) long term goal (LTG);10 days  -SC     Row Name 03/06/22 0835          Transfer Goal 1 (PT)    Activity/Assistive Device (Transfer Goal 1, PT) transfers, all;sit-to-stand/stand-to-sit;bed-to-chair/chair-to-bed;toilet  -SC     [place] Level/Cues Needed (Transfer Goal 1, PT) modified independence;standby assist  -SC     Time Frame (Transfer Goal 1, PT) long term goal (LTG);5 days  -SC     Row Name 03/06/22 0835          Gait Training Goal 1 (PT)    Activity/Assistive Device (Gait Training Goal 1, PT) gait (walking locomotion);assistive device use;backward stepping;decrease asymmetrical patterns;decrease fall risk;diminish gait deviation;forward stepping;improve balance and speed;increase endurance/gait distance;increase energy conservation;maintain weight-bearing status;normalize weight shifts;walker, rolling  -SC     [place] Level (Gait Training Goal 1, PT) standby assist;supervision required  -SC     Distance (Gait Training Goal 1, PT) 350 feet  -SC     Time Frame (Gait Training Goal 1, PT) long term goal (LTG);5 days  -SC     Row Name 03/06/22 0835          Stairs Goal 1 (PT)    Activity/Assistive Device (Stairs Goal 1, PT) ascending stairs;descending stairs;using handrail, left;using handrail, right;step-to-step;decrease fall risk;improve balance and speed;maintain weight-bearing status;walker, rolling  -SC     [place] Level/Cues Needed (Stairs Goal 1, PT) set-up required;verbal cues required;nonverbal cues (demo/gesture) required;contact guard required;minimum assist (75% or more patient effort)  -SC     Number of Stairs (Stairs Goal 1, PT) 3  -SC     Time Frame (Stairs Goal 1, PT) long term goal (LTG);10 days  -SC           User Key  (r) = Recorded By, (t) = Taken By, (c) = Cosigned By    Initials Name Provider Type    SC Anabella Flores, PT Physical Therapist               Clinical Impression     Row Name 03/06/22 0835          Pain    Pretreatment  Pain Rating 0/10 - no pain  -SC     Posttreatment Pain Rating 6/10  -SC     Pain Location - Side/Orientation Right  -SC     Pain Location lower  -SC     Pain Location - extremity  -SC     Pre/Posttreatment Pain Comment shin, noted pain upon laying down in bed after ambulation  -SC     Pain Intervention(s) Medication (See MAR);Repositioned;Ambulation/increased activity;Elevated  -SC     Row Name 03/06/22 0835          Plan of Care Review    Plan of Care Reviewed With patient  -SC     Outcome Evaluation Ms. Butt is a pleasant 86 year old female, history of fall in June/July 2021, IM surgical intervention, was at rehab for recovery. Returned to home alone with daugther for support. Hit right shin getting out of bathtub 02/26/22, went PCP, antibiotics/cellulitis, ED on 02/28/22 continued antibiotics, continued to get worse, dermatologist last week encouraged ED for IV antibiotics, presents to [facility] ED on 03/04/22 for wound R shin worsening cellulitis, admitted at that time. Upon arrival for IP PT assessment patient was resting comfortably in bed, no distress, no pain. Was able to perform bed mobility, transfers, toileting, and gait with personal 4 wheeled rolling walker (rollator) with CGA x 1. Was able to ambulate 200 feet with CGA. She did have increased pain in right LE after therapy session. Will benefit from continued skilled PT to address weakness, pain, functional mobility and decrease fall risk. Goal to return to home with family for support; however, may benefit from  PT vs rehab to assist pending progress.  -SC     Row Name 03/06/22 0860          Therapy Assessment/Plan (PT)    Patient/Family Therapy Goals Statement (PT) go home  -SC     Rehab Potential (PT) good, to achieve stated therapy goals  -SC     Criteria for Skilled Interventions Met (PT) yes;meets criteria;skilled treatment is necessary  -SC     Row Name 03/06/22 0835          Vital Signs    O2 Delivery Pre Treatment room air  -SC     O2 Delivery  Intra Treatment room air  -SC     O2 Delivery Post Treatment room air  -SC     Pre Patient Position Supine  -SC     Intra Patient Position Sitting  -SC     Post Patient Position Supine  -SC     Row Name 03/06/22 0835          Positioning and Restraints    Pre-Treatment Position in bed  -SC     Post Treatment Position bed  -SC     In Bed fowlers;call light within reach;encouraged to call for assist;exit alarm on;RLE elevated  -SC           User Key  (r) = Recorded By, (t) = Taken By, (c) = Cosigned By    Initials Name Provider Type    Anabella Jerez PT Physical Therapist               Outcome Measures     Row Name 03/06/22 0835          How much help from another person do you currently need...    Turning from your back to your side while in flat bed without using bedrails? 3  -SC     Moving from lying on back to sitting on the side of a flat bed without bedrails? 3  -SC     Moving to and from a bed to a chair (including a wheelchair)? 3  -SC     Standing up from a chair using your arms (e.g., wheelchair, bedside chair)? 3  -SC     Climbing 3-5 steps with a railing? 2  -SC     To walk in hospital room? 3  -SC     AM-PAC 6 Clicks Score (PT) 17  -SC     Row Name 03/06/22 0835          Functional Assessment    Outcome Measure Options AM-PAC 6 Clicks Basic Mobility (PT)  -SC           User Key  (r) = Recorded By, (t) = Taken By, (c) = Cosigned By    Initials Name Provider Type    Anabella Jerez PT Physical Therapist                             Physical Therapy Education                 Title: PT OT SLP Therapies (Done)     Topic: Physical Therapy (Done)     Point: Mobility training (Done)     Learning Progress Summary           Patient Acceptance, E,TB,D, VU,DU by SC at 3/6/2022 0939                   Point: Home exercise program (Done)     Learning Progress Summary           Patient Acceptance, E,TB,D, VU,DU by SC at 3/6/2022 0939                   Point: Body mechanics (Done)     Learning Progress  Summary           Patient Acceptance, E,TB,D, VU,DU by SC at 3/6/2022 0939                   Point: Precautions (Done)     Learning Progress Summary           Patient Acceptance, E,TB,D, VU,DU by SC at 3/6/2022 0939                               User Key     Initials Effective Dates Name Provider Type Discipline    SC 06/16/21 -  Anabella Flores, PT Physical Therapist PT              PT Recommendation and Plan  Planned Therapy Interventions (PT): balance training, bed mobility training, gait training, home exercise program, patient/family education, ROM (range of motion), stair training, strengthening, transfer training  Plan of Care Reviewed With: patient  Outcome Evaluation: Ms. Butt is a pleasant 86 year old female, history of fall in June/July 2021, IM surgical intervention, was at rehab for recovery. Returned to home alone with daugther for support. Hit right shin getting out of bathtub 02/26/22, went PCP, antibiotics/cellulitis, ED on 02/28/22 continued antibiotics, continued to get worse, dermatologist last week encouraged ED for IV antibiotics, presents to [facility] ED on 03/04/22 for wound R shin worsening cellulitis, admitted at that time. Upon arrival for IP PT assessment patient was resting comfortably in bed, no distress, no pain. Was able to perform bed mobility, transfers, toileting, and gait with personal 4 wheeled rolling walker (rollator) with CGA x 1. Was able to ambulate 200 feet with CGA. She did have increased pain in right LE after therapy session. Will benefit from continued skilled PT to address weakness, pain, functional mobility and decrease fall risk. Goal to return to home with family for support; however, may benefit from  PT vs rehab to assist pending progress.     Time Calculation:    PT Charges     Row Name 03/06/22 0835             Time Calculation    Start Time 0835  -SC      Stop Time 0858  -SC      Time Calculation (min) 23 min  -SC      PT Received On 03/06/22  -SC      PT -  Next Appointment 03/07/22  -SC      PT Goal Re-Cert Due Date 03/16/22  -SC              Timed Charges    24829 - PT Therapeutic Exercise Minutes 13  -SC              Untimed Charges    PT Eval/Re-eval Minutes 10  -SC              Total Minutes    Timed Charges Total Minutes 13  -SC      Untimed Charges Total Minutes 10  -SC       Total Minutes 23  -SC            User Key  (r) = Recorded By, (t) = Taken By, (c) = Cosigned By    Initials Name Provider Type    SC Anabella Flores, PT Physical Therapist              Therapy Charges for Today     Code Description Service Date Service Provider Modifiers Qty    76749834443 HC PT THER PROC EA 15 MIN 3/6/2022 Anabella Flores, PT GP 1    73516365860 HC PT EVAL LOW COMPLEXITY 1 3/6/2022 Anabella Flores, PT GP 1          PT G-Codes  Outcome Measure Options: AM-PAC 6 Clicks Basic Mobility (PT)  AM-PAC 6 Clicks Score (PT): 17    Anabella Ceja PT  3/6/2022

## 2022-03-06 NOTE — PROGRESS NOTES
"DAILY PROGRESS NOTE  [facility]    Patient Identification:  Name: Geeta Butt  Age: 86 y.o.  Sex: female  :  1935  MRN: [id number]         Primary Care Physician: Grzegorz Nguyen MD      Subjective  Patient overall feeling better today.  Nausea resolved.  Dizziness much improved.  She was able to work with physical therapy.  She has decided she does not want IV doxycycline for her to make her nauseated.  She also has allergies to sulfa-based antibiotics.       Objective:  General Appearance:  Comfortable, well-appearing, in no acute distress and not in pain.    Vital signs: (most recent): Blood pressure 142/68, pulse 74, temperature 97.5 °F (36.4 °C), temperature source Oral, resp. rate 16, height 154.9 cm (61\"), weight 68.5 kg (151 lb), SpO2 94 %.    Lungs:  Normal effort and normal respiratory rate.  Breath sounds clear to auscultation.    Heart: Normal rate.  Regular rhythm.    Extremities: There is no dependent edema.  (Erythema continues to improve.)  Neurological: Patient is alert and oriented to person, place and time.    Skin:  Warm and dry.                Vital signs in last 24 hours:  Temp:  [97.4 °F (36.3 °C)-98.7 °F (37.1 °C)] 97.5 °F (36.4 °C)  Heart Rate:  [72-74] 74  Resp:  [16-18] 16  BP: (142-198)/(64-80) 142/68    Intake/Output:    Intake/Output Summary (Last 24 hours) at 3/6/2022 1141  Last data filed at 3/6/2022 0800  Gross per 24 hour   Intake 360 ml   Output 700 ml   Net -340 ml         Results from last 7 days   Lab Units 22  0350 22  0348 22  1159 22  1440   WBC 10*3/mm3 8.55 9.01 10.06 12.02*   HEMOGLOBIN g/dL 11.4* 11.1* 11.9* 11.3*   PLATELETS 10*3/mm3 479* 494* 515* 501*     Results from last 7 days   Lab Units 22  0350 22  0348 22  1159 02/28/22  1440   SODIUM mmol/L 131* 138 138 136   POTASSIUM mmol/L 4.2 4.8 4.4 4.7   CHLORIDE mmol/L 95* 102 98 98   CO2 mmol/L 26.0 26.0 28.7 26.6   BUN mg/dL 14 14 14 14   CREATININE " mg/dL 0.75 0.85 0.78 0.92   GLUCOSE mg/dL 105* 104* 91 94   Estimated Creatinine Clearance: 45.5 mL/min (by C-G formula based on SCr of 0.75 mg/dL).  Results from last 7 days   Lab Units 03/06/22  0350 03/05/22  0348 03/04/22  1159 02/28/22  1440   CALCIUM mg/dL 9.8 9.2 9.9 9.5   ALBUMIN g/dL  --  3.70 4.40  --    MAGNESIUM mg/dL  --   --  1.8  --      Results from last 7 days   Lab Units 03/05/22  0348 03/04/22  1159   ALBUMIN g/dL 3.70 4.40   BILIRUBIN mg/dL <0.2 <0.2   ALK PHOS U/L 73 85   AST (SGOT) U/L 11 12   ALT (SGPT) U/L 11 16       Assessment:  Cellulitis right lower extremity:  Clinically improving very nicely.  Patient intolerant to vancomycin and states she is also intolerant to doxycycline.  Clinically this looks like a strep cellulitis and not like MRSA.  I think would be very reasonable to give her a trial of Keflex.  If she continues to improve could be discharged tomorrow.  Hypertension: Poor control.   Much improved today.  Continue to monitor and adjust as needed.    BPPV:  Clinically much improved today.  Nausea vomiting:  Resolved.    Plan:  Please see above.  Switch to oral antibiotics and likely discharge tomorrow.  Discussed with daughter at bedside.  Discussed with RN.    Montez Robertson MD  3/6/2022  11:41 EST

## 2022-03-06 NOTE — PLAN OF CARE
Goal Outcome Evaluation:  Plan of Care Reviewed With: patient           Outcome Evaluation: Ms. Butt is a pleasant 86 year old female, history of fall in June/July 2021, IM surgical intervention, was at rehab for recovery. Returned to home alone with daugther for support. Hit right shin getting out of bathtub 02/26/22, went PCP, antibiotics/cellulitis, ED on 02/28/22 continued antibiotics, continued to get worse, dermatologist last week encouraged ED for IV antibiotics, presents to [facility] ED on 03/04/22 for wound R shin worsening cellulitis, admitted at that time. Upon arrival for IP PT assessment patient was resting comfortably in bed, no distress, no pain. Was able to perform bed mobility, transfers, toileting, and gait with personal 4 wheeled rolling walker (rollator) with CGA x 1. Was able to ambulate 200 feet with CGA. She did have increased pain in right LE after therapy session. Will benefit from continued skilled PT to address weakness, pain, functional mobility and decrease fall risk. Goal to return to home with family for support; however, may benefit from HH PT vs rehab to assist pending progress.    Patient was intermittently wearing a face mask during this therapy encounter. Therapist used appropriate personal protective equipment including eye protection, mask, and gloves.  Mask used was standard procedure mask. Appropriate PPE was worn during the entire therapy session. Hand hygiene was completed before and after therapy session. Patient is not in enhanced droplet precautions.

## 2022-03-07 ENCOUNTER — READMISSION MANAGEMENT (OUTPATIENT)
Dept: CALL CENTER | Facility: HOSPITAL | Age: 87
End: 2022-03-07

## 2022-03-07 VITALS
DIASTOLIC BLOOD PRESSURE: 51 MMHG | BODY MASS INDEX: 28.51 KG/M2 | TEMPERATURE: 97.5 F | WEIGHT: 151 LBS | SYSTOLIC BLOOD PRESSURE: 116 MMHG | RESPIRATION RATE: 18 BRPM | HEIGHT: 61 IN | OXYGEN SATURATION: 95 % | HEART RATE: 70 BPM

## 2022-03-07 LAB
BACTERIA SPEC AEROBE CULT: NORMAL
GRAM STN SPEC: NORMAL
GRAM STN SPEC: NORMAL

## 2022-03-07 PROCEDURE — G0378 HOSPITAL OBSERVATION PER HR: HCPCS

## 2022-03-07 PROCEDURE — 25010000002 ONDANSETRON PER 1 MG: Performed by: HOSPITALIST

## 2022-03-07 PROCEDURE — 96372 THER/PROPH/DIAG INJ SC/IM: CPT

## 2022-03-07 PROCEDURE — 25010000002 ENOXAPARIN PER 10 MG: Performed by: INTERNAL MEDICINE

## 2022-03-07 PROCEDURE — 97165 OT EVAL LOW COMPLEX 30 MIN: CPT

## 2022-03-07 PROCEDURE — 97535 SELF CARE MNGMENT TRAINING: CPT

## 2022-03-07 PROCEDURE — 96376 TX/PRO/DX INJ SAME DRUG ADON: CPT

## 2022-03-07 RX ORDER — VALSARTAN 80 MG/1
240 TABLET ORAL DAILY
Qty: 30 TABLET | Refills: 0 | Status: SHIPPED | OUTPATIENT
Start: 2022-03-08 | End: 2022-03-15 | Stop reason: SDUPTHER

## 2022-03-07 RX ORDER — CARVEDILOL 12.5 MG/1
12.5 TABLET ORAL 2 TIMES DAILY WITH MEALS
Qty: 60 TABLET | Refills: 0 | Status: SHIPPED | OUTPATIENT
Start: 2022-03-07 | End: 2022-03-15 | Stop reason: SDUPTHER

## 2022-03-07 RX ORDER — CEPHALEXIN 500 MG/1
500 CAPSULE ORAL EVERY 8 HOURS SCHEDULED
Qty: 12 CAPSULE | Refills: 0 | Status: SHIPPED | OUTPATIENT
Start: 2022-03-07 | End: 2022-03-11

## 2022-03-07 RX ORDER — MECLIZINE HCL 12.5 MG/1
12.5 TABLET ORAL 3 TIMES DAILY PRN
Qty: 15 TABLET | Refills: 0 | Status: SHIPPED | OUTPATIENT
Start: 2022-03-07 | End: 2022-03-15

## 2022-03-07 RX ADMIN — FLUTICASONE PROPIONATE 1 SPRAY: 50 SPRAY, METERED NASAL at 10:04

## 2022-03-07 RX ADMIN — Medication 2000 UNITS: at 10:00

## 2022-03-07 RX ADMIN — CARVEDILOL 12.5 MG: 12.5 TABLET, FILM COATED ORAL at 10:00

## 2022-03-07 RX ADMIN — Medication 1 CAPSULE: at 10:00

## 2022-03-07 RX ADMIN — NYSTATIN 1 APPLICATION: 100000 OINTMENT TOPICAL at 11:04

## 2022-03-07 RX ADMIN — ENOXAPARIN SODIUM 30 MG: 30 INJECTION SUBCUTANEOUS at 10:00

## 2022-03-07 RX ADMIN — PANTOPRAZOLE SODIUM 40 MG: 40 TABLET, DELAYED RELEASE ORAL at 11:00

## 2022-03-07 RX ADMIN — MECLIZINE HCL 12.5 MG 12.5 MG: 12.5 TABLET ORAL at 15:04

## 2022-03-07 RX ADMIN — GABAPENTIN 100 MG: 100 CAPSULE ORAL at 15:04

## 2022-03-07 RX ADMIN — CEPHALEXIN 500 MG: 500 CAPSULE ORAL at 05:44

## 2022-03-07 RX ADMIN — CEPHALEXIN 500 MG: 500 CAPSULE ORAL at 15:04

## 2022-03-07 RX ADMIN — VALSARTAN 240 MG: 160 TABLET, FILM COATED ORAL at 10:00

## 2022-03-07 RX ADMIN — ONDANSETRON 4 MG: 2 INJECTION INTRAMUSCULAR; INTRAVENOUS at 05:42

## 2022-03-07 RX ADMIN — Medication 10 ML: at 10:00

## 2022-03-07 RX ADMIN — GABAPENTIN 100 MG: 100 CAPSULE ORAL at 05:44

## 2022-03-07 RX ADMIN — SUCRALFATE 1 G: 1 TABLET ORAL at 05:44

## 2022-03-07 RX ADMIN — PANTOPRAZOLE SODIUM 40 MG: 40 TABLET, DELAYED RELEASE ORAL at 05:44

## 2022-03-07 RX ADMIN — FLUOROMETHOLONE 1 DROP: 1 SOLUTION/ DROPS OPHTHALMIC at 10:03

## 2022-03-07 RX ADMIN — SODIUM CHLORIDE 1 DROP: 50 SOLUTION OPHTHALMIC at 10:04

## 2022-03-07 RX ADMIN — HYDROCODONE BITARTRATE AND ACETAMINOPHEN 1 TABLET: 5; 325 TABLET ORAL at 05:44

## 2022-03-07 RX ADMIN — ASPIRIN 81 MG: 81 TABLET, CHEWABLE ORAL at 10:00

## 2022-03-07 RX ADMIN — MECLIZINE HCL 12.5 MG 12.5 MG: 12.5 TABLET ORAL at 05:44

## 2022-03-07 RX ADMIN — SUCRALFATE 1 G: 1 TABLET ORAL at 11:00

## 2022-03-07 RX ADMIN — LEVOTHYROXINE SODIUM 50 MCG: 0.05 TABLET ORAL at 05:44

## 2022-03-07 NOTE — EXTERNAL PATIENT INSTRUCTIONS
Patient Education   Table of Contents       Cephalexin Capsules or Tablets     To view videos and all your education online visit,   [email or web address]   or scan this QR code with your smartphone.                  Cephalexin Capsules or Tablets     What is this medicine?   CEPHALEXIN (sef a REGI in) is a cephalosporin antibiotic. It treats some infections caused by bacteria. It will not work for colds, the flu, or other viruses.   This medicine may be used for other purposes; ask your health care provider or pharmacist if you have questions.   COMMON BRAND NAME(S): Biocef, Daxbia, Keflex, Keftab   What should I tell my health care provider before I take this medicine?   They need to know if you have any of these conditions:         Bleeding disorder       Kidney disease       Liver disease       Seizures       Stomach or intestine problems like colitis       An unusual or allergic reaction to cephalexin, other penicillin or cephalosporin antibiotics, other medications, foods, dyes, or preservatives       Pregnant or trying to get pregnant       Breast-feeding     How should I use this medicine?   Take this drug by mouth. Take it as directed on the prescription label at the same time every day. You can take it with or without food. If it upsets your stomach, take it with food. Take all of this drug unless your health care provider tells you to stop it early. Keep taking it even if you think you are better.   Talk to your health care provider about the use of this drug in children. While it may be prescribed for selected conditions, precautions do apply.   Overdosage: If you think you have taken too much of this medicine contact a poison control center or emergency room at once.   NOTE: This medicine is only for you. Do not share this medicine with others.   What if I miss a dose?   If you miss a dose, take it as soon as you can. If it is almost time for your next dose, take only that dose. Do not take  double or extra doses.   What may interact with this medicine?         Probenecid       Some other antibiotics     This list may not describe all possible interactions. Give your health care provider a list of all the medicines, herbs, non-prescription drugs, or dietary supplements you use. Also tell them if you smoke, drink alcohol, or use illegal drugs. Some items may interact with your medicine.   What should I watch for while using this medicine?   Tell your health care provider if your symptoms do not start to get better or if they get worse.   Do not treat diarrhea with over the counter products. Contact your health care provider if you have diarrhea that lasts more than 2 days or if it is severe and watery.   This medicine may cause serious skin reactions. They can happen weeks to months after starting the medicine. Contact your health care provider right away if you notice fevers or flu-like symptoms with a rash. The rash may be red or purple and then turn into blisters or peeling of the skin. Or, you might notice a red rash with swelling of the face, lips or lymph nodes in your neck or under your arms.   If you have diabetes, you may get a false-positive result for sugar in your urine. Check with your health care provider.   What side effects may I notice from receiving this medicine?   Side effects that you should report to your doctor or health care provider as soon as possible:         allergic reactions (skin rash, itching or hives; swelling of the face, lips, or tongue)       bloody or watery diarrhea       fever       kidney injury (trouble passing urine or change in the amount of urine)       low red blood cell counts (trouble breathing; feeling faint; lightheaded, falls; unusually weak or tired)       redness, blistering, peeling, or loosening of the skin, including inside the mouth       unusual bruising or bleeding     Side effects that usually do not require medical attention (report to your  doctor or health care provider if they continue or are bothersome):         headache       dizziness       nausea, vomiting       unusual vaginal discharge, itching, or odor       upset stomach     This list may not describe all possible side effects. Call your doctor for medical advice about side effects. You may report side effects to FDA at 8-206-KID-5981.   Where should I keep my medicine?   Keep out of the reach of children and pets.   Store at room temperature between 20 and 25 degrees C (68 and 77 degrees F). Throw away any unused drug after the expiration date.   NOTE: This sheet is a summary. It may not cover all possible information. If you have questions about this medicine, talk to your doctor, pharmacist, or health care provider.     ? 2021 Elsevier/Gold Standard (2021-11-10 12:12:58)

## 2022-03-07 NOTE — DISCHARGE INSTRUCTIONS
Change dressing every 3 Days. Treatment should include keeping wounds covered and moist, vaseline is appropriate. Cover and wrap with kerlix gauze to secure.

## 2022-03-07 NOTE — NURSING NOTE
Spoke with Wound nurse to determine dressing change frequency since it was not found within orders. Anna Perez from wound said to have patient change the dressing every 3 days. Will educate patient on that at discharge and send with supplies.

## 2022-03-07 NOTE — NURSING NOTE
Wound/ostomy - consult received regarding cellulitis and wounds to RLE. Patient in bed, kerlix wrap to RLE. Dressing unwrapped and vaseline gauze present to 2 superficial wounds to lower anterior legs, pink, dried blood/scabbing to edges, no drainage, cellulitis has resolved significantly. Patient reports is to d/c home today. Leg rewrapped with kerlix.     Treatment to include keeping wounds covered and moist, vaseline is appropriate, there is no drainage so a thin dressing to cover, can wrap with kerlix to secure.

## 2022-03-07 NOTE — PLAN OF CARE
Goal Outcome Evaluation:  Plan of Care Reviewed With: patient        Progress: no change  Outcome Evaluation: Pt is an 85 yo female admitted to [facility] with reports she has a wound on her right leg that was noticed 2wks ago. Pt with cellulitis of RLE. Pt seen for OT eval this date, A&Ox4, stating she lives at home alone, (I) with ADLs and use of rollator at prior level. Remains on main floor and daughter supportive and assist with grocery shopping and laundry located in basement. Daughter able to check in on pt throughout the day if needed. Today, pt demo bed mobility with SBA and no cues. Pt uses rollator to complete func mob to BR commode with SBA-CGA with no cues required for safety awareness. Pt demo ability to complete toileting tasks/LBD with SBA and no LOBs. G/h completed standing at sink top with SBA. Pt is at her baseline with ADLs at this time and safe to return home. OT to s/o and pt will not require further skilled OT in acute setting. Pt aware and agreeable.    Therapist in mask, gloves, glasses, and hand hygiene performed.

## 2022-03-07 NOTE — DISCHARGE SUMMARY
PHYSICIAN DISCHARGE SUMMARY                                                                        [facility]    Patient Identification:  Name: Geeta Butt  Age: 86 y.o.  Sex: female  :  1935  MRN: [id number]  Primary Care Physician: Grzegorz Nguyen MD    Admit date: 3/4/2022  Discharge date and time: 3/7/2022     Discharged Condition: good    Discharge Diagnoses:  Cellulitis right lower extremity:    Hypertension:   BPPV:    Nausea vomiting:      Hospital Course:  Pleasant 86-year-old female presents with pain and erythema of the right lower extremity.  Please H&P for full details.  In short she had scraped her leg on the bathtub earlier in the erythema and swelling progressed thereafter.  She had been seen by her primary care physician started Augmentin but did not appear to be improving.  She presented to the emergency room for further evaluation and treatment.  She was afebrile on presentation.  Normal white count no left shift.  There was a significant amount of erythema present and a denuded area just right of the shin on the right lower extremity.  She was admitted initially covered with very broad-spectrum antibiotics including vancomycin Maxipen.  Wound dressing was initiated.  Although she seemed responding well she could not tolerate the vancomycin.  She developed a significant amount of nausea and some vomiting.  She notes this is occurred before with the use of vancomycin.  There is discussion of switching over to IV doxycycline but she states she is had issues similar with doxycycline.  Examination of the leg was consistent with a strep infection.  Clinically this did not appear to be a staph infection.  She is switched over then to simple oral Keflex and she has done very nicely with this.  Today there is only minimal amount of erythema without any induration around the open area.  The wound itself is also  healing nicely.  There is good granulation present.  There is also issues with the nausea and vomiting as noted above.  In addition she had a couple episodes of vertigo.  On examination did look like she had benign positional vertigo.  Low-dose Antivert was started.  The vertiginous symptoms abated over the next 24 to 48 hours and she does not have any issues with this today.  Nausea vomiting resolved with discontinuing the vancomycin.  In addition were issues with her blood pressure.  She was running significantly high in both her ARB S and her beta-blocker dose have been increased.  Blood pressure control is much better today.  Today she is feeling markedly improved.  She states she is been up and walking around the [employer] station.  At this point then she can be discharged remainder treatment follow-up up as an outpatient.      Consults:     Consults     Date and Time Order Name Status Description    3/4/2022  2:16 PM [facility] (on-call MD unless specified) Details              Discharge Exam:  Afebrile vital signs stable.  Well-developed well-nourished female no apparent distress.  Lungs clear to auscultation good air movement.  Heart regular rate and rhythm.  Extremities no clubbing, cyanosis, edema.  The excoriated or denuded area just right of the shin is healing very nicely.  Good granulation tissue present at baseline and no drainage.  Erythema is now down to very small area around the initial wound with no induration.  Alert oriented conversant cooperative and pleasant.    Disposition:  Home    Patient Instructions:      Discharge Medications      New Medications      Instructions Start Date   cephalexin 500 MG capsule  Commonly known as: KEFLEX   500 mg, Oral, Every 8 Hours Scheduled      meclizine 12.5 MG tablet  Commonly known as: ANTIVERT   12.5 mg, Oral, 3 Times Daily PRN         Changes to Medications      Instructions Start Date   carvedilol 12.5 MG tablet  Commonly known as: COREG  What changed:    · medication strength  · how much to take   12.5 mg, Oral, 2 Times Daily With Meals      valsartan 80 MG tablet  Commonly known as: Diovan  What changed:   · medication strength  · how much to take   240 mg, Oral, Daily   Start Date: March 8, 2022        Continue These Medications      Instructions Start Date   acetaminophen 325 MG tablet  Commonly known as: TYLENOL   650 mg, Oral, Every 4 Hours PRN      aspirin 81 MG chewable tablet   81 mg, Oral, Daily      Flarex 0.1 % ophthalmic suspension  Generic drug: fluorometholone   No dose, route, or frequency recorded.      fluorometholone 0.1 % ophthalmic suspension  Commonly known as: FML   1 drop, Left Eye, Daily      fluticasone 50 MCG/ACT nasal spray  Commonly known as: FLONASE   1 spray, Nasal, Daily      freestyle lancets   Test glucose once daily      gabapentin 100 MG capsule  Commonly known as: NEURONTIN   Take 1 capsule three times per day.  Okay to take 1 more capsule at bedtime if needed for pain.      glucose blood test strip  Commonly known as: FREESTYLE TEST STRIPS   TEST BLOOD SUGARS ONCE DAILY      levothyroxine 50 MCG tablet  Commonly known as: SYNTHROID, LEVOTHROID   50 mcg, Oral, Daily      nystatin 086083 UNIT/GM ointment  Commonly known as: MYCOSTATIN   1 application, Topical, 2 Times Daily      ondansetron 4 MG tablet  Commonly known as: ZOFRAN   4 mg, Oral, Every 6 Hours PRN      PROBIOTIC DAILY PO   1 capsule, Oral, Daily      RABEprazole 20 MG EC tablet  Commonly known as: ACIPHEX   20 mg, Oral, Daily      sennosides-docusate 8.6-50 MG per tablet  Commonly known as: PERICOLACE   1 tablet, Oral, 2 Times Daily PRN      sodium chloride 5 % ophthalmic solution  Commonly known as: CAYLA 128   1 drop, Right Eye, As Needed      sucralfate 1 GM/10ML suspension  Commonly known as: CARAFATE   TAKE 10 ML BY MOUTH 3 TIMES A DAY FOR 30 MINUTES BEFORE MEALS      Vitamin D3 50 MCG (2000 UT) capsule   TAKE 1 CAPSULE BY MOUTH DAILY.         Stop These  Medications    amoxicillin 500 MG capsule  Commonly known as: AMOXIL     mupirocin 2 % nasal ointment  Commonly known as: BACTROBAN          Diet Instructions     Diet: Cardiac      Discharge Diet: Cardiac        Future Appointments   Date Time Provider Department Center   4/29/2022  3:45 PM Grzegorz Nguyen MD [facility]     Additional Instructions for the Follow-ups that You Need to Schedule     Discharge Follow-up with PCP   As directed       Currently Documented PCP:    Grzegorz Nguyen MD    PCP Phone Number:    [phone]     Follow Up Details: 1 week            Follow-up Information     Grzegorz Nguyen MD .    Specialties: Family Medicine, Emergency Medicine  Why: 1 week  Contact information:  [address]  [place] [address]  [phone]                       Discharge Order (From admission, onward)     Start     Ordered    03/07/22 1112  Discharge patient  Once        Expected Discharge Date: 03/07/22    Discharge Disposition: Home or Self Care    Physician of Record for Attribution - Please select from Treatment Team: MONTEZ LOTT [[id number]]    Review needed by CMO to determine Physician of Record: No       Question Answer Comment   Physician of Record for Attribution - Please select from Treatment Team MONTEZ LOTT    Review needed by CMO to determine Physician of Record No        03/07/22 1117                  Total time spent discharging patient including evaluation,post hospitalization follow up,  medication and post hospitalization instructions and education total time exceeds 30 minutes.    Signed:  Montez Lott MD  3/7/2022  16:47 EST    EMR Dragon/Transcription disclaimer:   Much of this encounter note is an electronic transcription/translation of spoken language to printed text. The electronic translation of spoken language may permit erroneous, or at times, nonsensical words or phrases to be inadvertently transcribed; Although I have reviewed the note  for such errors, some may still exist.

## 2022-03-07 NOTE — PROGRESS NOTES
Discharge Planning Assessment  [facility]     Patient Name: Geeta Butt  MRN: [id number]  Today's Date: 3/7/2022    Admit Date: 3/4/2022     Discharge Needs Assessment     Row Name 03/07/22 1413       Living Environment    People in Home alone    Current Living Arrangements home    Primary Care Provided by self;child(lonnie)    Provides Primary Care For no one    Family Caregiver if Needed child(lonnie), adult    Family Caregiver Names Daughter, Daisy    Quality of Family Relationships helpful;involved;supportive    Able to Return to Prior Arrangements yes       Resource/Environmental Concerns    Resource/Environmental Concerns none       Transition Planning    Patient/Family Anticipates Transition to home    Patient/Family Anticipated Services at Transition none    Transportation Anticipated family or friend will provide       Discharge Needs Assessment    Readmission Within the Last 30 Days no previous admission in last 30 days    Equipment Currently Used at Home walker, rolling    Concerns to be Addressed adjustment to diagnosis/illness;denies needs/concerns at this time    Outpatient/Agency/Support Group Needs homecare agency    Provided Post Acute Provider List? N/A               Discharge Plan     Row Name 03/07/22 1415       Plan    Plan Home    Patient/Family in Agreement with Plan yes    Plan Comments Met with patient at the bedside, face sheet and dc plan verified. Patient lives alone, she uses a walker and grab bars no other DME. Patient states all ADL are on main level of house. Her daughter and son are both helpful, involved and assist as needed.  She has used [facility] in the past and rehab at [facility]. PCP is Dr. Grzegorz Nguyen and pharmacy is [facility] on [address]. Patient states her daughter will provide dc transportation today @ 1600. S/w nsg, Daughter requesting in home caregiver list; left list with pt at the bedside. Therapy eval noted, pt ambulating 200 feet. Patient denies HH/SNF/DME  needs.               Continued Care and Services - Admitted Since 3/4/2022    Coordination has not been started for this encounter.       Expected Discharge Date and Time     Expected Discharge Date Expected Discharge Time    Mar 7, 2022          Demographic Summary    No documentation.                Functional Status     Row Name 03/07/22 1415       Functional Status    Usual Activity Tolerance moderate    Current Activity Tolerance moderate       Functional Status, IADL    Medications independent    Meal Preparation independent    Housekeeping independent    Laundry independent    Shopping assistive person       Mental Status    General Appearance WDL WDL       Mental Status Summary    Recent Changes in Mental Status/Cognitive Functioning no changes               Psychosocial    No documentation.                Abuse/Neglect    No documentation.                Legal    No documentation.                Substance Abuse    No documentation.                Patient Forms    No documentation.                   Alicia Hudson RN

## 2022-03-07 NOTE — THERAPY DISCHARGE NOTE
Acute Care - Occupational Therapy Discharge  [facility]    Patient Name: Geeta Butt  : 1935    MRN: [id number]                              Today's Date: 3/7/2022       Admit Date: 3/4/2022    Visit Dx:     ICD-10-CM ICD-9-CM   1. Cellulitis of right lower extremity  L03.115 682.6   2. Benign essential hypertension  I10 401.1     Patient Active Problem List   Diagnosis   • Essential hypertension   • Hypothyroidism   • Black stool   • Diarrhea   • Nausea & vomiting   • RUQ pain   • Leukocytosis   • UTI (urinary tract infection)   • Duodenitis   • Foot pain, bilateral   • Malignant neoplasm of lower-inner quadrant of left breast in female, estrogen receptor positive (HCC)   • Iron deficiency anemia   • Adverse effect of iron   • GI bleed   • GIUILA (acute kidney injury) (HCC)   • Gastrointestinal hemorrhage associated with duodenitis   • Hematochezia   • Duodenal ulcer   • Long-term use of high-risk medication   • Acute left-sided low back pain with left-sided sciatica   • DDD (degenerative disc disease), lumbosacral   • Hyperglycemia   • GERD without esophagitis   • Hyponatremia   • Hyperlipidemia   • Impaired fasting glucose   • Depressive disorder   • Chronic kidney disease   • Closed fracture of right hip (HCC)   • Acute pyelonephritis   • Sepsis secondary to UTI (HCC)   • Bilateral cataracts   • Central retinal vein occlusion   • Corneal endothelial dystrophy   • Epiretinal membrane   • Bladder prolapse, female, acquired   • Superficial bruising   • Neuropathy   • Urinary retention with incomplete bladder emptying   • Hx of bladder repair surgery   • Cellulitis of right lower extremity   • Stroke (HCC)     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    • Chronic kidney disease     • CKD (chronic kidney disease)     PT STATES STAGE 3   • Depression    • Diverticular disease    • Gastric ulcer     AND DUODENAL   • GERD (gastroesophageal reflux disease)    • Hearing loss     BILAT AIDES   • History of colon polyps    • History of depression    • History of GI bleed    • Hypertension    • Hypothyroidism    • Peptic ulceration    • PONV (postoperative nausea and vomiting)    • Poor vision     RIGHT EYE, HAS PERIPHERAL VISION    • Stroke (HCC) 2000     mild weakness on left, partial sight loss on right      Past Surgical History:   Procedure Laterality Date   • ABDOMINAL SURGERY     • APPENDECTOMY     • BLADDER REPAIR      AND RECTOCELE   • BREAST BIOPSY     • BREAST CYST ASPIRATION     • BREAST LUMPECTOMY WITH SENTINEL NODE BIOPSY Left 3/19/2018    Procedure: BREAST LUMPECTOMY WITH SENTINEL NODE BIOPSY AND NEEDLE LOCALIZATION;  Surgeon: Myles Soliman MD;  [place] [facility] [place] [facility] [place];  Service: General   • CHOLECYSTECTOMY     • COLONOSCOPY  2013   • COLONOSCOPY N/A 2/16/2018    Procedure: COLONOSCOPY into cecum and T.I. with biopsies;  Surgeon: Augustine Gallego MD;  Location: [facility] ENDOSCOPY;  Service:    • ENDOSCOPY N/A 10/17/2017    Procedure: ESOPHAGOGASTRODUODENOSCOPY WITH COLD BIOPSIES;  Surgeon: Augustine Gallego MD;  Location: [facility] ENDOSCOPY;  Service:    • ENDOSCOPY N/A 2/16/2018    Procedure: ESOPHAGOGASTRODUODENOSCOPY with biopsies and #54 [address] DILATATION;  Surgeon: Augustine Gallego MD;  Location: [facility] ENDOSCOPY;  Service:    • ENDOSCOPY N/A 3/19/2020    Procedure: ESOPHAGOGASTRODUODENOSCOPY with biopsies;  Surgeon: Augustine Gallego MD;  Location: [facility] ENDOSCOPY;  Service: Gastroenterology;  Laterality: N/A;  PRE- MELENA, EPIGASTRIC PAIN  POST- eerosive gastritis, duodenal ulcer, hiatal hernia   • ENDOSCOPY N/A 7/29/2020    Procedure: ESOPHAGOGASTRODUODENOSCOPY WITH DUODENAL ASPIRATE AND COLD BIOPSIES;  Surgeon: Augustine Gallego MD;  Location: [facility] ENDOSCOPY;  Service:  Gastroenterology;  Laterality: N/A;  PRE: HX ULCER DISEASE  POST: GASTRITIS, HEALED ULCER   • EYE SURGERY Left 2014    3-4 years, cornea replacement    • FEMUR IM NAILING/RODDING Right 8/3/2021    Procedure: FEMUR INTRAMEDULLARY NAILING/RODDING;  Surgeon: Garett Martin II, MD;  Location: [facility] MAIN OR;  Service: Orthopedics;  Laterality: Right;   • HIP SURGERY  08/03/2021   • HYSTERECTOMY     • MOUTH SURGERY  2000    UNDER TONGUE AND LEFT FLOOR OF MOUTH FOR CA   • PARATHYROIDECTOMY      PER PT   • SIGMOIDOSCOPY N/A 7/29/2020    Procedure: FLEXIBLE SIGMOIDOSCOPY TO DESCENDING COLON WITH COLD BIOPSIES;  Surgeon: Augustine Gallego MD;  Location: [facility] ENDOSCOPY;  Service: Gastroenterology;  Laterality: N/A;  PRE: RECTAL BLEEDING  POST: DIVERTICULOSIS, HEMORRHOIDS, MINIMAL PROCTITIS   • SINUS SURGERY     • SKIN BIOPSY     • THYROID SURGERY     • VARICOSE VEIN SURGERY        General Information     Row Name 03/07/22 1146          OT Time and Intention    Document Type discharge evaluation/summary  -     Mode of Treatment individual therapy;occupational therapy  -     Row Name 03/07/22 1146          General Information    Patient Profile Reviewed yes  -MW     Prior Level of Function independent:;ADL's;all household mobility;transfer  -MW     Existing Precautions/Restrictions fall  -MW     Barriers to Rehab none identified  -MW     Row Name 03/07/22 1146          Living Environment    People in Home alone  -     Row Name 03/07/22 1146          Home Main Entrance    Number of Stairs, Main Entrance three  -MW     Stair Railings, Main Entrance railings safe and in good condition  -     Row Name 03/07/22 1146          Stairs Within Home, Primary    Number of Stairs, Within Home, Primary none  -MW     Row Name 03/07/22 1146          Cognition    Orientation Status (Cognition) oriented x 4  -MW     Row Name 03/07/22 1146          Safety Issues, Functional Mobility    Impairments Affecting Function (Mobility)  pain  -           User Key  (r) = Recorded By, (t) = Taken By, (c) = Cosigned By    Initials Name Provider Type     Pura Brock OT Occupational Therapist               Mobility/ADL's     [facility] Name 03/07/22 1146          Bed Mobility    Bed Mobility supine-sit;sit-supine;scooting/bridging  -     Scooting/Bridging [place] (Bed Mobility) standby assist  -     Supine-Sit [place] (Bed Mobility) standby assist  -     Sit-Supine [place] (Bed Mobility) standby assist  -     Assistive Device (Bed Mobility) head of bed elevated  -[facility] Name 03/07/22 1146          Transfers    Transfers toilet transfer;sit-stand transfer;stand-sit transfer  -     Sit-Stand [place] (Transfers) set up;verbal cues;standby assist  -     Stand-Sit [place] (Transfers) standby assist  -     [place] Level (Toilet Transfer) standby assist  -     Assistive Device (Toilet Transfer) commode;grab bars/safety frame  -MW     Row Name 03/07/22 1146          Sit-Stand Transfer    Comment, (Sit-Stand Transfer) rollator  -MW     Row Name 03/07/22 1146          Toilet Transfer    Type (Toilet Transfer) sit-stand;stand-sit  -     Comment, (Toilet Transfer) rollator, pt states grab bars present in bathroom  -MW     Row Name 03/07/22 1146          Functional Mobility    Functional Mobility- Ind. Level standby assist  -     Functional Mobility- Device --  rollator  -     Functional Mobility- Comment pt demo func mob to BR commode from EOB with use of rollator at SBA, no safety cues required or balance impairments noted  -[facility] Name 03/07/22 1146          Activities of Daily Living    BADL Assessment/Intervention lower body dressing;grooming;toileting  -[facility] Name 03/07/22 1146          Lower Body Dressing Assessment/Training    [place] Level (Lower Body Dressing) pants/bottoms;don;doff;socks;standby assist  -     Position (Lower Body Dressing) supported sitting;supported standing  -      [facility] Name 03/07/22 1146          Grooming Assessment/Training    [place] Level (Grooming) wash face, hands  -     Position (Grooming) sink side;supported standing  -[facility] Name 03/07/22 1146          Toileting Assessment/Training    [place] Level (Toileting) perform perineal hygiene;adjust/manage clothing;standby assist  -     Assistive Devices (Toileting) commode;grab bar/safety frame  -     Position (Toileting) supported standing;supported sitting  -           User Key  (r) = Recorded By, (t) = Taken By, (c) = Cosigned By    Initials Name Provider Type     Pura Brock OT Occupational Therapist               Obj/Interventions     [facility] Name 03/07/22 1149          Sensory Assessment (Somatosensory)    Sensory Assessment (Somatosensory) UE sensation intact  -MW     Row Name 03/07/22 1149          Vision Assessment/Intervention    Visual Impairment/Limitations WFL  -[facility] Name 03/07/22 1149          Range of Motion Comprehensive    General Range of Motion no range of motion deficits identified  -MW     Row Name 03/07/22 1149          Strength Comprehensive (MMT)    General Manual Muscle Testing (MMT) Assessment no strength deficits identified  -MW     Row Name 03/07/22 1149          Balance    Balance Assessment sitting static balance;sitting dynamic balance;sit to stand dynamic balance;standing static balance;standing dynamic balance  -     Static Sitting Balance supervision  -     Dynamic Sitting Balance supervision  -     Position, Sitting Balance unsupported;sitting edge of bed  -     Sit to Stand Dynamic Balance supervision  -     Static Standing Balance supervision  -     Dynamic Standing Balance contact guard  -     Position/Device Used, Standing Balance supported  rollator  -     Balance Interventions sitting;standing;sit to stand;supported;static;dynamic;minimal challenge;occupation based/functional task  -     Comment, Balance SBA-CGA for balance,  rollator, no LOBs  -MW           User Key  (r) = Recorded By, (t) = Taken By, (c) = Cosigned By    Initials Name Provider Type    Pura Hopkins OT Occupational Therapist               Goals/Plan     Row Name 03/07/22 1154          Transfer Goal 1 (OT)    Activity/Assistive Device (Transfer Goal 1, OT) toilet;sit-to-stand/stand-to-sit  -MW     [place] Level/Cues Needed (Transfer Goal 1, OT) standby assist  -MW     Time Frame (Transfer Goal 1, OT) short term goal (STG);1 day  -MW     Progress/Outcome (Transfer Goal 1, OT) goal met  -MW           User Key  (r) = Recorded By, (t) = Taken By, (c) = Cosigned By    Initials Name Provider Type    Pura Hopkins OT Occupational Therapist               Clinical Impression     Row Name 03/07/22 1150          Pain Assessment    Pretreatment Pain Rating 0/10 - no pain  -MW     Posttreatment Pain Rating 0/10 - no pain  -MW     Row Name 03/07/22 1150          Plan of Care Review    Plan of Care Reviewed With patient  -MW     Progress no change  -MW     Outcome Evaluation Pt is an 87 yo female admitted to [facility] with reports she has a wound on her right leg that was noticed 2wks ago. Pt with cellulitis of RLE. Pt seen for OT eval this date, A&Ox4, stating she lives at home alone, (I) with ADLs and use of rollator at prior level. Remains on main floor and daughter supportive and assist with grocery shopping and laundry located in basement. Daughter able to check in on pt throughout the day if needed. Today, pt demo bed mobility with SBA and no cues. Pt uses rollator to complete func mob to BR commode with SBA-CGA with no cues required for safety awareness. Pt demo ability to complete toileting tasks/LBD with SBA and no LOBs. G/h completed standing at sink top with SBA. Pt is at her baseline with ADLs at this time and safe to return home. OT to s/o and pt will not require further skilled OT in acute setting. Pt aware and agreeable.  -MW     Row Name 03/07/22 1156           Therapy Assessment/Plan (OT)    Criteria for Skilled Therapeutic Interventions Met (OT) no problems identified which require skilled intervention  -MW     Row Name 03/07/22 1150          Therapy Plan Review/Discharge Plan (OT)    Anticipated Discharge Disposition (OT) home  -     Row Name 03/07/22 1150          Vital Signs    O2 Delivery Intra Treatment room air  -MW     O2 Delivery Post Treatment room air  -MW     Pre Patient Position Supine  -MW     Intra Patient Position Standing  -MW     Post Patient Position Supine  -MW     Row Name 03/07/22 1150          Positioning and Restraints    Pre-Treatment Position in bed  -MW     Post Treatment Position bed  -MW     In Bed notified nsg;call light within reach;encouraged to call for assist;supine;exit alarm on  -MW           User Key  (r) = Recorded By, (t) = Taken By, (c) = Cosigned By    Initials Name Provider Type    Pura Hopkins OT Occupational Therapist               Outcome Measures     Row Name 03/07/22 1154          How much help from another is currently needed...    Putting on and taking off regular lower body clothing? 4  -MW     Bathing (including washing, rinsing, and drying) 4  -MW     Toileting (which includes using toilet bed pan or urinal) 4  -MW     Putting on and taking off regular upper body clothing 4  -MW     Taking care of personal grooming (such as brushing teeth) 4  -MW     Eating meals 4  -MW     AM-PAC 6 Clicks Score (OT) 24  -MW     [facility] Name 03/07/22 1154          Modified [place] Scale    Modified [place] Scale 1 - No significant disability despite symptoms.  Able to carry out all usual duties and activities.  -[facility] Name 03/07/22 1154          Functional Assessment    Outcome Measure Options AM-PAC 6 Clicks Daily Activity (OT);Modified [place]  -MW           User Key  (r) = Recorded By, (t) = Taken By, (c) = Cosigned By    Initials Name Provider Type    Pura Hopkins OT Occupational Therapist               Occupational Therapy Education                 Title: PT OT SLP Therapies (Done)     Topic: Occupational Therapy (Done)     Point: ADL training (Done)     Description:   Instruct learner(s) on proper safety adaptation and remediation techniques during self care or transfers.   Instruct in proper use of assistive devices.              Learning Progress Summary           Patient Acceptance, E, VU by  at 3/7/2022 1155    Comment: role of OT, d/c rec                   Point: Home exercise program (Done)     Description:   Instruct learner(s) on appropriate technique for monitoring, assisting and/or progressing therapeutic exercises/activities.              Learning Progress Summary           Patient Acceptance, E, VU by  at 3/7/2022 1155    Comment: role of OT, d/c rec                   Point: Precautions (Done)     Description:   Instruct learner(s) on prescribed precautions during self-care and functional transfers.              Learning Progress Summary           Patient Acceptance, E, VU by  at 3/7/2022 1155    Comment: role of OT, d/c rec                   Point: Body mechanics (Done)     Description:   Instruct learner(s) on proper positioning and spine alignment during self-care, functional mobility activities and/or exercises.              Learning Progress Summary           Patient Acceptance, E, VU by  at 3/7/2022 1155    Comment: role of OT, d/c rec                               User Key     Initials Effective Dates Name Provider Type Discipline     08/20/21 -  Pura Brock OT Occupational Therapist OT              OT Recommendation and Plan  Retired Outcome Summary/Treatment Plan (OT)  Anticipated Discharge Disposition (OT): home  Plan of Care Review  Plan of Care Reviewed With: patient  Progress: no change  Outcome Evaluation: Pt is an 85 yo female admitted to [facility] with reports she has a wound on her right leg that was noticed 2wks ago. Pt with cellulitis of RLE. Pt seen for OT annabelle this  date, A&Ox4, stating she lives at home alone, (I) with ADLs and use of rollator at prior level. Remains on main floor and daughter supportive and assist with grocery shopping and laundry located in basement. Daughter able to check in on pt throughout the day if needed. Today, pt demo bed mobility with SBA and no cues. Pt uses rollator to complete func mob to BR commode with SBA-CGA with no cues required for safety awareness. Pt demo ability to complete toileting tasks/LBD with SBA and no LOBs. G/h completed standing at sink top with SBA. Pt is at her baseline with ADLs at this time and safe to return home. OT to s/o and pt will not require further skilled OT in acute setting. Pt aware and agreeable.  Plan of Care Reviewed With: patient  Outcome Evaluation: Pt is an 87 yo female admitted to [facility] with reports she has a wound on her right leg that was noticed 2wks ago. Pt with cellulitis of RLE. Pt seen for OT eval this date, A&Ox4, stating she lives at home alone, (I) with ADLs and use of rollator at prior level. Remains on main floor and daughter supportive and assist with grocery shopping and laundry located in basement. Daughter able to check in on pt throughout the day if needed. Today, pt demo bed mobility with SBA and no cues. Pt uses rollator to complete func mob to BR commode with SBA-CGA with no cues required for safety awareness. Pt demo ability to complete toileting tasks/LBD with SBA and no LOBs. G/h completed standing at sink top with SBA. Pt is at her baseline with ADLs at this time and safe to return home. OT to s/o and pt will not require further skilled OT in acute setting. Pt aware and agreeable.     Time Calculation:    Time Calculation- OT     Row Name 03/07/22 1156             Time Calculation- OT    OT Start Time 1008  -MW      OT Stop Time 1035  -MW      OT Time Calculation (min) 27 min  -MW      Total Timed Code Minutes- OT 17 minute(s)  -MW      OT Received On 03/07/22  -MW              Timed  Charges    83226 - OT Self Care/Mgmt Minutes 17  -MW              Untimed Charges    OT Eval/Re-eval Minutes 10  -MW              Total Minutes    Timed Charges Total Minutes 17  -MW      Untimed Charges Total Minutes 10  -MW       Total Minutes 27  -MW            User Key  (r) = Recorded By, (t) = Taken By, (c) = Cosigned By    Initials Name Provider Type     Pura Brock  Occupational Therapist              Therapy Charges for Today     Code Description Service Date Service Provider Modifiers Qty    [id number] HC OT SELF CARE/MGMT/TRAIN EA 15 MIN 3/7/2022 Pura Brock  GO 1    [id number]  OT EVAL LOW COMPLEXITY 2 3/7/2022 Pura Brock  GO 1               Pura Brock   3/7/2022

## 2022-03-07 NOTE — PLAN OF CARE
Problem: Skin Injury Risk Increased  Goal: Skin Health and Integrity  Intervention: Optimize Skin Protection  Recent Flowsheet Documentation  Taken 3/6/2022 1824 by Peyton Maxwell RN  Head of Bed (HOB) Positioning:   HOB elevated   HOB at 20-30 degrees  Taken 3/6/2022 1600 by Peyton Maxwell RN  Head of Bed (HOB) Positioning:   HOB elevated   HOB at 30 degrees  Taken 3/6/2022 1409 by Peyton Maxwell RN  Head of Bed (HOB) Positioning:   HOB elevated   HOB at 30 degrees  Taken 3/6/2022 1200 by Peyton Maxwell RN  Head of Bed (HOB) Positioning:   HOB elevated   HOB at 20-30 degrees  Taken 3/6/2022 1037 by Peyton Maxwell RN  Head of Bed (HOB) Positioning:   HOB elevated   HOB at 20-30 degrees  Taken 3/6/2022 0800 by Peyton Maxwell RN  Pressure Reduction Techniques:   frequent weight shift encouraged   heels elevated off bed   pressure points protected   weight shift assistance provided  Head of Bed (HOB) Positioning:   HOB elevated   HOB at 20-30 degrees  Pressure Reduction Devices:   pressure-redistributing mattress utilized   positioning supports utilized   heel offloading device utilized  Skin Protection:   adhesive use limited   incontinence pads utilized   Goal Outcome Evaluation:           Progress: improving  Outcome Evaluation: Pt feeling better today, only 1 episode of dizziness noted after period of activity. Responding well to Antivert but has been sleepy a lot. BP much improved after higher dose of Valsartan. Ambulating to BR using walker and assist x1. Wound care completed, looks improved. Transitioned to PO Keflex today, no side effects noted so far. Worked w/PT. Hopeful for d/c soon. will closely monitor.

## 2022-03-07 NOTE — PLAN OF CARE
Goal Outcome Evaluation: Pt dressing taken down with Dr. Bach at bedside and wound examined. Pt was provided education on wound healing. A&Ox4, GCS 15, VSS, able to ambulate to bathroom with assistive device and standby assist x1. Daughter, Daisy, was contacted regarding discharge orders and stated she would arrive later this afternoon to  the patient. Will do full discharge education once both the pt and daughter are present are the bedside to review all the documents and answer any questions.

## 2022-03-07 NOTE — EXTERNAL PATIENT INSTRUCTIONS
Patient Education   Table of Contents       Meclizine tablets or capsules     To view videos and all your education online visit,   [email or web address]   or scan this QR code with your smartphone.                  Meclizine tablets or capsules     What is this medicine?   MECLIZINE (MEK oscar yu) is an antihistamine. It is used to prevent nausea, vomiting, or dizziness caused by motion sickness. It is also used to prevent and treat vertigo (extreme dizziness or a feeling that you or your surroundings are tilting or spinning around).   This medicine may be used for other purposes; ask your health care provider or pharmacist if you have questions.   COMMON BRAND NAME(S): Antivert, Dramamine Less Drowsy, Dramamine-N, Medivert, Meni-D   What should I tell my health care provider before I take this medicine?   They need to know if you have any of these conditions:         glaucoma       lung or breathing disease, like asthma       problems urinating       prostate disease       stomach or intestine problems       an unusual or allergic reaction to meclizine, other medicines, foods, dyes, or preservatives       pregnant or trying to get pregnant       breast-feeding     How should I use this medicine?   Take this medicine by mouth with a glass of water. Follow the directions on the prescription label. If you are using this medicine to prevent motion sickness, take the dose at least 1 hour before travel. If it upsets your stomach, take it with food or milk. Take your doses at regular intervals. Do not take your medicine more often than directed.   Talk to your pediatrician regarding the use of this medicine in children. Special care may be needed.   Overdosage: If you think you have taken too much of this medicine contact a poison control center or emergency room at once.   NOTE: This medicine is only for you. Do not share this medicine with others.   What if I miss a dose?   If you miss a dose, take it as soon  as you can. If it is almost time for your next dose, take only that dose. Do not take double or extra doses.   What may interact with this medicine?   Do not take this medicine with any of the following medications:         MAOIs like Carbex, Eldepryl, Marplan, Nardil, and Parnate     This medicine may also interact with the following medications:         alcohol       antihistamines for allergy, cough and cold       certain medicines for anxiety or sleep       certain medicines for depression, like amitriptyline, fluoxetine, sertraline       certain medicines for seizures like phenobarbital, primidone       general anesthetics like halothane, isoflurane, methoxyflurane, propofol       local anesthetics like lidocaine, pramoxine, tetracaine       medicines that relax muscles for surgery       narcotic medicines for pain       phenothiazines like chlorpromazine, mesoridazine, prochlorperazine, thioridazine     This list may not describe all possible interactions. Give your health care provider a list of all the medicines, herbs, non-prescription drugs, or dietary supplements you use. Also tell them if you smoke, drink alcohol, or use illegal drugs. Some items may interact with your medicine.   What should I watch for while using this medicine?   Tell your doctor or healthcare professional if your symptoms do not start to get better or if they get worse.   You may get drowsy or dizzy. Do not drive, use machinery, or do anything that needs mental alertness until you know how this medicine affects you. Do not stand or sit up quickly, especially if you are an older patient. This reduces the risk of dizzy or fainting spells. Alcohol may interfere with the effect of this medicine. Avoid alcoholic drinks.   Your mouth may get dry. Chewing sugarless gum or sucking hard candy, and drinking plenty of water may help. Contact your doctor if the problem does not go away or is severe.   This medicine may cause dry eyes and blurred  vision. If you wear contact lenses you may feel some discomfort. Lubricating drops may help. See your eye doctor if the problem does not go away or is severe.   What side effects may I notice from receiving this medicine?   Side effects that you should report to your doctor or health care professional as soon as possible:         feeling faint or lightheaded, falls       fast, irregular heartbeat     Side effects that usually do not require medical attention (report to your doctor or health care professional if they continue or are bothersome):         constipation       headache       trouble passing urine or change in the amount of urine       trouble sleeping       upset stomach     This list may not describe all possible side effects. Call your doctor for medical advice about side effects. You may report side effects to FDA at 3-807-MJV-5441.   Where should I keep my medicine?   Keep out of the reach of children.   Store at room temperature between 15 and 30 degrees C (59 and 86 degrees F). Keep container tightly closed. Throw away any unused medicine after the expiration date.   NOTE: This sheet is a summary. It may not cover all possible information. If you have questions about this medicine, talk to your doctor, pharmacist, or health care provider.     ? 2021 Elsevier/Gold Standard (2017-01-18 19:41:02)

## 2022-03-08 ENCOUNTER — TRANSITIONAL CARE MANAGEMENT TELEPHONE ENCOUNTER (OUTPATIENT)
Dept: CALL CENTER | Facility: HOSPITAL | Age: 87
End: 2022-03-08

## 2022-03-08 NOTE — OUTREACH NOTE
Call Center TCM Note    Flowsheet Row Responses   [facility] patient discharged from? [place]   Does the patient have one of the following disease processes/diagnoses(primary or secondary)? Other   TCM attempt successful? No   Unsuccessful attempts Attempt 1          Raiza Betancourt RN    3/8/2022, 14:57 EST

## 2022-03-08 NOTE — OUTREACH NOTE
Call Center TCM Note    Flowsheet Row Responses   [facility] patient discharged from? [place]   Does the patient have one of the following disease processes/diagnoses(primary or secondary)? Other   TCM attempt successful? Yes   Call start time 1622   Call end time 1624   Discharge diagnosis cellulitis RLE, N/V, BPPV   Does the patient have all medications ordered at discharge? Yes   Is the patient taking all medications as directed (includes completed medication regime)? Yes   Does the patient have a primary care provider?  Yes   Does the patient have an appointment with their PCP within 7 days of discharge? No   Comments regarding PCP Says her daughter will make her f/u appt with PCP   Nursing Interventions Advised patient to make appointment   Has the patient kept scheduled appointments due by today? N/A   Has home health visited the patient within 72 hours of discharge? N/A   Psychosocial issues? No   Did the patient receive a copy of their discharge instructions? Yes   What is the patient's perception of their health status since discharge? Improving   Is the patient/caregiver able to teach back signs and symptoms related to disease process for when to call PCP? Yes   Is the patient/caregiver able to teach back signs and symptoms related to disease process for when to call 911? Yes   Is the patient/caregiver able to teach back the hierarchy of who to call/visit for symptoms/problems? PCP, Specialist, Home health nurse, Urgent Care, ED, 911 Yes   TCM call completed? Yes   Wrap up additional comments Doing well, no questions at this time, her daughter will make her f/u appt with PCP.          Raiza Betancourt RN    3/8/2022, 16:24 EST

## 2022-03-08 NOTE — OUTREACH NOTE
Prep Survey    Flowsheet Row Responses   [facility] facility patient discharged from? [place]   Is LACE score < 7 ? No   Emergency Room discharge w/ pulse ox? No   Eligibility [facility]   Date of Admission 03/04/22   Date of Discharge 03/07/22   Discharge Disposition Home or Self Care   Discharge diagnosis cellulitis RLE, N/V, BPPV   Does the patient have one of the following disease processes/diagnoses(primary or secondary)? Other   Does the patient have Home health ordered? No   Is there a DME ordered? No   Prep survey completed? Yes          CHEL JACK - Registered Nurse

## 2022-03-09 LAB
BACTERIA SPEC AEROBE CULT: NORMAL
BACTERIA SPEC AEROBE CULT: NORMAL

## 2022-03-15 ENCOUNTER — OFFICE VISIT (OUTPATIENT)
Dept: INTERNAL MEDICINE | Facility: CLINIC | Age: 87
End: 2022-03-15

## 2022-03-15 VITALS
DIASTOLIC BLOOD PRESSURE: 86 MMHG | HEART RATE: 77 BPM | HEIGHT: 61 IN | SYSTOLIC BLOOD PRESSURE: 162 MMHG | BODY MASS INDEX: 29.55 KG/M2 | OXYGEN SATURATION: 97 % | WEIGHT: 156.5 LBS | TEMPERATURE: 97.8 F

## 2022-03-15 DIAGNOSIS — G89.29 CHRONIC NECK PAIN: ICD-10-CM

## 2022-03-15 DIAGNOSIS — M54.2 CHRONIC NECK PAIN: ICD-10-CM

## 2022-03-15 DIAGNOSIS — Z09 HOSPITAL DISCHARGE FOLLOW-UP: Primary | ICD-10-CM

## 2022-03-15 DIAGNOSIS — L03.115 CELLULITIS OF RIGHT LOWER EXTREMITY: ICD-10-CM

## 2022-03-15 DIAGNOSIS — I10 ESSENTIAL HYPERTENSION: ICD-10-CM

## 2022-03-15 PROCEDURE — 99495 TRANSJ CARE MGMT MOD F2F 14D: CPT | Performed by: FAMILY MEDICINE

## 2022-03-15 PROCEDURE — 1111F DSCHRG MED/CURRENT MED MERGE: CPT | Performed by: FAMILY MEDICINE

## 2022-03-15 RX ORDER — CARVEDILOL 12.5 MG/1
12.5 TABLET ORAL 2 TIMES DAILY WITH MEALS
Qty: 180 TABLET | Refills: 3 | Status: SHIPPED | OUTPATIENT
Start: 2022-03-15 | End: 2022-05-23 | Stop reason: SDUPTHER

## 2022-03-15 RX ORDER — AMLODIPINE BESYLATE 10 MG/1
10 TABLET ORAL DAILY
Qty: 90 TABLET | Refills: 3 | Status: SHIPPED | OUTPATIENT
Start: 2022-03-15 | End: 2022-04-04 | Stop reason: DRUGHIGH

## 2022-03-15 RX ORDER — VALSARTAN 80 MG/1
240 TABLET ORAL DAILY
Qty: 270 TABLET | Refills: 3 | Status: SHIPPED | OUTPATIENT
Start: 2022-03-15 | End: 2022-05-23 | Stop reason: SDUPTHER

## 2022-03-15 NOTE — PROGRESS NOTES
[facility] transitional Care Follow Up Visit  Subjective     Geeta Butt is a 86 y.o. female who presents for a transitional care management visit.    Within 48 business hours after discharge our office contacted her via telephone to coordinate her care and needs.      I reviewed and discussed the details of that call along with the discharge summary, hospital problems, inpatient lab results, inpatient diagnostic studies, and consultation reports with Geeta.     Current outpatient and discharge medications have been reconciled for the patient.  Reviewed by: Grzegorz Nguyen MD      Date of TCM Phone Call 3/7/2022   [facility]   Date of Admission 3/4/2022   Date of Discharge 3/7/2022   Discharge Disposition Home or Self Care     Risk for Readmission (LACE) Score: 13 (3/7/2022  6:01 AM)      History of Present Illness   Course During Hospital Stay:      She presented to the hospital with pain and erythema of the right lower extremity.  It has started from a scrape on her leg on the bathtub and developed into a cellulitis.  She was seen by a urgent care and started on Augmentin.  Unfortunately this did not help with the infection and she was admitted on very broad-spectrum antibiotics.  She could not tolerate the vancomycin and it was switched to IV doxycycline.  On their physical exam it did not appear to be a staph infection and after a time her antibiotics were narrowed to oral Keflex.  She was also having some issues with nausea and vomiting and appeared to be benign positional vertigo and low-dose Antivert was started.  The symptoms stopped about 24 to 48 hours into the admission.  Also her blood pressure was more increased than usual and her ARB therapy and beta-blocker were both increased.  Her valsartan and is now 240 mg daily and carvedilol 12.5 mg twice daily.      Comparing pics of the original cellulitis/wound to now, marked improvement.  Wet to dry dressings changed daily.      BPs are still volatile  in the 200s at home and then came down to around 180s systolic.  Valsartan 80mg 3 tabs per day and carvedilol 12.5mg twice daily.  After doing some digging, it looks like her amlodipine was stopped during a second hospitalization in August due to hypotension secondary to sepsis and there was never restarted.         The following portions of the patient's history were reviewed and updated as appropriate: allergies, current medications, past family history, past medical history, past social history, past surgical history and problem list.    Review of Systems     Vitals:    03/15/22 1407   BP: 168/84   Pulse: 77   Temp: 97.8 °F (36.6 °C)   SpO2: 97%         Objective   Physical Exam  Vitals and nursing note reviewed.   Constitutional:       General: She is not in acute distress.     Appearance: Normal appearance.   Cardiovascular:      Rate and Rhythm: Normal rate and regular rhythm.      Heart sounds: Normal heart sounds. No murmur heard.  Pulmonary:      Effort: Pulmonary effort is normal.      Breath sounds: Normal breath sounds.   Skin:     Comments: Right lower extremity shows 2 wounds most proximal approximately 4 cm in length and the distal wound approximately 2-1/2 cm in length.  Both of which are scabbed over.  There is trace surrounding erythema.  Appears to be healing.  No drainage or pain with palpation around the wounds.   Neurological:      Mental Status: She is alert.         Assessment/Plan   Diagnoses and all orders for this visit:    1. Hospital discharge follow-up (Primary)    2. Cellulitis of right lower extremity    3. Essential hypertension  -     valsartan (Diovan) 80 MG tablet; Take 3 tablets by mouth Daily.  Dispense: 270 tablet; Refill: 3  -     carvedilol (COREG) 12.5 MG tablet; Take 1 tablet by mouth 2 (Two) Times a Day With Meals.  Dispense: 180 tablet; Refill: 3  -     amLODIPine (NORVASC) 10 MG tablet; Take 1 tablet by mouth Daily.  Dispense: 90 tablet; Refill: 3    4. Chronic neck  pain  -     Ambulatory Referral to Physical Therapy Evaluate and treat        Will add back amlodipine and start 5mg x 3-4 days.  If BP greater than 140/90, take full tablet. Continue ARB and BB therapy at current doses.  Refilled medications as above.  She is doing well from a wound stand point.  Continue wet to dry dressings and monitoring for now.

## 2022-03-16 ENCOUNTER — READMISSION MANAGEMENT (OUTPATIENT)
Dept: CALL CENTER | Facility: HOSPITAL | Age: 87
End: 2022-03-16

## 2022-03-16 NOTE — OUTREACH NOTE
Medical Week 2 Survey    Flowsheet Row Responses   [facility] patient discharged from? [place]   Does the patient have one of the following disease processes/diagnoses(primary or secondary)? Other   Week 2 attempt successful? No   Unsuccessful attempts Attempt 1          CHEN JACK - Registered Nurse

## 2022-03-22 ENCOUNTER — READMISSION MANAGEMENT (OUTPATIENT)
Dept: CALL CENTER | Facility: HOSPITAL | Age: 87
End: 2022-03-22

## 2022-03-22 NOTE — OUTREACH NOTE
Medical Week 2 Survey    Flowsheet Row Responses   [facility] patient discharged from? [place]   Does the patient have one of the following disease processes/diagnoses(primary or secondary)? Other   Week 2 attempt successful? No   Unsuccessful attempts Attempt 2          PATRICIA N - Registered Nurse

## 2022-03-30 ENCOUNTER — READMISSION MANAGEMENT (OUTPATIENT)
Dept: CALL CENTER | Facility: HOSPITAL | Age: 87
End: 2022-03-30

## 2022-03-30 NOTE — OUTREACH NOTE
Medical Week 3 Survey    Flowsheet Row Responses   [facility] patient discharged from? [place]   Does the patient have one of the following disease processes/diagnoses(primary or secondary)? Other   Week 3 attempt successful? No   Unsuccessful attempts Attempt 1          DOMENICO Weathers Registered Nurse

## 2022-03-31 ENCOUNTER — HOSPITAL ENCOUNTER (OUTPATIENT)
Dept: PHYSICAL THERAPY | Facility: HOSPITAL | Age: 87
Setting detail: THERAPIES SERIES
Discharge: HOME | End: 2022-03-31

## 2022-03-31 DIAGNOSIS — M54.2 CERVICAL PAIN: Primary | ICD-10-CM

## 2022-03-31 DIAGNOSIS — R29.3 POOR POSTURE: ICD-10-CM

## 2022-03-31 PROCEDURE — 97161 PT EVAL LOW COMPLEX 20 MIN: CPT

## 2022-03-31 PROCEDURE — 97530 THERAPEUTIC ACTIVITIES: CPT

## 2022-04-01 NOTE — THERAPY EVALUATION
Outpatient Physical Therapy Ortho Initial Evaluation  [facility]     Patient Name: Geeta Butt  : 1935  MRN: [id number]  Today's Date: 3/31/2022      Visit Date: 2022    Patient Active Problem List   Diagnosis   • Essential hypertension   • Hypothyroidism   • Black stool   • Diarrhea   • Nausea & vomiting   • RUQ pain   • Leukocytosis   • UTI (urinary tract infection)   • Duodenitis   • Foot pain, bilateral   • Malignant neoplasm of lower-inner quadrant of left breast in female, estrogen receptor positive (HCC)   • Iron deficiency anemia   • Adverse effect of iron   • GI bleed   • GIULIA (acute kidney injury) (HCC)   • Gastrointestinal hemorrhage associated with duodenitis   • Hematochezia   • Duodenal ulcer   • Long-term use of high-risk medication   • Acute left-sided low back pain with left-sided sciatica   • DDD (degenerative disc disease), lumbosacral   • Hyperglycemia   • GERD without esophagitis   • Hyponatremia   • Hyperlipidemia   • Impaired fasting glucose   • Depressive disorder   • Chronic kidney disease   • Closed fracture of right hip (HCC)   • Acute pyelonephritis   • Sepsis secondary to UTI (HCC)   • Bilateral cataracts   • Central retinal vein occlusion   • Corneal endothelial dystrophy   • Epiretinal membrane   • Bladder prolapse, female, acquired   • Superficial bruising   • Neuropathy   • Urinary retention with incomplete bladder emptying   • Hx of bladder repair surgery   • Cellulitis of right lower extremity   • Stroke (HCC)        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    • Chronic kidney disease    • CKD (chronic kidney disease)     PT STATES STAGE 3   • Depression    • Diverticular disease    • Gastric ulcer     AND DUODENAL   • GERD    ANTICOAGULATION FOLLOW-UP CLINIC VISIT    Patient Name:  Jt Montgomery  Date:  10/18/2018  Contact Type:  Face to Face    SUBJECTIVE:     Patient Findings     Positives No Problem Findings           OBJECTIVE    INR Protime   Date Value Ref Range Status   10/18/2018 2.4 (A) 0.86 - 1.14 Final       ASSESSMENT / PLAN  INR assessment THER    Recheck INR In: 2 WEEKS    INR Location Clinic      Anticoagulation Summary as of 10/18/2018     INR goal 2.0-3.0   Today's INR 2.4   Warfarin maintenance plan 7.5 mg (5 mg x 1.5) on Sun, Tue, Thu; 5 mg (5 mg x 1) all other days   Full warfarin instructions 7.5 mg on Sun, Tue, Thu; 5 mg all other days   Weekly warfarin total 42.5 mg   No change documented Paola Roberson RN   Plan last modified Marlena Hong RN (10/5/2018)   Next INR check 11/1/2018   Target end date Indefinite    Indications   Atrial fibrillation (AFIB) on Coumadin [I48.91]  Long term current use of anticoagulants with INR goal of 2.0-3.0 [Z79.01]         Anticoagulation Episode Summary     INR check location     Preferred lab     Send INR reminders to [facility] HEART INR NURSE    Comments       Anticoagulation Care Providers     Provider Role Specialty Phone number    Lottie Gomez,  [facility] [phone]            See the Encounter Report to view Anticoagulation Flowsheet and Dosing Calendar (Go to Encounters tab in chart review, and find the Anticoagulation Therapy Visit)    INR 2.4.  See previous note for vitamin changes.  Dosing was decreased by 2.5mg overall last time.  Continue the decreased dosing schedule and recheck in 2 weeks.  He said he is seeing a different provider on 11/7 and there might be more supplement changes.  Brian Roberson, RN                (gastroesophageal reflux disease)    • Hearing loss     BILAT AIDES   • History of colon polyps    • History of depression    • History of GI bleed    • Hypertension    • Hypothyroidism    • Peptic ulceration    • PONV (postoperative nausea and vomiting)    • Poor vision     RIGHT EYE, HAS PERIPHERAL VISION    • Stroke (HCC) 2000     mild weakness on left, partial sight loss on right         Past Surgical History:   Procedure Laterality Date   • ABDOMINAL SURGERY     • APPENDECTOMY     • BLADDER REPAIR      AND RECTOCELE   • BREAST BIOPSY     • BREAST CYST ASPIRATION     • BREAST LUMPECTOMY WITH SENTINEL NODE BIOPSY Left 3/19/2018    Procedure: BREAST LUMPECTOMY WITH SENTINEL NODE BIOPSY AND NEEDLE LOCALIZATION;  Surgeon: Myles Soliman MD;  [facility] [place] [facility];  Service: General   • CHOLECYSTECTOMY     • COLONOSCOPY  2013   • COLONOSCOPY N/A 2/16/2018    Procedure: COLONOSCOPY into cecum and T.I. with biopsies;  Surgeon: Augustine Gallego MD;  [facility];  Service:    • ENDOSCOPY N/A 10/17/2017    Procedure: ESOPHAGOGASTRODUODENOSCOPY WITH COLD BIOPSIES;  Surgeon: Augustine Gallego MD;  [facility];  Service:    • ENDOSCOPY N/A 2/16/2018    Procedure: ESOPHAGOGASTRODUODENOSCOPY with biopsies and #54 [address] DILATATION;  Surgeon: Augustine Gallego MD;  Location: [facility] ENDOSCOPY;  Service:    • ENDOSCOPY N/A 3/19/2020    Procedure: ESOPHAGOGASTRODUODENOSCOPY with biopsies;  Surgeon: Augustine Gallego MD;  Location: [facility] ENDOSCOPY;  Service: Gastroenterology;  Laterality: N/A;  PRE- MELENA, EPIGASTRIC PAIN  POST- eerosive gastritis, duodenal ulcer, hiatal hernia   • ENDOSCOPY N/A 7/29/2020    Procedure: ESOPHAGOGASTRODUODENOSCOPY WITH DUODENAL ASPIRATE AND COLD BIOPSIES;  Surgeon: Augustine Gallego MD;  Location: [facility] ENDOSCOPY;  Service: Gastroenterology;  Laterality: N/A;  PRE: HX ULCER DISEASE  POST: GASTRITIS, HEALED ULCER   • EYE SURGERY Left 2014    3-4 years, cornea  replacement    • FEMUR IM NAILING/RODDING Right 8/3/2021    Procedure: FEMUR INTRAMEDULLARY NAILING/RODDING;  Surgeon: Garett Martin II, MD;  Location: [facility] MAIN OR;  Service: Orthopedics;  Laterality: Right;   • HIP SURGERY  08/03/2021   • HYSTERECTOMY     • MOUTH SURGERY  2000    UNDER TONGUE AND LEFT FLOOR OF MOUTH FOR CA   • PARATHYROIDECTOMY      PER PT   • SIGMOIDOSCOPY N/A 7/29/2020    Procedure: FLEXIBLE SIGMOIDOSCOPY TO DESCENDING COLON WITH COLD BIOPSIES;  Surgeon: Augustine Gallego MD;  Location: [facility] ENDOSCOPY;  Service: Gastroenterology;  Laterality: N/A;  PRE: RECTAL BLEEDING  POST: DIVERTICULOSIS, HEMORRHOIDS, MINIMAL PROCTITIS   • SINUS SURGERY     • SKIN BIOPSY     • THYROID SURGERY     • VARICOSE VEIN SURGERY         Visit Dx:     ICD-10-CM ICD-9-CM   1. Cervical pain  M54.2 723.1   2. Poor posture  R29.3 781.92          Patient History     Row Name 03/31/22 1500             History    Brief Description of Current Complaint Pt presents to PT with daughter and reports of cervical pain which began approximately 1 year ago. Pt fell on 7/31/21 resulting in L hip fracture and underwent surgery on 8/3/21. Pt has been using rollator/Rwx since that time. Pt also with history of LBP and has had injections which have been helpful at times and not at others. Pt and daughter would like to avoid injections at this time and treat conservatively. Cervical pain is B at this time, staying in neck on the R and going into shoulder on the L. No radicular symptoms into UEs at this time. Per daughter, pt with complex medical history and also with hearing loss and poor vision. Per pt and daughter, pt with advanced OA in several joints and knees want to buckle at times.  -CN      Previous treatment for THIS PROBLEM Injections  -CN      Patient/Caregiver Goals Relieve pain;Know what to do to help the symptoms  -CN      Hand Dominance right-handed  -CN      Patient seeing anyone else for problem(s)? Yes  -CN               Pain     Pain Location Neck;Shoulder  -CN      Pain at Present 5  -CN      Pain at Worst 9  -CN      Pain Frequency Constant/continuous  -CN      What Performance Factors Make the Current Problem(s) WORSE? L rotation  -CN      What Performance Factors Make the Current Problem(s) BETTER? Tylenol, heat  -CN      Is your sleep disturbed? No  -CN      Difficulties with ADL's? Yes, reaching overhead is difficult, sweeping, dishes  -CN      Difficulties with recreational activities? Yes, sitting to play cards (sitting, reaching) 1x/month  -CN              Fall Risk Assessment    Any falls in the past year: Yes  -CN      Number of falls reported in the last 12 months 1x/month per daughter, martha a lot  -CN              Services    Are you currently receiving Home Health services No  -CN              Daily Activities    Primary Language English  -CN      Are you able to read Yes  -CN      Are you able to write Yes  -CN      How does patient learn best? Listening;Reading;Demonstration  -CN      Barriers to learning Visual;Hearing  -CN      Pt Participated in POC and Goals Yes  -CN              Safety    Are you being hurt, hit, or frightened by anyone at home or in your life? No  -CN      Are you being neglected by a caregiver No  -CN            User Key  (r) = Recorded By, (t) = Taken By, (c) = Cosigned By    Initials Name Provider Type    CN Mily Richmond, PT Physical Therapist                 PT Ortho     Row Name 03/31/22 1500       Posture/Observations    Alignment Options Forward head;Thoracic kyphosis;Rounded shoulders;Scoliosis  -CN    Forward Head Moderate;Severe  -CN    Thoracic Kyphosis Moderate  -CN    Rounded Shoulders Moderate;Severe  -CN    Scoliosis Moderate  -CN       Myotomal Screen- Upper Quarter Clearing    Shoulder flexion (C5) Bilateral:;4- (Good -)  -CN    Elbow flexion/wrist extension (C6) Bilateral:;4- (Good -);4 (Good)  -CN    Elbow extension/wrist flexion (C7) Bilateral:;4  (Good)  -CN       Cervical/Shoulder ROM Screen    Cervical flexion Impaired  50%  -CN    Cervical extension Impaired  25%  -CN    Cervical lateral flexion Impaired  R=50%, L=25% with slight pain  -CN    Cervical rotation Impaired  R=50%, L=25% with pain  -CN       Cervical Palpation    Suboccipital Guarded/taut;Tender  -CN    Levator Scapula Guarded/taut;Tender  -CN    Upper Traps Guarded/taut;Tender  -CN       Cervical Accessory Motions    PA glide- C3 Hypomobile  -CN    PA glide- C5 Hypomobile  -CN    PA glide- C6 Hypomobile  -CN        Strength Right    Right  Test 1 22  -CN    Right  Test 2 12  -CN    Right  Test 3 11  -CN     Strength Average Right 15  -CN        Strength Left    Left  Test 1 5  -CN    Left  Test 2 6  -CN    Left  Test 3 5  -CN     Strength Average Left 5.33  -CN       Upper Extremity Flexibility    Suboccipitals Mildly limited;Moderately limited  -CN    Upper Trapezius Moderately limited  -CN    Levator Scapula Moderately limited  -CN    Pect Minor Moderately limited  -CN    Pect Major Moderately limited  -CN          User Key  (r) = Recorded By, (t) = Taken By, (c) = Cosigned By    Initials Name Provider Type    Mily Holliday, PT Physical Therapist                            Therapy Education  Education Details: Anatomy, goals of PT, realistic expectations, importance of posture on condition, gait with rollator and adjusted handles  Given: HEP, Symptoms/condition management, Pain management, Posture/body mechanics, Mobility training  Program: New  How Provided: Verbal, Demonstration, Written  Provided to: Patient, Caregiver  Level of Understanding: Teach back education performed, Verbalized, Demonstrated      PT OP Goals     Row Name 03/31/22 1500          PT Short Term Goals    STG Date to Achieve 05/01/22  -CN     STG 1 Pt will report pain rated 4/10 at worst in order to demonstrate ability to return to normalized ADLs and functional  activities.  -CN     STG 1 Progress New  -CN     STG 2 Pt will be independent with initial HEP for symptom management.  -CN     STG 2 Progress New  -CN            Long Term Goals    LTG Date to Achieve 06/01/22  -CN     LTG 1 Pt will be independent and compliant with advanced HEP for long term management of symptoms and prevention of future occurrence.  -CN     LTG 1 Progress New  -CN     LTG 2 Pt will reduce level of perceived disability as measured by the NDI  to 40% in order to improve QOL.  -CN     LTG 2 Progress New  -CN     LTG 3 Pt will demonstrate L cervical rotation and SBing to 50% with mild pain in order to improve ability to perform ADLs.  -CN     LTG 3 Progress New  -CN     LTG 4 Pt will demonstrate erect posture with AD use during gait in order to reduce stress on affected tissues.  -CN     LTG 4 Progress New  -CN            Time Calculation    PT Goal Re-Cert Due Date 05/01/22  -CN           User Key  (r) = Recorded By, (t) = Taken By, (c) = Cosigned By    Initials Name Provider Type    Mily Holliday, PT Physical Therapist                 PT Assessment/Plan     Row Name 03/31/22 1600          PT Assessment    Functional Limitations Decreased safety during functional activities;Limitations in community activities;Impaired gait;Limitations in functional capacity and performance;Impaired locomotion;Performance in leisure activities;Performance in self-care ADL;Limitation in home management  -CN     Impairments Impaired flexibility;Gait;Joint mobility;Muscle strength;Pain;Posture;Range of motion  -CN     Assessment Comments 86 y.o. female referred to outpatient physical therapy for evaluation and treatment of bilateral cervical pain.  Patient presents with poor posture, dec cervical mobility, dec UE/ strength B and poor postural awareness with ADLs. Pt's signs and symptoms are consistent with referring diagnosis.  Pertinent comorbidities and personal factors that may affect progress  include, but are not limited to, OA, sedentary lifestyle, LBP/knee pain.  Pt scored 58% on the NDI where 0% is no disability and is in stable clinical condition. Pt would benefit from skilled PT to address functional deficits and return to PLOF.  -CN     Please refer to paper survey for additional self-reported information Yes  -CN     Rehab Potential Good  -CN     Patient/caregiver participated in establishment of treatment plan and goals Yes  -CN     Patient would benefit from skilled therapy intervention Yes  -CN            PT Plan    PT Frequency 2x/week  -CN     Predicted Duration of Therapy Intervention (PT) 8-10 visits  -CN     Planned CPT's? PT EVAL LOW COMPLEXITY: 48138;PT RE-EVAL: 98996;PT THER PROC EA 15 MIN: 44374;PT THER ACT EA 15 MIN: 28313;PT MANUAL THERAPY EA 15 MIN: 60642;PT NEUROMUSC RE-EDUCATION EA 15 MIN: 36042;PT SELF CARE/HOME MGMT/TRAIN EA 15: 80860;PT HOT OR COLD PACK TREAT [facility];PT AQUATIC THERAPY EA 15 MIN: 98154;PT TRACTION CERVICAL: 42221  -CN     PT Plan Comments Assess response to evaluation and consider manual work for dec muscle guarding and improved cervical mobility, chin tuck, scap squeeze, wall wash, SNAG next visit.  -CN           User Key  (r) = Recorded By, (t) = Taken By, (c) = Cosigned By    Initials Name Provider Type    Mily Holliday, PT Physical Therapist                   OP Exercises     Row Name 03/31/22 1500             Total Minutes    88113 - PT Therapeutic Activity Minutes 8  -CN              Exercise 1    Exercise Name 1 Adjusted rollator handles and demonstrated optimal body mechanics with AD including erect posture, dec shoulder hike  -CN              Exercise 2    Exercise Name 2 Discussed posture at length with demo and pt performance of shoulders retracted with slight chin tuck  -CN            User Key  (r) = Recorded By, (t) = Taken By, (c) = Cosigned By    Initials Name Provider Type    Mily Holliday, PT Physical Therapist                               Outcome Measure Options: Neck Disability Index (NDI) (58%)         Time Calculation:     Start Time: 1535  Stop Time: 1624  Time Calculation (min): 49 min  Timed Charges  84560 - PT Therapeutic Activity Minutes: 8  Total Minutes  Timed Charges Total Minutes: 8   Total Minutes: 8     Therapy Charges for Today     Code Description Service Date Service Provider Modifiers Qty    [id number]  PT THERAPEUTIC ACT EA 15 MIN 3/31/2022 Mily Richmond, PT GP 1    [id number]  PT EVAL LOW COMPLEXITY 2 3/31/2022 Mily Richmond, PT GP 1          PT G-Codes  Outcome Measure Options: Neck Disability Index (NDI) (58%)         Mily Richmond PT  3/31/2022

## 2022-04-04 ENCOUNTER — READMISSION MANAGEMENT (OUTPATIENT)
Dept: CALL CENTER | Facility: HOSPITAL | Age: 87
End: 2022-04-04

## 2022-04-04 ENCOUNTER — OFFICE VISIT (OUTPATIENT)
Dept: INTERNAL MEDICINE | Facility: CLINIC | Age: 87
End: 2022-04-04

## 2022-04-04 VITALS
BODY MASS INDEX: 29.32 KG/M2 | HEIGHT: 61 IN | SYSTOLIC BLOOD PRESSURE: 133 MMHG | OXYGEN SATURATION: 99 % | TEMPERATURE: 97.3 F | DIASTOLIC BLOOD PRESSURE: 60 MMHG | HEART RATE: 75 BPM | WEIGHT: 155.3 LBS

## 2022-04-04 DIAGNOSIS — B37.2 CANDIDAL INTERTRIGO: ICD-10-CM

## 2022-04-04 DIAGNOSIS — L03.115 CELLULITIS OF RIGHT LOWER EXTREMITY: ICD-10-CM

## 2022-04-04 DIAGNOSIS — I10 ESSENTIAL HYPERTENSION: Primary | Chronic | ICD-10-CM

## 2022-04-04 PROCEDURE — 99213 OFFICE O/P EST LOW 20 MIN: CPT | Performed by: NURSE PRACTITIONER

## 2022-04-04 RX ORDER — AMLODIPINE BESYLATE 5 MG/1
5 TABLET ORAL DAILY
Start: 2022-04-04 | End: 2022-05-23

## 2022-04-04 RX ORDER — KETOCONAZOLE 20 MG/G
1 CREAM TOPICAL EVERY 12 HOURS SCHEDULED
Qty: 15 G | Refills: 0 | Status: SHIPPED | OUTPATIENT
Start: 2022-04-04 | End: 2022-09-01 | Stop reason: ALTCHOICE

## 2022-04-04 RX ORDER — CEPHALEXIN 500 MG/1
500 CAPSULE ORAL 2 TIMES DAILY
Qty: 14 CAPSULE | Refills: 0 | Status: SHIPPED | OUTPATIENT
Start: 2022-04-04 | End: 2022-04-11

## 2022-04-04 NOTE — ASSESSMENT & PLAN NOTE
Hypertension is improving with treatment.  Medication changes per orders.    Decrease norvasc to 5 mg daily. Due to new onset edema after starting.     Notify PCP if SBP consistently over 140.

## 2022-04-04 NOTE — OUTREACH NOTE
Medical Week 3 Survey    Flowsheet Row Responses   [facility] patient discharged from? [place]   Does the patient have one of the following disease processes/diagnoses(primary or secondary)? Other   Week 3 attempt successful? No   Unsuccessful attempts Attempt 2          DELMI Weathers Registered Nurse

## 2022-04-04 NOTE — PROGRESS NOTES
Chief Complaint  Cellulitis (Right leg ) and Leg Swelling (Feet are also swelling , Friday /Saturday )       Subjective:      History of Present Illness {CC  Problem List  Visit  Diagnosis   Encounters  Notes  Medications  Labs  Result Review Imaging  Media :23}     Geeta Butt is a patient of Grzegorz Nguyen MD and presents to [facility] for right lower leg wound and edema.     She had cellulitis in March - improved with keflex.      In the last week, she increased norvasc to 10 mg daily.  She had increased edema.  Then over weekend, after eating canned soup, swelling worse.  Scab had some clear drainage on right  anterior leg.  No known injury.    No fever or chills. No SOA.     She elevated feet last night and swelling much better today.     Also: she states she has asked many providers to help with yeast under breast and skin folds of her arms (greater than one year).  She states she has been to derm twice and discussed with Dr Nguyen - but nothing has improved.        Objective:      Physical Exam  Vitals reviewed.   Constitutional:       Appearance: Normal appearance. She is well-developed.   HENT:      Head:      Comments: Wearing mask due to COVID   Neck:      Thyroid: No thyromegaly.   Cardiovascular:      Rate and Rhythm: Normal rate and regular rhythm.      Pulses: Normal pulses.      Heart sounds: Normal heart sounds.   Pulmonary:      Effort: Pulmonary effort is normal.      Breath sounds: Normal breath sounds.      Comments: E/U   Musculoskeletal:      Comments: Equal lower extremity edema 1+, non pitting.      Skin:     General: Skin is warm and dry.             Comments: Intertriginous folds: under breast, under abdomen and folds of arms - erythemic base with satellite lesions. No open wounds.    Neurological:      Mental Status: She is alert and oriented to person, place, and time.   Psychiatric:         Mood and Affect: Mood normal.          "Behavior: Behavior normal. Behavior is cooperative.         Thought Content: Thought content normal.         Judgment: Judgment normal.        Result Review  Data Reviewed:{ Labs  Result Review  Imaging  Med Tab  Media :23}          Vital Signs:   /60 (BP Location: Right arm, Patient Position: Sitting, Cuff Size: Adult)   Pulse 75   Temp 97.3 °F (36.3 °C) (Temporal)   Ht 154.9 cm (61\")   Wt 70.4 kg (155 lb 4.8 oz)   SpO2 99%   BMI 29.34 kg/m²          Assessment and Plan:      Assessment and Plan {CC Problem List  Visit Diagnosis  ROS  Review (Popup)  Health Maintenance  Quality  BestPractice  Medications  SmartSets  SnapShot Encounters  Media :23}     Problem List Items Addressed This Visit        Cardiac and Vasculature    Essential hypertension - Primary (Chronic)    Current Assessment & Plan     Hypertension is improving with treatment.  Medication changes per orders.    Decrease norvasc to 5 mg daily. Due to new onset edema after starting.     Notify PCP if SBP consistently over 140.            Relevant Medications    amLODIPine (Norvasc) 5 MG tablet       Infectious Diseases    Cellulitis of right lower extremity    Relevant Medications    cephalexin (KEFLEX) 500 MG capsule      Other Visit Diagnoses     Candidal intertrigo        Advised to apply antifungal until clear and then continue for 10 days further.  Keep areas dry.   Fu with derm if doesn't improve.     Relevant Medications    ketoconazole (NIZORAL) 2 % cream        Edema likely due to sodium and increasing norvasc. Weight is stable. Will decrease norvasc to 5 mg daily.    Low sodium diet.   Elevate lower extremities at night and 2-3 times during the day (about 20 mins) higher than heart.     Follow Up {Instructions Charge Capture  Follow-up Communications :23}     Return if symptoms worsen or fail to improve.     Patient was given instructions and counseling regarding her condition or for health maintenance advice. " Please see specific information pulled into the AVS if appropriate.    Tere disclaimer:   Much of this encounter note is an electronic transcription/translation of spoken language to printed text. The electronic translation of spoken language may permit erroneous, or at times, nonsensical words or phrases to be inadvertently transcribed; Although I have reviewed the note for such errors, some may still exist.     Additional Patient Counseling:       There are no Patient Instructions on file for this visit.

## 2022-04-18 DIAGNOSIS — I10 ESSENTIAL HYPERTENSION: ICD-10-CM

## 2022-04-19 ENCOUNTER — HOSPITAL ENCOUNTER (OUTPATIENT)
Dept: PHYSICAL THERAPY | Facility: HOSPITAL | Age: 87
Setting detail: THERAPIES SERIES
Discharge: HOME | End: 2022-04-19

## 2022-04-19 DIAGNOSIS — M54.2 CERVICAL PAIN: Primary | ICD-10-CM

## 2022-04-19 DIAGNOSIS — R29.3 POOR POSTURE: ICD-10-CM

## 2022-04-19 PROCEDURE — 97110 THERAPEUTIC EXERCISES: CPT

## 2022-04-19 PROCEDURE — 97140 MANUAL THERAPY 1/> REGIONS: CPT

## 2022-04-19 RX ORDER — CARVEDILOL 6.25 MG/1
TABLET ORAL
Qty: 180 TABLET | Refills: 1 | OUTPATIENT
Start: 2022-04-19

## 2022-04-19 NOTE — THERAPY TREATMENT NOTE
Outpatient Physical Therapy Ortho Treatment Note  [facility]     Patient Name: Geeta Butt  : 1935  MRN: [id number]  Today's Date: 2022      Visit Date: 2022    Visit Dx:    ICD-10-CM ICD-9-CM   1. Cervical pain  M54.2 723.1   2. Poor posture  R29.3 781.92       Patient Active Problem List   Diagnosis   • Essential hypertension   • Hypothyroidism   • Black stool   • Diarrhea   • Nausea & vomiting   • RUQ pain   • Leukocytosis   • UTI (urinary tract infection)   • Duodenitis   • Foot pain, bilateral   • Malignant neoplasm of lower-inner quadrant of left breast in female, estrogen receptor positive (HCC)   • Iron deficiency anemia   • Adverse effect of iron   • GI bleed   • GIULIA (acute kidney injury) (HCC)   • Gastrointestinal hemorrhage associated with duodenitis   • Hematochezia   • Duodenal ulcer   • Long-term use of high-risk medication   • Acute left-sided low back pain with left-sided sciatica   • DDD (degenerative disc disease), lumbosacral   • Hyperglycemia   • GERD without esophagitis   • Hyponatremia   • Hyperlipidemia   • Impaired fasting glucose   • Depressive disorder   • Chronic kidney disease   • Closed fracture of right hip (HCC)   • Acute pyelonephritis   • Sepsis secondary to UTI (HCC)   • Bilateral cataracts   • Central retinal vein occlusion   • Corneal endothelial dystrophy   • Epiretinal membrane   • Bladder prolapse, female, acquired   • Superficial bruising   • Neuropathy   • Urinary retention with incomplete bladder emptying   • Hx of bladder repair surgery   • Cellulitis of right lower extremity   • Stroke (HCC)        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    • Chronic kidney disease    • CKD (chronic kidney disease)     PT STATES STAGE  3   • Depression    • Diverticular disease    • Gastric ulcer     AND DUODENAL   • GERD (gastroesophageal reflux disease)    • Hearing loss     BILAT AIDES   • History of colon polyps    • History of depression    • History of GI bleed    • Hypertension    • Hypothyroidism    • Peptic ulceration    • PONV (postoperative nausea and vomiting)    • Poor vision     RIGHT EYE, HAS PERIPHERAL VISION    • Stroke (HCC) 2000     mild weakness on left, partial sight loss on right         Past Surgical History:   Procedure Laterality Date   • ABDOMINAL SURGERY     • APPENDECTOMY     • BLADDER REPAIR      AND RECTOCELE   • BREAST BIOPSY     • BREAST CYST ASPIRATION     • BREAST LUMPECTOMY WITH SENTINEL NODE BIOPSY Left 3/19/2018    Procedure: BREAST LUMPECTOMY WITH SENTINEL NODE BIOPSY AND NEEDLE LOCALIZATION;  Surgeon: Myles Soliman MD;  [facility] [place];  Service: General   • CHOLECYSTECTOMY     • COLONOSCOPY  2013   • COLONOSCOPY N/A 2/16/2018    Procedure: COLONOSCOPY into cecum and T.I. with biopsies;  Surgeon: Augustine Gallego MD;  Location: [facility] ENDOSCOPY;  Service:    • ENDOSCOPY N/A 10/17/2017    Procedure: ESOPHAGOGASTRODUODENOSCOPY WITH COLD BIOPSIES;  Surgeon: Augustine Gallego MD;  Location: [facility] ENDOSCOPY;  Service:    • ENDOSCOPY N/A 2/16/2018    Procedure: ESOPHAGOGASTRODUODENOSCOPY with biopsies and #54 Arguello DILATATION;  Surgeon: Augustine Gallego MD;  Location: [facility] ENDOSCOPY;  Service:    • ENDOSCOPY N/A 3/19/2020    Procedure: ESOPHAGOGASTRODUODENOSCOPY with biopsies;  Surgeon: Augutsine Gallego MD;  Location: [facility] ENDOSCOPY;  Service: Gastroenterology;  Laterality: N/A;  PRE- MELENA, EPIGASTRIC PAIN  POST- eerosive gastritis, duodenal ulcer, hiatal hernia   • ENDOSCOPY N/A 7/29/2020    Procedure: ESOPHAGOGASTRODUODENOSCOPY WITH DUODENAL ASPIRATE AND COLD BIOPSIES;  Surgeon: Augustine Gallego MD;  Location: [facility] ENDOSCOPY;  Service: Gastroenterology;  Laterality: N/A;  PRE: HX ULCER  DISEASE  POST: GASTRITIS, HEALED ULCER   • EYE SURGERY Left 2014    3-4 years, cornea replacement    • FEMUR IM NAILING/RODDING Right 8/3/2021    Procedure: FEMUR INTRAMEDULLARY NAILING/RODDING;  Surgeon: Garett Martin II, MD;  Location: [facility] MAIN OR;  Service: Orthopedics;  Laterality: Right;   • HIP SURGERY  08/03/2021   • HYSTERECTOMY     • MOUTH SURGERY  2000    UNDER TONGUE AND LEFT FLOOR OF MOUTH FOR CA   • PARATHYROIDECTOMY      PER PT   • SIGMOIDOSCOPY N/A 7/29/2020    Procedure: FLEXIBLE SIGMOIDOSCOPY TO DESCENDING COLON WITH COLD BIOPSIES;  Surgeon: Augustine Gallego MD;  Location: [facility] ENDOSCOPY;  Service: Gastroenterology;  Laterality: N/A;  PRE: RECTAL BLEEDING  POST: DIVERTICULOSIS, HEMORRHOIDS, MINIMAL PROCTITIS   • SINUS SURGERY     • SKIN BIOPSY     • THYROID SURGERY     • VARICOSE VEIN SURGERY                          PT Assessment/Plan     Row Name 04/19/22 1500          PT Assessment    Assessment Comments Pt returns for first f/u visit, reporting low level pain today. Initiated manual to address soft tissue muscle tension and TPs, as well as initial HEP.  -CC            PT Plan    PT Plan Comments scap squeeze, SNAG, f/u about appts? Pt may need later appts but was going to talk to her family  -CC           User Key  (r) = Recorded By, (t) = Taken By, (c) = Cosigned By    Initials Name Provider Type    Herlinda Sommers, PT Physical Therapist                   OP Exercises     Row Name 04/19/22 1500             Subjective Comments    Subjective Comments Reports she is feeling good today  -CC              Subjective Pain    Able to rate subjective pain? yes  -CC      Subjective Pain Comment stiffness  -CC              Total Minutes    95822 - PT Therapeutic Exercise Minutes 15  -CC      74617 - PT Manual Therapy Minutes 25  -CC              Exercise 1    Exercise Name 1 pulleys: flex  -CC      Cueing 1 Demo  -CC      Time 1 2 min  -CC              Exercise 2    Exercise Name 2  supine chin tuck  -CC      Cueing 2 Verbal  -CC      Reps 2 10  -CC      Time 2 5 sec  -CC              Exercise 3    Exercise Name 3 chin tuck + rot  -CC      Cueing 3 Verbal  -CC      Reps 3 10 b  -CC              Exercise 4    Exercise Name 4 posterior shoulder roll  -CC      Cueing 4 Verbal  -CC      Reps 4 15  -CC            User Key  (r) = Recorded By, (t) = Taken By, (c) = Cosigned By    Initials Name Provider Type    CC Herlinda Castillo, PT Physical Therapist                         Manual Rx (last 36 hours)     Manual Treatments     Row Name 04/19/22 1500             Total Minutes    98645 - PT Manual Therapy Minutes 25  -CC              Manual Rx 1    Manual Rx 1 Location L UT/cervical paraspinal STM  -CC              Manual Rx 2    Manual Rx 2 Location cspine distraction  -CC            User Key  (r) = Recorded By, (t) = Taken By, (c) = Cosigned By    Initials Name Provider Type    CC Herlinda Castillo, PT Physical Therapist                 PT OP Goals     Row Name 04/19/22 1500          PT Short Term Goals    STG Date to Achieve 05/01/22  -CC     STG 1 Pt will report pain rated 4/10 at worst in order to demonstrate ability to return to normalized ADLs and functional activities.  -CC     STG 1 Progress Ongoing  -CC     STG 2 Pt will be independent with initial HEP for symptom management.  -CC     STG 2 Progress Ongoing  -CC            Long Term Goals    LTG Date to Achieve 06/01/22  -CC     LTG 1 Pt will be independent and compliant with advanced HEP for long term management of symptoms and prevention of future occurrence.  -CC     LTG 1 Progress Ongoing  -CC     LTG 2 Pt will reduce level of perceived disability as measured by the NDI  to 40% in order to improve QOL.  -CC     LTG 2 Progress Ongoing  -CC     LTG 3 Pt will demonstrate L cervical rotation and SBing to 50% with mild pain in order to improve ability to perform ADLs.  -CC     LTG 3 Progress Ongoing  -CC     LTG 4 Pt will demonstrate  erect posture with AD use during gait in order to reduce stress on affected tissues.  -CC     LTG 4 Progress Ongoing  -CC           User Key  (r) = Recorded By, (t) = Taken By, (c) = Cosigned By    Initials Name Provider Type    Herlinda Sommers, PT Physical Therapist                Therapy Education  Education Details: Access Code [email or web address]  Given: HEP  Program: New  How Provided: Verbal, Demonstration, Written  Provided to: Patient  Level of Understanding: Teach back education performed, Verbalized, Demonstrated              Time Calculation:   Start Time: 1445  Stop Time: 1525  Time Calculation (min): 40 min  Total Timed Code Minutes- PT: 40 minute(s)  Timed Charges  77368 - PT Therapeutic Exercise Minutes: 15  90728 - PT Manual Therapy Minutes: 25  Total Minutes  Timed Charges Total Minutes: 40   Total Minutes: 40  Therapy Charges for Today     Code Description Service Date Service Provider Modifiers Qty    22367412298  PT THER PROC EA 15 MIN 4/19/2022 Herlinda Castillo, PT GP 1    31494790093 HC PT MANUAL THERAPY EA 15 MIN 4/19/2022 Herlinda Castillo, PT GP 2                    Herlinda Castillo PT  4/19/2022

## 2022-04-21 ENCOUNTER — HOSPITAL ENCOUNTER (OUTPATIENT)
Dept: PHYSICAL THERAPY | Facility: HOSPITAL | Age: 87
Setting detail: THERAPIES SERIES
Discharge: HOME | End: 2022-04-21

## 2022-04-21 DIAGNOSIS — R29.3 POOR POSTURE: ICD-10-CM

## 2022-04-21 DIAGNOSIS — M54.2 CERVICAL PAIN: Primary | ICD-10-CM

## 2022-04-21 PROCEDURE — 97110 THERAPEUTIC EXERCISES: CPT

## 2022-04-21 PROCEDURE — 97140 MANUAL THERAPY 1/> REGIONS: CPT

## 2022-04-21 NOTE — THERAPY TREATMENT NOTE
Outpatient Physical Therapy Ortho Treatment Note  [facility]     Patient Name: Geeta Butt  : 1935  MRN: [id number]  Today's Date: 2022      Visit Date: 2022    Visit Dx:    ICD-10-CM ICD-9-CM   1. Cervical pain  M54.2 723.1   2. Poor posture  R29.3 781.92       Patient Active Problem List   Diagnosis   • Essential hypertension   • Hypothyroidism   • Black stool   • Diarrhea   • Nausea & vomiting   • RUQ pain   • Leukocytosis   • UTI (urinary tract infection)   • Duodenitis   • Foot pain, bilateral   • Malignant neoplasm of lower-inner quadrant of left breast in female, estrogen receptor positive (HCC)   • Iron deficiency anemia   • Adverse effect of iron   • GI bleed   • GIULIA (acute kidney injury) (HCC)   • Gastrointestinal hemorrhage associated with duodenitis   • Hematochezia   • Duodenal ulcer   • Long-term use of high-risk medication   • Acute left-sided low back pain with left-sided sciatica   • DDD (degenerative disc disease), lumbosacral   • Hyperglycemia   • GERD without esophagitis   • Hyponatremia   • Hyperlipidemia   • Impaired fasting glucose   • Depressive disorder   • Chronic kidney disease   • Closed fracture of right hip (HCC)   • Acute pyelonephritis   • Sepsis secondary to UTI (HCC)   • Bilateral cataracts   • Central retinal vein occlusion   • Corneal endothelial dystrophy   • Epiretinal membrane   • Bladder prolapse, female, acquired   • Superficial bruising   • Neuropathy   • Urinary retention with incomplete bladder emptying   • Hx of bladder repair surgery   • Cellulitis of right lower extremity   • Stroke (HCC)        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    • Chronic kidney disease    • CKD (chronic kidney disease)     PT STATES STAGE  3   • Depression    • Diverticular disease    • Gastric ulcer     AND DUODENAL   • GERD (gastroesophageal reflux disease)    • Hearing loss     BILAT AIDES   • History of colon polyps    • History of depression    • History of GI bleed    • Hypertension    • Hypothyroidism    • Peptic ulceration    • PONV (postoperative nausea and vomiting)    • Poor vision     RIGHT EYE, HAS PERIPHERAL VISION    • Stroke (HCC) 2000     mild weakness on left, partial sight loss on right         Past Surgical History:   Procedure Laterality Date   • ABDOMINAL SURGERY     • APPENDECTOMY     • BLADDER REPAIR      AND RECTOCELE   • BREAST BIOPSY     • BREAST CYST ASPIRATION     • BREAST LUMPECTOMY WITH SENTINEL NODE BIOPSY Left 3/19/2018    Procedure: BREAST LUMPECTOMY WITH SENTINEL NODE BIOPSY AND NEEDLE LOCALIZATION;  Surgeon: Myles Soliman MD;  [facility] [place]  Service: General   • CHOLECYSTECTOMY     • COLONOSCOPY  2013   • COLONOSCOPY N/A 2/16/2018    Procedure: COLONOSCOPY into cecum and T.I. with biopsies;  Surgeon: Augustine Gallego MD;  Location: [facility] ENDOSCOPY;  Service:    • ENDOSCOPY N/A 10/17/2017    Procedure: ESOPHAGOGASTRODUODENOSCOPY WITH COLD BIOPSIES;  Surgeon: Augustine Gallego MD;  Location: [facility] ENDOSCOPY;  Service:    • ENDOSCOPY N/A 2/16/2018    Procedure: ESOPHAGOGASTRODUODENOSCOPY with biopsies and #54 Arguello DILATATION;  Surgeon: Augustine Gallego MD;  Location: [facility] ENDOSCOPY;  Service:    • ENDOSCOPY N/A 3/19/2020    Procedure: ESOPHAGOGASTRODUODENOSCOPY with biopsies;  Surgeon: Augustine Gallego MD;  Location: [facility] ENDOSCOPY;  Service: Gastroenterology;  Laterality: N/A;  PRE- MELENA, EPIGASTRIC PAIN  POST- eerosive gastritis, duodenal ulcer, hiatal hernia   • ENDOSCOPY N/A 7/29/2020    Procedure: ESOPHAGOGASTRODUODENOSCOPY WITH DUODENAL ASPIRATE AND COLD BIOPSIES;  Surgeon: Augustine Gallego MD;  Location: [facility] ENDOSCOPY;  Service: Gastroenterology;  Laterality: N/A;  PRE: HX ULCER  DISEASE  POST: GASTRITIS, HEALED ULCER   • EYE SURGERY Left 2014    3-4 years, cornea replacement    • FEMUR IM NAILING/RODDING Right 8/3/2021    Procedure: FEMUR INTRAMEDULLARY NAILING/RODDING;  Surgeon: Garett Martin II, MD;  Location: [facility] MAIN OR;  Service: Orthopedics;  Laterality: Right;   • HIP SURGERY  08/03/2021   • HYSTERECTOMY     • MOUTH SURGERY  2000    UNDER TONGUE AND LEFT FLOOR OF MOUTH FOR CA   • PARATHYROIDECTOMY      PER PT   • SIGMOIDOSCOPY N/A 7/29/2020    Procedure: FLEXIBLE SIGMOIDOSCOPY TO DESCENDING COLON WITH COLD BIOPSIES;  Surgeon: Augustine Gallego MD;  Location: [facility] ENDOSCOPY;  Service: Gastroenterology;  Laterality: N/A;  PRE: RECTAL BLEEDING  POST: DIVERTICULOSIS, HEMORRHOIDS, MINIMAL PROCTITIS   • SINUS SURGERY     • SKIN BIOPSY     • THYROID SURGERY     • VARICOSE VEIN SURGERY                          PT Assessment/Plan     Row Name 04/21/22 1622          PT Assessment    Assessment Comments Pt reporting good relief following manual last visit with increased ROM into R cervical rotation. Pt with continued poor posture and given cues with seated exercises for positioning. Added scap squeeze with good form and will continue to progress posture and mobility as tolerated.  -CN            PT Plan    PT Plan Comments Consider SNAG and rows next visit.  -CN           User Key  (r) = Recorded By, (t) = Taken By, (c) = Cosigned By    Initials Name Provider Type    CN Mily Richmond, PT Physical Therapist                   OP Exercises     Row Name 04/21/22 1500             Subjective Comments    Subjective Comments I had more motion to the R last time. I felt good. I feel good today, too.  -CN              Subjective Pain    Able to rate subjective pain? yes  -CN              Total Minutes    20166 - PT Therapeutic Exercise Minutes 15  -CN      54814 - PT Manual Therapy Minutes 25  -CN              Exercise 1    Exercise Name 1 pulleys: flex  -CN      Cueing 1 Demo   -CN      Time 1 2 min  -CN              Exercise 2    Exercise Name 2 supine chin tuck  -CN      Cueing 2 Verbal  -CN      Reps 2 10  -CN      Time 2 5 sec  -CN              Exercise 3    Exercise Name 3 chin tuck + rot  -CN      Cueing 3 Verbal  -CN      Reps 3 10 b  -CN              Exercise 4    Exercise Name 4 posterior shoulder roll  -CN      Cueing 4 Verbal  -CN      Reps 4 15  -CN              Exercise 5    Exercise Name 5 Scap squeeze  -CN      Cueing 5 Verbal;Demo  -CN      Reps 5 15  -CN            User Key  (r) = Recorded By, (t) = Taken By, (c) = Cosigned By    Initials Name Provider Type    Mily Holliday, PT Physical Therapist                         Manual Rx (last 36 hours)     Manual Treatments     Row Name 04/21/22 1500             Total Minutes    36941 - PT Manual Therapy Minutes 25  -CN              Manual Rx 1    Manual Rx 1 Location L UT/cervical paraspinal STM  -CN              Manual Rx 2    Manual Rx 2 Location cspine distraction  -CN            User Key  (r) = Recorded By, (t) = Taken By, (c) = Cosigned By    Initials Name Provider Type    Mily Holliday, PT Physical Therapist                    Therapy Education  Given: HEP  Program: Reinforced  How Provided: Verbal, Demonstration, Written  Provided to: Patient  Level of Understanding: Teach back education performed, Verbalized, Demonstrated              Time Calculation:   Start Time: 1532  Stop Time: 1612  Time Calculation (min): 40 min  Timed Charges  25394 - PT Therapeutic Exercise Minutes: 15  73675 - PT Manual Therapy Minutes: 25  Total Minutes  Timed Charges Total Minutes: 40   Total Minutes: 40  Therapy Charges for Today     Code Description Service Date Service Provider Modifiers Qty    95949733768 HC PT THER PROC EA 15 MIN 4/21/2022 Mily Richmond, PT GP 1    [id number] HC PT MANUAL THERAPY EA 15 MIN 4/21/2022 Mily Richmond, PT GP 2                    Mily Richmond  PT  4/21/2022

## 2022-04-26 ENCOUNTER — APPOINTMENT (OUTPATIENT)
Dept: PHYSICAL THERAPY | Facility: HOSPITAL | Age: 87
End: 2022-04-26

## 2022-04-28 ENCOUNTER — HOSPITAL ENCOUNTER (OUTPATIENT)
Dept: PHYSICAL THERAPY | Facility: HOSPITAL | Age: 87
Setting detail: THERAPIES SERIES
Discharge: HOME | End: 2022-04-28

## 2022-04-28 DIAGNOSIS — R29.3 POOR POSTURE: Primary | ICD-10-CM

## 2022-04-28 DIAGNOSIS — M54.2 CERVICAL PAIN: ICD-10-CM

## 2022-04-28 PROCEDURE — 97140 MANUAL THERAPY 1/> REGIONS: CPT

## 2022-04-28 PROCEDURE — 97110 THERAPEUTIC EXERCISES: CPT

## 2022-04-28 NOTE — THERAPY TREATMENT NOTE
Outpatient Physical Therapy Ortho Treatment Note  [facility]     Patient Name: Geeta Butt  : 1935  MRN: [id number]  Today's Date: 2022      Visit Date: 2022    Visit Dx:    ICD-10-CM ICD-9-CM   1. Poor posture  R29.3 781.92   2. Cervical pain  M54.2 723.1       Patient Active Problem List   Diagnosis   • Essential hypertension   • Hypothyroidism   • Black stool   • Diarrhea   • Nausea & vomiting   • RUQ pain   • Leukocytosis   • UTI (urinary tract infection)   • Duodenitis   • Foot pain, bilateral   • Malignant neoplasm of lower-inner quadrant of left breast in female, estrogen receptor positive (HCC)   • Iron deficiency anemia   • Adverse effect of iron   • GI bleed   • GIULIA (acute kidney injury) (HCC)   • Gastrointestinal hemorrhage associated with duodenitis   • Hematochezia   • Duodenal ulcer   • Long-term use of high-risk medication   • Acute left-sided low back pain with left-sided sciatica   • DDD (degenerative disc disease), lumbosacral   • Hyperglycemia   • GERD without esophagitis   • Hyponatremia   • Hyperlipidemia   • Impaired fasting glucose   • Depressive disorder   • Chronic kidney disease   • Closed fracture of right hip (HCC)   • Acute pyelonephritis   • Sepsis secondary to UTI (HCC)   • Bilateral cataracts   • Central retinal vein occlusion   • Corneal endothelial dystrophy   • Epiretinal membrane   • Bladder prolapse, female, acquired   • Superficial bruising   • Neuropathy   • Urinary retention with incomplete bladder emptying   • Hx of bladder repair surgery   • Cellulitis of right lower extremity   • Stroke (HCC)        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    • Chronic kidney disease    • CKD (chronic kidney disease)     PT STATES STAGE  3   • Depression    • Diverticular disease    • Gastric ulcer     AND DUODENAL   • GERD (gastroesophageal reflux disease)    • Hearing loss     BILAT AIDES   • History of colon polyps    • History of depression    • History of GI bleed    • Hypertension    • Hypothyroidism    • Peptic ulceration    • PONV (postoperative nausea and vomiting)    • Poor vision     RIGHT EYE, HAS PERIPHERAL VISION    • Stroke (HCC) 2000     mild weakness on left, partial sight loss on right         Past Surgical History:   Procedure Laterality Date   • ABDOMINAL SURGERY     • APPENDECTOMY     • BLADDER REPAIR      AND RECTOCELE   • BREAST BIOPSY     • BREAST CYST ASPIRATION     • BREAST LUMPECTOMY WITH SENTINEL NODE BIOPSY Left 3/19/2018    Procedure: BREAST LUMPECTOMY WITH SENTINEL NODE BIOPSY AND NEEDLE LOCALIZATION;  Surgeon: Myles Soliman MD;  [facility] [place];  Service: General   • CHOLECYSTECTOMY     • COLONOSCOPY  2013   • COLONOSCOPY N/A 2/16/2018    Procedure: COLONOSCOPY into cecum and T.I. with biopsies;  Surgeon: Augustine Gallego MD;  Location: [facility] ENDOSCOPY;  Service:    • ENDOSCOPY N/A 10/17/2017    Procedure: ESOPHAGOGASTRODUODENOSCOPY WITH COLD BIOPSIES;  Surgeon: Augustine Gallego MD;  Location: [facility] ENDOSCOPY;  Service:    • ENDOSCOPY N/A 2/16/2018    Procedure: ESOPHAGOGASTRODUODENOSCOPY with biopsies and #54 Arguello DILATATION;  Surgeon: Augustine Gallego MD;  Location: [facility] ENDOSCOPY;  Service:    • ENDOSCOPY N/A 3/19/2020    Procedure: ESOPHAGOGASTRODUODENOSCOPY with biopsies;  Surgeon: Augustine Gallego MD;  Location: [facility] ENDOSCOPY;  Service: Gastroenterology;  Laterality: N/A;  PRE- MELENA, EPIGASTRIC PAIN  POST- eerosive gastritis, duodenal ulcer, hiatal hernia   • ENDOSCOPY N/A 7/29/2020    Procedure: ESOPHAGOGASTRODUODENOSCOPY WITH DUODENAL ASPIRATE AND COLD BIOPSIES;  Surgeon: Augustine Gallego MD;  Location: [facility] ENDOSCOPY;  Service: Gastroenterology;  Laterality: N/A;  PRE: HX ULCER  DISEASE  POST: GASTRITIS, HEALED ULCER   • EYE SURGERY Left 2014    3-4 years, cornea replacement    • FEMUR IM NAILING/RODDING Right 8/3/2021    Procedure: FEMUR INTRAMEDULLARY NAILING/RODDING;  Surgeon: Garett Martin II, MD;  Location: [facility] MAIN OR;  Service: Orthopedics;  Laterality: Right;   • HIP SURGERY  08/03/2021   • HYSTERECTOMY     • MOUTH SURGERY  2000    UNDER TONGUE AND LEFT FLOOR OF MOUTH FOR CA   • PARATHYROIDECTOMY      PER PT   • SIGMOIDOSCOPY N/A 7/29/2020    Procedure: FLEXIBLE SIGMOIDOSCOPY TO DESCENDING COLON WITH COLD BIOPSIES;  Surgeon: Augustine Gallego MD;  Location: [facility] ENDOSCOPY;  Service: Gastroenterology;  Laterality: N/A;  PRE: RECTAL BLEEDING  POST: DIVERTICULOSIS, HEMORRHOIDS, MINIMAL PROCTITIS   • SINUS SURGERY     • SKIN BIOPSY     • THYROID SURGERY     • VARICOSE VEIN SURGERY                          PT Assessment/Plan     Row Name 04/28/22 1446          PT Assessment    Assessment Comments Pt continues to report relief with manual therapy and slow improvements in condition. She continues to demonstrates FF posture. Continued POC and add tband row and wall wash/dowel flexion for thoracic extension. Educated pt regarding improving posture in seated position with reading, car riding, household tasks. Pt interested in cervical traction as she has had success with this in the past.  -LB            PT Plan    PT Plan Comments consider cervical traction? pt denies osteoporosis  -LB           User Key  (r) = Recorded By, (t) = Taken By, (c) = Cosigned By    Initials Name Provider Type    Nusrat Almeida, PT Physical Therapist                   OP Exercises     Row Name 04/28/22 1400 04/28/22 1300          Subjective Comments    Subjective Comments I am doing pretty well. She did a good job last time and she worked out alot of the knots.  -LB --            Subjective Pain    Able to rate subjective pain? yes  -LB --     Pre-Treatment Pain Level 6  -LB --            Total  Minutes    99491 - PT Therapeutic Exercise Minutes 20  -LB --     10497 - PT Manual Therapy Minutes -- 25  -LB            Exercise 1    Exercise Name 1 pulleys: flex  -LB --     Cueing 1 Demo  -LB --     Time 1 2 min  -LB --            Exercise 2    Exercise Name 2 supine chin tuck  -LB --     Cueing 2 Verbal  -LB --     Reps 2 10  -LB --     Time 2 5 sec  -LB --            Exercise 3    Exercise Name 3 chin tuck + rot  -LB --     Cueing 3 Verbal  -LB --     Reps 3 10 b  -LB --            Exercise 4    Exercise Name 4 posterior shoulder roll  -LB --     Cueing 4 Verbal  -LB --     Reps 4 15  -LB --            Exercise 5    Exercise Name 5 Scap squeeze  -LB --     Cueing 5 Verbal;Demo  -LB --     Reps 5 15  -LB --            Exercise 6    Exercise Name 6 tband row  -LB --     Reps 6 15  -LB --     Time 6 RTB  -LB --     Additional Comments cuing for scap retraction  -LB --            Exercise 7    Exercise Name 7 wall wash  -LB --     Reps 7 10  -LB --            Exercise 8    Exercise Name 8 supine cervical rotation  -LB --     Reps 8 10  -LB --           User Key  (r) = Recorded By, (t) = Taken By, (c) = Cosigned By    Initials Name Provider Type    Nusrat Almeida, PT Physical Therapist                         Manual Rx (last 36 hours)     Manual Treatments     Row Name 04/28/22 1300             Total Minutes    56075 - PT Manual Therapy Minutes 25  -LB              Manual Rx 1    Manual Rx 1 Location B UT/cervical paraspinal STM  -LB              Manual Rx 2    Manual Rx 2 Location cspine distraction  -LB              Manual Rx 3    Manual Rx 3 Location gentle pec/UT stretch  -LB            User Key  (r) = Recorded By, (t) = Taken By, (c) = Cosigned By    Initials Name Provider Type    Nusrat Almeida, PT Physical Therapist                 PT OP Goals     Row Name 04/28/22 1400          PT Short Term Goals    STG Date to Achieve 05/01/22  -LB     STG 1 Pt will report pain rated 4/10 at worst in order to  demonstrate ability to return to normalized ADLs and functional activities.  -LB     STG 1 Progress Ongoing  -LB     STG 2 Pt will be independent with initial HEP for symptom management.  -LB     STG 2 Progress Met  -LB            Long Term Goals    LTG Date to Achieve 06/01/22  -LB     LTG 1 Pt will be independent and compliant with advanced HEP for long term management of symptoms and prevention of future occurrence.  -LB     LTG 1 Progress Ongoing  -LB     LTG 2 Pt will reduce level of perceived disability as measured by the NDI  to 40% in order to improve QOL.  -LB     LTG 2 Progress Ongoing  -LB     LTG 3 Pt will demonstrate L cervical rotation and SBing to 50% with mild pain in order to improve ability to perform ADLs.  -LB     LTG 3 Progress Ongoing  -LB     LTG 4 Pt will demonstrate erect posture with AD use during gait in order to reduce stress on affected tissues.  -LB     LTG 4 Progress Ongoing  -LB           User Key  (r) = Recorded By, (t) = Taken By, (c) = Cosigned By    Initials Name Provider Type    Nusrat Almeida PT Physical Therapist                Therapy Education  Given: Symptoms/condition management  Program: Reinforced  How Provided: Demonstration, Verbal  Provided to: Patient  Level of Understanding: Teach back education performed, Verbalized, Demonstrated              Time Calculation:   Start Time: 1415  Stop Time: 1500  Time Calculation (min): 45 min  Total Timed Code Minutes- PT: 43 minute(s)  Timed Charges  45963 - PT Therapeutic Exercise Minutes: 20  73735 - PT Manual Therapy Minutes: 25  Total Minutes  Timed Charges Total Minutes: 20   Total Minutes: 20  Therapy Charges for Today     Code Description Service Date Service Provider Modifiers Qty    00191166179 HC PT THER PROC EA 15 MIN 4/28/2022 Nusrat Yao PT GP 1    88167202710 HC PT MANUAL THERAPY EA 15 MIN 4/28/2022 Nusrat Yao PT GP 2                    Nusrat Yao PT  4/28/2022

## 2022-05-03 ENCOUNTER — HOSPITAL ENCOUNTER (OUTPATIENT)
Dept: PHYSICAL THERAPY | Facility: HOSPITAL | Age: 87
Setting detail: THERAPIES SERIES
Discharge: HOME | End: 2022-05-03

## 2022-05-03 DIAGNOSIS — M54.2 CERVICAL PAIN: ICD-10-CM

## 2022-05-03 DIAGNOSIS — R29.3 POOR POSTURE: Primary | ICD-10-CM

## 2022-05-03 PROCEDURE — 97110 THERAPEUTIC EXERCISES: CPT

## 2022-05-03 PROCEDURE — 97140 MANUAL THERAPY 1/> REGIONS: CPT

## 2022-05-04 DIAGNOSIS — I10 ESSENTIAL HYPERTENSION: ICD-10-CM

## 2022-05-05 ENCOUNTER — HOSPITAL ENCOUNTER (OUTPATIENT)
Dept: PHYSICAL THERAPY | Facility: HOSPITAL | Age: 87
Setting detail: THERAPIES SERIES
Discharge: HOME | End: 2022-05-05

## 2022-05-05 DIAGNOSIS — R29.3 POOR POSTURE: Primary | ICD-10-CM

## 2022-05-05 DIAGNOSIS — M54.2 CERVICAL PAIN: ICD-10-CM

## 2022-05-05 PROCEDURE — 97012 MECHANICAL TRACTION THERAPY: CPT

## 2022-05-05 PROCEDURE — 97110 THERAPEUTIC EXERCISES: CPT

## 2022-05-05 RX ORDER — CARVEDILOL 6.25 MG/1
TABLET ORAL
Qty: 180 TABLET | Refills: 1 | Status: SHIPPED | OUTPATIENT
Start: 2022-05-05 | End: 2022-05-23

## 2022-05-05 NOTE — THERAPY TREATMENT NOTE
Outpatient Physical Therapy Ortho Treatment Note  [facility]     Patient Name: Geeta Butt  : 1935  MRN: [id number]  Today's Date: 2022      Visit Date: 2022    Visit Dx:    ICD-10-CM ICD-9-CM   1. Poor posture  R29.3 781.92   2. Cervical pain  M54.2 723.1       Patient Active Problem List   Diagnosis   • Essential hypertension   • Hypothyroidism   • Black stool   • Diarrhea   • Nausea & vomiting   • RUQ pain   • Leukocytosis   • UTI (urinary tract infection)   • Duodenitis   • Foot pain, bilateral   • Malignant neoplasm of lower-inner quadrant of left breast in female, estrogen receptor positive (HCC)   • Iron deficiency anemia   • Adverse effect of iron   • GI bleed   • GIULIA (acute kidney injury) (HCC)   • Gastrointestinal hemorrhage associated with duodenitis   • Hematochezia   • Duodenal ulcer   • Long-term use of high-risk medication   • Acute left-sided low back pain with left-sided sciatica   • DDD (degenerative disc disease), lumbosacral   • Hyperglycemia   • GERD without esophagitis   • Hyponatremia   • Hyperlipidemia   • Impaired fasting glucose   • Depressive disorder   • Chronic kidney disease   • Closed fracture of right hip (HCC)   • Acute pyelonephritis   • Sepsis secondary to UTI (HCC)   • Bilateral cataracts   • Central retinal vein occlusion   • Corneal endothelial dystrophy   • Epiretinal membrane   • Bladder prolapse, female, acquired   • Superficial bruising   • Neuropathy   • Urinary retention with incomplete bladder emptying   • Hx of bladder repair surgery   • Cellulitis of right lower extremity   • Stroke (HCC)        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    • Chronic kidney disease    • CKD (chronic kidney disease)     PT STATES STAGE  3   • Depression    • Diverticular disease    • Gastric ulcer     AND DUODENAL   • GERD (gastroesophageal reflux disease)    • Hearing loss     BILAT AIDES   • History of colon polyps    • History of depression    • History of GI bleed    • Hypertension    • Hypothyroidism    • Peptic ulceration    • PONV (postoperative nausea and vomiting)    • Poor vision     RIGHT EYE, HAS PERIPHERAL VISION    • Stroke (HCC) 2000     mild weakness on left, partial sight loss on right         Past Surgical History:   Procedure Laterality Date   • ABDOMINAL SURGERY     • APPENDECTOMY     • BLADDER REPAIR      AND RECTOCELE   • BREAST BIOPSY     • BREAST CYST ASPIRATION     • BREAST LUMPECTOMY WITH SENTINEL NODE BIOPSY Left 3/19/2018    Procedure: BREAST LUMPECTOMY WITH SENTINEL NODE BIOPSY AND NEEDLE LOCALIZATION;  Surgeon: Myles Soliman MD;  [facility] [place];  Service: General   • CHOLECYSTECTOMY     • COLONOSCOPY  2013   • COLONOSCOPY N/A 2/16/2018    Procedure: COLONOSCOPY into cecum and T.I. with biopsies;  Surgeon: Augustine Gallego MD;  Location: [facility] ENDOSCOPY;  Service:    • ENDOSCOPY N/A 10/17/2017    Procedure: ESOPHAGOGASTRODUODENOSCOPY WITH COLD BIOPSIES;  Surgeon: Augustine Gallego MD;  Location: [facility] ENDOSCOPY;  Service:    • ENDOSCOPY N/A 2/16/2018    Procedure: ESOPHAGOGASTRODUODENOSCOPY with biopsies and #54 Arguello DILATATION;  Surgeon: Augustine Gallego MD;  Location: [facility] ENDOSCOPY;  Service:    • ENDOSCOPY N/A 3/19/2020    Procedure: ESOPHAGOGASTRODUODENOSCOPY with biopsies;  Surgeon: Augustine Gallego MD;  Location: [facility] ENDOSCOPY;  Service: Gastroenterology;  Laterality: N/A;  PRE- MELENA, EPIGASTRIC PAIN  POST- eerosive gastritis, duodenal ulcer, hiatal hernia   • ENDOSCOPY N/A 7/29/2020    Procedure: ESOPHAGOGASTRODUODENOSCOPY WITH DUODENAL ASPIRATE AND COLD BIOPSIES;  Surgeon: Augustine Gallego MD;  Location: [facility] ENDOSCOPY;  Service: Gastroenterology;  Laterality: N/A;  PRE: HX ULCER  DISEASE  POST: GASTRITIS, HEALED ULCER   • EYE SURGERY Left 2014    3-4 years, cornea replacement    • FEMUR IM NAILING/RODDING Right 8/3/2021    Procedure: FEMUR INTRAMEDULLARY NAILING/RODDING;  Surgeon: Garett Martin II, MD;  Location: [facility] MAIN OR;  Service: Orthopedics;  Laterality: Right;   • HIP SURGERY  08/03/2021   • HYSTERECTOMY     • MOUTH SURGERY  2000    UNDER TONGUE AND LEFT FLOOR OF MOUTH FOR CA   • PARATHYROIDECTOMY      PER PT   • SIGMOIDOSCOPY N/A 7/29/2020    Procedure: FLEXIBLE SIGMOIDOSCOPY TO DESCENDING COLON WITH COLD BIOPSIES;  Surgeon: Augustine Gallego MD;  Location: [facility] ENDOSCOPY;  Service: Gastroenterology;  Laterality: N/A;  PRE: RECTAL BLEEDING  POST: DIVERTICULOSIS, HEMORRHOIDS, MINIMAL PROCTITIS   • SINUS SURGERY     • SKIN BIOPSY     • THYROID SURGERY     • VARICOSE VEIN SURGERY                          PT Assessment/Plan     Row Name 05/05/22 1600          PT Assessment    Assessment Comments Ms. Butt returns to clinic, continues to report muscualr tension and radicular symptoms down L UE. Pt.denies history of neck injury and/or any osteoporosis. Pt. with previous benefit from cervical traction and has home unit she has continued to use. Trialed this date with low weight and no adverse response in clinic. Continued with ther ex to address postural strength and deficits with ambulation with rwx.  -           User Key  (r) = Recorded By, (t) = Taken By, (c) = Cosigned By    Initials Name Provider Type     Marbella Barakat, PT Physical Therapist                 Modalities     Row Name 05/05/22 1600             Moist Heat     Applied Yes  -      Location neck  -      PT Moist Heat Minutes --  during traction  -              Traction 56095    Traction Type Cervical  -      PT Traction Rx Minutes 10  -MH      Duration Intermittent  -      Position Hook-lying  -      Weight 10  /5  -MH      Hold 45  -MH      Relax 15  -MH            User Key  (r) = Recorded  By, (t) = Taken By, (c) = Cosigned By    Initials Name Provider Type     Marbella Barakat, PT Physical Therapist               OP Exercises     Row Name 05/05/22 1600             Subjective Comments    Subjective Comments I think it's getting a little better. It is mostly just tightness.  -              Total Minutes    70447 - PT Therapeutic Exercise Minutes 24  -MH              Exercise 1    Exercise Name 1 pulleys: flex  -MH      Cueing 1 Demo  -MH      Time 1 2 min  -MH              Exercise 2    Exercise Name 2 supine chin tuck  -MH      Cueing 2 Verbal  -MH      Reps 2 10  -MH      Time 2 5sec  -MH              Exercise 3    Exercise Name 3 chin tuck + rot  -MH      Cueing 3 Verbal  -MH      Reps 3 10 b  -MH      Time 3 R>L rotation range  -MH              Exercise 4    Exercise Name 4 posterior shoulder roll  -MH      Cueing 4 Verbal  -MH      Reps 4 20  -MH              Exercise 5    Exercise Name 5 Scap squeeze  -MH      Cueing 5 Verbal;Demo  -MH      Reps 5 20  -MH              Exercise 6    Exercise Name 6 supine H-A  -MH      Cueing 6 Verbal;Demo  -MH      Reps 6 10e  -MH      Time 6 no TB  -MH              Exercise 7    Exercise Name 7 doorway stretch low V  -MH      Cueing 7 Verbal;Demo  -MH      Reps 7 3  -MH      Time 7 20sec  -MH            User Key  (r) = Recorded By, (t) = Taken By, (c) = Cosigned By    Initials Name Provider Type     Marbella Barakat, PT Physical Therapist                              PT OP Goals     Row Name 05/05/22 1600          PT Short Term Goals    STG Date to Achieve 05/01/22  -     STG 1 Pt will report pain rated 4/10 at worst in order to demonstrate ability to return to normalized ADLs and functional activities.  -     STG 1 Progress Progressing;Ongoing  -     STG 2 Pt will be independent with initial HEP for symptom management.  -     STG 2 Progress Met  -            Long Term Goals    LTG Date to Achieve 06/01/22  -     LTG 1 Pt will be independent and  compliant with advanced HEP for long term management of symptoms and prevention of future occurrence.  -     LTG 1 Progress Ongoing  -     LTG 2 Pt will reduce level of perceived disability as measured by the NDI  to 40% in order to improve QOL.  -     LTG 2 Progress Met  -     LTG 3 Pt will demonstrate L cervical rotation and SBing to 50% with mild pain in order to improve ability to perform ADLs.  -     LTG 3 Progress Ongoing  -     LTG 4 Pt will demonstrate erect posture with AD use during gait in order to reduce stress on affected tissues.  -     LTG 4 Progress Ongoing  -           User Key  (r) = Recorded By, (t) = Taken By, (c) = Cosigned By    Initials Name Provider Type     Marbella Barakat PT Physical Therapist                Therapy Education  Education Details: reviewed precautions with traction: pt. denies osetoporosis, prior nexk trauma, RA, etc.  Given: Symptoms/condition management  Program: Reinforced  How Provided: Verbal, Demonstration  Provided to: Patient  Level of Understanding: Verbalized              Time Calculation:   Start Time: 1626 (Pt. arrives 1626 for 1615 appt.)  Stop Time: 1700  Time Calculation (min): 34 min  Total Timed Code Minutes- PT: 24 minute(s)  Timed Charges  31980 - PT Therapeutic Exercise Minutes: 24  Untimed Charges  PT Traction Rx Minutes: 10  PT Moist Heat Minutes:  (during traction)  Total Minutes  Timed Charges Total Minutes: 24  Untimed Charges Total Minutes: 10   Total Minutes: 34  Therapy Charges for Today     Code Description Service Date Service Provider Modifiers Qty    34763236534  PT THER PROC EA 15 MIN 5/5/2022 Marbella Barakat, PT GP 2    [id number]  PT TRACTION CERVICAL 5/5/2022 Marbella Barakat, PT GP 1                    Marbella Barakat PT  5/5/2022

## 2022-05-10 ENCOUNTER — HOSPITAL ENCOUNTER (OUTPATIENT)
Dept: PHYSICAL THERAPY | Facility: HOSPITAL | Age: 87
Setting detail: THERAPIES SERIES
Discharge: HOME | End: 2022-05-10

## 2022-05-10 DIAGNOSIS — M54.2 CERVICAL PAIN: ICD-10-CM

## 2022-05-10 DIAGNOSIS — R29.3 POOR POSTURE: Primary | ICD-10-CM

## 2022-05-10 PROCEDURE — 97110 THERAPEUTIC EXERCISES: CPT

## 2022-05-10 PROCEDURE — 97012 MECHANICAL TRACTION THERAPY: CPT

## 2022-05-10 NOTE — THERAPY TREATMENT NOTE
Outpatient Physical Therapy Ortho Treatment Note  [facility]     Patient Name: Geeta Butt  : 1935  MRN: [id number]  Today's Date: 5/10/2022      Visit Date: 05/10/2022    Visit Dx:    ICD-10-CM ICD-9-CM   1. Poor posture  R29.3 781.92   2. Cervical pain  M54.2 723.1       Patient Active Problem List   Diagnosis   • Essential hypertension   • Hypothyroidism   • Black stool   • Diarrhea   • Nausea & vomiting   • RUQ pain   • Leukocytosis   • UTI (urinary tract infection)   • Duodenitis   • Foot pain, bilateral   • Malignant neoplasm of lower-inner quadrant of left breast in female, estrogen receptor positive (HCC)   • Iron deficiency anemia   • Adverse effect of iron   • GI bleed   • GIULIA (acute kidney injury) (HCC)   • Gastrointestinal hemorrhage associated with duodenitis   • Hematochezia   • Duodenal ulcer   • Long-term use of high-risk medication   • Acute left-sided low back pain with left-sided sciatica   • DDD (degenerative disc disease), lumbosacral   • Hyperglycemia   • GERD without esophagitis   • Hyponatremia   • Hyperlipidemia   • Impaired fasting glucose   • Depressive disorder   • Chronic kidney disease   • Closed fracture of right hip (HCC)   • Acute pyelonephritis   • Sepsis secondary to UTI (HCC)   • Bilateral cataracts   • Central retinal vein occlusion   • Corneal endothelial dystrophy   • Epiretinal membrane   • Bladder prolapse, female, acquired   • Superficial bruising   • Neuropathy   • Urinary retention with incomplete bladder emptying   • Hx of bladder repair surgery   • Cellulitis of right lower extremity   • Stroke (HCC)        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    • Chronic kidney disease    • CKD (chronic kidney disease)     PT STATES STAGE  3   • Depression    • Diverticular disease    • Gastric ulcer     AND DUODENAL   • GERD (gastroesophageal reflux disease)    • Hearing loss     BILAT AIDES   • History of colon polyps    • History of depression    • History of GI bleed    • Hypertension    • Hypothyroidism    • Peptic ulceration    • PONV (postoperative nausea and vomiting)    • Poor vision     RIGHT EYE, HAS PERIPHERAL VISION    • Stroke (HCC) 2000     mild weakness on left, partial sight loss on right         Past Surgical History:   Procedure Laterality Date   • ABDOMINAL SURGERY     • APPENDECTOMY     • BLADDER REPAIR      AND RECTOCELE   • BREAST BIOPSY     • BREAST CYST ASPIRATION     • BREAST LUMPECTOMY WITH SENTINEL NODE BIOPSY Left 3/19/2018    Procedure: BREAST LUMPECTOMY WITH SENTINEL NODE BIOPSY AND NEEDLE LOCALIZATION;  Surgeon: Myles Soliman MD;  [facility];  Service: General   • CHOLECYSTECTOMY     • COLONOSCOPY  2013   • COLONOSCOPY N/A 2/16/2018    Procedure: COLONOSCOPY into cecum and T.I. with biopsies;  Surgeon: Augustine Gallego MD;  Location: [facility] ENDOSCOPY;  Service:    • ENDOSCOPY N/A 10/17/2017    Procedure: ESOPHAGOGASTRODUODENOSCOPY WITH COLD BIOPSIES;  Surgeon: Augustine Gallego MD;  Location: [facility] ENDOSCOPY;  Service:    • ENDOSCOPY N/A 2/16/2018    Procedure: ESOPHAGOGASTRODUODENOSCOPY with biopsies and #54 Arguello DILATATION;  Surgeon: Augustine Gallego MD;  Location: [facility] ENDOSCOPY;  Service:    • ENDOSCOPY N/A 3/19/2020    Procedure: ESOPHAGOGASTRODUODENOSCOPY with biopsies;  Surgeon: Augustine Gallego MD;  Location: [facility] ENDOSCOPY;  Service: Gastroenterology;  Laterality: N/A;  PRE- MELENA, EPIGASTRIC PAIN  POST- eerosive gastritis, duodenal ulcer, hiatal hernia   • ENDOSCOPY N/A 7/29/2020    Procedure: ESOPHAGOGASTRODUODENOSCOPY WITH DUODENAL ASPIRATE AND COLD BIOPSIES;  Surgeon: Augustine Gallego MD;  Location: [facility] ENDOSCOPY;  Service: Gastroenterology;  Laterality: N/A;  PRE: HX ULCER  DISEASE  POST: GASTRITIS, HEALED ULCER   • EYE SURGERY Left 2014    3-4 years, cornea replacement    • FEMUR IM NAILING/RODDING Right 8/3/2021    Procedure: FEMUR INTRAMEDULLARY NAILING/RODDING;  Surgeon: Garett Martin II, MD;  Location: [facility] MAIN OR;  Service: Orthopedics;  Laterality: Right;   • HIP SURGERY  08/03/2021   • HYSTERECTOMY     • MOUTH SURGERY  2000    UNDER TONGUE AND LEFT FLOOR OF MOUTH FOR CA   • PARATHYROIDECTOMY      PER PT   • SIGMOIDOSCOPY N/A 7/29/2020    Procedure: FLEXIBLE SIGMOIDOSCOPY TO DESCENDING COLON WITH COLD BIOPSIES;  Surgeon: Augustine Gallego MD;  Location: [facility] ENDOSCOPY;  Service: Gastroenterology;  Laterality: N/A;  PRE: RECTAL BLEEDING  POST: DIVERTICULOSIS, HEMORRHOIDS, MINIMAL PROCTITIS   • SINUS SURGERY     • SKIN BIOPSY     • THYROID SURGERY     • VARICOSE VEIN SURGERY                          PT Assessment/Plan     Row Name 05/10/22 1500          PT Assessment    Assessment Comments Ms. Butt returns today reporting good response to traction last session. Reports improved cervical motion and pain levels. Continued with postural strengthening and traction today. No adverse reactions noted.  -CC            PT Plan    PT Plan Comments Cont traction as beneficial  -CC           User Key  (r) = Recorded By, (t) = Taken By, (c) = Cosigned By    Initials Name Provider Type    Herlinda Sommers, PT Physical Therapist                 Modalities     Row Name 05/10/22 1400             Moist Heat    MH Applied Yes  -CC      Location neck  -CC      PT Moist Heat Minutes --  during traction  -CC              Traction 37873    Traction Type Cervical  -CC      PT Traction Rx Minutes 15  -CC      Duration Intermittent  -CC      Position Hook-lying  -CC      Weight 12  /5  -CC      Hold 45  -CC      Relax 15  -CC            User Key  (r) = Recorded By, (t) = Taken By, (c) = Cosigned By    Initials Name Provider Type    Herlinda Sommers, PT Physical Therapist                OP Exercises     Row Name 05/10/22 1400             Subjective Comments    Subjective Comments traction helped a lot last time, could turn my neck further. No pain, just tightness today.  -CC              Subjective Pain    Able to rate subjective pain? yes  -CC      Pre-Treatment Pain Level 0  -CC              Total Minutes    59901 - PT Therapeutic Exercise Minutes 25  -CC              Exercise 1    Exercise Name 1 pulleys: flex  -CC      Cueing 1 Demo  -CC      Time 1 2 min  -CC              Exercise 2    Exercise Name 2 supine chin tuck  -CC      Cueing 2 Verbal  -CC      Reps 2 10  -CC      Time 2 5sec  -CC              Exercise 3    Exercise Name 3 chin tuck + rot  -CC      Cueing 3 Verbal  -CC      Reps 3 10 b  -CC      Time 3 R>L rotation range  -CC              Exercise 4    Exercise Name 4 posterior shoulder roll  -CC      Cueing 4 Verbal  -CC      Reps 4 20  -CC              Exercise 5    Exercise Name 5 Scap squeeze  -CC      Cueing 5 Verbal;Demo  -CC      Reps 5 20  -CC              Exercise 6    Exercise Name 6 supine H-A  -CC      Cueing 6 Verbal;Demo  -CC      Reps 6 10e  -CC      Time 6 no TB  -CC              Exercise 7    Exercise Name 7 doorway stretch low V  -CC      Cueing 7 Verbal;Demo  -CC      Reps 7 3  -CC      Time 7 20sec  -CC            User Key  (r) = Recorded By, (t) = Taken By, (c) = Cosigned By    Initials Name Provider Type    Herlinda oSmmers, PT Physical Therapist                                                Time Calculation:   Start Time: 1445  Stop Time: 1526  Time Calculation (min): 41 min  Total Timed Code Minutes- PT: 25 minute(s)  Timed Charges  13647 - PT Therapeutic Exercise Minutes: 25  Untimed Charges  PT Traction Rx Minutes: 15  PT Moist Heat Minutes:  (during traction)  Total Minutes  Timed Charges Total Minutes: 25  Untimed Charges Total Minutes: 15   Total Minutes: 40  Therapy Charges for Today     Code Description Service Date Service Provider  Modifiers Qty    55575291860 HC PT THER PROC EA 15 MIN 5/10/2022 Herlinda Castillo, PT GP 2    91186585702 HC PT TRACTION CERVICAL 5/10/2022 Herlinda Castillo, PT GP 1                    Herlinda Castillo, PT  5/10/2022

## 2022-05-12 ENCOUNTER — APPOINTMENT (OUTPATIENT)
Dept: PHYSICAL THERAPY | Facility: HOSPITAL | Age: 87
End: 2022-05-12

## 2022-05-17 ENCOUNTER — HOSPITAL ENCOUNTER (OUTPATIENT)
Dept: PHYSICAL THERAPY | Facility: HOSPITAL | Age: 87
Setting detail: THERAPIES SERIES
Discharge: HOME | End: 2022-05-17

## 2022-05-17 DIAGNOSIS — R29.3 POOR POSTURE: Primary | ICD-10-CM

## 2022-05-17 DIAGNOSIS — M54.2 CERVICAL PAIN: ICD-10-CM

## 2022-05-17 PROCEDURE — 97012 MECHANICAL TRACTION THERAPY: CPT

## 2022-05-17 PROCEDURE — 97110 THERAPEUTIC EXERCISES: CPT

## 2022-05-17 NOTE — THERAPY TREATMENT NOTE
Outpatient Physical Therapy Ortho Treatment Note  [facility]     Patient Name: Geeta Butt  : 1935  MRN: [id number]  Today's Date: 2022      Visit Date: 2022    Visit Dx:    ICD-10-CM ICD-9-CM   1. Poor posture  R29.3 781.92   2. Cervical pain  M54.2 723.1       Patient Active Problem List   Diagnosis   • Essential hypertension   • Hypothyroidism   • Black stool   • Diarrhea   • Nausea & vomiting   • RUQ pain   • Leukocytosis   • UTI (urinary tract infection)   • Duodenitis   • Foot pain, bilateral   • Malignant neoplasm of lower-inner quadrant of left breast in female, estrogen receptor positive (HCC)   • Iron deficiency anemia   • Adverse effect of iron   • GI bleed   • GIULIA (acute kidney injury) (HCC)   • Gastrointestinal hemorrhage associated with duodenitis   • Hematochezia   • Duodenal ulcer   • Long-term use of high-risk medication   • Acute left-sided low back pain with left-sided sciatica   • DDD (degenerative disc disease), lumbosacral   • Hyperglycemia   • GERD without esophagitis   • Hyponatremia   • Hyperlipidemia   • Impaired fasting glucose   • Depressive disorder   • Chronic kidney disease   • Closed fracture of right hip (HCC)   • Acute pyelonephritis   • Sepsis secondary to UTI (HCC)   • Bilateral cataracts   • Central retinal vein occlusion   • Corneal endothelial dystrophy   • Epiretinal membrane   • Bladder prolapse, female, acquired   • Superficial bruising   • Neuropathy   • Urinary retention with incomplete bladder emptying   • Hx of bladder repair surgery   • Cellulitis of right lower extremity   • Stroke (HCC)        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    • Chronic kidney disease    • CKD (chronic kidney disease)     PT STATES STAGE  3   • Depression    • Diverticular disease    • Gastric ulcer     AND DUODENAL   • GERD (gastroesophageal reflux disease)    • Hearing loss     BILAT AIDES   • History of colon polyps    • History of depression    • History of GI bleed    • Hypertension    • Hypothyroidism    • Peptic ulceration    • PONV (postoperative nausea and vomiting)    • Poor vision     RIGHT EYE, HAS PERIPHERAL VISION    • Stroke (HCC) 2000     mild weakness on left, partial sight loss on right         Past Surgical History:   Procedure Laterality Date   • ABDOMINAL SURGERY     • APPENDECTOMY     • BLADDER REPAIR      AND RECTOCELE   • BREAST BIOPSY     • BREAST CYST ASPIRATION     • BREAST LUMPECTOMY WITH SENTINEL NODE BIOPSY Left 3/19/2018    Procedure: BREAST LUMPECTOMY WITH SENTINEL NODE BIOPSY AND NEEDLE LOCALIZATION;  Surgeon: Myles Soliman MD;  [facility];  Service: General   • CHOLECYSTECTOMY     • COLONOSCOPY  2013   • COLONOSCOPY N/A 2/16/2018    Procedure: COLONOSCOPY into cecum and T.I. with biopsies;  Surgeon: Augustine Gallego MD;  Location: [facility] ENDOSCOPY;  Service:    • ENDOSCOPY N/A 10/17/2017    Procedure: ESOPHAGOGASTRODUODENOSCOPY WITH COLD BIOPSIES;  Surgeon: Augustine Gallego MD;  Location: [facility] ENDOSCOPY;  Service:    • ENDOSCOPY N/A 2/16/2018    Procedure: ESOPHAGOGASTRODUODENOSCOPY with biopsies and #54 Arguello DILATATION;  Surgeon: Augustine Gallego MD;  Location: [facility] ENDOSCOPY;  Service:    • ENDOSCOPY N/A 3/19/2020    Procedure: ESOPHAGOGASTRODUODENOSCOPY with biopsies;  Surgeon: Augustine Gallego MD;  Location: [facility] ENDOSCOPY;  Service: Gastroenterology;  Laterality: N/A;  PRE- MELENA, EPIGASTRIC PAIN  POST- eerosive gastritis, duodenal ulcer, hiatal hernia   • ENDOSCOPY N/A 7/29/2020    Procedure: ESOPHAGOGASTRODUODENOSCOPY WITH DUODENAL ASPIRATE AND COLD BIOPSIES;  Surgeon: Augustine Gallego MD;  Location: [facility] ENDOSCOPY;  Service: Gastroenterology;  Laterality: N/A;  PRE: HX ULCER  DISEASE  POST: GASTRITIS, HEALED ULCER   • EYE SURGERY Left 2014    3-4 years, cornea replacement    • FEMUR IM NAILING/RODDING Right 8/3/2021    Procedure: FEMUR INTRAMEDULLARY NAILING/RODDING;  Surgeon: Garett Martin II, MD;  Location: [facility] MAIN OR;  Service: Orthopedics;  Laterality: Right;   • HIP SURGERY  08/03/2021   • HYSTERECTOMY     • MOUTH SURGERY  2000    UNDER TONGUE AND LEFT FLOOR OF MOUTH FOR CA   • PARATHYROIDECTOMY      PER PT   • SIGMOIDOSCOPY N/A 7/29/2020    Procedure: FLEXIBLE SIGMOIDOSCOPY TO DESCENDING COLON WITH COLD BIOPSIES;  Surgeon: Augustine Gallego MD;  Location: [facility] ENDOSCOPY;  Service: Gastroenterology;  Laterality: N/A;  PRE: RECTAL BLEEDING  POST: DIVERTICULOSIS, HEMORRHOIDS, MINIMAL PROCTITIS   • SINUS SURGERY     • SKIN BIOPSY     • THYROID SURGERY     • VARICOSE VEIN SURGERY                          PT Assessment/Plan     Row Name 05/17/22 1500          PT Assessment    Assessment Comments Ms. Butt continues to report good response to traction, and notes improved pain and mobility. Anticipate nearing discharge.  -CC            PT Plan    PT Plan Comments Possible d/c to indep managment vs. f/u in 2-3 weeks?  -CC           User Key  (r) = Recorded By, (t) = Taken By, (c) = Cosigned By    Initials Name Provider Type    CC Herlinda Castillo, PT Physical Therapist                 Modalities     Row Name 05/17/22 1400             Moist Heat    MH Applied Yes  -CC      Location neck  -CC      PT Moist Heat Minutes --  during traction  -CC              Traction 07491    Traction Type Cervical  -CC      PT Traction Rx Minutes 15  -CC      Duration Intermittent  -CC      Position Hook-lying  -CC      Weight 12  /5  -CC      Hold 45  -CC      Relax 15  -CC            User Key  (r) = Recorded By, (t) = Taken By, (c) = Cosigned By    Initials Name Provider Type    Herlinda Sommers, PT Physical Therapist               OP Exercises     Row Name 05/17/22 1400              Subjective Comments    Subjective Comments Still feels stiff at times, but overall feels she has improved significantly.  -CC              Subjective Pain    Able to rate subjective pain? yes  -CC      Pre-Treatment Pain Level 0  -CC              Total Minutes    15192 - PT Therapeutic Exercise Minutes 25  -CC              Exercise 1    Exercise Name 1 pulleys: flex  -CC      Cueing 1 Demo  -CC      Time 1 2 min  -CC              Exercise 2    Exercise Name 2 supine chin tuck  -CC      Cueing 2 Verbal  -CC      Reps 2 10  -CC      Time 2 5sec  -CC              Exercise 3    Exercise Name 3 chin tuck + rot  -CC      Cueing 3 Verbal  -CC      Reps 3 10 b  -CC      Time 3 R>L rotation range  -CC              Exercise 4    Exercise Name 4 posterior shoulder roll  -CC      Cueing 4 Verbal  -CC      Reps 4 20  -CC              Exercise 5    Exercise Name 5 Scap squeeze  -CC      Cueing 5 Verbal;Demo  -CC      Reps 5 20  -CC              Exercise 6    Exercise Name 6 supine H-A  -CC      Cueing 6 Verbal;Demo  -CC      Reps 6 10e  -CC      Time 6 no TB  -CC              Exercise 7    Exercise Name 7 doorway stretch low V  -CC      Cueing 7 Verbal;Demo  -CC      Reps 7 3  -CC      Time 7 20sec  -CC            User Key  (r) = Recorded By, (t) = Taken By, (c) = Cosigned By    Initials Name Provider Type    Herlinda Sommers, PT Physical Therapist                                                Time Calculation:   Start Time: 1452  Stop Time: 1532  Time Calculation (min): 40 min  Total Timed Code Minutes- PT: 25 minute(s)  Timed Charges  68615 - PT Therapeutic Exercise Minutes: 25  Untimed Charges  PT Traction Rx Minutes: 15  PT Moist Heat Minutes:  (during traction)  Total Minutes  Timed Charges Total Minutes: 25  Untimed Charges Total Minutes: 15   Total Minutes: 40  Therapy Charges for Today     Code Description Service Date Service Provider Modifiers Qty    14116250795 HC PT THER PROC EA 15 MIN 5/17/2022 Anna  Herlinda, PT GP 2    [id number]  PT TRACTION CERVICAL 5/17/2022 Herlinda Castillo, PT GP 1                    Herlinda Castillo, PT  5/17/2022

## 2022-05-19 ENCOUNTER — HOSPITAL ENCOUNTER (OUTPATIENT)
Dept: PHYSICAL THERAPY | Facility: HOSPITAL | Age: 87
Setting detail: THERAPIES SERIES
Discharge: HOME | End: 2022-05-19

## 2022-05-19 DIAGNOSIS — M54.2 CERVICAL PAIN: ICD-10-CM

## 2022-05-19 DIAGNOSIS — R29.3 POOR POSTURE: Primary | ICD-10-CM

## 2022-05-19 PROCEDURE — 97110 THERAPEUTIC EXERCISES: CPT

## 2022-05-19 PROCEDURE — 97012 MECHANICAL TRACTION THERAPY: CPT

## 2022-05-19 NOTE — THERAPY DISCHARGE NOTE
Outpatient Physical Therapy Ortho Treatment Note/Discharge Summary  [facility]     Patient Name: Geeta Butt  : 1935  MRN: [id number]  Today's Date: 2022      Visit Date: 2022    Visit Dx:    ICD-10-CM ICD-9-CM   1. Poor posture  R29.3 781.92   2. Cervical pain  M54.2 723.1       Patient Active Problem List   Diagnosis   • Essential hypertension   • Hypothyroidism   • Black stool   • Diarrhea   • Nausea & vomiting   • RUQ pain   • Leukocytosis   • UTI (urinary tract infection)   • Duodenitis   • Foot pain, bilateral   • Malignant neoplasm of lower-inner quadrant of left breast in female, estrogen receptor positive (HCC)   • Iron deficiency anemia   • Adverse effect of iron   • GI bleed   • GIULIA (acute kidney injury) (HCC)   • Gastrointestinal hemorrhage associated with duodenitis   • Hematochezia   • Duodenal ulcer   • Long-term use of high-risk medication   • Acute left-sided low back pain with left-sided sciatica   • DDD (degenerative disc disease), lumbosacral   • Hyperglycemia   • GERD without esophagitis   • Hyponatremia   • Hyperlipidemia   • Impaired fasting glucose   • Depressive disorder   • Chronic kidney disease   • Closed fracture of right hip (HCC)   • Acute pyelonephritis   • Sepsis secondary to UTI (HCC)   • Bilateral cataracts   • Central retinal vein occlusion   • Corneal endothelial dystrophy   • Epiretinal membrane   • Bladder prolapse, female, acquired   • Superficial bruising   • Neuropathy   • Urinary retention with incomplete bladder emptying   • Hx of bladder repair surgery   • Cellulitis of right lower extremity   • Stroke (HCC)        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    • Chronic kidney disease    • CKD (chronic kidney disease)      PT STATES STAGE 3   • Depression    • Diverticular disease    • Gastric ulcer     AND DUODENAL   • GERD (gastroesophageal reflux disease)    • Hearing loss     BILAT AIDES   • History of colon polyps    • History of depression    • History of GI bleed    • Hypertension    • Hypothyroidism    • Peptic ulceration    • PONV (postoperative nausea and vomiting)    • Poor vision     RIGHT EYE, HAS PERIPHERAL VISION    • Stroke (HCC) 2000     mild weakness on left, partial sight loss on right         Past Surgical History:   Procedure Laterality Date   • ABDOMINAL SURGERY     • APPENDECTOMY     • BLADDER REPAIR      AND RECTOCELE   • BREAST BIOPSY     • BREAST CYST ASPIRATION     • BREAST LUMPECTOMY WITH SENTINEL NODE BIOPSY Left 3/19/2018    Procedure: BREAST LUMPECTOMY WITH SENTINEL NODE BIOPSY AND NEEDLE LOCALIZATION;  Surgeon: Myles Soliman MD;  Location:  [facility];  Service: General   • CHOLECYSTECTOMY     • COLONOSCOPY  2013   • COLONOSCOPY N/A 2/16/2018    Procedure: COLONOSCOPY into cecum and T.I. with biopsies;  Surgeon: Augustine Gallego MD;  Location: [facility] ENDOSCOPY;  Service:    • ENDOSCOPY N/A 10/17/2017    Procedure: ESOPHAGOGASTRODUODENOSCOPY WITH COLD BIOPSIES;  Surgeon: Augustine Gallego MD;  Location: [facility] ENDOSCOPY;  Service:    • ENDOSCOPY N/A 2/16/2018    Procedure: ESOPHAGOGASTRODUODENOSCOPY with biopsies and #54 Arguello DILATATION;  Surgeon: Augustine Gallego MD;  Location: [facility] ENDOSCOPY;  Service:    • ENDOSCOPY N/A 3/19/2020    Procedure: ESOPHAGOGASTRODUODENOSCOPY with biopsies;  Surgeon: Augustine Gallego MD;  Location: [facility] ENDOSCOPY;  Service: Gastroenterology;  Laterality: N/A;  PRE- MELENA, EPIGASTRIC PAIN  POST- eerosive gastritis, duodenal ulcer, hiatal hernia   • ENDOSCOPY N/A 7/29/2020    Procedure: ESOPHAGOGASTRODUODENOSCOPY WITH DUODENAL ASPIRATE AND COLD BIOPSIES;  Surgeon: Augustine Gallego MD;  Location: [facility] ENDOSCOPY;  Service: Gastroenterology;  Laterality: N/A;   PRE: HX ULCER DISEASE  POST: GASTRITIS, HEALED ULCER   • EYE SURGERY Left 2014    3-4 years, cornea replacement    • FEMUR IM NAILING/RODDING Right 8/3/2021    Procedure: FEMUR INTRAMEDULLARY NAILING/RODDING;  Surgeon: Garett Martin II, MD;  Location: [facility] MAIN OR;  Service: Orthopedics;  Laterality: Right;   • HIP SURGERY  08/03/2021   • HYSTERECTOMY     • MOUTH SURGERY  2000    UNDER TONGUE AND LEFT FLOOR OF MOUTH FOR CA   • PARATHYROIDECTOMY      PER PT   • SIGMOIDOSCOPY N/A 7/29/2020    Procedure: FLEXIBLE SIGMOIDOSCOPY TO DESCENDING COLON WITH COLD BIOPSIES;  Surgeon: Augustine Gallego MD;  Location: [facility] ENDOSCOPY;  Service: Gastroenterology;  Laterality: N/A;  PRE: RECTAL BLEEDING  POST: DIVERTICULOSIS, HEMORRHOIDS, MINIMAL PROCTITIS   • SINUS SURGERY     • SKIN BIOPSY     • THYROID SURGERY     • VARICOSE VEIN SURGERY                              Modalities     Row Name 05/19/22 1400             Moist Heat    MH Applied Yes  -CN      Location neck  -CN      PT Moist Heat Minutes --  during traction  -CN              Traction 71251    Traction Type Cervical  -CN      PT Traction Rx Minutes 15  -CN      Duration Intermittent  -CN      Position Hook-lying  -CN      Weight 12  /5  -CN      Hold 45  -CN      Relax 15  -CN            User Key  (r) = Recorded By, (t) = Taken By, (c) = Cosigned By    Initials Name Provider Type    Mily Holliday, PT Physical Therapist                 OP Exercises     Row Name 05/19/22 1400             Subjective Comments    Subjective Comments I think I am ready to be finished with therapy.  -CN              Subjective Pain    Able to rate subjective pain? yes  -CN      Pre-Treatment Pain Level 0  -CN              Total Minutes    21005 - PT Therapeutic Exercise Minutes 25  -CN              Exercise 1    Exercise Name 1 pulleys: flex  -CN      Cueing 1 Demo  -CN      Time 1 2 min  -CN              Exercise 2    Exercise Name 2 supine chin tuck  -CN       Cueing 2 Verbal  -CN      Reps 2 10  -CN      Time 2 5sec  -CN              Exercise 3    Exercise Name 3 chin tuck + rot  -CN      Cueing 3 Verbal  -CN      Reps 3 10 b  -CN      Time 3 R>L rotation range  -CN              Exercise 4    Exercise Name 4 posterior shoulder roll  -CN      Cueing 4 Verbal  -CN      Reps 4 20  -CN              Exercise 5    Exercise Name 5 Scap squeeze  -CN      Cueing 5 Verbal;Demo  -CN      Reps 5 20  -CN              Exercise 6    Exercise Name 6 supine H-A  -CN      Cueing 6 Verbal;Demo  -CN      Reps 6 10e  -CN      Time 6 no TB  -CN              Exercise 7    Exercise Name 7 doorway stretch low V  -CN      Cueing 7 Verbal;Demo  -CN      Reps 7 3  -CN      Time 7 20sec  -CN            User Key  (r) = Recorded By, (t) = Taken By, (c) = Cosigned By    Initials Name Provider Type    Mily Holliday, PT Physical Therapist                                PT OP Goals     Row Name 05/19/22 1500          PT Short Term Goals    STG Date to Achieve 05/01/22  -CN     STG 1 Pt will report pain rated 4/10 at worst in order to demonstrate ability to return to normalized ADLs and functional activities.  -CN     STG 1 Progress Partially Met  -CN     STG 1 Progress Comments 5/10 at worst.  -CN     STG 2 Pt will be independent with initial HEP for symptom management.  -CN     STG 2 Progress Met  -CN            Long Term Goals    LTG Date to Achieve 06/01/22  -CN     LTG 1 Pt will be independent and compliant with advanced HEP for long term management of symptoms and prevention of future occurrence.  -CN     LTG 1 Progress Met  -CN     LTG 2 Pt will reduce level of perceived disability as measured by the NDI  to 40% in order to improve QOL.  -CN     LTG 2 Progress Met  -CN     LTG 3 Pt will demonstrate L cervical rotation and SBing to 50% with mild pain in order to improve ability to perform ADLs.  -CN     LTG 3 Progress Partially Met  -CN     LTG 4 Pt will demonstrate erect posture with AD  use during gait in order to reduce stress on affected tissues.  -CONNER     LTG 4 Progress Not Met  -CONNER     LTG 4 Progress Comments Continues to require cues for optimal posture.  -CN           User Key  (r) = Recorded By, (t) = Taken By, (c) = Cosigned By    Initials Name Provider Type    Mily Holliday, VITO Physical Therapist                Therapy Education  Given: Symptoms/condition management  Program: Reinforced  How Provided: Verbal, Demonstration  Provided to: Patient  Level of Understanding: Verbalized              Time Calculation:   Start Time: 1447  Stop Time: 1530  Time Calculation (min): 43 min  Timed Charges  44916 - PT Therapeutic Exercise Minutes: 25  Untimed Charges  PT Traction Rx Minutes: 15  PT Moist Heat Minutes:  (during traction)  Total Minutes  Timed Charges Total Minutes: 25  Untimed Charges Total Minutes: 15   Total Minutes: 40  Therapy Charges for Today     Code Description Service Date Service Provider Modifiers Qty    15007284299 HC PT THER PROC EA 15 MIN 5/19/2022 Mily Richmond, PT GP 2    28854112128 HC PT HOT OR COLD PACK TREAT MCARE 5/19/2022 Mily Richmond, PT GP 1    [id number]  PT-TRACTION MECHANICAL 5/19/2022 Mily Richmond, PT  1                OP PT Discharge Summary  Date of Discharge: 05/19/22  Reason for Discharge: Maximum functional potential achieved, Independent  Outcomes Achieved: Patient able to partially acheive established goals  Discharge Destination: Home with home program  Discharge Instructions/Additional Comments: Pt reports minimal symptoms at this time, pain at worst rated 5/10. Pt compliant with HEP and reviewed current plan. Pt has a home traction unit which she uses as needed and is very happy with her progress with PT.      Mily Richmond PT  5/19/2022        Monthly or less

## 2022-05-23 ENCOUNTER — OFFICE VISIT (OUTPATIENT)
Dept: INTERNAL MEDICINE | Facility: CLINIC | Age: 87
End: 2022-05-23

## 2022-05-23 VITALS
HEART RATE: 69 BPM | WEIGHT: 163 LBS | BODY MASS INDEX: 30.78 KG/M2 | DIASTOLIC BLOOD PRESSURE: 70 MMHG | HEIGHT: 61 IN | OXYGEN SATURATION: 98 % | TEMPERATURE: 97.5 F | SYSTOLIC BLOOD PRESSURE: 118 MMHG

## 2022-05-23 DIAGNOSIS — R73.03 PREDIABETES: Chronic | ICD-10-CM

## 2022-05-23 DIAGNOSIS — E78.2 MIXED HYPERLIPIDEMIA: Chronic | ICD-10-CM

## 2022-05-23 DIAGNOSIS — I10 ESSENTIAL HYPERTENSION: Primary | Chronic | ICD-10-CM

## 2022-05-23 DIAGNOSIS — R60.9 PERIPHERAL EDEMA: ICD-10-CM

## 2022-05-23 DIAGNOSIS — T88.7XXA MEDICATION SIDE EFFECT: ICD-10-CM

## 2022-05-23 DIAGNOSIS — E03.9 ACQUIRED HYPOTHYROIDISM: Chronic | ICD-10-CM

## 2022-05-23 PROBLEM — N18.9 CHRONIC KIDNEY DISEASE: Status: RESOLVED | Noted: 2021-07-22 | Resolved: 2022-05-23

## 2022-05-23 PROBLEM — R73.9 HYPERGLYCEMIA: Chronic | Status: RESOLVED | Noted: 2021-07-02 | Resolved: 2022-05-23

## 2022-05-23 PROBLEM — N17.9 AKI (ACUTE KIDNEY INJURY) (HCC): Status: RESOLVED | Noted: 2020-03-18 | Resolved: 2022-05-23

## 2022-05-23 PROBLEM — N39.0 UTI (URINARY TRACT INFECTION): Status: RESOLVED | Noted: 2017-10-17 | Resolved: 2022-05-23

## 2022-05-23 PROCEDURE — 99214 OFFICE O/P EST MOD 30 MIN: CPT | Performed by: FAMILY MEDICINE

## 2022-05-23 RX ORDER — FUROSEMIDE 40 MG/1
40 TABLET ORAL DAILY
Qty: 30 TABLET | Refills: 3 | Status: SHIPPED | OUTPATIENT
Start: 2022-05-23 | End: 2022-08-17

## 2022-05-23 RX ORDER — VALSARTAN 80 MG/1
240 TABLET ORAL DAILY
Qty: 270 TABLET | Refills: 4 | Status: SHIPPED | OUTPATIENT
Start: 2022-05-23 | End: 2022-07-11 | Stop reason: SDUPTHER

## 2022-05-23 RX ORDER — CARVEDILOL 12.5 MG/1
12.5 TABLET ORAL 2 TIMES DAILY WITH MEALS
Qty: 180 TABLET | Refills: 3 | Status: SHIPPED | OUTPATIENT
Start: 2022-05-23

## 2022-05-23 NOTE — PROGRESS NOTES
"Chief Complaint  Hypertension, Hyperlipidemia, and Hypothyroidism (3 month follow up/ discuss medications)    Subjective          Geeta Butt presents to [facility]  History of Present Illness     Hypertension - on the low end for her today.. Patient taking medication as prescribed.  Denies chest pain, shortness of breath, headache, lower extremity edema.  Patient is taking carvedilol 12.5mg BID, valsartan 240 mg daily, amlodipine 5mg daily (recent decreased due to edema).        Hypothyroidism - clinically Stable, due for recheck.  Patient taking levothyroxine.  Patient denying any symptoms of hypothyroidism such as cold intolerance, constipation, mood swings.    Prediabetes - due for recheck. Last A1c was 5.7.  Trying to eat a balanced diet and keep carbs in moderation.    Due for lipid check.  She has mild hyperlipidemia which has been managed with diet.    Gained about 8 lbs since a month ago.  Her edema continues to be an issue even with the decrease in amlodipine.    Objective   Vital Signs:  /70 (BP Location: Right arm, Patient Position: Sitting, Cuff Size: Adult)   Pulse 69   Temp 97.5 °F (36.4 °C)   Ht 154.9 cm (61\")   Wt 73.9 kg (163 lb)   SpO2 98%   BMI 30.80 kg/m²         Physical Exam  Vitals and nursing note reviewed.   Constitutional:       General: She is not in acute distress.     Appearance: Normal appearance.   Cardiovascular:      Rate and Rhythm: Normal rate and regular rhythm.      Heart sounds: Normal heart sounds. No murmur heard.  Pulmonary:      Effort: Pulmonary effort is normal.      Breath sounds: Normal breath sounds.   Neurological:      Mental Status: She is alert.        Result Review :   The following data was reviewed by: Grzegorz Nguyen MD on 05/23/2022:  Common labs    Common Labsle 3/4/22 3/4/22 3/5/22 3/5/22 3/6/22 3/6/22    1159 1159 0348 0348 0350 0350   Glucose  91  104 (A)  105 (A)   BUN  14  14  14   Creatinine  0.78  0.85  0.75 "   Sodium  138  138  131 (A)   Potassium  4.4  4.8  4.2   Chloride  98  102  95 (A)   Calcium  9.9  9.2  9.8   Albumin  4.40  3.70     Total Bilirubin  <0.2  <0.2     Alkaline Phosphatase  85  73     AST (SGOT)  12  11     ALT (SGPT)  16  11     WBC 10.06  9.01  8.55    Hemoglobin 11.9 (A)  11.1 (A)  11.4 (A)    Hematocrit 36.6  33.8 (A)  34.7    Platelets 515 (A)  494 (A)  479 (A)    (A) Abnormal value       Comments are available for some flowsheets but are not being displayed.                     Assessment and Plan    Diagnoses and all orders for this visit:    1. Essential hypertension (Primary)  -     carvedilol (COREG) 12.5 MG tablet; Take 1 tablet by mouth 2 (Two) Times a Day With Meals.  Dispense: 180 tablet; Refill: 3  -     valsartan (Diovan) 80 MG tablet; Take 3 tablets by mouth Daily.  Dispense: 270 tablet; Refill: 4    2. Acquired hypothyroidism    3. Prediabetes    4. Mixed hyperlipidemia    5. Peripheral edema  -     furosemide (Lasix) 40 MG tablet; Take 1 tablet by mouth Daily.  Dispense: 30 tablet; Refill: 3    6. Medication side effect      Due to her worsening edema I will get labs at her next visit in a couple of weeks.  I will discontinue amlodipine and prescribed furosemide 40 mg.  I will have them take the furosemide 40 mg daily for 5 days and then as needed daily after that.  Watch weights daily.  Recommended wrapping the legs, elevating, low-sodium diet.  Continue carvedilol and valsartan hand.  Continue hypothyroid medication.  Reviewed all other labs at next visit.  I think the edema is related to the amlodipine.  So we will get all labs at next visit.         Follow Up   Return in about 2 weeks (around 6/6/2022) for Recheck - edema.  Patient was given instructions and counseling regarding her condition or for health maintenance advice. Please see specific information pulled into the AVS if appropriate.     Patient has been erroneously marked as diabetic. Based on the available clinical  information, she does not have diabetes and should therefore be excluded from diabetic health maintenance and quality measures for the remainder of the reporting period.

## 2022-05-26 DIAGNOSIS — M54.42 ACUTE LEFT-SIDED LOW BACK PAIN WITH LEFT-SIDED SCIATICA: ICD-10-CM

## 2022-05-26 DIAGNOSIS — M51.37 DDD (DEGENERATIVE DISC DISEASE), LUMBOSACRAL: ICD-10-CM

## 2022-05-27 ENCOUNTER — TELEPHONE (OUTPATIENT)
Dept: INTERNAL MEDICINE | Facility: CLINIC | Age: 87
End: 2022-05-27

## 2022-05-27 DIAGNOSIS — E03.9 ACQUIRED HYPOTHYROIDISM: ICD-10-CM

## 2022-05-27 DIAGNOSIS — R73.03 PREDIABETES: ICD-10-CM

## 2022-05-27 DIAGNOSIS — I10 ESSENTIAL HYPERTENSION: Primary | ICD-10-CM

## 2022-05-27 DIAGNOSIS — E78.2 MIXED HYPERLIPIDEMIA: ICD-10-CM

## 2022-05-27 RX ORDER — GABAPENTIN 100 MG/1
CAPSULE ORAL
Qty: 120 CAPSULE | Refills: 5 | Status: SHIPPED | OUTPATIENT
Start: 2022-05-27 | End: 2022-12-20

## 2022-05-27 NOTE — TELEPHONE ENCOUNTER
Caller: Daisy Avila    Relationship to patient: Emergency Contact    Best call back number: [phone]    Chief complaint:     Type of visit: LABS    Requested date:     Additional notes: PATIENTS DAUGHTER STATES THEY WOULD LIKE TO HAVE LABS DONE IN THE MORNING PRIOR TO NEXT APPOINTMENT 6/2/2022

## 2022-05-27 NOTE — TELEPHONE ENCOUNTER
If they request I do.  I have them ordered.  She just needs a lab appointment for fasting labs.  Good to do about 48 hours or so in advance so I have them for the visit.

## 2022-06-02 ENCOUNTER — OFFICE VISIT (OUTPATIENT)
Dept: INTERNAL MEDICINE | Facility: CLINIC | Age: 87
End: 2022-06-02

## 2022-06-02 VITALS
TEMPERATURE: 97.3 F | HEART RATE: 63 BPM | HEIGHT: 61 IN | SYSTOLIC BLOOD PRESSURE: 126 MMHG | BODY MASS INDEX: 30.58 KG/M2 | DIASTOLIC BLOOD PRESSURE: 68 MMHG | WEIGHT: 162 LBS | OXYGEN SATURATION: 99 %

## 2022-06-02 DIAGNOSIS — E78.2 MIXED HYPERLIPIDEMIA: Chronic | ICD-10-CM

## 2022-06-02 DIAGNOSIS — I10 ESSENTIAL HYPERTENSION: Primary | Chronic | ICD-10-CM

## 2022-06-02 DIAGNOSIS — R73.03 PREDIABETES: Chronic | ICD-10-CM

## 2022-06-02 DIAGNOSIS — R60.9 PERIPHERAL EDEMA: ICD-10-CM

## 2022-06-02 DIAGNOSIS — N76.0 ACUTE VAGINITIS: ICD-10-CM

## 2022-06-02 DIAGNOSIS — E03.9 ACQUIRED HYPOTHYROIDISM: Chronic | ICD-10-CM

## 2022-06-02 PROCEDURE — 99214 OFFICE O/P EST MOD 30 MIN: CPT | Performed by: FAMILY MEDICINE

## 2022-06-02 RX ORDER — FLUCONAZOLE 150 MG/1
150 TABLET ORAL ONCE
Qty: 1 TABLET | Refills: 0 | Status: SHIPPED | OUTPATIENT
Start: 2022-06-02 | End: 2022-06-02

## 2022-06-02 RX ORDER — CLOTRIMAZOLE 10 MG/1
LOZENGE ORAL; TOPICAL
COMMUNITY
Start: 2022-05-23 | End: 2022-09-01 | Stop reason: ALTCHOICE

## 2022-06-02 NOTE — PROGRESS NOTES
"Chief Complaint  Hypertension (2 week follow up), Vaginal Itching, and Diarrhea    Subjective        Geeta Butt presents to [facility]  History of Present Illness     Hypertension -stable, last time she was having side effects to the amlodipine causing peripheral edema so it was discontinued.  She was placed on Lasix to help with the swelling. Patient taking medication as prescribed.  Patient is taking carvedilol 12.5mg BID, valsartan 240 mg daily, Lasix.         Hypothyroidism - Stable based on recent labs.  Patient taking levothyroxine.  Patient denying any symptoms of hypothyroidism such as cold intolerance, constipation, mood swings.     Prediabetes -stable based on recent lab.  A1c was 5.7.  Trying to eat a balanced diet and keep carbs in moderation.     She has mild hyperlipidemia which is stable based on recent lab for age.   Manages with managed with diet.    She started having diarrhea and had a couple of days of it.  She also now has vaginal itching.  This has been off and on over the last week.  Last week started Ensure daily for extra protein and calories.  Possible that this started it.   I will also get her a diflucan for the vaginitis.       Objective   Vital Signs:  /68 (BP Location: Left arm, Patient Position: Sitting, Cuff Size: Adult)   Pulse 63   Temp 97.3 °F (36.3 °C)   Ht 154.9 cm (61\")   Wt 73.5 kg (162 lb)   SpO2 99%   BMI 30.61 kg/m²     BMI has not been calculated during today's encounter.       Physical Exam  Vitals and nursing note reviewed.   Constitutional:       General: She is not in acute distress.     Appearance: Normal appearance.   Cardiovascular:      Rate and Rhythm: Normal rate and regular rhythm.      Heart sounds: Normal heart sounds. No murmur heard.  Pulmonary:      Effort: Pulmonary effort is normal.      Breath sounds: Normal breath sounds.   Musculoskeletal:      Right lower le+ Edema present.      Left lower le+ Edema " present.   Neurological:      Mental Status: She is alert.        Result Review :    Common labs    Common Labsle 3/5/22 3/5/22 3/6/22 3/6/22 5/31/22 5/31/22 5/31/22 5/31/22    0348 0348 0350 0350 1108 1108 1108 1108   Glucose  104 (A)  105 (A)  97     BUN  14  14  28 (A)     Creatinine  0.85  0.75  1.10 (A)     Sodium  138  131 (A)  133 (A)     Potassium  4.8  4.2  5.2     Chloride  102  95 (A)  92 (A)     Calcium  9.2  9.8  10.2     Total Protein      7.0     Albumin  3.70    4.6     Total Bilirubin  <0.2    0.3     Alkaline Phosphatase  73    93     AST (SGOT)  11    21     ALT (SGPT)  11    15     WBC 9.01  8.55  7.7      Hemoglobin 11.1 (A)  11.4 (A)  12.0      Hematocrit 33.8 (A)  34.7  36.7      Platelets 494 (A)  479 (A)  391      Total Cholesterol        248 (A)   Triglycerides        110   HDL Cholesterol        59   LDL Cholesterol         170 (A)   Hemoglobin A1C       5.7 (A)    (A) Abnormal value       Comments are available for some flowsheets but are not being displayed.                     Assessment and Plan   Diagnoses and all orders for this visit:    1. Essential hypertension (Primary)  -     Basic metabolic panel; Future    2. Acquired hypothyroidism    3. Prediabetes    4. Mixed hyperlipidemia    5. Peripheral edema  -     Basic metabolic panel; Future    6. Acute vaginitis  -     fluconazole (Diflucan) 150 MG tablet; Take 1 tablet by mouth 1 (One) Time for 1 dose.  Dispense: 1 tablet; Refill: 0      Stable chronic conditions as above.  Continue all medications as above but will half dose Lasix and recheck BMP in 10 day.  I will have her stop the dietary supplement and see if the diarrhea resolves.  Could be an GI intolerance issue.  Restart lasix 20mg tomorrow AM.  Overall the swelling does look a little improved.           Follow Up   Return in about 3 months (around 9/5/2022) for Next scheduled follow up.  Patient was given instructions and counseling regarding her condition or for health  maintenance advice. Please see specific information pulled into the AVS if appropriate.

## 2022-06-24 ENCOUNTER — APPOINTMENT (OUTPATIENT)
Dept: WOMENS IMAGING | Facility: HOSPITAL | Age: 87
End: 2022-06-24

## 2022-06-24 ENCOUNTER — TELEPHONE (OUTPATIENT)
Dept: ONCOLOGY | Facility: CLINIC | Age: 87
End: 2022-06-24

## 2022-06-24 DIAGNOSIS — N64.4 BREAST PAIN, LEFT: ICD-10-CM

## 2022-06-24 DIAGNOSIS — C50.312 MALIGNANT NEOPLASM OF LOWER-INNER QUADRANT OF LEFT BREAST IN FEMALE, ESTROGEN RECEPTOR POSITIVE: Primary | ICD-10-CM

## 2022-06-24 DIAGNOSIS — Z17.0 MALIGNANT NEOPLASM OF LOWER-INNER QUADRANT OF LEFT BREAST IN FEMALE, ESTROGEN RECEPTOR POSITIVE: Primary | ICD-10-CM

## 2022-06-24 PROCEDURE — G0279 TOMOSYNTHESIS, MAMMO: HCPCS | Performed by: RADIOLOGY

## 2022-06-24 PROCEDURE — 77066 DX MAMMO INCL CAD BI: CPT | Performed by: RADIOLOGY

## 2022-06-24 PROCEDURE — 76642 ULTRASOUND BREAST LIMITED: CPT | Performed by: RADIOLOGY

## 2022-06-28 NOTE — TELEPHONE ENCOUNTER
Department of Anesthesiology  Preprocedure Note       Name:  Damon Jo   Age:  26 y. o.  :  1997                                          MRN:  [id number]         Date:  2022      Surgeon: Ashwini Sales):  Angeline Rodríguez DO    Procedure: Procedure(s):   SECTION    Medications prior to admission:   Prior to Admission medications    Medication Sig Start Date End Date Taking? Authorizing Provider   diphenhydrAMINE (BENADRYL) 25 MG tablet Take 25 mg by mouth every 6 hours as needed for Itching   Yes Historical Provider, MD   famotidine (PEPCID) 20 MG tablet Take 1 tablet by mouth 2 times daily 22   THOMPSON Lomas - SHAMA   Prenatal Vit-Fe Fumarate-FA (PRENATAL VITAMIN PO) Take by mouth    Historical Provider, MD       Current medications:    Current Facility-Administered Medications   Medication Dose Route Frequency Provider Last Rate Last Admin    lactated ringers infusion   IntraVENous Continuous Karen Fields DO        lactated ringers bolus  1,000 mL IntraVENous Once Angeline Marcos DO 1,000 mL/hr at 22 0607 1,000 mL at 22 4917    sodium chloride flush 0.9 % injection 10 mL  10 mL IntraVENous 2 times per day Angeline Rodríguez, DO        sodium chloride flush 0.9 % injection 10 mL  10 mL IntraVENous PRN Angeline Rodríguez, DO        0.9 % sodium chloride infusion   IntraVENous PRN Angeline Rodríguez, DO        cefOXitin (MEFOXIN) 2000 mg in dextrose 5% 50 mL (mini-bag)  2,000 mg IntraVENous On Call to Mambu, DO        oxytocin (PITOCIN) 30 units in 500 mL infusion  87.3 gladys-units/min IntraVENous Continuous PRN Angeline Rodríguez DO        And    oxytocin (PITOCIN) 10 unit bolus from the bag  10 Units IntraVENous PRN Angeline Rodríguez, DO           Allergies:     Allergies   Allergen Reactions    No Known Allergies     Peanut (Diagnostic)        Problem List:    Patient Active Problem List Patient informed on v/m urine unremarkable no mucus,blood or infection   Diagnosis Code    Term pregnancy Z34.90       Past Medical History:  History reviewed. No pertinent past medical history. Past Surgical History:        Procedure Laterality Date    CYST REMOVAL         Social History:    Social History     Tobacco Use    Smoking status: Never Smoker    Smokeless tobacco: Never Used   Substance Use Topics    Alcohol use: Not Currently                                Counseling given: Not Answered      Vital Signs (Current):   Vitals:    06/28/22 0556   BP: 122/77   Pulse: 82   Temp: 36.6 °C (97.8 °F)   TempSrc: Oral                                              BP Readings from Last 3 Encounters:   06/28/22 122/77   06/21/22 133/80   06/14/22 125/81       NPO Status: Time of last liquid consumption: 2200                                                 Date of last liquid consumption: 06/27/22                             BMI:   Wt Readings from Last 3 Encounters:   06/21/22 140 lb (63.5 kg)   06/14/22 137 lb 12.8 oz (62.5 kg)   06/07/22 136 lb 12.8 oz (62.1 kg)     There is no height or weight on file to calculate BMI.    CBC:   Lab Results   Component Value Date    WBC 8.3 06/28/2022    RBC 4.47 06/28/2022    HGB 12.9 06/28/2022    HCT 39.1 06/28/2022    MCV 87.5 06/28/2022    RDW 12.4 06/28/2022     06/28/2022       CMP: No results found for: NA, K, CL, CO2, BUN, CREATININE, GFRAA, AGRATIO, LABGLOM, GLUCOSE, GLU, PROT, CALCIUM, BILITOT, ALKPHOS, AST, ALT    POC Tests: No results for input(s): POCGLU, POCNA, POCK, POCCL, POCBUN, POCHEMO, POCHCT in the last 72 hours.     Coags: No results found for: PROTIME, INR, APTT    HCG (If Applicable):   Lab Results   Component Value Date    HCGQUANT <1 09/02/2021        ABGs: No results found for: PHART, PO2ART, KXI2VPE, OXM5RZM, BEART, I2XTVYRG     Type & Screen (If Applicable):  No results found for: LABABO, LABRH    Drug/Infectious Status (If Applicable):  No results found for: HIV, HEPCAB    COVID-19 Screening (If Applicable): No results found for: COVID19        Anesthesia Evaluation  Patient summary reviewed and Nursing notes reviewed no history of anesthetic complications:   Airway: Mallampati: I  TM distance: >3 FB   Neck ROM: full  Mouth opening: > = 3 FB   Dental: normal exam         Pulmonary:Negative Pulmonary ROS and normal exam                               Cardiovascular:Negative CV ROS                      Neuro/Psych:   Negative Neuro/Psych ROS              GI/Hepatic/Renal: Neg GI/Hepatic/Renal ROS            Endo/Other: Negative Endo/Other ROS                    Abdominal:              PE comment: gravid uterus   Vascular: negative vascular ROS. Other Findings:           Anesthesia Plan      spinal     ASA 2             Anesthetic plan and risks discussed with patient.               Post-op pain plan if not by surgeon: single peripheral nerve block            THOMPSON Nava - CRNA   6/28/2022

## 2022-06-29 ENCOUNTER — TELEPHONE (OUTPATIENT)
Dept: INTERNAL MEDICINE | Facility: CLINIC | Age: 87
End: 2022-06-29

## 2022-06-29 ENCOUNTER — OFFICE VISIT (OUTPATIENT)
Dept: INTERNAL MEDICINE | Facility: CLINIC | Age: 87
End: 2022-06-29

## 2022-06-29 VITALS
SYSTOLIC BLOOD PRESSURE: 162 MMHG | WEIGHT: 162.3 LBS | TEMPERATURE: 98.4 F | OXYGEN SATURATION: 95 % | BODY MASS INDEX: 30.64 KG/M2 | DIASTOLIC BLOOD PRESSURE: 76 MMHG | RESPIRATION RATE: 18 BRPM | HEIGHT: 61 IN | HEART RATE: 59 BPM

## 2022-06-29 DIAGNOSIS — N76.0 ACUTE VAGINITIS: ICD-10-CM

## 2022-06-29 DIAGNOSIS — L03.115 CELLULITIS OF RIGHT LOWER EXTREMITY: Primary | ICD-10-CM

## 2022-06-29 PROCEDURE — 99213 OFFICE O/P EST LOW 20 MIN: CPT | Performed by: NURSE PRACTITIONER

## 2022-06-29 RX ORDER — CEPHALEXIN 500 MG/1
500 CAPSULE ORAL 2 TIMES DAILY
Qty: 14 CAPSULE | Refills: 0 | Status: SHIPPED | OUTPATIENT
Start: 2022-06-29 | End: 2022-07-06

## 2022-06-29 RX ORDER — FLUCONAZOLE 150 MG/1
150 TABLET ORAL ONCE
Qty: 1 TABLET | Refills: 0 | Status: SHIPPED | OUTPATIENT
Start: 2022-06-29 | End: 2022-06-29

## 2022-06-29 NOTE — PROGRESS NOTES
"Chief Complaint  Wound Infection (Pt presents to us today she states she has an open wound on her right leg and it burns)    Subjective        Geeta Butt presents to [facility]  History of Present Illness    Patient is a pleasant 86-year-old female who I have seen in the past.  Patient typically sees Dr. Nguyen here in the office.  Patient is here with complaints of an open wound on her right leg with burning sensation.  Patient has a history of cellulitis. She has been cleaning the wound with wound spray at this time.   No Injury noted.   History of prediabetes that is stable upon recent labs work.   Denies any fever or chills.     Hx of vaginal yeast infection. She c/o antibiotic induced vaginal yeast infections. Requesting antibiotic after Keflex therapy.       Objective   Vital Signs:  /76 (BP Location: Right arm, Patient Position: Sitting, Cuff Size: Large Adult)   Pulse 59   Temp 98.4 °F (36.9 °C)   Resp 18   Ht 154.9 cm (61\")   Wt 73.6 kg (162 lb 4.8 oz)   SpO2 95%   BMI 30.67 kg/m²   Estimated body mass index is 30.67 kg/m² as calculated from the following:    Height as of this encounter: 154.9 cm (61\").    Weight as of this encounter: 73.6 kg (162 lb 4.8 oz).          Physical Exam  Vitals and nursing note reviewed.   Constitutional:       Appearance: Normal appearance.   HENT:      Head: Normocephalic.      Mouth/Throat:      Mouth: Mucous membranes are moist.   Eyes:      Pupils: Pupils are equal, round, and reactive to light.   Cardiovascular:      Rate and Rhythm: Normal rate and regular rhythm.      Pulses: Normal pulses.      Heart sounds: Normal heart sounds.      Comments: No peripheral edema noted.   Pulmonary:      Effort: Pulmonary effort is normal. No respiratory distress.      Breath sounds: Normal breath sounds. No stridor. No wheezing, rhonchi or rales.   Chest:      Chest wall: No tenderness.   Musculoskeletal:         General: No signs of injury. " Normal range of motion.   Skin:     General: Skin is warm.      Capillary Refill: Capillary refill takes less than 2 seconds.      Findings: Erythema present.      Comments: 0.2 x 0.2 cm R leg wound/abrasion with erythema around the abrasion x 1 week.    Neurological:      Mental Status: She is alert and oriented to person, place, and time.   Psychiatric:         Behavior: Behavior normal.        Result Review :           Office Visit with Grzegorz Nguyen MD (06/02/2022)  ED to Hosp-Admission (Discharged) with Montez Robertson MD; Markel Rosenthal MD (03/04/2022)       Assessment and Plan   Diagnoses and all orders for this visit:    1. Cellulitis of right lower extremity (Primary)    2. Acute vaginitis    Other orders  -     cephalexin (Keflex) 500 MG capsule; Take 1 capsule by mouth 2 (Two) Times a Day for 7 days.  Dispense: 14 capsule; Refill: 0  -     fluconazole (Diflucan) 150 MG tablet; Take 1 tablet by mouth 1 (One) Time for 1 dose.  Dispense: 1 tablet; Refill: 0      Patient will stop at the pharmacy and take her medications as indicated.  Patient will also cleanse the wound twice daily with a antibacterial soap.  She will contact the office if she sees any changes in her symptoms such as increased redness, swelling, or any purulent drainage.  Patient will take the Diflucan capsule after her antibiotic therapy.  Patient will also monitor herself for any chills or fever and contact the office ASAP.         Follow Up   Return if symptoms worsen or fail to improve.  Patient was given instructions and counseling regarding her condition or for health maintenance advice. Please see specific information pulled into the AVS if appropriate.

## 2022-06-29 NOTE — TELEPHONE ENCOUNTER
Caller: Daisy Avila    Relationship to patient: Emergency Contact    Best call back number: [phone]    Patient is needing: PATIENT HAS MADE AN APPOINTMENT TO COME IN FOR CELLULITIS, SHE HAS A SPOT THAT HAS COME OPEN AND HAS STUFF COMING OUT OF IT. DOES SHE HAVE TO COME IN OR CAN SOMETHING BE SENT IN FOR HER WITHOUT HER COMING IN. PLEASE REACH OUT TO DAUGHTER AND LET HER KNOW.

## 2022-06-29 NOTE — PATIENT INSTRUCTIONS
Patient will stop at the pharmacy and take her medications as indicated.  Patient will also cleanse the wound twice daily with a antibacterial soap.  She will contact the office if she sees any changes in her symptoms such as increased redness, swelling, or any purulent drainage.  Patient will take the Diflucan capsule after her antibiotic therapy.  Patient will also monitor herself for any chills or fever and contact the office ASAP.

## 2022-07-06 ENCOUNTER — TELEPHONE (OUTPATIENT)
Dept: ONCOLOGY | Facility: CLINIC | Age: 87
End: 2022-07-06

## 2022-07-06 NOTE — TELEPHONE ENCOUNTER
Caller: Daisy Avila    Relationship: Emergency Contact DAUGHTER    NO BH VERBAL RELEASE  ON FILE    Best call back number: [phone]    What is the best time to reach you: ANYTIME    Who are you requesting to speak with (clinical staff, provider,  specific staff member): SCHEDULING DR WILLIS         What was the call regarding:     NEEDING TO  SCHEDULE FOLLOW UP APPT FOR MOTHER,     SHE DID HAVE A MAMMO AND ULTRASOUND DONE 06/24, WAS HAVING SOME PAIN AND TENDERNESS.    NOT SURE WHEN DR RAMOS WANTED TO SEE HER BACK YET. SHE HAD A YEAR FOLLOW UP ORIGINALLY BUT HAD TO CANCEL.  BUT CAN SCHEDULE FOR ANYTIME.     Do you require a callback: YES

## 2022-07-06 NOTE — TELEPHONE ENCOUNTER
Spoke with pt's daughter re: need for her follow up. Missed previous appointments due to husbands illness. She did have her mammogram last week and had some concerns re: an area of pain in her breast. Mammogram and ultrasound were WNL and I reviewed this with pt's daughter. Will send a message to scheduling to arrange for routine f/u with Dr. Valenzuela sometime in August.

## 2022-07-11 DIAGNOSIS — I10 ESSENTIAL HYPERTENSION: Chronic | ICD-10-CM

## 2022-07-11 RX ORDER — VALSARTAN 160 MG/1
240 TABLET ORAL DAILY
Qty: 135 TABLET | Refills: 3 | Status: SHIPPED | OUTPATIENT
Start: 2022-07-11 | End: 2022-09-01 | Stop reason: DRUGHIGH

## 2022-08-04 ENCOUNTER — OFFICE VISIT (OUTPATIENT)
Dept: INTERNAL MEDICINE | Facility: CLINIC | Age: 87
End: 2022-08-04

## 2022-08-04 VITALS
HEIGHT: 61 IN | SYSTOLIC BLOOD PRESSURE: 150 MMHG | RESPIRATION RATE: 18 BRPM | OXYGEN SATURATION: 97 % | BODY MASS INDEX: 30.02 KG/M2 | DIASTOLIC BLOOD PRESSURE: 80 MMHG | WEIGHT: 159 LBS | TEMPERATURE: 97.1 F | HEART RATE: 64 BPM

## 2022-08-04 DIAGNOSIS — I10 ESSENTIAL HYPERTENSION: Primary | Chronic | ICD-10-CM

## 2022-08-04 PROCEDURE — 99213 OFFICE O/P EST LOW 20 MIN: CPT | Performed by: NURSE PRACTITIONER

## 2022-08-04 NOTE — PATIENT INSTRUCTIONS
Ms. Connor please go home and take your blood pressure 2-3 times daily after you are sitting down for at least 20 minutes and not doing any activity or watching any TV.  Please write this down in a notebook so we can review it on your next office visit.  Please go home and take your Lasix as directed and your carvedilol 12.5 mg with a meal.  If you have any high blood pressure with chest pain or pressure or shortness of breath that is when you need to go to the emergency room.  Please wear your ROSA hose as well that will help with swelling and blood pressure.  Please follow back up in the office in 4 weeks for a recheck with Dr. Nguyen.

## 2022-08-04 NOTE — PROGRESS NOTES
"Chief Complaint  Hypertension (Pt presents here today with hypertension )    Subjective        Geeta Butt presents to [facility]  History of Present Illness    She did take her medication this am for hypertension around 6:30-7 am.   She had a scan this am and her bp was over 200 systolic up stairs.   She denies chest pain/pressure.   Has not taken her carvedilol this am nor her Lasix d/t her appointment this am.     Objective   Vital Signs:  /80 (BP Location: Right arm, Patient Position: Sitting, Cuff Size: Adult)   Pulse 64   Temp 97.1 °F (36.2 °C)   Resp 18   Ht 154.9 cm (61\")   Wt 72.1 kg (159 lb)   SpO2 97%   BMI 30.04 kg/m²   Estimated body mass index is 30.04 kg/m² as calculated from the following:    Height as of this encounter: 154.9 cm (61\").    Weight as of this encounter: 72.1 kg (159 lb).          Physical Exam  Vitals and nursing note reviewed.   Constitutional:       Appearance: Normal appearance.   HENT:      Head: Normocephalic.   Eyes:      Pupils: Pupils are equal, round, and reactive to light.   Cardiovascular:      Rate and Rhythm: Normal rate and regular rhythm.      Pulses: Normal pulses.      Heart sounds: Normal heart sounds.      Comments: No peripheral edema noted.   Pulmonary:      Effort: Pulmonary effort is normal. No respiratory distress.      Breath sounds: Normal breath sounds. No stridor. No wheezing, rhonchi or rales.      Comments: Denies SOB  Chest:      Chest wall: No tenderness.   Musculoskeletal:         General: Normal range of motion.   Skin:     General: Skin is warm.      Capillary Refill: Capillary refill takes less than 2 seconds.   Neurological:      Mental Status: She is alert and oriented to person, place, and time.   Psychiatric:         Behavior: Behavior normal.        Result Review :    Common labs    Common Labsle 3/6/22 3/6/22 5/31/22 5/31/22 5/31/22 5/31/22 6/15/22    0350 0350 1108 1108 1108 1108    Glucose  105 (A) "  97   105 (A)   BUN  14  28 (A)   20   Creatinine  0.75  1.10 (A)   0.97   Sodium  131 (A)  133 (A)   134   Potassium  4.2  5.2   4.7   Chloride  95 (A)  92 (A)   93 (A)   Calcium  9.8  10.2   9.7   Total Protein    7.0      Albumin    4.6      Total Bilirubin    0.3      Alkaline Phosphatase    93      AST (SGOT)    21      ALT (SGPT)    15      WBC 8.55  7.7       Hemoglobin 11.4 (A)  12.0       Hematocrit 34.7  36.7       Platelets 479 (A)  391       Total Cholesterol      248 (A)    Triglycerides      110    HDL Cholesterol      59    LDL Cholesterol       170 (A)    Hemoglobin A1C     5.7 (A)     (A) Abnormal value       Comments are available for some flowsheets but are not being displayed.                Office Visit with Grzegorz Nguyen MD (06/02/2022)       Assessment and Plan   Diagnoses and all orders for this visit:    1. Essential hypertension (Primary)      Ms. Connor please go home and take your blood pressure 2-3 times daily after you are sitting down for at least 20 minutes and not doing any activity or watching any TV.  Please write this down in a notebook so we can review it on your next office visit.  Please go home and take your Lasix as directed and your carvedilol 12.5 mg with a meal.  If you have any high blood pressure with chest pain or pressure or shortness of breath that is when you need to go to the emergency room.  Please wear your ROSA hose as well that will help with swelling and blood pressure.  Please follow back up in the office in 4 weeks for a recheck with Dr. Nguyen.       Follow Up   Return in about 4 weeks (around 9/1/2022) for Recheck.  Patient was given instructions and counseling regarding her condition or for health maintenance advice. Please see specific information pulled into the AVS if appropriate.

## 2022-08-05 ENCOUNTER — TELEPHONE (OUTPATIENT)
Dept: INTERNAL MEDICINE | Facility: CLINIC | Age: 87
End: 2022-08-05

## 2022-08-05 NOTE — TELEPHONE ENCOUNTER
Patient is still having high b/p issues. Daisy is wondering if she can restart her Amlodipine,  or maybe since she is not on a strict schedule  it could be med timing issues?   Would you please give Daisy a call regarding this.   She is fine to wait until Monday.

## 2022-08-05 NOTE — TELEPHONE ENCOUNTER
Caller: Daisy Avila    Relationship: Emergency Contact    Best call back number: [phone] (H)    What is the best time to reach you: ANYTIME, ASAP    Who are you requesting to speak with (clinical staff, provider,  specific staff member): CLINICAL STAFF/ DR MURRIETA    Do you know the name of the person who called: Daisy Avila, ON  VERBAL    What was the call regarding: PATIENT'S DAUGHTER IS CONCERNED ABOUT THE PATIENT'S HIGH BLOOD PRESSURE AND THE BLOOD PRESSURE MEDICATION, SHE WOULD LIKE DR MURRIETA TO CALL HER BACK ASAP REGARDING THIS ASAP    Do you require a callback: YES, ASAP

## 2022-08-08 NOTE — TELEPHONE ENCOUNTER
When she was here last week, she had not taken two of her original BP medications.   Does she need homehealth in to help with medication management? If so, I can go ahead and order this. Thanks, Neris

## 2022-08-12 NOTE — TELEPHONE ENCOUNTER
No on the amlodipine.  She was getting edema.    Last visit she had not taken some of her medications.  She needs to take her current medications on a schedule, check her blood pressures at home.  If consistently over 150/90, she needs to let me know where she is running.  This is taken 45 minutes after BP meds and sitting down for at least 15 minutes.

## 2022-08-17 DIAGNOSIS — R60.9 PERIPHERAL EDEMA: ICD-10-CM

## 2022-08-17 RX ORDER — FUROSEMIDE 40 MG/1
TABLET ORAL
Qty: 90 TABLET | Refills: 1 | Status: SHIPPED | OUTPATIENT
Start: 2022-08-17 | End: 2022-12-01 | Stop reason: SDUPTHER

## 2022-08-20 NOTE — TELEPHONE ENCOUNTER
Aromasin denied. Medication discontinued at last visit.   Oriented - self; Oriented - place; Oriented - time

## 2022-08-27 DIAGNOSIS — E03.9 HYPOTHYROIDISM, UNSPECIFIED TYPE: ICD-10-CM

## 2022-08-27 DIAGNOSIS — K21.9 GERD WITHOUT ESOPHAGITIS: ICD-10-CM

## 2022-08-28 RX ORDER — LEVOTHYROXINE SODIUM 0.05 MG/1
TABLET ORAL
Qty: 90 TABLET | Refills: 0 | Status: SHIPPED | OUTPATIENT
Start: 2022-08-28 | End: 2022-10-06

## 2022-08-28 RX ORDER — RABEPRAZOLE SODIUM 20 MG/1
TABLET, DELAYED RELEASE ORAL
Qty: 90 TABLET | Refills: 0 | Status: SHIPPED | OUTPATIENT
Start: 2022-08-28 | End: 2023-01-09

## 2022-09-01 ENCOUNTER — OFFICE VISIT (OUTPATIENT)
Dept: CARDIOLOGY | Facility: CLINIC | Age: 87
End: 2022-09-01

## 2022-09-01 ENCOUNTER — HOSPITAL ENCOUNTER (OUTPATIENT)
Dept: GENERAL RADIOLOGY | Facility: HOSPITAL | Age: 87
Discharge: HOME | End: 2022-09-01

## 2022-09-01 ENCOUNTER — HOSPITAL ENCOUNTER (OUTPATIENT)
Dept: CARDIOLOGY | Facility: HOSPITAL | Age: 87
Discharge: HOME | End: 2022-09-01

## 2022-09-01 ENCOUNTER — TELEPHONE (OUTPATIENT)
Dept: CARDIOLOGY | Facility: CLINIC | Age: 87
End: 2022-09-01

## 2022-09-01 VITALS
HEART RATE: 68 BPM | HEIGHT: 61 IN | OXYGEN SATURATION: 98 % | BODY MASS INDEX: 31.23 KG/M2 | SYSTOLIC BLOOD PRESSURE: 160 MMHG | WEIGHT: 165.4 LBS | DIASTOLIC BLOOD PRESSURE: 72 MMHG

## 2022-09-01 DIAGNOSIS — R93.89 ABNORMAL CHEST X-RAY: ICD-10-CM

## 2022-09-01 DIAGNOSIS — R05.9 COUGH: ICD-10-CM

## 2022-09-01 DIAGNOSIS — R06.02 SHORTNESS OF BREATH: Primary | ICD-10-CM

## 2022-09-01 DIAGNOSIS — R01.1 HEART MURMUR: Primary | ICD-10-CM

## 2022-09-01 DIAGNOSIS — R01.1 HEART MURMUR: ICD-10-CM

## 2022-09-01 DIAGNOSIS — R06.02 SHORTNESS OF BREATH: ICD-10-CM

## 2022-09-01 DIAGNOSIS — R00.2 PALPITATIONS: ICD-10-CM

## 2022-09-01 LAB
ANION GAP SERPL CALCULATED.3IONS-SCNC: 9 MMOL/L (ref 5–15)
AORTIC ARCH: 2.4 CM
ASCENDING AORTA: 2.5 CM
BH CV ECHO LEFT VENTRICLE GLOBAL LONGITUDINAL STRAIN: -22 %
BH CV ECHO MEAS - ACS: 1.67 CM
BH CV ECHO MEAS - AO MAX PG: 7 MMHG
BH CV ECHO MEAS - AO MEAN PG: 3.5 MMHG
BH CV ECHO MEAS - AO ROOT DIAM: 2.8 CM
BH CV ECHO MEAS - AO V2 MAX: 132.7 CM/SEC
BH CV ECHO MEAS - AO V2 VTI: 33.5 CM
BH CV ECHO MEAS - AVA(I,D): 2.7 CM2
BH CV ECHO MEAS - EDV(MOD-SP2): 95 ML
BH CV ECHO MEAS - EDV(MOD-SP4): 74 ML
BH CV ECHO MEAS - EF(MOD-BP): 70 %
BH CV ECHO MEAS - EF(MOD-SP2): 70.5 %
BH CV ECHO MEAS - EF(MOD-SP4): 67.6 %
BH CV ECHO MEAS - ESV(MOD-SP2): 28 ML
BH CV ECHO MEAS - ESV(MOD-SP4): 24 ML
BH CV ECHO MEAS - IVSD: 0.7 CM
BH CV ECHO MEAS - LAT PEAK E' VEL: 11 CM/SEC
BH CV ECHO MEAS - LV DIASTOLIC VOL/BSA (35-75): 42.5 CM2
BH CV ECHO MEAS - LV MAX PG: 4.6 MMHG
BH CV ECHO MEAS - LV MEAN PG: 2.16 MMHG
BH CV ECHO MEAS - LV SYSTOLIC VOL/BSA (12-30): 13.8 CM2
BH CV ECHO MEAS - LV V1 MAX: 107.1 CM/SEC
BH CV ECHO MEAS - LV V1 VTI: 27.8 CM
BH CV ECHO MEAS - LVIDD: 4.8 CM
BH CV ECHO MEAS - LVIDS: 3.3 CM
BH CV ECHO MEAS - LVOT AREA: 3.2 CM2
BH CV ECHO MEAS - LVOT DIAM: 2.02 CM
BH CV ECHO MEAS - LVPWD: 1 CM
BH CV ECHO MEAS - MED PEAK E' VEL: 11.7 CM/SEC
BH CV ECHO MEAS - MV A DUR: 0.17 SEC
BH CV ECHO MEAS - MV A MAX VEL: 84.8 CM/SEC
BH CV ECHO MEAS - MV DEC SLOPE: 206.4 CM/SEC2
BH CV ECHO MEAS - MV DEC TIME: 0.24 MSEC
BH CV ECHO MEAS - MV E MAX VEL: 70.3 CM/SEC
BH CV ECHO MEAS - MV E/A: 0.83
BH CV ECHO MEAS - MV MAX PG: 2.7 MMHG
BH CV ECHO MEAS - MV MEAN PG: 1.12 MMHG
BH CV ECHO MEAS - MV P1/2T: 117.5 MSEC
BH CV ECHO MEAS - MV V2 VTI: 24 CM
BH CV ECHO MEAS - MVA(P1/2T): 1.87 CM2
BH CV ECHO MEAS - MVA(VTI): 3.7 CM2
BH CV ECHO MEAS - PA ACC TIME: 0.08 SEC
BH CV ECHO MEAS - PA PR(ACCEL): 44.5 MMHG
BH CV ECHO MEAS - PA V2 MAX: 82.8 CM/SEC
BH CV ECHO MEAS - PULM A REVS DUR: 0.16 SEC
BH CV ECHO MEAS - PULM A REVS VEL: 30 CM/SEC
BH CV ECHO MEAS - PULM DIAS VEL: 48.1 CM/SEC
BH CV ECHO MEAS - PULM S/D: 1.34
BH CV ECHO MEAS - PULM SYS VEL: 64.3 CM/SEC
BH CV ECHO MEAS - QP/QS: 0.49
BH CV ECHO MEAS - RAP SYSTOLE: 3 MMHG
BH CV ECHO MEAS - RV MAX PG: 1.17 MMHG
BH CV ECHO MEAS - RV V1 MAX: 54 CM/SEC
BH CV ECHO MEAS - RV V1 VTI: 13.6 CM
BH CV ECHO MEAS - RVOT DIAM: 2.03 CM
BH CV ECHO MEAS - RVSP: 34 MMHG
BH CV ECHO MEAS - SI(MOD-SP2): 38.5 ML/M2
BH CV ECHO MEAS - SI(MOD-SP4): 28.7 ML/M2
BH CV ECHO MEAS - SV(LVOT): 89.2 ML
BH CV ECHO MEAS - SV(MOD-SP2): 67 ML
BH CV ECHO MEAS - SV(MOD-SP4): 50 ML
BH CV ECHO MEAS - SV(RVOT): 43.8 ML
BH CV ECHO MEAS - TAPSE (>1.6): 2.44 CM
BH CV ECHO MEAS - TR MAX PG: 30.8 MMHG
BH CV ECHO MEAS - TR MAX VEL: 277.3 CM/SEC
BH CV ECHO MEASUREMENTS AVERAGE E/E' RATIO: 6.19
BH CV XLRA - RV BASE: 3 CM
BH CV XLRA - RV LENGTH: 5.8 CM
BH CV XLRA - RV MID: 2.6 CM
BH CV XLRA - TDI S': 17.2 CM/SEC
BUN SERPL-MCNC: 25 MG/DL (ref 8–23)
BUN/CREAT SERPL: 25 (ref 7–25)
CALCIUM SPEC-SCNC: 9.7 MG/DL (ref 8.6–10.5)
CHLORIDE SERPL-SCNC: 94 MMOL/L (ref 98–107)
CO2 SERPL-SCNC: 29 MMOL/L (ref 22–29)
CREAT SERPL-MCNC: 1 MG/DL (ref 0.57–1)
DEPRECATED RDW RBC AUTO: 37.3 FL (ref 37–54)
EGFRCR SERPLBLD CKD-EPI 2021: 55 ML/MIN/1.73
ERYTHROCYTE [DISTWIDTH] IN BLOOD BY AUTOMATED COUNT: 12 % (ref 12.3–15.4)
GLUCOSE SERPL-MCNC: 91 MG/DL (ref 65–99)
HCT VFR BLD AUTO: 33.3 % (ref 34–46.6)
HGB BLD-MCNC: 11.3 G/DL (ref 12–15.9)
LEFT ATRIUM VOLUME INDEX: 18.3 ML/M2
MAXIMAL PREDICTED HEART RATE: 134 BPM
MCH RBC QN AUTO: 28.8 PG (ref 26.6–33)
MCHC RBC AUTO-ENTMCNC: 33.9 G/DL (ref 31.5–35.7)
MCV RBC AUTO: 84.7 FL (ref 79–97)
NT-PROBNP SERPL-MCNC: 185 PG/ML (ref 0–1800)
PLATELET # BLD AUTO: 336 10*3/MM3 (ref 140–450)
PMV BLD AUTO: 9.5 FL (ref 6–12)
POTASSIUM SERPL-SCNC: 4.3 MMOL/L (ref 3.5–5.2)
RBC # BLD AUTO: 3.93 10*6/MM3 (ref 3.77–5.28)
SINUS: 2.7 CM
SODIUM SERPL-SCNC: 132 MMOL/L (ref 136–145)
STJ: 2.17 CM
STRESS TARGET HR: 114 BPM
WBC NRBC COR # BLD: 7.4 10*3/MM3 (ref 3.4–10.8)

## 2022-09-01 PROCEDURE — 93306 TTE W/DOPPLER COMPLETE: CPT | Performed by: INTERNAL MEDICINE

## 2022-09-01 PROCEDURE — 71046 X-RAY EXAM CHEST 2 VIEWS: CPT

## 2022-09-01 PROCEDURE — 93306 TTE W/DOPPLER COMPLETE: CPT

## 2022-09-01 PROCEDURE — 99214 OFFICE O/P EST MOD 30 MIN: CPT | Performed by: NURSE PRACTITIONER

## 2022-09-01 PROCEDURE — 25010000002 PERFLUTREN (DEFINITY) 8.476 MG IN SODIUM CHLORIDE (PF) 0.9 % 10 ML INJECTION: Performed by: NURSE PRACTITIONER

## 2022-09-01 PROCEDURE — 93356 MYOCRD STRAIN IMG SPCKL TRCK: CPT

## 2022-09-01 PROCEDURE — 85027 COMPLETE CBC AUTOMATED: CPT | Performed by: NURSE PRACTITIONER

## 2022-09-01 PROCEDURE — 83880 ASSAY OF NATRIURETIC PEPTIDE: CPT | Performed by: NURSE PRACTITIONER

## 2022-09-01 PROCEDURE — 36415 COLL VENOUS BLD VENIPUNCTURE: CPT

## 2022-09-01 PROCEDURE — 80048 BASIC METABOLIC PNL TOTAL CA: CPT | Performed by: NURSE PRACTITIONER

## 2022-09-01 PROCEDURE — 93356 MYOCRD STRAIN IMG SPCKL TRCK: CPT | Performed by: INTERNAL MEDICINE

## 2022-09-01 RX ORDER — VALSARTAN 160 MG/1
240 TABLET ORAL DAILY
COMMUNITY
End: 2022-10-06 | Stop reason: SDUPTHER

## 2022-09-01 RX ADMIN — PERFLUTREN 2 ML: 6.52 INJECTION, SUSPENSION INTRAVENOUS at 13:08

## 2022-09-01 NOTE — TELEPHONE ENCOUNTER
S/w daughter about labs, echo and chest x ray today. No firm reason for patients SOB. Will proceed with CT chest without.

## 2022-09-01 NOTE — PROGRESS NOTES
Jennifer  Date of Office Visit: 22  Encounter Provider: JESSA Nolasco  Place of Service: [facility] CARDIOLOG[facility]  Patient Name: Geeta Butt  :1935    Chief Complaint   Patient presents with   • Hypertension   • Hyperlipidemia   • Stroke   • Follow-up   :     HPI: Geeta Butt is a 87 y.o. female  with hypertension, hyperlipidemia, breast cancer, stroke, carotid artery disease, coronary artery calcification on CT, cardiac murmur.      She was followed by Dr. Carter.  I will visit with her for the first time and have reviewed her medical record.     She had [facility] screening that revealed moderate disease in the left carotid artery as well as possible ectopy on EKG in .  In 2019 she complained of dyspnea on exertion and had echocardiogram that showed normal left ventricular systolic function, normal left atrial pressure, trace to mild mitral regurgitation and normal RVSP.  Perfusion stress test was negative for ischemia    She presents today with multiple complaints.  She suffered a fall and a right broken hip with surgery in .  She had a CT of the chest 2021 which showed coronary artery calcification.  She has had issues with UTI UTI and sepsis and has been following with urogynecology.  She has had increased fatigue as well as increased shortness of breath and wheezing.  Shortness of breath and wheezing has been for 1 month reportedly.  This is intermittent.  She apparently was found to have some stable renal artery stenosis.  She is being treated for cellulitis.  She had issues with elevated blood pressure and was tried on amlodipine but she developed edema so about a month ago she was started on Lasix.  She is accompanied by her daughter, Daisy who tells me she has not had the chest x-ray completed which was ordered by I believe primary care.  Patient reports up to 30 seconds of her heart racing which is new over the last month.  This happens  "a couple times a week.    Allergies   Allergen Reactions   • Other Nausea Only     All mycins   • Sulfa Antibiotics Unknown - High Severity     LOST CONSCIOUSNESS AND BODY TURNED RED/ON FIRE   • Amlodipine Swelling   • Ciprofloxacin Unknown - High Severity     RED BLISTERS   • Doxycycline Nausea Only   • Iodine Unknown - High Severity     DECADES AGO, IODINE INJECTION CAUSED SKIN REDNESS AND BURNING   • Macrodantin [Nitrofurantoin Macrocrystal] Diarrhea and Nausea And Vomiting   • Nitrofurantoin Nausea And Vomiting   • Yeast-Related Products Unknown - High Severity     MOUTH SORES AND BLADDER INFECTION   • Vancomycin Nausea And Vomiting           Family and social history reviewed.     ROS  All other systems were reviewed and are negative          Objective:     Vitals:    09/01/22 1055   BP: 160/72   BP Location: Right arm   Patient Position: Sitting   Cuff Size: Adult   Pulse: 68   SpO2: 98%   Weight: 75 kg (165 lb 6.4 oz)   Height: 154.9 cm (61\")     Body mass index is 31.25 kg/m².    PHYSICAL EXAM:  Pulmonary:      Breath sounds: Examination of the right-lower field reveals decreased breath sounds. Examination of the left-lower field reveals decreased breath sounds. Decreased breath sounds present.   Cardiovascular:      Normal rate. Regular rhythm.      Murmurs: There is a grade 2/6 systolic murmur at the URSB.      Comments: Right carotid bruit greater than left          ECG 12 Lead    Date/Time: 9/2/2022 7:40 AM  Performed by: Mary Grace Dumas APRN  Authorized by: Mary Grace Dumas APRN   Comparison: compared with previous ECG   Similar to previous ECG  Rhythm: sinus rhythm  Rate: normal  QRS axis: normal                Current Outpatient Medications   Medication Sig Dispense Refill   • acetaminophen (TYLENOL) 325 MG tablet Take 2 tablets by mouth Every 4 (Four) Hours As Needed for Mild Pain .     • aspirin 81 MG chewable tablet Chew 81 mg Daily.     • carvedilol (COREG) 12.5 MG tablet Take 1 tablet by mouth 2 " (Two) Times a Day With Meals. 180 tablet 3   • Cholecalciferol (VITAMIN D3) 50 MCG (2000 UT) capsule TAKE 1 CAPSULE BY MOUTH DAILY. 100 capsule 1   • Flarex 0.1 % ophthalmic suspension      • fluorometholone (FML) 0.1 % ophthalmic suspension Administer 1 drop into the left eye Daily.     • furosemide (LASIX) 40 MG tablet TAKE 1 TABLET BY MOUTH EVERY DAY 90 tablet 1   • gabapentin (NEURONTIN) 100 MG capsule TAKE 1 CAPSULE BY MOUTH THREE TIMES A DAY OKAY TO TAKE 1 CAPSULE AT BEDTIME IF NEEDED FOR PAIN 120 capsule 5   • hydrocortisone 2.5 % cream      • Probiotic Product (PROBIOTIC DAILY PO) Take 1 capsule by mouth Daily.     • RABEprazole (ACIPHEX) 20 MG EC tablet TAKE 1 TABLET BY MOUTH EVERY DAY 90 tablet 0   • sodium chloride (CAYLA 128) 5 % ophthalmic solution Administer 1 drop to the right eye As Needed.     • sucralfate (CARAFATE) 1 GM/10ML suspension TAKE 10 ML BY MOUTH 3 TIMES A DAY FOR 30 MINUTES BEFORE MEALS     • valsartan (DIOVAN) 160 MG tablet Take 240 mg by mouth Daily.     • levothyroxine (SYNTHROID, LEVOTHROID) 50 MCG tablet TAKE 1 TABLET BY MOUTH EVERY DAY 90 tablet 0     No current facility-administered medications for this visit.     Assessment:       Diagnosis Plan   1. Heart murmur  Adult Transthoracic Echo Complete W/ Cont if Necessary Per Protocol   2. Shortness of breath  Basic Metabolic Panel    CBC (No Diff)    proBNP    Adult Transthoracic Echo Complete W/ Cont if Necessary Per Protocol        Orders Placed This Encounter   Procedures   • Basic Metabolic Panel     Standing Status:   Future     Number of Occurrences:   1     Standing Expiration Date:   9/1/2023     Order Specific Question:   Release to patient     Answer:   Routine Release   • CBC (No Diff)     Standing Status:   Future     Number of Occurrences:   1     Standing Expiration Date:   9/1/2023     Order Specific Question:   Release to patient     Answer:   Routine Release   • proBNP     Order Specific Question:   Release to  patient     Answer:   Routine Release   • Adult Transthoracic Echo Complete W/ Cont if Necessary Per Protocol     Standing Status:   Future     Number of Occurrences:   1     Standing Expiration Date:   9/1/2023     Order Specific Question:   Reason for exam?     Answer:   Dyspnea     Order Specific Question:   Reason for exam?     Answer:   Murmur or Click         Plan:       1.  87-year-old female with dyspnea on exertion.  CBC is stable.  BMP markable.  proBNP is normal.  Echocardiogram completed today shows normal left ventricular systolic function, aortic valve sclerosis with mild aortic valve regurgitation, moderate mitral annular calcification with trace mitral regurgitation and trivial pericardial effusion.  Chest x-ray today reveals no acute process but mild opacity abnormality.  Given patient's cough and shortness of breath and intermittent wheezing we have decided to proceed with CT of the chest for further evaluation.  If CT of the chest is unrevealing given her palpitations I will plan for Holter monitoring and see if her insurance will cover 2-week monitor.  2.  Lower extremity edema most likely due to venous insufficiency-there is some  chronicity associated.  She appears stable on current dose Lasix and her renal function stable so we will continue this at this time   3.  Hypertension blood pressure is being followed at home and reportedly gets lower than it is reading today.  They did not bring a list she had edema with amlodipine.  We will follow this clinically as of also found blood pressure reading in her chart of 126/68 within the past few months  4.  Hyperlipidemia currently just on Zetia unless she has documented ischemic disease would not switch to statin.  5.  Carotid artery stenosis apparently moderate on repeat duplex July 2021  6.  History of breast cancer status post left modified mastectomy chemotherapy and radiation therapy that was completed in May 2018.  7.  Palpitations brief-these  are lasting 30 seconds a couple times a week.  Apparently she has had prior evaluation for these that were negative but may need to consider extended Holter monitor  8.  Renal artery stenosis-reportedly stable but also patient does not want this intervened  9.  Coronary calcification noted on CT of the chest August 2021.  She had a negative perfusion stress test in 2019  10.  Rectocele and cystocele following with urogynecology-she has had issues with UTI and sepsis and has recently been on antibiotics  11.  Hypothyroidism on replacement therapy            It has been a pleasure to participate in this patient's care.      Thank you,  JESSA Nolasco      **I used Dragon to dictate this note:**

## 2022-09-02 ENCOUNTER — PATIENT MESSAGE (OUTPATIENT)
Dept: CARDIOLOGY | Facility: CLINIC | Age: 87
End: 2022-09-02

## 2022-09-02 DIAGNOSIS — R06.02 SHORTNESS OF BREATH: Primary | ICD-10-CM

## 2022-09-02 DIAGNOSIS — R05.9 COUGH: ICD-10-CM

## 2022-09-02 DIAGNOSIS — R93.89 ABNORMAL CHEST X-RAY: ICD-10-CM

## 2022-09-02 PROCEDURE — 93000 ELECTROCARDIOGRAM COMPLETE: CPT | Performed by: NURSE PRACTITIONER

## 2022-09-02 NOTE — TELEPHONE ENCOUNTER
Patient's daughter Jennifer called to report the earliest appt they were given for CT Scan at  was 9/22/22.  Please advise if OK to wait until then. / COLLINS Rodriguez can be reached at [phone]

## 2022-09-02 NOTE — TELEPHONE ENCOUNTER
Called and left a message for Jennifer to inform her Mary Grace changed the order to stat. / COLLINS

## 2022-09-07 ENCOUNTER — APPOINTMENT (OUTPATIENT)
Dept: CT IMAGING | Facility: HOSPITAL | Age: 87
End: 2022-09-07

## 2022-09-07 ENCOUNTER — HOSPITAL ENCOUNTER (OUTPATIENT)
Dept: CT IMAGING | Facility: HOSPITAL | Age: 87
Discharge: HOME | End: 2022-09-07
Admitting: NURSE PRACTITIONER

## 2022-09-07 DIAGNOSIS — R05.9 COUGH: ICD-10-CM

## 2022-09-07 DIAGNOSIS — R93.89 ABNORMAL CHEST X-RAY: ICD-10-CM

## 2022-09-07 DIAGNOSIS — R06.02 SHORTNESS OF BREATH: ICD-10-CM

## 2022-09-07 PROCEDURE — 71250 CT THORAX DX C-: CPT

## 2022-09-08 ENCOUNTER — TELEPHONE (OUTPATIENT)
Dept: CARDIOLOGY | Facility: CLINIC | Age: 87
End: 2022-09-08

## 2022-09-08 DIAGNOSIS — R00.2 PALPITATIONS: Primary | ICD-10-CM

## 2022-09-08 DIAGNOSIS — R06.02 SHORTNESS OF BREATH: ICD-10-CM

## 2022-09-08 NOTE — TELEPHONE ENCOUNTER
Please inform daughter, adebayo. Ct chest shows no acute process. There is old granuloma/inflamttion noted. No mass, no pleural effusion, no pericardial effusion, no pneumothorax/collapsed lung. There is thickening of the adrenal glad which is insignificant. Let adebayo know, I will order a heart monitor to be worn for 7-10 days to try and capture the heart racing episodes patient has experienced over the past month.

## 2022-09-08 NOTE — TELEPHONE ENCOUNTER
Called and spoke with patient's daughter. Went over results and recommendations. She verbalized understanding.    Scheduling,     Could you get her monitor scheduled soon?    Thank you,    Alice Robin, RN  Triage [facility]  09/08/22 09:01 EDT

## 2022-09-14 ENCOUNTER — TELEPHONE (OUTPATIENT)
Dept: ONCOLOGY | Facility: CLINIC | Age: 87
End: 2022-09-14

## 2022-09-14 NOTE — TELEPHONE ENCOUNTER
Caller: Geeta Butt    Relationship: Self    Best call back number: [phone]    What is the best time to reach you: ANYTIME TODAY    OR TOMORROW         Who are you requesting to speak with (clinical staff, provider,  specific staff member): DR WILLIS OR HER NURSE        What was the call regarding:     WAS ORDERED SOME ESTRADIOL .015% CREAM  FOR BLADDER CONDITION, THE BREAST CANCER  WAS FROM ESTROGEN AND  WANTED TO KNOW IF WOULD BE SAFE TO USE THIS CREAM ?     Do you require a callback: YES

## 2022-09-15 NOTE — TELEPHONE ENCOUNTER
Returned call to patient with the information that she can use the Estradiol cream only once or twice a week.  She v/u.

## 2022-09-20 ENCOUNTER — TELEPHONE (OUTPATIENT)
Dept: CARDIOLOGY | Facility: CLINIC | Age: 87
End: 2022-09-20

## 2022-09-20 NOTE — TELEPHONE ENCOUNTER
Notified patient's daughter of recommendations. She verbalized understanding. She stated that one of Geeta's other doctors doubled her fish oil medication a couple weeks ago so she is wondering if that could be the cause. She said she is ok with the patient staying on her curent dose of Lasix right now. She just wanted to make sure that the sulfa in the Lasix was not causing her problems.     Meena Mg RN  Triage [facility]

## 2022-09-20 NOTE — TELEPHONE ENCOUNTER
Patient's daughter Gracie called to report patient is highly allergic to Sulfa.  She read that Lasix contains some form of Sulfa.  Patient is currently taking 40 mg Lasix daily.  She asks if you feel this could be the cause of patient's dizziness, SOA, palpitations and blurred vision?  / COLLINS Bowman can be reached at [phone].

## 2022-09-20 NOTE — TELEPHONE ENCOUNTER
Triage- please call to discuss that she's been on lasix since may when Dr. Nguyen started it so I do not think it is causing any acute issues. If they would like to try holding it for a week to see if symptoms improve, I am ok with that. The other option is to decrease it to half x 1 wwk.  Also need to monitor for swelling off the water pill or with the lower dose.  Let me know what they feel most comfortable with.    Thank you

## 2022-09-20 NOTE — TELEPHONE ENCOUNTER
Left VM of recommendations on Gracie's VM and to call back with any further questions.    Meena Mg RN  Triage [facility] [place]

## 2022-09-21 ENCOUNTER — OFFICE VISIT (OUTPATIENT)
Dept: ONCOLOGY | Facility: CLINIC | Age: 87
End: 2022-09-21

## 2022-09-21 ENCOUNTER — LAB (OUTPATIENT)
Dept: LAB | Facility: HOSPITAL | Age: 87
End: 2022-09-21

## 2022-09-21 VITALS
TEMPERATURE: 96.9 F | HEART RATE: 67 BPM | WEIGHT: 163.2 LBS | SYSTOLIC BLOOD PRESSURE: 189 MMHG | DIASTOLIC BLOOD PRESSURE: 71 MMHG | OXYGEN SATURATION: 98 % | RESPIRATION RATE: 18 BRPM | HEIGHT: 61 IN | BODY MASS INDEX: 30.81 KG/M2

## 2022-09-21 DIAGNOSIS — Z17.0 MALIGNANT NEOPLASM OF LOWER-INNER QUADRANT OF LEFT BREAST IN FEMALE, ESTROGEN RECEPTOR POSITIVE: ICD-10-CM

## 2022-09-21 DIAGNOSIS — C50.312 MALIGNANT NEOPLASM OF LOWER-INNER QUADRANT OF LEFT BREAST IN FEMALE, ESTROGEN RECEPTOR POSITIVE: ICD-10-CM

## 2022-09-21 DIAGNOSIS — Z17.0 MALIGNANT NEOPLASM OF LOWER-INNER QUADRANT OF LEFT BREAST IN FEMALE, ESTROGEN RECEPTOR POSITIVE: Primary | ICD-10-CM

## 2022-09-21 DIAGNOSIS — C50.312 MALIGNANT NEOPLASM OF LOWER-INNER QUADRANT OF LEFT BREAST IN FEMALE, ESTROGEN RECEPTOR POSITIVE: Primary | ICD-10-CM

## 2022-09-21 LAB
ALBUMIN SERPL-MCNC: 4.4 G/DL (ref 3.5–5.2)
ALBUMIN/GLOB SERPL: 1.6 G/DL (ref 1.1–2.4)
ALP SERPL-CCNC: 89 U/L (ref 38–116)
ALT SERPL W P-5'-P-CCNC: 15 U/L (ref 0–33)
ANION GAP SERPL CALCULATED.3IONS-SCNC: 14.2 MMOL/L (ref 5–15)
AST SERPL-CCNC: 19 U/L (ref 0–32)
BASOPHILS # BLD AUTO: 0.11 10*3/MM3 (ref 0–0.2)
BASOPHILS NFR BLD AUTO: 1.2 % (ref 0–1.5)
BILIRUB SERPL-MCNC: 0.3 MG/DL (ref 0.2–1.2)
BUN SERPL-MCNC: 21 MG/DL (ref 6–20)
BUN/CREAT SERPL: 22.1 (ref 7.3–30)
CALCIUM SPEC-SCNC: 10 MG/DL (ref 8.5–10.2)
CHLORIDE SERPL-SCNC: 91 MMOL/L (ref 98–107)
CO2 SERPL-SCNC: 22.8 MMOL/L (ref 22–29)
CREAT SERPL-MCNC: 0.95 MG/DL (ref 0.6–1.1)
DEPRECATED RDW RBC AUTO: 41.8 FL (ref 37–54)
EGFRCR SERPLBLD CKD-EPI 2021: 58.5 ML/MIN/1.73
EOSINOPHIL # BLD AUTO: 0.36 10*3/MM3 (ref 0–0.4)
EOSINOPHIL NFR BLD AUTO: 3.8 % (ref 0.3–6.2)
ERYTHROCYTE [DISTWIDTH] IN BLOOD BY AUTOMATED COUNT: 12.7 % (ref 12.3–15.4)
GLOBULIN UR ELPH-MCNC: 2.8 GM/DL (ref 1.8–3.5)
GLUCOSE SERPL-MCNC: 123 MG/DL (ref 74–124)
HCT VFR BLD AUTO: 33.4 % (ref 34–46.6)
HGB BLD-MCNC: 11.2 G/DL (ref 12–15.9)
IMM GRANULOCYTES # BLD AUTO: 0.05 10*3/MM3 (ref 0–0.05)
IMM GRANULOCYTES NFR BLD AUTO: 0.5 % (ref 0–0.5)
LYMPHOCYTES # BLD AUTO: 2.27 10*3/MM3 (ref 0.7–3.1)
LYMPHOCYTES NFR BLD AUTO: 23.9 % (ref 19.6–45.3)
MCH RBC QN AUTO: 30 PG (ref 26.6–33)
MCHC RBC AUTO-ENTMCNC: 33.5 G/DL (ref 31.5–35.7)
MCV RBC AUTO: 89.5 FL (ref 79–97)
MONOCYTES # BLD AUTO: 0.96 10*3/MM3 (ref 0.1–0.9)
MONOCYTES NFR BLD AUTO: 10.1 % (ref 5–12)
NEUTROPHILS NFR BLD AUTO: 5.76 10*3/MM3 (ref 1.7–7)
NEUTROPHILS NFR BLD AUTO: 60.5 % (ref 42.7–76)
NRBC BLD AUTO-RTO: 0 /100 WBC (ref 0–0.2)
PLATELET # BLD AUTO: 326 10*3/MM3 (ref 140–450)
PMV BLD AUTO: 8.8 FL (ref 6–12)
POTASSIUM SERPL-SCNC: 4.5 MMOL/L (ref 3.5–4.7)
PROT SERPL-MCNC: 7.2 G/DL (ref 6.3–8)
RBC # BLD AUTO: 3.73 10*6/MM3 (ref 3.77–5.28)
SODIUM SERPL-SCNC: 128 MMOL/L (ref 134–145)
WBC NRBC COR # BLD: 9.51 10*3/MM3 (ref 3.4–10.8)

## 2022-09-21 PROCEDURE — 36415 COLL VENOUS BLD VENIPUNCTURE: CPT

## 2022-09-21 PROCEDURE — 80053 COMPREHEN METABOLIC PANEL: CPT

## 2022-09-21 PROCEDURE — 99214 OFFICE O/P EST MOD 30 MIN: CPT | Performed by: INTERNAL MEDICINE

## 2022-09-21 PROCEDURE — 85025 COMPLETE CBC W/AUTO DIFF WBC: CPT

## 2022-09-21 NOTE — PROGRESS NOTES
Subjective     REASON FOR CONSULTATION:   1. Left breast cancer [id number] ER/OH+ her2 neg post lumpectomy/SLN  3/18 and whole breast radiation completed May 2018  2.  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  Fatigue out of proportion to anemia  4.  Arimidex started in 3/18 with bone density showing normal density in the spine and osteopenia in the hips-this was self stopped due to vision changes.  5.  Aromasin initiated 10/20/18 and discontinued 19 secondary to vision changes.  6.  Tamoxifen initiated 2019 and discontinued 10/20/2019 secondary to vision changes                             REQUESTING PHYSICIAN:  Myles Soliman M.D.    History of Present Illness patient is an 85-year-old female with the above-mentioned history who is here today for reevaluation.  She has not been here for over a year and a half and was dealing with her  who  of cancer    Last year she fell and broke her hip was in rehab for 4 months and finally is home.    She is having issues with prolapse of her bladder and rectum and they recommended some vaginal estrogen prior to surgery and she was scared to take it before she saw me and therefore she is here for an opinion about this .  She  also she has been complaining of soreness in her left breast which she has been telling me for years and I think just forgot that it has been so chronic.  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    • Chronic kidney disease    • Chronic kidney disease 07/22/2021   • CKD (chronic kidney disease)     PT STATES STAGE 3   • Depression    • Diverticular disease    • Gastric ulcer     AND DUODENAL   • GERD (gastroesophageal reflux disease)    • Hearing loss     BILAT AIDES   • History of colon polyps    • History of depression    • History of GI bleed    • Hypertension    • Hypothyroidism    • Peptic ulceration    • Polyneuritis    • PONV (postoperative nausea and vomiting)    • Poor vision     RIGHT EYE, HAS PERIPHERAL VISION    • Stroke (HCC) 2000     mild weakness on left, partial sight loss on right    • UTI (urinary tract infection) 10/17/2017        Past Surgical History:   Procedure Laterality Date   • ABDOMINAL SURGERY     • APPENDECTOMY     • BLADDER REPAIR      AND RECTOCELE   • BREAST BIOPSY     • BREAST CYST ASPIRATION     • BREAST LUMPECTOMY WITH SENTINEL NODE BIOPSY Left 3/19/2018    Procedure: BREAST LUMPECTOMY WITH SENTINEL NODE BIOPSY AND NEEDLE LOCALIZATION;  Surgeon: Myles Soliman MD;  Location: [facility] OR [facility];  Service: General   • CHOLECYSTECTOMY     • COLONOSCOPY  2013   • COLONOSCOPY N/A 2/16/2018    Procedure: COLONOSCOPY into cecum and T.I. with biopsies;  Surgeon: Augustine Gallego MD;  Location: [facility] ENDOSCOPY;  Service:    • ENDOSCOPY N/A 10/17/2017    Procedure: ESOPHAGOGASTRODUODENOSCOPY WITH COLD BIOPSIES;  Surgeon: Augustine Gallego MD;  [facility];  Service:    • ENDOSCOPY N/A 2/16/2018    Procedure: ESOPHAGOGASTRODUODENOSCOPY with biopsies and #54 [address] DILATATION;  Surgeon: Augustine Gallego MD;  Location: [facility] ENDOSCOPY;  Service:    • ENDOSCOPY N/A 3/19/2020    Procedure: ESOPHAGOGASTRODUODENOSCOPY with biopsies;  Surgeon: Augustine Gallego MD;  Location: [facility] ENDOSCOPY;  Service: Gastroenterology;  Laterality: N/A;  PRE- MELENA, EPIGASTRIC PAIN  POST- eerosive gastritis, duodenal ulcer, hiatal hernia   • ENDOSCOPY  N/A 7/29/2020    Procedure: ESOPHAGOGASTRODUODENOSCOPY WITH DUODENAL ASPIRATE AND COLD BIOPSIES;  Surgeon: Augustine Gallego MD;  Location: [facility] ENDOSCOPY;  Service: Gastroenterology;  Laterality: N/A;  PRE: HX ULCER DISEASE  POST: GASTRITIS, HEALED ULCER   • EYE SURGERY Left 2014    3-4 years, cornea replacement    • FEMUR IM NAILING/RODDING Right 8/3/2021    Procedure: FEMUR INTRAMEDULLARY NAILING/RODDING;  Surgeon: Garett Martin II, MD;  Location: [facility] MAIN OR;  Service: Orthopedics;  Laterality: Right;   • HIP SURGERY  08/03/2021   • HYSTERECTOMY     • MOUTH SURGERY  2000    UNDER TONGUE AND LEFT FLOOR OF MOUTH FOR CA   • PARATHYROIDECTOMY      PER PT   • SIGMOIDOSCOPY N/A 7/29/2020    Procedure: FLEXIBLE SIGMOIDOSCOPY TO DESCENDING COLON WITH COLD BIOPSIES;  Surgeon: Augustine Gallego MD;  Location: [facility] ENDOSCOPY;  Service: Gastroenterology;  Laterality: N/A;  PRE: RECTAL BLEEDING  POST: DIVERTICULOSIS, HEMORRHOIDS, MINIMAL PROCTITIS   • SINUS SURGERY     • SKIN BIOPSY     • THYROID SURGERY     • VARICOSE VEIN SURGERY      Oncological history  patient is an 82-year-old female with recent problems with abdominal pain and diarrhea and weight loss which is being evaluated by GI and finally found to have multiple ulcers in the duodenum and small bowel finally responding to treatment.  In the middle of this she went for routine mammogram a couple of months later was found to have an abnormality in the left breast at 7 o'clock position measuring about 12 mm in size that was not present last year.  The patient had previously had a left breast biopsy in 2015 with benign findings.   There was no palpable mass and biopsy was done and this revealed a grade 1 infiltrating ductal carcinoma at least 1 cm in size ER 78%/[place] 17% positive HER-2 negative.  The patient was referred to Dr. Soliman who performed a lumpectomy and sentinel node biopsy with the findings of a 1.2 cm tumor grade 2 with lymphovascular  invasion and clear margins with associated DCIS.  One of 2 sentinel nodes showed a micrometastasis although the pathologist said this was almost too small to be considered a micrometastasis.  Postoperatively she has done well and thankfully her GI complains of much improved with Carafate and Protonix and her diarrhea finally resolved.    She's not had a bone density in quite a while.  She is  5 para 5  menarche at age 15 and menopause in her mid 50s although she had a hysterectomy at 30 for an ulcerated uterus.  First childbirth was at age 20 she breast-fed all 5 children.  She took hormonal therapy for at least 15 years after menopause and stopped about 10 years ago   .  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    Her father  at 52 of stomach cancer mother had thyroid cancer at age 50 her brother's daughter had breast cancer age 40 and she had a paternal aunt with leukemia is no other breast cancer or ovarian cancer uterine cancer or pancreatic cancer in the family . I did ask him to check with her niece to see if she has had genetic testing - they do not know much at all about the maternal family .    She will call for the results of the DEXA scan and if adequate we will start Arimidex which I have  already prescribed to her and see her back in 3 months to assess her tolerance.  She is in very good health and I explained to her that adjuvant therapy decreases the risk of recurrence of her cancer which might be in the 15-20% range probably down to the 10% range and if she has significant side effects we will be inclined to stop treatment.  Obviously if her GI symptoms recur with Arimidex we will have to try another medications.  We talked about the joint pains and hot flashes which are unlikely at her age and she was agreeable to a trial of Arimidex.  Radiation has been planned      In 10/2018 she was changed to Aromasin as she had  issues previously with Arimidex with visual blurring.  The patient was then seen 1/25/19 with reports of blurred vision once again.    At that point the patient's symptoms have essentially resolved.  We therefore elected to switch her to Femara.    Unfortunately, the patient ultimately experienced similar symptoms with Femara because of muscle cramps and blurred vision.      I discussed with her today that Dr. Valenzuela would like the patient to consider taking tamoxifen as yet another potential medication to benefit her in light of history of hormone positive breast cancer.  It is noted that the patient takes Prozac and has been on this for many many years.  There is a potential interaction and I plan to discuss this with Dr. Valenzuela further.  If okayed per Dr. Valenzuela, we would plan then to start the patient on this as soon as next week.        Current Outpatient Medications on File Prior to Visit   Medication Sig Dispense Refill   • acetaminophen (TYLENOL) 325 MG tablet Take 2 tablets by mouth Every 4 (Four) Hours As Needed for Mild Pain .     • aspirin 81 MG chewable tablet Chew 81 mg Daily.     • carvedilol (COREG) 12.5 MG tablet Take 1 tablet by mouth 2 (Two) Times a Day With Meals. 180 tablet 3   • Cholecalciferol (VITAMIN D3) 50 MCG (2000 UT) capsule TAKE 1 CAPSULE BY MOUTH DAILY. 100 capsule 1   • Flarex 0.1 % ophthalmic suspension      • fluorometholone (FML) 0.1 % ophthalmic suspension Administer 1 drop into the left eye Daily.     • furosemide (LASIX) 40 MG tablet TAKE 1 TABLET BY MOUTH EVERY DAY 90 tablet 1   • gabapentin (NEURONTIN) 100 MG capsule TAKE 1 CAPSULE BY MOUTH THREE TIMES A DAY OKAY TO TAKE 1 CAPSULE AT BEDTIME IF NEEDED FOR PAIN 120 capsule 5   • hydrocortisone 2.5 % cream      • levothyroxine (SYNTHROID, LEVOTHROID) 50 MCG tablet TAKE 1 TABLET BY MOUTH EVERY DAY 90 tablet 0   • Probiotic Product (PROBIOTIC DAILY PO) Take 1 capsule by mouth Daily.     • RABEprazole (ACIPHEX) 20 MG EC tablet TAKE  1 TABLET BY MOUTH EVERY DAY 90 tablet 0   • sodium chloride (CAYLA 128) 5 % ophthalmic solution Administer 1 drop to the right eye As Needed.     • sucralfate (CARAFATE) 1 GM/10ML suspension TAKE 10 ML BY MOUTH 3 TIMES A DAY FOR 30 MINUTES BEFORE MEALS     • valsartan (DIOVAN) 160 MG tablet Take 240 mg by mouth Daily.       No current facility-administered medications on file prior to visit.        ALLERGIES:    Allergies   Allergen Reactions   • Other Nausea Only     All mycins   • Sulfa Antibiotics Unknown - High Severity     LOST CONSCIOUSNESS AND BODY TURNED RED/ON FIRE   • Amlodipine Swelling   • Ciprofloxacin Unknown - High Severity     RED BLISTERS   • Doxycycline Nausea Only   • Iodine Unknown - High Severity     DECADES AGO, IODINE INJECTION CAUSED SKIN REDNESS AND BURNING   • Macrodantin [Nitrofurantoin Macrocrystal] Diarrhea and Nausea And Vomiting   • Nitrofurantoin Nausea And Vomiting   • Yeast-Related Products Unknown - High Severity     MOUTH SORES AND BLADDER INFECTION   • Vancomycin Nausea And Vomiting        Social History     Socioeconomic History   • Marital status:      Spouse name: Nicolas   • Years of education: High school   Tobacco Use   • Smoking status: Former Smoker     Packs/day: 1.00     Years: 10.00     Pack years: 10.00     Types: Cigarettes     Start date:      Quit date:      Years since quittin.7   • Smokeless tobacco: Never Used   • Tobacco comment: no caffiene   Vaping Use   • Vaping Use: Never used   Substance and Sexual Activity   • Alcohol use: No   • Drug use: No   • Sexual activity: Defer        Family History   Problem Relation Age of Onset   • Esophageal cancer Father    • Stomach cancer Father    • Heart disease Mother    • Thyroid cancer Mother    • Hypertension Mother    • Hypertension Sister    • Hypertension Brother    • Stroke Brother    • Heart disease Brother    • Diabetes Brother    • Leukemia Paternal Aunt    • Malig Hyperthermia Neg Hx      "    Review of Systems   Constitutional: Positive for fatigue. Negative for chills and fever.   Eyes: Negative for discharge and visual disturbance.   Respiratory: Negative for chest tightness and shortness of breath.    Cardiovascular: Negative for chest pain and leg swelling.   Gastrointestinal: Positive for diarrhea (see HPI). Negative for abdominal distention, abdominal pain and nausea.   Musculoskeletal: Positive for back pain and joint swelling.   Skin: Negative for pallor and rash.   Neurological: Negative for dizziness and numbness.   Psychiatric/Behavioral: Negative for behavioral problems and confusion.   6/3/2020 ROS unchanged from above except as updated.    Objective     Vitals:    09/21/22 1136   BP: (!) 189/71   Pulse: 67   Resp: 18   Temp: 96.9 °F (36.1 °C)   TempSrc: Temporal   SpO2: 98%   Weight: 74 kg (163 lb 3.2 oz)   Height: 154.9 cm (60.98\")   PainSc: 0-No pain       Current Status 3/26/2021   ECOG score 0       Physical Exam   Constitutional: She is oriented to person, place, and time. She appears well-developed. No distress.   Pulmonary/Chest: Effort normal. No respiratory distress.       Neurological: She is alert and oriented to person, place, and time.   Skin: Skin is warm and dry.   Breast exam is benign with no new masses tenderness in the left breast is chronic  I have reexamined the patient and the results are consistent with the previously documented exam. Omi Valenzuela MD     RECENT LABS:    Results from last 7 days   Lab Units 09/21/22  1030   WBC 10*3/mm3 9.51   NEUTROS ABS 10*3/mm3 5.76   HEMOGLOBIN g/dL 11.2*   HEMATOCRIT % 33.4*   PLATELETS 10*3/mm3 326             Lab Results   Component Value Date    IRON 9 (L) 08/23/2021    TIBC 267 (L) 08/23/2021    FERRITIN 247 (H) 01/14/2022       R 78% VA 17% Her 2 -neg Ki-67 7%  Final Diagnosis   1.  BREAST, LEFT, LUMPECTOMY:                INVASIVE MAMMARY CARCINOMA OF NO SPECIAL TYPE (INVASIVE DUCTAL CARCINOMA) AND                "        FOCAL ASSOCIATED DUCTAL CARCINOMA IN SITU (DCIS).                SEE SYNOPTIC REPORT (BELOW) FOR ADDITIONAL DETAILS.     2.  SENTINEL LYMPH NODE #1, LEFT AXILLA, EXCISION:                ONE LYMPH NODE, POSITIVE FOR METASTATIC CARCINOMA (MICROMETASTASIS) (SEE COMMENT).               NO EXTRANODAL EXTENSION IS IDENTIFIED.     COMMENT: Measuring the exact size of the lymph node metastasis is difficult. The lymph node demonstrates a few very small scattered foci of tumor cells, compatible with isolated tumor cells.  One slide demonstrates a single contiguous focus of tumor cells exceeding 0.2 mm; therefore, this is best considered a micrometastasis rather than isolated tumor cells.     3.  SENTINEL LYMPH NODE #2, LEFT AXILLA, EXCISION.               ONE LYMPH NODE, NEGATIVE FOR METASTATIC CARCINOMA.     SYNOPTIC REPORT (Based on [facility] cancer case summary, version January 2018):               Procedure: Excision (less than total mastectomy).               Specimen laterality: Left.                Tumor site: Not specified.                Tumor size: 1.2 x 1.1 x 1 cm.               Histologic type: Invasive carcinoma of no special type (ductal, not otherwise specified).                Histologic grade (Melony Histologic Score):                              Glandular (acinar)/tubular differentiation: Score 3.                            Nuclear pleomorphism: Score 2.                             Mitotic rate: Score 1.                            Overall grade: Grade 2 (Score 6 of 9).               Tumor focality: Single focus of invasive carcinoma.                Ductal carcinoma in situ (DCIS): Present.                            Architectural patterns: Solid.                             Nuclear grade: Grade II (intermediate).                            Necrosis: Present, central comedonecrosis.                Lobular carcinoma in situ (LCIS): No LCIS in specimen.               Margins:                             Invasive carcinoma margins: Uninvolved by invasive carcinoma.                                          Distance from closest margin: 3 mm (medial margin).                                          NOTE: Invasive carcinoma also extends to within 4 mm of superior margin and to within                                                 4 mm of lateral margin.  All additional margins are greater than 10 mm from invasive                                                 carcinoma.                              DCIS Margins: Uninvolved by DCIS.                                          Distance from closest margin: 3 mm (medial margin).                    Regional Lymph nodes: Involved by tumor cells.                             Number of lymph nodes with macrometastases: 0.                            Number of lymph nodes with micrometastases: 1.                            Size of largest metastatic deposit: 0.4 mm.                            Extranodal extension: Not identified.                            Number of lymph nodes examined: 2.                            Number of sentinel lymph nodes examined: 2.                Treatment effect: No known presurgical therapy.                Lymphovascular invasion: Present.                Dermal Lymphovascular invasion: Not identified.                Pathologic stage classification.                            Primary tumor: pT1c.                            Regional lymph nodes: pN1mi(sn).                            Distant metastasis: Not applicable.                Additional pathologic findings:                             Ductal hyperplasia of the usual type.                             Fibrocystic changes with duct dilatation and stasis and focal apocrine metaplasia.                             Columnar cell change without atypia.                             Fibroinflammatory changes consistent with site of prior biopsy.                    Ancillary studies: ER, MN, HER-2/melanie, and  Ki-67 studies performed on previous biopsy specimen at                       outside facility ([facility]).               Duplex scan of extremity veins including responses to compression and other maneuvers; bilateral   Study Impression     • Right Common Femoral: No deep vein thrombosis noted.   • Right Saphenofemoral Junction: No superficial thrombophlebitis noted.   • Right Proximal Femoral: No deep vein thrombosis noted.   • Right Mid Femoral: No deep vein thrombosis noted.   • Right Distal Femoral: No deep vein thrombosis noted.   • Right Popliteal: No deep vein thrombosis noted.   • Right Posterior Tibial: No deep vein thrombosis noted.   • Right Peroneal: No deep vein thrombosis noted.   • Right Gastrocnemius Soleal: No deep vein thrombosis noted.   • Right Greater Saphenous Above Knee: No superficial thrombophlebitis noted.   • Right Greater Saphenous Below Knee: No superficial thrombophlebitis noted.   • Left Common Femoral: No deep vein thrombosis noted.   • Left Saphenofemoral Junction: No superficial thrombophlebitis noted.   • Left Proximal Femoral: No deep vein thrombosis noted.   • Left Mid Femoral: No deep vein thrombosis noted.   • Left Distal Femoral: No deep vein thrombosis noted.   • Left Popliteal: No deep vein thrombosis noted.   • Left Posterior Tibial: No deep vein thrombosis noted.   • Left Peroneal: No deep vein thrombosis noted.   • Left Gastrocnemius Soleal: No deep vein thrombosis noted.   • Left Greater Saphenous Above Knee: No superficial thrombophlebitis noted.   • Left Greater Saphenous Below Knee: No superficial thrombophlebitis noted.   Electronically signed by Nicolas Cotton MD on 10/4/19 at 1933 EDT    5/29/2020 bilateral screening mammogram negative.    At baseline EKG was done in the office today which shows normal sinus rhythm with a rate of 75.  QT/QTc 368/411.        Assessment & Plan      1.  T1c N1 jese N0 ER/AK positive HER-2 negative left breast cancer post  lumpectomy and radiation  · Arimidex started in 3/18 discontinued by the patient in 7/18 due to visual blurring which resolved.    · Aromasin started 11/2018. 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  She also has had weight gain. Aromasin discontinued.  · Patient seen early April 2019 and follow-up.  Patient agreeable to switch therapy to Femara 2.5 mg daily.  · Patient ultimately discontinuing Femara due to repeated arthralgias and blurred vision.  · The patient was transitioned to tamoxifen but ultimately discontinued this approximately 2 months later per her report today due to blurry vision.   · Recommend patient start trial toremifene.  She is hoping to be able to started early next week.  · Patient decided not to take toremifene and stop all hormonal blockade in 5/20  · Seen again in follow-up in 9/22 off all hormonal blockade had a fall and broke her hip but no obvious signs of cancer recurrence CA 15-3 pending    2.  Family history of breast cancer in a niece at a young age and father with cancer at age 51?  Genetic testing    4.  History of oral cancer in 1990    5.  Anemia, recently worked up per Dr. Ivan, internal medicine.   · Patient had EGD and colonoscopy per Dr. Gallego 1 year ago with no concerning findings.  Because of her history of GI ulcers however she was started on AcipHex per Dr. Ivan.  · 3/21/2019 showed iron saturation of 8% ferritin of 18. Patient started on trial of oral iron but was intolerant due to GI upset.  · Patient receiving 2 doses of IV Injectafer in May 2019.  Iron stores are now replete with iron saturation of 29% and hemoglobin having now normalized to 13.  · Rectal bleeding in 4/20 CAT scan shows duodenitis resumed ulcer medications    6.  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  Rectocele and cystocele.  Gynecologist recommended vaginal  estrogens I recommended she do it only once or twice a week    PLAN:  1.  Return in 6 months for follow-up   2.  CA 15-3 today and follow-up  I told her that the risk-benefit ratio was low based on her age with the use of vaginal estrogens

## 2022-09-22 ENCOUNTER — APPOINTMENT (OUTPATIENT)
Dept: CT IMAGING | Facility: HOSPITAL | Age: 87
End: 2022-09-22

## 2022-10-05 DIAGNOSIS — E03.9 HYPOTHYROIDISM, UNSPECIFIED TYPE: ICD-10-CM

## 2022-10-06 RX ORDER — LEVOTHYROXINE SODIUM 0.05 MG/1
TABLET ORAL
Qty: 90 TABLET | Refills: 0 | Status: SHIPPED | OUTPATIENT
Start: 2022-10-06 | End: 2023-02-16

## 2022-10-06 RX ORDER — VALSARTAN 160 MG/1
240 TABLET ORAL DAILY
Qty: 60 TABLET | Refills: 1 | Status: SHIPPED | OUTPATIENT
Start: 2022-10-06

## 2022-10-06 NOTE — TELEPHONE ENCOUNTER
Caller: MargaritaJay ferroe    Relationship: Emergency Contact    Best call back number: [phone]*    Requested Prescriptions:   Requested Prescriptions     Pending Prescriptions Disp Refills   • valsartan (DIOVAN) 160 MG tablet       Sig: Take 1.5 tablets by mouth Daily.        Pharmacy where request should be sent: [facility]/PHARMACY #[id number] [place], [place] - [address] - [phone]  - [phone] FX     Additional details provided by patient: PATIENT'S DAUGHTER CALLING STATING THAT THE PATIENT WILL RUN OUT OF MEDICATION ON Sunday, AND THE PHARMACY TOLD THE PATIENT THAT IT'S TOO EARLY TO REFILL. THE PATIENT IS CURRENTLY TAKING 1.5 TABLETS PER DAY, FOR A TOTAL  MG.    THE PATIENT'S DAUGHTER IS CONCERNED THAT THIS MEDICATION DOSAGE MAY HAVE BEEN ENTERED INCORRECTLY INTO THE SYSTEM.    Does the patient have less than a 3 day supply:  [x] Yes  [] No    Mitesh Dexter   10/06/22 13:25 EDT

## 2022-10-13 ENCOUNTER — OFFICE VISIT (OUTPATIENT)
Dept: INTERNAL MEDICINE | Facility: CLINIC | Age: 87
End: 2022-10-13

## 2022-10-13 VITALS
RESPIRATION RATE: 18 BRPM | DIASTOLIC BLOOD PRESSURE: 78 MMHG | TEMPERATURE: 98 F | HEIGHT: 60 IN | WEIGHT: 163 LBS | HEART RATE: 52 BPM | OXYGEN SATURATION: 97 % | SYSTOLIC BLOOD PRESSURE: 160 MMHG | BODY MASS INDEX: 32 KG/M2

## 2022-10-13 DIAGNOSIS — I10 ESSENTIAL HYPERTENSION: Primary | Chronic | ICD-10-CM

## 2022-10-13 DIAGNOSIS — E87.1 HYPONATREMIA: ICD-10-CM

## 2022-10-13 PROCEDURE — 99214 OFFICE O/P EST MOD 30 MIN: CPT | Performed by: FAMILY MEDICINE

## 2022-10-13 RX ORDER — ESTRADIOL 0.1 MG/G
2 CREAM VAGINAL WEEKLY
COMMUNITY

## 2022-10-13 NOTE — PROGRESS NOTES
"Chief Complaint  Follow-up (Pt has has a few concerns pt did fall not too long ago didn't go to hospital was just sore pt did hit her head pt has a current UTI seen obgyn yesterday ) and Hypertension    Subjective        Geeta Butt presents to [facility]  History of Present Illness     My colleague in the office who has since left our practice was working with her blood pressure since I saw her in May 2022.  Notes reviewed.  She is currently on carvedilol 12.5 twice daily, valsartan 160 mg 1.5 tablets daily.  Her blood pressure was quite high when it was recorded on 9/21/2022 when she saw her cancer doctor.  It was 189/71.    Her daughter is with her today to help give the history.    Her blood pressure has been erratic in the sense that the systolic blood pressure can range anywhere from the 130s to the 190s with the diastolic staying about in the 60s to 70s.  The daughter seems to notice a pattern when she lies down that her blood pressure will increase.  She has noticed this when she has had diagnostic testing completed.  I do not know off the top of my head while you physiologically that would happen with her conditions.  Her echo report which I reviewed was fairly unremarkable with some mild regurgitation in the mitral valve and a few minor problems and other valves.  No sign of congestive heart failure.  She does not seem like she is in any discomfort at the moment and no headache or chest pain.    Of note she has a chronic hyponatremia and her sodium typically stays in the 130s to 120s.  The latest showed 128 and she has had values that low before.  She is not on any fluid restriction.    She has seen a kidney doctor before and she mentions that it was not the best interaction.    Objective   Vital Signs:  /78   Pulse 52   Temp 98 °F (36.7 °C)   Resp 18   Ht 152.4 cm (60\")   Wt 73.9 kg (163 lb)   SpO2 97%   BMI 31.83 kg/m²   Estimated body mass index is 31.83 " "kg/m² as calculated from the following:    Height as of this encounter: 152.4 cm (60\").    Weight as of this encounter: 73.9 kg (163 lb).          Physical Exam  Vitals and nursing note reviewed.   Constitutional:       General: She is not in acute distress.     Appearance: Normal appearance.   Cardiovascular:      Rate and Rhythm: Normal rate and regular rhythm.      Heart sounds: Normal heart sounds. No murmur heard.  Pulmonary:      Effort: Pulmonary effort is normal.      Breath sounds: Normal breath sounds.   Neurological:      Mental Status: She is alert.        Result Review :  The following data was reviewed by: Grzegorz Nguyen MD on 10/13/2022:  Common labs    Common Labs 6/15/22 9/1/22 9/1/22 9/21/22 9/21/22     1142 1142 1030 1030   Glucose 105 (A)  91  123   BUN 20  25 (A)  21 (A)   Creatinine 0.97  1.00  0.95   Sodium 134  132 (A)  128 (A)   Potassium 4.7  4.3  4.5   Chloride 93 (A)  94 (A)  91 (A)   Calcium 9.7  9.7  10.0   Albumin     4.40   Total Bilirubin     0.3   Alkaline Phosphatase     89   AST (SGOT)     19   ALT (SGPT)     15   WBC  7.40  9.51    Hemoglobin  11.3 (A)  11.2 (A)    Hematocrit  33.3 (A)  33.4 (A)    Platelets  336  326    (A) Abnormal value                     Assessment and Plan   Diagnoses and all orders for this visit:    1. Essential hypertension (Primary)    2. Hyponatremia      This patient has a lot going on medically making working up the blood pressure difficult.  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  After reviewing all of the notes including cardiology's notes from September, I think I just need to talk with the cardiology group.  I am not sure what else I could order at the moment and I feel like I changing any medicines that I would be interfering with cardiology's work-up.  I will see what we can do to streamline the process some between me and her cardiologist.  For now continue all blood pressure " medicine as is including the carvedilol and valsartan hand.  Okay to continue Lasix for diuresis.       I spent 30 minutes caring for Geeta on this date of service. This time includes time spent by me in the following activities:  Follow Up   Return in about 19 days (around 11/1/2022) for Recheck - HTN.  Patient was given instructions and counseling regarding her condition or for health maintenance advice. Please see specific information pulled into the AVS if appropriate.

## 2022-10-14 ENCOUNTER — TELEPHONE (OUTPATIENT)
Dept: CARDIOLOGY | Facility: CLINIC | Age: 87
End: 2022-10-14

## 2022-10-14 ENCOUNTER — TELEPHONE (OUTPATIENT)
Dept: INTERNAL MEDICINE | Facility: CLINIC | Age: 87
End: 2022-10-14

## 2022-10-14 DIAGNOSIS — Z01.818 PREOPERATIVE CLEARANCE: ICD-10-CM

## 2022-10-14 DIAGNOSIS — R06.02 SHORTNESS OF BREATH: Primary | ICD-10-CM

## 2022-10-14 NOTE — TELEPHONE ENCOUNTER
----- Message from Grzegorz Nguyen MD sent at 10/14/2022  5:58 AM EDT -----      Please inform patient I received message from Dr. Nguyen about her BP being elevated. Please see of her or her daughter  Daisy can send a BP log into me. I am considering adding hydralazine 25 mg twice daily to her regimen     Please schedule her in 3 months with Dr. Strickland

## 2022-10-14 NOTE — TELEPHONE ENCOUNTER
Left voicemail for Geeta Butt requesting callback.    Thank you,  Zhane Adam RN  Triage Nurse [facility] [place]

## 2022-10-14 NOTE — TELEPHONE ENCOUNTER
Caller: Daisy Avila    Relationship: Emergency Contact    Best call back number: [phone]    What medication are you requesting: AUGMENTIN OR KELFEX     What are your current symptoms: URINARY TRACT INFECTION     How long have you been experiencing symptoms: ABOUT 1 WEEK.     Have you had these symptoms before:    [x] Yes  [] No    Have you been treated for these symptoms before:   [x] Yes  [] No    If a prescription is needed, what is your preferred pharmacy and phone number:  [facility]/pharmacy #[id number] - [place], [place] - [address]  - [phone]  - [phone]   [phone]      Additional notes: PATIENTS DAUGHTER STATES THE PATIENT HAS A URINARY TRACT INFECTION AND SHE HAS BEEN TRYING TO GET IN TOUCH WITH HER GYN BUT THEY ARE OUT OF OFFICE. PATIENTS DAUGHTER IS ASKING FOR DR. MURRIETA TO SEND AN ANTIBIOTIC OR SOMETHING TO HELP HER GET THROUGH THE WEEKEND DUE TO NOT BEING ABLE TO REACH THE OTHER PROVIDER.

## 2022-10-17 RX ORDER — CHLORAL HYDRATE 500 MG
1 CAPSULE ORAL
COMMUNITY

## 2022-10-17 RX ORDER — UBIDECARENONE 50 MG
1 CAPSULE ORAL DAILY
COMMUNITY

## 2022-10-17 NOTE — TELEPHONE ENCOUNTER
Hydralazine is ok in renal and stroke patients. She only has mild valvular regurgitation. Mitral calcification is not a culprit of high BP.  She would need to pass a non walking stress test to be cleared for surgery so I have ordered it. Will wait on BP values.

## 2022-10-17 NOTE — TELEPHONE ENCOUNTER
When patient calls back, please let her or her daughteradebayo know the monitor was benign showing no significant rhythm change. There are short episodes of tachycardia that do not sustain. Dizziness had no correlation in rhythm change. Still need update on BP readings.

## 2022-10-17 NOTE — TELEPHONE ENCOUNTER
Reviewed recommendations with Daisy and she verbalized understanding.  Daisy stated she would like to wait for consult to occur before patient does stress test.  Encouraged Daisy to schedule stress test when she receives call from scheduling.  Encouraged to schedule stress test about 1 week after consult (Nov.) and explained that she can always cancel stress test if they decide not to proceed with surgery.  Daisy stated she will do this.    Daisy stated she will send in BP/HR log at the end of the week for review.    Thank you,  Zhane Adam RN  Triage Nurse [facility]

## 2022-10-17 NOTE — TELEPHONE ENCOUNTER
Reviewed results and recommendations with Daisy, Geeta Butt daughter.  Daisy verbalized understanding of results and recommendations.   Daisy stated patient has gained a lot of weight due to a sedentary lifestyle.  Patient has multiple issues that limit her ability to exercise (back/neck problems, hip replacement in last year, knee pain), is exhausted with minimal activity, and due to problems with her stomach/bowels, eats a diet that is low in fruits/veggies and consists primarily of milk, cheese, biscuits, and some meats.  Explained that sedentary lifestyle, diet, and weight gain may be contributing to elevated blood pressure readings and Daisy verbalized understanding.     Cardiac Meds  Valsartan 160 mg, 1.5 tablets daily  Furosemide 40 mg, daily  Carvedilol 12.5 mg, BID  Aspirin 81 mg, daily  Fish Oil, daily (unsure of dose)  UBQH (Co-Q 10), daily (unsure of dose)    Daisy has a few questions/concerns:  1. Patient has a history of stroke and kidney issues.  She expressed concern about patient being on hydralazine due to this history.  2. Could elevated blood pressure be due to worsening mitral valve regurgitation?    3. Patient has a consult in November with urology for surgery to fix rectocele and cystocele.  Daisy expressed concern whether patient is candidate for this surgery and if it would be safe for patient to undergo given her current cardiac condition?     Requested Daisy have patient keep BP/HR log (twice daily, 1-2 hours after BP meds) and provide to office Friday via phone or MyChart.  Daisy stated she will do this.  Scheduled patient for 3 month f/u with Dr. Strickland at [place] office.    Please let me know how you would like to proceed.    Thank you,  Zhane Adam, RN  Triage Nurse [facility] [place]

## 2022-10-28 ENCOUNTER — PATIENT MESSAGE (OUTPATIENT)
Dept: CARDIOLOGY | Facility: CLINIC | Age: 87
End: 2022-10-28

## 2022-10-28 NOTE — TELEPHONE ENCOUNTER
From: Geeta Butt  To: JESSA Nolasco  Sent: 10/28/2022 2:15 PM EDT  Subject: Blood Pressure Recording requested for Geeta Butt    Following is bp readings for my mom Geeta Butt. she had a urinary infection and was on Keflex Oct.15th to Oct 24th not sure that is pertinent?   10/26 154/61 (54) and 173/65 (57)  10/25 143/59 (50) and 144/59 (58)  10/24 152/54 and 150/94 (64)  10/23 156/62 (57) and 142/56 (57)  10/22 155/64 and 144/60 (56)  10/21 189/75 (57) and 133/53 (59)  10/20 168/62 (54) and 170/63 (60)  10/19 168/70 (57) and 179/65 (62)  10/18 148/56 (56) and 147/54 (58)  10/17 150/63 (62)

## 2022-12-01 ENCOUNTER — OFFICE VISIT (OUTPATIENT)
Dept: INTERNAL MEDICINE | Facility: CLINIC | Age: 87
End: 2022-12-01

## 2022-12-01 VITALS
HEIGHT: 60 IN | SYSTOLIC BLOOD PRESSURE: 150 MMHG | HEART RATE: 68 BPM | WEIGHT: 166 LBS | BODY MASS INDEX: 32.59 KG/M2 | OXYGEN SATURATION: 97 % | TEMPERATURE: 97.3 F | DIASTOLIC BLOOD PRESSURE: 80 MMHG | RESPIRATION RATE: 18 BRPM

## 2022-12-01 DIAGNOSIS — R60.9 PERIPHERAL EDEMA: ICD-10-CM

## 2022-12-01 DIAGNOSIS — R33.9 URINARY RETENTION WITH INCOMPLETE BLADDER EMPTYING: ICD-10-CM

## 2022-12-01 DIAGNOSIS — N81.10 BLADDER PROLAPSE, FEMALE, ACQUIRED: ICD-10-CM

## 2022-12-01 DIAGNOSIS — I10 ESSENTIAL HYPERTENSION: Primary | Chronic | ICD-10-CM

## 2022-12-01 PROBLEM — R60.0 PERIPHERAL EDEMA: Status: ACTIVE | Noted: 2022-12-01

## 2022-12-01 PROBLEM — R60.0 PERIPHERAL EDEMA: Chronic | Status: ACTIVE | Noted: 2022-12-01

## 2022-12-01 PROCEDURE — 99214 OFFICE O/P EST MOD 30 MIN: CPT | Performed by: FAMILY MEDICINE

## 2022-12-01 RX ORDER — FUROSEMIDE 40 MG/1
TABLET ORAL
Qty: 135 TABLET | Refills: 1 | Status: SHIPPED | OUTPATIENT
Start: 2022-12-01 | End: 2023-02-16

## 2022-12-01 NOTE — PROGRESS NOTES
"Chief Complaint  Follow-up (Pt concerned about swelling of the body ) and Hypertension    Subjective        Geeta Butt presents to [facility]  History of Present Illness     She is scheduled with Dr. Strickland in January regarding her hypertension.  Reviewed recent cardiology notes and phone calls with the BP logs.  She is on the carvedilol and valsartan.  For the most part her blood pressure remains within acceptable range and heart rate is within normal limits.  She has blood pressure spikes from here and there but otherwise doing okay.    She is working with the urogynecologist to deal with her bladder prolapse which has been complicating her issue with her swelling.  I have her on furosemide at 40 mg daily but she is getting some increased swelling in her hands and some in her legs.  She is having difficulty getting her wedding ring off.  She says that she can urinate but she is not urinating completely and will have residual volume.  She is seeing the urogynecologist weekly.  Plan is to hopefully surgically repair the bladder prolapse.      Objective   Vital Signs:  /80 (BP Location: Left arm, Patient Position: Sitting, Cuff Size: Small Adult)   Pulse 68   Temp 97.3 °F (36.3 °C)   Resp 18   Ht 152.4 cm (60\")   Wt 75.3 kg (166 lb)   SpO2 97%   BMI 32.42 kg/m²   Estimated body mass index is 32.42 kg/m² as calculated from the following:    Height as of this encounter: 152.4 cm (60\").    Weight as of this encounter: 75.3 kg (166 lb).          Physical Exam  Vitals and nursing note reviewed.   Constitutional:       General: She is not in acute distress.     Appearance: Normal appearance.   Cardiovascular:      Rate and Rhythm: Normal rate and regular rhythm.      Heart sounds: Normal heart sounds. No murmur heard.     Comments: There is some swelling in her hands and trace edema in her feet bilaterally.  Pulmonary:      Effort: Pulmonary effort is normal.      Breath sounds: " Normal breath sounds.   Neurological:      Mental Status: She is alert.        Result Review :                Assessment and Plan   Diagnoses and all orders for this visit:    1. Essential hypertension (Primary)    2. Peripheral edema  -     furosemide (LASIX) 40 MG tablet; Take 1/2 tablet in the morning and 1 tablet at lunch.  Dispense: 135 tablet; Refill: 1    3. Bladder prolapse, female, acquired    4. Urinary retention with incomplete bladder emptying    Continue current blood pressure regimen as above.  I am going to increase her furosemide to 20 mg in the morning and 40 mg at lunch.  I explained to the patient that if she were to have lessened urine volume and/or not urinating nearly as frequently, this could be as a result of urinary retention and may end up needing to be drained sooner.  Hopefully they can get her surgery scheduled soon.  Cautioned to avoid high sodium foods.         Follow Up   Return in about 3 months (around 3/1/2023) for Medicare Wellness.  Patient was given instructions and counseling regarding her condition or for health maintenance advice. Please see specific information pulled into the AVS if appropriate.

## 2022-12-02 ENCOUNTER — TELEPHONE (OUTPATIENT)
Dept: INTERNAL MEDICINE | Facility: CLINIC | Age: 87
End: 2022-12-02

## 2022-12-02 NOTE — TELEPHONE ENCOUNTER
Caller: Daisy Avila    Relationship to patient: Emergency Contact    Best call back number: [phone]    Patient is needing: PATIENT'S DAUGHTER CALLED AND SAID THAT INSURANCE WILL NOT COVER THE FUROSEMIDE REFILL UNTIL February AND SHE IS NOT SURE WHY     THIS IS THE PRESCRIPTION THAT WAS SENT IN ON 12/01/22, SHE IS WANTING TO SEE WHAT NEEDS TO BE DONE TO GET THIS APPROVED FOR AN EARLIER  THAN February    SHE SAID SHE HAS SOME OF THE 40 MG TABLETS RIGHT NOW, BUT NOT ENOUGH FOR THE 1/2 IN THE MORNING AND FULL TABLET AT LUNCH     PATIENT'S DAUGHTER IS ON  [employer]BAL AGREEMENT

## 2022-12-05 NOTE — TELEPHONE ENCOUNTER
Pharmacy says its because her old script says she takes one tablet so it looks like shes taking one and a half tablets now which made her run out before the time she was supposed to

## 2022-12-19 DIAGNOSIS — M54.42 ACUTE LEFT-SIDED LOW BACK PAIN WITH LEFT-SIDED SCIATICA: ICD-10-CM

## 2022-12-19 DIAGNOSIS — M51.37 DDD (DEGENERATIVE DISC DISEASE), LUMBOSACRAL: ICD-10-CM

## 2022-12-19 NOTE — TELEPHONE ENCOUNTER
Rx Refill Note  Requested Prescriptions     Pending Prescriptions Disp Refills   • gabapentin (NEURONTIN) 100 MG capsule [Pharmacy Med Name: GABAPENTIN 100 MG CAPSULE] 120 capsule      Sig: TAKE 1 CAPSULE BY MOUTH THREE TIMES A DAY OKAY TO TAKE 1 CAPSULE AT BEDTIME IF NEEDED FOR PAIN      Last office visit with prescribing clinician: 12/1/2022   Last telemedicine visit with prescribing clinician: 3/9/2023   Next office visit with prescribing clinician: 3/9/2023         Pura Pineda MA  12/19/22, 12:59 EST

## 2022-12-20 RX ORDER — GABAPENTIN 100 MG/1
CAPSULE ORAL
Qty: 120 CAPSULE | Refills: 5 | Status: SHIPPED | OUTPATIENT
Start: 2022-12-20

## 2022-12-28 ENCOUNTER — APPOINTMENT (OUTPATIENT)
Dept: GENERAL RADIOLOGY | Facility: HOSPITAL | Age: 87
End: 2022-12-28
Payer: MEDICARE

## 2022-12-28 ENCOUNTER — HOSPITAL ENCOUNTER (OUTPATIENT)
Facility: HOSPITAL | Age: 87
Setting detail: OBSERVATION
Discharge: HOME | End: 2022-12-30
Attending: EMERGENCY MEDICINE | Admitting: EMERGENCY MEDICINE
Payer: MEDICARE

## 2022-12-28 ENCOUNTER — APPOINTMENT (OUTPATIENT)
Dept: MRI IMAGING | Facility: HOSPITAL | Age: 87
End: 2022-12-28
Payer: MEDICARE

## 2022-12-28 DIAGNOSIS — M54.32 LEFT SIDED SCIATICA: Primary | ICD-10-CM

## 2022-12-28 DIAGNOSIS — M54.16 LUMBAR BACK PAIN WITH RADICULOPATHY AFFECTING LEFT LOWER EXTREMITY: ICD-10-CM

## 2022-12-28 DIAGNOSIS — R52 INTRACTABLE PAIN: ICD-10-CM

## 2022-12-28 DIAGNOSIS — I10 HYPERTENSION NOT AT GOAL: ICD-10-CM

## 2022-12-28 LAB
ALBUMIN SERPL-MCNC: 4.1 G/DL (ref 3.5–5.2)
ALBUMIN/GLOB SERPL: 1.5 G/DL
ALP SERPL-CCNC: 69 U/L (ref 39–117)
ALT SERPL W P-5'-P-CCNC: 12 U/L (ref 1–33)
ANION GAP SERPL CALCULATED.3IONS-SCNC: 9 MMOL/L (ref 5–15)
AST SERPL-CCNC: 13 U/L (ref 1–32)
BASOPHILS # BLD AUTO: 0.13 10*3/MM3 (ref 0–0.2)
BASOPHILS NFR BLD AUTO: 1.6 % (ref 0–1.5)
BILIRUB SERPL-MCNC: 0.2 MG/DL (ref 0–1.2)
BUN SERPL-MCNC: 21 MG/DL (ref 8–23)
BUN/CREAT SERPL: 21.9 (ref 7–25)
CALCIUM SPEC-SCNC: 9.4 MG/DL (ref 8.6–10.5)
CHLORIDE SERPL-SCNC: 97 MMOL/L (ref 98–107)
CO2 SERPL-SCNC: 28 MMOL/L (ref 22–29)
CREAT SERPL-MCNC: 0.96 MG/DL (ref 0.57–1)
DEPRECATED RDW RBC AUTO: 39.1 FL (ref 37–54)
EGFRCR SERPLBLD CKD-EPI 2021: 57.7 ML/MIN/1.73
EOSINOPHIL # BLD AUTO: 0.32 10*3/MM3 (ref 0–0.4)
EOSINOPHIL NFR BLD AUTO: 3.9 % (ref 0.3–6.2)
ERYTHROCYTE [DISTWIDTH] IN BLOOD BY AUTOMATED COUNT: 12.1 % (ref 12.3–15.4)
GLOBULIN UR ELPH-MCNC: 2.8 GM/DL
GLUCOSE SERPL-MCNC: 102 MG/DL (ref 65–99)
HCT VFR BLD AUTO: 34.2 % (ref 34–46.6)
HGB BLD-MCNC: 10.7 G/DL (ref 12–15.9)
IMM GRANULOCYTES # BLD AUTO: 0.02 10*3/MM3 (ref 0–0.05)
IMM GRANULOCYTES NFR BLD AUTO: 0.2 % (ref 0–0.5)
LYMPHOCYTES # BLD AUTO: 1.99 10*3/MM3 (ref 0.7–3.1)
LYMPHOCYTES NFR BLD AUTO: 24 % (ref 19.6–45.3)
MCH RBC QN AUTO: 28 PG (ref 26.6–33)
MCHC RBC AUTO-ENTMCNC: 31.3 G/DL (ref 31.5–35.7)
MCV RBC AUTO: 89.5 FL (ref 79–97)
MONOCYTES # BLD AUTO: 0.92 10*3/MM3 (ref 0.1–0.9)
MONOCYTES NFR BLD AUTO: 11.1 % (ref 5–12)
NEUTROPHILS NFR BLD AUTO: 4.9 10*3/MM3 (ref 1.7–7)
NEUTROPHILS NFR BLD AUTO: 59.2 % (ref 42.7–76)
NRBC BLD AUTO-RTO: 0 /100 WBC (ref 0–0.2)
PLATELET # BLD AUTO: 349 10*3/MM3 (ref 140–450)
PMV BLD AUTO: 9.3 FL (ref 6–12)
POTASSIUM SERPL-SCNC: 4.4 MMOL/L (ref 3.5–5.2)
PROT SERPL-MCNC: 6.9 G/DL (ref 6–8.5)
RBC # BLD AUTO: 3.82 10*6/MM3 (ref 3.77–5.28)
SODIUM SERPL-SCNC: 134 MMOL/L (ref 136–145)
WBC NRBC COR # BLD: 8.28 10*3/MM3 (ref 3.4–10.8)

## 2022-12-28 PROCEDURE — 96375 TX/PRO/DX INJ NEW DRUG ADDON: CPT

## 2022-12-28 PROCEDURE — 72158 MRI LUMBAR SPINE W/O & W/DYE: CPT

## 2022-12-28 PROCEDURE — G0378 HOSPITAL OBSERVATION PER HR: HCPCS

## 2022-12-28 PROCEDURE — 25010000002 ONDANSETRON PER 1 MG: Performed by: EMERGENCY MEDICINE

## 2022-12-28 PROCEDURE — 25010000002 MORPHINE PER 10 MG: Performed by: EMERGENCY MEDICINE

## 2022-12-28 PROCEDURE — 99285 EMERGENCY DEPT VISIT HI MDM: CPT

## 2022-12-28 PROCEDURE — 0 GADOBENATE DIMEGLUMINE 529 MG/ML SOLUTION: Performed by: EMERGENCY MEDICINE

## 2022-12-28 PROCEDURE — 72110 X-RAY EXAM L-2 SPINE 4/>VWS: CPT

## 2022-12-28 PROCEDURE — A9577 INJ MULTIHANCE: HCPCS | Performed by: EMERGENCY MEDICINE

## 2022-12-28 PROCEDURE — 80053 COMPREHEN METABOLIC PANEL: CPT | Performed by: PHYSICIAN ASSISTANT

## 2022-12-28 PROCEDURE — 36415 COLL VENOUS BLD VENIPUNCTURE: CPT

## 2022-12-28 PROCEDURE — 85025 COMPLETE CBC W/AUTO DIFF WBC: CPT | Performed by: PHYSICIAN ASSISTANT

## 2022-12-28 PROCEDURE — 96374 THER/PROPH/DIAG INJ IV PUSH: CPT

## 2022-12-28 PROCEDURE — 96376 TX/PRO/DX INJ SAME DRUG ADON: CPT

## 2022-12-28 PROCEDURE — 25010000002 METHYLPREDNISOLONE PER 125 MG: Performed by: EMERGENCY MEDICINE

## 2022-12-28 RX ORDER — ACETAMINOPHEN 325 MG/1
650 TABLET ORAL EVERY 4 HOURS PRN
Status: DISCONTINUED | OUTPATIENT
Start: 2022-12-28 | End: 2022-12-29

## 2022-12-28 RX ORDER — PANTOPRAZOLE SODIUM 40 MG/1
40 TABLET, DELAYED RELEASE ORAL EVERY MORNING
Status: DISCONTINUED | OUTPATIENT
Start: 2022-12-29 | End: 2022-12-30 | Stop reason: HOSPADM

## 2022-12-28 RX ORDER — SODIUM CHLORIDE 0.9 % (FLUSH) 0.9 %
10 SYRINGE (ML) INJECTION EVERY 12 HOURS SCHEDULED
Status: DISCONTINUED | OUTPATIENT
Start: 2022-12-28 | End: 2022-12-30 | Stop reason: HOSPADM

## 2022-12-28 RX ORDER — NALOXONE HCL 0.4 MG/ML
0.4 VIAL (ML) INJECTION
Status: DISCONTINUED | OUTPATIENT
Start: 2022-12-28 | End: 2022-12-30 | Stop reason: HOSPADM

## 2022-12-28 RX ORDER — LEVOTHYROXINE SODIUM 0.05 MG/1
50 TABLET ORAL DAILY
Status: DISCONTINUED | OUTPATIENT
Start: 2022-12-29 | End: 2022-12-30 | Stop reason: HOSPADM

## 2022-12-28 RX ORDER — NITROGLYCERIN 0.4 MG/1
0.4 TABLET SUBLINGUAL
Status: DISCONTINUED | OUTPATIENT
Start: 2022-12-28 | End: 2022-12-30 | Stop reason: HOSPADM

## 2022-12-28 RX ORDER — ACETAMINOPHEN 160 MG
2000 TABLET,DISINTEGRATING ORAL DAILY
Status: DISCONTINUED | OUTPATIENT
Start: 2022-12-29 | End: 2022-12-28

## 2022-12-28 RX ORDER — ONDANSETRON 2 MG/ML
4 INJECTION INTRAMUSCULAR; INTRAVENOUS EVERY 6 HOURS PRN
Status: DISCONTINUED | OUTPATIENT
Start: 2022-12-28 | End: 2022-12-30 | Stop reason: HOSPADM

## 2022-12-28 RX ORDER — LABETALOL HYDROCHLORIDE 5 MG/ML
20 INJECTION, SOLUTION INTRAVENOUS ONCE
Status: COMPLETED | OUTPATIENT
Start: 2022-12-28 | End: 2022-12-28

## 2022-12-28 RX ORDER — CARVEDILOL 6.25 MG/1
12.5 TABLET ORAL ONCE
Status: COMPLETED | OUTPATIENT
Start: 2022-12-28 | End: 2022-12-28

## 2022-12-28 RX ORDER — SODIUM CHLORIDE 0.9 % (FLUSH) 0.9 %
10 SYRINGE (ML) INJECTION AS NEEDED
Status: DISCONTINUED | OUTPATIENT
Start: 2022-12-28 | End: 2022-12-30 | Stop reason: HOSPADM

## 2022-12-28 RX ORDER — CARVEDILOL 12.5 MG/1
12.5 TABLET ORAL 2 TIMES DAILY WITH MEALS
Status: DISCONTINUED | OUTPATIENT
Start: 2022-12-29 | End: 2022-12-30 | Stop reason: HOSPADM

## 2022-12-28 RX ORDER — CHOLECALCIFEROL (VITAMIN D3) 125 MCG
5 CAPSULE ORAL NIGHTLY PRN
Status: DISCONTINUED | OUTPATIENT
Start: 2022-12-28 | End: 2022-12-30 | Stop reason: HOSPADM

## 2022-12-28 RX ORDER — FUROSEMIDE 20 MG/1
20 TABLET ORAL
Status: DISCONTINUED | OUTPATIENT
Start: 2022-12-29 | End: 2022-12-30 | Stop reason: HOSPADM

## 2022-12-28 RX ORDER — MORPHINE SULFATE 2 MG/ML
2 INJECTION, SOLUTION INTRAMUSCULAR; INTRAVENOUS EVERY 4 HOURS PRN
Status: DISCONTINUED | OUTPATIENT
Start: 2022-12-28 | End: 2022-12-30 | Stop reason: HOSPADM

## 2022-12-28 RX ORDER — ONDANSETRON 2 MG/ML
4 INJECTION INTRAMUSCULAR; INTRAVENOUS ONCE
Status: COMPLETED | OUTPATIENT
Start: 2022-12-28 | End: 2022-12-28

## 2022-12-28 RX ORDER — GABAPENTIN 100 MG/1
100 CAPSULE ORAL EVERY 8 HOURS SCHEDULED
Status: DISCONTINUED | OUTPATIENT
Start: 2022-12-28 | End: 2022-12-29

## 2022-12-28 RX ORDER — SUCRALFATE 1 G/1
1 TABLET ORAL
Status: DISCONTINUED | OUTPATIENT
Start: 2022-12-29 | End: 2022-12-30 | Stop reason: HOSPADM

## 2022-12-28 RX ORDER — ONDANSETRON 4 MG/1
4 TABLET, FILM COATED ORAL EVERY 6 HOURS PRN
Status: DISCONTINUED | OUTPATIENT
Start: 2022-12-28 | End: 2022-12-30 | Stop reason: HOSPADM

## 2022-12-28 RX ORDER — MORPHINE SULFATE 2 MG/ML
4 INJECTION, SOLUTION INTRAMUSCULAR; INTRAVENOUS ONCE
Status: COMPLETED | OUTPATIENT
Start: 2022-12-28 | End: 2022-12-28

## 2022-12-28 RX ORDER — SODIUM CHLORIDE 9 MG/ML
40 INJECTION, SOLUTION INTRAVENOUS AS NEEDED
Status: DISCONTINUED | OUTPATIENT
Start: 2022-12-28 | End: 2022-12-30 | Stop reason: HOSPADM

## 2022-12-28 RX ORDER — METHYLPREDNISOLONE SODIUM SUCCINATE 125 MG/2ML
80 INJECTION, POWDER, LYOPHILIZED, FOR SOLUTION INTRAMUSCULAR; INTRAVENOUS ONCE
Status: COMPLETED | OUTPATIENT
Start: 2022-12-28 | End: 2022-12-28

## 2022-12-28 RX ORDER — HYDROCODONE BITARTRATE AND ACETAMINOPHEN 5; 325 MG/1; MG/1
1 TABLET ORAL EVERY 4 HOURS PRN
Status: DISCONTINUED | OUTPATIENT
Start: 2022-12-28 | End: 2022-12-29

## 2022-12-28 RX ADMIN — MORPHINE SULFATE 4 MG: 2 INJECTION, SOLUTION INTRAMUSCULAR; INTRAVENOUS at 17:27

## 2022-12-28 RX ADMIN — GADOBENATE DIMEGLUMINE 15 ML: 529 INJECTION, SOLUTION INTRAVENOUS at 21:37

## 2022-12-28 RX ADMIN — CARVEDILOL 12.5 MG: 6.25 TABLET, FILM COATED ORAL at 18:34

## 2022-12-28 RX ADMIN — LABETALOL HYDROCHLORIDE 20 MG: 5 INJECTION, SOLUTION INTRAVENOUS at 18:41

## 2022-12-28 RX ADMIN — ONDANSETRON 4 MG: 2 INJECTION INTRAMUSCULAR; INTRAVENOUS at 17:28

## 2022-12-28 RX ADMIN — ONDANSETRON 4 MG: 2 INJECTION INTRAMUSCULAR; INTRAVENOUS at 22:46

## 2022-12-28 RX ADMIN — MORPHINE SULFATE 2 MG: 2 INJECTION, SOLUTION INTRAMUSCULAR; INTRAVENOUS at 22:46

## 2022-12-28 RX ADMIN — METHYLPREDNISOLONE SODIUM SUCCINATE 80 MG: 125 INJECTION, POWDER, FOR SOLUTION INTRAMUSCULAR; INTRAVENOUS at 17:27

## 2022-12-28 NOTE — ED PROVIDER NOTES
MD ATTESTATION NOTE    The SILVIO and I have discussed this patient's history, physical exam, and treatment plan.  I have reviewed the documentation and personally had a face to face interaction with the patient. I affirm the documentation and agree with the treatment and plan.  The attached note describes my personal findings.      I provided a substantive portion of the care of the patient.  I personally performed the physical exam in its entirety, and below are my findings.  For this patient encounter, the patient wore surgical mask, I wore full protective PPE including N95 and eye protection.      Brief HPI: Patient presents for evaluation of back pain.  Patient has had long history of back pain in the past.  States this has been going on for the last week.  Starts in her left buttock and goes down the left leg.  Patient has had no weakness or numbness.  No trauma    PHYSICAL EXAM  ED Triage Vitals [12/28/22 1411]   Temp Heart Rate Resp BP SpO2   96.1 °F (35.6 °C) 77 16 (!) 186/67 97 %      Temp src Heart Rate Source Patient Position BP Location FiO2 (%)   Tympanic Monitor Sitting Left arm --         GENERAL: no acute distress  HENT: nares patent  EYES: no scleral icterus  CV: regular rhythm, normal rate  RESPIRATORY: normal effort  ABDOMEN: soft  MUSCULOSKELETAL: no deformity.  Tenderness to left lower back  NEURO: alert, moves all extremities, follows commands  PSYCH:  calm, cooperative  SKIN: warm, dry    Vital signs and nursing notes reviewed.        Plan: Pain control.       Luis Knox MD  12/28/22 6919

## 2022-12-28 NOTE — ED PROVIDER NOTES
EMERGENCY DEPARTMENT ENCOUNTER    Room Number:  111/1  Date of encounter:  12/28/2022  PCP: Grzegorz Nguyen MD  Historian: Patient, daughter  Chronic or social conditions impacting care: Patient lives independently      I used full protective equipment while examining this patient.  This includes face mask, gloves and protective eyewear.  I washed my hands before entering the room and immediately upon leaving the room      HPI:  Chief Complaint: Low back pain  A complete HPI/ROS/PMH/PSH/SH/FH are unobtainable due to: Nothing    Context: Geeta Butt is a 87 y.o. female who presents to the ED c/o approximate 1 week history of atraumatic low back pain.  Patient localizes the pain to the left sacroiliac area.  The pain seems to radiate down the left leg to the foot.  She denies any numbness, tingling, weakness.  She reports the pain is sharp, stabbing, severe.  The pain is worse with ambulating.  She denies any saddle paresthesias or urinary incontinence.  She denies any abdominal pain, fever.  She does have a history of prior similar pain in the past.  The patient states this last flared up in December 2021.  She ultimately required an epidural and did very well with that.  The daughter brings her in here today with concerns for mobility and safety.    Review of Medical Records  I reviewed internal medicine office visit from 12/1/2022.  Patient being followed for hypertension.    PAST MEDICAL HISTORY  Active Ambulatory Problems     Diagnosis Date Noted   • Essential hypertension 10/16/2017   • Hypothyroidism 10/16/2017   • Black stool 10/16/2017   • Diarrhea 10/16/2017   • Nausea & vomiting 10/16/2017   • RUQ pain 10/16/2017   • Leukocytosis 10/16/2017   • Duodenitis 10/17/2017   • Foot pain, bilateral 10/19/2017   • Malignant neoplasm of lower-inner quadrant of left breast in female, estrogen receptor positive (HCC) 02/27/2018   • Iron deficiency anemia 04/04/2019   • Adverse effect of iron 05/09/2019   • GI bleed  03/18/2020   • Gastrointestinal hemorrhage associated with duodenitis 03/18/2020   • Hematochezia 03/19/2020   • Duodenal ulcer 03/19/2020   • Long-term use of high-risk medication 06/03/2020   • Acute left-sided low back pain with left-sided sciatica 07/02/2021   • DDD (degenerative disc disease), lumbosacral 07/02/2021   • GERD without esophagitis 07/02/2021   • Hyponatremia 07/04/2021   • Hyperlipidemia 07/22/2021   • Prediabetes 07/22/2021   • Depressive disorder 07/22/2021   • Closed fracture of right hip ([facility]) 07/31/2021   • Acute pyelonephritis 08/23/2021   • Sepsis secondary to UTI ([facility]) 08/24/2021   • Bilateral cataracts 11/07/2002   • Central retinal vein occlusion 11/07/2012   • Corneal endothelial dystrophy 11/07/2004   • Epiretinal membrane 11/07/2012   • Bladder prolapse, female, acquired 01/14/2022   • Superficial bruising 01/14/2022   • Neuropathy 01/28/2022   • Urinary retention with incomplete bladder emptying 01/28/2022   • Hx of bladder repair surgery 01/28/2022   • Cellulitis of right lower extremity 03/04/2022   • Stroke ([facility]) 2000   • Peripheral edema 12/01/2022     Resolved Ambulatory Problems     Diagnosis Date Noted   • DM2 (diabetes mellitus, type 2) ([facility]) 10/16/2017   • UTI (urinary tract infection) 10/17/2017   • GIULIA (acute kidney injury) ([facility]) 03/18/2020   • Precordial chest pain 07/02/2021   • Hyperglycemia 07/02/2021   • Chronic kidney disease 07/22/2021     Past Medical History:   Diagnosis Date   • Anesthesia complication    • Arthritis    • Aspiration into airway    • Breast cancer ([facility])    • Cancer ([facility]) 1990   • CKD (chronic kidney disease)    • Depression    • Diverticular disease    • Gastric ulcer    • GERD (gastroesophageal reflux disease)    • Hearing loss    • History of colon polyps    • History of depression    • History of GI bleed    • Hypertension    • Peptic ulceration    • Polyneuritis    • PONV (postoperative nausea and vomiting)    • Poor vision          PAST  SURGICAL HISTORY  Past Surgical History:   Procedure Laterality Date   • ABDOMINAL SURGERY     • APPENDECTOMY     • BLADDER REPAIR      AND RECTOCELE   • BREAST BIOPSY     • BREAST CYST ASPIRATION     • BREAST LUMPECTOMY WITH SENTINEL NODE BIOPSY Left 3/19/2018    Procedure: BREAST LUMPECTOMY WITH SENTINEL NODE BIOPSY AND NEEDLE LOCALIZATION;  Surgeon: Myles Soliman MD;  Location:  [facility];  Service: General   • CHOLECYSTECTOMY     • COLONOSCOPY  2013   • COLONOSCOPY N/A 2/16/2018    Procedure: COLONOSCOPY into cecum and T.I. with biopsies;  Surgeon: Augustine Gallego MD;  [facility];  Service:    • ENDOSCOPY N/A 10/17/2017    Procedure: ESOPHAGOGASTRODUODENOSCOPY WITH COLD BIOPSIES;  Surgeon: Augustine Gallego MD;  [facility];  Service:    • ENDOSCOPY N/A 2/16/2018    Procedure: ESOPHAGOGASTRODUODENOSCOPY with biopsies and #54 Arguello DILATATION;  Surgeon: Augustine Gallego MD;  [facility];  Service:    • ENDOSCOPY N/A 3/19/2020    Procedure: ESOPHAGOGASTRODUODENOSCOPY with biopsies;  Surgeon: Augustine Gallego MD;  [facility];  Service: Gastroenterology;  Laterality: N/A;  PRE- MELENA, EPIGASTRIC PAIN  POST- eerosive gastritis, duodenal ulcer, hiatal hernia   • ENDOSCOPY N/A 7/29/2020    Procedure: ESOPHAGOGASTRODUODENOSCOPY WITH DUODENAL ASPIRATE AND COLD BIOPSIES;  Surgeon: Augustine Gallego MD;  Location: [facility] ENDOSCOPY;  Service: Gastroenterology;  Laterality: N/A;  PRE: HX ULCER DISEASE  POST: GASTRITIS, HEALED ULCER   • EYE SURGERY Left 2014    3-4 years, cornea replacement    • FEMUR IM NAILING/RODDING Right 8/3/2021    Procedure: FEMUR INTRAMEDULLARY NAILING/RODDING;  Surgeon: Garett Martin II, MD;  Location: [facility] MAIN OR;  Service: Orthopedics;  Laterality: Right;   • HIP SURGERY  08/03/2021   • HYSTERECTOMY     • MOUTH SURGERY  2000    UNDER TONGUE AND LEFT FLOOR OF MOUTH FOR CA   • PARATHYROIDECTOMY      PER PT   •  SIGMOIDOSCOPY N/A 2020    Procedure: FLEXIBLE SIGMOIDOSCOPY TO DESCENDING COLON WITH COLD BIOPSIES;  Surgeon: Augustine Gallego MD;  Location: [facility] ENDOSCOPY;  Service: Gastroenterology;  Laterality: N/A;  PRE: RECTAL BLEEDING  POST: DIVERTICULOSIS, HEMORRHOIDS, MINIMAL PROCTITIS   • SINUS SURGERY     • SKIN BIOPSY     • THYROID SURGERY     • VARICOSE VEIN SURGERY           FAMILY HISTORY  Family History   Problem Relation Age of Onset   • Esophageal cancer Father    • Stomach cancer Father    • Heart disease Mother    • Thyroid cancer Mother    • Hypertension Mother    • Hypertension Sister    • Hypertension Brother    • Stroke Brother    • Heart disease Brother    • Diabetes Brother    • Leukemia Paternal Aunt    • Malig Hyperthermia Neg Hx          SOCIAL HISTORY  Social History     Socioeconomic History   • Marital status:      Spouse name: Nicolas   • Years of education: High school   Tobacco Use   • Smoking status: Former     Packs/day: 1.00     Years: 10.00     Pack years: 10.00     Types: Cigarettes     Start date:      Quit date:      Years since quittin.0   • Smokeless tobacco: Never   • Tobacco comments:     no caffiene   Vaping Use   • Vaping Use: Never used   Substance and Sexual Activity   • Alcohol use: No   • Drug use: No   • Sexual activity: Defer         ALLERGIES  Other, Sulfa antibiotics, Amlodipine, Ciprofloxacin, Doxycycline, Iodine, Macrodantin [nitrofurantoin macrocrystal], Nitrofurantoin, Yeast-related products, and Vancomycin        REVIEW OF SYSTEMS  All systems reviewed and negative except for those discussed in HPI.       PHYSICAL EXAM    I have reviewed the triage vital signs and nursing notes.    ED Triage Vitals [22 1411]   Temp Heart Rate Resp BP SpO2   96.1 °F (35.6 °C) 77 16 (!) 186/67 97 %      Temp src Heart Rate Source Patient Position BP Location FiO2 (%)   Tympanic Monitor Sitting Left arm --       Physical Exam  GENERAL: Alert, oriented,  elderly, not distressed  HENT: head atraumatic, no nuchal rigidity  EYES: no scleral icterus, EOMI  CV: regular rhythm, regular rate, no murmur  RESPIRATORY: normal effort, CTA  ABDOMEN: soft, nontender  MUSCULOSKELETAL: Moderate tenderness at the left sacroiliac joint.  No vertebral tenderness.  NEURO: alert, 5/5 strength in lower extremities bilaterally.  Negative straight leg raise bilaterally.  SKIN: warm, dry        LAB RESULTS  Recent Results (from the past 24 hour(s))   Comprehensive Metabolic Panel    Collection Time: 12/28/22  5:19 PM    Specimen: Blood   Result Value Ref Range    Glucose 102 (H) 65 - 99 mg/dL    BUN 21 8 - 23 mg/dL    Creatinine 0.96 0.57 - 1.00 mg/dL    Sodium 134 (L) 136 - 145 mmol/L    Potassium 4.4 3.5 - 5.2 mmol/L    Chloride 97 (L) 98 - 107 mmol/L    CO2 28.0 22.0 - 29.0 mmol/L    Calcium 9.4 8.6 - 10.5 mg/dL    Total Protein 6.9 6.0 - 8.5 g/dL    Albumin 4.1 3.5 - 5.2 g/dL    ALT (SGPT) 12 1 - 33 U/L    AST (SGOT) 13 1 - 32 U/L    Alkaline Phosphatase 69 39 - 117 U/L    Total Bilirubin 0.2 0.0 - 1.2 mg/dL    Globulin 2.8 gm/dL    A/G Ratio 1.5 g/dL    BUN/Creatinine Ratio 21.9 7.0 - 25.0    Anion Gap 9.0 5.0 - 15.0 mmol/L    eGFR 57.7 (L) >60.0 mL/min/1.73   CBC Auto Differential    Collection Time: 12/28/22  5:19 PM    Specimen: Blood   Result Value Ref Range    WBC 8.28 3.40 - 10.80 10*3/mm3    RBC 3.82 3.77 - 5.28 10*6/mm3    Hemoglobin 10.7 (L) 12.0 - 15.9 g/dL    Hematocrit 34.2 34.0 - 46.6 %    MCV 89.5 79.0 - 97.0 fL    MCH 28.0 26.6 - 33.0 pg    MCHC 31.3 (L) 31.5 - 35.7 g/dL    RDW 12.1 (L) 12.3 - 15.4 %    RDW-SD 39.1 37.0 - 54.0 fl    MPV 9.3 6.0 - 12.0 fL    Platelets 349 140 - 450 10*3/mm3    Neutrophil % 59.2 42.7 - 76.0 %    Lymphocyte % 24.0 19.6 - 45.3 %    Monocyte % 11.1 5.0 - 12.0 %    Eosinophil % 3.9 0.3 - 6.2 %    Basophil % 1.6 (H) 0.0 - 1.5 %    Immature Grans % 0.2 0.0 - 0.5 %    Neutrophils, Absolute 4.90 1.70 - 7.00 10*3/mm3    Lymphocytes, Absolute 1.99  0.70 - 3.10 10*3/mm3    Monocytes, Absolute 0.92 (H) 0.10 - 0.90 10*3/mm3    Eosinophils, Absolute 0.32 0.00 - 0.40 10*3/mm3    Basophils, Absolute 0.13 0.00 - 0.20 10*3/mm3    Immature Grans, Absolute 0.02 0.00 - 0.05 10*3/mm3    nRBC 0.0 0.0 - 0.2 /100 WBC       Ordered the above labs and independently reviewed the results.        RADIOLOGY  XR Spine Lumbar Complete 4+VW    Result Date: 12/28/2022  XR SPINE LUMBAR COMPLETE 4+VW-  INDICATIONS: Pain  TECHNIQUE: 5 views of the lumbar spine  COMPARISON: 07/02/2021  FINDINGS:  Mild retrolisthesis of L3 on L4. Multilevel endplate spurring, disc space narrowing, facet arthropathy are apparent. Anterior wedging of L1, with about 30% loss of height appears to be a change from the prior exam, but this appearance could be exaggerated by scoliosis that is present. Arterial calcifications are seen. Follow-up/further evaluation can be obtained as indicated.       As described.  This report was finalized on 12/28/2022 7:02 PM by Dr. Dominic Cosby M.D.      MRI Lumbar Spine With & Without Contrast    Result Date: 12/28/2022  LUMBAR SPINE MRI WITHOUT GADOLINIUM  HISTORY: Left-sided sciatica, history of oral cancer; M54.32-Sciatica, left side; R52-Pain, unspecified; I10-Essential (primary) hypertension  COMPARISON:  07/02/2021.  FINDINGS:  Multiplanar images of the lumbar spine obtained without and with gadolinium.  Axial images obtained from T12-S1.  No acute fracture or subluxation of the lumbar spine is seen. There is lumbar scoliosis, with convexity to the right. The patient is noted to have decreased signal intensity within the inferior endplate of L1 and superior endplate of L2 on T1-weighted imaging, with accompanying increased signal intensity on T2-weighted imaging. There is enhancement following contrast administration. Similar findings were present on prior study. There also appears to be some increased signal intensity within the anterior disc space on T2-weighted  imaging, associated with some enhancement. This is a new finding when compared to the prior exam. A component of discitis is not excluded. Intervertebral disc space narrowing is most significant at L4-L5. The conus terminates at L2. No paraspinous collections are seen. There are some Tarlov cysts noted within the sacrum.  T12-L1: There is mild disc bulge, without significant canal stenosis. There is no significant neural foraminal narrowing. L1-L2: The patient has diffuse disc bulge, but there is more focal left paracentral disc protrusion, which significantly narrows the left side of the canal and severely narrows the left neural foramen. This is much more apparent than on the prior study, and is associated with some increased signal intensity on the T2-weighted imaging, which may reflect annular tear. Focal herniation measures up to 7 mm in AP dimensions. L2-L3: There is asymmetric left-sided disc bulge. There is mild canal stenosis, secondary to both disc disease and some hypertrophy of ligamentum flavum. There is moderate bilateral neural foraminal narrowing. L3-L4: There is diffuse disc bulge. There is moderate to severe canal stenosis, secondary to both disc disease and hypertrophy of ligamentum flavum. There is severe bilateral neural foraminal narrowing, secondary both disc disease and facet hypertrophy. L4-L5: There is diffuse disc bulge. There is severe canal stenosis, secondary to disc disease and hypertrophy of ligamentum flavum. There is severe bilateral neural foraminal narrowing. L5-S1: There is diffuse disc bulge. There is some mild canal narrowing. There is bilateral neural foraminal narrowing.       1. The patient has diffuse disc bulge at L1-L2, but has developed a more focal left paracentral disc protrusion, which results in severe canal stenosis on the left, as well as severe neural foraminal narrowing on the left. It is associated with increased signal intensity on the T2-weighted imaging,  which may reflect annular tear. 2. The patient has Modic type I endplate changes again noted at L1-L2. However, on the current study, there is some increased signal intensity on T2-weighted imaging in the anterior aspect of the disc space, as well as enhancement on postcontrast imaging. Discitis would be a consideration. Correlation with any evidence of infection is recommended.  This report was finalized on 12/28/2022 11:37 PM by Dr. Jyoti Avalos M.D.        I ordered the above noted radiological studies. Reviewed by me and discussed with radiologist.  See dictation for official radiology interpretation.      MEDICATIONS GIVEN IN ER    Medications   gabapentin (NEURONTIN) capsule 100 mg (has no administration in time range)   nitroglycerin (NITROSTAT) SL tablet 0.4 mg (has no administration in time range)   sodium chloride 0.9 % flush 10 mL (10 mL Intravenous Not Given 12/28/22 2035)   sodium chloride 0.9 % flush 10 mL (has no administration in time range)   sodium chloride 0.9 % infusion 40 mL (has no administration in time range)   ondansetron (ZOFRAN) tablet 4 mg ( Oral Not Given:  See Alt 12/28/22 2246)     Or   ondansetron (ZOFRAN) injection 4 mg (4 mg Intravenous Given 12/28/22 2246)   acetaminophen (TYLENOL) tablet 650 mg (has no administration in time range)   HYDROcodone-acetaminophen (NORCO) 5-325 MG per tablet 1 tablet (has no administration in time range)   morphine injection 2 mg (2 mg Intravenous Given 12/28/22 2246)     And   naloxone (NARCAN) injection 0.4 mg (has no administration in time range)   melatonin tablet 5 mg (has no administration in time range)   carvedilol (COREG) tablet 12.5 mg (has no administration in time range)   furosemide (LASIX) tablet 20 mg (has no administration in time range)   levothyroxine (SYNTHROID, LEVOTHROID) tablet 50 mcg (has no administration in time range)   pantoprazole (PROTONIX) EC tablet 40 mg (has no administration in time range)   sucralfate (CARAFATE)  tablet 1 g (has no administration in time range)   valsartan (DIOVAN) tablet 240 mg (has no administration in time range)   morphine injection 4 mg (4 mg Intravenous Given 12/28/22 1727)   ondansetron (ZOFRAN) injection 4 mg (4 mg Intravenous Given 12/28/22 1728)   methylPREDNISolone sodium succinate (SOLU-Medrol) injection 80 mg (80 mg Intravenous Given 12/28/22 1727)   labetalol (NORMODYNE,TRANDATE) injection 20 mg (20 mg Intravenous Given 12/28/22 1841)   carvedilol (COREG) tablet 12.5 mg (12.5 mg Oral Given 12/28/22 1834)   gadobenate dimeglumine (MULTIHANCE) injection 15 mL (15 mL Intravenous Given 12/28/22 2137)         ADDITIONAL ORDERS CONSIDERED BUT NOT ORDERED:  Patient is neurologically in tact.  MRI will be ordered while admitted.      PROGRESS, DATA ANALYSIS, CONSULTS, AND MEDICAL DECISION MAKING    All labs have been independently reviewed by me.  All radiology studies have been reviewed by me and discussed with radiologist dictating the report.   EKG's independently viewed and interpreted by me.  Discussion below represents my analysis of pertinent findings related to patient's condition, differential diagnosis, treatment plan and final disposition.    I have discussed case with Dr. Knox, emergency room physician.  He has performed his own bedside examination and agrees with treatment plan.    ED Course as of 12/28/22 2348   Wed Dec 28, 2022   1716 Patient presents with approximate 10-day history of atraumatic left-sided low back pain with radiation down the left leg.  Patient denies any overt weakness, saddle paresthesias, incontinence.  Patient has been taking Neurontin and Tylenol at home without much improvement.  Patient has had similar episodes and required epidurals.  Plan for pain control, imaging, likely admission. [EE]   1758 WBC: 8.28 [EE]   1758 Creatinine: 0.96 [EE]   1817 I discussed patient with Dr. Woo, neurosurgery.  He agrees with work-up.  He will consult. [EE]   1915 I do  not believe giving patient a large amount of pain medicine to be in her best interest.  Plan to admit the patient overnight for MRI and hopeful epidural. [EE]   1921 I discussed patient with Sherry, physician assistant in the observation unit.  She agrees to admit. [EE]      ED Course User Index  [EE] Dean Lugo PA       AS OF 23:48 EST VITALS:    BP - 180/73  HR - 67  TEMP - 97.5 °F (36.4 °C) (Oral)  O2 SATS - 97%        DIAGNOSIS  Final diagnoses:   Left sided sciatica   Intractable pain   Hypertension not at goal         DISPOSITION  Admitted      Dictated utilizing Dragon dictation     Dean Lugo PA  12/28/22 5402

## 2022-12-29 LAB
ANION GAP SERPL CALCULATED.3IONS-SCNC: 9.4 MMOL/L (ref 5–15)
BASOPHILS # BLD AUTO: 0.03 10*3/MM3 (ref 0–0.2)
BASOPHILS NFR BLD AUTO: 0.3 % (ref 0–1.5)
BUN SERPL-MCNC: 22 MG/DL (ref 8–23)
BUN/CREAT SERPL: 22.2 (ref 7–25)
CALCIUM SPEC-SCNC: 9.7 MG/DL (ref 8.6–10.5)
CHLORIDE SERPL-SCNC: 94 MMOL/L (ref 98–107)
CO2 SERPL-SCNC: 26.6 MMOL/L (ref 22–29)
CREAT SERPL-MCNC: 0.99 MG/DL (ref 0.57–1)
CRP SERPL-MCNC: 0.36 MG/DL (ref 0–0.5)
DEPRECATED RDW RBC AUTO: 38.1 FL (ref 37–54)
EGFRCR SERPLBLD CKD-EPI 2021: 55.6 ML/MIN/1.73
EOSINOPHIL # BLD AUTO: 0 10*3/MM3 (ref 0–0.4)
EOSINOPHIL NFR BLD AUTO: 0 % (ref 0.3–6.2)
ERYTHROCYTE [DISTWIDTH] IN BLOOD BY AUTOMATED COUNT: 11.9 % (ref 12.3–15.4)
ERYTHROCYTE [SEDIMENTATION RATE] IN BLOOD: 40 MM/HR (ref 0–30)
GLUCOSE SERPL-MCNC: 167 MG/DL (ref 65–99)
HCT VFR BLD AUTO: 35.5 % (ref 34–46.6)
HGB BLD-MCNC: 11.7 G/DL (ref 12–15.9)
IMM GRANULOCYTES # BLD AUTO: 0.04 10*3/MM3 (ref 0–0.05)
IMM GRANULOCYTES NFR BLD AUTO: 0.5 % (ref 0–0.5)
LYMPHOCYTES # BLD AUTO: 0.84 10*3/MM3 (ref 0.7–3.1)
LYMPHOCYTES NFR BLD AUTO: 9.5 % (ref 19.6–45.3)
MCH RBC QN AUTO: 29.2 PG (ref 26.6–33)
MCHC RBC AUTO-ENTMCNC: 33 G/DL (ref 31.5–35.7)
MCV RBC AUTO: 88.5 FL (ref 79–97)
MONOCYTES # BLD AUTO: 0.1 10*3/MM3 (ref 0.1–0.9)
MONOCYTES NFR BLD AUTO: 1.1 % (ref 5–12)
NEUTROPHILS NFR BLD AUTO: 7.8 10*3/MM3 (ref 1.7–7)
NEUTROPHILS NFR BLD AUTO: 88.6 % (ref 42.7–76)
NRBC BLD AUTO-RTO: 0 /100 WBC (ref 0–0.2)
PLATELET # BLD AUTO: 359 10*3/MM3 (ref 140–450)
PMV BLD AUTO: 9.1 FL (ref 6–12)
POTASSIUM SERPL-SCNC: 4.7 MMOL/L (ref 3.5–5.2)
RBC # BLD AUTO: 4.01 10*6/MM3 (ref 3.77–5.28)
SODIUM SERPL-SCNC: 130 MMOL/L (ref 136–145)
WBC NRBC COR # BLD: 8.81 10*3/MM3 (ref 3.4–10.8)

## 2022-12-29 PROCEDURE — 96376 TX/PRO/DX INJ SAME DRUG ADON: CPT

## 2022-12-29 PROCEDURE — 96375 TX/PRO/DX INJ NEW DRUG ADDON: CPT

## 2022-12-29 PROCEDURE — 80048 BASIC METABOLIC PNL TOTAL CA: CPT | Performed by: EMERGENCY MEDICINE

## 2022-12-29 PROCEDURE — 97530 THERAPEUTIC ACTIVITIES: CPT

## 2022-12-29 PROCEDURE — 25010000002 DEXAMETHASONE PER 1 MG: Performed by: NURSE PRACTITIONER

## 2022-12-29 PROCEDURE — 86140 C-REACTIVE PROTEIN: CPT | Performed by: STUDENT IN AN ORGANIZED HEALTH CARE EDUCATION/TRAINING PROGRAM

## 2022-12-29 PROCEDURE — G0378 HOSPITAL OBSERVATION PER HR: HCPCS

## 2022-12-29 PROCEDURE — 85652 RBC SED RATE AUTOMATED: CPT | Performed by: STUDENT IN AN ORGANIZED HEALTH CARE EDUCATION/TRAINING PROGRAM

## 2022-12-29 PROCEDURE — 85025 COMPLETE CBC W/AUTO DIFF WBC: CPT | Performed by: STUDENT IN AN ORGANIZED HEALTH CARE EDUCATION/TRAINING PROGRAM

## 2022-12-29 PROCEDURE — 99214 OFFICE O/P EST MOD 30 MIN: CPT | Performed by: NURSE PRACTITIONER

## 2022-12-29 PROCEDURE — 25010000002 MORPHINE PER 10 MG: Performed by: EMERGENCY MEDICINE

## 2022-12-29 PROCEDURE — 97161 PT EVAL LOW COMPLEX 20 MIN: CPT

## 2022-12-29 PROCEDURE — 87040 BLOOD CULTURE FOR BACTERIA: CPT | Performed by: NURSE PRACTITIONER

## 2022-12-29 RX ORDER — OXYCODONE HYDROCHLORIDE 5 MG/1
5 TABLET ORAL EVERY 4 HOURS PRN
Status: DISCONTINUED | OUTPATIENT
Start: 2022-12-29 | End: 2022-12-30 | Stop reason: HOSPADM

## 2022-12-29 RX ORDER — DEXAMETHASONE SODIUM PHOSPHATE 4 MG/ML
4 INJECTION, SOLUTION INTRA-ARTICULAR; INTRALESIONAL; INTRAMUSCULAR; INTRAVENOUS; SOFT TISSUE EVERY 6 HOURS
Status: DISCONTINUED | OUTPATIENT
Start: 2022-12-29 | End: 2022-12-30 | Stop reason: HOSPADM

## 2022-12-29 RX ORDER — GABAPENTIN 100 MG/1
100 CAPSULE ORAL EVERY 8 HOURS
Status: DISCONTINUED | OUTPATIENT
Start: 2022-12-29 | End: 2022-12-30 | Stop reason: HOSPADM

## 2022-12-29 RX ADMIN — VALSARTAN 240 MG: 160 TABLET, FILM COATED ORAL at 09:02

## 2022-12-29 RX ADMIN — Medication 10 ML: at 20:16

## 2022-12-29 RX ADMIN — GABAPENTIN 100 MG: 100 CAPSULE ORAL at 17:57

## 2022-12-29 RX ADMIN — SUCRALFATE 1 G: 1 TABLET ORAL at 07:44

## 2022-12-29 RX ADMIN — HYDROCODONE BITARTRATE AND ACETAMINOPHEN 1 TABLET: 5; 325 TABLET ORAL at 00:23

## 2022-12-29 RX ADMIN — LEVOTHYROXINE SODIUM 50 MCG: 0.05 TABLET ORAL at 09:01

## 2022-12-29 RX ADMIN — Medication 5 MG: at 00:19

## 2022-12-29 RX ADMIN — MORPHINE SULFATE 2 MG: 2 INJECTION, SOLUTION INTRAMUSCULAR; INTRAVENOUS at 02:21

## 2022-12-29 RX ADMIN — Medication 10 ML: at 09:02

## 2022-12-29 RX ADMIN — OXYCODONE 5 MG: 5 TABLET ORAL at 22:53

## 2022-12-29 RX ADMIN — HYDROCODONE BITARTRATE AND ACETAMINOPHEN 1 TABLET: 5; 325 TABLET ORAL at 07:44

## 2022-12-29 RX ADMIN — SUCRALFATE 1 G: 1 TABLET ORAL at 12:52

## 2022-12-29 RX ADMIN — PANTOPRAZOLE SODIUM 40 MG: 40 TABLET, DELAYED RELEASE ORAL at 07:44

## 2022-12-29 RX ADMIN — GABAPENTIN 100 MG: 100 CAPSULE ORAL at 00:19

## 2022-12-29 RX ADMIN — GABAPENTIN 100 MG: 100 CAPSULE ORAL at 23:01

## 2022-12-29 RX ADMIN — DEXAMETHASONE SODIUM PHOSPHATE 4 MG: 4 INJECTION, SOLUTION INTRAMUSCULAR; INTRAVENOUS at 22:53

## 2022-12-29 RX ADMIN — DEXAMETHASONE SODIUM PHOSPHATE 4 MG: 4 INJECTION, SOLUTION INTRAMUSCULAR; INTRAVENOUS at 18:14

## 2022-12-29 RX ADMIN — CARVEDILOL 12.5 MG: 12.5 TABLET, FILM COATED ORAL at 17:57

## 2022-12-29 RX ADMIN — OXYCODONE 5 MG: 5 TABLET ORAL at 12:53

## 2022-12-29 RX ADMIN — MORPHINE SULFATE 2 MG: 2 INJECTION, SOLUTION INTRAMUSCULAR; INTRAVENOUS at 17:57

## 2022-12-29 RX ADMIN — GABAPENTIN 100 MG: 100 CAPSULE ORAL at 07:53

## 2022-12-29 RX ADMIN — DEXAMETHASONE SODIUM PHOSPHATE 4 MG: 4 INJECTION, SOLUTION INTRAMUSCULAR; INTRAVENOUS at 12:53

## 2022-12-29 NOTE — CONSULTS
[facility] NEUROSURGERY CONSULT NOTE    Patient name: Geeta Butt  Referring Provider: Dr. Ford   Reason for Consultation: Acute on chronic lumbar radiculopathy, left    Patient Care Team:  Grzegorz Nguyen MD as PCP - General (Family Medicine)  Myles Soliman MD as Referring Physician (Breast Surgery)  Omi Valenzuela MD as Consulting Physician (Hematology and Oncology)  Sidney Campbell MD as Referring Physician (Dermatology)  Amador Brandt MD as Consulting Physician (Orthopedic Surgery)    Chief complaint: back and LLE pain    Subjective .     History of present illness:    Patient is a 87 y.o.  female with chronic low back pain who presents to hospital with acute exacerbation.  She reports over the last few weeks she has been having some low back pain treating with heat, massage, and over-the-counter pain reliever.  She is chronically on Tylenol 3-4 times daily.  Over the last week her low back pain is worsened and she has begun to have some pain into the left lower extremity posteriorly from the hip all the way down to the ankle.  She describes it as a severe burning pain that is present in all positions but aggravated by movement.  She denies any injury predating onset of increased symptoms.  She is chronically on gabapentin for neuropathy in her feet.  This does seem to help some with her pain.  On 12/14 she was initiated on Keflex twice daily for 10 days for cellulitis of the right lower extremity.  She completed this antibiotic.  She denies any prior back surgery.  She is a non-smoker.  She has osteopenia but has not been on medications for this.  She has history of breast cancer diagnosed in 2019 and treated with \"mini mastectomy\" and XRT.  She was unable to tolerate aromatase inhibitors. She also has remote history of oral cancer treated solely with surgery.  She does report she is feeling substantially better today but still having quite a bit of pain with activity.  She reports her  current pain at 9/10 where it was 20/10 at presentation.    Review of Systems  Review of Systems   Constitutional: Negative for chills and fever.   Genitourinary: Negative for enuresis.   Musculoskeletal: Positive for back pain.   Skin: Positive for wound.   Neurological: Positive for numbness. Negative for weakness.   Psychiatric/Behavioral: Negative for confusion.   All other systems reviewed and are negative.      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    • Chronic kidney disease    • Chronic kidney disease 07/22/2021   • CKD (chronic kidney disease)     PT STATES STAGE 3   • Depression    • Diverticular disease    • Gastric ulcer     AND DUODENAL   • GERD (gastroesophageal reflux disease)    • Hearing loss     BILAT AIDES   • History of colon polyps    • History of depression    • History of GI bleed    • Hypertension    • Hypothyroidism    • Peptic ulceration    • Polyneuritis    • PONV (postoperative nausea and vomiting)    • Poor vision     RIGHT EYE, HAS PERIPHERAL VISION    • Stroke (HCC) 2000     mild weakness on left, partial sight loss on right    • UTI (urinary tract infection) 10/17/2017       PAST SURGICAL HISTORY  Past Surgical History:   Procedure Laterality Date   • ABDOMINAL SURGERY     • APPENDECTOMY     • BLADDER REPAIR      AND RECTOCELE   • BREAST BIOPSY     • BREAST CYST ASPIRATION     • BREAST LUMPECTOMY WITH SENTINEL NODE BIOPSY Left 3/19/2018    Procedure: BREAST LUMPECTOMY WITH SENTINEL NODE BIOPSY AND NEEDLE LOCALIZATION;  Surgeon: Myles Soliman MD;  [place] [facility] [place] [facility] [place];  Service: General   • CHOLECYSTECTOMY     • COLONOSCOPY  2013   • COLONOSCOPY N/A 2/16/2018    Procedure: COLONOSCOPY into cecum and T.I. with  biopsies;  Surgeon: Augustine Gallego MD;  Location: [facility] ENDOSCOPY;  Service:    • ENDOSCOPY N/A 10/17/2017    Procedure: ESOPHAGOGASTRODUODENOSCOPY WITH COLD BIOPSIES;  Surgeon: Augustine Gallego MD;  Location: [facility] ENDOSCOPY;  Service:    • ENDOSCOPY N/A 2/16/2018    Procedure: ESOPHAGOGASTRODUODENOSCOPY with biopsies and #54 Ar[address]ello DILATATION;  Surgeon: Augustine Gallego MD;  Location: [facility] ENDOSCOPY;  Service:    • ENDOSCOPY N/A 3/19/2020    Procedure: ESOPHAGOGASTRODUODENOSCOPY with biopsies;  Surgeon: Augustine Gallego MD;  Location: [facility] ENDOSCOPY;  Service: Gastroenterology;  Laterality: N/A;  PRE- MELENA, EPIGASTRIC PAIN  POST- eerosive gastritis, duodenal ulcer, hiatal hernia   • ENDOSCOPY N/A 7/29/2020    Procedure: ESOPHAGOGASTRODUODENOSCOPY WITH DUODENAL ASPIRATE AND COLD BIOPSIES;  Surgeon: Augustine Gallego MD;  Location: [facility] ENDOSCOPY;  Service: Gastroenterology;  Laterality: N/A;  PRE: HX ULCER DISEASE  POST: GASTRITIS, HEALED ULCER   • EYE SURGERY Left 2014    3-4 years, cornea replacement    • FEMUR IM NAILING/RODDING Right 8/3/2021    Procedure: FEMUR INTRAMEDULLARY NAILING/RODDING;  Surgeon: Garett Martin II, MD;  Location: [facility] OR;  Service: Orthopedics;  Laterality: Right;   • HIP SURGERY  08/03/2021   • HYSTERECTOMY     • MOUTH SURGERY  2000    UNDER TONGUE AND LEFT FLOOR OF MOUTH FOR CA   • PARATHYROIDECTOMY      PER PT   • SIGMOIDOSCOPY N/A 7/29/2020    Procedure: FLEXIBLE SIGMOIDOSCOPY TO DESCENDING COLON WITH COLD BIOPSIES;  Surgeon: Augustine Gallego MD;  Location: [facility] ENDOSCOPY;  Service: Gastroenterology;  Laterality: N/A;  PRE: RECTAL BLEEDING  POST: DIVERTICULOSIS, HEMORRHOIDS, MINIMAL PROCTITIS   • SINUS SURGERY     • SKIN BIOPSY     • THYROID SURGERY     • VARICOSE VEIN SURGERY         FAMILY HISTORY  Family History   Problem Relation Age of Onset   • Esophageal cancer Father    • Stomach cancer Father    • Heart disease Mother    • Thyroid cancer Mother     • Hypertension Mother    • Hypertension Sister    • Hypertension Brother    • Stroke Brother    • Heart disease Brother    • Diabetes Brother    • Leukemia Paternal Aunt    • Malig Hyperthermia Neg Hx        SOCIAL HISTORY  Social History     Tobacco Use   • Smoking status: Former     Packs/day: 1.00     Years: 10.00     Pack years: 10.00     Types: Cigarettes     Start date:      Quit date:      Years since quittin.0   • Smokeless tobacco: Never   • Tobacco comments:     no caffiene   Vaping Use   • Vaping Use: Never used   Substance Use Topics   • Alcohol use: No   • Drug use: No       retired  Lives alone    Allergies:  Other, Sulfa antibiotics, Amlodipine, Ciprofloxacin, Doxycycline, Iodine, Macrodantin [nitrofurantoin macrocrystal], Nitrofurantoin, Yeast-related products, and Vancomycin    MEDICATIONS:  Medications Prior to Admission   Medication Sig Dispense Refill Last Dose   • acetaminophen (TYLENOL) 325 MG tablet Take 2 tablets by mouth Every 4 (Four) Hours As Needed for Mild Pain .   2022   • aspirin 81 MG chewable tablet Chew 81 mg Daily.   2022   • carvedilol (COREG) 12.5 MG tablet Take 1 tablet by mouth 2 (Two) Times a Day With Meals. 180 tablet 3 2022   • Cholecalciferol (VITAMIN D3) 50 MCG (2000 UT) capsule TAKE 1 CAPSULE BY MOUTH DAILY. 100 capsule 1 2022   • coenzyme Q10 50 MG capsule capsule Take 1 capsule by mouth Daily.   2022   • estradiol (ESTRACE) 0.1 MG/GM vaginal cream Insert 2 g into the vagina Daily.   2022   • Flarex 0.1 % ophthalmic suspension    2022   • fluorometholone (FML) 0.1 % ophthalmic suspension Administer 1 drop into the left eye Daily.   2022   • furosemide (LASIX) 40 MG tablet Take 1/2 tablet in the morning and 1 tablet at lunch. 135 tablet 1 2022   • gabapentin (NEURONTIN) 100 MG capsule TAKE 1 CAPSULE BY MOUTH THREE TIMES A DAY OKAY TO TAKE 1 CAPSULE AT BEDTIME IF NEEDED FOR PAIN 120 capsule 5  12/28/2022   • hydrocortisone 2.5 % cream    12/28/2022   • levothyroxine (SYNTHROID, LEVOTHROID) 50 MCG tablet TAKE 1 TABLET BY MOUTH EVERY DAY 90 tablet 0 12/28/2022   • Omega-3 Fatty Acids (fish oil) 1000 MG capsule capsule Take 1 capsule by mouth Daily With Breakfast.   12/28/2022   • RABEprazole (ACIPHEX) 20 MG EC tablet TAKE 1 TABLET BY MOUTH EVERY DAY 90 tablet 0 12/28/2022   • sodium chloride (CAYLA 128) 5 % ophthalmic solution Administer 1 drop to the right eye As Needed.   12/28/2022   • sucralfate (CARAFATE) 1 GM/10ML suspension TAKE 10 ML BY MOUTH 3 TIMES A DAY FOR 30 MINUTES BEFORE MEALS   Past Week   • valsartan (DIOVAN) 160 MG tablet Take 1.5 tablets by mouth Daily. 60 tablet 1 12/28/2022   • Probiotic Product (PROBIOTIC DAILY PO) Take 1 capsule by mouth Daily.          Objective     Results Review:  LABS:  Results from last 7 days   Lab Units 12/29/22 0220 12/28/22  1719   WBC 10*3/mm3 8.81 8.28   HEMOGLOBIN g/dL 11.7* 10.7*   HEMATOCRIT % 35.5 34.2   PLATELETS 10*3/mm3 359 349     Results from last 7 days   Lab Units 12/29/22 0220 12/28/22  1719   SODIUM mmol/L 130* 134*   POTASSIUM mmol/L 4.7 4.4   CHLORIDE mmol/L 94* 97*   CO2 mmol/L 26.6 28.0   BUN mg/dL 22 21   CREATININE mg/dL 0.99 0.96   CALCIUM mg/dL 9.7 9.4   BILIRUBIN mg/dL  --  0.2   ALK PHOS U/L  --  69   ALT (SGPT) U/L  --  12   AST (SGOT) U/L  --  13   GLUCOSE mg/dL 167* 102*     Results from last 7 days   Lab Units 12/29/22 0220   SED RATE mm/hr 40*     Results from last 7 days   Lab Units 12/29/22 0220   CRP mg/dL 0.36     DIAGNOSTICS:  MRI lumbar- No evidence of fracture or significant malalignment.  There is significant DDD at several levels. inferior L1 and superior L2 endplate changes are more prominent after contrast.  There is some T2 increasing on intensity associated with some enhancement in the L1/2 disc space.  At L2/3 there is left paracentral disc bulge resulting in moderate to severe left foraminal narrowing.   Significant DDD L4/5 with severe canal and moderate left and severe right neuroforaminal narrowing.  At L3/4 there is moderate to severe canal stenosis and severe bilateral foraminal narrowing.    Lumbar x-rays- mild retrolisthesis L3 on 4.  There is multilevel degenerative disc disease and facet arthropathy.  There is some mild anterior wedging of T12 and L1.  There is dextro scoliosis present.    Results Review:   I reviewed the patient's new clinical results.  I personally viewed and interpreted the patient's MRI and lumbar x-rays.  MRI reviewed by Dr. Mejia    Vital Signs   Temp:  [97.2 °F (36.2 °C)-98.2 °F (36.8 °C)] 97.2 °F (36.2 °C)  Heart Rate:  [67-76] 76  Resp:  [16-18] 18  BP: (128-182)/(55-86) 130/55    Physical Exam:  Physical Exam  Vitals reviewed.   Constitutional:       Appearance: Normal appearance.   Pulmonary:      Effort: Pulmonary effort is normal.   Musculoskeletal:      Lumbar back: Tenderness present. Negative right straight leg raise test and negative left straight leg raise test.   Skin:     General: Skin is warm and dry.      Comments: Small healing ulcer right lateral calf with scab.  No surrounding erythema, swelling, warmth.  There is no drainage.   Neurological:      General: No focal deficit present.      Mental Status: She is alert.      Deep Tendon Reflexes:      Reflex Scores:       Patellar reflexes are 1+ on the right side and 1+ on the left side.       Achilles reflexes are 1+ on the right side and 1+ on the left side.  Psychiatric:         Mood and Affect: Mood normal.         Speech: Speech normal.         Thought Content: Thought content normal.       Neurologic Exam     Mental Status   Attention: normal. Concentration: normal.   Speech: speech is normal   Level of consciousness: alert  Knowledge: good.   Normal comprehension.     Motor Exam   Muscle bulk: normal  Overall muscle tone: normal  5/5 tia LEs     Sensory Exam   Right leg light touch: normal  Left leg light  touch: Diminished sensation left lateral calf and  thigh.  Right leg proprioception: normal  Left leg proprioception: normal  Right leg pinprick: normal  Left leg pinprick: Diminished left great toe.    Gait, Coordination, and Reflexes     Gait  Gait: (Observed ambulating from bathroom to bed with rolling walker and standby assist.  Patient able to heel and toe walk using walker.  Also able to squat shallow)    Reflexes   Right patellar: 1+  Left patellar: 1+  Right achilles: 1+  Left achilles: 1+  Right ankle clonus: absent  Left ankle clonus: absent      Assessment & Plan       Lumbar back pain with radiculopathy affecting left lower extremity    Patient presents for evaluation of acute on chronic low back pain as well as left lower extremity pain.  The pain is in the posterior distribution of the leg.  She denies any fever or chills but MRI by report shows some concern about L1/2 discitis although these may be inflammatory changes from degenerative nature.  She has moderate to severe canal stenosis at 3/4, L4/5 which certainly could contribute to left lower extremity pain although the posterior aspect would be more L5/S1 wich is unremarkable and I believe that the foraminal narrowing is worse on the right at the L4/5 level.  At L1/2 there is significant degenerative endplate changes versus potential changes of discitis.  Patient's inflammatory markers are unremarkable and her white count is normal.  She denies fever; however, she has on routine Tylenol at home and is recently been treated for 10 days with p.o. Keflex for a right lower extremity cellulitis.  Due to radiologist concerning patient's pain, we will avoid epidural injection and initiate some IV steroid.  She does seem to feel better today and she had 1 dose of IV steroid yesterday.  It could possibly be the pain medication as well.  We will repeat inflammatory markers tomorrow.  Plans to initiate antibiotics at this point in time.  Okay to work with  therapies.  Recommend avoiding Tylenol to event masking of potential fever.    PLAN:   Monitor labs/temp  Avoid anitpyretics  Dex 4 mg q 6 hrs  Repeat ESR/CRP in AM  Blood cx    I discussed the patient's findings and my recommendations with patient, family and Dr. Rosenthal and Dr. Mejia and Maggie Lay, APRN  12/29/22  18:00 EST    \"Dictated utilizing Dragon dictation\".

## 2022-12-29 NOTE — PLAN OF CARE
Goal Outcome Evaluation:              Outcome Evaluation: patient in room during this shift alert and oriented and able to verbalize needs, neurosurgery rounded on patient today and new order for steroids q 6 hrs in place, blood cultures obtained and pending, patietn is staying over for further evaluation and pain management. VSS and no further questions at this timem.

## 2022-12-29 NOTE — PROGRESS NOTES
MD ATTESTATION NOTE    The SILVIO and I have discussed this patient's history, physical exam, and treatment plan.  I have reviewed the documentation and personally had a face to face interaction with the patient. I affirm the documentation and agree with the treatment and plan.  The attached note describes my personal findings.      I provided a substantive portion of the care of the patient.  I personally performed the physical exam in its entirety, and below are my findings.  For this patient encounter, the patient wore surgical mask, I wore full protective PPE including N95 and eye protection  I obtained the history with the patient as well as daughter at bedside.  Brief HPI: This is a pleasant 87-year-old female with a history of chronic back pain, hypertension who had worsening of her back pain that started about 10 days ago.  Pain is most prominent in the left lower portion of her back with some radiation down her left leg.  It is mainly in the anterior portion of her left leg at the thigh and just below the knee.  There is no report of any fevers, chills, weakness to lower extremity, foot drop, urinary or fecal incontinence or retention, saddle anesthesia.  She had a history of breast cancer in 2018 but is supposed to be cancer free at this time.  She is had similar episodes of pain in the past and received epidurals in the past.  She is followed by Dr. Mejia.  The pain is worse with any movement such as getting up rolling over and movement.  It is not relieved with rest but is better with rest.  She has been taking Neurontin and Tylenol with no significant improvement of her pain.  She denies any abdominal pain, nausea or vomiting, chest pain, shortness of breath, headache.  Is also important to note that she was recently treated for cellulitis with antibiotics to her leg.  That has subsequently resolved.  This was approximately 1 week ago.  Patient is received medicines for pain and steroids prior to my  evaluation this morning and she has had some improvement of her symptoms.    General : Elderly female that is pleasant.  Patient is in no acute distress.  No obvious discomfort at this time.  Patient is awake alert and oriented  HEENT: NCAT  CV: Heart is regular with no murmurs  Respiratory: CTA bilaterally  Abd: Soft and nontender.  Obese  Back exam location of pain is in the left lower lumbar region and left buttocks.  Normal inspection.  No focal midline pain.  Normal saddle anesthesia and normal rectal tone.  Ext: No acute abnormalities.  1+ edema to lower extremities bilaterally.  5/5 strength to hip flexion, knee extension/flexion, dorsiflexion, plantarflexion, and EHL.  No pain with bilateral external and internal rotation of hips.  Intact distal pulses with no cyanosis, pallor, or temperature change on palpation. Normal inspection and palpation with soft compartments.  SILT at bilateral superficial peroneal, deep peroneal, sural, and saphenous nerves  Skin: No rash  Neuro: Cranial nerves II through XII grossly intact as tested.  No acute lateralizing deficits.  Psych: Normal mood and affect    I have reviewed the patient's vital signs, lab work, EKG and imaging.    Plan:  #1  Acute exacerbation of chronic back pain.  X-rays done yesterday have been reviewed.  Essentially unremarkable other than a mild L1 compression compared to previous x-ray by about 30%.  She has degenerative changes.  There is spurring.  MRI I report has been reviewed.  There is a diffuse disc bulge at L1-L2 which is more prominent than previous MRI with some focal left paracentral disc protrusion and some canal stenosis and foraminal narrowing.  There is mention of a possible discitis and similar region.  Patient has been seen and evaluated by Talya for neurosurgery.  Patient sed rate is mildly elevated at 40.  Patient's CRP is unremarkable.  Patient has a normal white blood cell count.  She is afebrile.  Given her recent infection  treatment with antibiotics.  Neurosurgery recommends continue monitoring the patient.  She is treating her with steroids and analgesics as need be.  We will repeat inflammatory markers.  Blood cultures will also be obtained.  I clinically do not anticipate this patient does have discitis.  In discussion with neurosurgery at this time we will not start antibiotics.  We will watch again her inflammatory markers and fever and change treatment as need be.  Neurosurgery is going to continue to follow.  There is a significant chance this patient will need to be transferred to a full admission if were not able to clear up this process in the next 24 hours.  I discussed the plan with the patient as well as family at bedside as well as Talya the neurosurgeon midlevel practitioner.  All questions answered.        Note Disclaimer: At [facility], we believe that sharing information builds trust and better relationships. You are receiving this note because you recently visited [facility]. It is possible you will see health information before a provider has talked with you about it. This kind of information can be easy to misunderstand. To help you fully understand what it means for your health, we urge you to discuss this note with your provider.

## 2022-12-29 NOTE — CASE MANAGEMENT/SOCIAL WORK
Discharge Planning Assessment  [facility]     Patient Name: Geeta Butt  MRN: [id number]  Today's Date: 12/29/2022    Admit Date: 12/28/2022    Plan: Plans to return home at d/cTESSA Antonio RN   Discharge Needs Assessment     Row Name 12/29/22 1001       Living Environment    People in Home alone    Current Living Arrangements home    Primary Care Provided by self    Provides Primary Care For no one    Family Caregiver if Needed child(lonnie), adult    Family Caregiver Names Daisy    Quality of Family Relationships supportive       Resource/Environmental Concerns    Resource/Environmental Concerns none       Transition Planning    Patient/Family Anticipates Transition to home    Patient/Family Anticipated Services at Transition none    Transportation Anticipated family or friend will provide       Discharge Needs Assessment    Equipment Currently Used at Home rollator;shower chair;grab bar;bp cuff;other (see comments)  bedrail    Concerns to be Addressed no discharge needs identified    Anticipated Changes Related to Illness none    Equipment Needed After Discharge none    Provided Post Acute Provider List? N/A    Provided Post Acute Provider Quality & Resource List? N/A               Discharge Plan     Row Name 12/29/22 1002       Plan    Plan Plans to return home at mani/Priyanka Antonio RN    Patient/Family in Agreement with Plan yes    Provided Post Acute Provider List? N/A    Provided Post Acute Provider Quality & Resource List? N/A    Plan Comments Spoke w/ patient and daughter Daisy at bedside w/ patient's permission. Introduced self and explained role. All info on facesheet verified. Lives alone in one story home and is able to maneuver around home w/o difficulty. Uses rollator, shower chair, grab bars, B/P cuff and bedrail at home. denies need for further DME or community resources at d/c, at this time. Independent w/ ADLs. Daughter available to help at home. Uses [facility] at [facility] and is  able to  and pay for meds. Plans to return home at d/c w/ daughter's assist and daughter will transport. Agreeable w/ plan. CM will continue to follow-JOSI Antonio RN              Continued Care and Services - Admitted Since 12/28/2022    Coordination has not been started for this encounter.          Demographic Summary     Row Name 12/29/22 1000       General Information    Admission Type observation    Arrived From emergency department    Required Notices Provided Observation Status Notice    Referral Source admission list    Reason for Consult discharge planning    Preferred Language English               Functional Status     Row Name 12/29/22 1000       Functional Status    Usual Activity Tolerance good       Functional Status, IADL    Medications independent    Meal Preparation independent       Mental Status    General Appearance WDL WDL       Mental Status Summary    Recent Changes in Mental Status/Cognitive Functioning no changes               Psychosocial     Row Name 12/29/22 1000       Behavior WDL    Behavior WDL WDL       Emotion Mood WDL    Emotion/Mood/Affect WDL WDL       Speech WDL    Speech WDL WDL       Perceptual State WDL    Perceptual State WDL WDL       Thought Process WDL    Thought Process WDL WDL       Intellectual Performance WDL    Intellectual Performance WDL WDL               Abuse/Neglect    No documentation.                Legal    No documentation.                Substance Abuse    No documentation.                Patient Forms    No documentation.                   Leigh Antonio, RN

## 2022-12-29 NOTE — PROGRESS NOTES
ED OBSERVATION PROGRESS/DISCHARGE SUMMARY    Date of Admission: 12/28/2022   LOS: 0 days   PCP: Grzegorz Nguyen MD    Final Diagnosis low back pain      Subjective     Hospital Outcome:   Patient is a pleasant afebrile 87-year-old  female admitted to the ED observation unit for acute on chronic low back discomfort.  She denies any trauma or injury.  She has received 80 mg of Solu-Medrol in the ED yesterday, scheduled Tylenol and as needed Norco and she reports this morning that her pain is markedly improved.    She underwent MRI neuroimaging of her lumbar spine which showed disc bulge at L1-2 with disc protrusion and severe canal stenosis on the left and severe neuroforaminal narrowing on the left.  Some inflammatory changes appreciated at L1-2 that were previously seen but increased signal which could be indicative of discitis.  She was seen and evaluated by APRN with neurosurgery team who reports that patient has recently finished Keflex about 3 days ago for a cellulitis.  They recommend stopping her Tylenol, putting her on oxycodone without Tylenol checking blood cultures and repeating her ESR and CRP tomorrow.  They advised that because of this possible infectious appearance that they would like to hold off on doing any pain injections.  They request physical therapy to come work with her today and have scheduled dexamethasone for her as well.  If she is feeling better tomorrow she could likely be discharged home and if not she will likely be admitted inpatient for work-up of discitis especially for inflammatory markers worsen.  They request is not to start any IV antibiotics at this time. she has not had any fevers or chills but has been on scheduled Tylenol while here.    ROS:  General: no fevers, chills  Respiratory: no cough, dyspnea  Cardiovascular: no chest pain, palpitations  Abdomen: No abdominal pain, nausea, vomiting, or diarrhea  Neurologic: No focal weakness, + back pain    Objective    Physical Exam:  I have reviewed the vital signs.  Temp:  [96.1 °F (35.6 °C)-98.2 °F (36.8 °C)] 98.2 °F (36.8 °C)  Heart Rate:  [64-77] 73  Resp:  [16-18] 18  BP: (128-204)/(55-87) 142/65  General Appearance:    Alert, cooperative, no distress, elderly appearing  Head:    Normocephalic, atraumatic  Eyes:    Sclerae anicteric  Neck:   Supple, no mass  Back: Diffuse low back tenderness without focal tenderness, erythema or streaking appreciated  Lungs: Clear to auscultation bilaterally, respirations unlabored  Heart: Regular rate and rhythm, S1 and S2 normal, no murmur, rub or gallop  Abdomen:  Soft, non-tender, bowel sounds active, nondistended  Extremities: No clubbing, cyanosis, or edema to lower extremities  Pulses:  2+ and symmetric in distal lower extremities  Skin: No rashes   Neurologic: Oriented x3, Normal strength to extremities    Results Review:    I have reviewed the labs, radiology results and diagnostic studies.    Results from last 7 days   Lab Units 12/29/22  0220   WBC 10*3/mm3 8.81   HEMOGLOBIN g/dL 11.7*   HEMATOCRIT % 35.5   PLATELETS 10*3/mm3 359     Results from last 7 days   Lab Units 12/29/22 0220 12/28/22  1719   SODIUM mmol/L 130* 134*   POTASSIUM mmol/L 4.7 4.4   CHLORIDE mmol/L 94* 97*   CO2 mmol/L 26.6 28.0   BUN mg/dL 22 21   CREATININE mg/dL 0.99 0.96   CALCIUM mg/dL 9.7 9.4   BILIRUBIN mg/dL  --  0.2   ALK PHOS U/L  --  69   ALT (SGPT) U/L  --  12   AST (SGOT) U/L  --  13   GLUCOSE mg/dL 167* 102*     Imaging Results (Last 24 Hours)     Procedure Component Value Units Date/Time    MRI Lumbar Spine With & Without Contrast [[id number]] Collected: 12/28/22 2321     Updated: 12/28/22 2340    Narrative:      LUMBAR SPINE MRI WITHOUT GADOLINIUM     HISTORY: Left-sided sciatica, history of oral cancer; M54.32-Sciatica,  left side; R52-Pain, unspecified; I10-Essential (primary) hypertension     COMPARISON:  07/02/2021.     FINDINGS:  Multiplanar images of the lumbar spine obtained without  and  with gadolinium.  Axial images obtained from T12-S1.     No acute fracture or subluxation of the lumbar spine is seen. There is  lumbar scoliosis, with convexity to the right. The patient is noted to  have decreased signal intensity within the inferior endplate of L1 and  superior endplate of L2 on T1-weighted imaging, with accompanying  increased signal intensity on T2-weighted imaging. There is enhancement  following contrast administration. Similar findings were present on  prior study. There also appears to be some increased signal intensity  within the anterior disc space on T2-weighted imaging, associated with  some enhancement. This is a new finding when compared to the prior exam.  A component of discitis is not excluded. Intervertebral disc space  narrowing is most significant at L4-L5. The conus terminates at L2. No  paraspinous collections are seen. 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     T12-L1: There is mild disc bulge, without significant canal stenosis.  There is no significant neural foraminal narrowing.  L1-L2: The patient has diffuse disc bulge, but there is more focal left  paracentral disc protrusion, which significantly narrows the left side  of the canal and severely narrows the left neural foramen. This is much  more apparent than on the prior study, and is associated with some  increased signal intensity on the T2-weighted imaging, which may reflect  annular tear. Focal herniation measures up to 7 mm in AP dimensions.  L2-L3: There is asymmetric left-sided disc bulge. There is mild canal  stenosis, secondary to both disc disease and some hypertrophy of  ligamentum flavum. There is moderate bilateral neural foraminal  narrowing.  L3-L4: There is diffuse disc bulge. There is moderate to severe canal  stenosis, secondary to both disc disease and hypertrophy of ligamentum  flavum. There is severe bilateral neural foraminal narrowing, secondary  both disc disease and facet  hypertrophy.  L4-L5: There is diffuse disc bulge. There is severe canal stenosis,  secondary to disc disease and hypertrophy of ligamentum flavum. There is  severe bilateral neural foraminal narrowing.  L5-S1: There is diffuse disc bulge. There is some mild canal narrowing.  There is bilateral neural foraminal narrowing.          Impression:      1. The patient has diffuse disc bulge at L1-L2, but has developed a more  focal left paracentral disc protrusion, which results in severe canal  stenosis on the left, as well as severe neural foraminal narrowing on  the left. It is associated with increased signal intensity on the  T2-weighted imaging, which may reflect annular tear.  2. The patient has Modic type I endplate changes again noted at L1-L2.  However, on the current study, there is some increased signal intensity  on T2-weighted imaging in the anterior aspect of the disc space, as well  as enhancement on postcontrast imaging. Discitis would be a  consideration. Correlation with any evidence of infection is  recommended.     This report was finalized on 12/28/2022 11:37 PM by Dr. Jyoti Avalos M.D.       XR Spine Lumbar Complete 4+VW [[id number]] Collected: 12/28/22 1859     Updated: 12/28/22 1905    Narrative:      XR SPINE LUMBAR COMPLETE 4+VW-     INDICATIONS: Pain     TECHNIQUE: 5 views of the lumbar spine     COMPARISON: 07/02/2021     FINDINGS:     Mild retrolisthesis of L3 on L4. Multilevel endplate spurring, disc  space narrowing, facet arthropathy are apparent. Anterior wedging of L1,  with about 30% loss of height appears to be a change from the prior  exam, but this appearance could be exaggerated by scoliosis that is  present. Arterial calcifications are seen. Follow-up/further evaluation  can be obtained as indicated.       Impression:         As described.     This report was finalized on 12/28/2022 7:02 PM by Dr. Dominic Cosby M.D.             I have reviewed the  medications.  ---------------------------------------------------------------------------------------------  Assessment & Plan   Assessment/Problem List    Lumbar back pain with radiculopathy affecting left lower extremity      Plan:  Lumbar back pain with radiculopathy affecting the left lower extremity  -Patient has a history of radiculopathy has previously improved with lumbar epidural injections, follows with Dr. Mejia outpatient  -Plain films of the lumbar spine show mild retrolisthesis of L3 on L4, multilevel endplate spurring, disc space narrowing, facet arthropathy, note of anterior wedging of L1 with about 30% of loss of height appeared to be changed from prior exam, and scoliosis present  -MRI of the lumbar spine shows L1-2 diffuse disc bulge with severe canal narrowing and stenosis on the left with endplate changes seen on prior studies that has increased signal intensity and cannot rule out discitis  -Oxycodone for pain  -stop Tylenol, monitor for fever  -Continue IV steroids   -PT consulted     Hypertension  -Patient received home dose of Coreg p.o. 12.5 mg and 20 mg IV labetalol in the ER  -Level of pain is likely contributing to blood pressure elevation, will try to control pain to see if that improves blood pressure  -Monitor with vital signs every 4 hours     History of breast cancer  -Left breast cancer [id number] ER/VT+ her2 neg post lumpectomy/SLN  3/18 and whole breast radiation completed May 2018  -Follows with oncology outpatient, was seen in September and next appointment in March 2023     History of stroke  -remote history with some residual left-sided weakness and right eye vision loss  -Holding aspirin at this time until further plan is made with neurosurgery  -Patient also takes fish oil at home, her pharmacy does not stock this  -SCDs in place     CKD 3  -Creatinine 0.96  -Avoid nephrotoxic medications if appropriate  -Trend with a.m. labs     Chronic hyponatremia  -Sodium 134  -Trend  with a.m. labs     Hypothyroidism  -Continue levothyroxine    Disposition: I anticipate patient will be discharged home tomorrow    Follow-up after Discharge: PCP and neurosurgery    This note will serve as a discharge summary    JESSA Adkins 12/29/22 10:37 EST          I have worn appropriate PPE during this patient encounter, sanitized my hands both with entering and exiting patient's room.

## 2022-12-29 NOTE — PROGRESS NOTES
MD ATTESTATION NOTE    The SILVIO and I have discussed this patient's history, physical exam, and treatment plan.  I have reviewed the documentation and personally had a face to face interaction with the patient. I affirm the documentation and agree with the treatment and plan.  The attached note describes my personal findings.      I provided a substantive portion of the care of the patient.  I personally performed the physical exam in its entirety, and below are my findings.  For this patient encounter, the patient wore surgical mask, I wore full protective PPE including N95 and eye protection.      Brief HPI: This patient is an 87-year-old female with a history of chronic low back pain as well as lumbar radiculopathy admitted today to the observation unit today for recurrence of low back pain with radiation down her left leg.  She denies weakness in the leg, urinary retention, fecal incontinence, or groin numbness.    PHYSICAL EXAM  ED Triage Vitals [12/28/22 1411]   Temp Heart Rate Resp BP SpO2   96.1 °F (35.6 °C) 77 16 (!) 186/67 97 %      Temp src Heart Rate Source Patient Position BP Location FiO2 (%)   Tympanic Monitor Sitting Left arm --         GENERAL: In mild distress due to pain, nontoxic in appearance  HENT: nares patent  EYES: no scleral icterus  CV: regular rhythm, normal rate, no M/R/G  RESPIRATORY: normal effort, lungs clear bilaterally  ABDOMEN: soft, nontender, no palpable mass  MUSCULOSKELETAL: no deformity, no spinous tenderness nor step-off  NEURO: alert, moves all extremities, follows commands  PSYCH:  calm, cooperative  SKIN: warm, dry    Vital signs and nursing notes reviewed.        Plan: We will admit her to the observation unit today for pain control.  We will obtain an MRI of the lumbar spine and have neurosurgery see in the morning in consultation.

## 2022-12-29 NOTE — PLAN OF CARE
Goal Outcome Evaluation: Patient admitted tonight with acute on chronic back and left leg pain.  Pain has been relatively controlled overnight.  MRI of the spine performed and neurosurgery has been consulted.  VSS.  Will continue to monitor.

## 2022-12-29 NOTE — PLAN OF CARE
Goal Outcome Evaluation:  Plan of Care Reviewed With: patient        Progress: improving  Outcome Evaluation: Pt is a pleasant 88 y/o female admitted with acute on chronic L LBP and lower extremity pain with associated LLE weakness. Pt lives at home alone with 3 MARIE with 1HR. Pt uses a FWW and stated that she was ind with PLOF. Pt states that she has stairs inside her house but that she does not use them. On date of eval the pt was SBA with bed mobility, CGA/SBA with transfers and ambulated up to 300 feet with FWW with CGA/SBA. Pt did become fatigued and was unable to attempt stairs on this date. Pt demonstrates decreased endurance, functional mobility and increased pain and would benefit from skilled PT services in order to improve balance and stair navigation to prepare for safe d/c home.    Patient was wearing a face mask during this therapy encounter. Therapist used appropriate personal protective equipment including mask and gloves.  Mask used was standard procedure mask. Appropriate PPE was worn during the entire therapy session. Hand hygiene was completed before and after therapy session. Patient is not in enhanced droplet precautions.
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
## 2022-12-29 NOTE — THERAPY EVALUATION
Patient Name: Geeta Butt  : 1935    MRN: [id number]                              Today's Date: 2022       Admit Date: 2022    Visit Dx:     ICD-10-CM ICD-9-CM   1. Left sided sciatica  M54.32 724.3   2. Intractable pain  R52 780.96   3. Hypertension not at goal  I10 401.9     Patient Active Problem List   Diagnosis   • Essential hypertension   • Hypothyroidism   • Black stool   • Diarrhea   • Nausea & vomiting   • RUQ pain   • Leukocytosis   • Duodenitis   • Foot pain, bilateral   • Malignant neoplasm of lower-inner quadrant of left breast in female, estrogen receptor positive (HCC)   • Iron deficiency anemia   • Adverse effect of iron   • GI bleed   • Gastrointestinal hemorrhage associated with duodenitis   • Hematochezia   • Duodenal ulcer   • Long-term use of high-risk medication   • Acute left-sided low back pain with left-sided sciatica   • DDD (degenerative disc disease), lumbosacral   • GERD without esophagitis   • Hyponatremia   • Hyperlipidemia   • Prediabetes   • Depressive disorder   • Closed fracture of right hip (HCC)   • Acute pyelonephritis   • Sepsis secondary to UTI (HCC)   • Bilateral cataracts   • Central retinal vein occlusion   • Corneal endothelial dystrophy   • Epiretinal membrane   • Bladder prolapse, female, acquired   • Superficial bruising   • Neuropathy   • Urinary retention with incomplete bladder emptying   • Hx of bladder repair surgery   • Cellulitis of right lower extremity   • Stroke (HCC)   • Peripheral edema   • Lumbar back pain with radiculopathy affecting left lower extremity     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    • Chronic kidney disease    • Chronic kidney disease 2021   • CKD (chronic kidney disease)     PT  STATES STAGE 3   • Depression    • Diverticular disease    • Gastric ulcer     AND DUODENAL   • GERD (gastroesophageal reflux disease)    • Hearing loss     BILAT AIDES   • History of colon polyps    • History of depression    • History of GI bleed    • Hypertension    • Hypothyroidism    • Peptic ulceration    • Polyneuritis    • PONV (postoperative nausea and vomiting)    • Poor vision     RIGHT EYE, HAS PERIPHERAL VISION    • Stroke (HCC) 2000     mild weakness on left, partial sight loss on right    • UTI (urinary tract infection) 10/17/2017     Past Surgical History:   Procedure Laterality Date   • ABDOMINAL SURGERY     • APPENDECTOMY     • BLADDER REPAIR      AND RECTOCELE   • BREAST BIOPSY     • BREAST CYST ASPIRATION     • BREAST LUMPECTOMY WITH SENTINEL NODE BIOPSY Left 3/19/2018    Procedure: BREAST LUMPECTOMY WITH SENTINEL NODE BIOPSY AND NEEDLE LOCALIZATION;  Surgeon: Myles Soliman MD;  Location: [facility] OR [facility];  Service: General   • CHOLECYSTECTOMY     • COLONOSCOPY  2013   • COLONOSCOPY N/A 2/16/2018    Procedure: COLONOSCOPY into cecum and T.I. with biopsies;  Surgeon: Augustine Gallego MD;  Location: [facility] ENDOSCOPY;  Service:    • ENDOSCOPY N/A 10/17/2017    Procedure: ESOPHAGOGASTRODUODENOSCOPY WITH COLD BIOPSIES;  Surgeon: Augustine Gallego MD;  Location: [facility] ENDOSCOPY;  Service:    • ENDOSCOPY N/A 2/16/2018    Procedure: ESOPHAGOGASTRODUODENOSCOPY with biopsies and #54 Arguello DILATATION;  Surgeon: Augustine Gallego MD;  Location: [facility] ENDOSCOPY;  Service:    • ENDOSCOPY N/A 3/19/2020    Procedure: ESOPHAGOGASTRODUODENOSCOPY with biopsies;  Surgeon: Augustine Gallego MD;  Location: [facility] ENDOSCOPY;  Service: Gastroenterology;  Laterality: N/A;  PRE- MELENA, EPIGASTRIC PAIN  POST- eerosive gastritis, duodenal ulcer, hiatal hernia   • ENDOSCOPY N/A 7/29/2020    Procedure: ESOPHAGOGASTRODUODENOSCOPY WITH DUODENAL ASPIRATE AND COLD BIOPSIES;  Surgeon: Augustine Gallego MD;  Location: [facility]  ENDOSCOPY;  Service: Gastroenterology;  Laterality: N/A;  PRE: HX ULCER DISEASE  POST: GASTRITIS, HEALED ULCER   • EYE SURGERY Left 2014    3-4 years, cornea replacement    • FEMUR IM NAILING/RODDING Right 8/3/2021    Procedure: FEMUR INTRAMEDULLARY NAILING/RODDING;  Surgeon: Garett Martin II, MD;  Location: [facility] MAIN OR;  Service: Orthopedics;  Laterality: Right;   • HIP SURGERY  08/03/2021   • HYSTERECTOMY     • MOUTH SURGERY  2000    UNDER TONGUE AND LEFT FLOOR OF MOUTH FOR CA   • PARATHYROIDECTOMY      PER PT   • SIGMOIDOSCOPY N/A 7/29/2020    Procedure: FLEXIBLE SIGMOIDOSCOPY TO DESCENDING COLON WITH COLD BIOPSIES;  Surgeon: Augustine Gallego MD;  Location: [facility] ENDOSCOPY;  Service: Gastroenterology;  Laterality: N/A;  PRE: RECTAL BLEEDING  POST: DIVERTICULOSIS, HEMORRHOIDS, MINIMAL PROCTITIS   • SINUS SURGERY     • SKIN BIOPSY     • THYROID SURGERY     • VARICOSE VEIN SURGERY        General Information     Row Name 12/29/22 1405          Physical Therapy Time and Intention    Document Type evaluation  -DP     Mode of Treatment individual therapy;physical therapy  -DP     Row Name 12/29/22 1405          General Information    Patient Profile Reviewed yes  -DP     Prior Level of Function independent:  -DP     Existing Precautions/Restrictions fall  -DP     Row Name 12/29/22 1405          Living Environment    People in Home alone  -DP     Row Name 12/29/22 1405          Home Main Entrance    Number of Stairs, Main Entrance three  -DP     Stair Railings, Main Entrance other (see comments)  railing on one side  -DP     Row Name 12/29/22 1405          Stairs Within Home, Primary    Stairs Comment, Within Home, Primary Pt has stairs inside but does not use them  -DP     Row Name 12/29/22 1405          Cognition    Orientation Status (Cognition) oriented x 3  -DP     Row Name 12/29/22 1402          Safety Issues, Functional Mobility    Safety Issues Affecting Function (Mobility) ability to follow  commands;awareness of need for assistance;insight into deficits/self-awareness;positioning of assistive device;safety precaution awareness  -DP     Impairments Affecting Function (Mobility) balance;endurance/activity tolerance;pain;shortness of breath  -DP           User Key  (r) = Recorded By, (t) = Taken By, (c) = Cosigned By    Initials Name Provider Type    Obey Alva PT Physical Therapist               Mobility     Row Name 12/29/22 1413          Bed Mobility    Bed Mobility supine-sit;sit-supine;rolling left  -DP     Rolling Left [place] (Bed Mobility) standby assist  -DP     Supine-Sit [place] (Bed Mobility) standby assist  -DP     Sit-Supine [place] (Bed Mobility) standby assist  -DP     Assistive Device (Bed Mobility) head of bed elevated  -DP     Comment, (Bed Mobility) Pt performed bed mobility with SBA but required extra time to perform due to pain  -DP     Row Name 12/29/22 1413          Transfers    Comment, (Transfers) CGA/SBA  -DP     Row Name 12/29/22 1413          Sit-Stand Transfer    Sit-Stand [place] (Transfers) contact guard;standby assist  -DP     Assistive Device (Sit-Stand Transfers) walker, front-wheeled  -DP     Row Name 12/29/22 1413          Gait/Stairs (Locomotion)    [place] Level (Gait) contact guard;standby assist  -DP     Assistive Device (Gait) walker, front-wheeled  -DP     Distance in Feet (Gait) 300'  -DP     Deviations/Abnormal Patterns (Gait) base of support, narrow;gait speed decreased;stride length decreased;weight shifting decreased  -DP     Comment, (Gait/Stairs) Amb 300 feet with FWW requiring CGA/Joaquin but became fatigued with LLE and SOA  -DP           User Key  (r) = Recorded By, (t) = Taken By, (c) = Cosigned By    Initials Name Provider Type    Obey Alva PT Physical Therapist               Obj/Interventions     Row Name 12/29/22 1416          Range of Motion Comprehensive    General Range of Motion bilateral lower extremity  ROM WFL  -DP     Row Name 12/29/22 1416          Strength Comprehensive (MMT)    General Manual Muscle Testing (MMT) Assessment lower extremity strength deficits identified  -DP     Comment, General Manual Muscle Testing (MMT) Assessment RLE 4/5, LLE 4-/5  -DP     Row Name 12/29/22 1416          Balance    Balance Assessment sitting static balance;sitting dynamic balance;standing static balance;standing dynamic balance  -DP     Static Sitting Balance supervision  -DP     Dynamic Sitting Balance standby assist  -DP     Static Standing Balance standby assist  -DP     Dynamic Standing Balance contact guard  -DP           User Key  (r) = Recorded By, (t) = Taken By, (c) = Cosigned By    Initials Name Provider Type    DP Obey Thakur, PT Physical Therapist               Goals/Plan     Row Name 12/29/22 1433          Bed Mobility Goal 1 (PT)    Activity/Assistive Device (Bed Mobility Goal 1, PT) bed mobility activities, all  -DP     [place] Level/Cues Needed (Bed Mobility Goal 1, PT) modified independence  -DP     Time Frame (Bed Mobility Goal 1, PT) short term goal (STG);2 weeks  -DP     Row Name 12/29/22 1433          Transfer Goal 1 (PT)    Activity/Assistive Device (Transfer Goal 1, PT) transfers, all  -DP     [place] Level/Cues Needed (Transfer Goal 1, PT) modified independence  -DP     Time Frame (Transfer Goal 1, PT) short term goal (STG);2 weeks  -DP     Row Name 12/29/22 1433          Gait Training Goal 1 (PT)    Activity/Assistive Device (Gait Training Goal 1, PT) gait (walking locomotion)  -DP     [place] Level (Gait Training Goal 1, PT) modified independence  -DP     Distance (Gait Training Goal 1, PT) 100'  -DP     Time Frame (Gait Training Goal 1, PT) short term goal (STG);2 weeks  -DP     Row Name 12/29/22 1433          ROM Goal 1 (PT)    Time Frame (ROM Goal 1, PT) short-term goal (STG);2 weeks  -DP     Row Name 12/29/22 1433          Stairs Goal 1 (PT)    Activity/Assistive Device (Stairs  Goal 1, PT) stairs, all skills  -DP     [place] Level/Cues Needed (Stairs Goal 1, PT) modified independence  -DP     Number of Stairs (Stairs Goal 1, PT) 3  -DP     Time Frame (Stairs Goal 1, PT) short term goal (STG);2 weeks  -DP     Row Name 12/29/22 7040          Therapy Assessment/Plan (PT)    Planned Therapy Interventions (PT) balance training;bed mobility training;gait training;neuromuscular re-education;patient/family education;postural re-education;stair training;strengthening;transfer training  -DP           User Key  (r) = Recorded By, (t) = Taken By, (c) = Cosigned By    Initials Name Provider Type    DP Obey Thakur, PT Physical Therapist               Clinical Impression     Row Name 12/29/22 1414          Pain    Pretreatment Pain Rating 2/10  -DP     Posttreatment Pain Rating 6/10  -DP     Pain Location - Side/Orientation Left  -DP     Pain Location lower  -DP     Pain Location - extremity  -DP     Pain Intervention(s) Repositioned;Ambulation/increased activity  -DP     Row Name 12/29/22 5164          Plan of Care Review    Plan of Care Reviewed With patient  -DP     Progress improving  -DP     Outcome Evaluation Pt is a pleasant 86 y/o female admitted with acute on chronic L LBP and lower extremity pain with associated LLE weakness. Pt lives at home alone with 3 MARIE with 1HR. Pt uses a FWW and stated that she was ind with PLOF. Pt states that she has stairs inside her house but that she does not use them. On date of eval the pt was SBA with bed mobility, CGA/SBA with transfers and ambulated up to 300 feet with FWW with CGA/SBA. Pt did become fatigued and was unable to attempt stairs on this date. Pt demonstrates decreased endurance, functional mobility and increased pain and would benefit from skilled PT services in order to improve balance and stair navigation to prepare for safe d/c home.  -DP     Row Name 12/29/22 4072          Therapy Assessment/Plan (PT)    Rehab Potential (PT) good, to  achieve stated therapy goals  -DP     Criteria for Skilled Interventions Met (PT) yes  -DP     Therapy Frequency (PT) 5 times/wk  -DP     Row Name 12/29/22 1417          Positioning and Restraints    Pre-Treatment Position in bed  -DP     Post Treatment Position bed  -DP     In Bed notified nsg;supine;call light within reach;encouraged to call for assist;exit alarm on  -DP           User Key  (r) = Recorded By, (t) = Taken By, (c) = Cosigned By    Initials Name Provider Type    Obey lAva PT Physical Therapist               Outcome Measures     Row Name 12/29/22 1434          How much help from another person do you currently need...    Turning from your back to your side while in flat bed without using bedrails? 4  -DP     Moving from lying on back to sitting on the side of a flat bed without bedrails? 4  -DP     Moving to and from a bed to a chair (including a wheelchair)? 3  -DP     Standing up from a chair using your arms (e.g., wheelchair, bedside chair)? 3  -DP     Climbing 3-5 steps with a railing? 2  -DP     To walk in hospital room? 3  -DP     AM-PAC 6 Clicks Score (PT) 19  -DP     Highest level of mobility 6 --> Walked 10 steps or more  -DP     Row Name 12/29/22 1434          Functional Assessment    Outcome Measure Options AM-PAC 6 Clicks Basic Mobility (PT)  -DP           User Key  (r) = Recorded By, (t) = Taken By, (c) = Cosigned By    Initials Name Provider Type    Obey Alva PT Physical Therapist                             Physical Therapy Education     Title: PT OT SLP Therapies (In Progress)     Topic: Physical Therapy (Done)     Point: Mobility training (Done)     Learning Progress Summary           Patient Acceptance, E,D, RENESTO,DU by DEREK at 12/29/2022 1435                   Point: Home exercise program (Done)     Learning Progress Summary           Patient Acceptance, E,D, ERNESTO,DU by DEREK at 12/29/2022 1435                   Point: Body mechanics (Done)     Learning Progress Summary            Patient Acceptance, E,D, VU,DU by DP at 12/29/2022 1435                   Point: Precautions (Done)     Learning Progress Summary           Patient Acceptance, E,D, VU,DU by DP at 12/29/2022 1435                               User Key     Initials Effective Dates Name Provider Type Discipline    DP 08/24/21 -  Obey Thakur PT Physical Therapist PT              PT Recommendation and Plan  Planned Therapy Interventions (PT): balance training, bed mobility training, gait training, neuromuscular re-education, patient/family education, postural re-education, stair training, strengthening, transfer training  Plan of Care Reviewed With: patient  Progress: improving  Outcome Evaluation: Pt is a pleasant 86 y/o female admitted with acute on chronic L LBP and lower extremity pain with associated LLE weakness. Pt lives at home alone with 3 MARIE with 1HR. Pt uses a FWW and stated that she was ind with PLOF. Pt states that she has stairs inside her house but that she does not use them. On date of eval the pt was SBA with bed mobility, CGA/SBA with transfers and ambulated up to 300 feet with FWW with CGA/SBA. Pt did become fatigued and was unable to attempt stairs on this date. Pt demonstrates decreased endurance, functional mobility and increased pain and would benefit from skilled PT services in order to improve balance and stair navigation to prepare for safe d/c home.     Time Calculation:    PT Charges     Row Name 12/29/22 1437             Time Calculation    Start Time 1338  -DP      Stop Time 1356  -DP      Time Calculation (min) 18 min  -DP      PT Received On 12/29/22  -DP      PT - Next Appointment 12/30/22  -DP      PT Goal Re-Cert Due Date 01/05/23  -DP         Time Calculation- PT    Total Timed Code Minutes- PT 10 minute(s)  -DP            User Key  (r) = Recorded By, (t) = Taken By, (c) = Cosigned By    Initials Name Provider Type    DP Obey Thakur PT Physical Therapist              Therapy Charges for  Today     Code Description Service Date Service Provider Modifiers Qty    84714856215 HC PT THERAPEUTIC ACT EA 15 MIN 12/29/2022 Obey Thakur, PT GP 1    16642354329 HC PT EVAL LOW COMPLEXITY 2 12/29/2022 Obey Thakur, PT GP 1          PT G-Codes  Outcome Measure Options: AM-PAC 6 Clicks Basic Mobility (PT)  AM-PAC 6 Clicks Score (PT): 19  PT Discharge Summary  Anticipated Discharge Disposition (PT): home with assist    Obey Thakur, PT  12/29/2022

## 2022-12-30 ENCOUNTER — TELEPHONE (OUTPATIENT)
Dept: NEUROSURGERY | Facility: CLINIC | Age: 87
End: 2022-12-30

## 2022-12-30 ENCOUNTER — APPOINTMENT (OUTPATIENT)
Dept: GENERAL RADIOLOGY | Facility: HOSPITAL | Age: 87
End: 2022-12-30
Payer: MEDICARE

## 2022-12-30 ENCOUNTER — READMISSION MANAGEMENT (OUTPATIENT)
Dept: CALL CENTER | Facility: HOSPITAL | Age: 87
End: 2022-12-30

## 2022-12-30 VITALS
DIASTOLIC BLOOD PRESSURE: 62 MMHG | HEART RATE: 75 BPM | HEIGHT: 61 IN | OXYGEN SATURATION: 97 % | WEIGHT: 160 LBS | SYSTOLIC BLOOD PRESSURE: 156 MMHG | BODY MASS INDEX: 30.21 KG/M2 | TEMPERATURE: 98 F | RESPIRATION RATE: 16 BRPM

## 2022-12-30 DIAGNOSIS — M46.46 DISCITIS OF LUMBAR REGION: Primary | ICD-10-CM

## 2022-12-30 LAB
BASOPHILS # BLD AUTO: 0.01 10*3/MM3 (ref 0–0.2)
BASOPHILS NFR BLD AUTO: 0.1 % (ref 0–1.5)
CRP SERPL-MCNC: <0.3 MG/DL (ref 0–0.5)
DEPRECATED RDW RBC AUTO: 38.7 FL (ref 37–54)
EOSINOPHIL # BLD AUTO: 0 10*3/MM3 (ref 0–0.4)
EOSINOPHIL NFR BLD AUTO: 0 % (ref 0.3–6.2)
ERYTHROCYTE [DISTWIDTH] IN BLOOD BY AUTOMATED COUNT: 11.9 % (ref 12.3–15.4)
ERYTHROCYTE [SEDIMENTATION RATE] IN BLOOD: 24 MM/HR (ref 0–30)
HCT VFR BLD AUTO: 31.5 % (ref 34–46.6)
HGB BLD-MCNC: 10.4 G/DL (ref 12–15.9)
IMM GRANULOCYTES # BLD AUTO: 0.09 10*3/MM3 (ref 0–0.05)
IMM GRANULOCYTES NFR BLD AUTO: 0.6 % (ref 0–0.5)
LYMPHOCYTES # BLD AUTO: 0.75 10*3/MM3 (ref 0.7–3.1)
LYMPHOCYTES NFR BLD AUTO: 5.2 % (ref 19.6–45.3)
MCH RBC QN AUTO: 29.4 PG (ref 26.6–33)
MCHC RBC AUTO-ENTMCNC: 33 G/DL (ref 31.5–35.7)
MCV RBC AUTO: 89 FL (ref 79–97)
MONOCYTES # BLD AUTO: 0.18 10*3/MM3 (ref 0.1–0.9)
MONOCYTES NFR BLD AUTO: 1.2 % (ref 5–12)
NEUTROPHILS NFR BLD AUTO: 13.53 10*3/MM3 (ref 1.7–7)
NEUTROPHILS NFR BLD AUTO: 92.9 % (ref 42.7–76)
NRBC BLD AUTO-RTO: 0 /100 WBC (ref 0–0.2)
PLATELET # BLD AUTO: 311 10*3/MM3 (ref 140–450)
PMV BLD AUTO: 9.9 FL (ref 6–12)
RBC # BLD AUTO: 3.54 10*6/MM3 (ref 3.77–5.28)
WBC NRBC COR # BLD: 14.56 10*3/MM3 (ref 3.4–10.8)

## 2022-12-30 PROCEDURE — 85652 RBC SED RATE AUTOMATED: CPT | Performed by: NURSE PRACTITIONER

## 2022-12-30 PROCEDURE — 25010000002 ONDANSETRON PER 1 MG: Performed by: EMERGENCY MEDICINE

## 2022-12-30 PROCEDURE — 99214 OFFICE O/P EST MOD 30 MIN: CPT | Performed by: NURSE PRACTITIONER

## 2022-12-30 PROCEDURE — 86140 C-REACTIVE PROTEIN: CPT | Performed by: NURSE PRACTITIONER

## 2022-12-30 PROCEDURE — 25010000002 KETOROLAC TROMETHAMINE PER 15 MG

## 2022-12-30 PROCEDURE — 96376 TX/PRO/DX INJ SAME DRUG ADON: CPT

## 2022-12-30 PROCEDURE — 96375 TX/PRO/DX INJ NEW DRUG ADDON: CPT

## 2022-12-30 PROCEDURE — 85025 COMPLETE CBC W/AUTO DIFF WBC: CPT | Performed by: NURSE PRACTITIONER

## 2022-12-30 PROCEDURE — 25010000002 DEXAMETHASONE PER 1 MG: Performed by: NURSE PRACTITIONER

## 2022-12-30 PROCEDURE — G0378 HOSPITAL OBSERVATION PER HR: HCPCS

## 2022-12-30 PROCEDURE — 73560 X-RAY EXAM OF KNEE 1 OR 2: CPT

## 2022-12-30 RX ORDER — KETOROLAC TROMETHAMINE 15 MG/ML
15 INJECTION, SOLUTION INTRAMUSCULAR; INTRAVENOUS ONCE
Status: COMPLETED | OUTPATIENT
Start: 2022-12-30 | End: 2022-12-30

## 2022-12-30 RX ORDER — METHYLPREDNISOLONE 4 MG/1
TABLET ORAL
Qty: 21 TABLET | Refills: 0 | Status: SHIPPED | OUTPATIENT
Start: 2022-12-30 | End: 2023-01-12

## 2022-12-30 RX ORDER — OXYCODONE HYDROCHLORIDE 5 MG/1
5 TABLET ORAL EVERY 4 HOURS PRN
Qty: 12 TABLET | Refills: 0 | Status: SHIPPED | OUTPATIENT
Start: 2022-12-30 | End: 2023-01-05

## 2022-12-30 RX ORDER — ONDANSETRON 4 MG/1
4 TABLET, ORALLY DISINTEGRATING ORAL EVERY 8 HOURS PRN
Qty: 30 TABLET | Refills: 0 | Status: SHIPPED | OUTPATIENT
Start: 2022-12-30

## 2022-12-30 RX ADMIN — VALSARTAN 240 MG: 160 TABLET, FILM COATED ORAL at 08:50

## 2022-12-30 RX ADMIN — DEXAMETHASONE SODIUM PHOSPHATE 4 MG: 4 INJECTION, SOLUTION INTRAMUSCULAR; INTRAVENOUS at 04:33

## 2022-12-30 RX ADMIN — OXYCODONE 5 MG: 5 TABLET ORAL at 12:39

## 2022-12-30 RX ADMIN — KETOROLAC TROMETHAMINE 15 MG: 15 INJECTION, SOLUTION INTRAMUSCULAR; INTRAVENOUS at 00:41

## 2022-12-30 RX ADMIN — PANTOPRAZOLE SODIUM 40 MG: 40 TABLET, DELAYED RELEASE ORAL at 08:49

## 2022-12-30 RX ADMIN — OXYCODONE 5 MG: 5 TABLET ORAL at 08:55

## 2022-12-30 RX ADMIN — LEVOTHYROXINE SODIUM 50 MCG: 0.05 TABLET ORAL at 08:50

## 2022-12-30 RX ADMIN — DEXAMETHASONE SODIUM PHOSPHATE 4 MG: 4 INJECTION, SOLUTION INTRAMUSCULAR; INTRAVENOUS at 12:39

## 2022-12-30 RX ADMIN — ONDANSETRON 4 MG: 2 INJECTION INTRAMUSCULAR; INTRAVENOUS at 09:32

## 2022-12-30 RX ADMIN — GABAPENTIN 100 MG: 100 CAPSULE ORAL at 08:49

## 2022-12-30 RX ADMIN — Medication 10 ML: at 08:55

## 2022-12-30 NOTE — OUTREACH NOTE
Prep Survey    Flowsheet Row Responses   [facility] facility patient discharged from? [place]   Is LACE score < 7 ? Yes   Eligibility [facility]   Date of Admission 12/28/22   Date of Discharge 12/30/22   Discharge Disposition Home or Self Care   Discharge diagnosis Lumbar back pain with radiculopathy affecting left lower extremity   Does the patient have one of the following disease processes/diagnoses(primary or secondary)? Other   Does the patient have Home health ordered? No   Is there a DME ordered? No   Prep survey completed? Yes          CHRIS DAVISON - Registered Nurse

## 2022-12-30 NOTE — PROGRESS NOTES
[facility] NEUROSURGERY PROGRESS NOTE      CC:back and LLE pain      Subjective     Interval History: Reports overall feeling better.  Still having some left lower extremity pain.  Mainly complaining of pain around her left knee and reports some swelling.  No fever or chills.    ROS:  Constitutional: No fever, chills  MS: back pain  Neuro: LLE pain  : No difficulty voiding, no incontinence    Objective     Vital signs in last 24 hours:  Temp:  [97.2 °F (36.2 °C)-98.2 °F (36.8 °C)] 98 °F (36.7 °C)  Heart Rate:  [74-86] 74  Resp:  [16-18] 16  BP: (130-162)/(52-66) 157/65    Intake/Output this shift:  No intake/output data recorded.    LABS:  Results from last 7 days   Lab Units 12/30/22 0433 12/29/22 0220 12/28/22  1719   WBC 10*3/mm3 14.56* 8.81 8.28   HEMOGLOBIN g/dL 10.4* 11.7* 10.7*   HEMATOCRIT % 31.5* 35.5 34.2   PLATELETS 10*3/mm3 311 359 349     Results from last 7 days   Lab Units 12/29/22 0220 12/28/22  1719   SODIUM mmol/L 130* 134*   POTASSIUM mmol/L 4.7 4.4   CHLORIDE mmol/L 94* 97*   CO2 mmol/L 26.6 28.0   BUN mg/dL 22 21   CREATININE mg/dL 0.99 0.96   CALCIUM mg/dL 9.7 9.4   BILIRUBIN mg/dL  --  0.2   ALK PHOS U/L  --  69   ALT (SGPT) U/L  --  12   AST (SGOT) U/L  --  13   GLUCOSE mg/dL 167* 102*     Results from last 7 days   Lab Units 12/30/22 0433 12/29/22 0220   SED RATE mm/hr 24 40*     Results from last 7 days   Lab Units 12/30/22 0433 12/29/22 0220   CRP mg/dL <0.30 0.36     Bld cx 12/29-no growth at 24 hours    IMAGING STUDIES:  No new imaging    I personally viewed and interpreted the patient's chart.    Meds reviewed/changed: Yes    Current Facility-Administered Medications:   •  carvedilol (COREG) tablet 12.5 mg, 12.5 mg, Oral, BID With Meals, Nika Durbin, STEFFANIE, 12.5 mg at 12/29/22 1757  •  dexamethasone (DECADRON) injection 4 mg, 4 mg, Intravenous, Q6H, Danii Lay, JESSA, 4 mg at 12/30/22 0433  •  furosemide (LASIX) tablet 20 mg, 20 mg, Oral, Daily With Breakfast &  Lunch, Nika Durbin PA-C  •  gabapentin (NEURONTIN) capsule 100 mg, 100 mg, Oral, Q8H, Nika Durbin PA-C, 100 mg at 12/30/22 0849  •  levothyroxine (SYNTHROID, LEVOTHROID) tablet 50 mcg, 50 mcg, Oral, Daily, Nika Durbin PA-C, 50 mcg at 12/30/22 0850  •  melatonin tablet 5 mg, 5 mg, Oral, Nightly PRN, Samm Ford MD, 5 mg at 12/29/22 0019  •  morphine injection 2 mg, 2 mg, Intravenous, Q4H PRN, 2 mg at 12/29/22 1757 **AND** naloxone (NARCAN) injection 0.4 mg, 0.4 mg, Intravenous, Q5 Min PRN, Samm Ford MD  •  nitroglycerin (NITROSTAT) SL tablet 0.4 mg, 0.4 mg, Sublingual, Q5 Min PRN, Samm Ford MD  •  ondansetron (ZOFRAN) tablet 4 mg, 4 mg, Oral, Q6H PRN **OR** ondansetron (ZOFRAN) injection 4 mg, 4 mg, Intravenous, Q6H PRN, Samm Ford MD, 4 mg at 12/30/22 0932  •  oxyCODONE (ROXICODONE) immediate release tablet 5 mg, 5 mg, Oral, Q4H PRN, Kaycee Frank, APRN, 5 mg at 12/30/22 0855  •  pantoprazole (PROTONIX) EC tablet 40 mg, 40 mg, Oral, QAM, Nika Durbin PA-C, 40 mg at 12/30/22 0849  •  sodium chloride 0.9 % flush 10 mL, 10 mL, Intravenous, Q12H, Samm Ford MD, 10 mL at 12/30/22 0855  •  sodium chloride 0.9 % flush 10 mL, 10 mL, Intravenous, PRN, Samm Ford MD  •  sodium chloride 0.9 % infusion 40 mL, 40 mL, Intravenous, PRN, Samm Ford MD  •  sucralfate (CARAFATE) tablet 1 g, 1 g, Oral, TID AC, Nika Durbin PA-C, 1 g at 12/29/22 1252  •  valsartan (DIOVAN) tablet 240 mg, 240 mg, Oral, Daily, Nika Durbin PA-C, 240 mg at 12/30/22 0850      Physical Exam:    General:   Awake, alert, pleasant and cooperative.  Appears in no acute distress.  Back:    Negative straight leg raise   Motor: Normal strength bilateral lower extremities   Station and Gait:             Normal gait and station.  Able to heel and toe walk  Extremities:   Wearing SCD.  Tenderness right patella and anterior shin.  Patient  complains of swelling but no obvious effusion and no redness.      Assessment & Plan     ASSESSMENT:      Lumbar back pain with radiculopathy affecting left lower extremity    Patient with acute back and left lower extremity pain.  MRI concerning for possible discitis at L1/2 although afebrile with normal WBC and inflammatory markers trending down.  WBC elevated today after initiation of steroid, likely reactive.  Overall pain is improved.  She does admit to recent cellulitis with treatment of Keflex.  She completed this antibiotic approximately 4 days ago.  The potential for infection is there but with inflammatory markers improving off antibiotics, I feel is unlikely.  The findings at L1/2 are likely degenerative in nature.  Patient is feeling better, recommend continuing IV steroid for today and working with PT.  She does well, can likely discharge with Medrol Dosepak and follow-up as an outpatient.  We will ask primary service to address her knee pain.    PLAN:   Continue IV steroid  Primary to address knee pain  CBC AM    Addendum: Contacted by ER provider.  Patient ambulated with PT and pain is much better today and patient is asking to go home.  Knee evaluation apparently unremarkable.  Okay from neurosurgery standpoint for discharge.  Recommend Medrol Dosepak.  We will arrange outpatient follow-up 10 to 14 days with labs.  Patient should return to ER for fever, chills, uncontrolled pain.    I discussed the patient's findings and my recommendations with patient, family and JESSA Adkins and Dr. Mejia       LOS: 0 days       JESSA Rubi  12/30/2022  09:33 EST    \"Dictated utilizing Dragon dictation\".

## 2022-12-30 NOTE — DISCHARGE INSTRUCTIONS
Return to the emergency department if you experience any fevers, chills or worsening back discomfort

## 2022-12-30 NOTE — PLAN OF CARE
Goal Outcome Evaluation:              Outcome Evaluation: louie left observation unit at this time via private vehicle with all her belongings, discharge summary provided and education included, patient is aware to follow as ordered, stable at the time of discharge and had no further questions

## 2022-12-30 NOTE — PLAN OF CARE
Goal Outcome Evaluation: Patient required one dose of PO pain medication overnight for her back and one dose of IV toradol for a headache.   VSS.  Will continue to monitor.

## 2022-12-30 NOTE — TELEPHONE ENCOUNTER
----- Message from JESSA Randall sent at 12/30/2022 12:33 PM EST -----  Regarding: f/u  Sorry. Not sure who to send to. Needs f/u 10-14 days with APC (prefer when V in office) with ESR,CRP, CBC day prior or same day early. Had abn MRI lumbar, back pain.

## 2022-12-30 NOTE — PROGRESS NOTES
ED OBSERVATION PROGRESS/DISCHARGE SUMMARY    Date of Admission: 12/28/2022   LOS: 0 days   PCP: Grzegorz Nguyen MD      Subjective   No acute events overnight.  States that back pain is some better this AM.    Hospital Outcome:   Patient is a pleasant afebrile 87-year-old  female admitted to the ED observation unit for acute on chronic low back discomfort.  She denies any trauma or injury.  She has received 80 mg of Solu-Medrol in the ED initially, scheduled Tylenol and as needed Norco and she reports yesterday morning that her pain is markedly improved.     She underwent MRI neuroimaging of her lumbar spine which showed disc bulge at L1-2 with disc protrusion and severe canal stenosis on the left and severe neuroforaminal narrowing on the left.  Some inflammatory changes appreciated at L1-2 that were previously seen but increased signal which could be indicative of discitis.  She was seen and evaluated by APRN with neurosurgery team who reports that patient has recently finished Keflex about 3 days ago for a cellulitis.  They recommend stopping her Tylenol, putting her on oxycodone without Tylenol checking blood cultures and repeating her ESR and CRP the next morning.  They advised that because of this possible infectious appearance that they would like to hold off on doing any pain injections.  They request physical therapy to come work with her today and have scheduled dexamethasone for her as well.  If she is feeling better she could likely be discharged home and if not she will likely be admitted inpatient for work-up of discitis especially for inflammatory markers worsen.      She was seen and evaluated by neurosurgery APRN who agrees patient's clinical course is improving today.  She did complain of some left knee discomfort and had plain films which showed no acute findings.  Patient was able to ambulate through the department without any need for assistance and states her pain is improved.  She  is not had any fevers overnight even after having Tylenol discontinued her inflammatory markers today are markedly improved.  Her white blood cell count did elevate but this is certainly secondary to corticosteroids starting yesterday.  I have offered patient further evaluation on an inpatient basis especially with possible discitis seen on her MRI which she has declined.  Agreeable to discharge home with steroid pack and short course of pain medicine and will follow-up with neurosurgery in the office in a few weeks.          ROS:  General: no fevers, chills  Respiratory: no cough, dyspnea  Cardiovascular: no chest pain, palpitations  Abdomen: No abdominal pain, nausea, vomiting, or diarrhea  Neurologic: No focal weakness    Objective   Physical Exam:  I have reviewed the vital signs.  Temp:  [97.2 °F (36.2 °C)-98.2 °F (36.8 °C)] 97.9 °F (36.6 °C)  Heart Rate:  [73-86] 74  Resp:  [18] 18  BP: (130-162)/(52-66) 146/57  General Appearance:    Alert, cooperative, no distress, elderly appearing  Head:    Normocephalic, atraumatic  Eyes:    Sclerae anicteric  Neck:   Supple, no mass  Lungs: Clear to auscultation bilaterally, respirations unlabored  Heart: Regular rate and rhythm, S1 and S2 normal, no murmur, rub or gallop  Abdomen:  Soft, non-tender, bowel sounds active, nondistended  Extremities: No clubbing, cyanosis, or edema to lower extremities, mild left knee discomfort to palpation without any erythema, warmth or streaking  Pulses:  2+ and symmetric in distal lower extremities  Skin: No rashes   Neurologic: Oriented x3, Normal strength to extremities    Results Review:    I have reviewed the labs, radiology results and diagnostic studies.    Results from last 7 days   Lab Units 12/30/22  0433   WBC 10*3/mm3 14.56*   HEMOGLOBIN g/dL 10.4*   HEMATOCRIT % 31.5*   PLATELETS 10*3/mm3 311     Results from last 7 days   Lab Units 12/29/22  0220 12/28/22  1719   SODIUM mmol/L 130* 134*   POTASSIUM mmol/L 4.7 4.4    CHLORIDE mmol/L 94* 97*   CO2 mmol/L 26.6 28.0   BUN mg/dL 22 21   CREATININE mg/dL 0.99 0.96   CALCIUM mg/dL 9.7 9.4   BILIRUBIN mg/dL  --  0.2   ALK PHOS U/L  --  69   ALT (SGPT) U/L  --  12   AST (SGOT) U/L  --  13   GLUCOSE mg/dL 167* 102*     Imaging Results (Last 24 Hours)     ** No results found for the last 24 hours. **          I have reviewed the medications.  ---------------------------------------------------------------------------------------------  Assessment & Plan   Assessment/Problem List    Lumbar back pain with radiculopathy affecting left lower extremity      Plan:  Lumbar back pain with radiculopathy affecting the left lower extremity  -Patient has a history of radiculopathy has previously improved with lumbar epidural injections, follows with Dr. Mejia outpatient  -Plain films of the lumbar spine show mild retrolisthesis of L3 on L4, multilevel endplate spurring, disc space narrowing, facet arthropathy, note of anterior wedging of L1 with about 30% of loss of height appeared to be changed from prior exam, and scoliosis present  -MRI of the lumbar spine shows L1-2 diffuse disc bulge with severe canal narrowing and stenosis on the left with endplate changes seen on prior studies that has increased signal intensity and cannot rule out discitis  -Oxycodone for pain     Hypertension  -Continue home medications following discharge     History of breast cancer  -Left breast cancer [id number] ER/AZ+ her2 neg post lumpectomy/SLN  3/18 and whole breast radiation completed May 2018  -Follows with oncology outpatient, was seen in September and next appointment in March 2023     History of stroke  -remote history with some residual left-sided weakness and right eye vision loss  -Holding aspirin at this time until further plan is made with neurosurgery  -Patient also takes fish oil at home, her pharmacy does not stock this  -SCDs in place     CKD 3  -Creatinine 0.96  -Avoid nephrotoxic medications if  appropriate  -Trend with a.m. labs     Chronic hyponatremia  -Stable 130     Hypothyroidism  -Continue levothyroxine    Disposition: Home    Follow-up after Discharge: Neurosurgery in a few weeks    This note will serve as a discharge summary    JESSA Mcgee 12/30/22 05:41 EST

## 2023-01-02 ENCOUNTER — TRANSITIONAL CARE MANAGEMENT TELEPHONE ENCOUNTER (OUTPATIENT)
Dept: CALL CENTER | Facility: HOSPITAL | Age: 88
End: 2023-01-02
Payer: MEDICARE

## 2023-01-02 NOTE — OUTREACH NOTE
Call Center TCM Note    Flowsheet Row Responses   [facility] patient discharged from? [place]   Does the patient have one of the following disease processes/diagnoses(primary or secondary)? Other   TCM attempt successful? Yes   Call start time 1338   Call end time 1341   Discharge diagnosis Lumbar back pain with radiculopathy affecting left lower extremity   Person spoke with today (if not patient) and relationship Daisy, daughter   Meds reviewed with patient/caregiver? Yes   Is the patient having any side effects they believe may be caused by any medication additions or changes? No   Does the patient have all medications ordered at discharge? Yes   Is the patient taking all medications as directed (includes completed medication regime)? Yes   Comments States they will make appt after follow up with Neuro   Does the patient have an appointment with their PCP within 7 days of discharge? No appointments available   Nursing Interventions Patient desires to follow up with specialty only   Psychosocial issues? No   Did the patient receive a copy of their discharge instructions? Yes   Nursing interventions Reviewed instructions with patient   What is the patient's perception of their health status since discharge? Improving   Is the patient/caregiver able to teach back signs and symptoms related to disease process for when to call PCP? Yes   Is the patient/caregiver able to teach back signs and symptoms related to disease process for when to call 911? Yes   Is the patient/caregiver able to teach back the hierarchy of who to call/visit for symptoms/problems? PCP, Specialist, Home health nurse, Urgent Care, ED, 911 Yes   If the patient is a current smoker, are they able to teach back resources for cessation? Not a smoker   Additional teach back comments States she had a fall yesterday and tore her knee open.  They are watching it closely for signs of an infection and keeping it clean.     TCM call completed?  Yes   Wrap up additional comments Denies questions or needs at this time.   Call end time [id number]          Allyson Ang LPN    1/2/2023, 13:43 EST

## 2023-01-03 LAB
BACTERIA SPEC AEROBE CULT: NORMAL
BACTERIA SPEC AEROBE CULT: NORMAL

## 2023-01-08 DIAGNOSIS — K21.9 GERD WITHOUT ESOPHAGITIS: ICD-10-CM

## 2023-01-09 RX ORDER — RABEPRAZOLE SODIUM 20 MG/1
TABLET, DELAYED RELEASE ORAL
Qty: 90 TABLET | Refills: 0 | Status: SHIPPED | OUTPATIENT
Start: 2023-01-09 | End: 2023-04-06

## 2023-01-09 NOTE — PROGRESS NOTES
Subjective   History of present illness: Geeta Butt is a 87 y.o. female is here today for hospital follow-up.  She was seen in December 2022, for back and left lower extremity.  The MRI that she had in the hospital showed some concern for possible discitis at L1/2 although, she was afebrile with normal WBC and inflammatory markers trending down. There was some WBC elevation after start of steroids, overall pain is improved.  She had admitted to prior treatment with Keflex for cellulitis, before admission to the hospital.  The potential for infection was there, but with inflammatory markers improving off antibiotics, it was felt to be unlikely.  The findings at L1/2 are likely degenerative in nature.    She was discharged with a Medrol Dosepak.  He had ordered CBC, CRP, and sed rate to be drawn before her appointment.    Today she reports she is having pain in her lower back. She reports she is having pain and swelling in her left leg. She reports of redness in her right leg. She reports of numbness and tingling in her left leg. She reports of bowel and bladder incontinence that started about two days after the hospital. She reports of leg weakness. She reports she is taking tylenol and gabapentin for pain. She has fallen, on 1/1/2022.  She states that she does have a history of [place] cyst in her sacral area that her daughter feels could have increased her pain and new issues.  She states that she is sitting with her left leg up r/t pain with sitting normal.  She states that they did not want to do any SRINIVAS's r/t history of possible infection and she was given a MDP.  She has been doing better since the MDP, but she was a little nauseated after.  She states that she did have increased pain, since the MDP stopped. She has had some stress incontinence, since the fall, but she states that she is aware of urinating, just cannot make it to the BR in time.  She reports after fall on 1/1 she could not get up, but was able  "to get up just before EMS arrived. She was not taken to the hospital, at that time. She reports taking Gabapentin 100mg TID, and is able to take an extra, if needed.     She is currently being treated for right knee infection, on Keflex currently.  She has had left leg pain that is intense, and keeping her left elevated, which is not different than her last visit to the hospital.  She is wanting to have a more permanent fix, other than surgery for her pain in her legs.        The following portions of the patient's history were reviewed and updated as appropriate: allergies, current medications, past family history, past medical history, past social history, past surgical history and problem list.    Review of Systems   Constitutional: Positive for activity change.   Musculoskeletal: Positive for back pain.   Neurological: Positive for weakness and numbness.   Psychiatric/Behavioral: Positive for sleep disturbance.           Objective     Vitals:    01/12/23 1027   BP: 140/68   BP Location: Right arm   Patient Position: Sitting   Cuff Size: Adult   Pulse: 74   Temp: 97.2 °F (36.2 °C)   TempSrc: Infrared   SpO2: 97%   Weight: 73.5 kg (162 lb)   Height: 154.9 cm (61\")     Body mass index is 30.61 kg/m².      Physical Exam  Vitals and nursing note reviewed.   HENT:      Mouth/Throat:      Pharynx: Oropharynx is clear.   Eyes:      Conjunctiva/sclera: Conjunctivae normal.   Cardiovascular:      Rate and Rhythm: Normal rate.      Pulses: Normal pulses.   Pulmonary:      Effort: Pulmonary effort is normal.      Breath sounds: Normal air entry.   Musculoskeletal:      Cervical back: Normal range of motion and neck supple.      Right lower leg: Edema present.      Left lower leg: Edema present.   Skin:     General: Skin is warm and dry.          Neurological:      Mental Status: She is oriented to person, place, and time.      GCS: GCS eye subscore is 4. GCS verbal subscore is 5. GCS motor subscore is 6.      Sensory: " Sensation is intact.      Motor: Motor strength is normal. Motor function is intact.      Coordination: Coordination is intact.      Comments: Uses walker   Psychiatric:         Attention and Perception: Attention normal.         Mood and Affect: Mood normal.         Behavior: Behavior is cooperative.       Neurologic Exam     Mental Status   Oriented to person, place, and time.     Motor Exam     Strength   Strength 5/5 throughout.     Sensory Exam   Light touch normal.     Gait, Coordination, and Reflexes     Tremor   Resting tremor: absent  Intention tremor: absent  Action tremor: absent      Assessment & Plan   Independent Review of Radiographic Studies:      I personally reviewed the images from the MRI of the lumbar spine from December 2022.      Medical Decision Making:    I have reviewed the labs from the previous hospital stay and the Sed rate was 30, final; CRP <.30; her WBC's were 14.56 at last check, after steroids were started.  She was scheduled to have labs today, but will not be accurate r/t current infection in right knee and being treated with Keflex.  She is interested in returning to pain management, gave referral.  Explained that it would be up to their discretion how they treated her back pain, if they decided to use steroids, but noted that she has a current infection in her right knee.  She has had steroids in the past, which were helpful, she just finished them a few days ago.  She continues to deny any numbness, tingling, or weakness in her bilateral lower extremities.  Chief complaint of pain as left lower extremity with increased pain with sitting for long periods of time.  Encouraged to increase activity, as tolerated.  Explained that she can take over-the-counter pain medications, and use Salonpas, or analgesic creams to assist with the pain in her left lower extremity and low back.  We will return to the office in approximately 2 months, after pain management.  I did however,  encouraged her to take the extra gabapentin, daily, as she is only taking it 3 times a day, and explained the extra dose taken for possibly 1 week may help with her pain and she can see if it is effective.  She and her daughter are in agreement with this plan.    Diagnoses and all orders for this visit:    1. Chronic bilateral low back pain, unspecified whether sciatica present (Primary)  -     Ambulatory Referral to Pain Management    2. Lumbar back pain with radiculopathy affecting left lower extremity  -     Ambulatory Referral to Pain Management      Return in about 2 months (around 3/12/2023) for after pain management, any APC.          in person

## 2023-01-10 ENCOUNTER — TELEPHONE (OUTPATIENT)
Dept: INTERNAL MEDICINE | Facility: CLINIC | Age: 88
End: 2023-01-10
Payer: MEDICARE

## 2023-01-10 ENCOUNTER — HOSPITAL ENCOUNTER (EMERGENCY)
Facility: HOSPITAL | Age: 88
Discharge: HOME | End: 2023-01-10
Attending: EMERGENCY MEDICINE | Admitting: EMERGENCY MEDICINE
Payer: MEDICARE

## 2023-01-10 VITALS
RESPIRATION RATE: 18 BRPM | DIASTOLIC BLOOD PRESSURE: 82 MMHG | BODY MASS INDEX: 30.58 KG/M2 | TEMPERATURE: 98.1 F | OXYGEN SATURATION: 100 % | HEIGHT: 61 IN | WEIGHT: 162 LBS | HEART RATE: 64 BPM | SYSTOLIC BLOOD PRESSURE: 197 MMHG

## 2023-01-10 DIAGNOSIS — L08.9 SUPERFICIAL SKIN INFECTION: ICD-10-CM

## 2023-01-10 DIAGNOSIS — S81.001A OPEN WOUND OF RIGHT KNEE, INITIAL ENCOUNTER: Primary | ICD-10-CM

## 2023-01-10 DIAGNOSIS — I10 HYPERTENSION, UNSPECIFIED TYPE: ICD-10-CM

## 2023-01-10 PROCEDURE — 99284 EMERGENCY DEPT VISIT MOD MDM: CPT

## 2023-01-10 RX ORDER — CEPHALEXIN 500 MG/1
500 CAPSULE ORAL 3 TIMES DAILY
Qty: 30 CAPSULE | Refills: 0 | Status: SHIPPED | OUTPATIENT
Start: 2023-01-10 | End: 2023-01-20

## 2023-01-10 RX ORDER — IBUPROFEN 200 MG
1 TABLET ORAL 3 TIMES DAILY
Qty: 14.2 G | Refills: 0 | Status: SHIPPED | OUTPATIENT
Start: 2023-01-10

## 2023-01-10 NOTE — ED NOTES
Pt to ED from home via EMS. Pt had fall 1/1/23 and has skin tear to r knee. Pt c/o of knee skin tear not healing. Pt hx cellulitis. Pt seen by home health and NP wanted her to be seen.

## 2023-01-10 NOTE — TELEPHONE ENCOUNTER
Pt fell and was treat for r knee skin avulsion, no improvement so nurse sent her to ER    Will also send over encounter notes.

## 2023-01-10 NOTE — ED NOTES
Wound care provided - wound cleaned with saline and antibiotic applied with bandage and paper tape on right knee

## 2023-01-10 NOTE — ED PROVIDER NOTES
EMERGENCY DEPARTMENT ENCOUNTER  I wore full protective equipment throughout this patient encounter including a N95 mask, eye shield, gown and gloves. Hand hygiene was performed before donning protective equipment and after removal when leaving the room.    Room Number:  HE1/I  Date of encounter:  1/10/2023  PCP: Grzegorz Nguyen MD    HPI:  Context: Geeta Butt is a 87 y.o. female who presents to the ED c/o chief complaint of right knee wound.  Patient reports that she fell and skinned her right knee on the carpet on January 1.  Patient reports that she has been putting wound care spray on it as well as covering it with a nonadhesive bandage.  Patient reports that she has had redness around the wound and the wound is not healing.  Patient reports concern for infection, has history of cellulitis.  Patient denies any drainage from the wound, no fever shakes chills or night sweats.  Patient denies any knee pain.  Tetanus is up-to-date.    MEDICAL HISTORY REVIEW  Reviewed in EPIC    PAST MEDICAL HISTORY  Active Ambulatory Problems     Diagnosis Date Noted   • Essential hypertension 10/16/2017   • Hypothyroidism 10/16/2017   • Black stool 10/16/2017   • Diarrhea 10/16/2017   • Nausea & vomiting 10/16/2017   • RUQ pain 10/16/2017   • Leukocytosis 10/16/2017   • Duodenitis 10/17/2017   • Foot pain, bilateral 10/19/2017   • Malignant neoplasm of lower-inner quadrant of left breast in female, estrogen receptor positive (HCC) 02/27/2018   • Iron deficiency anemia 04/04/2019   • Adverse effect of iron 05/09/2019   • GI bleed 03/18/2020   • Gastrointestinal hemorrhage associated with duodenitis 03/18/2020   • Hematochezia 03/19/2020   • Duodenal ulcer 03/19/2020   • Long-term use of high-risk medication 06/03/2020   • Acute left-sided low back pain with left-sided sciatica 07/02/2021   • DDD (degenerative disc disease), lumbosacral 07/02/2021   • GERD without esophagitis 07/02/2021   • Hyponatremia 07/04/2021   •  Hyperlipidemia 07/22/2021   • Prediabetes 07/22/2021   • Depressive disorder 07/22/2021   • Closed fracture of right hip ([facility]) 07/31/2021   • Acute pyelonephritis 08/23/2021   • Sepsis secondary to UTI ([facility]) 08/24/2021   • Bilateral cataracts 11/07/2002   • Central retinal vein occlusion 11/07/2012   • Corneal endothelial dystrophy 11/07/2004   • Epiretinal membrane 11/07/2012   • Bladder prolapse, female, acquired 01/14/2022   • Superficial bruising 01/14/2022   • Neuropathy 01/28/2022   • Urinary retention with incomplete bladder emptying 01/28/2022   • Hx of bladder repair surgery 01/28/2022   • Cellulitis of right lower extremity 03/04/2022   • Stroke ([facility]) 2000   • Peripheral edema 12/01/2022   • Lumbar back pain with radiculopathy affecting left lower extremity 12/28/2022     Resolved Ambulatory Problems     Diagnosis Date Noted   • DM2 (diabetes mellitus, type 2) ([facility]) 10/16/2017   • UTI (urinary tract infection) 10/17/2017   • GIULIA (acute kidney injury) ([facility]) 03/18/2020   • Precordial chest pain 07/02/2021   • Hyperglycemia 07/02/2021   • Chronic kidney disease 07/22/2021     Past Medical History:   Diagnosis Date   • Anesthesia complication    • Arthritis    • Aspiration into airway    • Breast cancer ([facility])    • Cancer ([facility]) 1990   • CKD (chronic kidney disease)    • Depression    • Diverticular disease    • Gastric ulcer    • GERD (gastroesophageal reflux disease)    • Hearing loss    • History of colon polyps    • History of depression    • History of GI bleed    • Hypertension    • Peptic ulceration    • Polyneuritis    • PONV (postoperative nausea and vomiting)    • Poor vision        PAST SURGICAL HISTORY  Past Surgical History:   Procedure Laterality Date   • ABDOMINAL SURGERY     • APPENDECTOMY     • BLADDER REPAIR      AND RECTOCELE   • BREAST BIOPSY     • BREAST CYST ASPIRATION     • BREAST LUMPECTOMY WITH SENTINEL NODE BIOPSY Left 3/19/2018    Procedure: BREAST LUMPECTOMY WITH SENTINEL NODE BIOPSY  AND NEEDLE LOCALIZATION;  Surgeon: Myles Soliman MD;  Location: [facility] OR [facility];  Service: General   • CHOLECYSTECTOMY     • COLONOSCOPY  2013   • COLONOSCOPY N/A 2/16/2018    Procedure: COLONOSCOPY into cecum and T.I. with biopsies;  Surgeon: Augustine Gallego MD;  Location[facility] END[facility];  Service:    • ENDOSCOPY N/A 10/17/2017    Procedure: ESOPHAGOGASTRODUODENOSCOPY WITH COLD BIOPSIES;  Surgeon: Augustine Gallego MD;  Location: [facility] ENDOSCOPY;  Service:    • ENDOSCOPY N/A 2/16/2018    Procedure: ESOPHAGOGASTRODUODENOSCOPY with biopsies and #54 Arguello DILATATION;  Surgeon: Augustine Gallego MD;  Location: [facility] END[facility];  Service:    • ENDOSCOPY N/A 3/19/2020    Procedure: ESOPHAGOGASTRODUODENOSCOPY with biopsies;  Surgeon: Augustine Gallego MD;  Location: [facility] ENDOSCOPY;  Service: Gastroenterology;  Laterality: N/A;  PRE- MELENA, EPIGASTRIC PAIN  POST- eerosive gastritis, duodenal ulcer, hiatal hernia   • ENDOSCOPY N/A 7/29/2020    Procedure: ESOPHAGOGASTRODUODENOSCOPY WITH DUODENAL ASPIRATE AND COLD BIOPSIES;  Surgeon: Augustine Gallego MD;  Location: [facility] ENDOSCOPY;  Service: Gastroenterology;  Laterality: N/A;  PRE: HX ULCER DISEASE  POST: GASTRITIS, HEALED ULCER   • EYE SURGERY Left 2014    3-4 years, cornea replacement    • FEMUR IM NAILING/RODDING Right 8/3/2021    Procedure: FEMUR INTRAMEDULLARY NAILING/RODDING;  Surgeon: Garett Martin II, MD;  Location: [facility] MAIN OR;  Service: Orthopedics;  Laterality: Right;   • HIP SURGERY  08/03/2021   • HYSTERECTOMY     • MOUTH SURGERY  2000    UNDER TONGUE AND LEFT FLOOR OF MOUTH FOR CA   • PARATHYROIDECTOMY      PER PT   • SIGMOIDOSCOPY N/A 7/29/2020    Procedure: FLEXIBLE SIGMOIDOSCOPY TO DESCENDING COLON WITH COLD BIOPSIES;  Surgeon: Augustine Gallego MD;  Location: [facility] ENDOSCOPY;  Service: Gastroenterology;  Laterality: N/A;  PRE: RECTAL BLEEDING  POST: DIVERTICULOSIS, HEMORRHOIDS, MINIMAL PROCTITIS   • SINUS SURGERY     • SKIN BIOPSY      • THYROID SURGERY     • VARICOSE VEIN SURGERY         FAMILY HISTORY  Family History   Problem Relation Age of Onset   • Esophageal cancer Father    • Stomach cancer Father    • Heart disease Mother    • Thyroid cancer Mother    • Hypertension Mother    • Hypertension Sister    • Hypertension Brother    • Stroke Brother    • Heart disease Brother    • Diabetes Brother    • Leukemia Paternal Aunt    • Malig Hyperthermia Neg Hx        SOCIAL HISTORY  Social History     Socioeconomic History   • Marital status:      Spouse name: Nicolas   • Years of education: High school   Tobacco Use   • Smoking status: Former     Packs/day: 1.00     Years: 10.00     Pack years: 10.00     Types: Cigarettes     Start date:      Quit date:      Years since quittin.0   • Smokeless tobacco: Never   • Tobacco comments:     no caffiene   Vaping Use   • Vaping Use: Never used   Substance and Sexual Activity   • Alcohol use: No   • Drug use: No   • Sexual activity: Defer       ALLERGIES  Other, Sulfa antibiotics, Amlodipine, Ciprofloxacin, Doxycycline, Iodine, Macrodantin [nitrofurantoin macrocrystal], Nitrofurantoin, Yeast-related products, and Vancomycin    The patient's allergies have been reviewed    REVIEW OF SYSTEMS  All systems reviewed and negative except for those discussed in HPI.     PHYSICAL EXAM  I have reviewed the triage vital signs and nursing notes.  ED Triage Vitals [01/10/23 1053]   Temp Heart Rate Resp BP SpO2   98.1 °F (36.7 °C) 68 18 (!) 196/81 96 %      Temp src Heart Rate Source Patient Position BP Location FiO2 (%)   Tympanic Monitor Lying Right arm --       General: No acute distress.  HENT: NCAT, PERRL, Nares patent.  Eyes: no scleral icterus.  Neck: trachea midline, no ROM limitations.  CV: regular rhythm, regular rate.  Respiratory: normal effort, CTAB.  Abdomen: soft, nondistended, NTTP, no rebound tenderness, no guarding or rigidity.  Musculoskeletal: no deformity.  Right knee: Patient has  skin tear with abrasion on right anterior knee with trace surrounding erythema, no warmth induration fluctuance or drainage.  Knee is otherwise normal in appearance, knee itself has no redness or swelling, no effusion, pain-free passive range of motion.  Neuro: alert, moves all extremities, follows commands.  Skin: warm, dry.    LAB RESULTS  No results found for this or any previous visit (from the past 24 hour(s)).    I ordered the above labs and reviewed the results.    RADIOLOGY  No Radiology Exams Resulted Within Past 24 Hours    I ordered the above noted radiological studies. I reviewed the images and results. I agree with the radiologist interpretation.    PROCEDURES  Procedures    MEDICATIONS GIVEN IN ER  Medications - No data to display    PROGRESS, DATA ANALYSIS, CONSULTS, AND MEDICAL DECISION MAKING  A complete history and physical exam have been performed.  All available laboratory and imaging results have been reviewed by myself prior to disposition.    MDM  After the initial H&P, I discussed pertinent information from history and physical exam with patient/family.  Discussed differential diagnosis.  Discussed plan for ED evaluation/workup/treatment.  All questions answered.  Patient/family is agreeable with plan.  ED Course as of 01/10/23 1437   Tue Rolando 10, 2023   1100 Blood pressure reportedly elevated per EMS.  Patient denies any signs or symptoms of hypertensive emergency. [JG]   1105 Patient presents with skin tear to the right knee, patient reports concern for cellulitis, history of cellulitis.  At present, patient does appear to have some superficial skin infection although mild and limited.  No alarm signs or symptoms.  Discussed plan for wound care here, discussed plan for discharge on oral as well as topical antibiotics, discussed plan for follow-up with primary care for wound check as well as referral to wound care center.  Extensive discussion return precautions, discharging. [JG]      ED  Course User Index  [JG] Alan Mann MD       AS OF 14:37 EST VITALS:    BP - (!) 197/82  HR - 64  TEMP - 98.1 °F (36.7 °C) (Tympanic)  O2 SATS - 100%    DIAGNOSIS  Final diagnoses:   Open wound of right knee, initial encounter   Superficial skin infection   Hypertension, unspecified type         DISPOSITION  DISCHARGE    Patient discharged in stable condition.    Reviewed implications of results, diagnosis, meds, responsibility to follow up, warning signs and symptoms of possible worsening, potential complications and reasons to return to ER.    Patient/Family voiced understanding of above instructions.    Discussed plan for discharge, as there is no emergent indication for admission. Patient referred to primary care provider for BP management due to today's BP. Pt/family is agreeable and understands need for follow up and repeat testing.  Pt is aware that discharge does not mean that nothing is wrong but it indicates no emergency is present that requires admission and they must continue care with follow-up as given below or physician of their choice.     FOLLOW-UP  Grzegorz Nguyen MD  [address] [address]  [phone]    Schedule an appointment as soon as possible for a visit in 2 days  even if well, For wound re-check    [facility]  [address] [facility] [address] [phone]             Medication List      New Prescriptions    cephalexin 500 MG capsule  Commonly known as: KEFLEX  Take 1 capsule by mouth 3 (Three) Times a Day for 10 days.     neomycin-bacitracin-polymyxin 5-400-5000 ointment  Apply 1 application topically to the appropriate area as directed 3 (Three) Times a Day.           Where to Get Your Medications      These medications were sent to [facility]  [address] [id number]    Hours: 7:00 AM-6:00 PM Mon-Fri, 8:00 AM-4:30 PM Sat-Sun (Closed 12-12:30PM) Phone: [phone]    · cephalexin 500 MG capsule  · neomycin-bacitracin-polymyxin 5-400-5000 ointment          Alan Mann MD  01/10/23 8238

## 2023-01-10 NOTE — ED NOTES
Pt states she fell on 1/1 and got a skin tear on her right leg. Pt she is here for a wound check on her right leg.

## 2023-01-11 ENCOUNTER — TELEPHONE (OUTPATIENT)
Dept: INTERNAL MEDICINE | Facility: CLINIC | Age: 88
End: 2023-01-11

## 2023-01-11 NOTE — TELEPHONE ENCOUNTER
lvm for pt daughter to see if we can get her in for an appointment tomorrow  is willing to work her in at the end of the day called twice

## 2023-01-12 ENCOUNTER — OFFICE VISIT (OUTPATIENT)
Dept: CARDIOLOGY | Facility: CLINIC | Age: 88
End: 2023-01-12
Payer: MEDICARE

## 2023-01-12 ENCOUNTER — OFFICE VISIT (OUTPATIENT)
Dept: NEUROSURGERY | Facility: CLINIC | Age: 88
End: 2023-01-12
Payer: MEDICARE

## 2023-01-12 VITALS
WEIGHT: 160 LBS | HEART RATE: 77 BPM | RESPIRATION RATE: 16 BRPM | OXYGEN SATURATION: 97 % | DIASTOLIC BLOOD PRESSURE: 84 MMHG | SYSTOLIC BLOOD PRESSURE: 126 MMHG | BODY MASS INDEX: 30.21 KG/M2 | HEIGHT: 61 IN

## 2023-01-12 VITALS
BODY MASS INDEX: 30.58 KG/M2 | HEIGHT: 61 IN | WEIGHT: 162 LBS | DIASTOLIC BLOOD PRESSURE: 68 MMHG | SYSTOLIC BLOOD PRESSURE: 140 MMHG | HEART RATE: 74 BPM | TEMPERATURE: 97.2 F | OXYGEN SATURATION: 97 %

## 2023-01-12 DIAGNOSIS — I10 ESSENTIAL HYPERTENSION: Primary | Chronic | ICD-10-CM

## 2023-01-12 DIAGNOSIS — G89.29 CHRONIC BILATERAL LOW BACK PAIN, UNSPECIFIED WHETHER SCIATICA PRESENT: Primary | ICD-10-CM

## 2023-01-12 DIAGNOSIS — M54.50 CHRONIC BILATERAL LOW BACK PAIN, UNSPECIFIED WHETHER SCIATICA PRESENT: Primary | ICD-10-CM

## 2023-01-12 DIAGNOSIS — E78.2 MIXED HYPERLIPIDEMIA: Chronic | ICD-10-CM

## 2023-01-12 DIAGNOSIS — M54.16 LUMBAR BACK PAIN WITH RADICULOPATHY AFFECTING LEFT LOWER EXTREMITY: ICD-10-CM

## 2023-01-12 PROCEDURE — 99214 OFFICE O/P EST MOD 30 MIN: CPT | Performed by: INTERNAL MEDICINE

## 2023-01-12 PROCEDURE — 93000 ELECTROCARDIOGRAM COMPLETE: CPT | Performed by: INTERNAL MEDICINE

## 2023-01-12 PROCEDURE — 99214 OFFICE O/P EST MOD 30 MIN: CPT | Performed by: NURSE PRACTITIONER

## 2023-01-12 NOTE — PROGRESS NOTES
CARDIOLOGY    Malini Strickland MD    ENCOUNTER DATE:  01/12/2023    Geeta Butt / 87 y.o. / female        CHIEF COMPLAINT / REASON FOR OFFICE VISIT     Heart Problem (3 month follow up/________________/Palpitations /Heart murmur )      HISTORY OF PRESENT ILLNESS       HPI    Geeta Butt is a 87 y.o. female     This is  a patient who previously followed with Dr. Carter. She has hypertension, hyperlipidemia, carotid disease, coronary artery calcification on CT, history of breast cancer and stroke.   She had lifeline screening performed in 2019 showing bilateral carotid disease. She had an echocardiogram in 2019 which did not show significant valve disease. Perfusion stress test was negative for ischemia. She had a fall with a broken right hip in 2021. She was diagnosed with stable renal artery stenosis and treated for cellulitis.   She comes in today to see me. She has been having lower extremity edema. She has not been taking her diuretic because it makes her go to the bathroom too much. She had a fall and cut her leg and was hospitalized for antibiotics. She has an upcoming appointment with the wound care clinic. She comes in to see me because of the edema and worsening shortness of breath.   Echocardiogram in 09/2022 showed ejection fraction to be 60-65% with grade 1 diastolic dysfunction, moderate mitral regurgitation, mild aortic regurgitation, mild tricuspid regurgitation, with normal right ventricular systolic pressure. A 14 day monitor in 10/2022 showed some short bursts of SVT without any significant ventricular tachycardia, bradycardia, or pauses. Symptoms of dizziness do not correlate to arrhythmia.       REVIEW OF SYSTEMS     Review of Systems   Constitutional: Negative for chills, fever, weight gain and weight loss.   Cardiovascular: Positive for leg swelling.   Respiratory: Positive for shortness of breath. Negative for cough, snoring and wheezing.    Hematologic/Lymphatic: Negative for  "bleeding problem. Does not bruise/bleed easily.   Skin: Negative for color change.   Musculoskeletal: Positive for joint pain. Negative for falls and myalgias.   Gastrointestinal: Negative for melena.   Genitourinary: Negative for hematuria.   Neurological: Negative for excessive daytime sleepiness.   Psychiatric/Behavioral: Negative for depression. The patient is not nervous/anxious.          VITAL SIGNS     Visit Vitals  /84 (BP Location: Right arm, Patient Position: Sitting, Cuff Size: Adult)   Pulse 77   Resp 16   Ht 154.9 cm (61\")   Wt 72.6 kg (160 lb)   SpO2 97%   BMI 30.23 kg/m²         Wt Readings from Last 3 Encounters:   01/12/23 72.6 kg (160 lb)   01/12/23 73.5 kg (162 lb)   01/10/23 73.5 kg (162 lb)     Body mass index is 30.23 kg/m².      PHYSICAL EXAMINATION     Constitutional:       General: Not in acute distress.  Neck:      Vascular: No carotid bruit or JVD.   Pulmonary:      Effort: Pulmonary effort is normal.      Breath sounds: Normal breath sounds.   Cardiovascular:      Normal rate. Regular rhythm.      Murmurs: There is no murmur.   Psychiatric:         Mood and Affect: Mood and affect normal.           REVIEWED DATA       ECG 12 Lead    Date/Time: 1/12/2023 3:18 PM  Performed by: Malini Strickland MD  Authorized by: Malini Strickland MD   Comparison: compared with previous ECG from 9/1/2021  Rhythm: sinus rhythm  Conduction: conduction normal  ST Segments: ST segments normal    Clinical impression: normal ECG              Lipid Panel    Lipid Panel 5/31/22   Total Cholesterol 248 (A)   Triglycerides 110   HDL Cholesterol 59   VLDL Cholesterol 19   LDL Cholesterol  170 (A)   LDL/HDL Ratio 2.9   (A) Abnormal value       Comments are available for some flowsheets but are not being displayed.             Lab Results   Component Value Date    GLUCOSE 167 (H) 12/29/2022    BUN 22 12/29/2022    CREATININE 0.99 12/29/2022    EGFRIFNONA 67 10/29/2021    EGFRIFAFRI 78 10/29/2021    BCR 22.2 " 12/29/2022    K 4.7 12/29/2022    CO2 26.6 12/29/2022    CALCIUM 9.7 12/29/2022    PROTENTOTREF 7.0 05/31/2022    ALBUMIN 4.1 12/28/2022    LABIL2 1.9 05/31/2022    AST 13 12/28/2022    ALT 12 12/28/2022       ASSESSMENT & PLAN      Diagnosis Plan   1. Essential hypertension        2. Mixed hyperlipidemia            Dyspnea on exertion. I reviewed her laboratory data. Her echocardiogram is pretty unremarkable for her age. Monitor did not show any significant arrhythmia. I think she is just deconditioned.   Lower extremity edema. It is better since she has been lying down a lot and keeping her feet up. I did encourage her to take 40 mg of Lasix a day over the weekend.   Hypertension. She has been having problems with her blood pressure going high even though it looks good in the office today, so I am not going to adjust her medicine except for having her go back on the diuretic.   Carotid artery disease, moderate. On aspirin. She cannot tolerate statin.  History of renal artery atherosclerotic disease.   Peripheral vascular disease.   Hyperlipidemia. She does not take Zetia. She did not tolerate statins.   History of breast cancer.     I told her to call us next week with an update on how she is feeling and how her blood pressure is looking. Followup with Mary Grace in 4 weeks.         Orders Placed This Encounter   Procedures   • ECG 12 Lead     This order was created via procedure documentation     Order Specific Question:   Release to patient     Answer:   Routine Release           MEDICATIONS         Discharge Medications          Accurate as of January 12, 2023  3:20 PM. If you have any questions, ask your nurse or doctor.            Changes to Medications      Instructions Start Date   furosemide 40 MG tablet  Commonly known as: LASIX  What changed: additional instructions   Take 1/2 tablet in the morning and 1 tablet at lunch.         Continue These Medications      Instructions Start Date   aspirin 81 MG chewable  tablet   81 mg, Oral, Daily      carvedilol 12.5 MG tablet  Commonly known as: COREG   12.5 mg, Oral, 2 Times Daily With Meals      cephalexin 500 MG capsule  Commonly known as: KEFLEX   500 mg, Oral, 3 Times Daily      coenzyme Q10 50 MG capsule capsule   1 capsule, Oral, Daily      estradiol 0.1 MG/GM vaginal cream  Commonly known as: ESTRACE   2 g, Vaginal, Daily      fish oil 1000 MG capsule capsule   1 capsule, Oral, Daily With Breakfast      Flarex 0.1 % ophthalmic suspension  Generic drug: fluorometholone   No dose, route, or frequency recorded.      fluorometholone 0.1 % ophthalmic suspension  Commonly known as: FML   1 drop, Left Eye, Daily      gabapentin 100 MG capsule  Commonly known as: NEURONTIN   TAKE 1 CAPSULE BY MOUTH THREE TIMES A DAY OKAY TO TAKE 1 CAPSULE AT BEDTIME IF NEEDED FOR PAIN      hydrocortisone 2.5 % cream   No dose, route, or frequency recorded.      levothyroxine 50 MCG tablet  Commonly known as: SYNTHROID, LEVOTHROID   TAKE 1 TABLET BY MOUTH EVERY DAY      neomycin-bacitracin-polymyxin 5-400-5000 ointment   1 application, Topical, 3 Times Daily      ondansetron ODT 4 MG disintegrating tablet  Commonly known as: ZOFRAN-ODT   4 mg, Translingual, Every 8 Hours PRN      PROBIOTIC DAILY PO   1 capsule, Oral, Daily      RABEprazole 20 MG EC tablet  Commonly known as: ACIPHEX   TAKE 1 TABLET BY MOUTH EVERY DAY      sodium chloride 5 % ophthalmic solution  Commonly known as: CAYLA 128   1 drop, Right Eye, As Needed      sucralfate 1 GM/10ML suspension  Commonly known as: CARAFATE   TAKE 10 ML BY MOUTH 3 TIMES A DAY FOR 30 MINUTES BEFORE MEALS      valsartan 160 MG tablet  Commonly known as: DIOVAN   240 mg, Oral, Daily      Vitamin D3 50 MCG (2000 UT) capsule   TAKE 1 CAPSULE BY MOUTH DAILY.         Stop These Medications    methylPREDNISolone 4 MG dose pack  Commonly known as: MEDROL  Stopped by: JESSA Clinton MD  01/12/23  15:20 EST    Part of this note  may be an electronic transcription/translation of spoken language to printed text using the Dragon dictation system.

## 2023-01-16 ENCOUNTER — OFFICE VISIT (OUTPATIENT)
Dept: WOUND CARE | Facility: HOSPITAL | Age: 88
End: 2023-01-16
Payer: MEDICARE

## 2023-01-16 ENCOUNTER — TELEPHONE (OUTPATIENT)
Dept: NEUROSURGERY | Facility: CLINIC | Age: 88
End: 2023-01-16

## 2023-01-16 ENCOUNTER — LAB REQUISITION (OUTPATIENT)
Dept: LAB | Facility: HOSPITAL | Age: 88
End: 2023-01-16
Payer: MEDICARE

## 2023-01-16 DIAGNOSIS — L97.812 NON-PRESSURE CHRONIC ULCER OF OTHER PART OF RIGHT LOWER LEG WITH FAT LAYER EXPOSED: ICD-10-CM

## 2023-01-16 PROCEDURE — 87205 SMEAR GRAM STAIN: CPT | Performed by: NURSE PRACTITIONER

## 2023-01-16 PROCEDURE — 97602 WOUND(S) CARE NON-SELECTIVE: CPT

## 2023-01-16 PROCEDURE — 87075 CULTR BACTERIA EXCEPT BLOOD: CPT | Performed by: NURSE PRACTITIONER

## 2023-01-16 PROCEDURE — 87070 CULTURE OTHR SPECIMN AEROBIC: CPT | Performed by: NURSE PRACTITIONER

## 2023-01-16 PROCEDURE — G0463 HOSPITAL OUTPT CLINIC VISIT: HCPCS

## 2023-01-16 PROCEDURE — 87015 SPECIMEN INFECT AGNT CONCNTJ: CPT | Performed by: NURSE PRACTITIONER

## 2023-01-16 NOTE — TELEPHONE ENCOUNTER
Caller: Daisy Avila    Relationship to patient: Emergency Contact    Best call back number:[phone]    Patient is needing: PATIENTS DAUGHTER CALLED TO SEE IF WE COULD FAX MRI REPORT TO DR LÓPEZ W/ PM. PATIENT HAS APPT 01/19/23. PLEASE CALL PATIENTS DAUGHTER TO ADVISE.    THANK YOU

## 2023-01-19 LAB
BACTERIA SPEC AEROBE CULT: NORMAL
GRAM STN SPEC: NORMAL

## 2023-01-21 LAB — BACTERIA SPEC ANAEROBE CULT: NORMAL

## 2023-01-23 ENCOUNTER — OFFICE VISIT (OUTPATIENT)
Dept: WOUND CARE | Facility: HOSPITAL | Age: 88
End: 2023-01-23
Payer: MEDICARE

## 2023-01-23 ENCOUNTER — TELEPHONE (OUTPATIENT)
Dept: NEUROSURGERY | Facility: CLINIC | Age: 88
End: 2023-01-23
Payer: MEDICARE

## 2023-01-23 NOTE — TELEPHONE ENCOUNTER
April tried calling  office twice. Cant get through. Ok form neurosurgical standpoint for epidural?

## 2023-01-23 NOTE — TELEPHONE ENCOUNTER
Dr. Compton called to ask if it was ok for PT to receive an epidural.    Please return his MA's call

## 2023-02-02 ENCOUNTER — OFFICE VISIT (OUTPATIENT)
Dept: WOUND CARE | Facility: HOSPITAL | Age: 88
End: 2023-02-02
Payer: MEDICARE

## 2023-02-02 PROCEDURE — G0463 HOSPITAL OUTPT CLINIC VISIT: HCPCS

## 2023-02-09 ENCOUNTER — OFFICE VISIT (OUTPATIENT)
Dept: CARDIOLOGY | Facility: CLINIC | Age: 88
End: 2023-02-09
Payer: MEDICARE

## 2023-02-09 VITALS
SYSTOLIC BLOOD PRESSURE: 150 MMHG | BODY MASS INDEX: 31.53 KG/M2 | DIASTOLIC BLOOD PRESSURE: 60 MMHG | HEIGHT: 61 IN | OXYGEN SATURATION: 100 % | WEIGHT: 167 LBS | HEART RATE: 69 BPM

## 2023-02-09 DIAGNOSIS — R05.3 CHRONIC COUGH: ICD-10-CM

## 2023-02-09 DIAGNOSIS — I10 ESSENTIAL HYPERTENSION: ICD-10-CM

## 2023-02-09 DIAGNOSIS — E78.2 MIXED HYPERLIPIDEMIA: ICD-10-CM

## 2023-02-09 DIAGNOSIS — R07.89 CHEST PAIN, ATYPICAL: Primary | ICD-10-CM

## 2023-02-09 DIAGNOSIS — R00.2 PALPITATIONS: ICD-10-CM

## 2023-02-09 PROCEDURE — 99214 OFFICE O/P EST MOD 30 MIN: CPT | Performed by: NURSE PRACTITIONER

## 2023-02-09 RX ORDER — CEPHALEXIN 250 MG/1
1 CAPSULE ORAL EVERY 12 HOURS SCHEDULED
COMMUNITY
Start: 2022-11-17 | End: 2023-02-09

## 2023-02-09 NOTE — PROGRESS NOTES
Date of Office Visit: 23  Encounter Provider: JESSA Nolasco  Place of Service: [facility] CARDIOLOGY  Patient Name: Geeta Butt  :1935    Chief Complaint   Patient presents with   • Hyperlipidemia   • Hypertension   • Follow-up   :     HPI: Geeta Butt is a 87 y.o. female  with hypertension, hyperlipidemia, breast cancer, stroke, carotid artery disease, renal artery stenosis, coronary artery calcification on CT, cardiac murmur, hiatel hernia, chronic gastritis, stomach ulcers, schatzki ring. .       She was followed by Dr. Carter.  She is now followed by Dr. Malini Strickland. I will visit with her in follow-up and have reviewed her medical record.      She had [facility] screening that revealed moderate disease in the left carotid artery as well as possible ectopy on EKG in .  In 2019 she complained of dyspnea on exertion and had echocardiogram that showed normal left ventricular systolic function, normal left atrial pressure, trace to mild mitral regurgitation and normal RVSP.  Perfusion stress test was negative for ischemia     She suffered a fall and a right broken hip with surgery in .  She had a CT of the chest 2021 which showed coronary artery calcification.  She has had issues with UTI UTI and sepsis and has been following with urogynecology.     She reported shortness of breath and also had evidence of cardiac murmur which prompted echocardiogram to be completed 2022 showing normal left ventricular systolic function, mild aortic valve regurgitation, moderate mitral annular calcification and normal RVSP.  There was trivial pericardial effusion.  Stress test was ordered but not completed.  She was started back on Lasix 60 mg daily for lower extremity swelling.  She presents today reports swelling is better.  She continues to complain of cough which she has had for about 6 months.  She also reports chest heaviness and some  "intermittent wheezing.  The wheezing is not new.  She has nonproductive cough.  She was being treated for cellulitis but is now cleared to finally have spinal injection since cellulitis has been treated.  She denies blood in the urine or stool.  Her blood pressure is usually 150/70 but still goes up to 180 systolic.  She could check it a couple hours later and it usually goes back down.  Allergies   Allergen Reactions   • Other Nausea Only     All mycins   • Sulfa Antibiotics Unknown - High Severity     LOST CONSCIOUSNESS AND BODY TURNED RED/ON FIRE   • Amlodipine Swelling   • Ciprofloxacin Unknown - High Severity     RED BLISTERS   • Doxycycline Nausea Only   • Iodine Unknown - High Severity     DECADES AGO, IODINE INJECTION CAUSED SKIN REDNESS AND BURNING   • Macrodantin [Nitrofurantoin Macrocrystal] Diarrhea and Nausea And Vomiting   • Nitrofurantoin Nausea And Vomiting   • Yeast-Related Products Unknown - High Severity     MOUTH SORES AND BLADDER INFECTION   • Vancomycin Nausea And Vomiting           Family and social history reviewed.     Review of Systems   Constitutional: Positive for malaise/fatigue.   Cardiovascular: Positive for chest pain and leg swelling.     All other systems were reviewed and are negative          Objective:     Vitals:    02/09/23 1423   BP: 150/60   BP Location: Right arm   Patient Position: Sitting   Cuff Size: Adult   Pulse: 69   SpO2: 100%   Weight: 75.8 kg (167 lb)   Height: 154.9 cm (61\")     Body mass index is 31.55 kg/m².    PHYSICAL EXAM:  Pulmonary:      Effort: Pulmonary effort is normal.      Breath sounds: Normal breath sounds.   Cardiovascular:      Murmurs: There is a grade 2/6 systolic murmur at the ULSB.   Edema:     Ankle: bilateral trace edema of the ankle.        Procedures      Current Outpatient Medications   Medication Sig Dispense Refill   • aspirin 81 MG chewable tablet Chew 81 mg Daily.     • carvedilol (COREG) 12.5 MG tablet Take 1 tablet by mouth 2 (Two) " Times a Day With Meals. 180 tablet 3   • Cholecalciferol (VITAMIN D3) 50 MCG (2000 UT) capsule TAKE 1 CAPSULE BY MOUTH DAILY. 100 capsule 1   • coenzyme Q10 50 MG capsule capsule Take 1 capsule by mouth Daily.     • estradiol (ESTRACE) 0.1 MG/GM vaginal cream Insert 2 g into the vagina 1 (One) Time Per Week.     • Flarex 0.1 % ophthalmic suspension      • fluorometholone (FML) 0.1 % ophthalmic suspension Administer 1 drop into the left eye Daily.     • furosemide (LASIX) 40 MG tablet Take 1/2 tablet in the morning and 1 tablet at lunch. (Patient taking differently: Take 1/2 tablet in the morning and 1 tablet at lunch.      PT HAS NOT BEEN TAKING IT CURRENTLY DUE TO INCONTINENCE.) 135 tablet 1   • gabapentin (NEURONTIN) 100 MG capsule TAKE 1 CAPSULE BY MOUTH THREE TIMES A DAY OKAY TO TAKE 1 CAPSULE AT BEDTIME IF NEEDED FOR PAIN 120 capsule 5   • hydrocortisone 2.5 % cream As Needed.     • levothyroxine (SYNTHROID, LEVOTHROID) 50 MCG tablet TAKE 1 TABLET BY MOUTH EVERY DAY 90 tablet 0   • neomycin-bacitracin-polymyxin (NEOSPORIN) 5-400-5000 ointment Apply 1 application topically to the appropriate area as directed 3 (Three) Times a Day. 14.2 g 0   • Omega-3 Fatty Acids (fish oil) 1000 MG capsule capsule Take 1 capsule by mouth Daily With Breakfast.     • ondansetron ODT (ZOFRAN-ODT) 4 MG disintegrating tablet Place 1 tablet on the tongue Every 8 (Eight) Hours As Needed for Nausea or Vomiting. 30 tablet 0   • Probiotic Product (PROBIOTIC DAILY PO) Take 1 capsule by mouth Daily.     • RABEprazole (ACIPHEX) 20 MG EC tablet TAKE 1 TABLET BY MOUTH EVERY DAY 90 tablet 0   • sodium chloride (CAYLA 128) 5 % ophthalmic solution Administer 1 drop to the right eye As Needed.     • sucralfate (CARAFATE) 1 GM/10ML suspension TAKE 10 ML BY MOUTH 3 TIMES A DAY FOR 30 MINUTES BEFORE MEALS     • valsartan (DIOVAN) 160 MG tablet Take 1.5 tablets by mouth Daily. 60 tablet 1   • cephalexin (KEFLEX) 250 MG capsule Take 1 capsule by mouth  Every 12 (Twelve) Hours.       No current facility-administered medications for this visit.     Assessment:       Diagnosis Plan   1. Chest pain, atypical  Stress Test With Myocardial Perfusion One Day      2. Mixed hyperlipidemia        3. Essential hypertension        4. Palpitations        5. Chronic cough             Orders Placed This Encounter   Procedures   • Stress Test With Myocardial Perfusion One Day     Standing Status:   Future     Standing Expiration Date:   2/9/2024     Order Specific Question:   What stress agent will be used?     Answer:   Regadenoson (Lexiscan)     Order Specific Question:   Difficulty walking criteria?     Answer:   Poor Exercise Tolerance     Order Specific Question:   Difficulty walking criteria?     Answer:   Musculoskeletal (hips, knees, feet, back, amputee)     Order Specific Question:   Reason for exam?     Answer:   Chest Pain     Order Specific Question:   Chest pain specification?     Answer:   Ischemic Equivalent     Order Specific Question:   Release to patient     Answer:   Routine Release         Plan:       1.  87-year-old female with dyspnea on exertion.    Echocardiogram September 2022 showed preserved left ventricular systolic function, mild aortic valve regurgitation, moderate mitral annular calcification.  She will return for perfusion stress test to rule out ischemia.  Her dyspnea on exertion could be anginal equivalent and she does have evidence of coronary calcification on prior CT in 2021.  2.  Lower extremity edema most likely due to venous insufficiency-better now on Lasix 60 mg daily.  3.  Hypertension blood pressure is labile but overall stable.  When it elevates up to 188 usually goes down on its own within a couple hours  4.  Hyperlipidemia currently just on Zetia unless she has documented ischemic disease would not switch to statin.  5.  Carotid artery stenosis apparently moderate on repeat duplex July 2021  6.  History of breast cancer status post  left modified mastectomy chemotherapy and radiation therapy that was completed in May 2018.  7.  History of palpitations-no complaint at this time  8.  Renal artery stenosis-reportedly stable but also patient does not want this intervened  9.  Coronary calcification noted on CT of the chest August 2021.  She had a negative perfusion stress test in 2019  10.  Rectocele and cystocele following with urogynecology-she has had issues with UTI and sepsis and has recently been on antibiotics  11.  Hypothyroidism on replacement therapy  12.  History of chronic gastritis-previously saw Dr. Augustine Toussaint.  She is to be on lifelong PPI  13.  Mild Schatzki ring    14.  Chronic back pain-she has been cleared for spinal injection  15.  Chronic cough-we have not found a clear etiology.  CT of the chest September 2022 showed no acute process.  I have asked her to follow-up with GI to have evaluation for possible GI related cause             It has been a pleasure to participate in this patient's care.      Thank you,  JESSA Nolasco      **I used Dragon to dictate this note:**

## 2023-02-16 DIAGNOSIS — R60.9 PERIPHERAL EDEMA: ICD-10-CM

## 2023-02-16 DIAGNOSIS — E03.9 HYPOTHYROIDISM, UNSPECIFIED TYPE: ICD-10-CM

## 2023-02-16 RX ORDER — LEVOTHYROXINE SODIUM 0.05 MG/1
TABLET ORAL
Qty: 90 TABLET | Refills: 1 | Status: SHIPPED | OUTPATIENT
Start: 2023-02-16

## 2023-02-16 RX ORDER — FUROSEMIDE 40 MG/1
TABLET ORAL
Qty: 90 TABLET | Refills: 1 | Status: SHIPPED | OUTPATIENT
Start: 2023-02-16

## 2023-02-16 NOTE — TELEPHONE ENCOUNTER
Rx Refill Note  Requested Prescriptions     Pending Prescriptions Disp Refills   • levothyroxine (SYNTHROID, LEVOTHROID) 50 MCG tablet [Pharmacy Med Name: LEVOTHYROXINE 50 MCG TABLET] 90 tablet 0     Sig: TAKE 1 TABLET BY MOUTH EVERY DAY   • furosemide (LASIX) 40 MG tablet [Pharmacy Med Name: FUROSEMIDE 40 MG TABLET] 90 tablet 1     Sig: TAKE 1 TABLET BY MOUTH EVERY DAY      Last office visit with prescribing clinician: 12/1/2022   Last telemedicine visit with prescribing clinician: 3/9/2023   Next office visit with prescribing clinician: 3/9/2023                         Would you like a call back once the refill request has been completed: [] Yes [] No    If the office needs to give you a call back, can they leave a voicemail: [] Yes [] No    Zhane Harrison  02/16/23, 14:20 EST

## 2023-02-28 ENCOUNTER — TELEPHONE (OUTPATIENT)
Dept: ONCOLOGY | Facility: CLINIC | Age: 88
End: 2023-02-28
Payer: MEDICARE

## 2023-02-28 NOTE — TELEPHONE ENCOUNTER
Caller: Daisy Avila    Relationship to patient: Emergency Contact    Best call back number: [phone]    Patient is needing: TO R/S 3-8-23 LAB AND F/U APPT TO July AFTER MAMMO.

## 2023-03-08 NOTE — THERAPY PROGRESS REPORT/RE-CERT
Outpatient Physical Therapy Ortho Progress Note  [facility]     Patient Name: Geeta Butt  : 1935  MRN: [id number]  Today's Date: 5/3/2022      Visit Date: 2022    Visit Dx:    ICD-10-CM ICD-9-CM   1. Poor posture  R29.3 781.92   2. Cervical pain  M54.2 723.1       Patient Active Problem List   Diagnosis   • Essential hypertension   • Hypothyroidism   • Black stool   • Diarrhea   • Nausea & vomiting   • RUQ pain   • Leukocytosis   • UTI (urinary tract infection)   • Duodenitis   • Foot pain, bilateral   • Malignant neoplasm of lower-inner quadrant of left breast in female, estrogen receptor positive (HCC)   • Iron deficiency anemia   • Adverse effect of iron   • GI bleed   • GIULIA (acute kidney injury) (HCC)   • Gastrointestinal hemorrhage associated with duodenitis   • Hematochezia   • Duodenal ulcer   • Long-term use of high-risk medication   • Acute left-sided low back pain with left-sided sciatica   • DDD (degenerative disc disease), lumbosacral   • Hyperglycemia   • GERD without esophagitis   • Hyponatremia   • Hyperlipidemia   • Impaired fasting glucose   • Depressive disorder   • Chronic kidney disease   • Closed fracture of right hip (HCC)   • Acute pyelonephritis   • Sepsis secondary to UTI (HCC)   • Bilateral cataracts   • Central retinal vein occlusion   • Corneal endothelial dystrophy   • Epiretinal membrane   • Bladder prolapse, female, acquired   • Superficial bruising   • Neuropathy   • Urinary retention with incomplete bladder emptying   • Hx of bladder repair surgery   • Cellulitis of right lower extremity   • Stroke (HCC)        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         • Depression    • Diverticular disease    • Gastric ulcer     AND DUODENAL   • GERD (gastroesophageal reflux disease)    • Hearing loss     BILAT AIDES   • History of colon polyps    • History of depression    • History of GI bleed    • Hypertension    • Hypothyroidism    • Peptic ulceration    • PONV (postoperative nausea and vomiting)    • Poor vision     RIGHT EYE, HAS PERIPHERAL VISION    • Stroke (HCC) 2000     mild weakness on left, partial sight loss on right         Past Surgical History:   Procedure Laterality Date   • ABDOMINAL SURGERY     • APPENDECTOMY     • BLADDER REPAIR      AND RECTOCELE   • BREAST BIOPSY     • BREAST CYST ASPIRATION     • BREAST LUMPECTOMY WITH SENTINEL NODE BIOPSY Left 3/19/2018    Procedure: BREAST LUMPECTOMY WITH SENTINEL NODE BIOPSY AND NEEDLE LOCALIZATION;  Surgeon: Myles Soliman MD;  [facility];  Service: General   • CHOLECYSTECTOMY     • COLONOSCOPY  2013   • COLONOSCOPY N/A 2/16/2018    Procedure: COLONOSCOPY into cecum and T.I. with biopsies;  Surgeon: Augustine Gallego MD;  Location: [facility] ENDOSCOPY;  Service:    • ENDOSCOPY N/A 10/17/2017    Procedure: ESOPHAGOGASTRODUODENOSCOPY WITH COLD BIOPSIES;  Surgeon: Augustine Gallego MD;  Location: [facility] ENDOSCOPY;  Service:    • ENDOSCOPY N/A 2/16/2018    Procedure: ESOPHAGOGASTRODUODENOSCOPY with biopsies and #54 Argu[address] DILATATION;  Surgeon: Augustine Gallego MD;  Location: [facility] ENDOSCOPY;  Service:    • ENDOSCOPY N/A 3/19/2020    Procedure: ESOPHAGOGASTRODUODENOSCOPY with biopsies;  Surgeon: Augustine Gallego MD;  Location: [facility] ENDOSCOPY;  Service: Gastroenterology;  Laterality: N/A;  PRE- MELENA, EPIGASTRIC PAIN  POST- eerosive gastritis, duodenal ulcer, hiatal hernia   • ENDOSCOPY N/A 7/29/2020    Procedure: ESOPHAGOGASTRODUODENOSCOPY WITH DUODENAL ASPIRATE AND COLD BIOPSIES;  Surgeon: Augustine Gallego MD;  Location: [facility] ENDOSCOPY;  Service: Gastroenterology;  Laterality: N/A;  PRE: HX ULCER  DISEASE  POST: GASTRITIS, HEALED ULCER   • EYE SURGERY Left 2014    3-4 years, cornea replacement    • FEMUR IM NAILING/RODDING Right 8/3/2021    Procedure: FEMUR INTRAMEDULLARY NAILING/RODDING;  Surgeon: Garett Martin II, MD;  Location: [facility] MAIN OR;  Service: Orthopedics;  Laterality: Right;   • HIP SURGERY  08/03/2021   • HYSTERECTOMY     • MOUTH SURGERY  2000    UNDER TONGUE AND LEFT FLOOR OF MOUTH FOR CA   • PARATHYROIDECTOMY      PER PT   • SIGMOIDOSCOPY N/A 7/29/2020    Procedure: FLEXIBLE SIGMOIDOSCOPY TO DESCENDING COLON WITH COLD BIOPSIES;  Surgeon: Augustine Gallego MD;  Location: [facility] ENDOSCOPY;  Service: Gastroenterology;  Laterality: N/A;  PRE: RECTAL BLEEDING  POST: DIVERTICULOSIS, HEMORRHOIDS, MINIMAL PROCTITIS   • SINUS SURGERY     • SKIN BIOPSY     • THYROID SURGERY     • VARICOSE VEIN SURGERY                          PT Assessment/Plan     Row Name 05/03/22 1527          PT Assessment    Functional Limitations Decreased safety during functional activities;Limitations in community activities;Impaired gait;Limitations in functional capacity and performance;Impaired locomotion;Performance in leisure activities;Performance in self-care ADL;Limitation in home management  -     Impairments Impaired flexibility;Gait;Joint mobility;Muscle strength;Pain;Posture;Range of motion  -     Assessment Comments Geeta Butt has been seen for 4 physical therapy sessions for cervical pain. Treatment has included therapeutic exercise, manual therapy and patient education with home exercise program . Progress to physical therapy goals is fair as pt. Has met 1/2 STGs, and 1/4 LTGs. She reports improved pain levels since beginning therapy, worst 5-6/10 over last week. She demonstrates improving ROM particularly into R rotation. Continues to require cueing to improve posture with rwx as pt. Ambulates with forward head/kyphotic posture. Reports previous benefit from therapy with use of cervical traction,  may trail in coming sessions though will continue to benefit from postural correction/education for long-term management. She will benefit from continued skilled physical therapy to address remaining impairments and functional limitations.  -     Please refer to paper survey for additional self-reported information Yes  -     Rehab Potential Good  -     Patient/caregiver participated in establishment of treatment plan and goals Yes  -     Patient would benefit from skilled therapy intervention Yes  -            PT Plan    PT Frequency 2x/week  -     Predicted Duration of Therapy Intervention (PT) 8 visits  -     Planned CPT's? PT RE-EVAL: 79267;PT THER PROC EA 15 MIN: 40182;PT THER ACT EA 15 MIN: 64138;PT MANUAL THERAPY EA 15 MIN: 55148;PT NEUROMUSC RE-EDUCATION EA 15 MIN: 82006;PT SELF CARE/HOME MGMT/TRAIN EA 15: 98837;PT HOT OR COLD PACK TREAT [facility];PT AQUATIC THERAPY EA 15 MIN: 47753;PT TRACTION CERVICAL: 93060  -     PT Plan Comments consider cervical traction? previous benefit  -           User Key  (r) = Recorded By, (t) = Taken By, (c) = Cosigned By    Initials Name Provider Type     Marbella Barakat, PT Physical Therapist                 Modalities     Row Name 05/03/22 1600             Moist Heat     Applied Yes  -      Location neck  -      PT Moist Heat Minutes --  during manual and ther ex, skin inspected before/after wit no adverse response  -            User Key  (r) = Recorded By, (t) = Taken By, (c) = Cosigned By    Initials Name Provider Type     Marbella Barakat, PT Physical Therapist               OP Exercises     Row Name 05/03/22 1500             Subjective Comments    Subjective Comments I have some good days and bad days. Maybe a few more good days.  -              Total Minutes    96103 - PT Therapeutic Exercise Minutes 15  -      77785 - PT Manual Therapy Minutes 25  -              Exercise 1    Exercise Name 1 pulleys: flex  -      Cueing 1 Demo  -       Time 1 2 min  -              Exercise 2    Exercise Name 2 supine chin tuck  -      Cueing 2 Verbal  -      Reps 2 10  -MH      Time 2 5sec  -MH              Exercise 3    Exercise Name 3 chin tuck + rot  -      Cueing 3 Verbal  -      Reps 3 10 b  -MH      Time 3 R>L rotation range  -              Exercise 4    Exercise Name 4 posterior shoulder roll  -      Cueing 4 Verbal  -MH      Reps 4 20  -MH              Exercise 5    Exercise Name 5 Scap squeeze  -      Cueing 5 Verbal;Demo  -MH      Reps 5 20  -MH              Exercise 9    Exercise Name 9 update POC, goals, outcome measures  -            User Key  (r) = Recorded By, (t) = Taken By, (c) = Cosigned By    Initials Name Provider Type     Marbella Barakat, PT Physical Therapist                         Manual Rx (last 36 hours)     Manual Treatments     Row Name 05/03/22 1500             Total Minutes    08641 - PT Manual Therapy Minutes 25  -              Manual Rx 1    Manual Rx 1 Location B UT/cervical paraspinal STM  -      Manual Rx 1 Type gentle cervical rotation  -              Manual Rx 2    Manual Rx 2 Location cspine distraction  -              Manual Rx 3    Manual Rx 3 Location gentle pec/UT stretch  -            User Key  (r) = Recorded By, (t) = Taken By, (c) = Cosigned By    Initials Name Provider Type     Marbella Barakat, PT Physical Therapist                 PT OP Goals     Row Name 05/03/22 1500          PT Short Term Goals    STG Date to Achieve 05/01/22  -     STG 1 Pt will report pain rated 4/10 at worst in order to demonstrate ability to return to normalized ADLs and functional activities.  -     STG 1 Progress Progressing;Ongoing  -     STG 1 Progress Comments 5-6/10  -     STG 2 Pt will be independent with initial HEP for symptom management.  -     STG 2 Progress Met  -            Long Term Goals    LTG Date to Achieve 06/01/22  -     LTG 1 Pt will be independent and compliant with advanced  HEP for long term management of symptoms and prevention of future occurrence.  -     LTG 1 Progress Ongoing  -     LTG 1 Progress Comments slow progression with goals secondary to limited visits and progress thus far  -     LTG 2 Pt will reduce level of perceived disability as measured by the NDI  to 40% in order to improve QOL.  -     LTG 2 Progress Met  -     LTG 2 Progress Comments 30%  -     LTG 3 Pt will demonstrate L cervical rotation and SBing to 50% with mild pain in order to improve ability to perform ADLs.  -     LTG 3 Progress Ongoing  -     LTG 3 Progress Comments continues to report pain with L rotation  [facility] will demonstrate erect posture with AD use during gait in order to reduce stress on affected tissues.  -     LTG 4 Progress Ongoing  -     LTG 4 Progress Comments verbalizes becoming more aware of posture with walking though continues to require cueing in clinic  -           User Key  (r) = Recorded By, (t) = Taken By, (c) = Cosigned By    Initials Name Provider Type     Marbella Barakat, PT Physical Therapist                Therapy Education  Given: Symptoms/condition management, Posture/body mechanics  Program: Reinforced  How Provided: Verbal, Demonstration  Provided to: Patient  Level of Understanding: Verbalized, Demonstrated    Outcome Measure Options: Neck Disability Index (NDI)  Neck Disability Index  Section 1 - Pain Intensity: The pain is moderate and does not vary much.  Section 2 - Personal Care: It is painful to look after myself, and I am slow and careful.  Section 3 - Lifting: I can lift very light weights.  Section 4 - Reading: I can read as much as I want with no neck pain.  Section 5 - Headaches: I have no headaches at all.  Section 6 - Concentration: I have a fair degree of difficulty in concentrating when I want to.  Section 7 - Work: I cannot do my usual work.  Section 9 - Sleeping: My sleep is slightly disturbed (less than 1 hour  sleepless).  Section 10 - Recreation: I am able to engage in all recreational activities with no pain in my neck at all.  Neck Disability Index Score: 15      Time Calculation:   Start Time: 1530  Stop Time: 1612  Time Calculation (min): 42 min  Total Timed Code Minutes- PT: 40 minute(s)  Timed Charges  47752 - PT Therapeutic Exercise Minutes: 15  22860 - PT Manual Therapy Minutes: 25  Untimed Charges  PT Moist Heat Minutes:  (during manual and ther ex, skin inspected before/after wit no adverse response)  Total Minutes  Timed Charges Total Minutes: 40   Total Minutes: 40  Therapy Charges for Today     Code Description Service Date Service Provider Modifiers Qty    79047403888 HC PT MANUAL THERAPY EA 15 MIN 5/3/2022 Marbella Barakat, PT GP 2    33059417238 HC PT THER PROC EA 15 MIN 5/3/2022 Marbella Barakat, PT GP 1          PT G-Codes  Outcome Measure Options: Neck Disability Index (NDI)  Neck Disability Index Score: 15         Marbella Barakat PT  5/3/2022

## 2023-03-09 ENCOUNTER — OFFICE VISIT (OUTPATIENT)
Dept: INTERNAL MEDICINE | Facility: CLINIC | Age: 88
End: 2023-03-09
Payer: MEDICARE

## 2023-03-09 VITALS
HEART RATE: 57 BPM | TEMPERATURE: 96.8 F | RESPIRATION RATE: 18 BRPM | HEIGHT: 61 IN | SYSTOLIC BLOOD PRESSURE: 130 MMHG | BODY MASS INDEX: 30.21 KG/M2 | WEIGHT: 160 LBS | DIASTOLIC BLOOD PRESSURE: 72 MMHG | OXYGEN SATURATION: 100 %

## 2023-03-09 DIAGNOSIS — Z00.00 MEDICARE ANNUAL WELLNESS VISIT, SUBSEQUENT: Primary | ICD-10-CM

## 2023-03-09 PROCEDURE — 1126F AMNT PAIN NOTED NONE PRSNT: CPT | Performed by: FAMILY MEDICINE

## 2023-03-09 PROCEDURE — 1170F FXNL STATUS ASSESSED: CPT | Performed by: FAMILY MEDICINE

## 2023-03-09 PROCEDURE — 1159F MED LIST DOCD IN RCRD: CPT | Performed by: FAMILY MEDICINE

## 2023-03-09 PROCEDURE — G0439 PPPS, SUBSEQ VISIT: HCPCS | Performed by: FAMILY MEDICINE

## 2023-03-09 PROCEDURE — 1160F RVW MEDS BY RX/DR IN RCRD: CPT | Performed by: FAMILY MEDICINE

## 2023-03-09 NOTE — PROGRESS NOTES
The ABCs of the Annual Wellness Visit  Subsequent Medicare Wellness Visit    Subjective      Geeta Butt is a 87 y.o. female who presents for a Subsequent Medicare Wellness Visit.    The following portions of the patient's history were reviewed and   updated as appropriate: allergies, current medications, past family history, past medical history, past social history, past surgical history and problem list.    Compared to one year ago, the patient feels her physical   health is the same.    Compared to one year ago, the patient feels her mental   health is the same.    Recent Hospitalizations:  This patient has had a [facility] admission record on file within the last 365 days.    Current Medical Providers:  Patient Care Team:  Grzegorz Nguyen MD as PCP - General (Family Medicine)  Myles Soliman MD as Referring Physician (Breast Surgery)  Omi Valenzuela MD as Consulting Physician (Hematology and Oncology)  Sidney Campbell MD as Referring Physician (Dermatology)  Amador Brandt MD as Consulting Physician (Orthopedic Surgery)    Outpatient Medications Prior to Visit   Medication Sig Dispense Refill   • aspirin 81 MG chewable tablet Chew 1 tablet Daily.     • carvedilol (COREG) 12.5 MG tablet Take 1 tablet by mouth 2 (Two) Times a Day With Meals. 180 tablet 3   • Cholecalciferol (VITAMIN D3) 50 MCG (2000 UT) capsule TAKE 1 CAPSULE BY MOUTH DAILY. 100 capsule 1   • coenzyme Q10 50 MG capsule capsule Take 1 capsule by mouth Daily.     • estradiol (ESTRACE) 0.1 MG/GM vaginal cream Insert 2 g into the vagina 1 (One) Time Per Week.     • Flarex 0.1 % ophthalmic suspension      • fluorometholone (FML) 0.1 % ophthalmic suspension Administer 1 drop into the left eye Daily.     • furosemide (LASIX) 40 MG tablet TAKE 1 TABLET BY MOUTH EVERY DAY (Patient taking differently: 60 mg) 90 tablet 1   • gabapentin (NEURONTIN) 100 MG capsule TAKE 1 CAPSULE BY MOUTH THREE TIMES A DAY OKAY TO TAKE 1 CAPSULE AT  BEDTIME IF NEEDED FOR PAIN 120 capsule 5   • hydrocortisone 2.5 % cream As Needed.     • levothyroxine (SYNTHROID, LEVOTHROID) 50 MCG tablet TAKE 1 TABLET BY MOUTH EVERY DAY 90 tablet 1   • Omega-3 Fatty Acids (fish oil) 1000 MG capsule capsule Take 1 capsule by mouth Daily With Breakfast.     • ondansetron ODT (ZOFRAN-ODT) 4 MG disintegrating tablet Place 1 tablet on the tongue Every 8 (Eight) Hours As Needed for Nausea or Vomiting. 30 tablet 0   • Probiotic Product (PROBIOTIC DAILY PO) Take 1 capsule by mouth Daily.     • RABEprazole (ACIPHEX) 20 MG EC tablet TAKE 1 TABLET BY MOUTH EVERY DAY 90 tablet 0   • sodium chloride (CAYLA 128) 5 % ophthalmic solution Administer 1 drop to the right eye As Needed.     • sucralfate (CARAFATE) 1 GM/10ML suspension TAKE 10 ML BY MOUTH 3 TIMES A DAY FOR 30 MINUTES BEFORE MEALS     • valsartan (DIOVAN) 160 MG tablet Take 1.5 tablets by mouth Daily. 60 tablet 1   • neomycin-bacitracin-polymyxin (NEOSPORIN) 5-400-5000 ointment Apply 1 application topically to the appropriate area as directed 3 (Three) Times a Day. 14.2 g 0     No facility-administered medications prior to visit.       No opioid medication identified on active medication list. I have reviewed chart for other potential  high risk medication/s and harmful drug interactions in the elderly.          Aspirin is on active medication list. Aspirin use is indicated based on review of current medical condition/s. Pros and cons of this therapy have been discussed today. Benefits of this medication outweigh potential harm.  Patient has been encouraged to continue taking this medication.  .      Patient Active Problem List   Diagnosis   • Essential hypertension   • Hypothyroidism   • Black stool   • Diarrhea   • Nausea & vomiting   • RUQ pain   • Leukocytosis   • Duodenitis   • Foot pain, bilateral   • Malignant neoplasm of lower-inner quadrant of left breast in female, estrogen receptor positive (HCC)   • Iron deficiency anemia  "  • Adverse effect of iron   • GI bleed   • Gastrointestinal hemorrhage associated with duodenitis   • Hematochezia   • Duodenal ulcer   • Long-term use of high-risk medication   • Acute left-sided low back pain with left-sided sciatica   • DDD (degenerative disc disease), lumbosacral   • GERD without esophagitis   • Hyponatremia   • Hyperlipidemia   • Prediabetes   • Depressive disorder   • Closed fracture of right hip (HCC)   • Acute pyelonephritis   • Sepsis secondary to UTI (HCC)   • Bilateral cataracts   • Central retinal vein occlusion   • Corneal endothelial dystrophy   • Epiretinal membrane   • Bladder prolapse, female, acquired   • Superficial bruising   • Neuropathy   • Urinary retention with incomplete bladder emptying   • Hx of bladder repair surgery   • Cellulitis of right lower extremity   • Stroke (HCC)   • Peripheral edema   • Lumbar back pain with radiculopathy affecting left lower extremity     Advance Care Planning  Advance Directive is on file.  ACP discussion was held with the patient during this visit. Patient has an advance directive in EMR which is still valid.      Objective    Vitals:    03/09/23 1127 03/09/23 1157   BP: 118/78 130/72   BP Location: Left arm    Patient Position: Sitting    Cuff Size: Small Adult    Pulse: 57    Resp: 18    Temp: 96.8 °F (36 °C)    SpO2: 100%    Weight: 72.6 kg (160 lb)    Height: 154.9 cm (61\")    PainSc: 0-No pain      Estimated body mass index is 30.23 kg/m² as calculated from the following:    Height as of this encounter: 154.9 cm (61\").    Weight as of this encounter: 72.6 kg (160 lb).    BMI is >= 30 and <35. (Class 1 Obesity). The following options were offered after discussion;: nutrition counseling/recommendations    Physical Exam  Vitals and nursing note reviewed.   Constitutional:       General: She is not in acute distress.     Appearance: Normal appearance.   Cardiovascular:      Rate and Rhythm: Normal rate and regular rhythm.      Heart " sounds: Normal heart sounds. No murmur heard.  Pulmonary:      Effort: Pulmonary effort is normal.      Breath sounds: Normal breath sounds.   Neurological:      Mental Status: She is alert.           Does the patient have evidence of cognitive impairment?   No            HEALTH RISK ASSESSMENT    Smoking Status:  Social History     Tobacco Use   Smoking Status Former   • Packs/day: 1.00   • Years: 10.00   • Pack years: 10.00   • Types: Cigarettes   • Start date:    • Quit date:    • Years since quittin.2   Smokeless Tobacco Never   Tobacco Comments    no caffeine     Alcohol Consumption:  Social History     Substance and Sexual Activity   Alcohol Use No     Fall Risk Screen:    STEADI Fall Risk Assessment was completed, and patient is at LOW risk for falls.Assessment completed on:3/9/2023    Depression Screening:  PHQ-2/PHQ-9 Depression Screening 3/9/2023   Little Interest or Pleasure in Doing Things 0-->not at all   Feeling Down, Depressed or Hopeless 0-->not at all   PHQ-9: Brief Depression Severity Measure Score 0       Health Habits and Functional and Cognitive Screening:  Functional & Cognitive Status 3/9/2023   Do you have difficulty preparing food and eating? Yes   Do you have difficulty bathing yourself, getting dressed or grooming yourself? No   Do you have difficulty using the toilet? No   Do you have difficulty moving around from place to place? No   Do you have trouble with steps or getting out of a bed or a chair? No   Current Diet Well Balanced Diet   Dental Exam Up to date   Eye Exam Up to date   Exercise (times per week) 0 times per week   Current Exercises Include No Regular Exercise   Current Exercise Activities Include -   Do you need help using the phone?  No   Are you deaf or do you have serious difficulty hearing?  Yes   Do you need help with transportation? No   Do you need help shopping? Yes   Do you need help preparing meals?  Yes   Do you need help with housework?  Yes   Do  you need help with laundry? Yes   Do you need help taking your medications? No   Do you need help managing money? No   Do you ever drive or ride in a car without wearing a seat belt? No   Have you felt unusual stress, anger or loneliness in the last month? No   Who do you live with? Alone   If you need help, do you have trouble finding someone available to you? No   Have you been bothered in the last four weeks by sexual problems? No   Do you have difficulty concentrating, remembering or making decisions? No       Age-appropriate Screening Schedule:  Refer to the list below for future screening recommendations based on patient's age, sex and/or medical conditions. Orders for these recommended tests are listed in the plan section. The patient has been provided with a written plan.    Health Maintenance   Topic Date Due   • Pneumococcal Vaccine 65+ (1 - PCV) Never done   • ZOSTER VACCINE (2 of 2) 05/17/2017   • COVID-19 Vaccine (4 - Booster for Pfizer series) 01/13/2022   • DXA SCAN  05/07/2022   • INFLUENZA VACCINE  08/01/2022   • LIPID PANEL  05/31/2023   • MAMMOGRAM  06/24/2023   • ANNUAL WELLNESS VISIT  03/09/2024   • TDAP/TD VACCINES (2 - Td or Tdap) 03/22/2027   • HEMOGLOBIN A1C  Discontinued   • DIABETIC EYE EXAM  Discontinued   • URINE MICROALBUMIN  Discontinued              Common labs    Common Labs 12/28/22 12/28/22 12/29/22 12/29/22 12/30/22    1719 1719 0220 0220    Glucose  102 (A)  167 (A)    BUN  21  22    Creatinine  0.96  0.99    Sodium  134 (A)  130 (A)    Potassium  4.4  4.7    Chloride  97 (A)  94 (A)    Calcium  9.4  9.7    Albumin  4.1      Total Bilirubin  0.2      Alkaline Phosphatase  69      AST (SGOT)  13      ALT (SGPT)  12      WBC 8.28  8.81  14.56 (A)   Hemoglobin 10.7 (A)  11.7 (A)  10.4 (A)   Hematocrit 34.2  35.5  31.5 (A)   Platelets 349  359  311   (A) Abnormal value       Comments are available for some flowsheets but are not being displayed.               CMS Preventative  Services Quick Reference  Risk Factors Identified During Encounter:    Immunizations Discussed/Encouraged: Influenza, Prevnar 20 (Pneumococcal 20-valent conjugate), Shingrix and COVID19    The above risks/problems have been discussed with the patient.  Pertinent information has been shared with the patient in the After Visit Summary.    Diagnoses and all orders for this visit:    1. Medicare annual wellness visit, subsequent (Primary)        Follow Up:   Next Medicare Wellness visit to be scheduled in 1 year.      An After Visit Summary and PPPS were made available to the patient.

## 2023-03-13 ENCOUNTER — TELEPHONE (OUTPATIENT)
Dept: CARDIOLOGY | Facility: CLINIC | Age: 88
End: 2023-03-13

## 2023-03-13 NOTE — TELEPHONE ENCOUNTER
Caller: Alex Avila    Relationship: Emergency Contact    Best call back number: [phone]    What is the best time to reach you: ANY    Who are you requesting to speak with (clinical staff, provider,  specific staff member): CLINICAL     Do you know the name of the person who called: ALEX    What was the call regarding: DAUGHTER IS CALLING WITH QUESTIONS ABOUT STRESS TEST. STATES THAT SHE HAS INSTRUCTIONS NOT TO TAKE CERTAIN MEDICATIONS BEFORE TEST BUT IS CONCERNED THAT HER BP WILL GO UP BY NOT TAKING THESE MEDICATIONS. PLS REACH OUT TO ALEX TO GO OVER CONCERNS     Do you require a callback: YES

## 2023-03-16 ENCOUNTER — HOSPITAL ENCOUNTER (OUTPATIENT)
Dept: CARDIOLOGY | Facility: HOSPITAL | Age: 88
Discharge: HOME | End: 2023-03-16
Admitting: NURSE PRACTITIONER
Payer: MEDICARE

## 2023-03-16 ENCOUNTER — TELEPHONE (OUTPATIENT)
Dept: CARDIOLOGY | Facility: CLINIC | Age: 88
End: 2023-03-16
Payer: MEDICARE

## 2023-03-16 VITALS — HEIGHT: 61 IN | BODY MASS INDEX: 30.39 KG/M2 | WEIGHT: 160.94 LBS

## 2023-03-16 DIAGNOSIS — R07.89 CHEST PAIN, ATYPICAL: ICD-10-CM

## 2023-03-16 LAB
BH CV NUCLEAR PRIOR STUDY: 1
BH CV REST NUCLEAR ISOTOPE DOSE: 10.8 MCI
BH CV STRESS BP STAGE 1: NORMAL
BH CV STRESS COMMENTS STAGE 1: NORMAL
BH CV STRESS DOSE REGADENOSON STAGE 1: 0.4
BH CV STRESS DURATION MIN STAGE 1: 0
BH CV STRESS DURATION SEC STAGE 1: 10
BH CV STRESS HR STAGE 1: 85
BH CV STRESS NUCLEAR ISOTOPE DOSE: 35.1 MCI
BH CV STRESS PROTOCOL 1: NORMAL
BH CV STRESS RECOVERY BP: NORMAL MMHG
BH CV STRESS RECOVERY HR: 82 BPM
BH CV STRESS STAGE 1: 1
LV EF NUC BP: 62 %
MAXIMAL PREDICTED HEART RATE: 134 BPM
PERCENT MAX PREDICTED HR: 63.43 %
STRESS BASELINE BP: NORMAL MMHG
STRESS BASELINE HR: 68 BPM
STRESS PERCENT HR: 75 %
STRESS POST EXERCISE DUR SEC: 10 SEC
STRESS POST PEAK BP: NORMAL MMHG
STRESS POST PEAK HR: 85 BPM
STRESS TARGET HR: 114 BPM

## 2023-03-16 PROCEDURE — 93018 CV STRESS TEST I&R ONLY: CPT | Performed by: INTERNAL MEDICINE

## 2023-03-16 PROCEDURE — 93016 CV STRESS TEST SUPVJ ONLY: CPT | Performed by: INTERNAL MEDICINE

## 2023-03-16 PROCEDURE — 93017 CV STRESS TEST TRACING ONLY: CPT

## 2023-03-16 PROCEDURE — A9502 TC99M TETROFOSMIN: HCPCS | Performed by: NURSE PRACTITIONER

## 2023-03-16 PROCEDURE — 78452 HT MUSCLE IMAGE SPECT MULT: CPT | Performed by: INTERNAL MEDICINE

## 2023-03-16 PROCEDURE — 78452 HT MUSCLE IMAGE SPECT MULT: CPT

## 2023-03-16 PROCEDURE — 0 TECHNETIUM TETROFOSMIN KIT: Performed by: NURSE PRACTITIONER

## 2023-03-16 PROCEDURE — 25010000002 REGADENOSON 0.4 MG/5ML SOLUTION: Performed by: NURSE PRACTITIONER

## 2023-03-16 RX ADMIN — TETROFOSMIN 1 DOSE: 1.38 INJECTION, POWDER, LYOPHILIZED, FOR SOLUTION INTRAVENOUS at 14:30

## 2023-03-16 RX ADMIN — REGADENOSON 0.4 MG: 0.08 INJECTION, SOLUTION INTRAVENOUS at 14:30

## 2023-03-16 RX ADMIN — TETROFOSMIN 1 DOSE: 1.38 INJECTION, POWDER, LYOPHILIZED, FOR SOLUTION INTRAVENOUS at 13:31

## 2023-03-16 NOTE — TELEPHONE ENCOUNTER
Please inform patient that her perfusion stress test compared to the prior study from 11/7/2019 the current study reveals no changes. Keep follow up as scheduled

## 2023-03-17 NOTE — TELEPHONE ENCOUNTER
Notified patient's daughter, Daisy, of results/recommendations, allowed per PHIL. She verbalized understanding.    Meena Mg RN  Triage [facility] [place]

## 2023-03-24 ENCOUNTER — OFFICE VISIT (OUTPATIENT)
Dept: ONCOLOGY | Facility: CLINIC | Age: 88
End: 2023-03-24
Payer: MEDICARE

## 2023-03-24 ENCOUNTER — LAB (OUTPATIENT)
Dept: LAB | Facility: HOSPITAL | Age: 88
End: 2023-03-24
Payer: MEDICARE

## 2023-03-24 VITALS
SYSTOLIC BLOOD PRESSURE: 148 MMHG | HEIGHT: 61 IN | HEART RATE: 62 BPM | RESPIRATION RATE: 16 BRPM | TEMPERATURE: 97.3 F | OXYGEN SATURATION: 98 % | BODY MASS INDEX: 30.58 KG/M2 | WEIGHT: 162 LBS | DIASTOLIC BLOOD PRESSURE: 76 MMHG

## 2023-03-24 DIAGNOSIS — Z17.0 MALIGNANT NEOPLASM OF LOWER-INNER QUADRANT OF LEFT BREAST IN FEMALE, ESTROGEN RECEPTOR POSITIVE: ICD-10-CM

## 2023-03-24 DIAGNOSIS — Z17.0 MALIGNANT NEOPLASM OF LOWER-INNER QUADRANT OF LEFT BREAST IN FEMALE, ESTROGEN RECEPTOR POSITIVE: Primary | ICD-10-CM

## 2023-03-24 DIAGNOSIS — C50.312 MALIGNANT NEOPLASM OF LOWER-INNER QUADRANT OF LEFT BREAST IN FEMALE, ESTROGEN RECEPTOR POSITIVE: ICD-10-CM

## 2023-03-24 DIAGNOSIS — C50.312 MALIGNANT NEOPLASM OF LOWER-INNER QUADRANT OF LEFT BREAST IN FEMALE, ESTROGEN RECEPTOR POSITIVE: Primary | ICD-10-CM

## 2023-03-24 LAB — CANCER AG15-3 SERPL-ACNC: 14.2 U/ML

## 2023-03-24 PROCEDURE — 36415 COLL VENOUS BLD VENIPUNCTURE: CPT

## 2023-03-24 PROCEDURE — 99213 OFFICE O/P EST LOW 20 MIN: CPT | Performed by: NURSE PRACTITIONER

## 2023-03-24 PROCEDURE — 86300 IMMUNOASSAY TUMOR CA 15-3: CPT | Performed by: INTERNAL MEDICINE

## 2023-03-24 PROCEDURE — 1126F AMNT PAIN NOTED NONE PRSNT: CPT | Performed by: NURSE PRACTITIONER

## 2023-03-24 NOTE — PROGRESS NOTES
Subjective     REASON FOR CONSULTATION:   1. Left breast cancer [id number] ER/GA+ her2 neg post lumpectomy/SLN  3/18 and whole breast radiation completed May 2018  2.  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  Fatigue out of proportion to anemia  4.  Arimidex started in 3/18 with bone density showing normal density in the spine and osteopenia in the hips-this was self stopped due to vision changes.  5.  Aromasin initiated 10/20/18 and discontinued 1/20/19 secondary to vision changes.  6.  Tamoxifen initiated 8/20/2019 and discontinued 10/20/2019 secondary to vision changes                             REQUESTING PHYSICIAN:  Myles Soliman M.D.    History of Present Illness patient is an 85-year-old female with the above-mentioned history who is here today for reevaluation.      Last year she fell and broke her hip was in rehab for 4 months and finally is home.  She struggles with pain in her other hip as well as low back pain.  She recently did receive some low back injections which she has felt helpful.    She is having issues with prolapse of her bladder and rectum and they recommended some vaginal estrogen prior to surgery and she discontinued as she was concerned about the hormones.  Dr. Valenzuela suggested once weekly use.      She denies any concerns with her breasts currently.               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    • Chronic kidney disease    • Chronic kidney disease 07/22/2021   • CKD (chronic kidney disease)     PT STATES STAGE 3   • Depression    • Diverticular disease    • Gastric ulcer     AND DUODENAL   • GERD (gastroesophageal reflux disease)    • Hearing loss     BILAT AIDES   • History of colon polyps    • History of depression    • History of GI bleed    •  Hypertension    • Hypothyroidism    • Peptic ulceration    • Polyneuritis    • PONV (postoperative nausea and vomiting)    • Poor vision     RIGHT EYE, HAS PERIPHERAL VISION    • Stroke (HCC) 2000     mild weakness on left, partial sight loss on right    • UTI (urinary tract infection) 10/17/2017        Past Surgical History:   Procedure Laterality Date   • ABDOMINAL SURGERY     • APPENDECTOMY     • BLADDER REPAIR      AND RECTOCELE   • BREAST BIOPSY     • BREAST CYST ASPIRATION     • BREAST LUMPECTOMY WITH SENTINEL NODE BIOPSY Left 3/19/2018    Procedure: BREAST LUMPECTOMY WITH SENTINEL NODE BIOPSY AND NEEDLE LOCALIZATION;  Surgeon: Myles Soliman MD;  [facility] [place];  Service: General   • CHOLECYSTECTOMY     • COLONOSCOPY  2013   • COLONOSCOPY N/A 2/16/2018    Procedure: COLONOSCOPY into cecum and T.I. with biopsies;  Surgeon: Augustine Gallego MD;  Location: [facility] ENDOSCOPY;  Service:    • ENDOSCOPY N/A 10/17/2017    Procedure: ESOPHAGOGASTRODUODENOSCOPY WITH COLD BIOPSIES;  Surgeon: Augustine Gallego MD;  Location: [facility] ENDOSCOPY;  Service:    • ENDOSCOPY N/A 2/16/2018    Procedure: ESOPHAGOGASTRODUODENOSCOPY with biopsies and #54 Arguello DILATATION;  Surgeon: Augustine Gallego MD;  Location: [facility] ENDOSCOPY;  Service:    • ENDOSCOPY N/A 3/19/2020    Procedure: ESOPHAGOGASTRODUODENOSCOPY with biopsies;  Surgeon: Augustine Gallego MD;  Location: [facility] ENDOSCOPY;  Service: Gastroenterology;  Laterality: N/A;  PRE- MELENA, EPIGASTRIC PAIN  POST- eerosive gastritis, duodenal ulcer, hiatal hernia   • ENDOSCOPY N/A 7/29/2020    Procedure: ESOPHAGOGASTRODUODENOSCOPY WITH DUODENAL ASPIRATE AND COLD BIOPSIES;  Surgeon: Augustine Gallego MD;  Location: [facility] ENDOSCOPY;  Service: Gastroenterology;  Laterality: N/A;  PRE: HX ULCER DISEASE  POST: GASTRITIS, HEALED ULCER   • EYE SURGERY Left 2014    3-4 years, cornea replacement    • FEMUR IM NAILING/RODDING Right 8/3/2021    Procedure: FEMUR INTRAMEDULLARY  NAILING/RODDING;  Surgeon: Garett Martin II, MD;  Location: [facility] MAIN OR;  Service: Orthopedics;  Laterality: Right;   • HIP SURGERY  08/03/2021   • HYSTERECTOMY     • MOUTH SURGERY  2000    UNDER TONGUE AND LEFT FLOOR OF MOUTH FOR CA   • PARATHYROIDECTOMY      PER PT   • SIGMOIDOSCOPY N/A 7/29/2020    Procedure: FLEXIBLE SIGMOIDOSCOPY TO DESCENDING COLON WITH COLD BIOPSIES;  Surgeon: Augustine Gallego MD;  Location: [facility] ENDOSCOPY;  Service: Gastroenterology;  Laterality: N/A;  PRE: RECTAL BLEEDING  POST: DIVERTICULOSIS, HEMORRHOIDS, MINIMAL PROCTITIS   • SINUS SURGERY     • SKIN BIOPSY     • THYROID SURGERY     • VARICOSE VEIN SURGERY      Oncological history  patient is an 82-year-old female with recent problems with abdominal pain and diarrhea and weight loss which is being evaluated by GI and finally found to have multiple ulcers in the duodenum and small bowel finally responding to treatment.  In the middle of this she went for routine mammogram a couple of months later was found to have an abnormality in the left breast at 7 o'clock position measuring about 12 mm in size that was not present last year.  The patient had previously had a left breast biopsy in 2015 with benign findings.   There was no palpable mass and biopsy was done and this revealed a grade 1 infiltrating ductal carcinoma at least 1 cm in size ER 78%/VA 17% positive HER-2 negative.  The patient was referred to Dr. Soliman who performed a lumpectomy and sentinel node biopsy with the findings of a 1.2 cm tumor grade 2 with lymphovascular invasion and clear margins with associated DCIS.  One of 2 sentinel nodes showed a micrometastasis although the pathologist said this was almost too small to be considered a micrometastasis.  Postoperatively she has done well and thankfully her GI complains of much improved with Carafate and Protonix and her diarrhea finally resolved.    She's not had a bone density in quite a while.  She is   5 para 5  menarche at age 15 and menopause in her mid 50s although she had a hysterectomy at 30 for an ulcerated uterus.  First childbirth was at age 20 she breast-fed all 5 children.  She took hormonal therapy for at least 15 years after menopause and stopped about 10 years ago   .  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    Her father  at 52 of stomach cancer mother had thyroid cancer at age 50 her brother's daughter had breast cancer age 40 and she had a paternal aunt with leukemia is no other breast cancer or ovarian cancer uterine cancer or pancreatic cancer in the family . I did ask him to check with her niece to see if she has had genetic testing - they do not know much at all about the maternal family .    She will call for the results of the DEXA scan and if adequate we will start Arimidex which I have  already prescribed to her and see her back in 3 months to assess her tolerance.  She is in very good health and I explained to her that adjuvant therapy decreases the risk of recurrence of her cancer which might be in the 15-20% range probably down to the 10% range and if she has significant side effects we will be inclined to stop treatment.  Obviously if her GI symptoms recur with Arimidex we will have to try another medications.  We talked about the joint pains and hot flashes which are unlikely at her age and she was agreeable to a trial of Arimidex.  Radiation has been planned      In 10/2018 she was changed to Aromasin as she had issues previously with Arimidex with visual blurring.  The patient was then seen 19 with reports of blurred vision once again.    At that point the patient's symptoms have essentially resolved.  We therefore elected to switch her to Femara.    Unfortunately, the patient ultimately experienced similar symptoms with Femara because of muscle cramps and blurred vision.      I discussed with her  today that Dr. Valenzuela would like the patient to consider taking tamoxifen as yet another potential medication to benefit her in light of history of hormone positive breast cancer.  It is noted that the patient takes Prozac and has been on this for many many years.  There is a potential interaction and I plan to discuss this with Dr. Valenzuela further.  If okayed per Dr. Valenzuela, we would plan then to start the patient on this as soon as next week.        Current Outpatient Medications on File Prior to Visit   Medication Sig Dispense Refill   • aspirin 81 MG chewable tablet Chew 1 tablet Daily.     • carvedilol (COREG) 12.5 MG tablet Take 1 tablet by mouth 2 (Two) Times a Day With Meals. 180 tablet 3   • Cholecalciferol (VITAMIN D3) 50 MCG (2000 UT) capsule TAKE 1 CAPSULE BY MOUTH DAILY. 100 capsule 1   • coenzyme Q10 50 MG capsule capsule Take 1 capsule by mouth Daily.     • estradiol (ESTRACE) 0.1 MG/GM vaginal cream Insert 2 g into the vagina 1 (One) Time Per Week.     • Flarex 0.1 % ophthalmic suspension      • fluorometholone (FML) 0.1 % ophthalmic suspension Administer 1 drop into the left eye Daily.     • furosemide (LASIX) 40 MG tablet TAKE 1 TABLET BY MOUTH EVERY DAY (Patient taking differently: 60 mg) 90 tablet 1   • gabapentin (NEURONTIN) 100 MG capsule TAKE 1 CAPSULE BY MOUTH THREE TIMES A DAY OKAY TO TAKE 1 CAPSULE AT BEDTIME IF NEEDED FOR PAIN 120 capsule 5   • hydrocortisone 2.5 % cream As Needed.     • levothyroxine (SYNTHROID, LEVOTHROID) 50 MCG tablet TAKE 1 TABLET BY MOUTH EVERY DAY 90 tablet 1   • neomycin-bacitracin-polymyxin (NEOSPORIN) 5-400-5000 ointment Apply 1 application topically to the appropriate area as directed 3 (Three) Times a Day. 14.2 g 0   • Omega-3 Fatty Acids (fish oil) 1000 MG capsule capsule Take 1 capsule by mouth Daily With Breakfast.     • ondansetron ODT (ZOFRAN-ODT) 4 MG disintegrating tablet Place 1 tablet on the tongue Every 8 (Eight) Hours As Needed for Nausea or Vomiting.  30 tablet 0   • Probiotic Product (PROBIOTIC DAILY PO) Take 1 capsule by mouth Daily.     • RABEprazole (ACIPHEX) 20 MG EC tablet TAKE 1 TABLET BY MOUTH EVERY DAY 90 tablet 0   • sodium chloride (CAYLA 128) 5 % ophthalmic solution Administer 1 drop to the right eye As Needed.     • sucralfate (CARAFATE) 1 GM/10ML suspension TAKE 10 ML BY MOUTH 3 TIMES A DAY FOR 30 MINUTES BEFORE MEALS     • valsartan (DIOVAN) 160 MG tablet Take 1.5 tablets by mouth Daily. 60 tablet 1     No current facility-administered medications on file prior to visit.        ALLERGIES:    Allergies   Allergen Reactions   • Other Nausea Only     All mycins   • Sulfa Antibiotics Unknown - High Severity     LOST CONSCIOUSNESS AND BODY TURNED RED/ON FIRE   • Amlodipine Swelling   • Ciprofloxacin Unknown - High Severity     RED BLISTERS   • Doxycycline Nausea Only   • Iodine Unknown - High Severity     DECADES AGO, IODINE INJECTION CAUSED SKIN REDNESS AND BURNING   • Macrodantin [Nitrofurantoin Macrocrystal] Diarrhea and Nausea And Vomiting   • Nitrofurantoin Nausea And Vomiting   • Yeast-Related Products Unknown - High Severity     MOUTH SORES AND BLADDER INFECTION   • Vancomycin Nausea And Vomiting        Social History     Socioeconomic History   • Marital status:      Spouse name: Nicolas   • Years of education: High school   Tobacco Use   • Smoking status: Former     Packs/day: 1.00     Years: 10.00     Pack years: 10.00     Types: Cigarettes     Start date:      Quit date:      Years since quittin.2   • Smokeless tobacco: Never   • Tobacco comments:     no caffeine   Vaping Use   • Vaping Use: Never used   Substance and Sexual Activity   • Alcohol use: No   • Drug use: No   • Sexual activity: Defer        Family History   Problem Relation Age of Onset   • Esophageal cancer Father    • Stomach cancer Father    • Heart disease Mother    • Thyroid cancer Mother    • Hypertension Mother    • Hypertension Sister    • Hypertension  "Brother    • Stroke Brother    • Heart disease Brother    • Diabetes Brother    • Leukemia Paternal Aunt    • Malig Hyperthermia Neg Hx         Review of Systems   Constitutional: Positive for fatigue. Negative for chills and fever.   Eyes: Negative for discharge and visual disturbance.   Respiratory: Negative for chest tightness and shortness of breath.    Cardiovascular: Negative for chest pain and leg swelling.   Gastrointestinal: Negative for abdominal distention, abdominal pain, diarrhea and nausea.   Musculoskeletal: Positive for back pain and joint swelling.   Skin: Negative for pallor and rash.   Neurological: Negative for dizziness and numbness.   Psychiatric/Behavioral: Negative for behavioral problems and confusion.       Objective     Vitals:    03/24/23 1038   BP: 148/76   Pulse: 62   Resp: 16   Temp: 97.3 °F (36.3 °C)   TempSrc: Temporal   SpO2: 98%   Weight: 73.5 kg (162 lb)   Height: 154.9 cm (60.98\")   PainSc: 0-No pain       Current Status 3/24/2023   ECOG score 1       Physical Exam   Constitutional: She is oriented to person, place, and time. She appears well-developed. No distress.   Pulmonary/Chest: Effort normal. No respiratory distress.       Neurological: She is alert and oriented to person, place, and time.   Skin: Skin is warm and dry.   Breast exam is benign with no new masses tenderness in the left breast is chronic  I have reexamined the patient and the results are consistent with the previously documented exam. JESSA Taylor     RECENT LABS:                Lab Results   Component Value Date    IRON 9 (L) 08/23/2021    TIBC 267 (L) 08/23/2021    FERRITIN 247 (H) 01/14/2022       R 78% VT 17% Her 2 -neg Ki-67 7%  Final Diagnosis   1.  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                SEE SYNOPTIC REPORT (BELOW) FOR ADDITIONAL " DETAILS.     2.  SENTINEL LYMPH NODE #1, LEFT AXILLA, EXCISION:                ONE LYMPH NODE, POSITIVE FOR METASTATIC CARCINOMA (MICROMETASTASIS) (SEE COMMENT).               NO EXTRANODAL EXTENSION IS IDENTIFIED.     COMMENT: Measuring the exact size of the lymph node metastasis is difficult. The lymph node demonstrates a few very small scattered foci of tumor cells, compatible with isolated tumor cells.  One slide demonstrates a single contiguous focus of tumor cells exceeding 0.2 mm; therefore, this is best considered a micrometastasis rather than isolated tumor cells.     3.  SENTINEL LYMPH NODE #2, LEFT AXILLA, EXCISION.               ONE LYMPH NODE, NEGATIVE FOR METASTATIC CARCINOMA.     SYNOPTIC REPORT (Based on [facility] cancer case summary, version January 2018):               Procedure: Excision (less than total mastectomy).               Specimen laterality: Left.                Tumor site: Not specified.                Tumor size: 1.2 x 1.1 x 1 cm.               Histologic type: Invasive carcinoma of no special type (ductal, not otherwise specified).                Histologic grade ([place] Histologic Score):                              Glandular (acinar)/tubular differentiation: Score 3.                            Nuclear pleomorphism: Score 2.                             Mitotic rate: Score 1.                            Overall grade: Grade 2 (Score 6 of 9).               Tumor focality: Single focus of invasive carcinoma.                Ductal carcinoma in situ (DCIS): Present.                            Architectural patterns: Solid.                             Nuclear grade: Grade II (intermediate).                            Necrosis: Present, central comedonecrosis.                Lobular carcinoma in situ (LCIS): No LCIS in specimen.               Margins:                            Invasive carcinoma margins: Uninvolved by invasive carcinoma.                                          Distance from  closest margin: 3 mm (medial margin).                                          NOTE: Invasive carcinoma also extends to within 4 mm of superior margin and to within                                                 4 mm of lateral margin.  All additional margins are greater than 10 mm from invasive                                                 carcinoma.                              DCIS Margins: Uninvolved by DCIS.                                          Distance from closest margin: 3 mm (medial margin).                    Regional Lymph nodes: Involved by tumor cells.                             Number of lymph nodes with macrometastases: 0.                            Number of lymph nodes with micrometastases: 1.                            Size of largest metastatic deposit: 0.4 mm.                            Extranodal extension: Not identified.                            Number of lymph nodes examined: 2.                            Number of sentinel lymph nodes examined: 2.                Treatment effect: No known presurgical therapy.                Lymphovascular invasion: Present.                Dermal Lymphovascular invasion: Not identified.                Pathologic stage classification.                            Primary tumor: pT1c.                            Regional lymph nodes: pN1mi(sn).                            Distant metastasis: Not applicable.                Additional pathologic findings:                             Ductal hyperplasia of the usual type.                             Fibrocystic changes with duct dilatation and stasis and focal apocrine metaplasia.                             Columnar cell change without atypia.                             Fibroinflammatory changes consistent with site of prior biopsy.                    Ancillary studies: ER, TN, HER-2/melanie, and Ki-67 studies performed on previous biopsy specimen at                       outside facility ([facility]).                Duplex scan of extremity veins including responses to compression and other maneuvers; bilateral   Study Impression     • Right Common Femoral: No deep vein thrombosis noted.   • Right Saphenofemoral Junction: No superficial thrombophlebitis noted.   • Right Proximal Femoral: No deep vein thrombosis noted.   • Right Mid Femoral: No deep vein thrombosis noted.   • Right Distal Femoral: No deep vein thrombosis noted.   • Right Popliteal: No deep vein thrombosis noted.   • Right Posterior Tibial: No deep vein thrombosis noted.   • Right Peroneal: No deep vein thrombosis noted.   • Right Gastrocnemius Soleal: No deep vein thrombosis noted.   • Right Greater Saphenous Above Knee: No superficial thrombophlebitis noted.   • Right Greater Saphenous Below Knee: No superficial thrombophlebitis noted.   • Left Common Femoral: No deep vein thrombosis noted.   • Left Saphenofemoral Junction: No superficial thrombophlebitis noted.   • Left Proximal Femoral: No deep vein thrombosis noted.   • Left Mid Femoral: No deep vein thrombosis noted.   • Left Distal Femoral: No deep vein thrombosis noted.   • Left Popliteal: No deep vein thrombosis noted.   • Left Posterior Tibial: No deep vein thrombosis noted.   • Left Peroneal: No deep vein thrombosis noted.   • Left Gastrocnemius Soleal: No deep vein thrombosis noted.   • Left Greater Saphenous Above Knee: No superficial thrombophlebitis noted.   • Left Greater Saphenous Below Knee: No superficial thrombophlebitis noted.   Electronically signed by Nicolas Cotton MD on 10/4/19 at 1933 EDT    5/29/2020 bilateral screening mammogram negative.    At baseline EKG was done in the office today which shows normal sinus rhythm with a rate of 75.  QT/QTc 368/411.        Assessment & Plan      1.  T1c N1 jese N0 ER/W[place] positive HER-2 negative left breast cancer post lumpectomy and radiation  · Arimidex started in 3/18 discontinued by the patient in 7/18 due to visual blurring  which resolved.    · Aromasin started 11/2018. 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  She also has had weight gain. Aromasin discontinued.  · Patient seen early April 2019 and follow-up.  Patient agreeable to switch therapy to Femara 2.5 mg daily.  · Patient ultimately discontinuing Femara due to repeated arthralgias and blurred vision.  · The patient was transitioned to tamoxifen but ultimately discontinued this approximately 2 months later per her report today due to blurry vision.   · Recommend patient start trial toremifene.  She is hoping to be able to started early next week.  · Patient decided not to take toremifene and stop all hormonal blockade in 5/20  · Seen again in follow-up in 9/22 off all hormonal blockade had a fall and broke her hip but no obvious signs of cancer recurrence  · 3/24/23 patient returns with no new concerns with her breasts.  She has had lumbar sipne injections for pain with some improvement.     2.  Family history of breast cancer in a niece at a young age and father with cancer at age 51?  Genetic testing    4.  History of oral cancer in 1990    5.  Anemia, recently worked up per Dr. Ivan, internal medicine.   · Patient had EGD and colonoscopy per Dr. Gallego 1 year ago with no concerning findings.  Because of her history of GI ulcers however she was started on AcipHex per Dr. Ivan.  · 3/21/2019 showed iron saturation of 8% ferritin of 18. Patient started on trial of oral iron but was intolerant due to GI upset.  · Patient receiving 2 doses of IV Injectafer in May 2019.  Iron stores are now replete with iron saturation of 29% and hemoglobin having now normalized to 13.  · Rectal bleeding in 4/20 CAT scan shows duodenitis resumed ulcer medications  · CBC requested today    6.  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  Rectocele and cystocele.   Gynecologist recommended vaginal estrogens Dr. yee recommended she do it only once or twice a week, she is not currently using    PLAN:  1.  Return in 6 months for follow-up  With Dr. Yee  2.  Mammogram due June 2023

## 2023-04-06 DIAGNOSIS — K21.9 GERD WITHOUT ESOPHAGITIS: ICD-10-CM

## 2023-04-06 RX ORDER — RABEPRAZOLE SODIUM 20 MG/1
TABLET, DELAYED RELEASE ORAL
Qty: 90 TABLET | Refills: 0 | Status: SHIPPED | OUTPATIENT
Start: 2023-04-06

## 2023-05-22 DIAGNOSIS — I10 ESSENTIAL HYPERTENSION: Chronic | ICD-10-CM

## 2023-05-22 RX ORDER — CARVEDILOL 12.5 MG/1
TABLET ORAL
Qty: 180 TABLET | Refills: 3 | Status: SHIPPED | OUTPATIENT
Start: 2023-05-22

## 2023-06-06 DIAGNOSIS — I10 ESSENTIAL (PRIMARY) HYPERTENSION: ICD-10-CM

## 2023-06-07 RX ORDER — VALSARTAN 160 MG/1
TABLET ORAL
Qty: 135 TABLET | Refills: 3 | Status: SHIPPED | OUTPATIENT
Start: 2023-06-07

## 2023-07-13 ENCOUNTER — APPOINTMENT (OUTPATIENT)
Dept: WOMENS IMAGING | Facility: HOSPITAL | Age: 88
End: 2023-07-13
Payer: MEDICARE

## 2023-07-13 PROCEDURE — 77067 SCR MAMMO BI INCL CAD: CPT | Performed by: RADIOLOGY

## 2023-07-13 PROCEDURE — 77063 BREAST TOMOSYNTHESIS BI: CPT | Performed by: RADIOLOGY

## 2023-07-17 NOTE — TELEPHONE ENCOUNTER
Spoke with Kassandra in the obs unit and she will relay the message.    no Stage 15: Additional Anesthesia Type: 1% lidocaine with epinephrine

## 2023-08-03 ENCOUNTER — OFFICE VISIT (OUTPATIENT)
Dept: INTERNAL MEDICINE | Facility: CLINIC | Age: 88
End: 2023-08-03
Payer: MEDICARE

## 2023-08-03 VITALS
OXYGEN SATURATION: 96 % | BODY MASS INDEX: 31.29 KG/M2 | DIASTOLIC BLOOD PRESSURE: 91 MMHG | WEIGHT: 159.4 LBS | HEART RATE: 74 BPM | SYSTOLIC BLOOD PRESSURE: 149 MMHG | HEIGHT: 60 IN | TEMPERATURE: 98.1 F

## 2023-08-03 DIAGNOSIS — H61.21 IMPACTED CERUMEN OF RIGHT EAR: ICD-10-CM

## 2023-08-03 DIAGNOSIS — J34.89 SINUS PRESSURE: ICD-10-CM

## 2023-08-03 DIAGNOSIS — J01.90 ACUTE BACTERIAL SINUSITIS: Primary | ICD-10-CM

## 2023-08-03 DIAGNOSIS — H92.01 RIGHT EAR PAIN: ICD-10-CM

## 2023-08-03 DIAGNOSIS — B96.89 ACUTE BACTERIAL SINUSITIS: Primary | ICD-10-CM

## 2023-08-03 DIAGNOSIS — J34.2 DEVIATED SEPTUM: ICD-10-CM

## 2023-08-03 RX ORDER — METHYLPREDNISOLONE 4 MG/1
TABLET ORAL
Qty: 21 TABLET | Refills: 0 | Status: SHIPPED | OUTPATIENT
Start: 2023-08-03

## 2023-08-03 RX ORDER — CEPHALEXIN 500 MG/1
500 CAPSULE ORAL 2 TIMES DAILY
Qty: 14 CAPSULE | Refills: 0 | Status: SHIPPED | OUTPATIENT
Start: 2023-08-03 | End: 2023-08-10

## 2023-08-24 ENCOUNTER — OFFICE VISIT (OUTPATIENT)
Dept: CARDIOLOGY | Facility: CLINIC | Age: 88
End: 2023-08-24
Payer: MEDICARE

## 2023-08-24 VITALS
BODY MASS INDEX: 30.51 KG/M2 | HEIGHT: 60 IN | SYSTOLIC BLOOD PRESSURE: 130 MMHG | WEIGHT: 155.4 LBS | OXYGEN SATURATION: 98 % | DIASTOLIC BLOOD PRESSURE: 72 MMHG | HEART RATE: 63 BPM

## 2023-08-24 DIAGNOSIS — E78.2 MIXED HYPERLIPIDEMIA: ICD-10-CM

## 2023-08-24 DIAGNOSIS — I10 ESSENTIAL HYPERTENSION: Primary | ICD-10-CM

## 2023-08-24 DIAGNOSIS — I65.29 STENOSIS OF CAROTID ARTERY, UNSPECIFIED LATERALITY: ICD-10-CM

## 2023-08-24 DIAGNOSIS — I70.1 RENAL ARTERY STENOSIS: ICD-10-CM

## 2023-08-24 DIAGNOSIS — E03.9 HYPOTHYROIDISM, UNSPECIFIED TYPE: ICD-10-CM

## 2023-08-24 PROCEDURE — 1160F RVW MEDS BY RX/DR IN RCRD: CPT | Performed by: NURSE PRACTITIONER

## 2023-08-24 PROCEDURE — 93000 ELECTROCARDIOGRAM COMPLETE: CPT | Performed by: NURSE PRACTITIONER

## 2023-08-24 PROCEDURE — 1159F MED LIST DOCD IN RCRD: CPT | Performed by: NURSE PRACTITIONER

## 2023-08-24 PROCEDURE — 99214 OFFICE O/P EST MOD 30 MIN: CPT | Performed by: NURSE PRACTITIONER

## 2023-08-24 RX ORDER — LEVOTHYROXINE SODIUM 0.05 MG/1
50 TABLET ORAL DAILY
Qty: 90 TABLET | Refills: 1 | Status: SHIPPED | OUTPATIENT
Start: 2023-08-24

## 2023-08-24 NOTE — PROGRESS NOTES
"    CARDIOLOGY        Patient Name: Geeta Butt  :1935  Age: 88 y.o.  Primary Cardiologist: Malini Strickland MD and Grzegorz Carter MD  Encounter Provider:  JESSA Rodriguez    Date of Service: 23        CHIEF COMPLAINT / REASON FOR OFFICE VISIT     6 month follow up, Hypertension, and Coronary Artery Disease      HISTORY OF PRESENT ILLNESS       HPI  Geeta Butt is a 88 y.o. female who presents today for semiannual evaluation.     Pt has a  history significant for venous insufficiency, hypertension, hyperlipidemia, carotid artery stenosis, history of breast cancer with mastectomy, Renal artery stenosis, Coronary artery calcification noted on CT imaging.    Patient presents today with her daughter, she reports that she has done well since last assessment.  Her BP typically runs high, she no longer monitors her BP at home.  She is currently on antibiotics for a urinary tract infection.  She has had some diarrhea as a side effect of antibiotics. She did have an episode of lightheadedness on Friday of last week, this is when she was diagnosed with UTI.  She has also struggled with balance recently and was fount to have an otitis media and was treated with antibiotics.  Denies chest pain, palpitations, edema that is baseline on most days.  She consumes a low sodium diet.  She does have generalized weakness.  She does walk in the house and tries to move as much as possible, but she does not do formal exercises.  She does to chair exercises when she can.  She is tolerating all medications.     The following portions of the patient's history were reviewed and updated as appropriate: allergies, current medications, past family history, past medical history, past social history, past surgical history and problem list.      VITAL SIGNS     Visit Vitals  /72 (BP Location: Left arm, Patient Position: Sitting, Cuff Size: Adult)   Pulse 63   Ht 152.4 cm (60\")   Wt 70.5 kg (155 lb 6.4 oz)   SpO2 98% "   BMI 30.35 kg/mý         Wt Readings from Last 3 Encounters:   08/24/23 70.5 kg (155 lb 6.4 oz)   08/03/23 72.3 kg (159 lb 6.4 oz)   03/24/23 73.5 kg (162 lb)     Body mass index is 30.35 kg/mý.      REVIEW OF SYSTEMS   Review of Systems   Constitutional: Positive for malaise/fatigue. Negative for chills, fever, weight gain and weight loss.   Cardiovascular:  Positive for dyspnea on exertion. Negative for leg swelling.   Respiratory:  Positive for shortness of breath. Negative for cough, snoring and wheezing.    Hematologic/Lymphatic: Negative for bleeding problem. Does not bruise/bleed easily.   Skin:  Negative for color change.   Musculoskeletal:  Negative for falls, joint pain and myalgias.   Gastrointestinal:  Negative for melena.   Genitourinary:  Negative for hematuria.   Neurological:  Negative for excessive daytime sleepiness.   Psychiatric/Behavioral:  Negative for depression. The patient is not nervous/anxious.          PHYSICAL EXAMINATION     Vitals and nursing note reviewed.   Constitutional:       Appearance: Normal appearance. Well-developed.   Eyes:      Conjunctiva/sclera: Conjunctivae normal.   Neck:      Vascular: No carotid bruit.   Pulmonary:      Breath sounds: Normal breath sounds.   Cardiovascular:      Normal rate. Regular rhythm. Normal S1 with normal intensity. Normal S2 with normal intensity.       Murmurs: There is no murmur.      No gallop.  No click. No rub.   Edema:     Peripheral edema absent.   Musculoskeletal: Normal range of motion. Skin:     General: Skin is warm and dry.   Neurological:      Mental Status: Alert and oriented to person, place, and time.      GCS: GCS eye subscore is 4. GCS verbal subscore is 5. GCS motor subscore is 6.   Psychiatric:         Speech: Speech normal.         Behavior: Behavior normal.         Thought Content: Thought content normal.         Judgment: Judgment normal.         REVIEWED DATA       ECG 12 Lead    Date/Time: 8/24/2023 1:54 PM  Performed  by: Nusrat Villalobos APRN  Authorized by: Nusrat Villalobos, JESSA   Comparison: compared with previous ECG from 1/23/2023  Similar to previous ECG  Rhythm: sinus rhythm  Rate: normal  BPM: 69  ST Segments: ST segments normal  T Waves: T waves normal  QRS axis: normal    Clinical impression: normal ECG        Cardiac Procedures:  Myocardial perfusion stress test 3/16/2023.  Inferior wall attenuation with no evidence of ischemia.  Impressions consistent with low risk study.  Compared to November 2019 there are no changes.        Lab Results   Component Value Date     (L) 12/29/2022     (L) 12/28/2022    K 4.7 12/29/2022    K 4.4 12/28/2022    CL 94 (L) 12/29/2022    CL 97 (L) 12/28/2022    CO2 26.6 12/29/2022    CO2 28.0 12/28/2022    BUN 22 12/29/2022    BUN 21 12/28/2022    CREATININE 0.99 12/29/2022    CREATININE 0.96 12/28/2022    EGFRIFNONA 67 10/29/2021    EGFRIFNONA 101 08/26/2021    EGFRIFAFRI 78 10/29/2021    EGFRIFAFRI 61 03/15/2021    GLUCOSE 167 (H) 12/29/2022    GLUCOSE 102 (H) 12/28/2022    CALCIUM 9.7 12/29/2022    CALCIUM 9.4 12/28/2022    PROTENTOTREF 7.0 05/31/2022    PROTENTOTREF 7.1 10/29/2021    ALBUMIN 4.1 12/28/2022    ALBUMIN 4.40 09/21/2022    BILITOT 0.2 12/28/2022    BILITOT 0.3 09/21/2022    AST 13 12/28/2022    AST 19 09/21/2022    ALT 12 12/28/2022    ALT 15 09/21/2022     Lab Results   Component Value Date    WBC 14.56 (H) 12/30/2022    WBC 8.81 12/29/2022    HGB 10.4 (L) 12/30/2022    HGB 11.7 (L) 12/29/2022    HCT 31.5 (L) 12/30/2022    HCT 35.5 12/29/2022    MCV 89.0 12/30/2022    MCV 88.5 12/29/2022     12/30/2022     12/29/2022     Lab Results   Component Value Date    PROBNP 185.0 09/01/2022    PROBNP 1,092.0 07/31/2021     Lab Results   Component Value Date    CKTOTAL 639 (H) 08/01/2021    TROPONINT 0.019 07/31/2021     Lab Results   Component Value Date    TSH 2.270 05/31/2022    TSH 1.110 10/29/2021             ASSESSMENT & PLAN     Diagnoses and all  orders for this visit:    1. Essential hypertension (Primary)  Blood pressure controlled at 130/72  Continue valsartan 160 mg/day, carvedilol 12.5 mg twice daily  -     ECG 12 Lead    2. Mixed hyperlipidemia  Currently on Zetia, in the past patient has been hesitant to try a statin drug    3. Stenosis of carotid artery, unspecified laterality  Due for surveillance scanning by vascular surgery  Continue aspirin therapy.  Patient does have bruising to lower extremities bilaterally but no noted blood in the stool or blood in the urine.    4. Renal artery stenosis  Due for surveillance scans by vascular surgery.  Daughter will reach out to arrange scheduling          Return in about 6 months (around 2/24/2024) for Dr. Strickland-follow up.    Future Appointments         Provider Department Center    9/21/2023 1:15 PM Grzegorz Nguyen MD [facility] PRIMARY C[facility] [place]    10/19/2023 2:20 PM LAB CHAIR 6 [facility] ONCOLOGY [facility] [place]    10/19/2023 2:40 PM Omi Valenzuela MD [facility] [place]                  MEDICATIONS         Discharge Medications            Accurate as of August 24, 2023  1:57 PM. If you have any questions, ask your nurse or doctor.                Changes to Medications        Instructions Start Date   furosemide 40 MG tablet  Commonly known as: LASIX  What changed:   how much to take  additional instructions   TAKE 1 TABLET BY MOUTH EVERY DAY             Continue These Medications        Instructions Start Date   aspirin 81 MG chewable tablet   81 mg, Oral, Daily      carvedilol 12.5 MG tablet  Commonly known as: COREG   TAKE 1 TABLET BY MOUTH TWICE A DAY WITH MEALS      coenzyme Q10 50 MG capsule capsule   1 capsule, Oral, Daily      estradiol 0.1 MG/GM vaginal cream  Commonly known as: ESTRACE   2 g, Vaginal, Weekly      fish oil 1000 MG capsule capsule   1 capsule, Oral, Daily With Breakfast      Flarex 0.1 %  ophthalmic suspension  Generic drug: fluorometholone   No dose, route, or frequency recorded.      fluorometholone 0.1 % ophthalmic suspension  Commonly known as: FML   1 drop, Left Eye, Daily      gabapentin 100 MG capsule  Commonly known as: NEURONTIN   TAKE 1 CAPSULE BY MOUTH 3 TIMES A DAY. OKAY TO TAKE 1 CAPSULE AT BEDTIME IF NEEDED FOR PAIN      hydrocortisone 2.5 % cream   As Needed      levothyroxine 50 MCG tablet  Commonly known as: SYNTHROID, LEVOTHROID   TAKE 1 TABLET BY MOUTH EVERY DAY      ondansetron ODT 4 MG disintegrating tablet  Commonly known as: ZOFRAN-ODT   4 mg, Translingual, Every 8 Hours PRN      PROBIOTIC DAILY PO   1 capsule, Oral, Daily      RABEprazole 20 MG EC tablet  Commonly known as: ACIPHEX   TAKE 1 TABLET BY MOUTH EVERY DAY      sodium chloride 5 % ophthalmic solution  Commonly known as: CAYLA 128   1 drop, Right Eye, As Needed      sucralfate 1 GM/10ML suspension  Commonly known as: CARAFATE   TAKE 10 ML BY MOUTH 3 TIMES A DAY FOR 30 MINUTES BEFORE MEALS      valsartan 160 MG tablet  Commonly known as: DIOVAN   TAKE 1.5 TABLETS BY MOUTH DAILY. NEW DIRECTIONS      Vitamin D3 50 MCG (2000 UT) capsule   TAKE 1 CAPSULE BY MOUTH DAILY.                   **Dragon Disclaimer:   Much of this encounter note is an electronic transcription/translation of spoken language to printed text. The electronic translation of spoken language may permit erroneous, or at times, nonsensical words or phrases to be inadvertently transcribed. Although I have reviewed the note for such errors, some may still exist.

## 2023-09-07 ENCOUNTER — TELEPHONE (OUTPATIENT)
Dept: INTERNAL MEDICINE | Facility: CLINIC | Age: 88
End: 2023-09-07

## 2023-09-07 ENCOUNTER — OFFICE VISIT (OUTPATIENT)
Dept: INTERNAL MEDICINE | Facility: CLINIC | Age: 88
End: 2023-09-07
Payer: MEDICARE

## 2023-09-07 ENCOUNTER — HOSPITAL ENCOUNTER (OUTPATIENT)
Facility: HOSPITAL | Age: 88
Discharge: HOME | End: 2023-09-07
Payer: MEDICARE

## 2023-09-07 ENCOUNTER — HOSPITAL ENCOUNTER (OUTPATIENT)
Dept: CARDIOLOGY | Facility: HOSPITAL | Age: 88
Discharge: HOME | End: 2023-09-07
Payer: MEDICARE

## 2023-09-07 ENCOUNTER — TRANSCRIBE ORDERS (OUTPATIENT)
Dept: ADMINISTRATIVE | Facility: HOSPITAL | Age: 88
End: 2023-09-07
Payer: MEDICARE

## 2023-09-07 VITALS
WEIGHT: 157.4 LBS | TEMPERATURE: 98.2 F | HEART RATE: 73 BPM | DIASTOLIC BLOOD PRESSURE: 51 MMHG | HEIGHT: 60 IN | SYSTOLIC BLOOD PRESSURE: 117 MMHG | OXYGEN SATURATION: 94 % | BODY MASS INDEX: 30.9 KG/M2

## 2023-09-07 DIAGNOSIS — R05.3 CHRONIC COUGH: Primary | ICD-10-CM

## 2023-09-07 DIAGNOSIS — J90 PLEURAL EFFUSION: ICD-10-CM

## 2023-09-07 DIAGNOSIS — L81.9 DISCOLORATION OF SKIN OF LOWER LEG: ICD-10-CM

## 2023-09-07 DIAGNOSIS — M79.605 PAIN IN LEFT LEG: ICD-10-CM

## 2023-09-07 DIAGNOSIS — M79.89 LEFT LEG SWELLING: ICD-10-CM

## 2023-09-07 DIAGNOSIS — J84.9 INTERSTITIAL LUNG DISEASE: ICD-10-CM

## 2023-09-07 DIAGNOSIS — R13.11 DYSPHAGIA, ORAL PHASE: Primary | ICD-10-CM

## 2023-09-07 DIAGNOSIS — J18.9 PNEUMONIA OF BOTH LUNGS DUE TO INFECTIOUS ORGANISM, UNSPECIFIED PART OF LUNG: ICD-10-CM

## 2023-09-07 DIAGNOSIS — R06.02 SOB (SHORTNESS OF BREATH): ICD-10-CM

## 2023-09-07 DIAGNOSIS — R05.3 CHRONIC COUGH: ICD-10-CM

## 2023-09-07 DIAGNOSIS — R06.2 WHEEZING: ICD-10-CM

## 2023-09-07 LAB
BH CV LOWER VASCULAR LEFT COMMON FEMORAL AUGMENT: NORMAL
BH CV LOWER VASCULAR LEFT COMMON FEMORAL COMPETENT: NORMAL
BH CV LOWER VASCULAR LEFT COMMON FEMORAL COMPRESS: NORMAL
BH CV LOWER VASCULAR LEFT COMMON FEMORAL PHASIC: NORMAL
BH CV LOWER VASCULAR LEFT COMMON FEMORAL SPONT: NORMAL
BH CV LOWER VASCULAR LEFT DISTAL FEMORAL COMPRESS: NORMAL
BH CV LOWER VASCULAR LEFT GASTRONEMIUS COMPRESS: NORMAL
BH CV LOWER VASCULAR LEFT LESSER SAPH COMPRESS: NORMAL
BH CV LOWER VASCULAR LEFT MID FEMORAL AUGMENT: NORMAL
BH CV LOWER VASCULAR LEFT MID FEMORAL COMPETENT: NORMAL
BH CV LOWER VASCULAR LEFT MID FEMORAL COMPRESS: NORMAL
BH CV LOWER VASCULAR LEFT MID FEMORAL PHASIC: NORMAL
BH CV LOWER VASCULAR LEFT MID FEMORAL SPONT: NORMAL
BH CV LOWER VASCULAR LEFT PERONEAL COMPRESS: NORMAL
BH CV LOWER VASCULAR LEFT POPLITEAL AUGMENT: NORMAL
BH CV LOWER VASCULAR LEFT POPLITEAL COMPETENT: NORMAL
BH CV LOWER VASCULAR LEFT POPLITEAL COMPRESS: NORMAL
BH CV LOWER VASCULAR LEFT POPLITEAL PHASIC: NORMAL
BH CV LOWER VASCULAR LEFT POPLITEAL SPONT: NORMAL
BH CV LOWER VASCULAR LEFT POSTERIOR TIBIAL COMPRESS: NORMAL
BH CV LOWER VASCULAR LEFT PROFUNDA FEMORAL COMPRESS: NORMAL
BH CV LOWER VASCULAR LEFT PROXIMAL FEMORAL COMPRESS: NORMAL
BH CV LOWER VASCULAR LEFT SAPHENOFEMORAL JUNCTION COMPRESS: NORMAL
BH CV LOWER VASCULAR RIGHT COMMON FEMORAL AUGMENT: NORMAL
BH CV LOWER VASCULAR RIGHT COMMON FEMORAL COMPETENT: NORMAL
BH CV LOWER VASCULAR RIGHT COMMON FEMORAL COMPRESS: NORMAL
BH CV LOWER VASCULAR RIGHT COMMON FEMORAL PHASIC: NORMAL
BH CV LOWER VASCULAR RIGHT COMMON FEMORAL SPONT: NORMAL

## 2023-09-07 PROCEDURE — 99214 OFFICE O/P EST MOD 30 MIN: CPT

## 2023-09-07 PROCEDURE — 93971 EXTREMITY STUDY: CPT

## 2023-09-07 PROCEDURE — 1160F RVW MEDS BY RX/DR IN RCRD: CPT

## 2023-09-07 PROCEDURE — 71046 X-RAY EXAM CHEST 2 VIEWS: CPT

## 2023-09-07 PROCEDURE — 1159F MED LIST DOCD IN RCRD: CPT

## 2023-09-07 RX ORDER — AZITHROMYCIN 250 MG/1
TABLET, FILM COATED ORAL
Qty: 6 TABLET | Refills: 0 | Status: SHIPPED | OUTPATIENT
Start: 2023-09-07

## 2023-09-07 RX ORDER — FLUTICASONE FUROATE, UMECLIDINIUM BROMIDE AND VILANTEROL TRIFENATATE 100; 62.5; 25 UG/1; UG/1; UG/1
1 POWDER RESPIRATORY (INHALATION)
Qty: 28 EACH | Refills: 3 | Status: SHIPPED | OUTPATIENT
Start: 2023-09-07

## 2023-09-07 NOTE — TELEPHONE ENCOUNTER
Called patient's daughter regarding venous Doppler.  Educated patient's daughter that there was no DVT seen.  Educated patient daughter to keep an eye on the discoloration and to reach out if it worsens.  Patient's daughter verbalized understanding and is comfortable with the plan of care.

## 2023-09-07 NOTE — PROGRESS NOTES
"        Chief Complaint  Cough, Shortness of Breath, Dizziness, and Leg Swelling     Subjective:      History of Present Illness {CC  Problem List  Visit  Diagnosis   Encounters  Notes  Medications  Labs  Result Review Imaging  Media :23}     Geeta Butt presents to [facility] for:      History of Present Illness  Pt saw ENT 9/1 and pt states he \"suctioned up mucous in sinuses\". Pt also had cerumen impaction removal by ENT. Pt's daughter states pt has a swallow study test next Thursday. PT is taking delsym daily with mild relief. Cough is worse at night.   Cough  This is a new problem. The current episode started 1 to 4 weeks ago. The problem has been gradually worsening. The problem occurs constantly. The cough is Non-productive. Associated symptoms include ear pain, postnasal drip, rhinorrhea and shortness of breath. Pertinent negatives include no chills, fever or headaches. The symptoms are aggravated by lying down. She has tried OTC cough suppressant for the symptoms.      Pt has discoloration and pain in Left leg. Pt states this pain is intermittent and \"sharp\". PT states this started 1 week ago.     I have reviewed patient's medical history, any new submitted information provided by patient or medical assistant and updated medical record.      Objective:      Physical Exam  Vitals reviewed.   Constitutional:       General: She is not in acute distress.     Appearance: Normal appearance. She is not ill-appearing, toxic-appearing or diaphoretic.   HENT:      Head: Normocephalic.      Right Ear: Tympanic membrane, ear canal and external ear normal.      Left Ear: Ear canal and external ear normal.      Nose: Nose normal. No congestion or rhinorrhea.      Mouth/Throat:      Mouth: Mucous membranes are moist.      Pharynx: Oropharynx is clear. No oropharyngeal exudate or posterior oropharyngeal erythema.   Eyes:      General:         Right eye: No discharge.         Left " "eye: No discharge.      Conjunctiva/sclera: Conjunctivae normal.      Pupils: Pupils are equal, round, and reactive to light.   Cardiovascular:      Rate and Rhythm: Normal rate and regular rhythm.      Pulses: Normal pulses.      Heart sounds: Normal heart sounds.   Pulmonary:      Effort: Pulmonary effort is normal.      Breath sounds: Examination of the right-lower field reveals decreased breath sounds. Examination of the left-lower field reveals decreased breath sounds. Decreased breath sounds and wheezing (expiratory) present.   Musculoskeletal:      Left lower leg: Edema present.   Skin:     General: Skin is warm and dry.      Capillary Refill: Capillary refill takes less than 2 seconds.      Findings: Bruising (Left lower leg) present.   Neurological:      General: No focal deficit present.      Mental Status: She is alert.   Psychiatric:         Mood and Affect: Mood normal.         Behavior: Behavior normal.      Result Review  Data Reviewed:{ Labs  Result Review  Imaging  Med Tab  Media :23}                Vital Signs:   /51 (BP Location: Right arm, Patient Position: Sitting, Cuff Size: Large Adult)   Pulse 73   Temp 98.2 °F (36.8 °C) (Oral)   Ht 152.4 cm (60\")   Wt 71.4 kg (157 lb 6.4 oz)   SpO2 94%   BMI 30.74 kg/m²         Requested Prescriptions     Signed Prescriptions Disp Refills    Fluticasone-Umeclidin-Vilant (Trelegy Ellipta) 100-62.5-25 MCG/ACT inhaler 28 each 3     Sig: Inhale 1 puff Daily.       Routine medications provided by this office will also be refilled via pharmacy request.       Current Outpatient Medications:     aspirin 81 MG chewable tablet, Chew 1 tablet Daily., Disp: , Rfl:     carvedilol (COREG) 12.5 MG tablet, TAKE 1 TABLET BY MOUTH TWICE A DAY WITH MEALS, Disp: 180 tablet, Rfl: 3    Cholecalciferol (VITAMIN D3) 50 MCG (2000 UT) capsule, TAKE 1 CAPSULE BY MOUTH DAILY., Disp: 100 capsule, Rfl: 1    coenzyme Q10 50 MG capsule capsule, Take 1 capsule by mouth " Daily., Disp: , Rfl:     estradiol (ESTRACE) 0.1 MG/GM vaginal cream, Insert 2 g into the vagina 1 (One) Time Per Week., Disp: , Rfl:     Flarex 0.1 % ophthalmic suspension, , Disp: , Rfl:     fluorometholone (FML) 0.1 % ophthalmic suspension, Administer 1 drop into the left eye Daily., Disp: , Rfl:     furosemide (LASIX) 40 MG tablet, TAKE 1 TABLET BY MOUTH EVERY DAY (Patient taking differently: 1.5 tablets. 60 mg), Disp: 90 tablet, Rfl: 1    gabapentin (NEURONTIN) 100 MG capsule, TAKE 1 CAPSULE BY MOUTH 3 TIMES A DAY. OKAY TO TAKE 1 CAPSULE AT BEDTIME IF NEEDED FOR PAIN, Disp: 120 capsule, Rfl: 5    hydrocortisone 2.5 % cream, As Needed., Disp: , Rfl:     levothyroxine (SYNTHROID, LEVOTHROID) 50 MCG tablet, Take 1 tablet by mouth Daily., Disp: 90 tablet, Rfl: 1    Omega-3 Fatty Acids (fish oil) 1000 MG capsule capsule, Take 1 capsule by mouth Daily With Breakfast., Disp: , Rfl:     ondansetron ODT (ZOFRAN-ODT) 4 MG disintegrating tablet, Place 1 tablet on the tongue Every 8 (Eight) Hours As Needed for Nausea or Vomiting., Disp: 30 tablet, Rfl: 0    Probiotic Product (PROBIOTIC DAILY PO), Take 1 capsule by mouth Daily., Disp: , Rfl:     RABEprazole (ACIPHEX) 20 MG EC tablet, TAKE 1 TABLET BY MOUTH EVERY DAY, Disp: 90 tablet, Rfl: 0    sodium chloride (CAYLA 128) 5 % ophthalmic solution, Administer 1 drop to the right eye As Needed., Disp: , Rfl:     sucralfate (CARAFATE) 1 GM/10ML suspension, TAKE 10 ML BY MOUTH 3 TIMES A DAY FOR 30 MINUTES BEFORE MEALS, Disp: , Rfl:     valsartan (DIOVAN) 160 MG tablet, TAKE 1.5 TABLETS BY MOUTH DAILY. NEW DIRECTIONS, Disp: 135 tablet, Rfl: 3    Fluticasone-Umeclidin-Vilant (Trelegy Ellipta) 100-62.5-25 MCG/ACT inhaler, Inhale 1 puff Daily., Disp: 28 each, Rfl: 3     Assessment and Plan:      Assessment and Plan {CC Problem List  Visit Diagnosis  ROS  Review (Popup)  Health Maintenance  Quality  BestPractice  Medications  SmartSets  SnapShot Encounters  Media :23}      Problem List Items Addressed This Visit    None  Visit Diagnoses       Chronic cough    -  Primary    Relevant Medications    Fluticasone-Umeclidin-Vilant (Trelegy Ellipta) 100-62.5-25 MCG/ACT inhaler    Other Relevant Orders    XR Chest PA & Lateral (Completed)    Wheezing        Relevant Medications    Fluticasone-Umeclidin-Vilant (Trelegy Ellipta) 100-62.5-25 MCG/ACT inhaler    Other Relevant Orders    XR Chest PA & Lateral (Completed)    SOB (shortness of breath)        Relevant Medications    Fluticasone-Umeclidin-Vilant (Trelegy Ellipta) 100-62.5-25 MCG/ACT inhaler    Other Relevant Orders    XR Chest PA & Lateral (Completed)    Left leg swelling        Relevant Orders    Duplex Venous Lower Extremity - Left CAR    Pain in left leg        Relevant Orders    Duplex Venous Lower Extremity - Left CAR    Discoloration of skin of lower leg        Relevant Orders    Duplex Venous Lower Extremity - Left CAR          Educated patient on dosing and side effects of Trelegy.  We will send in prescription for trilogy as patient has a high possibility of having COPD.  We will get chest x-ray to rule out pneumonia.  We will review those results with patient once available.  We will get stat Doppler to rule out blood clot.  We will review those results with patient once available.  Educated patient to continue taking Flonase and Zyrtec.  Patient verbalized understanding and is comfortable with the plan of care.    Follow Up {Instructions Charge Capture  Follow-up Communications :23}     Return if symptoms worsen or fail to improve.    I spent 37 minutes caring for Geeta on this date of service. This time includes time spent by me in the following activities: preparing for the visit, reviewing tests, obtaining and/or reviewing a separately obtained history, performing a medically appropriate examination and/or evaluation, counseling and educating the patient/family/caregiver, ordering medications, tests, or procedures,  documenting information in the medical record, independently interpreting results and communicating that information with the patient/family/caregiver, and care coordination   Patient was given instructions and counseling regarding her condition or for health maintenance advice. Please see specific information pulled into the AVS if appropriate.    Dragon disclaimer:   Much of this encounter note is an electronic transcription/translation of spoken language to printed text. The electronic translation of spoken language may permit erroneous, or at times, nonsensical words or phrases to be inadvertently transcribed; Although I have reviewed the note for such errors, some may still exist.     Additional Patient Counseling:       There are no Patient Instructions on file for this visit.

## 2023-09-11 ENCOUNTER — NURSE TRIAGE (OUTPATIENT)
Dept: CALL CENTER | Facility: HOSPITAL | Age: 88
End: 2023-09-11
Payer: MEDICARE

## 2023-09-11 NOTE — TELEPHONE ENCOUNTER
"Reason for Disposition   [1] Caller requesting NON-URGENT health information AND [2] PCP's office is the best resource    Additional Information   Negative: [1] Caller is not with the adult (patient) AND [2] reporting urgent symptoms   Negative: Lab result questions   Negative: Medication questions   Negative: Caller can't be reached by phone   Negative: Caller has already spoken to PCP or another triager   Negative: RN needs further essential information from caller in order to complete triage   Negative: Requesting regular office appointment    Answer Assessment - Initial Assessment Questions  1. REASON FOR CALL or QUESTION: \"What is your reason for calling today?\" or \"How can I best help you?\" or \"What question do you have that I can help answer?\"      Caller wanting to ask if 09/20/2023 is ok to get CT scan, or if PCP wants this to be done sooner, Is on Antibiotic finishes today and Trelegy once a day. Daughter feels like her breathing is not significantly improved.    Protocols used: Information Only Call-ADULT-    "

## 2023-09-11 NOTE — TELEPHONE ENCOUNTER
Shortness of Breath 09/11/2023 Caller concerned about her mother was seen in office on Thursday. Shortness of breath with activity. On antibiotic will be done today. Ct chest scheduled for Wed 09/20/2023. States she wants to check with JESSA to see if this is too late    Asking to speak with Glenna in office about the scheduling of this CT scan. States her mother's breathing is not significantly improved since she was seen on Thursday.Caller will send My Chart message to Glenna about this question.

## 2023-09-14 ENCOUNTER — HOSPITAL ENCOUNTER (OUTPATIENT)
Dept: GENERAL RADIOLOGY | Facility: HOSPITAL | Age: 88
Discharge: HOME | End: 2023-09-14
Admitting: OTOLARYNGOLOGY
Payer: MEDICARE

## 2023-09-14 DIAGNOSIS — R13.11 DYSPHAGIA, ORAL PHASE: ICD-10-CM

## 2023-09-14 PROCEDURE — 63710000001 BARIUM SULFATE 60 % CREAM: Performed by: OTOLARYNGOLOGY

## 2023-09-14 PROCEDURE — 63710000001 BARIUM SULFATE 40 % SUSPENSION: Performed by: OTOLARYNGOLOGY

## 2023-09-14 PROCEDURE — A9270 NON-COVERED ITEM OR SERVICE: HCPCS | Performed by: OTOLARYNGOLOGY

## 2023-09-14 PROCEDURE — 63710000001 BARIUM SULFATE 40 % RECONSTITUTED SUSPENSION: Performed by: OTOLARYNGOLOGY

## 2023-09-14 PROCEDURE — 63710000001 BARIUM SULFATE 98 % RECONSTITUTED SUSPENSION: Performed by: OTOLARYNGOLOGY

## 2023-09-14 PROCEDURE — 74230 X-RAY XM SWLNG FUNCJ C+: CPT

## 2023-09-14 PROCEDURE — 92611 MOTION FLUOROSCOPY/SWALLOW: CPT

## 2023-09-14 RX ADMIN — BARIUM SULFATE 135 ML: 980 POWDER, FOR SUSPENSION ORAL at 13:22

## 2023-09-14 RX ADMIN — BARIUM SULFATE 55 ML: 0.81 POWDER, FOR SUSPENSION ORAL at 13:18

## 2023-09-14 RX ADMIN — BARIUM SULFATE 1 TEASPOON(S): 0.6 CREAM ORAL at 13:20

## 2023-09-14 RX ADMIN — BARIUM SULFATE 50 ML: 400 SUSPENSION ORAL at 13:24

## 2023-09-14 NOTE — MBS/VFSS/FEES
Outpatient Speech Language Pathology   Adult Swallow Initial Evaluation  [facility]     Patient Name: Geeta Butt  : 1935  MRN: [id number]  Today's Date: 2023         Visit Date: 2023   Patient Active Problem List   Diagnosis    Essential hypertension    Hypothyroidism    Black stool    Diarrhea    Nausea & vomiting    RUQ pain    Leukocytosis    Duodenitis    Foot pain, bilateral    Malignant neoplasm of lower-inner quadrant of left breast in female, estrogen receptor positive    Iron deficiency anemia    Adverse effect of iron    GI bleed    Gastrointestinal hemorrhage associated with duodenitis    Hematochezia    Duodenal ulcer    Long-term use of high-risk medication    Acute left-sided low back pain with left-sided sciatica    DDD (degenerative disc disease), lumbosacral    GERD without esophagitis    Hyponatremia    Hyperlipidemia    Prediabetes    Depressive disorder    Closed fracture of right hip    Acute pyelonephritis    Sepsis secondary to UTI    Bilateral cataracts    Central retinal vein occlusion    Corneal endothelial dystrophy    Epiretinal membrane    Bladder prolapse, female, acquired    Superficial bruising    Neuropathy    Urinary retention with incomplete bladder emptying    Hx of bladder repair surgery    Cellulitis of right lower extremity    Stroke    Peripheral edema    Lumbar back pain with radiculopathy affecting left lower extremity        Past Medical History:   Diagnosis Date    Anesthesia complication     ASPIRATION INTO AIRWAY WITH TRACH PRESENT IN PAST (WITH ORAL CANCER SURGERY(    Arthritis     Aspiration into airway     post anesthesia    Breast cancer     Cancer     mouth cancer     Chronic kidney disease     Chronic kidney disease 2021    CKD (chronic kidney disease)     PT STATES STAGE 3    Depression     Diverticular disease     Gastric ulcer     AND DUODENAL    GERD (gastroesophageal reflux disease)     Hearing loss     BILAT AIDES    History  of colon polyps     History of depression     History of GI bleed     Hypertension     Hypothyroidism     Peptic ulceration     Polyneuritis     PONV (postoperative nausea and vomiting)     Poor vision     RIGHT EYE, HAS PERIPHERAL VISION     Stroke 2000     mild weakness on left, partial sight loss on right     UTI (urinary tract infection) 10/17/2017        Past Surgical History:   Procedure Laterality Date    ABDOMINAL SURGERY      APPENDECTOMY      BLADDER REPAIR      AND RECTOCELE    BREAST BIOPSY      BREAST CYST ASPIRATION      BREAST LUMPECTOMY WITH SENTINEL NODE BIOPSY Left 3/19/2018    Procedure: BREAST LUMPECTOMY WITH SENTINEL NODE BIOPSY AND NEEDLE LOCALIZATION;  Surgeon: Myles Soliman MD;  Location: [facility] OR [facility];  Service: General    CHOLECYSTECTOMY      COLONOSCOPY  2013    COLONOSCOPY N/A 2/16/2018    Procedure: COLONOSCOPY into cecum and T.I. with biopsies;  Surgeon: Augustine Gallego MD;  Location: [facility] ENDOSCOPY;  Service:     ENDOSCOPY N/A 10/17/2017    Procedure: ESOPHAGOGASTRODUODENOSCOPY WITH COLD BIOPSIES;  Surgeon: Augustine Gallego MD;  Location: [facility] ENDOSCOPY;  Service:     ENDOSCOPY N/A 2/16/2018    Procedure: ESOPHAGOGASTRODUODENOSCOPY with biopsies and #54 [address] DILATATION;  Surgeon: Augustine Gallego MD;  Location: [facility] ENDOSCOPY;  Service:     ENDOSCOPY N/A 3/19/2020    Procedure: ESOPHAGOGASTRODUODENOSCOPY with biopsies;  Surgeon: Augustine Gallego MD;  Location: [facility] ENDOSCOPY;  Service: Gastroenterology;  Laterality: N/A;  PRE- MELENA, EPIGASTRIC PAIN  POST- eerosive gastritis, duodenal ulcer, hiatal hernia    ENDOSCOPY N/A 7/29/2020    Procedure: ESOPHAGOGASTRODUODENOSCOPY WITH DUODENAL ASPIRATE AND COLD BIOPSIES;  Surgeon: Augustine Gallego MD;  Location: [facility] ENDOSCOPY;  Service: Gastroenterology;  Laterality: N/A;  PRE: HX ULCER DISEASE  POST: GASTRITIS, HEALED ULCER    EYE SURGERY Left 2014    3-4 years, cornea replacement     FEMUR IM NAILING/RODDING Right  8/3/2021    Procedure: FEMUR INTRAMEDULLARY NAILING/RODDING;  Surgeon: Garett Martin II, MD;  Location: [facility] MAIN OR;  Service: Orthopedics;  Laterality: Right;    HIP SURGERY  08/03/2021    HYSTERECTOMY      MOUTH SURGERY  2000    UNDER TONGUE AND LEFT FLOOR OF MOUTH FOR CA    PARATHYROIDECTOMY      PER PT    SIGMOIDOSCOPY N/A 7/29/2020    Procedure: FLEXIBLE SIGMOIDOSCOPY TO DESCENDING COLON WITH COLD BIOPSIES;  Surgeon: Augustine Gallego MD;  Location: [facility] ENDOSCOPY;  Service: Gastroenterology;  Laterality: N/A;  PRE: RECTAL BLEEDING  POST: DIVERTICULOSIS, HEMORRHOIDS, MINIMAL PROCTITIS    SINUS SURGERY      SKIN BIOPSY      THYROID SURGERY      VARICOSE VEIN SURGERY           Visit Dx:     ICD-10-CM ICD-9-CM   1. Dysphagia, oral phase  R13.11 787.21            OP SLP Assessment/Plan - 09/14/23 1540          SLP Assessment    Functional Problems Swallowing  -    Clinical Impression Comments WFLs  -       SLP Plan    Plan Comments Referral to GI  -SH              User Key  (r) = Recorded By, (t) = Taken By, (c) = Cosigned By      Initials Name Provider Type     Anabella Kraft MS CCC-SLP Speech and Language Pathologist                     SLP Adult Swallow Evaluation       Row Name 09/14/23 1400       Rehab Evaluation    Document Type evaluation  -    Patient Effort adequate  -       General Information    Patient Profile Reviewed yes  -    Pertinent History Of Current Problem Pt c/o obstruction with solids at the level of the suprasternal notch. Pt reports hx of dilation. Hx thyroid lobectomy and FESS with septoplasty.  -    Current Method of Nutrition regular textures;thin liquids  -    Precautions/Limitations, Hearing WFL;for purposes of eval  -    Prior Level of Function-Communication WFL  -    Prior Level of Function-Swallowing other (see comments);esophageal concerns  trouble with dry solids  -    Plans/Goals Discussed with patient;family;agreed upon  -    Barriers to  Rehab none identified  -    Patient's Goals for Discharge no concerns voiced  -       Pain    Additional Documentation Pain Scale: FACES Pre/Post-Treatment (Group)  [facility]       Pain Scale: FACES Pre/Post-Treatment    Pain: FACES Scale, Pretreatment 0-->no hurt  -       MBS/VFSS Interpretation    VFSS Summary Patient presents with a functional oropharyngeal swallow. Prominent cricopharyngeus demonstrated. Normal swallow initiation with nectar via cup and straw without penetration. Normal swallow initiation again demonstrated with thins via cup. Transient, flash penetration during the swallow with thins via straw. Pt cleared trace-mild pharyngeal residue with an additional swallow. Transient, shallow penetration during second swallow with consecutive drinks thins via straw. Spill to the valleculae before the swallow with puree. Trace backflow above the circopharyngeus. Rotary mastication with soft solids and regular solids. Swallow elicited at the pyriforms and transient penetration observed during the swallow. Slow passage of regular solids through upper esophagus. Retention in cervical esophagus noted after second trial of regular. Pt cleared with liquid wash thins. SLP recs follow up with GI, may need dedicated esophageal study.  -[facility] Communication to Radiology    Summary Statement Radiologist present: Dr. Stanley. Prominent cricopharyngeus noted, which allowed trace backflow to pyriforms occasionally. No penetration with nectar. Intermittent, transient penetration with thins. Retention in upper esophagus with cracker, but cleared quickly with a liquid wash.  -       SLP Evaluation Clinical Impression    SLP Swallowing Diagnosis swallow WFL/no suspected pharyngeal impairment;other (see comments)  question esophageal impairment  -       Recommendations    Therapy Frequency (Swallow) evaluation only  -    SLP Diet Recommendation regular textures;thin liquids  -    Recommended Diagnostics No  further SLP services recommended  -    Oral Care Recommendations Oral Care BID/PRN  -    SLP Rec. for Method of Medication Administration meds whole;as tolerated  -    Monitor for Signs of Aspiration yes;notify SLP if any concerns  -    Demonstrates Need for Referral to Another Service gastroenterology;dedicated esophageal assessment  -              User Key  (r) = Recorded By, (t) = Taken By, (c) = Cosigned By      Initials Name Provider Type     Anabella Kraft MS CCC-SLP Speech and Language Pathologist                                   OP SLP Education       Row Name 09/14/23 6550       Education    Barriers to Learning No barriers identified  -    Assessed Learning needs;Learning motivation  -    Learning Motivation Strong  -    Learning Method Explanation;Other (comment)  VFSS images reviewed  -    Teaching Response Verbalized understanding  -              User Key  (r) = Recorded By, (t) = Taken By, (c) = Cosigned By      Initials Name Effective Dates     Anabella Kraft MS CCC-SLP 06/16/21 -                              Time Calculation:   SLP Start Time: 1300  Untimed Charges  63130-DH Motion Fluoro Eval Swallow Minutes: 60  Total Minutes  Untimed Charges Total Minutes: 60   Total Minutes: 60    Therapy Charges for Today       Code Description Service Date Service Provider Modifiers Qty    [id number] HC ST MOTION FLUORO EVAL SWALLOW 4 9/14/2023 Anabella Kraft MS CCC-SLP GN 1                     Anabella Kraft MS CCC-SLP  9/14/2023

## 2023-09-20 ENCOUNTER — HOSPITAL ENCOUNTER (OUTPATIENT)
Facility: HOSPITAL | Age: 88
Discharge: HOME | End: 2023-09-20
Payer: MEDICARE

## 2023-09-20 DIAGNOSIS — J84.9 INTERSTITIAL LUNG DISEASE: ICD-10-CM

## 2023-09-20 DIAGNOSIS — R06.02 SOB (SHORTNESS OF BREATH): ICD-10-CM

## 2023-09-20 DIAGNOSIS — J18.9 PNEUMONIA OF BOTH LUNGS DUE TO INFECTIOUS ORGANISM, UNSPECIFIED PART OF LUNG: ICD-10-CM

## 2023-09-20 DIAGNOSIS — J90 PLEURAL EFFUSION: ICD-10-CM

## 2023-09-20 DIAGNOSIS — R05.3 CHRONIC COUGH: ICD-10-CM

## 2023-09-20 PROCEDURE — 71250 CT THORAX DX C-: CPT

## 2023-09-21 ENCOUNTER — OFFICE VISIT (OUTPATIENT)
Dept: INTERNAL MEDICINE | Facility: CLINIC | Age: 88
End: 2023-09-21
Payer: MEDICARE

## 2023-09-21 VITALS
HEART RATE: 60 BPM | SYSTOLIC BLOOD PRESSURE: 116 MMHG | WEIGHT: 158 LBS | RESPIRATION RATE: 18 BRPM | BODY MASS INDEX: 31.02 KG/M2 | OXYGEN SATURATION: 98 % | HEIGHT: 60 IN | DIASTOLIC BLOOD PRESSURE: 60 MMHG

## 2023-09-21 DIAGNOSIS — I10 ESSENTIAL HYPERTENSION: Primary | Chronic | ICD-10-CM

## 2023-09-21 DIAGNOSIS — R73.03 PREDIABETES: Chronic | ICD-10-CM

## 2023-09-21 DIAGNOSIS — G62.9 NEUROPATHY: Chronic | ICD-10-CM

## 2023-09-21 DIAGNOSIS — E03.9 ACQUIRED HYPOTHYROIDISM: Chronic | ICD-10-CM

## 2023-09-21 DIAGNOSIS — M51.37 DDD (DEGENERATIVE DISC DISEASE), LUMBOSACRAL: Chronic | ICD-10-CM

## 2023-09-21 DIAGNOSIS — E78.2 MIXED HYPERLIPIDEMIA: Chronic | ICD-10-CM

## 2023-09-21 DIAGNOSIS — J84.9 INTERSTITIAL LUNG DISEASE: ICD-10-CM

## 2023-09-21 PROCEDURE — 1159F MED LIST DOCD IN RCRD: CPT | Performed by: FAMILY MEDICINE

## 2023-09-21 PROCEDURE — 1160F RVW MEDS BY RX/DR IN RCRD: CPT | Performed by: FAMILY MEDICINE

## 2023-09-21 PROCEDURE — 99214 OFFICE O/P EST MOD 30 MIN: CPT | Performed by: FAMILY MEDICINE

## 2023-09-21 NOTE — PATIENT INSTRUCTIONS
"MyChart Tips:    MyChart is a useful part of our patient care program and is a great way for us to communicate lab results and for you to request refills.  Not all medical questions are appropriate for MyChart such as new medical issues that require taking a history, performing an exam, getting labs/studies or researching medical questions needed to be addressed in the office.    Examples of medical issues that are APPROPRIATE for MyChart:  -Follow-up on problems we have already addressed in a visit such as home testing results, blood pressure readings, glucose readings  -Questions that can be answered with a simple \"yes\" or \"no\"    Communication that is NOT APPROPRIATE for MyChart:  -MyChart is not for new problems, serious problems or urgent problems.  Urgent matters should be addressed by phone, in the office, urgent care or the ER.  -[employer] messages are not email.  Staff will check messages each weekday.  We strive for a 48-hour turnaround on messaging.    -[employer] is not for private issues.  Messages are received first by our office staff.    Please allow up to 7 business days for lab results to be sent through [employer] to you before contacting your provider.  Sometimes we are waiting for results to get back from the lab and also your provider needs time to analyze them thoroughly before making recommendations.  While the internet has great resources, it is not a substitute for interpreting lab results.    We also ask that you not send [employer] messages and telephone calls regarding the same issue simultaneously.  This slows down the process of returning your call/message and confuses staff.    Be mindful that [employer] messages and telephone calls become part of your permanent medical record.    Lastly, your provider cannot access your [employer].  It is a service which communicates with the EHR (Electronic Health Record).  Sometimes there are errors in [employer] that staff and providers cannot see and these errors " are not part of your EHR.  Vaccines and screening reminders have been incorrect in MyChart.    We appreciate your respect for the limitations and boundaries of [employer].

## 2023-09-22 DIAGNOSIS — J84.9 INTERSTITIAL LUNG DISEASE: Primary | ICD-10-CM

## 2023-09-22 DIAGNOSIS — R05.3 CHRONIC COUGH: ICD-10-CM

## 2023-09-22 DIAGNOSIS — R06.02 SOB (SHORTNESS OF BREATH): ICD-10-CM

## 2023-09-30 DIAGNOSIS — K21.9 GERD WITHOUT ESOPHAGITIS: ICD-10-CM

## 2023-10-02 RX ORDER — RABEPRAZOLE SODIUM 20 MG/1
TABLET, DELAYED RELEASE ORAL
Qty: 90 TABLET | Refills: 0 | Status: SHIPPED | OUTPATIENT
Start: 2023-10-02

## 2023-10-05 ENCOUNTER — TELEPHONE (OUTPATIENT)
Dept: ONCOLOGY | Facility: CLINIC | Age: 88
End: 2023-10-05

## 2023-10-05 NOTE — TELEPHONE ENCOUNTER
Caller: Daisy Avila    Relationship to patient: Emergency Contact    Best call back number: [phone]    Chief complaint: EARLIER LAB APPT DUE TO HER FASTING     Type of visit: LAB    Requested date: 10/19/2023 AROUND 10:30,CALL TO R/S     If rescheduling, when is the original appointment: 10/19/2023    Additional notes:

## 2023-10-30 ENCOUNTER — OFFICE VISIT (OUTPATIENT)
Dept: WOUND CARE | Facility: HOSPITAL | Age: 88
End: 2023-10-30
Payer: MEDICARE

## 2023-10-30 PROCEDURE — G0463 HOSPITAL OUTPT CLINIC VISIT: HCPCS

## 2023-11-09 ENCOUNTER — OFFICE VISIT (OUTPATIENT)
Dept: ONCOLOGY | Facility: CLINIC | Age: 88
End: 2023-11-09
Payer: MEDICARE

## 2023-11-09 ENCOUNTER — LAB (OUTPATIENT)
Dept: LAB | Facility: HOSPITAL | Age: 88
End: 2023-11-09
Payer: MEDICARE

## 2023-11-09 VITALS
HEART RATE: 67 BPM | TEMPERATURE: 99.1 F | BODY MASS INDEX: 31.61 KG/M2 | SYSTOLIC BLOOD PRESSURE: 184 MMHG | WEIGHT: 161 LBS | DIASTOLIC BLOOD PRESSURE: 69 MMHG | OXYGEN SATURATION: 96 % | HEIGHT: 60 IN | RESPIRATION RATE: 18 BRPM

## 2023-11-09 DIAGNOSIS — D50.0 IRON DEFICIENCY ANEMIA DUE TO CHRONIC BLOOD LOSS: ICD-10-CM

## 2023-11-09 DIAGNOSIS — Z17.0 MALIGNANT NEOPLASM OF LOWER-INNER QUADRANT OF LEFT BREAST IN FEMALE, ESTROGEN RECEPTOR POSITIVE: ICD-10-CM

## 2023-11-09 DIAGNOSIS — C50.312 MALIGNANT NEOPLASM OF LOWER-INNER QUADRANT OF LEFT BREAST IN FEMALE, ESTROGEN RECEPTOR POSITIVE: ICD-10-CM

## 2023-11-09 DIAGNOSIS — C50.312 MALIGNANT NEOPLASM OF LOWER-INNER QUADRANT OF LEFT BREAST IN FEMALE, ESTROGEN RECEPTOR POSITIVE: Primary | ICD-10-CM

## 2023-11-09 DIAGNOSIS — Z17.0 MALIGNANT NEOPLASM OF LOWER-INNER QUADRANT OF LEFT BREAST IN FEMALE, ESTROGEN RECEPTOR POSITIVE: Primary | ICD-10-CM

## 2023-11-09 LAB
ALBUMIN SERPL-MCNC: 4 G/DL (ref 3.5–5.2)
ALBUMIN/GLOB SERPL: 1.5 G/DL
ALP SERPL-CCNC: 55 U/L (ref 39–117)
ALT SERPL W P-5'-P-CCNC: 10 U/L (ref 1–33)
ANION GAP SERPL CALCULATED.3IONS-SCNC: 15.4 MMOL/L (ref 5–15)
AST SERPL-CCNC: 29 U/L (ref 1–32)
BASOPHILS # BLD AUTO: 0.1 10*3/MM3 (ref 0–0.2)
BASOPHILS NFR BLD AUTO: 1.1 % (ref 0–1.5)
BILIRUB SERPL-MCNC: 0.2 MG/DL (ref 0–1.2)
BUN SERPL-MCNC: 32 MG/DL (ref 8–23)
BUN/CREAT SERPL: 26.2 (ref 7–25)
CALCIUM SPEC-SCNC: 9.6 MG/DL (ref 8.6–10.5)
CHLORIDE SERPL-SCNC: 98 MMOL/L (ref 98–107)
CO2 SERPL-SCNC: 23.6 MMOL/L (ref 22–29)
CREAT SERPL-MCNC: 1.22 MG/DL (ref 0.6–1.1)
DEPRECATED RDW RBC AUTO: 42.5 FL (ref 37–54)
EGFRCR SERPLBLD CKD-EPI 2021: 43 ML/MIN/1.73
EOSINOPHIL # BLD AUTO: 0.26 10*3/MM3 (ref 0–0.4)
EOSINOPHIL NFR BLD AUTO: 2.9 % (ref 0.3–6.2)
ERYTHROCYTE [DISTWIDTH] IN BLOOD BY AUTOMATED COUNT: 12.9 % (ref 12.3–15.4)
FERRITIN SERPL-MCNC: 48.8 NG/ML (ref 13–150)
GLOBULIN UR ELPH-MCNC: 2.7 GM/DL
GLUCOSE SERPL-MCNC: 122 MG/DL (ref 65–99)
HCT VFR BLD AUTO: 32.8 % (ref 34–46.6)
HGB BLD-MCNC: 10.7 G/DL (ref 12–15.9)
IMM GRANULOCYTES # BLD AUTO: 0.04 10*3/MM3 (ref 0–0.05)
IMM GRANULOCYTES NFR BLD AUTO: 0.4 % (ref 0–0.5)
IRON 24H UR-MRATE: 49 MCG/DL (ref 37–145)
IRON SATN MFR SERPL: 12 % (ref 20–50)
LYMPHOCYTES # BLD AUTO: 1.89 10*3/MM3 (ref 0.7–3.1)
LYMPHOCYTES NFR BLD AUTO: 20.8 % (ref 19.6–45.3)
MCH RBC QN AUTO: 29.2 PG (ref 26.6–33)
MCHC RBC AUTO-ENTMCNC: 32.6 G/DL (ref 31.5–35.7)
MCV RBC AUTO: 89.6 FL (ref 79–97)
MONOCYTES # BLD AUTO: 0.8 10*3/MM3 (ref 0.1–0.9)
MONOCYTES NFR BLD AUTO: 8.8 % (ref 5–12)
NEUTROPHILS NFR BLD AUTO: 5.99 10*3/MM3 (ref 1.7–7)
NEUTROPHILS NFR BLD AUTO: 66 % (ref 42.7–76)
NRBC BLD AUTO-RTO: 0 /100 WBC (ref 0–0.2)
PLATELET # BLD AUTO: 384 10*3/MM3 (ref 140–450)
PMV BLD AUTO: 9 FL (ref 6–12)
POTASSIUM SERPL-SCNC: 4.6 MMOL/L (ref 3.5–5.2)
PROT SERPL-MCNC: 6.7 G/DL (ref 6–8.5)
RBC # BLD AUTO: 3.66 10*6/MM3 (ref 3.77–5.28)
SODIUM SERPL-SCNC: 137 MMOL/L (ref 136–145)
TIBC SERPL-MCNC: 417 MCG/DL (ref 298–536)
TRANSFERRIN SERPL-MCNC: 298 MG/DL (ref 200–360)
WBC NRBC COR # BLD: 9.08 10*3/MM3 (ref 3.4–10.8)

## 2023-11-09 PROCEDURE — 80053 COMPREHEN METABOLIC PANEL: CPT

## 2023-11-09 PROCEDURE — 82728 ASSAY OF FERRITIN: CPT

## 2023-11-09 PROCEDURE — 83540 ASSAY OF IRON: CPT

## 2023-11-09 PROCEDURE — 84466 ASSAY OF TRANSFERRIN: CPT

## 2023-11-09 PROCEDURE — 85025 COMPLETE CBC W/AUTO DIFF WBC: CPT

## 2023-11-09 RX ORDER — FLUTICASONE PROPIONATE 50 MCG
SPRAY, SUSPENSION (ML) NASAL
COMMUNITY
Start: 2023-10-15

## 2023-11-09 NOTE — PROGRESS NOTES
Subjective     REASON FOR CONSULTATION:   1. Left breast cancer [id number] ER/ND+ her2 neg post lumpectomy/SLN  3/18 and whole breast radiation completed May 2018  2.  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  Fatigue out of proportion to anemia  4.  Arimidex started in 3/18 with bone density showing normal density in the spine and osteopenia in the hips-this was self stopped due to vision changes.  5.  Aromasin initiated 10/20/18 and discontinued 1/20/19 secondary to vision changes.  6.  Tamoxifen initiated 8/20/2019 and discontinued 10/20/2019 secondary to vision changes                             REQUESTING PHYSICIAN:  Myles Soliman M.D.    History of Present Illness patient is an 88-year-old female with the above-mentioned history who is here today for reevaluation.  She   Is 5 and half years from diagnosis and is doing well overall.  She took 1-1/2 years of therapy with different drugs including Arimidex for marrow Aromasin and tamoxifen all of which she could not tolerate and she has been off therapy now for the last 4 years  .  She has been complaining of soreness in her left breast which she has been telling me for years her last mammogram was in July of this year and I think at her age we can stop doing routine imaging  She was complaining of cough and shortness of breath and a chest CT was done in September that showed tiny density in the left upper lobe and minimal groundglass opacity in the right upper lobe follow-up scan is recommended in 6 months    She remains mildly anemic but she also has chronic renal insufficiency and I think this is the culprit- she is wondering if B12 will help her have more energy - the levels were checked a year ago and normal and we will recheck them.  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     Chronic kidney disease     Chronic kidney disease 07/22/2021    CKD (chronic kidney disease)     PT STATES STAGE 3    Depression     Diverticular disease     Gastric ulcer     AND DUODENAL    GERD (gastroesophageal reflux disease)     Hearing loss     BILAT AIDES    History of colon polyps     History of depression     History of GI bleed     Hypertension     Hypothyroidism     Peptic ulceration     Polyneuritis     PONV (postoperative nausea and vomiting)     Poor vision     RIGHT EYE, HAS PERIPHERAL VISION     Stroke 2000     mild weakness on left, partial sight loss on right     UTI (urinary tract infection) 10/17/2017        Past Surgical History:   Procedure Laterality Date    ABDOMINAL SURGERY      APPENDECTOMY      BLADDER REPAIR      AND RECTOCELE    BREAST BIOPSY      BREAST CYST ASPIRATION      BREAST LUMPECTOMY WITH SENTINEL NODE BIOPSY Left 3/19/2018    Procedure: BREAST LUMPECTOMY WITH SENTINEL NODE BIOPSY AND NEEDLE LOCALIZATION;  Surgeon: Myles Soliman MD;  Location: [facility] OR [facility] [place], [place];  Service: General    CHOLECYSTECTOMY      COLONOSCOPY  2013    COLONOSCOPY N/A 2/16/2018    Procedure: COLONOSCOPY into cecum and T.I. with biopsies;  Surgeon: Augustine Gallego MD;  [facility];  Service:     ENDOSCOPY N/A 10/17/2017    Procedure: ESOPHAGOGASTRODUODENOSCOPY WITH COLD BIOPSIES;  Surgeon: Augustine Gallego MD;  Location: [facility] ENDOSCOPY;  Service:     ENDOSCOPY N/A 2/16/2018    Procedure: ESOPHAGOGASTRODUODENOSCOPY with biopsies and #54 Ar[address] DILATATION;  Surgeon: Augustine Gallego MD;  Location: [facility] ENDOSCOPY;  Service:     ENDOSCOPY N/A 3/19/2020    Procedure: ESOPHAGOGASTRODUODENOSCOPY with biopsies;  Surgeon: Augustine Gallego MD;  Location: [facility] ENDOSCOPY;  Service: Gastroenterology;  Laterality: N/A;  PRE- MELENA, EPIGASTRIC PAIN  POST- eerosive  gastritis, duodenal ulcer, hiatal hernia    ENDOSCOPY N/A 7/29/2020    Procedure: ESOPHAGOGASTRODUODENOSCOPY WITH DUODENAL ASPIRATE AND COLD BIOPSIES;  Surgeon: Augustine Gallego MD;  [facility];  Service: Gastroenterology;  Laterality: N/A;  PRE: HX ULCER DISEASE  POST: GASTRITIS, HEALED ULCER    EYE SURGERY Left 2014    3-4 years, cornea replacement     FEMUR IM NAILING/RODDING Right 8/3/2021    Procedure: FEMUR INTRAMEDULLARY NAILING/RODDING;  Surgeon: Garett Martin II, MD;  Location: [facility] MAIN OR;  Service: Orthopedics;  Laterality: Right;    HIP SURGERY  08/03/2021    HYSTERECTOMY      MOUTH SURGERY  2000    UNDER TONGUE AND LEFT FLOOR OF MOUTH FOR CA    PARATHYROIDECTOMY      PER PT    SIGMOIDOSCOPY N/A 7/29/2020    Procedure: FLEXIBLE SIGMOIDOSCOPY TO DESCENDING COLON WITH COLD BIOPSIES;  Surgeon: Augustine Gallego MD;  Location:  [facility] ENDOSCOPY;  Service: Gastroenterology;  Laterality: N/A;  PRE: RECTAL BLEEDING  POST: DIVERTICULOSIS, HEMORRHOIDS, MINIMAL PROCTITIS    SINUS SURGERY      SKIN BIOPSY      THYROID SURGERY      VARICOSE VEIN SURGERY      Oncological history  patient is an 82-year-old female with recent problems with abdominal pain and diarrhea and weight loss which is being evaluated by GI and finally found to have multiple ulcers in the duodenum and small bowel finally responding to treatment.  In the middle of this she went for routine mammogram a couple of months later was found to have an abnormality in the left breast at 7 o'clock position measuring about 12 mm in size that was not present last year.  The patient had previously had a left breast biopsy in 2015 with benign findings.   There was no palpable mass and biopsy was done and this revealed a grade 1 infiltrating ductal carcinoma at least 1 cm in size ER 78%/OK 17% positive HER-2 negative.  The patient was referred to Dr. Soliman who performed a lumpectomy and sentinel node biopsy with the findings of a  1.2 cm tumor grade 2 with lymphovascular invasion and clear margins with associated DCIS.  One of 2 sentinel nodes showed a micrometastasis although the pathologist said this was almost too small to be considered a micrometastasis.  Postoperatively she has done well and thankfully her GI complains of much improved with Carafate and Protonix and her diarrhea finally resolved.    She's not had a bone density in quite a while.  She is  5 para 5  menarche at age 15 and menopause in her mid 50s although she had a hysterectomy at 30 for an ulcerated uterus.  First childbirth was at age 20 she breast-fed all 5 children.  She took hormonal therapy for at least 15 years after menopause and stopped about 10 years ago   .  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    Her father  at 52 of stomach cancer mother had thyroid cancer at age 50 her brother's daughter had breast cancer age 40 and she had a paternal aunt with leukemia is no other breast cancer or ovarian cancer uterine cancer or pancreatic cancer in the family . I did ask him to check with her niece to see if she has had genetic testing - they do not know much at all about the maternal family .    She will call for the results of the DEXA scan and if adequate we will start Arimidex which I have  already prescribed to her and see her back in 3 months to assess her tolerance.  She is in very good health and I explained to her that adjuvant therapy decreases the risk of recurrence of her cancer which might be in the 15-20% range probably down to the 10% range and if she has significant side effects we will be inclined to stop treatment.  Obviously if her GI symptoms recur with Arimidex we will have to try another medications.  We talked about the joint pains and hot flashes which are unlikely at her age and she was agreeable to a trial of Arimidex.  Radiation has been planned      In 10/2018 she  was changed to Aromasin as she had issues previously with Arimidex with visual blurring.  The patient was then seen 1/25/19 with reports of blurred vision once again.    At that point the patient's symptoms have essentially resolved.  We therefore elected to switch her to Femara.    Unfortunately, the patient ultimately experienced similar symptoms with Femara because of muscle cramps and blurred vision.      I discussed with her today that Dr. Valenzuela would like the patient to consider taking tamoxifen as yet another potential medication to benefit her in light of history of hormone positive breast cancer.  It is noted that the patient takes Prozac and has been on this for many many years.  There is a potential interaction and I plan to discuss this with Dr. Valenzuela further.  If okayed per Dr. Valenzuela, we would plan then to start the patient on this as soon as next week.        Current Outpatient Medications on File Prior to Visit   Medication Sig Dispense Refill    aspirin 81 MG chewable tablet Chew 1 tablet Daily.      carvedilol (COREG) 12.5 MG tablet TAKE 1 TABLET BY MOUTH TWICE A DAY WITH MEALS 180 tablet 3    Cholecalciferol (VITAMIN D3) 50 MCG (2000 UT) capsule TAKE 1 CAPSULE BY MOUTH DAILY. 100 capsule 1    coenzyme Q10 50 MG capsule capsule Take 1 capsule by mouth Daily.      estradiol (ESTRACE) 0.1 MG/GM vaginal cream Insert 2 g into the vagina 1 (One) Time Per Week.      Flarex 0.1 % ophthalmic suspension       fluorometholone (FML) 0.1 % ophthalmic suspension Administer 1 drop into the left eye Daily.      fluticasone (FLONASE) 50 MCG/ACT nasal spray       Fluticasone-Umeclidin-Vilant (Trelegy Ellipta) 100-62.5-25 MCG/ACT inhaler Inhale 1 puff Daily. 28 each 3    furosemide (LASIX) 40 MG tablet TAKE 1 TABLET BY MOUTH EVERY DAY (Patient taking differently: 1.5 tablets. 60 mg) 90 tablet 1    gabapentin (NEURONTIN) 100 MG capsule TAKE 1 CAPSULE BY MOUTH 3 TIMES A DAY. OKAY TO TAKE 1 CAPSULE AT BEDTIME IF  NEEDED FOR PAIN 120 capsule 5    hydrocortisone 2.5 % cream As Needed.      levothyroxine (SYNTHROID, LEVOTHROID) 50 MCG tablet Take 1 tablet by mouth Daily. 90 tablet 1    Omega-3 Fatty Acids (fish oil) 1000 MG capsule capsule Take 1 capsule by mouth Daily With Breakfast.      ondansetron ODT (ZOFRAN-ODT) 4 MG disintegrating tablet Place 1 tablet on the tongue Every 8 (Eight) Hours As Needed for Nausea or Vomiting. 30 tablet 0    Probiotic Product (PROBIOTIC DAILY PO) Take 1 capsule by mouth Daily.      RABEprazole (ACIPHEX) 20 MG EC tablet TAKE 1 TABLET BY MOUTH EVERY DAY 90 tablet 0    sodium chloride (CAYLA 128) 5 % ophthalmic solution Administer 1 drop to the right eye As Needed.      sucralfate (CARAFATE) 1 GM/10ML suspension TAKE 10 ML BY MOUTH 3 TIMES A DAY FOR 30 MINUTES BEFORE MEALS      valsartan (DIOVAN) 160 MG tablet TAKE 1.5 TABLETS BY MOUTH DAILY. NEW DIRECTIONS 135 tablet 3     No current facility-administered medications on file prior to visit.        ALLERGIES:    Allergies   Allergen Reactions    Other Nausea Only     All mycins    Sulfa Antibiotics Unknown - High Severity     LOST CONSCIOUSNESS AND BODY TURNED RED/ON FIRE    Amlodipine Swelling    Ciprofloxacin Unknown - High Severity     RED BLISTERS    Doxycycline Nausea Only    Iodine Unknown - High Severity     DECADES AGO, IODINE INJECTION CAUSED SKIN REDNESS AND BURNING    Macrodantin [Nitrofurantoin Macrocrystal] Diarrhea and Nausea And Vomiting    Nitrofurantoin Nausea And Vomiting    Yeast-Related Products Unknown - High Severity     MOUTH SORES AND BLADDER INFECTION    Vancomycin Nausea And Vomiting        Social History     Socioeconomic History    Marital status:      Spouse name: Nicolas    Years of education: High school   Tobacco Use    Smoking status: Former     Packs/day: 1.00     Years: 10.00     Additional pack years: 0.00     Total pack years: 10.00     Types: Cigarettes     Start date: 1980     Quit date: 1990     Years  "since quittin.8    Smokeless tobacco: Never    Tobacco comments:     no caffeine   Vaping Use    Vaping Use: Never used   Substance and Sexual Activity    Alcohol use: No    Drug use: No    Sexual activity: Defer        Family History   Problem Relation Age of Onset    Esophageal cancer Father     Stomach cancer Father     Heart disease Mother     Thyroid cancer Mother     Hypertension Mother     Hypertension Sister     Hypertension Brother     Stroke Brother     Heart disease Brother     Diabetes Brother     Leukemia Paternal Aunt     Malig Hyperthermia Neg Hx         Review of Systems   Constitutional:  Positive for fatigue. Negative for chills and fever.   Eyes:  Negative for discharge and visual disturbance.   Respiratory:  Negative for chest tightness and shortness of breath.    Cardiovascular:  Negative for chest pain and leg swelling.   Gastrointestinal:  Positive for diarrhea (see HPI). Negative for abdominal distention, abdominal pain and nausea.   Musculoskeletal:  Positive for back pain and joint swelling.   Skin:  Negative for pallor and rash.   Neurological:  Negative for dizziness and numbness.   Psychiatric/Behavioral:  Negative for behavioral problems and confusion.    6/3/2020 ROS unchanged from above except as updated.    Objective     Vitals:    23 1322   BP: (!) 184/69   Pulse: 67   Resp: 18   Temp: 99.1 °F (37.3 °C)   TempSrc: Temporal   SpO2: 96%   Weight: 73 kg (161 lb)   Height: 152.4 cm (60\")   PainSc: 0-No pain             2023     1:25 PM   Current Status   ECOG score 1       Physical Exam   Constitutional: She is oriented to person, place, and time. She appears well-developed. No distress.   Pulmonary/Chest: Effort normal. No respiratory distress.       Neurological: She is alert and oriented to person, place, and time.   Skin: Skin is warm and dry.   Breast exam is benign with no new masses tenderness in the left breast is chronic  I have reexamined the patient and the " results are consistent with the previously documented exam. Omi Valenzuela MD     RECENT LABS:    Results from last 7 days   Lab Units 11/09/23  1302   WBC 10*3/mm3 9.08   NEUTROS ABS 10*3/mm3 5.99   HEMOGLOBIN g/dL 10.7*   HEMATOCRIT % 32.8*   PLATELETS 10*3/mm3 384     Results from last 7 days   Lab Units 11/09/23  1302   SODIUM mmol/L 137   POTASSIUM mmol/L 4.6   CHLORIDE mmol/L 98   CO2 mmol/L 23.6   BUN mg/dL 32*   CREATININE mg/dL 1.22*   CALCIUM mg/dL 9.6   ALBUMIN g/dL 4.0   BILIRUBIN mg/dL 0.2   ALK PHOS U/L 55   ALT (SGPT) U/L 10   AST (SGOT) U/L 29   GLUCOSE mg/dL 122*   FERRITIN ng/mL 48.80   IRON mcg/dL 49   TIBC mcg/dL 417         Lab Results   Component Value Date    IRON 49 11/09/2023    TIBC 417 11/09/2023    FERRITIN 48.80 11/09/2023       R 78% VT 17% Her 2 -neg Ki-67 7%  Final Diagnosis   1.  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                SEE SYNOPTIC REPORT (BELOW) FOR ADDITIONAL DETAILS.     2.  SENTINEL LYMPH NODE #1, LEFT AXILLA, EXCISION:                ONE LYMPH NODE, POSITIVE FOR METASTATIC CARCINOMA (MICROMETASTASIS) (SEE COMMENT).               NO EXTRANODAL EXTENSION IS IDENTIFIED.     COMMENT: Measuring the exact size of the lymph node metastasis is difficult. The lymph node demonstrates a few very small scattered foci of tumor cells, compatible with isolated tumor cells.  One slide demonstrates a single contiguous focus of tumor cells exceeding 0.2 mm; therefore, this is best considered a micrometastasis rather than isolated tumor cells.     3.  SENTINEL LYMPH NODE #2, LEFT AXILLA, EXCISION.               ONE LYMPH NODE, NEGATIVE FOR METASTATIC CARCINOMA.     SYNOPTIC REPORT (Based on [facility] cancer case summary, version January 2018):               Procedure: Excision (less than total mastectomy).               Specimen  laterality: Left.                Tumor site: Not specified.                Tumor size: 1.2 x 1.1 x 1 cm.               Histologic type: Invasive carcinoma of no special type (ductal, not otherwise specified).                Histologic grade ([place] Histologic Score):                              Glandular (acinar)/tubular differentiation: Score 3.                            Nuclear pleomorphism: Score 2.                             Mitotic rate: Score 1.                            Overall grade: Grade 2 (Score 6 of 9).               Tumor focality: Single focus of invasive carcinoma.                Ductal carcinoma in situ (DCIS): Present.                            Architectural patterns: Solid.                             Nuclear grade: Grade II (intermediate).                            Necrosis: Present, central comedonecrosis.                Lobular carcinoma in situ (LCIS): No LCIS in specimen.               Margins:                            Invasive carcinoma margins: Uninvolved by invasive carcinoma.                                          Distance from closest margin: 3 mm (medial margin).                                          NOTE: Invasive carcinoma also extends to within 4 mm of superior margin and to within                                                 4 mm of lateral margin.  All additional margins are greater than 10 mm from invasive                                                 carcinoma.                              DCIS Margins: Uninvolved by DCIS.                                          Distance from closest margin: 3 mm (medial margin).                    Regional Lymph nodes: Involved by tumor cells.                             Number of lymph nodes with macrometastases: 0.                            Number of lymph nodes with micrometastases: 1.                            Size of largest metastatic deposit: 0.4 mm.                            Extranodal extension: Not  identified.                            Number of lymph nodes examined: 2.                            Number of sentinel lymph nodes examined: 2.                Treatment effect: No known presurgical therapy.                Lymphovascular invasion: Present.                Dermal Lymphovascular invasion: Not identified.                Pathologic stage classification.                            Primary tumor: pT1c.                            Regional lymph nodes: pN1mi(sn).                            Distant metastasis: Not applicable.                Additional pathologic findings:                             Ductal hyperplasia of the usual type.                             Fibrocystic changes with duct dilatation and stasis and focal apocrine metaplasia.                             Columnar cell change without atypia.                             Fibroinflammatory changes consistent with site of prior biopsy.                    Ancillary studies: ER, NH, HER-2/melanie, and Ki-67 studies performed on previous biopsy specimen at                       outside facility ([facility]).               Duplex scan of extremity veins including responses to compression and other maneuvers; bilateral   Study Impression      Right Common Femoral: No deep vein thrombosis noted.    Right Saphenofemoral Junction: No superficial thrombophlebitis noted.    Right Proximal Femoral: No deep vein thrombosis noted.    Right Mid Femoral: No deep vein thrombosis noted.    Right Distal Femoral: No deep vein thrombosis noted.    Right Popliteal: No deep vein thrombosis noted.    Right Posterior Tibial: No deep vein thrombosis noted.    Right Peroneal: No deep vein thrombosis noted.    Right Gastrocnemius Soleal: No deep vein thrombosis noted.    Right Greater Saphenous Above Knee: No superficial thrombophlebitis noted.    Right Greater Saphenous Below Knee: No superficial thrombophlebitis noted.    Left Common Femoral: No deep vein thrombosis  noted.    Left Saphenofemoral Junction: No superficial thrombophlebitis noted.    Left Proximal Femoral: No deep vein thrombosis noted.    Left Mid Femoral: No deep vein thrombosis noted.    Left Distal Femoral: No deep vein thrombosis noted.    Left Popliteal: No deep vein thrombosis noted.    Left Posterior Tibial: No deep vein thrombosis noted.    Left Peroneal: No deep vein thrombosis noted.    Left Gastrocnemius Soleal: No deep vein thrombosis noted.    Left Greater Saphenous Above Knee: No superficial thrombophlebitis noted.    Left Greater Saphenous Below Knee: No superficial thrombophlebitis noted.   Electronically signed by Nicolas Cotton MD on 10/4/19 at 1933 EDT    5/29/2020 bilateral screening mammogram negative.    At baseline EKG was done in the office today which shows normal sinus rhythm with a rate of 75.  QT/QTc 368/411.        Assessment & Plan      1.  T1c N1 jese N0 ER/IL positive HER-2 negative left breast cancer post lumpectomy and radiation  Arimidex started in 3/18 discontinued by the patient in 7/18 due to visual blurring which resolved.    Aromasin started 11/2018. 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  She also has had weight gain. Aromasin discontinued.  Patient seen early April 2019 and follow-up.  Patient agreeable to switch therapy to Femara 2.5 mg daily.  Patient ultimately discontinuing Femara due to repeated arthralgias and blurred vision.  The patient was transitioned to tamoxifen but ultimately discontinued this approximately 2 months later per her report today due to blurry vision.   Recommend patient start trial toremifene.  She is hoping to be able to started early next week.  Patient decided not to take toremifene and stop all hormonal blockade in 5/20  Seen again in follow-up in 9/22 off all hormonal blockade had a fall and broke her hip but no obvious signs of cancer  recurrence CA 15-3 pending    2.  Family history of breast cancer in a niece at a young age and father with cancer at age 51?  Genetic testing    4.  History of oral cancer in 1990    5.  Anemia, recently worked up per Dr. Ivan, internal medicine.   Patient had EGD and colonoscopy per Dr. Gallego 1 year ago with no concerning findings.  Because of her history of GI ulcers however she was started on AcipHex per Dr. Ivan.  3/21/2019 showed iron saturation of 8% ferritin of 18. Patient started on trial of oral iron but was intolerant due to GI upset.  Patient receiving 2 doses of IV Injectafer in May 2019.  Iron stores are now replete with iron saturation of 29% and hemoglobin having now normalized to 13.  Rectal bleeding in 4/20 CAT scan shows duodenitis resumed ulcer medications  Persistently anemic as of 11/23 we will recheck B12 and folate-ferritin 48 and not low enough for IV iron    6.  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  Rectocele and cystocele.  Gynecologist recommended vaginal estrogens I recommended she do it only once or twice a week    PLAN:  1.  Return in 12 months for follow-up   2.  CBC in 6 months with ferritin at Dr. Nguyen office and if her ferritin is below 20 we will give her IV iron    Overall I think no further imaging is needed for her at her age and considering that we did very little treatment she is doing well 5 years from diagnosis with no evidence of disease-this was with her and understood my suggestions    I spent 33 total minutes, face-to-face, caring for Geeta today. Greater than 50% of this time involved counseling and/or coordination of care as documented within this note.

## 2023-11-13 ENCOUNTER — OFFICE VISIT (OUTPATIENT)
Dept: WOUND CARE | Facility: HOSPITAL | Age: 88
End: 2023-11-13
Payer: MEDICARE

## 2023-11-27 ENCOUNTER — OFFICE VISIT (OUTPATIENT)
Dept: WOUND CARE | Facility: HOSPITAL | Age: 88
End: 2023-11-27
Payer: MEDICARE

## 2023-12-07 DIAGNOSIS — R60.9 PERIPHERAL EDEMA: ICD-10-CM

## 2023-12-07 RX ORDER — FUROSEMIDE 40 MG/1
TABLET ORAL
Qty: 90 TABLET | Refills: 1 | Status: SHIPPED | OUTPATIENT
Start: 2023-12-07

## 2023-12-07 NOTE — TELEPHONE ENCOUNTER
Rx Refill Note  Requested Prescriptions     Pending Prescriptions Disp Refills    furosemide (LASIX) 40 MG tablet 90 tablet 1     Sig: TAKE 1 TABLET BY MOUTH EVERY DAY      Last office visit with prescribing clinician: 9/21/2023   Last telemedicine visit with prescribing clinician: Visit date not found   Next office visit with prescribing clinician: 3/28/2024                         Would you like a call back once the refill request has been completed: [] Yes [] No    If the office needs to give you a call back, can they leave a voicemail: [] Yes [] No    Zhane Harrison  12/07/23, 12:32 EST  
8

## 2023-12-26 DIAGNOSIS — K21.9 GERD WITHOUT ESOPHAGITIS: ICD-10-CM

## 2023-12-26 RX ORDER — RABEPRAZOLE SODIUM 20 MG/1
TABLET, DELAYED RELEASE ORAL
Qty: 90 TABLET | Refills: 0 | Status: SHIPPED | OUTPATIENT
Start: 2023-12-26

## 2024-02-06 DIAGNOSIS — M54.42 ACUTE LEFT-SIDED LOW BACK PAIN WITH LEFT-SIDED SCIATICA: ICD-10-CM

## 2024-02-06 DIAGNOSIS — K21.9 GERD WITHOUT ESOPHAGITIS: ICD-10-CM

## 2024-02-06 DIAGNOSIS — M51.37 DDD (DEGENERATIVE DISC DISEASE), LUMBOSACRAL: ICD-10-CM

## 2024-02-06 DIAGNOSIS — E03.9 HYPOTHYROIDISM, UNSPECIFIED TYPE: ICD-10-CM

## 2024-02-07 RX ORDER — LEVOTHYROXINE SODIUM 0.05 MG/1
50 TABLET ORAL DAILY
Qty: 90 TABLET | Refills: 4 | Status: SHIPPED | OUTPATIENT
Start: 2024-02-07

## 2024-02-07 RX ORDER — GABAPENTIN 100 MG/1
CAPSULE ORAL
Qty: 120 CAPSULE | Refills: 5 | Status: SHIPPED | OUTPATIENT
Start: 2024-02-07

## 2024-02-07 RX ORDER — RABEPRAZOLE SODIUM 20 MG/1
TABLET, DELAYED RELEASE ORAL
Qty: 90 TABLET | Refills: 4 | Status: SHIPPED | OUTPATIENT
Start: 2024-02-07

## 2024-02-15 ENCOUNTER — OFFICE VISIT (OUTPATIENT)
Dept: INTERNAL MEDICINE | Facility: CLINIC | Age: 89
End: 2024-02-15
Payer: MEDICARE

## 2024-02-15 VITALS
HEIGHT: 60 IN | SYSTOLIC BLOOD PRESSURE: 132 MMHG | BODY MASS INDEX: 32.04 KG/M2 | DIASTOLIC BLOOD PRESSURE: 78 MMHG | TEMPERATURE: 97.8 F | WEIGHT: 163.2 LBS | HEART RATE: 64 BPM | OXYGEN SATURATION: 99 %

## 2024-02-15 DIAGNOSIS — J01.00 ACUTE NON-RECURRENT MAXILLARY SINUSITIS: Primary | ICD-10-CM

## 2024-02-15 PROCEDURE — 99213 OFFICE O/P EST LOW 20 MIN: CPT

## 2024-02-15 PROCEDURE — 1159F MED LIST DOCD IN RCRD: CPT

## 2024-02-15 PROCEDURE — 1160F RVW MEDS BY RX/DR IN RCRD: CPT

## 2024-02-15 RX ORDER — AMOXICILLIN AND CLAVULANATE POTASSIUM 875; 125 MG/1; MG/1
1 TABLET, FILM COATED ORAL 2 TIMES DAILY
Qty: 14 TABLET | Refills: 0 | Status: SHIPPED | OUTPATIENT
Start: 2024-02-15 | End: 2024-02-22

## 2024-03-04 ENCOUNTER — OFFICE VISIT (OUTPATIENT)
Dept: CARDIOLOGY | Facility: CLINIC | Age: 89
End: 2024-03-04
Payer: MEDICARE

## 2024-03-04 VITALS
WEIGHT: 164.8 LBS | HEIGHT: 60 IN | OXYGEN SATURATION: 98 % | SYSTOLIC BLOOD PRESSURE: 140 MMHG | DIASTOLIC BLOOD PRESSURE: 80 MMHG | HEART RATE: 66 BPM | BODY MASS INDEX: 32.35 KG/M2

## 2024-03-04 DIAGNOSIS — I10 ESSENTIAL HYPERTENSION: Chronic | ICD-10-CM

## 2024-03-04 DIAGNOSIS — E78.2 MIXED HYPERLIPIDEMIA: Primary | ICD-10-CM

## 2024-03-04 PROCEDURE — 99214 OFFICE O/P EST MOD 30 MIN: CPT | Performed by: INTERNAL MEDICINE

## 2024-03-04 PROCEDURE — 1159F MED LIST DOCD IN RCRD: CPT | Performed by: INTERNAL MEDICINE

## 2024-03-04 PROCEDURE — 93000 ELECTROCARDIOGRAM COMPLETE: CPT | Performed by: INTERNAL MEDICINE

## 2024-03-04 PROCEDURE — 1160F RVW MEDS BY RX/DR IN RCRD: CPT | Performed by: INTERNAL MEDICINE

## 2024-03-04 NOTE — PROGRESS NOTES
CARDIOLOGY    Malini Strickland MD    ENCOUNTER DATE:  03/04/2024    Geeta Butt / 88 y.o. / female        CHIEF COMPLAINT / REASON FOR OFFICE VISIT     Follow-up      HISTORY OF PRESENT ILLNESS       HPI    Geeta Butt is a 88 y.o. female     This is  a patient who previously followed with Dr. Carter. She has hypertension, hyperlipidemia, carotid disease, coronary artery calcification on CT, history of breast cancer and stroke.     She had lifeline screening performed in 2019 showing bilateral carotid disease. She had an echocardiogram in 2019 which did not show significant valve disease. Perfusion stress test was negative for ischemia. She had a fall with a broken right hip in 2021. She was diagnosed with stable renal artery stenosis and treated for cellulitis.     Echocardiogram in 09/2022 showed ejection fraction to be 60-65% with grade 1 diastolic dysfunction, moderate mitral regurgitation, mild aortic regurgitation, mild tricuspid regurgitation, with normal right ventricular systolic pressure. A 14 day monitor in 10/2022 showed some short bursts of SVT without any significant ventricular tachycardia, bradycardia, or pauses. Symptoms of dizziness do not correlate to arrhythmia.     She is doing well.  Her breathing is doing better with her new inhaler.  No chest pain.  Rare palpitations.  No presyncope or syncope.      REVIEW OF SYSTEMS     Review of Systems   Constitutional: Negative for chills, fever, weight gain and weight loss.   Cardiovascular:  Positive for leg swelling.   Respiratory:  Positive for snoring. Negative for cough and wheezing.    Hematologic/Lymphatic: Negative for bleeding problem. Does not bruise/bleed easily.   Skin:  Negative for color change.   Musculoskeletal:  Positive for joint pain and myalgias. Negative for falls.   Gastrointestinal:  Negative for melena.   Genitourinary:  Negative for hematuria.   Neurological:  Negative for excessive daytime sleepiness.  "  Psychiatric/Behavioral:  Negative for depression. The patient is not nervous/anxious.          VITAL SIGNS     Visit Vitals  /80   Pulse 66   Ht 152.4 cm (60\")   Wt 74.8 kg (164 lb 12.8 oz)   SpO2 98%   BMI 32.19 kg/m²         Wt Readings from Last 3 Encounters:   03/04/24 74.8 kg (164 lb 12.8 oz)   02/15/24 74 kg (163 lb 3.2 oz)   11/09/23 73 kg (161 lb)     Body mass index is 32.19 kg/m².      PHYSICAL EXAMINATION     Constitutional:       General: Not in acute distress.  Neck:      Vascular: No carotid bruit or JVD.   Pulmonary:      Effort: Pulmonary effort is normal.      Breath sounds: Normal breath sounds.   Cardiovascular:      Normal rate. Regular rhythm.      Murmurs: There is no murmur.   Psychiatric:         Mood and Affect: Mood and affect normal.           REVIEWED DATA       ECG 12 Lead    Date/Time: 3/4/2024 2:56 PM  Performed by: Malini Strickland MD    Authorized by: Malini Strickland MD  Comparison: compared with previous ECG from 8/4/2023  Similar to previous ECG  Rhythm: sinus rhythm  BPM: 66  Conduction: conduction normal  ST Segments: ST segments normal  T Waves: T waves normal    Clinical impression: normal ECG            Lipid Panel          11/9/2023    13:02   Lipid Panel   Total Cholesterol 237    Triglycerides 112    HDL Cholesterol 56    VLDL Cholesterol 20    LDL Cholesterol  161    LDL/HDL Ratio 2.9        Lab Results   Component Value Date    GLUCOSE 122 (H) 11/09/2023    BUN 32 (H) 11/09/2023    CREATININE 1.22 (H) 11/09/2023    EGFRRESULT 57 (L) 06/15/2022    EGFR 43.0 (L) 11/09/2023    BCR 26.2 (H) 11/09/2023    K 4.6 11/09/2023    CO2 23.6 11/09/2023    CALCIUM 9.6 11/09/2023    PROTENTOTREF 7.0 05/31/2022    ALBUMIN 4.0 11/09/2023    BILITOT 0.2 11/09/2023    AST 29 11/09/2023    ALT 10 11/09/2023       ASSESSMENT & PLAN      Diagnosis Plan   1. Mixed hyperlipidemia        2. Essential hypertension            Dyspnea on exertion.  Doing much better with the change to her " inhalers.  Lower extremity edema.  She is doing better with this.  Hypertension. She is going to keep an eye on her blood pressure at home.  Carotid artery disease, moderate. On aspirin. She cannot tolerate statin.  History of renal artery atherosclerotic disease.   Peripheral vascular disease.   Hyperlipidemia.  She does not tolerate statins.  History of breast cancer.      Follow-up with Mary Grace in 1 year.      Orders Placed This Encounter   Procedures    ECG 12 Lead     This order was created via procedure documentation     Order Specific Question:   Release to patient     Answer:   Routine Release [[id number]]           MEDICATIONS         Discharge Medications            Accurate as of March 4, 2024  3:09 PM. If you have any questions, ask your nurse or doctor.                Continue These Medications        Instructions Start Date   aspirin 81 MG chewable tablet   81 mg, Oral, Daily      carvedilol 12.5 MG tablet  Commonly known as: COREG   TAKE 1 TABLET BY MOUTH TWICE A DAY WITH MEALS      coenzyme Q10 50 MG capsule capsule   1 capsule, Oral, Daily      estradiol 0.1 MG/GM vaginal cream  Commonly known as: ESTRACE   2 g, Vaginal, Weekly      fish oil 1000 MG capsule capsule   1 capsule, Oral, Daily With Breakfast      Flarex 0.1 % ophthalmic suspension  Generic drug: fluorometholone   No dose, route, or frequency recorded.      fluorometholone 0.1 % ophthalmic suspension  Commonly known as: FML   1 drop, Left Eye, Daily      fluticasone 50 MCG/ACT nasal spray  Commonly known as: FLONASE       furosemide 40 MG tablet  Commonly known as: LASIX   TAKE 1 TABLET BY MOUTH EVERY DAY      gabapentin 100 MG capsule  Commonly known as: NEURONTIN   TAKE 1 CAPSULE BY MOUTH 3 TIMES A DAY. OKAY TO TAKE 1 CAPSULE AT BEDTIME IF NEEDED FOR PAIN      hydrocortisone 2.5 % cream   As Needed      levothyroxine 50 MCG tablet  Commonly known as: SYNTHROID, LEVOTHROID   50 mcg, Oral, Daily      ondansetron ODT 4 MG disintegrating  tablet  Commonly known as: ZOFRAN-ODT   4 mg, Translingual, Every 8 Hours PRN      PROBIOTIC DAILY PO   1 capsule, Oral, Daily      RABEprazole 20 MG EC tablet  Commonly known as: ACIPHEX   TAKE 1 TABLET BY MOUTH EVERY DAY      sodium chloride 5 % ophthalmic solution  Commonly known as: CAYLA 128   1 drop, As Needed      sucralfate 1 GM/10ML suspension  Commonly known as: CARAFATE   TAKE 10 ML BY MOUTH 3 TIMES A DAY FOR 30 MINUTES BEFORE MEALS      Trelegy Ellipta 100-62.5-25 MCG/ACT inhaler  Generic drug: Fluticasone-Umeclidin-Vilant   1 puff, Inhalation, Daily - RT      valsartan 160 MG tablet  Commonly known as: DIOVAN   TAKE 1.5 TABLETS BY MOUTH DAILY. NEW DIRECTIONS      Vitamin D3 50 MCG (2000 UT) capsule   TAKE 1 CAPSULE BY MOUTH DAILY.                 Malini Strickland MD  03/04/24  15:09 EST    Part of this note may be an electronic transcription/translation of spoken language to printed text using the Dragon dictation system.

## 2024-03-11 ENCOUNTER — HOSPITAL ENCOUNTER (OUTPATIENT)
Facility: HOSPITAL | Age: 89
Setting detail: OBSERVATION
Discharge: HOME | End: 2024-03-12
Attending: EMERGENCY MEDICINE | Admitting: EMERGENCY MEDICINE
Payer: MEDICARE

## 2024-03-11 DIAGNOSIS — L03.116 CELLULITIS OF LEFT LEG: Primary | ICD-10-CM

## 2024-03-11 LAB
ANION GAP SERPL CALCULATED.3IONS-SCNC: 9 MMOL/L (ref 5–15)
BASOPHILS # BLD AUTO: 0.1 10*3/MM3 (ref 0–0.2)
BASOPHILS NFR BLD AUTO: 0.8 % (ref 0–1.5)
BUN SERPL-MCNC: 20 MG/DL (ref 8–23)
BUN/CREAT SERPL: 22 (ref 7–25)
CALCIUM SPEC-SCNC: 10.1 MG/DL (ref 8.6–10.5)
CHLORIDE SERPL-SCNC: 98 MMOL/L (ref 98–107)
CO2 SERPL-SCNC: 27 MMOL/L (ref 22–29)
CREAT SERPL-MCNC: 0.91 MG/DL (ref 0.57–1)
DEPRECATED RDW RBC AUTO: 46.3 FL (ref 37–54)
EGFRCR SERPLBLD CKD-EPI 2021: 61.2 ML/MIN/1.73
EOSINOPHIL # BLD AUTO: 0.21 10*3/MM3 (ref 0–0.4)
EOSINOPHIL NFR BLD AUTO: 1.6 % (ref 0.3–6.2)
ERYTHROCYTE [DISTWIDTH] IN BLOOD BY AUTOMATED COUNT: 15 % (ref 12.3–15.4)
GLUCOSE SERPL-MCNC: 104 MG/DL (ref 65–99)
HCT VFR BLD AUTO: 35.2 % (ref 34–46.6)
HGB BLD-MCNC: 11.4 G/DL (ref 12–15.9)
IMM GRANULOCYTES # BLD AUTO: 0.06 10*3/MM3 (ref 0–0.05)
IMM GRANULOCYTES NFR BLD AUTO: 0.5 % (ref 0–0.5)
LYMPHOCYTES # BLD AUTO: 1.64 10*3/MM3 (ref 0.7–3.1)
LYMPHOCYTES NFR BLD AUTO: 12.6 % (ref 19.6–45.3)
MCH RBC QN AUTO: 27.4 PG (ref 26.6–33)
MCHC RBC AUTO-ENTMCNC: 32.4 G/DL (ref 31.5–35.7)
MCV RBC AUTO: 84.6 FL (ref 79–97)
MONOCYTES # BLD AUTO: 1.09 10*3/MM3 (ref 0.1–0.9)
MONOCYTES NFR BLD AUTO: 8.4 % (ref 5–12)
NEUTROPHILS NFR BLD AUTO: 76.1 % (ref 42.7–76)
NEUTROPHILS NFR BLD AUTO: 9.89 10*3/MM3 (ref 1.7–7)
NRBC BLD AUTO-RTO: 0 /100 WBC (ref 0–0.2)
PLATELET # BLD AUTO: 402 10*3/MM3 (ref 140–450)
PMV BLD AUTO: 9.5 FL (ref 6–12)
POTASSIUM SERPL-SCNC: 4.4 MMOL/L (ref 3.5–5.2)
RBC # BLD AUTO: 4.16 10*6/MM3 (ref 3.77–5.28)
SODIUM SERPL-SCNC: 134 MMOL/L (ref 136–145)
WBC NRBC COR # BLD AUTO: 12.99 10*3/MM3 (ref 3.4–10.8)

## 2024-03-11 PROCEDURE — 80048 BASIC METABOLIC PNL TOTAL CA: CPT | Performed by: PHYSICIAN ASSISTANT

## 2024-03-11 PROCEDURE — 25010000002 CEFTRIAXONE PER 250 MG: Performed by: STUDENT IN AN ORGANIZED HEALTH CARE EDUCATION/TRAINING PROGRAM

## 2024-03-11 PROCEDURE — 25010000002 MORPHINE PER 10 MG: Performed by: EMERGENCY MEDICINE

## 2024-03-11 PROCEDURE — 99285 EMERGENCY DEPT VISIT HI MDM: CPT

## 2024-03-11 PROCEDURE — 96374 THER/PROPH/DIAG INJ IV PUSH: CPT

## 2024-03-11 PROCEDURE — G0378 HOSPITAL OBSERVATION PER HR: HCPCS

## 2024-03-11 PROCEDURE — 85025 COMPLETE CBC W/AUTO DIFF WBC: CPT | Performed by: PHYSICIAN ASSISTANT

## 2024-03-11 RX ORDER — FUROSEMIDE 40 MG/1
40 TABLET ORAL DAILY
Status: DISCONTINUED | OUTPATIENT
Start: 2024-03-11 | End: 2024-03-12 | Stop reason: HOSPADM

## 2024-03-11 RX ORDER — NITROGLYCERIN 0.4 MG/1
0.4 TABLET SUBLINGUAL
Status: DISCONTINUED | OUTPATIENT
Start: 2024-03-11 | End: 2024-03-12 | Stop reason: HOSPADM

## 2024-03-11 RX ORDER — ONDANSETRON 4 MG/1
4 TABLET, ORALLY DISINTEGRATING ORAL EVERY 6 HOURS PRN
Status: DISCONTINUED | OUTPATIENT
Start: 2024-03-11 | End: 2024-03-12 | Stop reason: HOSPADM

## 2024-03-11 RX ORDER — HYDROCODONE BITARTRATE AND ACETAMINOPHEN 5; 325 MG/1; MG/1
1 TABLET ORAL EVERY 6 HOURS PRN
Status: DISCONTINUED | OUTPATIENT
Start: 2024-03-11 | End: 2024-03-12 | Stop reason: HOSPADM

## 2024-03-11 RX ORDER — ASPIRIN 81 MG/1
81 TABLET ORAL DAILY
Status: DISCONTINUED | OUTPATIENT
Start: 2024-03-11 | End: 2024-03-12 | Stop reason: HOSPADM

## 2024-03-11 RX ORDER — CARVEDILOL 12.5 MG/1
12.5 TABLET ORAL 2 TIMES DAILY
Status: DISCONTINUED | OUTPATIENT
Start: 2024-03-11 | End: 2024-03-12 | Stop reason: HOSPADM

## 2024-03-11 RX ORDER — BUDESONIDE AND FORMOTEROL FUMARATE DIHYDRATE 160; 4.5 UG/1; UG/1
2 AEROSOL RESPIRATORY (INHALATION)
Status: DISCONTINUED | OUTPATIENT
Start: 2024-03-11 | End: 2024-03-12 | Stop reason: HOSPADM

## 2024-03-11 RX ORDER — ACETAMINOPHEN 325 MG/1
650 TABLET ORAL EVERY 4 HOURS PRN
Status: DISCONTINUED | OUTPATIENT
Start: 2024-03-11 | End: 2024-03-12 | Stop reason: HOSPADM

## 2024-03-11 RX ORDER — GABAPENTIN 100 MG/1
100 CAPSULE ORAL 3 TIMES DAILY
Status: DISCONTINUED | OUTPATIENT
Start: 2024-03-11 | End: 2024-03-12 | Stop reason: HOSPADM

## 2024-03-11 RX ORDER — MORPHINE SULFATE 2 MG/ML
2 INJECTION, SOLUTION INTRAMUSCULAR; INTRAVENOUS ONCE
Status: COMPLETED | OUTPATIENT
Start: 2024-03-11 | End: 2024-03-11

## 2024-03-11 RX ORDER — LEVOTHYROXINE SODIUM 0.05 MG/1
50 TABLET ORAL
Status: DISCONTINUED | OUTPATIENT
Start: 2024-03-12 | End: 2024-03-12 | Stop reason: HOSPADM

## 2024-03-11 RX ORDER — ONDANSETRON 2 MG/ML
4 INJECTION INTRAMUSCULAR; INTRAVENOUS EVERY 6 HOURS PRN
Status: DISCONTINUED | OUTPATIENT
Start: 2024-03-11 | End: 2024-03-12 | Stop reason: HOSPADM

## 2024-03-11 RX ORDER — SUCRALFATE 1 G/1
1 TABLET ORAL
Status: DISCONTINUED | OUTPATIENT
Start: 2024-03-11 | End: 2024-03-12 | Stop reason: HOSPADM

## 2024-03-11 RX ORDER — SODIUM CHLORIDE 9 MG/ML
40 INJECTION, SOLUTION INTRAVENOUS AS NEEDED
Status: DISCONTINUED | OUTPATIENT
Start: 2024-03-11 | End: 2024-03-12 | Stop reason: HOSPADM

## 2024-03-11 RX ORDER — SODIUM CHLORIDE 0.9 % (FLUSH) 0.9 %
10 SYRINGE (ML) INJECTION AS NEEDED
Status: DISCONTINUED | OUTPATIENT
Start: 2024-03-11 | End: 2024-03-12 | Stop reason: HOSPADM

## 2024-03-11 RX ORDER — SODIUM CHLORIDE 0.9 % (FLUSH) 0.9 %
10 SYRINGE (ML) INJECTION EVERY 12 HOURS SCHEDULED
Status: DISCONTINUED | OUTPATIENT
Start: 2024-03-11 | End: 2024-03-12 | Stop reason: HOSPADM

## 2024-03-11 RX ADMIN — SUCRALFATE 1 G: 1 TABLET ORAL at 20:06

## 2024-03-11 RX ADMIN — CEFTRIAXONE 2000 MG: 2 INJECTION, POWDER, FOR SOLUTION INTRAMUSCULAR; INTRAVENOUS at 19:37

## 2024-03-11 RX ADMIN — GABAPENTIN 100 MG: 100 CAPSULE ORAL at 20:06

## 2024-03-11 RX ADMIN — Medication 10 ML: at 20:06

## 2024-03-11 RX ADMIN — HYDROCODONE BITARTRATE AND ACETAMINOPHEN 1 TABLET: 5; 325 TABLET ORAL at 21:13

## 2024-03-11 RX ADMIN — ASPIRIN 81 MG: 81 TABLET, COATED ORAL at 21:13

## 2024-03-11 RX ADMIN — CARVEDILOL 12.5 MG: 12.5 TABLET, FILM COATED ORAL at 20:07

## 2024-03-11 RX ADMIN — MORPHINE SULFATE 2 MG: 2 INJECTION, SOLUTION INTRAMUSCULAR; INTRAVENOUS at 13:09

## 2024-03-11 NOTE — PROGRESS NOTES
Clinical Pharmacy Services: Medication History    Geeta Butt is a 88 y.o. female presenting to [facility] for   Chief Complaint   Patient presents with    Leg Pain    Leg Swelling     Leg pain with swelling and redness        She  has a past medical history of Anesthesia complication, Arthritis, Aspiration into airway, Breast cancer, Cancer (1990), Chronic kidney disease, Chronic kidney disease (07/22/2021), CKD (chronic kidney disease), Depression, Diverticular disease, Gastric ulcer, GERD (gastroesophageal reflux disease), Hearing loss, History of colon polyps, History of depression, History of GI bleed, Hypertension, Hypothyroidism, Long-term use of high-risk medication (06/03/2020), Peptic ulceration, Polyneuritis, PONV (postoperative nausea and vomiting), Poor vision, Stroke (2000), and UTI (urinary tract infection) (10/17/2017).    Allergies as of 03/11/2024 - Reviewed 03/11/2024   Allergen Reaction Noted    Other Nausea Only 08/18/2014    Sulfa antibiotics Unknown - High Severity 06/07/2016    Amlodipine Swelling 05/23/2022    Ciprofloxacin Unknown - High Severity 06/07/2016    Doxycycline Nausea Only 02/26/2022    Iodine Unknown - High Severity 03/14/2018    Macrodantin [nitrofurantoin macrocrystal] Diarrhea and Nausea And Vomiting 06/07/2016    Nitrofurantoin Nausea And Vomiting 12/17/2016    Yeast-related products Unknown - High Severity 10/16/2017    Vancomycin Nausea And Vomiting 03/15/2022       Medication information was obtained from: Patient   Pharmacy and Phone Number:     Prior to Admission Medications       Prescriptions Last Dose Informant Patient Reported? Taking?    aspirin 81 MG EC tablet  Self Yes Yes    Take 1 tablet by mouth Daily.    carvedilol (COREG) 12.5 MG tablet 3/10/2024 Self No Yes    TAKE 1 TABLET BY MOUTH TWICE A DAY WITH MEALS    Patient taking differently:  Take 1 tablet by mouth 2 (Two) Times a Day.    Cholecalciferol (VITAMIN D3) 50 MCG (2000 UT) capsule  Self  No Yes    TAKE 1 CAPSULE BY MOUTH DAILY.    Patient taking differently:  Take 1 capsule by mouth Daily.    estradiol (ESTRACE) 0.1 MG/GM vaginal cream  Self Yes Yes    Insert 2 g into the vagina As Needed.    fluticasone (FLONASE) 50 MCG/ACT nasal spray  Self Yes Yes    1 spray into the nostril(s) as directed by provider Daily.    Fluticasone-Umeclidin-Vilant (Trelegy Ellipta) 100-62.5-25 MCG/ACT inhaler  Self No Yes    Inhale 1 puff Daily.    furosemide (LASIX) 40 MG tablet  Self No Yes    TAKE 1 TABLET BY MOUTH EVERY DAY    Patient taking differently:  Take 1 tablet by mouth Daily.    gabapentin (NEURONTIN) 100 MG capsule 3/11/2024 Self No Yes    TAKE 1 CAPSULE BY MOUTH 3 TIMES A DAY. OKAY TO TAKE 1 CAPSULE AT BEDTIME IF NEEDED FOR PAIN    Patient taking differently:  Take 1 capsule by mouth 3 (Three) Times a Day. TAKE 1 CAPSULE BY MOUTH 3 TIMES A DAY. OKAY TO TAKE 1 CAPSULE AT BEDTIME IF NEEDED FOR PAIN    levothyroxine (SYNTHROID, LEVOTHROID) 50 MCG tablet  Self No Yes    TAKE 1 TABLET BY MOUTH EVERY DAY    Probiotic Product (PROBIOTIC DAILY PO)  Self Yes Yes    Take 1 capsule by mouth Daily.    sucralfate (CARAFATE) 1 GM/10ML suspension  Self Yes Yes    Take 10 mL by mouth 3 (Three) Times a Day Before Meals.    valsartan (DIOVAN) 160 MG tablet 3/11/2024 Self No Yes    TAKE 1.5 TABLETS BY MOUTH DAILY. NEW DIRECTIONS    Patient taking differently:  Take 1.5 tablets by mouth Daily.    coenzyme Q10 50 MG capsule capsule   Yes No    Take 1 capsule by mouth Daily.    Flarex 0.1 % ophthalmic suspension   Yes No    fluorometholone (FML) 0.1 % ophthalmic suspension   Yes No    Administer 1 drop into the left eye Daily.    hydrocortisone 2.5 % cream   Yes No    As Needed.    Omega-3 Fatty Acids (fish oil) 1000 MG capsule capsule   Yes No    Take 1 capsule by mouth Daily With Breakfast.    ondansetron ODT (ZOFRAN-ODT) 4 MG disintegrating tablet   No No    Place 1 tablet on the tongue Every 8 (Eight) Hours As Needed for  Nausea or Vomiting.    Patient not taking:  Reported on 3/4/2024    RABEprazole (ACIPHEX) 20 MG EC tablet   No No    TAKE 1 TABLET BY MOUTH EVERY DAY    Patient not taking:  Reported on 3/4/2024    sodium chloride (CAYLA 128) 5 % ophthalmic solution   Yes No    Administer 1 drop to the right eye As Needed.    Patient not taking:  Reported on 3/4/2024              Medication notes:     This medication list is complete to the best of my knowledge as of 3/11/2024    Please call if questions.    Ben Goins  Medication History Technician   [phone]    3/11/2024 17:13 EDT

## 2024-03-11 NOTE — PLAN OF CARE
Goal Outcome Evaluation:              Outcome Evaluation: Pt. 88 yr. old female admitted to the obsdervation unit r/t left leg cellulitis. Pt is AOX4 and able to verbalize needs. Family at Bedside. Pt to stay over night for treatment and PT has been consulted. Pt had no other questions at this time.
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
## 2024-03-11 NOTE — ED PROVIDER NOTES
MD ATTESTATION NOTE    The SILVIO and I have discussed this patient's history, physical exam, MDM, and treatment plan.  I have reviewed the documentation and personally had a face to face interaction with the patient. I affirm the documentation and agree with the treatment and plan.  The attached note describes my personal findings.      I provided a substantive portion of the medical decision making.        Brief HPI: Patient presents with complaint of left leg pain and redness that developed over the weekend.  She states that last Thursday or Friday she began to feel a little ill but denies known fever or chills at home.  She has had cellulitis before that has warranted hospitalization after failing antibiotics on 2 different occasions.    PHYSICAL EXAM  ED Triage Vitals   Temp Heart Rate Resp BP SpO2   03/11/24 1118 03/11/24 1118 03/11/24 1118 03/11/24 1123 03/11/24 1118   97.8 °F (36.6 °C) 80 16 (!) 192/83 100 %      Temp src Heart Rate Source Patient Position BP Location FiO2 (%)   03/11/24 1118 03/11/24 1118 03/11/24 1146 03/11/24 1146 --   Tympanic Monitor Sitting Right arm          GENERAL: Awake and alert, no acute distress  HENT: nares patent  EYES: no scleral icterus  CV: regular rhythm, normal rate  RESPIRATORY: normal effort  MUSCULOSKELETAL: no deformity.  Right lower extremity without erythema or warmth.  Left lower extremity with moderate erythema and warmth on the anterior aspect left leg, no vesicles or bullae, no soft tissue crepitus, intact DP pulses bilaterally.  NEURO: alert, moves all extremities, follows commands  PSYCH:  calm, cooperative  SKIN: warm, dry    Vital signs and nursing notes reviewed.        Medical Decision Making:  ED Course as of 03/11/24 1636   Mon Mar 11, 2024   1437 BP(!): 196/81 [JR]   1437 Hemoglobin(!): 11.4 [JR]   1437 WBC(!): 12.99 [JR]   1437 Creatinine: 0.91 [JR]      ED Course User Index  [JR] Nicolas Perez MD       Patient be admitted for IV antibiotics  until she is showing symptomatic improvement considering that she has failed oral antibiotics for cellulitis 2 times previously.      SHARED VISIT: This visit was performed by BOTH a physician and an APC. The substantive portion of the medical decision making was performed by this attesting physician who made or approved the management plan and takes responsibility for patient management. All studies in the APC note (if performed) were independently interpreted by me.      Nicolas Perez MD  03/11/24 0020

## 2024-03-11 NOTE — ED NOTES
Nursing report ED to floor  Geeta Butt  88 y.o.  female    HPI :  HPI (Adult)  Stated Reason for Visit: leg pain  History Obtained From: patient    Chief Complaint  Chief Complaint   Patient presents with    Leg Pain    Leg Swelling     Leg pain with swelling and redness        Admitting doctor:   Tolu Murphy MD    Admitting diagnosis:   The encounter diagnosis was Cellulitis of left leg.    Code status:   Current Code Status       Date Active Code Status Order ID Comments User Context       3/11/2024 165 CPR (Attempt to Resuscitate) [id number]  Nika Durbin PA-C ED        Question Answer    Code Status (Patient has no pulse and is not breathing) CPR (Attempt to Resuscitate)    Medical Interventions (Patient has pulse or is breathing) Full Support                    Allergies:   Other, Sulfa antibiotics, Amlodipine, Ciprofloxacin, Doxycycline, Iodine, Macrodantin [nitrofurantoin macrocrystal], Nitrofurantoin, Yeast-related products, and Vancomycin    Isolation:   No active isolations    Intake and Output  No intake or output data in the 24 hours ending 03/11/24 1710    Weight:       03/11/24  1146   Weight: 72.1 kg (159 lb)       Most recent vitals:   Vitals:    03/11/24 1346 03/11/24 1446 03/11/24 1604 03/11/24 1616   BP: (!) 196/81 171/79 (!) 188/70 172/67   BP Location:   Right arm    Patient Position:   Sitting    Pulse: 71 70 74    Resp: 18 16 16    Temp:       TempSrc:       SpO2: 98% 97% 98% 98%   Weight:       Height:           Active LDAs/IV Access:   Lines, Drains & Airways       Active LDAs       Name Placement date Placement time Site Days    Peripheral IV 03/11/24 1308 Right Antecubital 03/11/24  1308  Antecubital  less than 1                    Labs (abnormal labs have a star):   Labs Reviewed   BASIC METABOLIC PANEL - Abnormal; Notable for the following components:       Result Value    Glucose 104 (*)     Sodium 134 (*)     All other components within normal limits    Narrative:      GFR Normal >60  Chronic Kidney Disease <60  Kidney Failure <15    The GFR formula is only valid for adults with stable renal function between ages 18 and 70.   CBC WITH AUTO DIFFERENTIAL - Abnormal; Notable for the following components:    WBC 12.99 (*)     Hemoglobin 11.4 (*)     Neutrophil % 76.1 (*)     Lymphocyte % 12.6 (*)     Neutrophils, Absolute 9.89 (*)     Monocytes, Absolute 1.09 (*)     Immature Grans, Absolute 0.06 (*)     All other components within normal limits   CBC AND DIFFERENTIAL    Narrative:     The following orders were created for panel order CBC & Differential.  Procedure                               Abnormality         Status                     ---------                               -----------         ------                     CBC Auto Differential[[id number]]        Abnormal            Final result                 Please view results for these tests on the individual orders.       EKG:   No orders to display       Meds given in ED:   Medications   sodium chloride 0.9 % flush 10 mL (has no administration in time range)   sodium chloride 0.9 % flush 10 mL (has no administration in time range)   sodium chloride 0.9 % flush 10 mL (has no administration in time range)   sodium chloride 0.9 % infusion 40 mL (has no administration in time range)   ondansetron ODT (ZOFRAN-ODT) disintegrating tablet 4 mg (has no administration in time range)     Or   ondansetron (ZOFRAN) injection 4 mg (has no administration in time range)   nitroglycerin (NITROSTAT) SL tablet 0.4 mg (has no administration in time range)   acetaminophen (TYLENOL) tablet 650 mg (has no administration in time range)   morphine injection 2 mg (2 mg Intravenous Given 3/11/24 1309)       Imaging results:  No radiology results for the last day    Ambulatory status:   - with assist, uses walker    Social issues:   Social History     Socioeconomic History    Marital status:      Spouse name: Nicolas    Years of education: High  "school   Tobacco Use    Smoking status: Former     Current packs/day: 0.00     Average packs/day: 1 pack/day for 10.0 years (10.0 ttl pk-yrs)     Types: Cigarettes     Start date:      Quit date:      Years since quittin.2    Smokeless tobacco: Never    Tobacco comments:     no caffeine   Vaping Use    Vaping status: Never Used   Substance and Sexual Activity    Alcohol use: No    Drug use: No    Sexual activity: Defer       Peripheral Neurovascular  Peripheral Neurovascular (Adult)  Peripheral Neurovascular WDL: .WDL except, all, neurovascular assessment lower (Pt states left lower leg is painful to the touch and feels \"like its burning\". Leg is bright red with circular black wound above the ankle. pt states history of cellulitis and feels like this is similar.)  Pulse Assessment: dorsalis pedis  Additional Documentation: Edema (Group)  Edema  Edema: foot, left, foot, right, ankle, right, ankle, left, leg, left, leg, right  LLE Neurovascular Assessment  Temperature LLE: warm  Color LLE: red  Sensation LLE: tingling present, tenderness present    Neuro Cognitive  Neuro Cognitive (Adult)  Cognitive/Neuro/Behavioral WDL: WDL  Orientation: oriented x 4    Learning  Learning Assessment (Adult)  Learning Readiness and Ability: no barriers identified  Education Provided  Person Taught: patient    Respiratory  Respiratory WDL  Respiratory WDL: WDL    Abdominal Pain       Pain Assessments  Pain (Adult)  (0-10) Pain Rating: Rest: 4  (0-10) Pain Rating: Activity: 10  Response to Pain Interventions: nonverbal indicators absent/decreased    NIH Stroke Scale       Oral Encarnacion RN  24 17:10 EDT   "

## 2024-03-11 NOTE — ED PROVIDER NOTES
EMERGENCY DEPARTMENT ENCOUNTER      Room Number:  26/26  PCP: Grzegorz Nguyen MD  Independent Historians: Patient and Family  Patient Care Team:  Grzegorz Nguyen MD as PCP - General (Family Medicine)  Myles Soliman MD as Referring Physician (Breast Surgery)  Omi Vaelnzuela MD as Consulting Physician (Hematology and Oncology)  Sidney Campbell MD as Referring Physician (Dermatology)  Amador Brandt MD as Consulting Physician (Orthopedic Surgery)       HPI:  Chief Complaint: Leg pain    A complete HPI/ROS/PMH/PSH/SH/FH are unobtainable due to: None    Chronic or social conditions impacting patient care (Social Determinants of Health): None      Context: Geeta Butt is a 88 y.o. female with a PMH significant for breast cancer, prediabetes, cellulitis who presents to the ED c/o acute left leg pain.  She started noticing some mild discomfort a few days ago and more recently developed a red rash across the anterior aspect of the shin.  There is a small abrasion noted over the past couple of days as well.  She does have a history of cellulitis that felt similar.  No recent travel or surgeries.  No history of blood clots or clotting disorders.  No active treatment for cancer.  No shortness of breath or chest pain.  She has not developed a fever or chills.  Denies nausea, vomiting.  She does report that she failed outpatient treatment with Bactrim the last time she had cellulitis.      Upon review of prior external notes (non-ED) -and- Review of prior external test results outside of this encounter it appears the patient was evaluated in the office with internal medicine for maxillary sinusitis on 2/15/2024.  The patient had a normal CRP and sed rate on 12/30/2022.      PAST MEDICAL HISTORY  Active Ambulatory Problems     Diagnosis Date Noted    Essential hypertension 10/16/2017    Hypothyroidism 10/16/2017    Black stool 10/16/2017    Diarrhea 10/16/2017    Nausea & vomiting 10/16/2017    RUQ pain  10/16/2017    Leukocytosis 10/16/2017    Duodenitis 10/17/2017    Foot pain, bilateral 10/19/2017    Malignant neoplasm of lower-inner quadrant of left breast in female, estrogen receptor positive 02/27/2018    Iron deficiency anemia 04/04/2019    Adverse effect of iron 05/09/2019    GI bleed 03/18/2020    Gastrointestinal hemorrhage associated with duodenitis 03/18/2020    Hematochezia 03/19/2020    Duodenal ulcer 03/19/2020    Long-term use of high-risk medication 06/03/2020    Acute left-sided low back pain with left-sided sciatica 07/02/2021    DDD (degenerative disc disease), lumbosacral 07/02/2021    GERD without esophagitis 07/02/2021    Hyponatremia 07/04/2021    Mixed hyperlipidemia 07/22/2021    Prediabetes 07/22/2021    Depressive disorder 07/22/2021    Closed fracture of right hip 07/31/2021    Acute pyelonephritis 08/23/2021    Sepsis secondary to UTI 08/24/2021    Bilateral cataracts 11/07/2002    Central retinal vein occlusion 11/07/2012    Corneal endothelial dystrophy 11/07/2004    Epiretinal membrane 11/07/2012    Bladder prolapse, female, acquired 01/14/2022    Superficial bruising 01/14/2022    Neuropathy 01/28/2022    Urinary retention with incomplete bladder emptying 01/28/2022    Hx of bladder repair surgery 01/28/2022    Cellulitis of right lower extremity 03/04/2022    Stroke 2000    Peripheral edema 12/01/2022    Lumbar back pain with radiculopathy affecting left lower extremity 12/28/2022    Interstitial lung disease 09/21/2023     Resolved Ambulatory Problems     Diagnosis Date Noted    DM2 (diabetes mellitus, type 2) 10/16/2017    UTI (urinary tract infection) 10/17/2017    GIULIA (acute kidney injury) 03/18/2020    Precordial chest pain 07/02/2021    Hyperglycemia 07/02/2021    Chronic kidney disease 07/22/2021     Past Medical History:   Diagnosis Date    Anesthesia complication     Arthritis     Aspiration into airway     Breast cancer     Cancer 1990    CKD (chronic kidney disease)      Depression     Diverticular disease     Gastric ulcer     GERD (gastroesophageal reflux disease)     Hearing loss     History of colon polyps     History of depression     History of GI bleed     Hypertension     Peptic ulceration     Polyneuritis     PONV (postoperative nausea and vomiting)     Poor vision          PAST SURGICAL HISTORY  Past Surgical History:   Procedure Laterality Date    ABDOMINAL SURGERY      APPENDECTOMY      BLADDER REPAIR      AND RECTOCELE    BREAST BIOPSY      BREAST CYST ASPIRATION      BREAST LUMPECTOMY WITH SENTINEL NODE BIOPSY Left 3/19/2018    Procedure: BREAST LUMPECTOMY WITH SENTINEL NODE BIOPSY AND NEEDLE LOCALIZATION;  Surgeon: Myles Soliman MD;  Location: [facility] OR [facility];  Service: General    CHOLECYSTECTOMY      COLONOSCOPY  2013    COLONOSCOPY N/A 2/16/2018    Procedure: COLONOSCOPY into cecum and T.I. with biopsies;  Surgeon: Augustine Gallego MD;  Location: [facility] ENDOSCOPY;  Service:     ENDOSCOPY N/A 10/17/2017    Procedure: ESOPHAGOGASTRODUODENOSCOPY WITH COLD BIOPSIES;  Surgeon: Augustine Gallego MD;  Location: [facility] ENDOSCOPY;  Service:     ENDOSCOPY N/A 2/16/2018    Procedure: ESOPHAGOGASTRODUODENOSCOPY with biopsies and #54 [address] DILATATION;  Surgeon: Augustine Gallego MD;  Location: [facility] ENDOSCOPY;  Service:     ENDOSCOPY N/A 3/19/2020    Procedure: ESOPHAGOGASTRODUODENOSCOPY with biopsies;  Surgeon: Augustine Gallego MD;  Location: [facility] ENDOSCOPY;  Service: Gastroenterology;  Laterality: N/A;  PRE- MELENA, EPIGASTRIC PAIN  POST- eerosive gastritis, duodenal ulcer, hiatal hernia    ENDOSCOPY N/A 7/29/2020    Procedure: ESOPHAGOGASTRODUODENOSCOPY WITH DUODENAL ASPIRATE AND COLD BIOPSIES;  Surgeon: Augustine Gallego MD;  Location: [facility] ENDOSCOPY;  Service: Gastroenterology;  Laterality: N/A;  PRE: HX ULCER DISEASE  POST: GASTRITIS, HEALED ULCER    EYE SURGERY Left 2014    3-4 years, cornea replacement     FEMUR IM NAILING/RODDING Right 8/3/2021    Procedure: FEMUR  INTRAMEDULLARY NAILING/RODDING;  Surgeon: Garett Martin II, MD;  Location: [facility] MAIN OR;  Service: Orthopedics;  Laterality: Right;    HIP SURGERY  2021    HYSTERECTOMY      MOUTH SURGERY      UNDER TONGUE AND LEFT FLOOR OF MOUTH FOR CA    PARATHYROIDECTOMY      PER PT    SIGMOIDOSCOPY N/A 2020    Procedure: FLEXIBLE SIGMOIDOSCOPY TO DESCENDING COLON WITH COLD BIOPSIES;  Surgeon: Augustine Gallego MD;  Location: [facility] ENDOSCOPY;  Service: Gastroenterology;  Laterality: N/A;  PRE: RECTAL BLEEDING  POST: DIVERTICULOSIS, HEMORRHOIDS, MINIMAL PROCTITIS    SINUS SURGERY      SKIN BIOPSY      THYROID SURGERY      VARICOSE VEIN SURGERY           FAMILY HISTORY  Family History   Problem Relation Age of Onset    Esophageal cancer Father     Stomach cancer Father     Heart disease Mother     Thyroid cancer Mother     Hypertension Mother     Hypertension Sister     Hypertension Brother     Stroke Brother     Heart disease Brother     Diabetes Brother     Leukemia Paternal Aunt     Malig Hyperthermia Neg Hx          SOCIAL HISTORY  Social History     Socioeconomic History    Marital status:      Spouse name: Nicolas    Years of education: High school   Tobacco Use    Smoking status: Former     Current packs/day: 0.00     Average packs/day: 1 pack/day for 10.0 years (10.0 ttl pk-yrs)     Types: Cigarettes     Start date:      Quit date:      Years since quittin.2    Smokeless tobacco: Never    Tobacco comments:     no caffeine   Vaping Use    Vaping status: Never Used   Substance and Sexual Activity    Alcohol use: No    Drug use: No    Sexual activity: Defer         ALLERGIES  Other, Sulfa antibiotics, Amlodipine, Ciprofloxacin, Doxycycline, Iodine, Macrodantin [nitrofurantoin macrocrystal], Nitrofurantoin, Yeast-related products, and Vancomycin      REVIEW OF SYSTEMS  Included in HPI  All systems reviewed and negative except for those discussed in HPI.      PHYSICAL EXAM    I have  reviewed the triage vital signs and nursing notes.    ED Triage Vitals   Temp Heart Rate Resp BP SpO2   03/11/24 1118 03/11/24 1118 03/11/24 1118 03/11/24 1123 03/11/24 1118   97.8 °F (36.6 °C) 80 16 (!) 192/83 100 %      Temp src Heart Rate Source Patient Position BP Location FiO2 (%)   03/11/24 1118 03/11/24 1118 03/11/24 1146 03/11/24 1146 --   Tympanic Monitor Sitting Right arm        Physical Exam  Constitutional:       General: She is not in acute distress.     Appearance: She is well-developed.   HENT:      Head: Normocephalic and atraumatic.   Eyes:      General: No scleral icterus.     Conjunctiva/sclera: Conjunctivae normal.   Neck:      Trachea: No tracheal deviation.   Cardiovascular:      Rate and Rhythm: Normal rate and regular rhythm.   Pulmonary:      Effort: Pulmonary effort is normal.      Breath sounds: Normal breath sounds.   Abdominal:      Palpations: Abdomen is soft.      Tenderness: There is no abdominal tenderness.   Musculoskeletal:         General: No deformity.      Cervical back: Normal range of motion.   Lymphadenopathy:      Cervical: No cervical adenopathy.   Skin:     General: Skin is warm and dry.          Neurological:      Mental Status: She is alert and oriented to person, place, and time.   Psychiatric:         Behavior: Behavior normal.         Vital signs and nursing notes reviewed.      PPE: I wore a surgical mask throughout my encounters with the pt. I performed hand hygiene on entry into the pt room and upon exit.       LAB RESULTS  Recent Results (from the past 24 hour(s))   Basic Metabolic Panel    Collection Time: 03/11/24  1:08 PM    Specimen: Blood   Result Value Ref Range    Glucose 104 (H) 65 - 99 mg/dL    BUN 20 8 - 23 mg/dL    Creatinine 0.91 0.57 - 1.00 mg/dL    Sodium 134 (L) 136 - 145 mmol/L    Potassium 4.4 3.5 - 5.2 mmol/L    Chloride 98 98 - 107 mmol/L    CO2 27.0 22.0 - 29.0 mmol/L    Calcium 10.1 8.6 - 10.5 mg/dL    BUN/Creatinine Ratio 22.0 7.0 - 25.0     Anion Gap 9.0 5.0 - 15.0 mmol/L    eGFR 61.2 >60.0 mL/min/1.73   CBC Auto Differential    Collection Time: 03/11/24  1:08 PM    Specimen: Blood   Result Value Ref Range    WBC 12.99 (H) 3.40 - 10.80 10*3/mm3    RBC 4.16 3.77 - 5.28 10*6/mm3    Hemoglobin 11.4 (L) 12.0 - 15.9 g/dL    Hematocrit 35.2 34.0 - 46.6 %    MCV 84.6 79.0 - 97.0 fL    MCH 27.4 26.6 - 33.0 pg    MCHC 32.4 31.5 - 35.7 g/dL    RDW 15.0 12.3 - 15.4 %    RDW-SD 46.3 37.0 - 54.0 fl    MPV 9.5 6.0 - 12.0 fL    Platelets 402 140 - 450 10*3/mm3    Neutrophil % 76.1 (H) 42.7 - 76.0 %    Lymphocyte % 12.6 (L) 19.6 - 45.3 %    Monocyte % 8.4 5.0 - 12.0 %    Eosinophil % 1.6 0.3 - 6.2 %    Basophil % 0.8 0.0 - 1.5 %    Immature Grans % 0.5 0.0 - 0.5 %    Neutrophils, Absolute 9.89 (H) 1.70 - 7.00 10*3/mm3    Lymphocytes, Absolute 1.64 0.70 - 3.10 10*3/mm3    Monocytes, Absolute 1.09 (H) 0.10 - 0.90 10*3/mm3    Eosinophils, Absolute 0.21 0.00 - 0.40 10*3/mm3    Basophils, Absolute 0.10 0.00 - 0.20 10*3/mm3    Immature Grans, Absolute 0.06 (H) 0.00 - 0.05 10*3/mm3    nRBC 0.0 0.0 - 0.2 /100 WBC         RADIOLOGY  No Radiology Exams Resulted Within Past 24 Hours      MEDICATIONS GIVEN IN ER  Medications   sodium chloride 0.9 % flush 10 mL (has no administration in time range)   morphine injection 2 mg (2 mg Intravenous Given 3/11/24 1309)         ORDERS PLACED DURING THIS VISIT:  Orders Placed This Encounter   Procedures    Basic Metabolic Panel    CBC Auto Differential    Insert Peripheral IV    Initiate ED Observation Status    CBC & Differential         OUTPATIENT MEDICATION MANAGEMENT:  Current Facility-Administered Medications Ordered in Epic   Medication Dose Route Frequency Provider Last Rate Last Admin    sodium chloride 0.9 % flush 10 mL  10 mL Intravenous PRN Camron Burns PA         Current Outpatient Medications Ordered in Epic   Medication Sig Dispense Refill    aspirin 81 MG chewable tablet Chew 1 tablet Daily.      carvedilol (COREG) 12.5  MG tablet TAKE 1 TABLET BY MOUTH TWICE A DAY WITH MEALS 180 tablet 3    Cholecalciferol (VITAMIN D3) 50 MCG (2000 UT) capsule TAKE 1 CAPSULE BY MOUTH DAILY. 100 capsule 1    coenzyme Q10 50 MG capsule capsule Take 1 capsule by mouth Daily.      estradiol (ESTRACE) 0.1 MG/GM vaginal cream Insert 2 applicators into the vagina 1 (One) Time Per Week.      Flarex 0.1 % ophthalmic suspension       fluorometholone (FML) 0.1 % ophthalmic suspension Administer 1 drop into the left eye Daily.      fluticasone (FLONASE) 50 MCG/ACT nasal spray       Fluticasone-Umeclidin-Vilant (Trelegy Ellipta) 100-62.5-25 MCG/ACT inhaler Inhale 1 puff Daily. 28 each 3    furosemide (LASIX) 40 MG tablet TAKE 1 TABLET BY MOUTH EVERY DAY 90 tablet 1    gabapentin (NEURONTIN) 100 MG capsule TAKE 1 CAPSULE BY MOUTH 3 TIMES A DAY. OKAY TO TAKE 1 CAPSULE AT BEDTIME IF NEEDED FOR PAIN 120 capsule 5    hydrocortisone 2.5 % cream As Needed.      levothyroxine (SYNTHROID, LEVOTHROID) 50 MCG tablet TAKE 1 TABLET BY MOUTH EVERY DAY 90 tablet 4    Omega-3 Fatty Acids (fish oil) 1000 MG capsule capsule Take 1 capsule by mouth Daily With Breakfast.      ondansetron ODT (ZOFRAN-ODT) 4 MG disintegrating tablet Place 1 tablet on the tongue Every 8 (Eight) Hours As Needed for Nausea or Vomiting. (Patient not taking: Reported on 3/4/2024) 30 tablet 0    Probiotic Product (PROBIOTIC DAILY PO) Take 1 capsule by mouth Daily.      RABEprazole (ACIPHEX) 20 MG EC tablet TAKE 1 TABLET BY MOUTH EVERY DAY (Patient not taking: Reported on 3/4/2024) 90 tablet 4    sodium chloride (CAYLA 128) 5 % ophthalmic solution Administer 1 drop to the right eye As Needed. (Patient not taking: Reported on 3/4/2024)      sucralfate (CARAFATE) 1 GM/10ML suspension TAKE 10 ML BY MOUTH 3 TIMES A DAY FOR 30 MINUTES BEFORE MEALS      valsartan (DIOVAN) 160 MG tablet TAKE 1.5 TABLETS BY MOUTH DAILY. NEW DIRECTIONS 135 tablet 3             PROGRESS, DATA ANALYSIS, CONSULTS, AND MEDICAL DECISION  MAKING  All labs have been independently interpreted by me.  All radiology studies have been reviewed by me. All EKG's have been independently viewed and interpreted by me.  Discussion below represents my analysis of pertinent findings related to patient's condition, differential diagnosis, treatment plan and final disposition.      DIFFERENTIAL DIAGNOSIS INCLUDE BUT NOT LIMITED TO:     Cellulitis, abrasion, dermatitis    Clinical Scores: N/A      ED Course as of 03/11/24 1651   Mon Mar 11, 2024   1437 BP(!): 196/81 [JR]   1437 Hemoglobin(!): 11.4 [JR]   1437 WBC(!): 12.99 [JR]   1437 Creatinine: 0.91 [JR]   1650 Patient presentation and evaluation consistent with uncomplicated cellulitis to the left shin that will require IV antibiotics overnight.  Patient agreeable and all questions answered. [DC]   1650 I discussed the case with JERRI Powell with [facility] at this time regarding the patient.  I discussed work-up, results, concerns.  I discussed the consulting provider's desire for observation admission.   [DC]      ED Course User Index  [DC] Camron Burns PA  [JR] Nicolas Perez MD         1651 I rechecked the patient.  I discussed the patient's labs, radiology findings (including all incidental findings), diagnosis, and plan for admission. The patient understands and agrees with the plan.      AS OF 16:51 EDT VITALS:    BP - 172/67  HR - 74  TEMP - 97.8 °F (36.6 °C) (Tympanic)  O2 SATS - 98%    COMPLEXITY OF CARE  The patient requires admission.      DIAGNOSIS  Final diagnoses:   Cellulitis of left leg         DISPOSITION  ED Disposition       ED Disposition   Decision to Admit    Condition   --    Comment   --                Please note that portions of this document were completed with a voice recognition program.    Note Disclaimer: At [facility], we believe that sharing information builds trust and better relationships. You are receiving this note because you recently visited [facility]. It is  possible you will see health information before a provider has talked with you about it. This kind of information can be easy to misunderstand. To help you fully understand what it means for your health, we urge you to discuss this note with your provider.         Camron Burns PA  03/12/24 0952

## 2024-03-12 ENCOUNTER — READMISSION MANAGEMENT (OUTPATIENT)
Dept: CALL CENTER | Facility: HOSPITAL | Age: 89
End: 2024-03-12
Payer: MEDICARE

## 2024-03-12 ENCOUNTER — APPOINTMENT (OUTPATIENT)
Dept: CARDIOLOGY | Facility: HOSPITAL | Age: 89
End: 2024-03-12
Payer: MEDICARE

## 2024-03-12 VITALS
TEMPERATURE: 98.4 F | SYSTOLIC BLOOD PRESSURE: 167 MMHG | HEART RATE: 63 BPM | DIASTOLIC BLOOD PRESSURE: 67 MMHG | RESPIRATION RATE: 16 BRPM | OXYGEN SATURATION: 97 % | HEIGHT: 60 IN | WEIGHT: 162.7 LBS | BODY MASS INDEX: 31.94 KG/M2

## 2024-03-12 LAB
ANION GAP SERPL CALCULATED.3IONS-SCNC: 12.1 MMOL/L (ref 5–15)
BH CV LOWER VASCULAR LEFT COMMON FEMORAL AUGMENT: NORMAL
BH CV LOWER VASCULAR LEFT COMMON FEMORAL COMPETENT: NORMAL
BH CV LOWER VASCULAR LEFT COMMON FEMORAL COMPRESS: NORMAL
BH CV LOWER VASCULAR LEFT COMMON FEMORAL PHASIC: NORMAL
BH CV LOWER VASCULAR LEFT COMMON FEMORAL SPONT: NORMAL
BH CV LOWER VASCULAR LEFT DISTAL FEMORAL COMPRESS: NORMAL
BH CV LOWER VASCULAR LEFT GASTRONEMIUS COMPRESS: NORMAL
BH CV LOWER VASCULAR LEFT GREATER SAPH AK COMPRESS: NORMAL
BH CV LOWER VASCULAR LEFT GREATER SAPH BK COMPRESS: NORMAL
BH CV LOWER VASCULAR LEFT LESSER SAPH COMPRESS: NORMAL
BH CV LOWER VASCULAR LEFT MID FEMORAL AUGMENT: NORMAL
BH CV LOWER VASCULAR LEFT MID FEMORAL COMPETENT: NORMAL
BH CV LOWER VASCULAR LEFT MID FEMORAL COMPRESS: NORMAL
BH CV LOWER VASCULAR LEFT MID FEMORAL PHASIC: NORMAL
BH CV LOWER VASCULAR LEFT MID FEMORAL SPONT: NORMAL
BH CV LOWER VASCULAR LEFT PERONEAL COMPRESS: NORMAL
BH CV LOWER VASCULAR LEFT POPLITEAL AUGMENT: NORMAL
BH CV LOWER VASCULAR LEFT POPLITEAL COMPETENT: NORMAL
BH CV LOWER VASCULAR LEFT POPLITEAL COMPRESS: NORMAL
BH CV LOWER VASCULAR LEFT POPLITEAL PHASIC: NORMAL
BH CV LOWER VASCULAR LEFT POPLITEAL SPONT: NORMAL
BH CV LOWER VASCULAR LEFT POSTERIOR TIBIAL COMPRESS: NORMAL
BH CV LOWER VASCULAR LEFT PROFUNDA FEMORAL COMPRESS: NORMAL
BH CV LOWER VASCULAR LEFT PROXIMAL FEMORAL COMPRESS: NORMAL
BH CV LOWER VASCULAR LEFT SAPHENOFEMORAL JUNCTION COMPRESS: NORMAL
BH CV LOWER VASCULAR RIGHT COMMON FEMORAL AUGMENT: NORMAL
BH CV LOWER VASCULAR RIGHT COMMON FEMORAL COMPETENT: NORMAL
BH CV LOWER VASCULAR RIGHT COMMON FEMORAL COMPRESS: NORMAL
BH CV LOWER VASCULAR RIGHT COMMON FEMORAL PHASIC: NORMAL
BH CV LOWER VASCULAR RIGHT COMMON FEMORAL SPONT: NORMAL
BUN SERPL-MCNC: 16 MG/DL (ref 8–23)
BUN/CREAT SERPL: 18.8 (ref 7–25)
CALCIUM SPEC-SCNC: 9.5 MG/DL (ref 8.6–10.5)
CHLORIDE SERPL-SCNC: 98 MMOL/L (ref 98–107)
CO2 SERPL-SCNC: 21.9 MMOL/L (ref 22–29)
CREAT SERPL-MCNC: 0.85 MG/DL (ref 0.57–1)
DEPRECATED RDW RBC AUTO: 45.1 FL (ref 37–54)
EGFRCR SERPLBLD CKD-EPI 2021: 66.4 ML/MIN/1.73
ERYTHROCYTE [DISTWIDTH] IN BLOOD BY AUTOMATED COUNT: 14.6 % (ref 12.3–15.4)
GLUCOSE SERPL-MCNC: 113 MG/DL (ref 65–99)
HCT VFR BLD AUTO: 32.4 % (ref 34–46.6)
HGB BLD-MCNC: 10.4 G/DL (ref 12–15.9)
MCH RBC QN AUTO: 26.9 PG (ref 26.6–33)
MCHC RBC AUTO-ENTMCNC: 32.1 G/DL (ref 31.5–35.7)
MCV RBC AUTO: 83.7 FL (ref 79–97)
PLATELET # BLD AUTO: 388 10*3/MM3 (ref 140–450)
PMV BLD AUTO: 9.3 FL (ref 6–12)
POTASSIUM SERPL-SCNC: 4.6 MMOL/L (ref 3.5–5.2)
RBC # BLD AUTO: 3.87 10*6/MM3 (ref 3.77–5.28)
SODIUM SERPL-SCNC: 132 MMOL/L (ref 136–145)
WBC NRBC COR # BLD AUTO: 10.86 10*3/MM3 (ref 3.4–10.8)

## 2024-03-12 PROCEDURE — G0378 HOSPITAL OBSERVATION PER HR: HCPCS

## 2024-03-12 PROCEDURE — 97162 PT EVAL MOD COMPLEX 30 MIN: CPT

## 2024-03-12 PROCEDURE — 25010000002 CEFTRIAXONE PER 250 MG: Performed by: STUDENT IN AN ORGANIZED HEALTH CARE EDUCATION/TRAINING PROGRAM

## 2024-03-12 PROCEDURE — 85027 COMPLETE CBC AUTOMATED: CPT | Performed by: STUDENT IN AN ORGANIZED HEALTH CARE EDUCATION/TRAINING PROGRAM

## 2024-03-12 PROCEDURE — 97116 GAIT TRAINING THERAPY: CPT

## 2024-03-12 PROCEDURE — 93971 EXTREMITY STUDY: CPT

## 2024-03-12 PROCEDURE — 80048 BASIC METABOLIC PNL TOTAL CA: CPT | Performed by: STUDENT IN AN ORGANIZED HEALTH CARE EDUCATION/TRAINING PROGRAM

## 2024-03-12 RX ORDER — CEPHALEXIN 500 MG/1
500 CAPSULE ORAL 3 TIMES DAILY
Qty: 30 CAPSULE | Refills: 0 | Status: SHIPPED | OUTPATIENT
Start: 2024-03-12 | End: 2024-03-22

## 2024-03-12 RX ORDER — CEPHALEXIN 500 MG/1
500 CAPSULE ORAL 3 TIMES DAILY
Qty: 30 CAPSULE | Refills: 0 | Status: SHIPPED | OUTPATIENT
Start: 2024-03-12 | End: 2024-03-12

## 2024-03-12 RX ADMIN — SUCRALFATE 1 G: 1 TABLET ORAL at 18:12

## 2024-03-12 RX ADMIN — HYDROCODONE BITARTRATE AND ACETAMINOPHEN 1 TABLET: 5; 325 TABLET ORAL at 09:30

## 2024-03-12 RX ADMIN — LEVOTHYROXINE SODIUM 50 MCG: 50 TABLET ORAL at 06:37

## 2024-03-12 RX ADMIN — GABAPENTIN 100 MG: 100 CAPSULE ORAL at 16:18

## 2024-03-12 RX ADMIN — FUROSEMIDE 40 MG: 40 TABLET ORAL at 09:31

## 2024-03-12 RX ADMIN — SUCRALFATE 1 G: 1 TABLET ORAL at 06:37

## 2024-03-12 RX ADMIN — Medication 10 ML: at 09:49

## 2024-03-12 RX ADMIN — GABAPENTIN 100 MG: 100 CAPSULE ORAL at 09:31

## 2024-03-12 RX ADMIN — CARVEDILOL 12.5 MG: 12.5 TABLET, FILM COATED ORAL at 09:48

## 2024-03-12 RX ADMIN — CEFTRIAXONE 2000 MG: 2 INJECTION, POWDER, FOR SOLUTION INTRAMUSCULAR; INTRAVENOUS at 17:51

## 2024-03-12 RX ADMIN — SUCRALFATE 1 G: 1 TABLET ORAL at 12:07

## 2024-03-12 NOTE — PLAN OF CARE
Goal Outcome Evaluation:              Outcome Evaluation: Pt AOX4 and able to verbalize needs is being discharged from observation unit. IV removed Discharged summary provided. Pt aware to  her medications at pharmacy of choice. Pt aware to follow up with PCP in 1 - to 2 weeks.   Pt. is with daughter at bedside and had no other questions at this time.

## 2024-03-12 NOTE — PLAN OF CARE
Goal Outcome Evaluation:      Pt. Has had cellulitis area marked and picture is documented in chart. BP was elevated at start of shift, carvedilol given. BP has improved and has been stable. Norco given for pain. Rocephin IV was given. WBC is trending down from 12.99 to 10.86 this morning, PT consulted. Ultrasound of lower left extremity planned this morning. Plan to transition pt from IV antibiotics to oral. Pt. Aware of treatment plan and is resting comfortably.

## 2024-03-12 NOTE — THERAPY DISCHARGE NOTE
Patient Name: Geeta Butt  : 1935    MRN: [id number]                              Today's Date: 3/12/2024       Admit Date: 3/11/2024    Visit Dx:     ICD-10-CM ICD-9-CM   1. Cellulitis of left leg  L03.116 682.6     Patient Active Problem List   Diagnosis    Essential hypertension    Hypothyroidism    Black stool    Diarrhea    Nausea & vomiting    RUQ pain    Leukocytosis    Duodenitis    Foot pain, bilateral    Malignant neoplasm of lower-inner quadrant of left breast in female, estrogen receptor positive    Iron deficiency anemia    Adverse effect of iron    GI bleed    Gastrointestinal hemorrhage associated with duodenitis    Hematochezia    Duodenal ulcer    Long-term use of high-risk medication    Acute left-sided low back pain with left-sided sciatica    DDD (degenerative disc disease), lumbosacral    GERD without esophagitis    Hyponatremia    Mixed hyperlipidemia    Prediabetes    Depressive disorder    Closed fracture of right hip    Acute pyelonephritis    Sepsis secondary to UTI    Bilateral cataracts    Central retinal vein occlusion    Corneal endothelial dystrophy    Epiretinal membrane    Bladder prolapse, female, acquired    Superficial bruising    Neuropathy    Urinary retention with incomplete bladder emptying    Hx of bladder repair surgery    Cellulitis of right lower extremity    Stroke    Peripheral edema    Lumbar back pain with radiculopathy affecting left lower extremity    Interstitial lung disease    Left leg cellulitis     Past Medical History:   Diagnosis Date    Anesthesia complication     ASPIRATION INTO AIRWAY WITH TRACH PRESENT IN PAST (WITH ORAL CANCER SURGERY(    Arthritis     Aspiration into airway     post anesthesia    Breast cancer     Cancer     mouth cancer     Chronic kidney disease     Chronic kidney disease 2021    CKD (chronic kidney disease)     PT STATES STAGE 3    Depression     Diverticular disease     Gastric ulcer     AND DUODENAL    GERD  (gastroesophageal reflux disease)     Hearing loss     BILAT AIDES    History of colon polyps     History of depression     History of GI bleed     Hypertension     Hypothyroidism     Long-term use of high-risk medication 06/03/2020    Peptic ulceration     Polyneuritis     PONV (postoperative nausea and vomiting)     Poor vision     RIGHT EYE, HAS PERIPHERAL VISION     Stroke 2000     mild weakness on left, partial sight loss on right     UTI (urinary tract infection) 10/17/2017     Past Surgical History:   Procedure Laterality Date    ABDOMINAL SURGERY      APPENDECTOMY      BLADDER REPAIR      AND RECTOCELE    BREAST BIOPSY      BREAST CYST ASPIRATION      BREAST LUMPECTOMY WITH SENTINEL NODE BIOPSY Left 3/19/2018    Procedure: BREAST LUMPECTOMY WITH SENTINEL NODE BIOPSY AND NEEDLE LOCALIZATION;  Surgeon: Myels Soliman MD;  [facility];  Service: General    CHOLECYSTECTOMY      COLONOSCOPY  2013    COLONOSCOPY N/A 2/16/2018    Procedure: COLONOSCOPY into cecum and T.I. with biopsies;  Surgeon: Augustine Gallego MD;  Location: [facility] ENDOSCOPY;  Service:     ENDOSCOPY N/A 10/17/2017    Procedure: ESOPHAGOGASTRODUODENOSCOPY WITH COLD BIOPSIES;  Surgeon: Augustine Gallego MD;  Location: [facility] ENDOSCOPY;  Service:     ENDOSCOPY N/A 2/16/2018    Procedure: ESOPHAGOGASTRODUODENOSCOPY with biopsies and #54 [place] DILATATION;  Surgeon: Augustine Gallego MD;  Location: [facility] ENDOSCOPY;  Service:     ENDOSCOPY N/A 3/19/2020    Procedure: ESOPHAGOGASTRODUODENOSCOPY with biopsies;  Surgeon: Augustine Gallego MD;  Location: [facility] ENDOSCOPY;  Service: Gastroenterology;  Laterality: N/A;  PRE- MELENA, EPIGASTRIC PAIN  POST- eerosive gastritis, duodenal ulcer, hiatal hernia    ENDOSCOPY N/A 7/29/2020    Procedure: ESOPHAGOGASTRODUODENOSCOPY WITH DUODENAL ASPIRATE AND COLD BIOPSIES;  Surgeon: Augustine Gallego MD;  Location: [facility] ENDOSCOPY;  Service: Gastroenterology;  Laterality: N/A;  PRE: HX ULCER  DISEASE  POST: GASTRITIS, HEALED ULCER    EYE SURGERY Left 2014    3-4 years, cornea replacement     FEMUR IM NAILING/RODDING Right 8/3/2021    Procedure: FEMUR INTRAMEDULLARY NAILING/RODDING;  Surgeon: Garett Martin II, MD;  Location: [facility] MAIN OR;  Service: Orthopedics;  Laterality: Right;    HIP SURGERY  08/03/2021    HYSTERECTOMY      MOUTH SURGERY  2000    UNDER TONGUE AND LEFT FLOOR OF MOUTH FOR CA    PARATHYROIDECTOMY      PER PT    SIGMOIDOSCOPY N/A 7/29/2020    Procedure: FLEXIBLE SIGMOIDOSCOPY TO DESCENDING COLON WITH COLD BIOPSIES;  Surgeon: Augustine Gallego MD;  Location: [facility] ENDOSCOPY;  Service: Gastroenterology;  Laterality: N/A;  PRE: RECTAL BLEEDING  POST: DIVERTICULOSIS, HEMORRHOIDS, MINIMAL PROCTITIS    SINUS SURGERY      SKIN BIOPSY      THYROID SURGERY      VARICOSE VEIN SURGERY        General Information       Row Name 03/12/24 1045          Physical Therapy Time and Intention    Document Type evaluation  -DJ     Mode of Treatment individual therapy;physical therapy  -DJ       Row Name 03/12/24 1045          General Information    Patient Profile Reviewed yes  -DJ     Prior Level of Function independent:;ADL's;community mobility  -DJ     Existing Precautions/Restrictions fall  -DJ     Barriers to Rehab none identified  -DJ       Row Name 03/12/24 1045          Living Environment    People in Home alone  -DJ       Row Name 03/12/24 1045          Home Main Entrance    Number of Stairs, Main Entrance two  -DJ     Stair Railings, Main Entrance railings safe and in good condition  -DJ       Row Name 03/12/24 1045          Stairs Within Home, Primary    Stairs, Within Home, Primary optional stairs inside  -DJ       Row Name 03/12/24 1045          Cognition    Orientation Status (Cognition) oriented x 4  -DJ       Row Name 03/12/24 1045          Safety Issues, Functional Mobility    Impairments Affecting Function (Mobility) strength;pain  -DJ     Comment, Safety Issues/Impairments  (Mobility) gt belt, nonskid socks  -DJ               User Key  (r) = Recorded By, (t) = Taken By, (c) = Cosigned By      Initials Name Provider Type    Abby Emerson PT Physical Therapist                   Mobility       Row Name 03/12/24 1046          Bed Mobility    Bed Mobility supine-sit;sit-supine  -DJ     Supine-Sit [place] (Bed Mobility) modified independence;verbal cues  -DJ     Sit-Supine [place] (Bed Mobility) modified independence;verbal cues  -DJ     Assistive Device (Bed Mobility) head of bed elevated;bed rails  -DJ       Row Name 03/12/24 1046          Transfers    Comment, (Transfers) sit/stand from EOB  -DJ       Row Name 03/12/24 1046          Bed-Chair Transfer    Bed-Chair [place] (Transfers) not tested  -DJ       Row Name 03/12/24 1046          Sit-Stand Transfer    Sit-Stand [place] (Transfers) standby assist;verbal cues  -DJ     Assistive Device (Sit-Stand Transfers) walker, front-wheeled  -DJ       Row Name 03/12/24 1046          Gait/Stairs (Locomotion)    Gait/Stairs Locomotion gait/ambulation assistive device  -DJ     [place] Level (Gait) standby assist;verbal cues  -DJ     Assistive Device (Gait) walker, front-wheeled  -DJ     Distance in Feet (Gait) 240  -DJ     Pattern (Gait) step-through  -DJ     Deviations/Abnormal Patterns (Gait) antalgic;gait speed decreased  -DJ     Bilateral Gait Deviations forward flexed posture  -DJ     [place] Level (Stairs) not tested  -DJ     Comment, (Gait/Stairs) Pt amb 240' with r wx and SBA/vc; good balance and fair endurance, LBP and L LE discomfort limits further amb distance but currently well controlled with meds.  -DJ               User Key  (r) = Recorded By, (t) = Taken By, (c) = Cosigned By      Initials Name Provider Type    Abby Emerson PT Physical Therapist                   Obj/Interventions       Row Name 03/12/24 1049          Range of Motion Comprehensive    General Range of Motion no range of motion  deficits identified  -DJ       Row Name 03/12/24 1049          Strength Comprehensive (MMT)    Comment, General Manual Muscle Testing (MMT) Assessment fair LE strength - L LE limited slightly due to cellulitis discomfort  -DJ       Row Name 03/12/24 1049          Motor Skills    Motor Skills functional endurance  -DJ     Functional Endurance good  -DJ       Row Name 03/12/24 1049          Balance    Balance Assessment standing static balance;standing dynamic balance  -DJ     Static Standing Balance standby assist;verbal cues  -DJ     Dynamic Standing Balance standby assist;verbal cues  -DJ     Position/Device Used, Standing Balance walker, front-wheeled;supported  -DJ     Balance Interventions sitting;standing;sit to stand;supported;weight shifting activity  -DJ     Comment, Balance no LOB  -DJ       Row Name 03/12/24 1049          Sensory Assessment (Somatosensory)    Sensory Assessment (Somatosensory) not tested  -DJ               User Key  (r) = Recorded By, (t) = Taken By, (c) = Cosigned By      Initials Name Provider Type    Abby Emerson, PT Physical Therapist                   Goals/Plan    No documentation.                  Clinical Impression       Row Name 03/12/24 1050          Pain    Pretreatment Pain Rating 0/10 - no pain  -DJ     Pain Location - Side/Orientation Left  -DJ     Pain Location lower  -DJ     Pain Location - extremity  -DJ     Pre/Posttreatment Pain Comment chronic LBP, current L LE discomfort from cellulitis, well controlled with pain meds  -DJ     Pain Intervention(s) Repositioned;Rest  -DJ       Row Name 03/12/24 1050          Plan of Care Review    Plan of Care Reviewed With patient  -DJ     Outcome Evaluation 89yo pleasant wh[place] female admitted 3/11/24 with L LE pain and cellulitis. PMH Includes hbp, breast CA, hip fx, UTI, sepsis, stroke, LBP with L LE radiculopathy. PLOF: Pt lives at home alone with 2 MARIE and optional inside stairs. She was independent with ADLs and used a  rollator for mobility. She has a lisa in [facility] that check on her daily. Today, she was found in bed in [facility], eager to move. Pt req vc and use of bedrail to sit EOB. She stood from EOB with SBA using r wx. Pt amb 240' with r wx and SBA/vc; good balance and fair endurance, LBP and L LE discomfort limits further amb distance but currently well controlled with meds. Pt returned supine in bed with SBA and left with all needs met. She is safe to amb with nsg staff and safe to return home upon d/c and declines need for home PT services. Will sign off.  -DJ       Row Name 03/12/24 1050          Therapy Assessment/Plan (PT)    Patient/Family Therapy Goals Statement (PT) home  -DJ     Criteria for Skilled Interventions Met (PT) no problems identified which require skilled intervention  -DJ     Therapy Frequency (PT) evaluation only  -DJ       Row Name 03/12/24 1050          Vital Signs    O2 Delivery Pre Treatment room air  -DJ     O2 Delivery Intra Treatment room air  -DJ     O2 Delivery Post Treatment room air  -DJ     Pre Patient Position Supine  -DJ     Intra Patient Position Standing  -DJ     Post Patient Position Supine  -DJ       Row Name 03/12/24 1050          Positioning and Restraints    Pre-Treatment Position in bed  -DJ     Post Treatment Position bed  -DJ     In Bed notified nsg;supine;call light within reach;encouraged to call for assist  no exit alarm on when PT entered room  -DJ               User Key  (r) = Recorded By, (t) = Taken By, (c) = Cosigned By      Initials Name Provider Type    Abby Emerson, PT Physical Therapist                   Outcome Measures       Row Name 03/12/24 1056          How much help from another person do you currently need...    Turning from your back to your side while in flat bed without using bedrails? 4  -DJ     Moving from lying on back to sitting on the side of a flat bed without bedrails? 4  -DJ     Moving to and from a bed to a chair (including a wheelchair)? 4  -DJ      Standing up from a chair using your arms (e.g., wheelchair, bedside chair)? 4  -DJ     Climbing 3-5 steps with a railing? 3  -DJ     To walk in hospital room? 3  -DJ     AM-PAC 6 Clicks Score (PT) 22  -DJ     Highest Level of Mobility Goal 7 --> Walk 25 feet or more  -       Row Name 03/12/24 1056          Functional Assessment    Outcome Measure Options AM-PAC 6 Clicks Basic Mobility (PT)  -DJ               User Key  (r) = Recorded By, (t) = Taken By, (c) = Cosigned By      Initials Name Provider Type    Abby Emerson, PT Physical Therapist                  Physical Therapy Education       Title: PT OT SLP Therapies (Done)       Topic: Physical Therapy (Done)       Point: Mobility training (Done)       Learning Progress Summary             Patient Acceptance, E, VU by  at 3/12/2024 1057                         Point: Body mechanics (Done)       Learning Progress Summary             Patient Acceptance, E, VU by DJ at 3/12/2024 1057                         Point: Precautions (Done)       Learning Progress Summary             Patient Acceptance, E, VU by  at 3/12/2024 1057                                         User Key       Initials Effective Dates Name Provider Type Discipline     10/25/19 -  Abby Grissom, PT Physical Therapist PT                  PT Recommendation and Plan     Plan of Care Reviewed With: patient  Outcome Evaluation: 89yo [place] female admitted 3/11/24 with L LE pain and cellulitis. PMH Includes hbp, breast CA, hip fx, UTI, sepsis, stroke, LBP with L LE radiculopathy. PLOF: Pt lives at home alone with 2 MARIE and optional inside stairs. She was independent with ADLs and used a rollator for mobility. She has a lisa in [facility] that check on her daily. Today, she was found in bed in [facility], eager to move. Pt req vc and use of bedrail to sit EOB. She stood from EOB with SBA using r wx. Pt amb 240' with r wx and SBA/vc; good balance and fair endurance, LBP and L LE discomfort limits further  amb distance but currently well controlled with meds. Pt returned supine in bed with SBA and left with all needs met. She is safe to amb with nsg staff and safe to return home upon d/c and declines need for home PT services. Will sign off.     Time Calculation:   PT Evaluation Complexity  History, PT Evaluation Complexity: 3 or more personal factors and/or comorbidities  Examination of Body Systems (PT Eval Complexity): total of 4 or more elements  Clinical Presentation (PT Evaluation Complexity): evolving  Clinical Decision Making (PT Evaluation Complexity): moderate complexity  Overall Complexity (PT Evaluation Complexity): moderate complexity     PT Charges       Row Name 03/12/24 1058             Time Calculation    Start Time 1025  -DJ      Stop Time 1045  -DJ      Time Calculation (min) 20 min  -DJ      PT Non-Billable Time (min) 10 min  -DJ      PT Received On 03/12/24  -DJ                User Key  (r) = Recorded By, (t) = Taken By, (c) = Cosigned By      Initials Name Provider Type    Abby Emerson, PT Physical Therapist                  Therapy Charges for Today       Code Description Service Date Service Provider Modifiers Qty    17579247270 HC PT EVAL MOD COMPLEXITY 2 3/12/2024 Abby Grissom, PT GP 1    89829346492 HC GAIT TRAINING EA 15 MIN 3/12/2024 Abby Grissom, PT GP 1            PT G-Codes  Outcome Measure Options: AM-PAC 6 Clicks Basic Mobility (PT)  AM-PAC 6 Clicks Score (PT): 22    PT Discharge Summary  Anticipated Discharge Disposition (PT): home    Abby Grissom PT  3/12/2024

## 2024-03-12 NOTE — PLAN OF CARE
Goal Outcome Evaluation:  Plan of Care Reviewed With: patient           Outcome Evaluation: 89yo rk dao female admitted 3/11/24 with L LE pain and cellulitis. PMH Includes hbp, breast CA, hip fx, UTI, sepsis, stroke, LBP with L LE radiculopathy. PLOF: Pt lives at home alone with 2 MARIE and optional inside stairs. She was independent with ADLs and used a rollator for mobility. She has a lisa in [facility] that check on her daily. Today, she was found in bed in [facility], eager to move. Pt req vc and use of bedrail to sit EOB. She stood from EOB with SBA using r wx. Pt amb 240' with r wx and SBA/vc; good balance and fair endurance, LBP and L LE discomfort limits further amb distance but currently well controlled with meds. Pt returned supine in bed with SBA and left with all needs met. She is safe to amb with nsg staff and safe to return home upon d/c and declines need for home PT services. Will sign off.      Anticipated Discharge Disposition (PT): home

## 2024-03-12 NOTE — PROGRESS NOTES
CARLOS PAPPAS ATTESTATION NOTE    The SILVIO and I have discussed this patient's history, physical exam, and treatment plan.  I have reviewed the documentation and personally had a face to face interaction with the patient. I affirm the documentation and agree with the treatment and plan.  The attached note describes my personal findings.      I provided a substantive portion of the care of this patient. I personally performed the physical exam, in its entirety.    Geeta Butt is a 88 y.o. female who presented to the emergency department today complaining of left leg cellulitis.  She was admitted to the observation unit for further treatment.  She had failed oral antibiotic therapy.  She was started on Rocephin.  She did have a leukocytosis of 12,000.  Plan to continue IV antibiotic therapy.  Plan ultrasound of the left lower extremity to rule out DVT.      On exam:  GENERAL: Awake, alert, no acute distress  SKIN: Warm, dry  HENT: Normocephalic, atraumatic  EYES: no scleral icterus  CV: regular rhythm, regular rate  RESPIRATORY: normal effort, lungs clear  ABDOMEN: soft, nontender, nondistended  MUSCULOSKELETAL: no deformity.  Left lower extremity with erythema and mild tenderness distal to the knee.  Intact pulses.  NEURO: alert, moves all extremities, follows commands        Labs  Recent Results (from the past 24 hour(s))   Basic Metabolic Panel    Collection Time: 03/11/24  1:08 PM    Specimen: Blood   Result Value Ref Range    Glucose 104 (H) 65 - 99 mg/dL    BUN 20 8 - 23 mg/dL    Creatinine 0.91 0.57 - 1.00 mg/dL    Sodium 134 (L) 136 - 145 mmol/L    Potassium 4.4 3.5 - 5.2 mmol/L    Chloride 98 98 - 107 mmol/L    CO2 27.0 22.0 - 29.0 mmol/L    Calcium 10.1 8.6 - 10.5 mg/dL    BUN/Creatinine Ratio 22.0 7.0 - 25.0    Anion Gap 9.0 5.0 - 15.0 mmol/L    eGFR 61.2 >60.0 mL/min/1.73   CBC Auto Differential    Collection Time: 03/11/24  1:08 PM    Specimen: Blood   Result Value Ref Range    WBC 12.99 (H) 3.40 - 10.80  10*3/mm3    RBC 4.16 3.77 - 5.28 10*6/mm3    Hemoglobin 11.4 (L) 12.0 - 15.9 g/dL    Hematocrit 35.2 34.0 - 46.6 %    MCV 84.6 79.0 - 97.0 fL    MCH 27.4 26.6 - 33.0 pg    MCHC 32.4 31.5 - 35.7 g/dL    RDW 15.0 12.3 - 15.4 %    RDW-SD 46.3 37.0 - 54.0 fl    MPV 9.5 6.0 - 12.0 fL    Platelets 402 140 - 450 10*3/mm3    Neutrophil % 76.1 (H) 42.7 - 76.0 %    Lymphocyte % 12.6 (L) 19.6 - 45.3 %    Monocyte % 8.4 5.0 - 12.0 %    Eosinophil % 1.6 0.3 - 6.2 %    Basophil % 0.8 0.0 - 1.5 %    Immature Grans % 0.5 0.0 - 0.5 %    Neutrophils, Absolute 9.89 (H) 1.70 - 7.00 10*3/mm3    Lymphocytes, Absolute 1.64 0.70 - 3.10 10*3/mm3    Monocytes, Absolute 1.09 (H) 0.10 - 0.90 10*3/mm3    Eosinophils, Absolute 0.21 0.00 - 0.40 10*3/mm3    Basophils, Absolute 0.10 0.00 - 0.20 10*3/mm3    Immature Grans, Absolute 0.06 (H) 0.00 - 0.05 10*3/mm3    nRBC 0.0 0.0 - 0.2 /100 WBC       Radiology  No Radiology Exams Resulted Within Past 24 Hours          Note Disclaimer: At [facility], we believe that sharing information builds trust and better relationships. You are receiving this note because you recently visited [facility]. It is possible you will see health information before a provider has talked with you about it. This kind of information can be easy to misunderstand. To help you fully understand what it means for your health, we urge you to discuss this note with your provider.

## 2024-03-12 NOTE — DISCHARGE INSTRUCTIONS
Take antibiotic as prescribed  Follow up with your PCP on Friday, 03/15/23, for recheck of infection  Return to the ED for worsening redness & erythema, pain, fever.

## 2024-03-12 NOTE — PROGRESS NOTES
ED OBSERVATION PROGRESS/DISCHARGE SUMMARY    Date of Admission: 3/11/2024   LOS: 0 days   PCP: Grzegorz Nguyen MD    Final Diagnosis Cellulitis      Subjective   No acute events overnight.  Hospital Outcome:   88-year-old female admitted to the observation unit with a diagnosis of cellulitis of the left lower leg.  Initial lab work done shows sodium 134, WBCs 12.99, all other lab work is at baseline for the patient.  She was started on Rocephin 1 g IV every 24 hours and received 2 doses while in the hospital. Area of redness on the left lower leg has been marked for further comparison with significant improvement overnight. Duplex venous Doppler left lower extremity was negative.  PT was consulted and patient ambulatory without assistance. Patient was discharged home on antibiotics of Keflex 500 mg TID X10 days.    ROS:  General: no fevers, chills  Respiratory: no cough, dyspnea  Cardiovascular: no chest pain, palpitations  Abdomen: No abdominal pain, nausea, vomiting, or diarrhea  Neurologic: No focal weakness    Objective   Physical Exam:  I have reviewed the vital signs.  Temp:  [98.1 °F (36.7 °C)-98.4 °F (36.9 °C)] 98.4 °F (36.9 °C)  Heart Rate:  [60-75] 63  Resp:  [16-20] 16  BP: (118-199)/(45-75) 167/67  General Appearance:    Alert, cooperative, no distress  Head:    Normocephalic, atraumatic  Eyes:    Sclerae anicteric  Neck:   Supple, no mass  Lungs: Clear to auscultation bilaterally, respirations unlabored  Heart: Regular rate and rhythm, S1 and S2 normal, no murmur, rub or gallop  Abdomen:  Soft, non-tender, bowel sounds active, nondistended  Extremities: No clubbing, cyanosis, or edema to lower extremities  Pulses:  2+ and symmetric in distal lower extremities  Skin: No rashes   Neurologic: Oriented x3, Normal strength to extremities    Results Review:    I have reviewed the labs, radiology results and diagnostic studies.    Results from last 7 days   Lab Units 03/12/24  0429   WBC 10*3/mm3 10.86*    HEMOGLOBIN g/dL 10.4*   HEMATOCRIT % 32.4*   PLATELETS 10*3/mm3 388     Results from last 7 days   Lab Units 03/12/24  0429 03/11/24  1308   SODIUM mmol/L 132* 134*   POTASSIUM mmol/L 4.6 4.4   CHLORIDE mmol/L 98 98   CO2 mmol/L 21.9* 27.0   BUN mg/dL 16 20   CREATININE mg/dL 0.85 0.91   CALCIUM mg/dL 9.5 10.1   GLUCOSE mg/dL 113* 104*     Imaging Results (Last 24 Hours)       ** No results found for the last 24 hours. **            I have reviewed the medications.  ---------------------------------------------------------------------------------------------  Assessment & Plan   Assessment/Problem List    Left leg cellulitis      Plan:  Left leg cellulitis  -IV Rocephin X2 doses given while in ED  -Patient has remained afebrile with stable vital signs  -Keflex 500 mg PO TID X10 days  -Follow up with your PCP in 3 days for wound recheck     Hypertension  Supine hypertension  -Continue home carvedilol, valsartan     Hypothyroidism  -Continue levothyroxine     Chronic back pain  -No acute issues  -Continue home gabapentin    Disposition: Home accompanied by daughter    Follow-up after Discharge: Follow up with your PCP in 3 days for wound recheck    This note will serve as a discharge summary    Daja Avery, APRN 03/12/24 19:26 EDT    I have worn appropriate PPE during this patient encounter, sanitized my hands both with entering and exiting patient's room.      40 minutes has been spent by [facility] providers in the care of this patient while under observation status

## 2024-03-12 NOTE — PROGRESS NOTES
MD Attestation Note    I supervised care provided by the midlevel provider.    The SILVIO and I have discussed this patient's history, physical exam, and treatment plan. I have reviewed the documentation and personally had a face to face interaction with the patient  I affirm the documentation and agree with the treatment and plan.       My personal findings are:        Subjective:  Patient admitted for further management of left lower extremity cellulitis.  She reports that her pain is little bit improved today.  She denies fever overnight.  She has been up and walking without difficulty.  She believes that the redness is little bit improved today.        Objective:  General: Awake and alert, well-appearing, no acute distress  MSK: No significant lower extremity tenderness bilaterally.  2+ DP pulses bilateral lower extremities.  Skin: Persistent erythema of the anterior left leg which is inside the marked area.  There is a small ulcer present on the left lower leg anteriorly without purulent drainage or bleeding.  There are no vesicles or bulla.        Assessment/ Plan:  Left lower extremity cellulitis  Patient seems to be improving after IV Rocephin started yesterday.  Will plan for second dose of Rocephin when due this afternoon and then discharged on Keflex.  Venous duplex left lower extremity to rule out DVT is pending.

## 2024-03-13 ENCOUNTER — TRANSITIONAL CARE MANAGEMENT TELEPHONE ENCOUNTER (OUTPATIENT)
Dept: CALL CENTER | Facility: HOSPITAL | Age: 89
End: 2024-03-13
Payer: MEDICARE

## 2024-03-13 NOTE — OUTREACH NOTE
Call Center TCM Note      Flowsheet Row Responses   [facility] patient discharged from? [place]   Does the patient have one of the following disease processes/diagnoses(primary or secondary)? Other   TCM attempt successful? No   Unsuccessful attempts Attempt 1            Geeta Weaver MA    3/13/2024, 12:55 EDT

## 2024-03-13 NOTE — OUTREACH NOTE
Prep Survey      Flowsheet Row Responses   [facility] [place] patient discharged from? [place]   Is LACE score < 7 ? Yes   Eligibility [facility]   Date of Admission 03/11/24   Date of Discharge 03/12/24   Discharge Disposition Home or Self Care   Discharge diagnosis *Left leg cellulitis   Does the patient have one of the following disease processes/diagnoses(primary or secondary)? Other   Does the patient have Home health ordered? No   Is there a DME ordered? No   Prep survey completed? Yes            ASHLI GALLAGHER - Registered Nurse

## 2024-03-13 NOTE — OUTREACH NOTE
Call Center TCM Note      Flowsheet Row Responses   [facility] patient discharged from? [place]   Does the patient have one of the following disease processes/diagnoses(primary or secondary)? Other   TCM attempt successful? No   Unsuccessful attempts Attempt 2            Geeta Weaver MA    3/13/2024, 16:10 EDT

## 2024-03-14 ENCOUNTER — TRANSITIONAL CARE MANAGEMENT TELEPHONE ENCOUNTER (OUTPATIENT)
Dept: CALL CENTER | Facility: HOSPITAL | Age: 89
End: 2024-03-14
Payer: MEDICARE

## 2024-03-14 NOTE — OUTREACH NOTE
Call Center TCM Note      Flowsheet Row Responses   [facility] patient discharged from? [place]   Does the patient have one of the following disease processes/diagnoses(primary or secondary)? Other   TCM attempt successful? No   Unsuccessful attempts Attempt 3   Call Status Left message            Melanie Nguyen RN    3/14/2024, 16:25 EDT

## 2024-03-28 ENCOUNTER — OFFICE VISIT (OUTPATIENT)
Dept: INTERNAL MEDICINE | Facility: CLINIC | Age: 89
End: 2024-03-28
Payer: MEDICARE

## 2024-03-28 VITALS
DIASTOLIC BLOOD PRESSURE: 60 MMHG | OXYGEN SATURATION: 99 % | HEIGHT: 60 IN | WEIGHT: 162 LBS | BODY MASS INDEX: 31.8 KG/M2 | RESPIRATION RATE: 18 BRPM | HEART RATE: 78 BPM | SYSTOLIC BLOOD PRESSURE: 122 MMHG

## 2024-03-28 DIAGNOSIS — G62.9 NEUROPATHY: Chronic | ICD-10-CM

## 2024-03-28 DIAGNOSIS — M51.37 DDD (DEGENERATIVE DISC DISEASE), LUMBOSACRAL: Chronic | ICD-10-CM

## 2024-03-28 DIAGNOSIS — Z09 HOSPITAL DISCHARGE FOLLOW-UP: Primary | ICD-10-CM

## 2024-03-28 DIAGNOSIS — L03.116 LEFT LEG CELLULITIS: ICD-10-CM

## 2024-03-28 PROBLEM — E78.2 MIXED HYPERLIPIDEMIA: Chronic | Status: ACTIVE | Noted: 2021-07-22

## 2024-03-28 RX ORDER — CEPHALEXIN 500 MG/1
500 CAPSULE ORAL 3 TIMES DAILY
Qty: 30 CAPSULE | Refills: 0 | Status: SHIPPED | OUTPATIENT
Start: 2024-03-28 | End: 2024-04-07

## 2024-03-28 NOTE — PROGRESS NOTES
Chief Complaint  Follow-up and Hospital Follow Up Visit    Subjective        Geeta Butt presents to [facility]  History of Present Illness    She is here to be seen today for back pain/neuropathy. She is taking all of her medication as below as prescribed. PDMP reviewed for the gabapentin and appropriate. Denies side effects of the medication.     She was also in the hospital a few weeks ago for cellulitis of the left lower leg.  She was treated with IV Rocephin and then sent home on Keflex.  Since then she is having some pain around the site of the cellulitis again and wondering if it needs another round of antibiotics.      Current Outpatient Medications:     aspirin 81 MG EC tablet, Take 1 tablet by mouth Daily., Disp: , Rfl:     carvedilol (COREG) 12.5 MG tablet, TAKE 1 TABLET BY MOUTH TWICE A DAY WITH MEALS (Patient taking differently: Take 1 tablet by mouth 2 (Two) Times a Day.), Disp: 180 tablet, Rfl: 3    Cholecalciferol (VITAMIN D3) 50 MCG (2000 UT) capsule, TAKE 1 CAPSULE BY MOUTH DAILY. (Patient taking differently: Take 1 capsule by mouth Daily.), Disp: 100 capsule, Rfl: 1    coenzyme Q10 50 MG capsule capsule, Take 1 capsule by mouth Daily., Disp: , Rfl:     estradiol (ESTRACE) 0.1 MG/GM vaginal cream, Insert 2 g into the vagina As Needed., Disp: , Rfl:     Flarex 0.1 % ophthalmic suspension, , Disp: , Rfl:     fluorometholone (FML) 0.1 % ophthalmic suspension, Administer 1 drop into the left eye Daily., Disp: , Rfl:     fluticasone (FLONASE) 50 MCG/ACT nasal spray, 1 spray into the nostril(s) as directed by provider Daily., Disp: , Rfl:     Fluticasone-Umeclidin-Vilant (Trelegy Ellipta) 100-62.5-25 MCG/ACT inhaler, Inhale 1 puff Daily., Disp: 28 each, Rfl: 3    furosemide (LASIX) 40 MG tablet, TAKE 1 TABLET BY MOUTH EVERY DAY (Patient taking differently: Take 1 tablet by mouth Daily.), Disp: 90 tablet, Rfl: 1    gabapentin (NEURONTIN) 100 MG capsule, TAKE 1 CAPSULE BY  "MOUTH 3 TIMES A DAY. OKAY TO TAKE 1 CAPSULE AT BEDTIME IF NEEDED FOR PAIN (Patient taking differently: Take 1 capsule by mouth 3 (Three) Times a Day. TAKE 1 CAPSULE BY MOUTH 3 TIMES A DAY. OKAY TO TAKE 1 CAPSULE AT BEDTIME IF NEEDED FOR PAIN), Disp: 120 capsule, Rfl: 5    hydrocortisone 2.5 % cream, As Needed. Takes it as needed, Disp: , Rfl:     levothyroxine (SYNTHROID, LEVOTHROID) 50 MCG tablet, TAKE 1 TABLET BY MOUTH EVERY DAY, Disp: 90 tablet, Rfl: 4    Omega-3 Fatty Acids (fish oil) 1000 MG capsule capsule, Take 1 capsule by mouth Daily With Breakfast. Pt takes this every day., Disp: , Rfl:     Probiotic Product (PROBIOTIC DAILY PO), Take 1 capsule by mouth Daily., Disp: , Rfl:     RABEprazole (ACIPHEX) 20 MG EC tablet, TAKE 1 TABLET BY MOUTH EVERY DAY, Disp: 90 tablet, Rfl: 4    sodium chloride (CAYLA 128) 5 % ophthalmic solution, Administer 1 drop to the right eye As Needed., Disp: , Rfl:     sucralfate (CARAFATE) 1 GM/10ML suspension, Take 10 mL by mouth 3 (Three) Times a Day Before Meals., Disp: , Rfl:     valsartan (DIOVAN) 160 MG tablet, TAKE 1.5 TABLETS BY MOUTH DAILY. NEW DIRECTIONS (Patient taking differently: Take 1.5 tablets by mouth Daily.), Disp: 135 tablet, Rfl: 3    cephalexin (Keflex) 500 MG capsule, Take 1 capsule by mouth 3 (Three) Times a Day for 10 days., Disp: 30 capsule, Rfl: 0      Objective   Vital Signs:  /60 (BP Location: Right arm, Patient Position: Sitting, Cuff Size: Small Adult)   Pulse 78   Resp 18   Ht 152.4 cm (60\")   Wt 73.5 kg (162 lb)   SpO2 99%   BMI 31.64 kg/m²   Estimated body mass index is 31.64 kg/m² as calculated from the following:    Height as of this encounter: 152.4 cm (60\").    Weight as of this encounter: 73.5 kg (162 lb).             Physical Exam  Vitals and nursing note reviewed.   Constitutional:       General: She is not in acute distress.     Appearance: Normal appearance.   Cardiovascular:      Rate and Rhythm: Normal rate and regular rhythm.    "   Heart sounds: Normal heart sounds. No murmur heard.  Pulmonary:      Effort: Pulmonary effort is normal.      Breath sounds: Normal breath sounds.   Skin:     Findings: Erythema present.             Comments: Slight warmth, erythema, no drainage, scab present about 2 cm circumscribed   Neurological:      Mental Status: She is alert.        Result Review :    The following data was reviewed by: Grzegorz Nguyen MD on 03/28/2024:  Common labs          11/9/2023    13:02 3/11/2024    13:08 3/12/2024    04:29   Common Labs   Glucose 122  104  113    BUN 32  20  16    Creatinine 1.22  0.91  0.85    Sodium 137  134  132    Potassium 4.6  4.4  4.6    Chloride 98  98  98    Calcium 9.6  10.1  9.5    Albumin 4.0      Total Bilirubin 0.2      Alkaline Phosphatase 55      AST (SGOT) 29      ALT (SGPT) 10      WBC 9.08  12.99  10.86    Hemoglobin 10.7  11.4  10.4    Hematocrit 32.8  35.2  32.4    Platelets 384  402  388    Total Cholesterol 237      Triglycerides 112      HDL Cholesterol 56      LDL Cholesterol  161      Hemoglobin A1C 5.9        Data reviewed : Recent hospitalization notes ER note 3/11/2024             Assessment and Plan     Diagnoses and all orders for this visit:    1. Hospital discharge follow-up (Primary)    2. Left leg cellulitis  -     cephalexin (Keflex) 500 MG capsule; Take 1 capsule by mouth 3 (Three) Times a Day for 10 days.  Dispense: 30 capsule; Refill: 0    3. Neuropathy    4. DDD (degenerative disc disease), lumbosacral      I will go ahead and treat with another round of cephalexin as we are quite limited on antibiotic choices for her due to allergies and intolerances.  I will have the APRN check her out in about a week to see how it is doing.  She is tolerating the gabapentin well for the pain but she sounds like she is running out frequently.  I checked PDMP and she is not filling on a schedule (usually going 45 days).  I recommended to set up a system to get her filled around 30 days to  avoid running out.         I spent 31 minutes caring for Geeta on this date of service. This time includes time spent by me in the following activities:preparing for the visit, reviewing tests, obtaining and/or reviewing a separately obtained history, performing a medically appropriate examination and/or evaluation , counseling and educating the patient/family/caregiver, ordering medications, tests, or procedures, and documenting information in the medical record  Follow Up     Return in about 1 week (around 4/4/2024) for Recheck - cellulitis.  Patient was given instructions and counseling regarding her condition or for health maintenance advice. Please see specific information pulled into the AVS if appropriate.

## 2024-04-05 ENCOUNTER — OFFICE VISIT (OUTPATIENT)
Dept: INTERNAL MEDICINE | Facility: CLINIC | Age: 89
End: 2024-04-05
Payer: MEDICARE

## 2024-04-05 VITALS
WEIGHT: 164.2 LBS | DIASTOLIC BLOOD PRESSURE: 72 MMHG | HEIGHT: 60 IN | OXYGEN SATURATION: 97 % | SYSTOLIC BLOOD PRESSURE: 138 MMHG | HEART RATE: 67 BPM | BODY MASS INDEX: 32.24 KG/M2 | TEMPERATURE: 97.7 F

## 2024-04-05 DIAGNOSIS — L03.116 LEFT LEG CELLULITIS: Primary | ICD-10-CM

## 2024-04-05 PROCEDURE — 99213 OFFICE O/P EST LOW 20 MIN: CPT

## 2024-04-05 NOTE — PROGRESS NOTES
"        Chief Complaint  Follow-up and Cellulitis     Subjective:      History of Present Illness {CC  Problem List  Visit  Diagnosis   Encounters  Notes  Medications  Labs  Result Review Imaging  Media :23}     Geeta Butt presents to [facility] for:      History of Present Illness     Cellulitis- pt was in last week for cellulitis treatment. Pt states that she fells it looks much better than last week. Pt has two more doses of her antibiotic. Pt Is using ice on the affected area.      I have reviewed patient's medical history, any new submitted information provided by patient or medical assistant and updated medical record.      Objective:      Physical Exam  Vitals and nursing note reviewed.   Constitutional:       General: She is not in acute distress.     Appearance: Normal appearance.   Cardiovascular:      Rate and Rhythm: Normal rate and regular rhythm.      Heart sounds: Normal heart sounds. No murmur heard.  Pulmonary:      Effort: Pulmonary effort is normal.      Breath sounds: Normal breath sounds.   Skin:     Findings: Erythema present.             Comments:  no drainage, scab present    Neurological:      Mental Status: She is alert.        Result Review  Data Reviewed:{ Labs  Result Review  Imaging  Med Tab  Media :23}                Vital Signs:   /72 (Patient Position: Sitting)   Pulse 67   Temp 97.7 °F (36.5 °C) (Oral)   Ht 152.4 cm (60\")   Wt 74.5 kg (164 lb 3.2 oz)   SpO2 97%   BMI 32.07 kg/m²         Requested Prescriptions      No prescriptions requested or ordered in this encounter       Routine medications provided by this office will also be refilled via pharmacy request.       Current Outpatient Medications:     aspirin 81 MG EC tablet, Take 1 tablet by mouth Daily., Disp: , Rfl:     carvedilol (COREG) 12.5 MG tablet, TAKE 1 TABLET BY MOUTH TWICE A DAY WITH MEALS (Patient taking differently: Take 1 tablet by mouth 2 (Two) Times a " Day.), Disp: 180 tablet, Rfl: 3    cephalexin (Keflex) 500 MG capsule, Take 1 capsule by mouth 3 (Three) Times a Day for 10 days., Disp: 30 capsule, Rfl: 0    Cholecalciferol (VITAMIN D3) 50 MCG (2000 UT) capsule, TAKE 1 CAPSULE BY MOUTH DAILY. (Patient taking differently: Take 1 capsule by mouth Daily.), Disp: 100 capsule, Rfl: 1    coenzyme Q10 50 MG capsule capsule, Take 1 capsule by mouth Daily., Disp: , Rfl:     estradiol (ESTRACE) 0.1 MG/GM vaginal cream, Insert 2 g into the vagina As Needed., Disp: , Rfl:     Flarex 0.1 % ophthalmic suspension, , Disp: , Rfl:     fluorometholone (FML) 0.1 % ophthalmic suspension, Administer 1 drop into the left eye Daily., Disp: , Rfl:     fluticasone (FLONASE) 50 MCG/ACT nasal spray, 1 spray into the nostril(s) as directed by provider Daily., Disp: , Rfl:     Fluticasone-Umeclidin-Vilant (Trelegy Ellipta) 100-62.5-25 MCG/ACT inhaler, Inhale 1 puff Daily., Disp: 28 each, Rfl: 3    furosemide (LASIX) 40 MG tablet, TAKE 1 TABLET BY MOUTH EVERY DAY (Patient taking differently: Take 1 tablet by mouth Daily.), Disp: 90 tablet, Rfl: 1    gabapentin (NEURONTIN) 100 MG capsule, TAKE 1 CAPSULE BY MOUTH 3 TIMES A DAY. OKAY TO TAKE 1 CAPSULE AT BEDTIME IF NEEDED FOR PAIN (Patient taking differently: Take 1 capsule by mouth 3 (Three) Times a Day. TAKE 1 CAPSULE BY MOUTH 3 TIMES A DAY. OKAY TO TAKE 1 CAPSULE AT BEDTIME IF NEEDED FOR PAIN), Disp: 120 capsule, Rfl: 5    hydrocortisone 2.5 % cream, As Needed. Takes it as needed, Disp: , Rfl:     levothyroxine (SYNTHROID, LEVOTHROID) 50 MCG tablet, TAKE 1 TABLET BY MOUTH EVERY DAY, Disp: 90 tablet, Rfl: 4    Omega-3 Fatty Acids (fish oil) 1000 MG capsule capsule, Take 1 capsule by mouth Daily With Breakfast. Pt takes this every day., Disp: , Rfl:     Probiotic Product (PROBIOTIC DAILY PO), Take 1 capsule by mouth Daily., Disp: , Rfl:     RABEprazole (ACIPHEX) 20 MG EC tablet, TAKE 1 TABLET BY MOUTH EVERY DAY, Disp: 90 tablet, Rfl: 4    sodium  chloride (CAYLA 128) 5 % ophthalmic solution, Administer 1 drop to the right eye As Needed., Disp: , Rfl:     sucralfate (CARAFATE) 1 GM/10ML suspension, Take 10 mL by mouth 3 (Three) Times a Day Before Meals., Disp: , Rfl:     valsartan (DIOVAN) 160 MG tablet, TAKE 1.5 TABLETS BY MOUTH DAILY. NEW DIRECTIONS (Patient taking differently: Take 1.5 tablets by mouth Daily.), Disp: 135 tablet, Rfl: 3     Assessment and Plan:      Assessment and Plan {CC Problem List  Visit Diagnosis  ROS  Review (Popup)  Health Maintenance  Quality  BestPractice  Medications  SmartSets  SnapShot Encounters  Media :23}     Problem List Items Addressed This Visit       Left leg cellulitis - Primary     Left leg cellulitis is looking significantly better than before per patient.  No drainage noted.  Educated patient to continue taking the rest of her antibiotics.  Educated patient that if this opens back up, becomes more red, or she develops a fever to be seen in the emergency room.  Patient verbalized understanding and is comfortable with the plan of care.    Follow Up {Instructions Charge Capture  Follow-up Communications :23}     Return if symptoms worsen or fail to improve.      Patient was given instructions and counseling regarding her condition or for health maintenance advice. Please see specific information pulled into the AVS if appropriate.    Dragon disclaimer:   Much of this encounter note is an electronic transcription/translation of spoken language to printed text. The electronic translation of spoken language may permit erroneous, or at times, nonsensical words or phrases to be inadvertently transcribed; Although I have reviewed the note for such errors, some may still exist.     Additional Patient Counseling:       There are no Patient Instructions on file for this visit.

## 2024-04-16 PROBLEM — I70.1 RENAL ARTERY STENOSIS: Status: ACTIVE | Noted: 2024-04-16

## 2024-04-16 PROBLEM — I65.29 CAROTID ARTERY STENOSIS: Status: ACTIVE | Noted: 2024-04-16

## 2024-05-03 DIAGNOSIS — R06.02 SOB (SHORTNESS OF BREATH): ICD-10-CM

## 2024-05-03 DIAGNOSIS — R06.2 WHEEZING: ICD-10-CM

## 2024-05-03 DIAGNOSIS — R05.3 CHRONIC COUGH: ICD-10-CM

## 2024-05-03 RX ORDER — FLUTICASONE FUROATE, UMECLIDINIUM BROMIDE AND VILANTEROL TRIFENATATE 100; 62.5; 25 UG/1; UG/1; UG/1
1 POWDER RESPIRATORY (INHALATION)
Qty: 28 EACH | Refills: 3 | Status: SHIPPED | OUTPATIENT
Start: 2024-05-03

## 2024-05-03 NOTE — TELEPHONE ENCOUNTER
Caller: Daisy Avila    Relationship: Emergency Contact    Best call back number: [phone]     Requested Prescriptions:   Requested Prescriptions     Pending Prescriptions Disp Refills    Fluticasone-Umeclidin-Vilant (Trelegy Ellipta) 100-62.5-25 MCG/ACT inhaler 28 each 3     Sig: Inhale 1 puff Daily.        Pharmacy where request should be sent: [id number] - [facility] [place] - [address] AT [address] [phone] [facility] [phone]      Last office visit with prescribing clinician: 3/28/2024   Last telemedicine visit with prescribing clinician: Visit date not found   Next office visit with prescribing clinician: 8/1/2024     Additional details provided by patient: PATIENT'S DAUGHTER STATES THAT SHE IS OUT OF MEDICATION    Does the patient have less than a 3 day supply:  [x] Yes  [] No    Would you like a call back once the refill request has been completed: [] Yes [x] No    If the office needs to give you a call back, can they leave a voicemail: [] Yes [x] No    Tammy Guzman Rep   05/03/24 10:29 EDT

## 2024-05-14 ENCOUNTER — TELEPHONE (OUTPATIENT)
Dept: INTERNAL MEDICINE | Facility: CLINIC | Age: 89
End: 2024-05-14
Payer: MEDICARE

## 2024-05-15 NOTE — TELEPHONE ENCOUNTER
Name: Daisy Avila    Relationship: Emergency Contact    Best Callback Number: [phone]    HUB PROVIDED THE RELAY MESSAGE FROM THE OFFICE   PATIENT SCHEDULED AS REQUESTED    ADDITIONAL INFORMATION:

## 2024-05-28 DIAGNOSIS — I10 ESSENTIAL (PRIMARY) HYPERTENSION: ICD-10-CM

## 2024-05-28 RX ORDER — VALSARTAN 160 MG/1
TABLET ORAL
Qty: 135 TABLET | Refills: 0 | Status: SHIPPED | OUTPATIENT
Start: 2024-05-28

## 2024-05-28 NOTE — TELEPHONE ENCOUNTER
Rx Refill Note  Requested Prescriptions     Pending Prescriptions Disp Refills    valsartan (DIOVAN) 160 MG tablet [Pharmacy Med Name: VALSARTAN 160 MG TABLET] 135 tablet 3     Sig: TAKE 1.5 TABLETS BY MOUTH DAILY. NEW DIRECTIONS      Last office visit with prescribing clinician: 3/28/2024   Last telemedicine visit with prescribing clinician: Visit date not found   Next office visit with prescribing clinician: 8/7/2024   {

## 2024-06-07 DIAGNOSIS — R60.0 PERIPHERAL EDEMA: ICD-10-CM

## 2024-06-07 RX ORDER — FUROSEMIDE 40 MG/1
TABLET ORAL
Qty: 90 TABLET | Refills: 1 | Status: SHIPPED | OUTPATIENT
Start: 2024-06-07

## 2024-06-07 NOTE — TELEPHONE ENCOUNTER
Rx Refill Note  Requested Prescriptions     Pending Prescriptions Disp Refills    furosemide (LASIX) 40 MG tablet [Pharmacy Med Name: FUROSEMIDE 40 MG TABLET] 90 tablet 1     Sig: TAKE 1 TABLET BY MOUTH EVERY DAY      Last office visit with prescribing clinician: 3/28/2024   Last telemedicine visit with prescribing clinician: Visit date not found   Next office visit with prescribing clinician: 8/7/2024

## 2024-06-20 ENCOUNTER — OFFICE VISIT (OUTPATIENT)
Age: 89
End: 2024-06-20
Payer: MEDICARE

## 2024-06-20 ENCOUNTER — HOSPITAL ENCOUNTER (OUTPATIENT)
Facility: HOSPITAL | Age: 89
Discharge: HOME | End: 2024-06-20
Admitting: SURGERY
Payer: MEDICARE

## 2024-06-20 VITALS
WEIGHT: 164 LBS | SYSTOLIC BLOOD PRESSURE: 122 MMHG | HEIGHT: 60 IN | DIASTOLIC BLOOD PRESSURE: 80 MMHG | BODY MASS INDEX: 32.2 KG/M2

## 2024-06-20 DIAGNOSIS — I65.23 BILATERAL CAROTID ARTERY STENOSIS: Primary | ICD-10-CM

## 2024-06-20 DIAGNOSIS — R09.89 DECREASED PEDAL PULSES: ICD-10-CM

## 2024-06-20 DIAGNOSIS — I70.1 RENAL ARTERY STENOSIS: ICD-10-CM

## 2024-06-20 DIAGNOSIS — I70.1 ATHEROSCLEROSIS OF RENAL ARTERY: ICD-10-CM

## 2024-06-20 DIAGNOSIS — R60.0 PERIPHERAL EDEMA: Chronic | ICD-10-CM

## 2024-06-20 DIAGNOSIS — I65.23 CAROTID STENOSIS, BILATERAL: ICD-10-CM

## 2024-06-20 LAB
BH CV ECHO MEAS - DIST REN A EDV LEFT: 23.1 CM/S
BH CV ECHO MEAS - DIST REN A PSV LEFT: 124 CM/S
BH CV ECHO MEAS - MID REN A EDV LEFT: 17.7 CM/S
BH CV ECHO MEAS - MID REN A PSV LEFT: 103 CM/S
BH CV ECHO MEAS - PROX REN A EDV LEFT: 19.6 CM/S
BH CV ECHO MEAS - PROX REN A PSV LEFT: 117 CM/S
BH CV VAS BP RIGHT ARM: NORMAL MMHG
BH CV VAS RENAL AORTIC MID EDV: 17.2 CM/S
BH CV VAS RENAL AORTIC MID PSV: 82.6 CM/S
BH CV VAS RENAL HILUM LEFT EDV: 10 CM/S
BH CV VAS RENAL HILUM LEFT PSV: 28.9 CM/S
BH CV VAS RENAL HILUM RIGHT EDV: 8.6 CM/S
BH CV VAS RENAL HILUM RIGHT PSV: 35.7 CM/S
BH CV XLRA MEAS - KID L LEFT: 9.2 CM
BH CV XLRA MEAS DIST REN A EDV RIGHT: 30.7 CM/S
BH CV XLRA MEAS DIST REN A PSV RIGHT: 101 CM/S
BH CV XLRA MEAS KID L RIGHT: 9.1 CM
BH CV XLRA MEAS KID W RIGHT: 4.42 CM
BH CV XLRA MEAS MID REN A EDV RIGHT: 23.1 CM/S
BH CV XLRA MEAS MID REN A PSV RIGHT: 124 CM/S
BH CV XLRA MEAS PROX REN A EDV RIGHT: 33.2 CM/S
BH CV XLRA MEAS PROX REN A PSV RIGHT: 212 CM/S
BH CV XLRA MEAS RAR LEFT: 1.66
BH CV XLRA MEAS RAR RIGHT: 3.77
BH CV XLRA MEAS RENAL A ORG EDV LEFT: 26.4 CM/S
BH CV XLRA MEAS RENAL A ORG EDV RIGHT: 35 CM/S
BH CV XLRA MEAS RENAL A ORG PSV LEFT: 137.3 CM/S
BH CV XLRA MEAS RENAL A ORG PSV RIGHT: 311.5 CM/S
LEFT KIDNEY WIDTH: 4.4 CM
LEFT RENAL UPPER PARENCHYMA MAX: 23.9 CM/S
LEFT RENAL UPPER PARENCHYMA MIN: 7.32 CM/S
LEFT RENAL UPPER PARENCHYMA RI: 0.69
RIGHT RENAL UPPER PARENCHYMA MAX: 18.9 CM/S
RIGHT RENAL UPPER PARENCHYMA MIN: 6.45 CM/S
RIGHT RENAL UPPER PARENCHYMA RI: 0.66

## 2024-06-20 PROCEDURE — 93975 VASCULAR STUDY: CPT

## 2024-06-20 RX ORDER — LATANOPROST 50 UG/ML
SOLUTION/ DROPS OPHTHALMIC
COMMUNITY
Start: 2024-04-15

## 2024-06-20 RX ORDER — KETOROLAC TROMETHAMINE 5 MG/ML
SOLUTION OPHTHALMIC
COMMUNITY
Start: 2024-05-21

## 2024-06-20 NOTE — PATIENT INSTRUCTIONS
"\"3-4-2-Almost None!\"  Healthy Habits Start Early    EAT 5 OR MORE SERVINGS OF VEGETABLES AND FRUITS EVERY DAY.    Help Geeta get three vegetables and two fruits each day. Red, green, yellow, orange...encourage them to try all the colors so they can enjoy different flavors and get more vitamins.    How can I help Geeta do this?  ---------------------------------------------  -BE PATIENT WITH Geeta, remember it may take 10 times before they start to like new food. So, start with small bites and just keep trying.  -Serve at least one vegetable or fruit at every meal. Even try two. Remember, portions do not have to be as big as you think.  -Encourage eating fruits and vegetables instead of drinking them..it's a better way to get fiber and vitamins..so limit the amount of juice to 1/2 cup per day for children 1-6 years and one cup per day for children 7-18 years of age. Try using 1/2 part water and 1/2 part juice.    Spend less than two hours per day watching television and other screen media. Screen media includes video games, movies and computer use for entertainment.    How can I help Geeta do this?  -Turn off the TV at dinner. Dinner is the best time to hang out with your kids and just talk, learn about their day, and tell them about your day. Your kids have a lot to learn from you and dinner is a great time to share.  "

## 2024-06-20 NOTE — PROGRESS NOTES
Chief Complaint  Follow-up (6mo follow up with renal scan. )    Subjective        Geeta Butt presents to [facility]  HPI   Geeta Butt is a 88 y.o. female that has been followed in our office for renal artery stenosis. She returns today in follow-up along with a renal duplex. She   reports she has been in the hospital for recurrent lower extremity cellulitis.  She  denies any hypertensive urgency, worsening kidney function, or pulmonary edema. She is on 2 antihypertensives.     Geeta Butt  reports that she quit smoking about 34 years ago. Her smoking use included cigarettes. She started smoking about 44 years ago. She has a 10 pack-year smoking history. She has never used smokeless tobacco..        Objective   Vital Signs:  Vitals:    06/20/24 1243   BP: 122/80      Body mass index is 32.03 kg/m².   BMI is >= 30 and <35. (Class 1 Obesity). The following options were offered after discussion;: information on healthy weight added to patient's after visit summary        Physical Exam  Vitals reviewed.   Constitutional:       Appearance: Normal appearance.   HENT:      Head: Normocephalic.   Cardiovascular:      Rate and Rhythm: Normal rate and regular rhythm.      Pulses: Normal pulses.           Dorsalis pedis pulses are 1+ on the right side and 3+ on the left side.        Posterior tibial pulses are 1+ on the right side and 3+ on the left side.   Pulmonary:      Effort: Pulmonary effort is normal.   Musculoskeletal:      Right lower leg: Edema present.      Left lower leg: Edema present.   Skin:     General: Skin is warm.      Comments: Hemosiderin staining bilateral lower extremities   Neurological:      General: No focal deficit present.      Mental Status: She is alert and oriented to person, place, and time.   Psychiatric:         Mood and Affect: Mood normal.          Result Review :      Previous renal duplex: Greater than 60% stenosis on the right and normal velocities  on the left    Previous carotid duplex: Less than 50% stenosis bilaterally    Renal  duplex done today: Duplex Renal Artery - Bilateral Complete CAR (06/20/2024 11:58)      BMP          11/9/2023    13:02 3/11/2024    13:08 3/12/2024    04:29   BMP   BUN 32  20  16    Creatinine 1.22  0.91  0.85    Sodium 137  134  132    Potassium 4.6  4.4  4.6    Chloride 98  98  98    CO2 23.6  27.0  21.9    Calcium 9.6  10.1  9.5                   Assessment and Plan     Diagnoses and all orders for this visit:    1. Bilateral carotid artery stenosis (Primary)    2. Renal artery stenosis  -     Duplex Renal Artery - Bilateral Complete CAR; Future    3. Carotid stenosis, bilateral  -     Duplex Carotid Ultrasound CAR; Future    4. Decreased pedal pulses  -     Doppler Ankle Brachial Index Single Level CAR; Future    5. Peripheral edema        Patient presents today for ongoing management of her  renal artery stenosis.  She has a right-sided renal artery stenosis which is unchanged.  She is asymptomatic.  We discussed indications for surgical repair.  I recommended continued surveillance with duplex imaging.  We discussed her recurrent cellulitis and her bilateral lower extremity edema.  She is not able to wear traditional compression stockings as they are too difficult to don and doff.  She did purchase a pair of SensiSocks today and I recommended daily use to help control her swelling and hopefully eliminate the need for hospitalizations related to cellulitis..  I also did not feel easily palpable pedal pulses therefore we will add ABIs to her next visit.  She denies any claudication symptoms, rest pain, tissue loss.  She is to continue her antiplatelet agent which is aspirin.  She is not taking a statin.  She  will return in 6 months along with a repeat renal and carotid artery duplex.       Follow Up     Return in about 6 months (around 12/20/2024) for carotid duplexdany.  Patient was given instructions and counseling  regarding her condition or for health maintenance advice. Please see specific information pulled into the AVS if appropriate.     JESSA Mcduffie

## 2024-07-25 DIAGNOSIS — I10 ESSENTIAL HYPERTENSION: Chronic | ICD-10-CM

## 2024-07-25 RX ORDER — CARVEDILOL 12.5 MG/1
TABLET ORAL
Qty: 180 TABLET | Refills: 3 | Status: SHIPPED | OUTPATIENT
Start: 2024-07-25

## 2024-08-07 ENCOUNTER — OFFICE VISIT (OUTPATIENT)
Dept: INTERNAL MEDICINE | Facility: CLINIC | Age: 89
End: 2024-08-07
Payer: MEDICARE

## 2024-08-07 ENCOUNTER — HOME HEALTH ADMISSION (OUTPATIENT)
Dept: HOME HEALTH SERVICES | Facility: HOME HEALTHCARE | Age: 89
End: 2024-08-07
Payer: MEDICARE

## 2024-08-07 VITALS
SYSTOLIC BLOOD PRESSURE: 144 MMHG | OXYGEN SATURATION: 96 % | HEART RATE: 60 BPM | DIASTOLIC BLOOD PRESSURE: 60 MMHG | WEIGHT: 164 LBS | BODY MASS INDEX: 32.03 KG/M2

## 2024-08-07 DIAGNOSIS — E78.2 MIXED HYPERLIPIDEMIA: Chronic | ICD-10-CM

## 2024-08-07 DIAGNOSIS — G62.9 NEUROPATHY: Chronic | ICD-10-CM

## 2024-08-07 DIAGNOSIS — I10 ESSENTIAL HYPERTENSION: Primary | Chronic | ICD-10-CM

## 2024-08-07 DIAGNOSIS — R73.03 PREDIABETES: Chronic | ICD-10-CM

## 2024-08-07 DIAGNOSIS — E03.9 ACQUIRED HYPOTHYROIDISM: Chronic | ICD-10-CM

## 2024-08-07 DIAGNOSIS — M51.37 DDD (DEGENERATIVE DISC DISEASE), LUMBOSACRAL: Chronic | ICD-10-CM

## 2024-08-07 DIAGNOSIS — R29.898 WEAKNESS OF BOTH LOWER EXTREMITIES: ICD-10-CM

## 2024-08-07 PROCEDURE — 1159F MED LIST DOCD IN RCRD: CPT | Performed by: FAMILY MEDICINE

## 2024-08-07 PROCEDURE — G2211 COMPLEX E/M VISIT ADD ON: HCPCS | Performed by: FAMILY MEDICINE

## 2024-08-07 PROCEDURE — 99214 OFFICE O/P EST MOD 30 MIN: CPT | Performed by: FAMILY MEDICINE

## 2024-08-07 PROCEDURE — 1160F RVW MEDS BY RX/DR IN RCRD: CPT | Performed by: FAMILY MEDICINE

## 2024-08-07 PROCEDURE — 1126F AMNT PAIN NOTED NONE PRSNT: CPT | Performed by: FAMILY MEDICINE

## 2024-08-07 NOTE — PATIENT INSTRUCTIONS
"MyChart Tips:    Q-got can be a useful part of our patient care program and is a way for us to communicate lab results and for you to request refills.  Here is some information regarding the best way to use MusicAll for communication.       Examples of medical issues that are APPROPRIATE for Q-got:  -Follow-up on problems we have already addressed in a visit such as home testing results, blood pressure readings, glucose readings  -Questions that can be answered with a simple \"yes\" or \"no\"  -Please keep communication concise and to the point.  All other types of communication should be held for an office visit.    Communication that is NOT APPROPRIATE for Q-got:  -New problems, serious problems or urgent problems.  Urgent matters should be addressed by phone for advice, in the office, urgent care or the ER.  -Requesting Paxlovid or Antibiotics: These types of problems require an evaluation unless your provider approved this previously.    -MusicAll messages are not email.  Staff will check messages each weekday.  We strive for a 48-hour turnaround on messaging.    -MusicAll is not for private issues.  Messages are received first by our office staff.    Please be mindful that sometimes it may take longer to receive a response on MusicAll.  Many times of the year we have high volumes of messages coming into physician inboxes.      Please also be mindful that MusicAll messages become part of your permanent medical record.    We appreciate your respect for the limitations and boundaries of MusicAll.      Lab Results:  Please allow up to 7 business days for lab results to be sent through MusicAll to you before contacting your provider.  Sometimes we are waiting for results to get back from the lab and also your provider needs time to analyze them thoroughly before making recommendations.  Also, providers may be out of the office on vacation.      While the internet has great resources, it is not a substitute for " interpreting lab results.

## 2024-08-07 NOTE — PROGRESS NOTES
Chief Complaint  Hypertension, Hyperlipidemia, Hypothyroidism, and Prediabetes    Subjective        Geeta Butt presents to [facility]  History of Present Illness    She is here to be seen today for her hypertension, hyperlipidemia, hypothyroidism, prediabetes, back pain/neuropathy. She is taking all of her medication as below as prescribed.  PDMP reviewed for the gabapentin and appropriate. Denies side effects of the medications. Blood pressure is controlled in the office today.  Pain controlled.  She is back to having difficulty with her balance and lower extremity weakness.  She has been using Tylenol for her arthritis but apparently she is using Tylenol PM as well at night along with her gabapentin.  Her daughter was with her at the appointment today.  We discussed that it is not the best idea to use p.m. medications along with gabapentin due to the risk of falls, sedation and fatigue.      Current Outpatient Medications:     aspirin 81 MG EC tablet, Take 1 tablet by mouth Daily., Disp: , Rfl:     carvedilol (COREG) 12.5 MG tablet, TAKE 1 TABLET BY MOUTH TWICE A DAY WITH MEALS, Disp: 180 tablet, Rfl: 3    Cholecalciferol (VITAMIN D3) 50 MCG (2000 UT) capsule, TAKE 1 CAPSULE BY MOUTH DAILY. (Patient taking differently: Take 1 capsule by mouth Daily.), Disp: 100 capsule, Rfl: 1    coenzyme Q10 50 MG capsule capsule, Take 1 capsule by mouth Daily., Disp: , Rfl:     estradiol (ESTRACE) 0.1 MG/GM vaginal cream, Insert 2 g into the vagina As Needed., Disp: , Rfl:     Flarex 0.1 % ophthalmic suspension, , Disp: , Rfl:     fluorometholone (FML) 0.1 % ophthalmic suspension, Administer 1 drop into the left eye Daily., Disp: , Rfl:     fluticasone (FLONASE) 50 MCG/ACT nasal spray, 1 spray into the nostril(s) as directed by provider Daily., Disp: , Rfl:     Fluticasone-Umeclidin-Vilant (Trelegy Ellipta) 100-62.5-25 MCG/ACT inhaler, Inhale 1 puff Daily., Disp: 28 each, Rfl: 3    furosemide  "(LASIX) 40 MG tablet, TAKE 1 TABLET BY MOUTH EVERY DAY, Disp: 90 tablet, Rfl: 1    gabapentin (NEURONTIN) 100 MG capsule, TAKE 1 CAPSULE BY MOUTH 3 TIMES A DAY. OKAY TO TAKE 1 CAPSULE AT BEDTIME IF NEEDED FOR PAIN (Patient taking differently: Take 1 capsule by mouth 3 (Three) Times a Day. TAKE 1 CAPSULE BY MOUTH 3 TIMES A DAY. OKAY TO TAKE 1 CAPSULE AT BEDTIME IF NEEDED FOR PAIN), Disp: 120 capsule, Rfl: 5    hydrocortisone 2.5 % cream, As Needed. Takes it as needed, Disp: , Rfl:     latanoprost (XALATAN) 0.005 % ophthalmic solution, INSTILL 1 DROP INTO AFFECTED EYE EVERY DAY IN THE EVENING, Disp: , Rfl:     levothyroxine (SYNTHROID, LEVOTHROID) 50 MCG tablet, TAKE 1 TABLET BY MOUTH EVERY DAY, Disp: 90 tablet, Rfl: 4    Omega-3 Fatty Acids (fish oil) 1000 MG capsule capsule, Take 1 capsule by mouth Daily With Breakfast. Pt takes this every day., Disp: , Rfl:     Probiotic Product (PROBIOTIC DAILY PO), Take 1 capsule by mouth Daily., Disp: , Rfl:     RABEprazole (ACIPHEX) 20 MG EC tablet, TAKE 1 TABLET BY MOUTH EVERY DAY, Disp: 90 tablet, Rfl: 4    sodium chloride (CAYLA 128) 5 % ophthalmic solution, Administer 1 drop to the right eye As Needed., Disp: , Rfl:     sucralfate (CARAFATE) 1 GM/10ML suspension, Take 10 mL by mouth 3 (Three) Times a Day Before Meals., Disp: , Rfl:     valsartan (DIOVAN) 160 MG tablet, TAKE 1.5 TABLETS BY MOUTH DAILY. NEW DIRECTIONS, Disp: 135 tablet, Rfl: 0      Objective   Vital Signs:  /60 (BP Location: Left arm, Patient Position: Sitting, Cuff Size: Large Adult)   Pulse 60   Wt 74.4 kg (164 lb)   SpO2 96%   BMI 32.03 kg/m²   Estimated body mass index is 32.03 kg/m² as calculated from the following:    Height as of 6/20/24: 152.4 cm (60\").    Weight as of this encounter: 74.4 kg (164 lb).               Physical Exam  Vitals and nursing note reviewed.   Constitutional:       General: She is not in acute distress.     Appearance: Normal appearance.   Cardiovascular:      Rate and " Rhythm: Normal rate and regular rhythm.      Heart sounds: Normal heart sounds. No murmur heard.  Pulmonary:      Effort: Pulmonary effort is normal.      Breath sounds: Normal breath sounds.   Neurological:      Mental Status: She is alert.        Result Review :    The following data was reviewed by: Grzegorz Nguyen MD on 08/07/2024:  Common labs          11/9/2023    13:02 3/11/2024    13:08 3/12/2024    04:29   Common Labs   Glucose 122  104  113    BUN 32  20  16    Creatinine 1.22  0.91  0.85    Sodium 137  134  132    Potassium 4.6  4.4  4.6    Chloride 98  98  98    Calcium 9.6  10.1  9.5    Albumin 4.0      Total Bilirubin 0.2      Alkaline Phosphatase 55      AST (SGOT) 29      ALT (SGPT) 10      WBC 9.08  12.99  10.86    Hemoglobin 10.7  11.4  10.4    Hematocrit 32.8  35.2  32.4    Platelets 384  402  388    Total Cholesterol 237      Triglycerides 112      HDL Cholesterol 56      LDL Cholesterol  161      Hemoglobin A1C 5.9        TSH          11/9/2023    13:02   TSH   TSH 1.800                     Assessment and Plan     Diagnoses and all orders for this visit:    1. Essential hypertension (Primary)    2. Mixed hyperlipidemia    3. Acquired hypothyroidism    4. Prediabetes    5. DDD (degenerative disc disease), lumbosacral  -     Ambulatory Referral to Home Health    6. Neuropathy  -     Ambulatory Referral to Home Health    7. Weakness of both lower extremities  -     Ambulatory Referral to Home Health        Clinically stable chronic conditions as above.  Continue all medications as above.  Labs reviewed as above.  I think she would do well with some physical therapy and after discussing with the daughter I think home health would be a good idea to try to get her some physical therapy to strengthen her legs as best as possible.  She is using her rolling walker for longer distances.  I will also have her follow-up with a new primary care provider soon since I am leaving the practice.           Follow  Up     Return if symptoms worsen or fail to improve.  Patient was given instructions and counseling regarding her condition or for health maintenance advice. Please see specific information pulled into the AVS if appropriate.

## 2024-08-12 DIAGNOSIS — M51.37 DDD (DEGENERATIVE DISC DISEASE), LUMBOSACRAL: ICD-10-CM

## 2024-08-13 RX ORDER — GABAPENTIN 100 MG/1
CAPSULE ORAL
Qty: 120 CAPSULE | Refills: 2 | Status: SHIPPED | OUTPATIENT
Start: 2024-08-13

## 2024-08-13 NOTE — TELEPHONE ENCOUNTER
Rx Refill Note  Requested Prescriptions     Pending Prescriptions Disp Refills    gabapentin (NEURONTIN) 100 MG capsule [Pharmacy Med Name: GABAPENTIN 100 MG CAPSULE] 120 capsule      Sig: TAKE 1 CAPSULE BY MOUTH 3 TIMES A DAY. MAY TAKE 1 CAPSULE AT BEDTIME IF NEEDED FOR PAIN      Last office visit with prescribing clinician: 8/7/2024   Last telemedicine visit with prescribing clinician: Visit date not found   Next office visit with prescribing clinician: Visit date not found

## 2024-08-28 ENCOUNTER — TELEPHONE (OUTPATIENT)
Dept: INTERNAL MEDICINE | Facility: CLINIC | Age: 89
End: 2024-08-28
Payer: MEDICARE

## 2024-08-28 NOTE — TELEPHONE ENCOUNTER
Daisy, daughter of Pt called stating that she is taking her Mother (the Pt) to Urgent Care.  Monday, 08/26 Pt had a procedure, band aid place over puncture on left arm. Once removed, the skin came off with it.  Today, Wednesday 8/28 Skin appears red, raised, sore to the touch and infected.  Scheduled to see Lucinda 12/11/24

## 2024-09-05 ENCOUNTER — OFFICE VISIT (OUTPATIENT)
Dept: INTERNAL MEDICINE | Facility: CLINIC | Age: 89
End: 2024-09-05
Payer: MEDICARE

## 2024-09-05 VITALS
OXYGEN SATURATION: 98 % | BODY MASS INDEX: 32.39 KG/M2 | DIASTOLIC BLOOD PRESSURE: 78 MMHG | TEMPERATURE: 97.1 F | HEIGHT: 60 IN | HEART RATE: 78 BPM | WEIGHT: 165 LBS | SYSTOLIC BLOOD PRESSURE: 184 MMHG

## 2024-09-05 DIAGNOSIS — R33.9 RETENTION, URINE: Primary | ICD-10-CM

## 2024-09-05 DIAGNOSIS — I10 ESSENTIAL (PRIMARY) HYPERTENSION: Chronic | ICD-10-CM

## 2024-09-05 DIAGNOSIS — R82.90 ABNORMAL URINE FINDINGS: ICD-10-CM

## 2024-09-05 DIAGNOSIS — R23.8 SKIN IRRITATION: ICD-10-CM

## 2024-09-05 LAB
BILIRUB BLD-MCNC: NEGATIVE MG/DL
CLARITY, POC: ABNORMAL
COLOR UR: YELLOW
EXPIRATION DATE: ABNORMAL
GLUCOSE UR STRIP-MCNC: NEGATIVE MG/DL
KETONES UR QL: NEGATIVE
LEUKOCYTE EST, POC: ABNORMAL
Lab: ABNORMAL
NITRITE UR-MCNC: NEGATIVE MG/ML
PH UR: 6 [PH] (ref 5–8)
PROT UR STRIP-MCNC: NEGATIVE MG/DL
RBC # UR STRIP: NEGATIVE /UL
SP GR UR: 1.01 (ref 1–1.03)
UROBILINOGEN UR QL: ABNORMAL

## 2024-09-05 PROCEDURE — 81003 URINALYSIS AUTO W/O SCOPE: CPT

## 2024-09-05 PROCEDURE — 1126F AMNT PAIN NOTED NONE PRSNT: CPT

## 2024-09-05 PROCEDURE — 99213 OFFICE O/P EST LOW 20 MIN: CPT

## 2024-09-05 PROCEDURE — G2211 COMPLEX E/M VISIT ADD ON: HCPCS

## 2024-09-05 RX ORDER — CEPHALEXIN 500 MG/1
500 CAPSULE ORAL 2 TIMES DAILY
Qty: 14 CAPSULE | Refills: 0 | Status: SHIPPED | OUTPATIENT
Start: 2024-09-05 | End: 2024-09-05

## 2024-09-05 RX ORDER — CEPHALEXIN 500 MG/1
500 CAPSULE ORAL 2 TIMES DAILY
Qty: 14 CAPSULE | Refills: 0 | Status: SHIPPED | OUTPATIENT
Start: 2024-09-05 | End: 2024-09-20 | Stop reason: HOSPADM

## 2024-09-05 RX ORDER — VALSARTAN 160 MG/1
160 TABLET ORAL DAILY
Qty: 30 TABLET | Refills: 0 | Status: SHIPPED | OUTPATIENT
Start: 2024-09-05

## 2024-09-06 ENCOUNTER — APPOINTMENT (OUTPATIENT)
Dept: CARDIOLOGY | Facility: HOSPITAL | Age: 89
DRG: 470 | End: 2024-09-06
Payer: MEDICARE

## 2024-09-06 ENCOUNTER — APPOINTMENT (OUTPATIENT)
Dept: GENERAL RADIOLOGY | Facility: HOSPITAL | Age: 89
DRG: 470 | End: 2024-09-06
Payer: MEDICARE

## 2024-09-06 ENCOUNTER — HOSPITAL ENCOUNTER (INPATIENT)
Facility: HOSPITAL | Age: 89
LOS: 14 days | Discharge: NURSING, REHAB OR LONG-TERM CARE | DRG: 470 | End: 2024-09-20
Attending: EMERGENCY MEDICINE | Admitting: HOSPITALIST
Payer: MEDICARE

## 2024-09-06 DIAGNOSIS — M25.551 ACUTE RIGHT HIP PAIN: Primary | ICD-10-CM

## 2024-09-06 DIAGNOSIS — G89.29 CHRONIC RIGHT-SIDED LOW BACK PAIN WITH RIGHT-SIDED SCIATICA: ICD-10-CM

## 2024-09-06 DIAGNOSIS — M54.41 CHRONIC RIGHT-SIDED LOW BACK PAIN WITH RIGHT-SIDED SCIATICA: ICD-10-CM

## 2024-09-06 PROBLEM — I73.9 PVD (PERIPHERAL VASCULAR DISEASE): Status: ACTIVE | Noted: 2024-09-06

## 2024-09-06 PROBLEM — Z85.3 HISTORY OF BREAST CANCER: Status: ACTIVE | Noted: 2024-09-06

## 2024-09-06 LAB
ALBUMIN SERPL-MCNC: 4.2 G/DL (ref 3.5–5.2)
ALBUMIN/GLOB SERPL: 1.4 G/DL
ALP SERPL-CCNC: 72 U/L (ref 39–117)
ALT SERPL W P-5'-P-CCNC: 20 U/L (ref 1–33)
ANION GAP SERPL CALCULATED.3IONS-SCNC: 10.8 MMOL/L (ref 5–15)
AST SERPL-CCNC: 16 U/L (ref 1–32)
BACTERIA UR QL AUTO: ABNORMAL /HPF
BASOPHILS # BLD AUTO: 0.07 10*3/MM3 (ref 0–0.2)
BASOPHILS NFR BLD AUTO: 0.6 % (ref 0–1.5)
BH CV LOWER VASCULAR LEFT COMMON FEMORAL AUGMENT: NORMAL
BH CV LOWER VASCULAR LEFT COMMON FEMORAL COMPETENT: NORMAL
BH CV LOWER VASCULAR LEFT COMMON FEMORAL COMPRESS: NORMAL
BH CV LOWER VASCULAR LEFT COMMON FEMORAL PHASIC: NORMAL
BH CV LOWER VASCULAR LEFT COMMON FEMORAL SPONT: NORMAL
BH CV LOWER VASCULAR LEFT DISTAL FEMORAL COMPRESS: NORMAL
BH CV LOWER VASCULAR LEFT GASTRONEMIUS COMPRESS: NORMAL
BH CV LOWER VASCULAR LEFT LESSER SAPH COMPRESS: NORMAL
BH CV LOWER VASCULAR LEFT MID FEMORAL AUGMENT: NORMAL
BH CV LOWER VASCULAR LEFT MID FEMORAL COMPETENT: NORMAL
BH CV LOWER VASCULAR LEFT MID FEMORAL COMPRESS: NORMAL
BH CV LOWER VASCULAR LEFT MID FEMORAL PHASIC: NORMAL
BH CV LOWER VASCULAR LEFT MID FEMORAL SPONT: NORMAL
BH CV LOWER VASCULAR LEFT PERONEAL COMPRESS: NORMAL
BH CV LOWER VASCULAR LEFT POPLITEAL AUGMENT: NORMAL
BH CV LOWER VASCULAR LEFT POPLITEAL COMPETENT: NORMAL
BH CV LOWER VASCULAR LEFT POPLITEAL COMPRESS: NORMAL
BH CV LOWER VASCULAR LEFT POPLITEAL PHASIC: NORMAL
BH CV LOWER VASCULAR LEFT POPLITEAL SPONT: NORMAL
BH CV LOWER VASCULAR LEFT POSTERIOR TIBIAL COMPRESS: NORMAL
BH CV LOWER VASCULAR LEFT PROFUNDA FEMORAL COMPRESS: NORMAL
BH CV LOWER VASCULAR LEFT PROXIMAL FEMORAL COMPRESS: NORMAL
BH CV LOWER VASCULAR LEFT SAPHENOFEMORAL JUNCTION COMPRESS: NORMAL
BH CV LOWER VASCULAR RIGHT COMMON FEMORAL AUGMENT: NORMAL
BH CV LOWER VASCULAR RIGHT COMMON FEMORAL COMPETENT: NORMAL
BH CV LOWER VASCULAR RIGHT COMMON FEMORAL COMPRESS: NORMAL
BH CV LOWER VASCULAR RIGHT COMMON FEMORAL PHASIC: NORMAL
BH CV LOWER VASCULAR RIGHT COMMON FEMORAL SPONT: NORMAL
BH CV LOWER VASCULAR RIGHT DISTAL FEMORAL COMPRESS: NORMAL
BH CV LOWER VASCULAR RIGHT GASTRONEMIUS COMPRESS: NORMAL
BH CV LOWER VASCULAR RIGHT GREATER SAPH AK COMPRESS: NORMAL
BH CV LOWER VASCULAR RIGHT GREATER SAPH BK COMPRESS: NORMAL
BH CV LOWER VASCULAR RIGHT LESSER SAPH COMPRESS: NORMAL
BH CV LOWER VASCULAR RIGHT MID FEMORAL AUGMENT: NORMAL
BH CV LOWER VASCULAR RIGHT MID FEMORAL COMPETENT: NORMAL
BH CV LOWER VASCULAR RIGHT MID FEMORAL COMPRESS: NORMAL
BH CV LOWER VASCULAR RIGHT MID FEMORAL PHASIC: NORMAL
BH CV LOWER VASCULAR RIGHT MID FEMORAL SPONT: NORMAL
BH CV LOWER VASCULAR RIGHT PERONEAL COMPRESS: NORMAL
BH CV LOWER VASCULAR RIGHT POPLITEAL AUGMENT: NORMAL
BH CV LOWER VASCULAR RIGHT POPLITEAL COMPETENT: NORMAL
BH CV LOWER VASCULAR RIGHT POPLITEAL COMPRESS: NORMAL
BH CV LOWER VASCULAR RIGHT POPLITEAL PHASIC: NORMAL
BH CV LOWER VASCULAR RIGHT POPLITEAL SPONT: NORMAL
BH CV LOWER VASCULAR RIGHT POSTERIOR TIBIAL COMPRESS: NORMAL
BH CV LOWER VASCULAR RIGHT PROFUNDA FEMORAL COMPRESS: NORMAL
BH CV LOWER VASCULAR RIGHT PROXIMAL FEMORAL COMPRESS: NORMAL
BH CV LOWER VASCULAR RIGHT SAPHENOFEMORAL JUNCTION COMPRESS: NORMAL
BILIRUB SERPL-MCNC: 0.3 MG/DL (ref 0–1.2)
BILIRUB UR QL STRIP: NEGATIVE
BUN SERPL-MCNC: 38 MG/DL (ref 8–23)
BUN/CREAT SERPL: 41.3 (ref 7–25)
CALCIUM SPEC-SCNC: 10.1 MG/DL (ref 8.6–10.5)
CHLORIDE SERPL-SCNC: 95 MMOL/L (ref 98–107)
CLARITY UR: CLEAR
CO2 SERPL-SCNC: 25.2 MMOL/L (ref 22–29)
COLOR UR: YELLOW
CREAT SERPL-MCNC: 0.92 MG/DL (ref 0.57–1)
DEPRECATED RDW RBC AUTO: 42.2 FL (ref 37–54)
EGFRCR SERPLBLD CKD-EPI 2021: 60 ML/MIN/1.73
EOSINOPHIL # BLD AUTO: 0.18 10*3/MM3 (ref 0–0.4)
EOSINOPHIL NFR BLD AUTO: 1.6 % (ref 0.3–6.2)
ERYTHROCYTE [DISTWIDTH] IN BLOOD BY AUTOMATED COUNT: 13 % (ref 12.3–15.4)
GLOBULIN UR ELPH-MCNC: 3 GM/DL
GLUCOSE SERPL-MCNC: 105 MG/DL (ref 65–99)
GLUCOSE UR STRIP-MCNC: NEGATIVE MG/DL
HBA1C MFR BLD: 5.8 % (ref 4.8–5.6)
HCT VFR BLD AUTO: 34.3 % (ref 34–46.6)
HGB BLD-MCNC: 11.3 G/DL (ref 12–15.9)
HGB UR QL STRIP.AUTO: NEGATIVE
HYALINE CASTS UR QL AUTO: ABNORMAL /LPF
IMM GRANULOCYTES # BLD AUTO: 0.18 10*3/MM3 (ref 0–0.05)
IMM GRANULOCYTES NFR BLD AUTO: 1.6 % (ref 0–0.5)
KETONES UR QL STRIP: NEGATIVE
LEUKOCYTE ESTERASE UR QL STRIP.AUTO: ABNORMAL
LYMPHOCYTES # BLD AUTO: 1.48 10*3/MM3 (ref 0.7–3.1)
LYMPHOCYTES NFR BLD AUTO: 12.9 % (ref 19.6–45.3)
MCH RBC QN AUTO: 29.4 PG (ref 26.6–33)
MCHC RBC AUTO-ENTMCNC: 32.9 G/DL (ref 31.5–35.7)
MCV RBC AUTO: 89.1 FL (ref 79–97)
MONOCYTES # BLD AUTO: 0.94 10*3/MM3 (ref 0.1–0.9)
MONOCYTES NFR BLD AUTO: 8.2 % (ref 5–12)
NEUTROPHILS NFR BLD AUTO: 75.1 % (ref 42.7–76)
NEUTROPHILS NFR BLD AUTO: 8.6 10*3/MM3 (ref 1.7–7)
NITRITE UR QL STRIP: NEGATIVE
NRBC BLD AUTO-RTO: 0 /100 WBC (ref 0–0.2)
PH UR STRIP.AUTO: 7 [PH] (ref 5–8)
PLATELET # BLD AUTO: 433 10*3/MM3 (ref 140–450)
PMV BLD AUTO: 8.5 FL (ref 6–12)
POTASSIUM SERPL-SCNC: 4.3 MMOL/L (ref 3.5–5.2)
PROT SERPL-MCNC: 7.2 G/DL (ref 6–8.5)
PROT UR QL STRIP: NEGATIVE
RBC # BLD AUTO: 3.85 10*6/MM3 (ref 3.77–5.28)
RBC # UR STRIP: ABNORMAL /HPF
REF LAB TEST METHOD: ABNORMAL
SODIUM SERPL-SCNC: 131 MMOL/L (ref 136–145)
SP GR UR STRIP: 1.01 (ref 1–1.03)
SQUAMOUS #/AREA URNS HPF: ABNORMAL /HPF
UROBILINOGEN UR QL STRIP: ABNORMAL
WBC # UR STRIP: ABNORMAL /HPF
WBC NRBC COR # BLD AUTO: 11.45 10*3/MM3 (ref 3.4–10.8)

## 2024-09-06 PROCEDURE — 81001 URINALYSIS AUTO W/SCOPE: CPT | Performed by: HOSPITALIST

## 2024-09-06 PROCEDURE — 25010000002 MORPHINE PER 10 MG: Performed by: HOSPITALIST

## 2024-09-06 PROCEDURE — 99285 EMERGENCY DEPT VISIT HI MDM: CPT

## 2024-09-06 PROCEDURE — 99222 1ST HOSP IP/OBS MODERATE 55: CPT

## 2024-09-06 PROCEDURE — 73502 X-RAY EXAM HIP UNI 2-3 VIEWS: CPT

## 2024-09-06 PROCEDURE — 83036 HEMOGLOBIN GLYCOSYLATED A1C: CPT | Performed by: HOSPITALIST

## 2024-09-06 PROCEDURE — 25010000002 MORPHINE PER 10 MG: Performed by: EMERGENCY MEDICINE

## 2024-09-06 PROCEDURE — 93970 EXTREMITY STUDY: CPT

## 2024-09-06 PROCEDURE — 80053 COMPREHEN METABOLIC PANEL: CPT | Performed by: EMERGENCY MEDICINE

## 2024-09-06 PROCEDURE — 93970 EXTREMITY STUDY: CPT | Performed by: SURGERY

## 2024-09-06 PROCEDURE — 85025 COMPLETE CBC W/AUTO DIFF WBC: CPT | Performed by: EMERGENCY MEDICINE

## 2024-09-06 RX ORDER — SODIUM CHLORIDE 9 MG/ML
40 INJECTION, SOLUTION INTRAVENOUS AS NEEDED
Status: DISCONTINUED | OUTPATIENT
Start: 2024-09-06 | End: 2024-09-10

## 2024-09-06 RX ORDER — HYDROCODONE BITARTRATE AND ACETAMINOPHEN 5; 325 MG/1; MG/1
1 TABLET ORAL EVERY 4 HOURS PRN
Status: DISCONTINUED | OUTPATIENT
Start: 2024-09-06 | End: 2024-09-10

## 2024-09-06 RX ORDER — SODIUM CHLORIDE 0.9 % (FLUSH) 0.9 %
10 SYRINGE (ML) INJECTION AS NEEDED
Status: DISCONTINUED | OUTPATIENT
Start: 2024-09-06 | End: 2024-09-10

## 2024-09-06 RX ORDER — ACETAMINOPHEN 500 MG
500 TABLET ORAL EVERY 6 HOURS
Status: DISCONTINUED | OUTPATIENT
Start: 2024-09-06 | End: 2024-09-20 | Stop reason: HOSPADM

## 2024-09-06 RX ORDER — ONDANSETRON 2 MG/ML
4 INJECTION INTRAMUSCULAR; INTRAVENOUS EVERY 6 HOURS PRN
Status: DISCONTINUED | OUTPATIENT
Start: 2024-09-06 | End: 2024-09-09

## 2024-09-06 RX ORDER — AMOXICILLIN 250 MG
2 CAPSULE ORAL 2 TIMES DAILY PRN
Status: DISCONTINUED | OUTPATIENT
Start: 2024-09-06 | End: 2024-09-07

## 2024-09-06 RX ORDER — BISACODYL 10 MG
10 SUPPOSITORY, RECTAL RECTAL DAILY PRN
Status: DISCONTINUED | OUTPATIENT
Start: 2024-09-06 | End: 2024-09-07

## 2024-09-06 RX ORDER — SODIUM CHLORIDE 0.9 % (FLUSH) 0.9 %
10 SYRINGE (ML) INJECTION EVERY 12 HOURS SCHEDULED
Status: DISCONTINUED | OUTPATIENT
Start: 2024-09-06 | End: 2024-09-10

## 2024-09-06 RX ORDER — MORPHINE SULFATE 2 MG/ML
2 INJECTION, SOLUTION INTRAMUSCULAR; INTRAVENOUS EVERY 4 HOURS PRN
Status: DISCONTINUED | OUTPATIENT
Start: 2024-09-06 | End: 2024-09-10

## 2024-09-06 RX ORDER — LEVOTHYROXINE SODIUM 50 UG/1
50 TABLET ORAL
Status: DISCONTINUED | OUTPATIENT
Start: 2024-09-07 | End: 2024-09-18

## 2024-09-06 RX ORDER — POLYETHYLENE GLYCOL 3350 17 G/17G
17 POWDER, FOR SOLUTION ORAL DAILY PRN
Status: DISCONTINUED | OUTPATIENT
Start: 2024-09-06 | End: 2024-09-07

## 2024-09-06 RX ORDER — MORPHINE SULFATE 2 MG/ML
2 INJECTION, SOLUTION INTRAMUSCULAR; INTRAVENOUS ONCE
Status: COMPLETED | OUTPATIENT
Start: 2024-09-06 | End: 2024-09-06

## 2024-09-06 RX ORDER — BISACODYL 5 MG/1
5 TABLET, DELAYED RELEASE ORAL DAILY PRN
Status: DISCONTINUED | OUTPATIENT
Start: 2024-09-06 | End: 2024-09-07

## 2024-09-06 RX ORDER — CLONIDINE HYDROCHLORIDE 0.1 MG/1
0.1 TABLET ORAL ONCE
Status: COMPLETED | OUTPATIENT
Start: 2024-09-06 | End: 2024-09-06

## 2024-09-06 RX ORDER — CARVEDILOL 12.5 MG/1
12.5 TABLET ORAL 2 TIMES DAILY WITH MEALS
Status: DISCONTINUED | OUTPATIENT
Start: 2024-09-07 | End: 2024-09-11

## 2024-09-06 RX ORDER — GABAPENTIN 100 MG/1
100 CAPSULE ORAL 3 TIMES DAILY
Status: DISCONTINUED | OUTPATIENT
Start: 2024-09-06 | End: 2024-09-10

## 2024-09-06 RX ADMIN — GABAPENTIN 100 MG: 100 CAPSULE ORAL at 14:42

## 2024-09-06 RX ADMIN — Medication 10 ML: at 13:58

## 2024-09-06 RX ADMIN — HYDROCODONE BITARTRATE AND ACETAMINOPHEN 1 TABLET: 5; 325 TABLET ORAL at 23:55

## 2024-09-06 RX ADMIN — HYDROCODONE BITARTRATE AND ACETAMINOPHEN 1 TABLET: 5; 325 TABLET ORAL at 19:38

## 2024-09-06 RX ADMIN — MORPHINE SULFATE 2 MG: 2 INJECTION, SOLUTION INTRAMUSCULAR; INTRAVENOUS at 22:10

## 2024-09-06 RX ADMIN — ACETAMINOPHEN 500 MG: 500 TABLET ORAL at 13:48

## 2024-09-06 RX ADMIN — MORPHINE SULFATE 2 MG: 2 INJECTION, SOLUTION INTRAMUSCULAR; INTRAVENOUS at 11:18

## 2024-09-06 RX ADMIN — MORPHINE SULFATE 2 MG: 2 INJECTION, SOLUTION INTRAMUSCULAR; INTRAVENOUS at 18:10

## 2024-09-06 RX ADMIN — GABAPENTIN 100 MG: 100 CAPSULE ORAL at 20:10

## 2024-09-06 RX ADMIN — ACETAMINOPHEN 500 MG: 500 TABLET ORAL at 20:10

## 2024-09-06 RX ADMIN — HYDROCODONE BITARTRATE AND ACETAMINOPHEN 1 TABLET: 5; 325 TABLET ORAL at 14:42

## 2024-09-06 RX ADMIN — CLONIDINE HYDROCHLORIDE 0.1 MG: 0.1 TABLET ORAL at 11:18

## 2024-09-06 RX ADMIN — Medication 10 ML: at 20:10

## 2024-09-07 ENCOUNTER — APPOINTMENT (OUTPATIENT)
Dept: CT IMAGING | Facility: HOSPITAL | Age: 89
DRG: 470 | End: 2024-09-07
Payer: MEDICARE

## 2024-09-07 ENCOUNTER — APPOINTMENT (OUTPATIENT)
Dept: MRI IMAGING | Facility: HOSPITAL | Age: 89
DRG: 470 | End: 2024-09-07
Payer: MEDICARE

## 2024-09-07 LAB
ANION GAP SERPL CALCULATED.3IONS-SCNC: 10 MMOL/L (ref 5–15)
BASOPHILS # BLD AUTO: 0.07 10*3/MM3 (ref 0–0.2)
BASOPHILS NFR BLD AUTO: 0.7 % (ref 0–1.5)
BUN SERPL-MCNC: 27 MG/DL (ref 8–23)
BUN/CREAT SERPL: 34.6 (ref 7–25)
CALCIUM SPEC-SCNC: 9.8 MG/DL (ref 8.6–10.5)
CHLORIDE SERPL-SCNC: 93 MMOL/L (ref 98–107)
CO2 SERPL-SCNC: 24 MMOL/L (ref 22–29)
CORTIS SERPL-MCNC: 0.95 MCG/DL
CREAT SERPL-MCNC: 0.78 MG/DL (ref 0.57–1)
DEPRECATED RDW RBC AUTO: 41.3 FL (ref 37–54)
EGFRCR SERPLBLD CKD-EPI 2021: 73.2 ML/MIN/1.73
EOSINOPHIL # BLD AUTO: 0.17 10*3/MM3 (ref 0–0.4)
EOSINOPHIL NFR BLD AUTO: 1.7 % (ref 0.3–6.2)
ERYTHROCYTE [DISTWIDTH] IN BLOOD BY AUTOMATED COUNT: 12.9 % (ref 12.3–15.4)
GLUCOSE SERPL-MCNC: 134 MG/DL (ref 65–99)
HCT VFR BLD AUTO: 31.5 % (ref 34–46.6)
HGB BLD-MCNC: 10.5 G/DL (ref 12–15.9)
IMM GRANULOCYTES # BLD AUTO: 0.12 10*3/MM3 (ref 0–0.05)
IMM GRANULOCYTES NFR BLD AUTO: 1.2 % (ref 0–0.5)
LYMPHOCYTES # BLD AUTO: 1.05 10*3/MM3 (ref 0.7–3.1)
LYMPHOCYTES NFR BLD AUTO: 10.4 % (ref 19.6–45.3)
MCH RBC QN AUTO: 29.3 PG (ref 26.6–33)
MCHC RBC AUTO-ENTMCNC: 33.3 G/DL (ref 31.5–35.7)
MCV RBC AUTO: 88 FL (ref 79–97)
MONOCYTES # BLD AUTO: 0.85 10*3/MM3 (ref 0.1–0.9)
MONOCYTES NFR BLD AUTO: 8.4 % (ref 5–12)
NEUTROPHILS NFR BLD AUTO: 7.81 10*3/MM3 (ref 1.7–7)
NEUTROPHILS NFR BLD AUTO: 77.6 % (ref 42.7–76)
NRBC BLD AUTO-RTO: 0 /100 WBC (ref 0–0.2)
OSMOLALITY SERPL: 279 MOSM/KG (ref 280–301)
OSMOLALITY UR: 339 MOSM/KG
PLATELET # BLD AUTO: 405 10*3/MM3 (ref 140–450)
PMV BLD AUTO: 8.7 FL (ref 6–12)
POTASSIUM SERPL-SCNC: 4.4 MMOL/L (ref 3.5–5.2)
RBC # BLD AUTO: 3.58 10*6/MM3 (ref 3.77–5.28)
SODIUM SERPL-SCNC: 127 MMOL/L (ref 136–145)
SODIUM UR-SCNC: 37 MMOL/L
TSH SERPL DL<=0.05 MIU/L-ACNC: 2.99 UIU/ML (ref 0.27–4.2)
WBC NRBC COR # BLD AUTO: 10.07 10*3/MM3 (ref 3.4–10.8)

## 2024-09-07 PROCEDURE — 80048 BASIC METABOLIC PNL TOTAL CA: CPT | Performed by: HOSPITALIST

## 2024-09-07 PROCEDURE — 85025 COMPLETE CBC W/AUTO DIFF WBC: CPT | Performed by: HOSPITALIST

## 2024-09-07 PROCEDURE — 82533 TOTAL CORTISOL: CPT | Performed by: HOSPITALIST

## 2024-09-07 PROCEDURE — 25010000002 ONDANSETRON PER 1 MG: Performed by: HOSPITALIST

## 2024-09-07 PROCEDURE — 94640 AIRWAY INHALATION TREATMENT: CPT

## 2024-09-07 PROCEDURE — 36415 COLL VENOUS BLD VENIPUNCTURE: CPT | Performed by: HOSPITALIST

## 2024-09-07 PROCEDURE — 72148 MRI LUMBAR SPINE W/O DYE: CPT

## 2024-09-07 PROCEDURE — 99231 SBSQ HOSP IP/OBS SF/LOW 25: CPT | Performed by: NURSE PRACTITIONER

## 2024-09-07 PROCEDURE — 83935 ASSAY OF URINE OSMOLALITY: CPT | Performed by: HOSPITALIST

## 2024-09-07 PROCEDURE — 25010000002 MORPHINE PER 10 MG: Performed by: HOSPITALIST

## 2024-09-07 PROCEDURE — 74176 CT ABD & PELVIS W/O CONTRAST: CPT

## 2024-09-07 PROCEDURE — 84443 ASSAY THYROID STIM HORMONE: CPT | Performed by: HOSPITALIST

## 2024-09-07 PROCEDURE — 84300 ASSAY OF URINE SODIUM: CPT | Performed by: HOSPITALIST

## 2024-09-07 PROCEDURE — 83930 ASSAY OF BLOOD OSMOLALITY: CPT | Performed by: HOSPITALIST

## 2024-09-07 RX ORDER — POLYETHYLENE GLYCOL 3350 17 G/17G
17 POWDER, FOR SOLUTION ORAL DAILY PRN
Status: DISCONTINUED | OUTPATIENT
Start: 2024-09-07 | End: 2024-09-10

## 2024-09-07 RX ORDER — CEPHALEXIN 500 MG/1
500 CAPSULE ORAL 2 TIMES DAILY
Status: DISCONTINUED | OUTPATIENT
Start: 2024-09-07 | End: 2024-09-07

## 2024-09-07 RX ORDER — SUCRALFATE 1 G/1
1 TABLET ORAL
Status: DISCONTINUED | OUTPATIENT
Start: 2024-09-07 | End: 2024-09-15

## 2024-09-07 RX ORDER — BUDESONIDE AND FORMOTEROL FUMARATE DIHYDRATE 160; 4.5 UG/1; UG/1
2 AEROSOL RESPIRATORY (INHALATION)
Status: DISCONTINUED | OUTPATIENT
Start: 2024-09-07 | End: 2024-09-20 | Stop reason: HOSPADM

## 2024-09-07 RX ORDER — BISACODYL 10 MG
10 SUPPOSITORY, RECTAL RECTAL DAILY PRN
Status: DISCONTINUED | OUTPATIENT
Start: 2024-09-07 | End: 2024-09-10

## 2024-09-07 RX ORDER — PANTOPRAZOLE SODIUM 40 MG/1
40 TABLET, DELAYED RELEASE ORAL
Status: DISCONTINUED | OUTPATIENT
Start: 2024-09-07 | End: 2024-09-13

## 2024-09-07 RX ORDER — BISACODYL 5 MG/1
5 TABLET, DELAYED RELEASE ORAL DAILY PRN
Status: DISCONTINUED | OUTPATIENT
Start: 2024-09-07 | End: 2024-09-10

## 2024-09-07 RX ORDER — ERYTHROMYCIN 5 MG/G
1 OINTMENT OPHTHALMIC NIGHTLY
Status: DISCONTINUED | OUTPATIENT
Start: 2024-09-07 | End: 2024-09-20 | Stop reason: HOSPADM

## 2024-09-07 RX ORDER — AMOXICILLIN 250 MG
2 CAPSULE ORAL 2 TIMES DAILY
Status: DISCONTINUED | OUTPATIENT
Start: 2024-09-07 | End: 2024-09-10

## 2024-09-07 RX ORDER — MUPIROCIN 20 MG/G
1 OINTMENT TOPICAL 2 TIMES DAILY
Status: DISPENSED | OUTPATIENT
Start: 2024-09-07 | End: 2024-09-11

## 2024-09-07 RX ORDER — CEPHALEXIN 500 MG/1
500 CAPSULE ORAL 3 TIMES DAILY
Status: DISPENSED | OUTPATIENT
Start: 2024-09-07 | End: 2024-09-11

## 2024-09-07 RX ORDER — FLUOROMETHOLONE 0.1 %
1 SUSPENSION, DROPS(FINAL DOSAGE FORM)(ML) OPHTHALMIC (EYE) DAILY
Status: DISCONTINUED | OUTPATIENT
Start: 2024-09-07 | End: 2024-09-20 | Stop reason: HOSPADM

## 2024-09-07 RX ORDER — FLUTICASONE PROPIONATE 50 UG/1
1 SPRAY, METERED NASAL DAILY
Status: DISCONTINUED | OUTPATIENT
Start: 2024-09-07 | End: 2024-09-20 | Stop reason: HOSPADM

## 2024-09-07 RX ORDER — VALSARTAN 160 MG/1
160 TABLET ORAL DAILY
Status: DISCONTINUED | OUTPATIENT
Start: 2024-09-07 | End: 2024-09-08

## 2024-09-07 RX ORDER — MORPHINE SULFATE 2 MG/ML
2 INJECTION, SOLUTION INTRAMUSCULAR; INTRAVENOUS ONCE
Status: COMPLETED | OUTPATIENT
Start: 2024-09-07 | End: 2024-09-07

## 2024-09-07 RX ORDER — FUROSEMIDE 40 MG
40 TABLET ORAL DAILY
Status: DISCONTINUED | OUTPATIENT
Start: 2024-09-07 | End: 2024-09-08

## 2024-09-07 RX ADMIN — HYDROCODONE BITARTRATE AND ACETAMINOPHEN 1 TABLET: 5; 325 TABLET ORAL at 04:09

## 2024-09-07 RX ADMIN — GABAPENTIN 100 MG: 100 CAPSULE ORAL at 17:05

## 2024-09-07 RX ADMIN — VALSARTAN 160 MG: 160 TABLET, FILM COATED ORAL at 09:37

## 2024-09-07 RX ADMIN — ACETAMINOPHEN 500 MG: 500 TABLET ORAL at 12:33

## 2024-09-07 RX ADMIN — PANTOPRAZOLE SODIUM 40 MG: 40 TABLET, DELAYED RELEASE ORAL at 06:43

## 2024-09-07 RX ADMIN — ERYTHROMYCIN 1 APPLICATION: 5 OINTMENT OPHTHALMIC at 21:11

## 2024-09-07 RX ADMIN — MORPHINE SULFATE 2 MG: 2 INJECTION, SOLUTION INTRAMUSCULAR; INTRAVENOUS at 02:05

## 2024-09-07 RX ADMIN — CARVEDILOL 12.5 MG: 12.5 TABLET, FILM COATED ORAL at 17:05

## 2024-09-07 RX ADMIN — MUPIROCIN 1 APPLICATION: 20 OINTMENT TOPICAL at 09:37

## 2024-09-07 RX ADMIN — SENNOSIDES AND DOCUSATE SODIUM 2 TABLET: 50; 8.6 TABLET ORAL at 21:09

## 2024-09-07 RX ADMIN — MORPHINE SULFATE 2 MG: 2 INJECTION, SOLUTION INTRAMUSCULAR; INTRAVENOUS at 06:43

## 2024-09-07 RX ADMIN — ONDANSETRON 4 MG: 2 INJECTION INTRAMUSCULAR; INTRAVENOUS at 04:11

## 2024-09-07 RX ADMIN — MORPHINE SULFATE 2 MG: 2 INJECTION, SOLUTION INTRAMUSCULAR; INTRAVENOUS at 09:36

## 2024-09-07 RX ADMIN — CEPHALEXIN 500 MG: 500 CAPSULE ORAL at 21:11

## 2024-09-07 RX ADMIN — CEPHALEXIN 500 MG: 500 CAPSULE ORAL at 17:05

## 2024-09-07 RX ADMIN — CEPHALEXIN 500 MG: 500 CAPSULE ORAL at 09:36

## 2024-09-07 RX ADMIN — Medication 10 ML: at 21:12

## 2024-09-07 RX ADMIN — GABAPENTIN 100 MG: 100 CAPSULE ORAL at 09:15

## 2024-09-07 RX ADMIN — FLUTICASONE PROPIONATE 1 SPRAY: 50 SPRAY, METERED NASAL at 09:36

## 2024-09-07 RX ADMIN — MUPIROCIN 1 APPLICATION: 20 OINTMENT TOPICAL at 21:11

## 2024-09-07 RX ADMIN — HYDROCODONE BITARTRATE AND ACETAMINOPHEN 1 TABLET: 5; 325 TABLET ORAL at 14:16

## 2024-09-07 RX ADMIN — Medication 10 ML: at 09:17

## 2024-09-07 RX ADMIN — GABAPENTIN 100 MG: 100 CAPSULE ORAL at 21:09

## 2024-09-07 RX ADMIN — ACETAMINOPHEN 500 MG: 500 TABLET ORAL at 06:43

## 2024-09-07 RX ADMIN — HYDROCODONE BITARTRATE AND ACETAMINOPHEN 1 TABLET: 5; 325 TABLET ORAL at 09:15

## 2024-09-07 RX ADMIN — SUCRALFATE 1 G: 1 TABLET ORAL at 09:16

## 2024-09-07 RX ADMIN — SUCRALFATE 1 G: 1 TABLET ORAL at 17:09

## 2024-09-07 RX ADMIN — SENNOSIDES AND DOCUSATE SODIUM 2 TABLET: 50; 8.6 TABLET ORAL at 09:15

## 2024-09-07 RX ADMIN — MORPHINE SULFATE 2 MG: 2 INJECTION, SOLUTION INTRAMUSCULAR; INTRAVENOUS at 14:44

## 2024-09-07 RX ADMIN — CARVEDILOL 12.5 MG: 12.5 TABLET, FILM COATED ORAL at 09:15

## 2024-09-07 RX ADMIN — FUROSEMIDE 40 MG: 40 TABLET ORAL at 12:33

## 2024-09-08 LAB
ANION GAP SERPL CALCULATED.3IONS-SCNC: 8 MMOL/L (ref 5–15)
ANION GAP SERPL CALCULATED.3IONS-SCNC: 8 MMOL/L (ref 5–15)
BASOPHILS # BLD AUTO: 0.06 10*3/MM3 (ref 0–0.2)
BASOPHILS NFR BLD AUTO: 0.6 % (ref 0–1.5)
BUN SERPL-MCNC: 39 MG/DL (ref 8–23)
BUN SERPL-MCNC: 40 MG/DL (ref 8–23)
BUN/CREAT SERPL: 27.1 (ref 7–25)
BUN/CREAT SERPL: 31.5 (ref 7–25)
CALCIUM SPEC-SCNC: 9.4 MG/DL (ref 8.6–10.5)
CALCIUM SPEC-SCNC: 9.5 MG/DL (ref 8.6–10.5)
CHLORIDE SERPL-SCNC: 92 MMOL/L (ref 98–107)
CHLORIDE SERPL-SCNC: 96 MMOL/L (ref 98–107)
CO2 SERPL-SCNC: 25 MMOL/L (ref 22–29)
CO2 SERPL-SCNC: 26 MMOL/L (ref 22–29)
CREAT SERPL-MCNC: 1.27 MG/DL (ref 0.57–1)
CREAT SERPL-MCNC: 1.44 MG/DL (ref 0.57–1)
DEPRECATED RDW RBC AUTO: 43.5 FL (ref 37–54)
EGFRCR SERPLBLD CKD-EPI 2021: 35.1 ML/MIN/1.73
EGFRCR SERPLBLD CKD-EPI 2021: 40.8 ML/MIN/1.73
EOSINOPHIL # BLD AUTO: 0.19 10*3/MM3 (ref 0–0.4)
EOSINOPHIL NFR BLD AUTO: 1.9 % (ref 0.3–6.2)
EOSINOPHIL SPEC QL MICRO: 0 % EOS/100 CELLS (ref 0–0)
ERYTHROCYTE [DISTWIDTH] IN BLOOD BY AUTOMATED COUNT: 13.1 % (ref 12.3–15.4)
GLUCOSE SERPL-MCNC: 114 MG/DL (ref 65–99)
GLUCOSE SERPL-MCNC: 136 MG/DL (ref 65–99)
HCT VFR BLD AUTO: 31.8 % (ref 34–46.6)
HGB BLD-MCNC: 10.4 G/DL (ref 12–15.9)
IMM GRANULOCYTES # BLD AUTO: 0.13 10*3/MM3 (ref 0–0.05)
IMM GRANULOCYTES NFR BLD AUTO: 1.3 % (ref 0–0.5)
LYMPHOCYTES # BLD AUTO: 1.23 10*3/MM3 (ref 0.7–3.1)
LYMPHOCYTES NFR BLD AUTO: 12.2 % (ref 19.6–45.3)
MCH RBC QN AUTO: 29.8 PG (ref 26.6–33)
MCHC RBC AUTO-ENTMCNC: 32.7 G/DL (ref 31.5–35.7)
MCV RBC AUTO: 91.1 FL (ref 79–97)
MONOCYTES # BLD AUTO: 0.93 10*3/MM3 (ref 0.1–0.9)
MONOCYTES NFR BLD AUTO: 9.2 % (ref 5–12)
NEUTROPHILS NFR BLD AUTO: 7.54 10*3/MM3 (ref 1.7–7)
NEUTROPHILS NFR BLD AUTO: 74.8 % (ref 42.7–76)
NRBC BLD AUTO-RTO: 0 /100 WBC (ref 0–0.2)
PLATELET # BLD AUTO: 364 10*3/MM3 (ref 140–450)
PMV BLD AUTO: 8.9 FL (ref 6–12)
POTASSIUM SERPL-SCNC: 4.7 MMOL/L (ref 3.5–5.2)
POTASSIUM SERPL-SCNC: 5.3 MMOL/L (ref 3.5–5.2)
RBC # BLD AUTO: 3.49 10*6/MM3 (ref 3.77–5.28)
SODIUM SERPL-SCNC: 126 MMOL/L (ref 136–145)
SODIUM SERPL-SCNC: 129 MMOL/L (ref 136–145)
URATE SERPL-MCNC: 8.6 MG/DL (ref 2.4–5.7)
WBC NRBC COR # BLD AUTO: 10.08 10*3/MM3 (ref 3.4–10.8)

## 2024-09-08 PROCEDURE — 36415 COLL VENOUS BLD VENIPUNCTURE: CPT | Performed by: HOSPITALIST

## 2024-09-08 PROCEDURE — 84550 ASSAY OF BLOOD/URIC ACID: CPT | Performed by: INTERNAL MEDICINE

## 2024-09-08 PROCEDURE — 25010000002 MORPHINE PER 10 MG: Performed by: HOSPITALIST

## 2024-09-08 PROCEDURE — 87205 SMEAR GRAM STAIN: CPT | Performed by: HOSPITALIST

## 2024-09-08 PROCEDURE — 85025 COMPLETE CBC W/AUTO DIFF WBC: CPT | Performed by: HOSPITALIST

## 2024-09-08 PROCEDURE — 94799 UNLISTED PULMONARY SVC/PX: CPT

## 2024-09-08 PROCEDURE — 25810000003 SODIUM CHLORIDE 0.9 % SOLUTION: Performed by: HOSPITALIST

## 2024-09-08 PROCEDURE — 80048 BASIC METABOLIC PNL TOTAL CA: CPT | Performed by: HOSPITALIST

## 2024-09-08 RX ORDER — SODIUM CHLORIDE 9 MG/ML
75 INJECTION, SOLUTION INTRAVENOUS CONTINUOUS
Status: DISCONTINUED | OUTPATIENT
Start: 2024-09-08 | End: 2024-09-08

## 2024-09-08 RX ADMIN — GABAPENTIN 100 MG: 100 CAPSULE ORAL at 22:09

## 2024-09-08 RX ADMIN — MORPHINE SULFATE 2 MG: 2 INJECTION, SOLUTION INTRAMUSCULAR; INTRAVENOUS at 06:07

## 2024-09-08 RX ADMIN — CARVEDILOL 12.5 MG: 12.5 TABLET, FILM COATED ORAL at 17:49

## 2024-09-08 RX ADMIN — CEPHALEXIN 500 MG: 500 CAPSULE ORAL at 09:42

## 2024-09-08 RX ADMIN — MUPIROCIN 1 APPLICATION: 20 OINTMENT TOPICAL at 09:44

## 2024-09-08 RX ADMIN — SUCRALFATE 1 G: 1 TABLET ORAL at 07:15

## 2024-09-08 RX ADMIN — CARVEDILOL 12.5 MG: 12.5 TABLET, FILM COATED ORAL at 07:19

## 2024-09-08 RX ADMIN — PANTOPRAZOLE SODIUM 40 MG: 40 TABLET, DELAYED RELEASE ORAL at 06:02

## 2024-09-08 RX ADMIN — ERYTHROMYCIN 1 APPLICATION: 5 OINTMENT OPHTHALMIC at 22:09

## 2024-09-08 RX ADMIN — CEPHALEXIN 500 MG: 500 CAPSULE ORAL at 15:25

## 2024-09-08 RX ADMIN — HYDROCODONE BITARTRATE AND ACETAMINOPHEN 1 TABLET: 5; 325 TABLET ORAL at 19:37

## 2024-09-08 RX ADMIN — LEVOTHYROXINE SODIUM 50 MCG: 50 TABLET ORAL at 06:01

## 2024-09-08 RX ADMIN — HYDROCODONE BITARTRATE AND ACETAMINOPHEN 1 TABLET: 5; 325 TABLET ORAL at 15:25

## 2024-09-08 RX ADMIN — FLUTICASONE PROPIONATE 1 SPRAY: 50 SPRAY, METERED NASAL at 09:43

## 2024-09-08 RX ADMIN — HYDROCODONE BITARTRATE AND ACETAMINOPHEN 1 TABLET: 5; 325 TABLET ORAL at 23:50

## 2024-09-08 RX ADMIN — TIOTROPIUM BROMIDE INHALATION SPRAY 2 PUFF: 3.12 SPRAY, METERED RESPIRATORY (INHALATION) at 09:22

## 2024-09-08 RX ADMIN — BUDESONIDE AND FORMOTEROL FUMARATE DIHYDRATE 2 PUFF: 160; 4.5 AEROSOL RESPIRATORY (INHALATION) at 09:22

## 2024-09-08 RX ADMIN — CEPHALEXIN 500 MG: 500 CAPSULE ORAL at 22:09

## 2024-09-08 RX ADMIN — SUCRALFATE 1 G: 1 TABLET ORAL at 11:21

## 2024-09-08 RX ADMIN — MORPHINE SULFATE 2 MG: 2 INJECTION, SOLUTION INTRAMUSCULAR; INTRAVENOUS at 12:53

## 2024-09-08 RX ADMIN — MORPHINE SULFATE 2 MG: 2 INJECTION, SOLUTION INTRAMUSCULAR; INTRAVENOUS at 17:49

## 2024-09-08 RX ADMIN — BUDESONIDE AND FORMOTEROL FUMARATE DIHYDRATE 2 PUFF: 160; 4.5 AEROSOL RESPIRATORY (INHALATION) at 20:56

## 2024-09-08 RX ADMIN — SODIUM CHLORIDE 75 ML/HR: 9 INJECTION, SOLUTION INTRAVENOUS at 09:42

## 2024-09-08 RX ADMIN — FLUOROMETHOLONE 1 DROP: 1 SUSPENSION/ DROPS OPHTHALMIC at 09:43

## 2024-09-08 RX ADMIN — GABAPENTIN 100 MG: 100 CAPSULE ORAL at 09:42

## 2024-09-08 RX ADMIN — MUPIROCIN 1 APPLICATION: 20 OINTMENT TOPICAL at 22:10

## 2024-09-08 RX ADMIN — HYDROCODONE BITARTRATE AND ACETAMINOPHEN 1 TABLET: 5; 325 TABLET ORAL at 11:21

## 2024-09-08 RX ADMIN — ACETAMINOPHEN 500 MG: 500 TABLET ORAL at 06:02

## 2024-09-08 RX ADMIN — Medication 10 ML: at 22:10

## 2024-09-08 RX ADMIN — HYDROCODONE BITARTRATE AND ACETAMINOPHEN 1 TABLET: 5; 325 TABLET ORAL at 07:15

## 2024-09-08 RX ADMIN — Medication 10 ML: at 09:44

## 2024-09-08 RX ADMIN — GABAPENTIN 100 MG: 100 CAPSULE ORAL at 15:25

## 2024-09-09 ENCOUNTER — ANESTHESIA (OUTPATIENT)
Dept: PERIOP | Facility: HOSPITAL | Age: 89
End: 2024-09-09
Payer: MEDICARE

## 2024-09-09 ENCOUNTER — APPOINTMENT (OUTPATIENT)
Dept: GENERAL RADIOLOGY | Facility: HOSPITAL | Age: 89
DRG: 470 | End: 2024-09-09
Payer: MEDICARE

## 2024-09-09 ENCOUNTER — ANESTHESIA EVENT (OUTPATIENT)
Dept: PERIOP | Facility: HOSPITAL | Age: 89
End: 2024-09-09
Payer: MEDICARE

## 2024-09-09 PROBLEM — M87.051 AVASCULAR NECROSIS OF BONE OF RIGHT HIP: Status: ACTIVE | Noted: 2024-09-09

## 2024-09-09 LAB
ABO GROUP BLD: NORMAL
ALBUMIN SERPL-MCNC: 3.5 G/DL (ref 3.5–5.2)
ANION GAP SERPL CALCULATED.3IONS-SCNC: 9 MMOL/L (ref 5–15)
BASOPHILS # BLD AUTO: 0.08 10*3/MM3 (ref 0–0.2)
BASOPHILS NFR BLD AUTO: 0.7 % (ref 0–1.5)
BLD GP AB SCN SERPL QL: NEGATIVE
BUN SERPL-MCNC: 33 MG/DL (ref 8–23)
BUN/CREAT SERPL: 37.1 (ref 7–25)
CALCIUM SPEC-SCNC: 9.5 MG/DL (ref 8.6–10.5)
CHLORIDE SERPL-SCNC: 96 MMOL/L (ref 98–107)
CO2 SERPL-SCNC: 25 MMOL/L (ref 22–29)
CREAT SERPL-MCNC: 0.89 MG/DL (ref 0.57–1)
DEPRECATED RDW RBC AUTO: 44.7 FL (ref 37–54)
EGFRCR SERPLBLD CKD-EPI 2021: 62.4 ML/MIN/1.73
EOSINOPHIL # BLD AUTO: 0.21 10*3/MM3 (ref 0–0.4)
EOSINOPHIL NFR BLD AUTO: 1.8 % (ref 0.3–6.2)
ERYTHROCYTE [DISTWIDTH] IN BLOOD BY AUTOMATED COUNT: 13.1 % (ref 12.3–15.4)
GLUCOSE BLDC GLUCOMTR-MCNC: 137 MG/DL (ref 70–130)
GLUCOSE SERPL-MCNC: 119 MG/DL (ref 65–99)
HCT VFR BLD AUTO: 32.5 % (ref 34–46.6)
HGB BLD-MCNC: 10.6 G/DL (ref 12–15.9)
IMM GRANULOCYTES # BLD AUTO: 0.12 10*3/MM3 (ref 0–0.05)
IMM GRANULOCYTES NFR BLD AUTO: 1 % (ref 0–0.5)
LYMPHOCYTES # BLD AUTO: 1.15 10*3/MM3 (ref 0.7–3.1)
LYMPHOCYTES NFR BLD AUTO: 10 % (ref 19.6–45.3)
MAGNESIUM SERPL-MCNC: 2 MG/DL (ref 1.6–2.4)
MCH RBC QN AUTO: 29.9 PG (ref 26.6–33)
MCHC RBC AUTO-ENTMCNC: 32.6 G/DL (ref 31.5–35.7)
MCV RBC AUTO: 91.5 FL (ref 79–97)
MONOCYTES # BLD AUTO: 0.88 10*3/MM3 (ref 0.1–0.9)
MONOCYTES NFR BLD AUTO: 7.7 % (ref 5–12)
NEUTROPHILS NFR BLD AUTO: 78.8 % (ref 42.7–76)
NEUTROPHILS NFR BLD AUTO: 9.02 10*3/MM3 (ref 1.7–7)
NRBC BLD AUTO-RTO: 0 /100 WBC (ref 0–0.2)
PHOSPHATE SERPL-MCNC: 3.1 MG/DL (ref 2.5–4.5)
PLATELET # BLD AUTO: 379 10*3/MM3 (ref 140–450)
PMV BLD AUTO: 9.1 FL (ref 6–12)
POTASSIUM SERPL-SCNC: 5.1 MMOL/L (ref 3.5–5.2)
RBC # BLD AUTO: 3.55 10*6/MM3 (ref 3.77–5.28)
RH BLD: POSITIVE
SODIUM SERPL-SCNC: 130 MMOL/L (ref 136–145)
T&S EXPIRATION DATE: NORMAL
WBC NRBC COR # BLD AUTO: 11.46 10*3/MM3 (ref 3.4–10.8)

## 2024-09-09 PROCEDURE — 25810000003 LACTATED RINGERS PER 1000 ML: Performed by: ANESTHESIOLOGY

## 2024-09-09 PROCEDURE — 83735 ASSAY OF MAGNESIUM: CPT | Performed by: INTERNAL MEDICINE

## 2024-09-09 PROCEDURE — 94761 N-INVAS EAR/PLS OXIMETRY MLT: CPT

## 2024-09-09 PROCEDURE — C1776 JOINT DEVICE (IMPLANTABLE): HCPCS | Performed by: ORTHOPAEDIC SURGERY

## 2024-09-09 PROCEDURE — 25010000002 MORPHINE PER 10 MG: Performed by: ORTHOPAEDIC SURGERY

## 2024-09-09 PROCEDURE — 94799 UNLISTED PULMONARY SVC/PX: CPT

## 2024-09-09 PROCEDURE — 25010000002 HYDROMORPHONE PER 4 MG: Performed by: ANESTHESIOLOGY

## 2024-09-09 PROCEDURE — C1713 ANCHOR/SCREW BN/BN,TIS/BN: HCPCS | Performed by: ORTHOPAEDIC SURGERY

## 2024-09-09 PROCEDURE — 80069 RENAL FUNCTION PANEL: CPT | Performed by: INTERNAL MEDICINE

## 2024-09-09 PROCEDURE — 94664 DEMO&/EVAL PT USE INHALER: CPT

## 2024-09-09 PROCEDURE — 25010000002 FENTANYL CITRATE (PF) 50 MCG/ML SOLUTION: Performed by: ANESTHESIOLOGY

## 2024-09-09 PROCEDURE — 0SR90J9 REPLACEMENT OF RIGHT HIP JOINT WITH SYNTHETIC SUBSTITUTE, CEMENTED, OPEN APPROACH: ICD-10-PCS | Performed by: ORTHOPAEDIC SURGERY

## 2024-09-09 PROCEDURE — 76000 FLUOROSCOPY <1 HR PHYS/QHP: CPT

## 2024-09-09 PROCEDURE — 82948 REAGENT STRIP/BLOOD GLUCOSE: CPT

## 2024-09-09 PROCEDURE — 25010000002 VANCOMYCIN 1 G RECONSTITUTED SOLUTION: Performed by: ORTHOPAEDIC SURGERY

## 2024-09-09 PROCEDURE — 86850 RBC ANTIBODY SCREEN: CPT | Performed by: ORTHOPAEDIC SURGERY

## 2024-09-09 PROCEDURE — 85025 COMPLETE CBC W/AUTO DIFF WBC: CPT | Performed by: HOSPITALIST

## 2024-09-09 PROCEDURE — 25010000002 PROPOFOL 10 MG/ML EMULSION: Performed by: ANESTHESIOLOGY

## 2024-09-09 PROCEDURE — 25010000002 ONDANSETRON PER 1 MG: Performed by: HOSPITALIST

## 2024-09-09 PROCEDURE — 25010000002 SUGAMMADEX 200 MG/2ML SOLUTION: Performed by: ANESTHESIOLOGY

## 2024-09-09 PROCEDURE — 25010000002 CEFAZOLIN PER 500 MG: Performed by: ORTHOPAEDIC SURGERY

## 2024-09-09 PROCEDURE — 25010000002 ONDANSETRON PER 1 MG: Performed by: ANESTHESIOLOGY

## 2024-09-09 PROCEDURE — 25010000002 DEXAMETHASONE PER 1 MG: Performed by: ANESTHESIOLOGY

## 2024-09-09 PROCEDURE — 86901 BLOOD TYPING SEROLOGIC RH(D): CPT | Performed by: ORTHOPAEDIC SURGERY

## 2024-09-09 PROCEDURE — 73501 X-RAY EXAM HIP UNI 1 VIEW: CPT

## 2024-09-09 PROCEDURE — 86900 BLOOD TYPING SEROLOGIC ABO: CPT | Performed by: ORTHOPAEDIC SURGERY

## 2024-09-09 PROCEDURE — 25010000002 MORPHINE PER 10 MG: Performed by: HOSPITALIST

## 2024-09-09 DEVICE — ACCORD 2.0MM COBALT CHROME CABLE                                    WITH CLAMP
Type: IMPLANTABLE DEVICE | Site: HIP | Status: FUNCTIONAL
Brand: ACCORD

## 2024-09-09 DEVICE — OXINIUM FEMORAL HEAD 12/14 TAPER                                    28 MM +4
Type: IMPLANTABLE DEVICE | Site: HIP | Status: FUNCTIONAL
Brand: OXINIUM

## 2024-09-09 DEVICE — CMT BONE PALACOS R HI/VISC 1X40: Type: IMPLANTABLE DEVICE | Site: HIP | Status: FUNCTIONAL

## 2024-09-09 DEVICE — OR3O DUAL MOBILITY LINER 38/50
Type: IMPLANTABLE DEVICE | Site: HIP | Status: FUNCTIONAL
Brand: OR3O DUAL MOBILITY

## 2024-09-09 DEVICE — DEV CONTRL TISS STRATAFIX SYMM PDS PLUS VIL CT-1 60CM: Type: IMPLANTABLE DEVICE | Site: HIP | Status: FUNCTIONAL

## 2024-09-09 DEVICE — DEV CONTRL TISS STRATAFIX SPIRAL MNCRYL UD 3/0 PLS 30CM: Type: IMPLANTABLE DEVICE | Site: HIP | Status: FUNCTIONAL

## 2024-09-09 DEVICE — SUT FW #2 W/TPR NDL 1/2 CIR 38IN 97CM 26.5MM BLU: Type: IMPLANTABLE DEVICE | Site: HIP | Status: FUNCTIONAL

## 2024-09-09 DEVICE — R3 3 HOLE ACETABULAR SHELL 50MM
Type: IMPLANTABLE DEVICE | Site: HIP | Status: FUNCTIONAL
Brand: R3 ACETABULAR

## 2024-09-09 DEVICE — OR3O DUAL MOBILITY XLPE INSERT 28/38
Type: IMPLANTABLE DEVICE | Site: HIP | Status: FUNCTIONAL
Brand: OR3O DUAL MOBILITY

## 2024-09-09 DEVICE — POLARSTEM STANDARD CEMENTED 2
Type: IMPLANTABLE DEVICE | Site: HIP | Status: FUNCTIONAL
Brand: POLARSTEM

## 2024-09-09 RX ORDER — ASPIRIN 81 MG/1
81 TABLET, CHEWABLE ORAL 2 TIMES DAILY
Status: DISCONTINUED | OUTPATIENT
Start: 2024-09-09 | End: 2024-09-20 | Stop reason: HOSPADM

## 2024-09-09 RX ORDER — ONDANSETRON 2 MG/ML
4 INJECTION INTRAMUSCULAR; INTRAVENOUS ONCE AS NEEDED
Status: COMPLETED | OUTPATIENT
Start: 2024-09-09 | End: 2024-09-09

## 2024-09-09 RX ORDER — TRANEXAMIC ACID 10 MG/ML
1000 INJECTION, SOLUTION INTRAVENOUS ONCE
Status: DISCONTINUED | OUTPATIENT
Start: 2024-09-09 | End: 2024-09-09 | Stop reason: HOSPADM

## 2024-09-09 RX ORDER — PROPOFOL 10 MG/ML
VIAL (ML) INTRAVENOUS AS NEEDED
Status: DISCONTINUED | OUTPATIENT
Start: 2024-09-09 | End: 2024-09-09 | Stop reason: SURG

## 2024-09-09 RX ORDER — FENTANYL CITRATE 50 UG/ML
25 INJECTION, SOLUTION INTRAMUSCULAR; INTRAVENOUS
Status: DISCONTINUED | OUTPATIENT
Start: 2024-09-09 | End: 2024-09-09 | Stop reason: HOSPADM

## 2024-09-09 RX ORDER — FAMOTIDINE 10 MG/ML
20 INJECTION, SOLUTION INTRAVENOUS ONCE
Status: COMPLETED | OUTPATIENT
Start: 2024-09-09 | End: 2024-09-09

## 2024-09-09 RX ORDER — LIDOCAINE HYDROCHLORIDE 20 MG/ML
INJECTION, SOLUTION INFILTRATION; PERINEURAL AS NEEDED
Status: DISCONTINUED | OUTPATIENT
Start: 2024-09-09 | End: 2024-09-09 | Stop reason: SURG

## 2024-09-09 RX ORDER — DEXAMETHASONE SODIUM PHOSPHATE 4 MG/ML
INJECTION, SOLUTION INTRA-ARTICULAR; INTRALESIONAL; INTRAMUSCULAR; INTRAVENOUS; SOFT TISSUE AS NEEDED
Status: DISCONTINUED | OUTPATIENT
Start: 2024-09-09 | End: 2024-09-09 | Stop reason: SURG

## 2024-09-09 RX ORDER — EPHEDRINE SULFATE 50 MG/ML
5 INJECTION, SOLUTION INTRAVENOUS ONCE AS NEEDED
Status: DISCONTINUED | OUTPATIENT
Start: 2024-09-09 | End: 2024-09-09 | Stop reason: HOSPADM

## 2024-09-09 RX ORDER — FENTANYL CITRATE 50 UG/ML
50 INJECTION, SOLUTION INTRAMUSCULAR; INTRAVENOUS ONCE AS NEEDED
Status: COMPLETED | OUTPATIENT
Start: 2024-09-09 | End: 2024-09-09

## 2024-09-09 RX ORDER — DIPHENHYDRAMINE HYDROCHLORIDE 50 MG/ML
12.5 INJECTION INTRAMUSCULAR; INTRAVENOUS
Status: DISCONTINUED | OUTPATIENT
Start: 2024-09-09 | End: 2024-09-09 | Stop reason: HOSPADM

## 2024-09-09 RX ORDER — VANCOMYCIN HYDROCHLORIDE 1 G/20ML
INJECTION, POWDER, LYOPHILIZED, FOR SOLUTION INTRAVENOUS AS NEEDED
Status: DISCONTINUED | OUTPATIENT
Start: 2024-09-09 | End: 2024-09-09 | Stop reason: HOSPADM

## 2024-09-09 RX ORDER — SODIUM CHLORIDE 0.9 % (FLUSH) 0.9 %
3 SYRINGE (ML) INJECTION EVERY 12 HOURS SCHEDULED
Status: DISCONTINUED | OUTPATIENT
Start: 2024-09-09 | End: 2024-09-09 | Stop reason: HOSPADM

## 2024-09-09 RX ORDER — HYDROMORPHONE HYDROCHLORIDE 1 MG/ML
0.25 INJECTION, SOLUTION INTRAMUSCULAR; INTRAVENOUS; SUBCUTANEOUS
Status: DISCONTINUED | OUTPATIENT
Start: 2024-09-09 | End: 2024-09-09 | Stop reason: HOSPADM

## 2024-09-09 RX ORDER — FLUMAZENIL 0.1 MG/ML
0.2 INJECTION INTRAVENOUS AS NEEDED
Status: DISCONTINUED | OUTPATIENT
Start: 2024-09-09 | End: 2024-09-09 | Stop reason: HOSPADM

## 2024-09-09 RX ORDER — HYDROCODONE BITARTRATE AND ACETAMINOPHEN 5; 325 MG/1; MG/1
1 TABLET ORAL ONCE AS NEEDED
Status: DISCONTINUED | OUTPATIENT
Start: 2024-09-09 | End: 2024-09-09 | Stop reason: HOSPADM

## 2024-09-09 RX ORDER — HYDROCODONE BITARTRATE AND ACETAMINOPHEN 5; 325 MG/1; MG/1
1 TABLET ORAL EVERY 6 HOURS PRN
Status: DISCONTINUED | OUTPATIENT
Start: 2024-09-09 | End: 2024-09-10

## 2024-09-09 RX ORDER — ROCURONIUM BROMIDE 10 MG/ML
INJECTION, SOLUTION INTRAVENOUS AS NEEDED
Status: DISCONTINUED | OUTPATIENT
Start: 2024-09-09 | End: 2024-09-09 | Stop reason: SURG

## 2024-09-09 RX ORDER — EPHEDRINE SULFATE 50 MG/ML
INJECTION, SOLUTION INTRAVENOUS AS NEEDED
Status: DISCONTINUED | OUTPATIENT
Start: 2024-09-09 | End: 2024-09-09 | Stop reason: SURG

## 2024-09-09 RX ORDER — SODIUM CHLORIDE, SODIUM LACTATE, POTASSIUM CHLORIDE, CALCIUM CHLORIDE 600; 310; 30; 20 MG/100ML; MG/100ML; MG/100ML; MG/100ML
9 INJECTION, SOLUTION INTRAVENOUS CONTINUOUS
Status: DISCONTINUED | OUTPATIENT
Start: 2024-09-09 | End: 2024-09-10

## 2024-09-09 RX ORDER — ONDANSETRON 2 MG/ML
4 INJECTION INTRAMUSCULAR; INTRAVENOUS EVERY 4 HOURS PRN
Status: DISCONTINUED | OUTPATIENT
Start: 2024-09-09 | End: 2024-09-20 | Stop reason: HOSPADM

## 2024-09-09 RX ORDER — LABETALOL HYDROCHLORIDE 5 MG/ML
5 INJECTION, SOLUTION INTRAVENOUS
Status: DISCONTINUED | OUTPATIENT
Start: 2024-09-09 | End: 2024-09-09 | Stop reason: HOSPADM

## 2024-09-09 RX ORDER — CEFAZOLIN SODIUM 1 G/3ML
INJECTION, POWDER, FOR SOLUTION INTRAMUSCULAR; INTRAVENOUS AS NEEDED
Status: DISCONTINUED | OUTPATIENT
Start: 2024-09-09 | End: 2024-09-09 | Stop reason: HOSPADM

## 2024-09-09 RX ORDER — PROMETHAZINE HYDROCHLORIDE 25 MG/1
25 SUPPOSITORY RECTAL ONCE AS NEEDED
Status: DISCONTINUED | OUTPATIENT
Start: 2024-09-09 | End: 2024-09-09 | Stop reason: HOSPADM

## 2024-09-09 RX ORDER — NALOXONE HCL 0.4 MG/ML
0.2 VIAL (ML) INJECTION AS NEEDED
Status: DISCONTINUED | OUTPATIENT
Start: 2024-09-09 | End: 2024-09-09 | Stop reason: HOSPADM

## 2024-09-09 RX ORDER — LIDOCAINE HYDROCHLORIDE 10 MG/ML
0.5 INJECTION, SOLUTION INFILTRATION; PERINEURAL ONCE AS NEEDED
Status: DISCONTINUED | OUTPATIENT
Start: 2024-09-09 | End: 2024-09-09 | Stop reason: HOSPADM

## 2024-09-09 RX ORDER — HYDROCODONE BITARTRATE AND ACETAMINOPHEN 7.5; 325 MG/1; MG/1
1 TABLET ORAL EVERY 4 HOURS PRN
Status: DISCONTINUED | OUTPATIENT
Start: 2024-09-09 | End: 2024-09-09 | Stop reason: HOSPADM

## 2024-09-09 RX ORDER — IPRATROPIUM BROMIDE AND ALBUTEROL SULFATE 2.5; .5 MG/3ML; MG/3ML
3 SOLUTION RESPIRATORY (INHALATION) ONCE AS NEEDED
Status: DISCONTINUED | OUTPATIENT
Start: 2024-09-09 | End: 2024-09-09 | Stop reason: HOSPADM

## 2024-09-09 RX ORDER — DROPERIDOL 2.5 MG/ML
0.62 INJECTION, SOLUTION INTRAMUSCULAR; INTRAVENOUS
Status: DISCONTINUED | OUTPATIENT
Start: 2024-09-09 | End: 2024-09-09 | Stop reason: HOSPADM

## 2024-09-09 RX ORDER — PROMETHAZINE HYDROCHLORIDE 25 MG/1
25 TABLET ORAL ONCE AS NEEDED
Status: DISCONTINUED | OUTPATIENT
Start: 2024-09-09 | End: 2024-09-09 | Stop reason: HOSPADM

## 2024-09-09 RX ORDER — MAGNESIUM HYDROXIDE 1200 MG/15ML
LIQUID ORAL AS NEEDED
Status: DISCONTINUED | OUTPATIENT
Start: 2024-09-09 | End: 2024-09-09 | Stop reason: HOSPADM

## 2024-09-09 RX ORDER — HYDRALAZINE HYDROCHLORIDE 20 MG/ML
5 INJECTION INTRAMUSCULAR; INTRAVENOUS
Status: DISCONTINUED | OUTPATIENT
Start: 2024-09-09 | End: 2024-09-09 | Stop reason: HOSPADM

## 2024-09-09 RX ORDER — MIDAZOLAM HYDROCHLORIDE 1 MG/ML
0.5 INJECTION INTRAMUSCULAR; INTRAVENOUS
Status: DISCONTINUED | OUTPATIENT
Start: 2024-09-09 | End: 2024-09-09 | Stop reason: HOSPADM

## 2024-09-09 RX ORDER — SODIUM CHLORIDE 0.9 % (FLUSH) 0.9 %
3-10 SYRINGE (ML) INJECTION AS NEEDED
Status: DISCONTINUED | OUTPATIENT
Start: 2024-09-09 | End: 2024-09-09 | Stop reason: HOSPADM

## 2024-09-09 RX ADMIN — SUGAMMADEX 200 MG: 100 INJECTION, SOLUTION INTRAVENOUS at 17:42

## 2024-09-09 RX ADMIN — CARVEDILOL 12.5 MG: 12.5 TABLET, FILM COATED ORAL at 22:24

## 2024-09-09 RX ADMIN — FLUOROMETHOLONE 1 DROP: 1 SUSPENSION/ DROPS OPHTHALMIC at 08:44

## 2024-09-09 RX ADMIN — SENNOSIDES AND DOCUSATE SODIUM 2 TABLET: 50; 8.6 TABLET ORAL at 22:23

## 2024-09-09 RX ADMIN — ERYTHROMYCIN 1 APPLICATION: 5 OINTMENT OPHTHALMIC at 22:25

## 2024-09-09 RX ADMIN — ONDANSETRON 4 MG: 2 INJECTION, SOLUTION INTRAMUSCULAR; INTRAVENOUS at 18:23

## 2024-09-09 RX ADMIN — HYDROMORPHONE HYDROCHLORIDE 0.25 MG: 1 INJECTION, SOLUTION INTRAMUSCULAR; INTRAVENOUS; SUBCUTANEOUS at 18:37

## 2024-09-09 RX ADMIN — ASPIRIN 81 MG: 81 TABLET, CHEWABLE ORAL at 22:24

## 2024-09-09 RX ADMIN — PROPOFOL 80 MG: 10 INJECTION, EMULSION INTRAVENOUS at 16:21

## 2024-09-09 RX ADMIN — Medication 10 ML: at 08:44

## 2024-09-09 RX ADMIN — TIOTROPIUM BROMIDE INHALATION SPRAY 2 PUFF: 3.12 SPRAY, METERED RESPIRATORY (INHALATION) at 09:15

## 2024-09-09 RX ADMIN — FENTANYL CITRATE 50 MCG: 50 INJECTION, SOLUTION INTRAMUSCULAR; INTRAVENOUS at 16:54

## 2024-09-09 RX ADMIN — MORPHINE SULFATE 2 MG: 2 INJECTION, SOLUTION INTRAMUSCULAR; INTRAVENOUS at 12:28

## 2024-09-09 RX ADMIN — HYDROMORPHONE HYDROCHLORIDE 0.25 MG: 1 INJECTION, SOLUTION INTRAMUSCULAR; INTRAVENOUS; SUBCUTANEOUS at 18:48

## 2024-09-09 RX ADMIN — EPHEDRINE SULFATE 10 MG: 50 INJECTION INTRAVENOUS at 17:43

## 2024-09-09 RX ADMIN — FAMOTIDINE 20 MG: 10 INJECTION INTRAVENOUS at 13:44

## 2024-09-09 RX ADMIN — ROCURONIUM BROMIDE 40 MG: 10 INJECTION, SOLUTION INTRAVENOUS at 16:22

## 2024-09-09 RX ADMIN — FLUTICASONE PROPIONATE 1 SPRAY: 50 SPRAY, METERED NASAL at 08:44

## 2024-09-09 RX ADMIN — Medication 10 ML: at 22:26

## 2024-09-09 RX ADMIN — MORPHINE SULFATE 2 MG: 2 INJECTION, SOLUTION INTRAMUSCULAR; INTRAVENOUS at 20:40

## 2024-09-09 RX ADMIN — SODIUM CHLORIDE, POTASSIUM CHLORIDE, SODIUM LACTATE AND CALCIUM CHLORIDE: 600; 310; 30; 20 INJECTION, SOLUTION INTRAVENOUS at 16:21

## 2024-09-09 RX ADMIN — EPHEDRINE SULFATE 10 MG: 50 INJECTION INTRAVENOUS at 17:33

## 2024-09-09 RX ADMIN — GABAPENTIN 100 MG: 100 CAPSULE ORAL at 22:23

## 2024-09-09 RX ADMIN — DEXAMETHASONE SODIUM PHOSPHATE 8 MG: 4 INJECTION, SOLUTION INTRA-ARTICULAR; INTRALESIONAL; INTRAMUSCULAR; INTRAVENOUS; SOFT TISSUE at 16:42

## 2024-09-09 RX ADMIN — PROPOFOL 100 MCG/KG/MIN: 10 INJECTION, EMULSION INTRAVENOUS at 16:27

## 2024-09-09 RX ADMIN — MUPIROCIN 1 APPLICATION: 20 OINTMENT TOPICAL at 08:44

## 2024-09-09 RX ADMIN — METOPROLOL TARTRATE 2 MG: 5 INJECTION, SOLUTION INTRAVENOUS at 17:01

## 2024-09-09 RX ADMIN — MORPHINE SULFATE 2 MG: 2 INJECTION, SOLUTION INTRAMUSCULAR; INTRAVENOUS at 08:43

## 2024-09-09 RX ADMIN — MORPHINE SULFATE 2 MG: 2 INJECTION, SOLUTION INTRAMUSCULAR; INTRAVENOUS at 04:08

## 2024-09-09 RX ADMIN — ONDANSETRON 4 MG: 2 INJECTION INTRAMUSCULAR; INTRAVENOUS at 17:00

## 2024-09-09 RX ADMIN — CARVEDILOL 12.5 MG: 12.5 TABLET, FILM COATED ORAL at 08:43

## 2024-09-09 RX ADMIN — Medication 30 G: at 22:30

## 2024-09-09 RX ADMIN — EPHEDRINE SULFATE 10 MG: 50 INJECTION INTRAVENOUS at 17:56

## 2024-09-09 RX ADMIN — ACETAMINOPHEN 500 MG: 500 TABLET ORAL at 23:09

## 2024-09-09 RX ADMIN — BUDESONIDE AND FORMOTEROL FUMARATE DIHYDRATE 2 PUFF: 160; 4.5 AEROSOL RESPIRATORY (INHALATION) at 09:15

## 2024-09-09 RX ADMIN — HYDROCODONE BITARTRATE AND ACETAMINOPHEN 1 TABLET: 5; 325 TABLET ORAL at 23:14

## 2024-09-09 RX ADMIN — METOPROLOL TARTRATE 2 MG: 5 INJECTION, SOLUTION INTRAVENOUS at 16:45

## 2024-09-09 RX ADMIN — LIDOCAINE HYDROCHLORIDE 50 MG: 20 INJECTION, SOLUTION INFILTRATION; PERINEURAL at 16:21

## 2024-09-09 NOTE — ANESTHESIA PROCEDURE NOTES
Airway  Urgency: elective    Date/Time: 9/9/2024 4:24 PM  Airway not difficult    General Information and Staff    Patient location during procedure: OR  Anesthesiologist: Ambrose Briscoe MD    Indications and Patient Condition  Indications for airway management: airway protection    Preoxygenated: yes  MILS not maintained throughout  Mask difficulty assessment: 1 - vent by mask    Final Airway Details  Final airway type: endotracheal airway      Successful airway: ETT  Cuffed: yes   Successful intubation technique: direct laryngoscopy  Facilitating devices/methods: intubating stylet  Endotracheal tube insertion site: oral  Blade: Ariana  Blade size: 3  ETT size (mm): 7.0  Cormack-Lehane Classification: grade I - full view of glottis  Placement verified by: capnometry   Measured from: teeth  ETT/EBT  to teeth (cm): 21  Number of attempts at approach: 1  Assessment: lips, teeth, and gum same as pre-op and atraumatic intubation

## 2024-09-09 NOTE — ANESTHESIA PREPROCEDURE EVALUATION
Anesthesia Evaluation     NPO Solid Status: > 8 hours  NPO Liquid Status: > 8 hours           Airway   Mallampati: I  No difficulty expected  Dental    (+) upper dentures    Pulmonary    Cardiovascular   Exercise tolerance: good (4-7 METS)    (+) hypertension, PVD, hyperlipidemia,  carotid artery disease    ROS comment: · Left ventricular ejection fraction appears to be 61 - 65%. Left ventricular systolic function is normal.  · Left ventricular diastolic function is consistent with (grade I) impaired relaxation.  · Normal right ventricular cavity size and systolic function noted.  · The left atrial cavity is mildly dilated.  · Mild aortic valve regurgitation is present.  · There is moderate mitral annular calcification  · Mild tricuspid valve regurgitation is present.  · Calculated right ventricular systolic pressure from tricuspid regurgitation is 34 mmHg.  · There is a trivial pericardial effusion      Neuro/Psych  (+) CVA, numbness, psychiatric history  GI/Hepatic/Renal/Endo    (+) GERD, renal disease-, diabetes mellitus, thyroid problem     Musculoskeletal     Abdominal    Substance History      OB/GYN          Other   arthritis,   history of cancer    ROS/Med Hx Other: History of right hip replacement  History of chronic back pain, Neuropathy  Hyponatremia  GIULIA/CKD  Interstitial lung disease  Hypertension   Hyperlipidemia, PVD, carotid disease   DM2/prediabetes                 Anesthesia Plan    ASA 4     general   total IV anesthesia  (TIVA )  intravenous induction     Anesthetic plan, risks, benefits, and alternatives have been provided, discussed and informed consent has been obtained with: patient.    CODE STATUS:    Code Status (Patient has no pulse and is not breathing): CPR (Attempt to Resuscitate)  Medical Interventions (Patient has pulse or is breathing): Full Support

## 2024-09-09 NOTE — ANESTHESIA POSTPROCEDURE EVALUATION
"Patient: Geeta Butt    Procedure Summary       Date: 09/09/24 Room / Location:  [facility] /  [facility]    Anesthesia Start: 1615 Anesthesia Stop: 1808    Procedure: RIGHT TOTAL HIP ARTHROPLASTY ANTERIOR WITH HANA TABLE (Right: Hip) Diagnosis:       Acute right hip pain      (Acute right hip pain [M25.551])    Surgeons: Garett Martin II, MD Provider: Ambrose Briscoe MD    Anesthesia Type: general ASA Status: 4            Anesthesia Type: general    Vitals  Vitals Value Taken Time   /57 09/09/24 1900   Temp 36.4 °C (97.6 °F) 09/09/24 1802   Pulse 64 09/09/24 1907   Resp 16 09/09/24 1900   SpO2 99 % 09/09/24 1907   Vitals shown include unfiled device data.        Post Anesthesia Care and Evaluation    Patient location during evaluation: bedside  Patient participation: complete - patient participated  Level of consciousness: awake  Pain management: adequate    Airway patency: patent  Anesthetic complications: No anesthetic complications    Cardiovascular status: acceptable  Respiratory status: acceptable  Hydration status: acceptable    Comments: /59   Pulse 65   Temp 36.4 °C (97.6 °F) (Oral)   Resp 16   Ht 152.4 cm (60\")   Wt 74.8 kg (165 lb)   SpO2 100%   BMI 32.22 kg/m²     "

## 2024-09-10 ENCOUNTER — APPOINTMENT (OUTPATIENT)
Dept: GENERAL RADIOLOGY | Facility: HOSPITAL | Age: 89
DRG: 470 | End: 2024-09-10
Payer: MEDICARE

## 2024-09-10 PROBLEM — N17.9 ACUTE KIDNEY INJURY: Status: ACTIVE | Noted: 2024-09-10

## 2024-09-10 PROBLEM — E27.40 ACUTE ADRENAL INSUFFICIENCY: Status: ACTIVE | Noted: 2024-09-10

## 2024-09-10 LAB
ALBUMIN SERPL-MCNC: 3.3 G/DL (ref 3.5–5.2)
ANION GAP SERPL CALCULATED.3IONS-SCNC: 10.7 MMOL/L (ref 5–15)
ANION GAP SERPL CALCULATED.3IONS-SCNC: 12.2 MMOL/L (ref 5–15)
BACTERIA #/AREA URNS HPF: ABNORMAL /HPF
BACTERIA UR CULT: ABNORMAL
BACTERIA UR CULT: ABNORMAL
BASOPHILS # BLD AUTO: 0.04 10*3/MM3 (ref 0–0.2)
BASOPHILS NFR BLD AUTO: 0.2 % (ref 0–1.5)
BILIRUB UR QL STRIP: NEGATIVE
BUN SERPL-MCNC: 41 MG/DL (ref 8–23)
BUN SERPL-MCNC: 50 MG/DL (ref 8–23)
BUN/CREAT SERPL: 31.1 (ref 7–25)
BUN/CREAT SERPL: 32.5 (ref 7–25)
CALCIUM SPEC-SCNC: 9.2 MG/DL (ref 8.6–10.5)
CALCIUM SPEC-SCNC: 9.4 MG/DL (ref 8.6–10.5)
CASTS URNS MICRO: ABNORMAL
CHLORIDE SERPL-SCNC: 97 MMOL/L (ref 98–107)
CHLORIDE SERPL-SCNC: 98 MMOL/L (ref 98–107)
CLARITY UR: CLEAR
CO2 SERPL-SCNC: 21.8 MMOL/L (ref 22–29)
CO2 SERPL-SCNC: 22.3 MMOL/L (ref 22–29)
COLOR UR: YELLOW
CORTIS SERPL-MCNC: 10.9 MCG/DL
CORTIS SERPL-MCNC: 14.3 MCG/DL
CORTIS SERPL-MCNC: 14.5 MCG/DL
CORTIS SERPL-MCNC: 2.57 MCG/DL
CREAT SERPL-MCNC: 1.26 MG/DL (ref 0.57–1)
CREAT SERPL-MCNC: 1.61 MG/DL (ref 0.57–1)
DEPRECATED RDW RBC AUTO: 43.5 FL (ref 37–54)
EGFRCR SERPLBLD CKD-EPI 2021: 30.7 ML/MIN/1.73
EGFRCR SERPLBLD CKD-EPI 2021: 41.1 ML/MIN/1.73
EOSINOPHIL # BLD AUTO: 0 10*3/MM3 (ref 0–0.4)
EOSINOPHIL NFR BLD AUTO: 0 % (ref 0.3–6.2)
EPI CELLS #/AREA URNS HPF: ABNORMAL /HPF
ERYTHROCYTE [DISTWIDTH] IN BLOOD BY AUTOMATED COUNT: 13.1 % (ref 12.3–15.4)
GLUCOSE BLDC GLUCOMTR-MCNC: 197 MG/DL (ref 70–130)
GLUCOSE SERPL-MCNC: 154 MG/DL (ref 65–99)
GLUCOSE SERPL-MCNC: 164 MG/DL (ref 65–99)
GLUCOSE UR STRIP-MCNC: NEGATIVE MG/DL
HCT VFR BLD AUTO: 27.5 % (ref 34–46.6)
HGB BLD-MCNC: 9.1 G/DL (ref 12–15.9)
HGB UR QL STRIP.AUTO: NEGATIVE
IMM GRANULOCYTES # BLD AUTO: 0.27 10*3/MM3 (ref 0–0.05)
IMM GRANULOCYTES NFR BLD AUTO: 1.2 % (ref 0–0.5)
KETONES UR QL STRIP: ABNORMAL
LEUKOCYTE ESTERASE UR QL STRIP.AUTO: NEGATIVE
LYMPHOCYTES # BLD AUTO: 0.53 10*3/MM3 (ref 0.7–3.1)
LYMPHOCYTES NFR BLD AUTO: 2.4 % (ref 19.6–45.3)
MCH RBC QN AUTO: 29.8 PG (ref 26.6–33)
MCHC RBC AUTO-ENTMCNC: 33.1 G/DL (ref 31.5–35.7)
MCV RBC AUTO: 90.2 FL (ref 79–97)
MONOCYTES # BLD AUTO: 0.85 10*3/MM3 (ref 0.1–0.9)
MONOCYTES NFR BLD AUTO: 3.8 % (ref 5–12)
NEUTROPHILS NFR BLD AUTO: 20.45 10*3/MM3 (ref 1.7–7)
NEUTROPHILS NFR BLD AUTO: 92.4 % (ref 42.7–76)
NITRITE UR QL STRIP: NEGATIVE
NRBC BLD AUTO-RTO: 0 /100 WBC (ref 0–0.2)
OSMOLALITY UR: 411 MOSM/KG
OTHER ANTIBIOTIC SUSC ISLT: ABNORMAL
PH UR STRIP.AUTO: 5.5 [PH] (ref 5–8)
PHOSPHATE SERPL-MCNC: 4.8 MG/DL (ref 2.5–4.5)
PLATELET # BLD AUTO: 333 10*3/MM3 (ref 140–450)
PMV BLD AUTO: 8.9 FL (ref 6–12)
POTASSIUM SERPL-SCNC: 5.3 MMOL/L (ref 3.5–5.2)
POTASSIUM SERPL-SCNC: 5.6 MMOL/L (ref 3.5–5.2)
PROT UR QL STRIP: ABNORMAL
RBC # BLD AUTO: 3.05 10*6/MM3 (ref 3.77–5.28)
RBC #/AREA URNS HPF: ABNORMAL /HPF
SODIUM SERPL-SCNC: 131 MMOL/L (ref 136–145)
SODIUM SERPL-SCNC: 131 MMOL/L (ref 136–145)
SODIUM UR-SCNC: 25 MMOL/L
SP GR UR STRIP: 1.02 (ref 1–1.03)
UROBILINOGEN UR QL STRIP: ABNORMAL
WBC #/AREA URNS HPF: ABNORMAL /HPF
WBC NRBC COR # BLD AUTO: 22.14 10*3/MM3 (ref 3.4–10.8)

## 2024-09-10 PROCEDURE — 81003 URINALYSIS AUTO W/O SCOPE: CPT | Performed by: HOSPITALIST

## 2024-09-10 PROCEDURE — 74018 RADEX ABDOMEN 1 VIEW: CPT

## 2024-09-10 PROCEDURE — 25010000002 CEFAZOLIN PER 500 MG: Performed by: ORTHOPAEDIC SURGERY

## 2024-09-10 PROCEDURE — 25010000002 COSYNTROPIN PER 0.25 MG: Performed by: HOSPITALIST

## 2024-09-10 PROCEDURE — 83935 ASSAY OF URINE OSMOLALITY: CPT | Performed by: INTERNAL MEDICINE

## 2024-09-10 PROCEDURE — 25010000002 MORPHINE PER 10 MG: Performed by: ORTHOPAEDIC SURGERY

## 2024-09-10 PROCEDURE — 84300 ASSAY OF URINE SODIUM: CPT | Performed by: INTERNAL MEDICINE

## 2024-09-10 PROCEDURE — 80048 BASIC METABOLIC PNL TOTAL CA: CPT | Performed by: ORTHOPAEDIC SURGERY

## 2024-09-10 PROCEDURE — 25810000003 SODIUM CHLORIDE 0.9 % SOLUTION: Performed by: INTERNAL MEDICINE

## 2024-09-10 PROCEDURE — 82948 REAGENT STRIP/BLOOD GLUCOSE: CPT

## 2024-09-10 PROCEDURE — 25010000002 HYDROCORTISONE SOD SUC (PF) 100 MG RECONSTITUTED SOLUTION: Performed by: HOSPITALIST

## 2024-09-10 PROCEDURE — 25010000002 ONDANSETRON PER 1 MG

## 2024-09-10 PROCEDURE — 87040 BLOOD CULTURE FOR BACTERIA: CPT | Performed by: INTERNAL MEDICINE

## 2024-09-10 PROCEDURE — 82533 TOTAL CORTISOL: CPT | Performed by: HOSPITALIST

## 2024-09-10 PROCEDURE — 85025 COMPLETE CBC W/AUTO DIFF WBC: CPT | Performed by: ORTHOPAEDIC SURGERY

## 2024-09-10 PROCEDURE — 80069 RENAL FUNCTION PANEL: CPT | Performed by: INTERNAL MEDICINE

## 2024-09-10 RX ORDER — BISACODYL 5 MG/1
5 TABLET, DELAYED RELEASE ORAL DAILY PRN
Status: DISCONTINUED | OUTPATIENT
Start: 2024-09-10 | End: 2024-09-13

## 2024-09-10 RX ORDER — SODIUM CHLORIDE 9 MG/ML
100 INJECTION, SOLUTION INTRAVENOUS CONTINUOUS
Status: ACTIVE | OUTPATIENT
Start: 2024-09-10 | End: 2024-09-11

## 2024-09-10 RX ORDER — HYDROCODONE BITARTRATE AND ACETAMINOPHEN 7.5; 325 MG/15ML; MG/15ML
10 SOLUTION ORAL EVERY 4 HOURS PRN
Status: DISCONTINUED | OUTPATIENT
Start: 2024-09-10 | End: 2024-09-13

## 2024-09-10 RX ORDER — MORPHINE SULFATE 2 MG/ML
1 INJECTION, SOLUTION INTRAMUSCULAR; INTRAVENOUS EVERY 4 HOURS PRN
Status: DISCONTINUED | OUTPATIENT
Start: 2024-09-10 | End: 2024-09-11

## 2024-09-10 RX ORDER — COSYNTROPIN 0.25 MG/ML
0.25 INJECTION, POWDER, FOR SOLUTION INTRAMUSCULAR; INTRAVENOUS ONCE
Status: COMPLETED | OUTPATIENT
Start: 2024-09-10 | End: 2024-09-10

## 2024-09-10 RX ORDER — POLYETHYLENE GLYCOL 3350 17 G/17G
17 POWDER, FOR SOLUTION ORAL DAILY
Status: DISCONTINUED | OUTPATIENT
Start: 2024-09-10 | End: 2024-09-11

## 2024-09-10 RX ORDER — GABAPENTIN 100 MG/1
100 CAPSULE ORAL NIGHTLY
Status: DISCONTINUED | OUTPATIENT
Start: 2024-09-10 | End: 2024-09-13

## 2024-09-10 RX ORDER — AMOXICILLIN 250 MG
2 CAPSULE ORAL 2 TIMES DAILY
Status: DISCONTINUED | OUTPATIENT
Start: 2024-09-10 | End: 2024-09-13

## 2024-09-10 RX ORDER — BISACODYL 10 MG
10 SUPPOSITORY, RECTAL RECTAL DAILY PRN
Status: DISCONTINUED | OUTPATIENT
Start: 2024-09-10 | End: 2024-09-13

## 2024-09-10 RX ORDER — BISACODYL 10 MG
10 SUPPOSITORY, RECTAL RECTAL ONCE
Status: COMPLETED | OUTPATIENT
Start: 2024-09-10 | End: 2024-09-10

## 2024-09-10 RX ADMIN — ASPIRIN 81 MG: 81 TABLET, CHEWABLE ORAL at 20:26

## 2024-09-10 RX ADMIN — HYDROCODONE BITARTRATE AND ACETAMINOPHEN 10 ML: 7.5; 325 SOLUTION ORAL at 20:26

## 2024-09-10 RX ADMIN — HYDROCODONE BITARTRATE AND ACETAMINOPHEN 10 ML: 7.5; 325 SOLUTION ORAL at 15:46

## 2024-09-10 RX ADMIN — HYDROCODONE BITARTRATE AND ACETAMINOPHEN 10 ML: 7.5; 325 SOLUTION ORAL at 11:22

## 2024-09-10 RX ADMIN — CEPHALEXIN 500 MG: 500 CAPSULE ORAL at 20:26

## 2024-09-10 RX ADMIN — ONDANSETRON 4 MG: 2 INJECTION INTRAMUSCULAR; INTRAVENOUS at 14:51

## 2024-09-10 RX ADMIN — SENNOSIDES AND DOCUSATE SODIUM 2 TABLET: 50; 8.6 TABLET ORAL at 20:27

## 2024-09-10 RX ADMIN — GABAPENTIN 100 MG: 100 CAPSULE ORAL at 20:27

## 2024-09-10 RX ADMIN — MORPHINE SULFATE 2 MG: 2 INJECTION, SOLUTION INTRAMUSCULAR; INTRAVENOUS at 08:19

## 2024-09-10 RX ADMIN — Medication 10 ML: at 08:19

## 2024-09-10 RX ADMIN — SODIUM CHLORIDE 100 ML/HR: 9 INJECTION, SOLUTION INTRAVENOUS at 18:45

## 2024-09-10 RX ADMIN — BISACODYL 10 MG: 10 SUPPOSITORY RECTAL at 15:46

## 2024-09-10 RX ADMIN — COSYNTROPIN 0.25 MG: 0.25 INJECTION, POWDER, LYOPHILIZED, FOR SOLUTION INTRAMUSCULAR; INTRAVENOUS at 11:59

## 2024-09-10 RX ADMIN — SODIUM CHLORIDE 250 ML: 9 INJECTION, SOLUTION INTRAVENOUS at 16:28

## 2024-09-10 RX ADMIN — SODIUM CHLORIDE 2000 MG: 900 INJECTION INTRAVENOUS at 03:05

## 2024-09-10 RX ADMIN — CEPHALEXIN 500 MG: 500 CAPSULE ORAL at 15:46

## 2024-09-10 RX ADMIN — ERYTHROMYCIN 1 APPLICATION: 5 OINTMENT OPHTHALMIC at 20:28

## 2024-09-10 RX ADMIN — HYDROCORTISONE SODIUM SUCCINATE 100 MG: 100 INJECTION, POWDER, FOR SOLUTION INTRAMUSCULAR; INTRAVENOUS at 16:29

## 2024-09-10 RX ADMIN — SODIUM CHLORIDE 2000 MG: 900 INJECTION INTRAVENOUS at 10:39

## 2024-09-10 RX ADMIN — ONDANSETRON 4 MG: 2 INJECTION INTRAMUSCULAR; INTRAVENOUS at 10:39

## 2024-09-10 RX ADMIN — ONDANSETRON 4 MG: 2 INJECTION INTRAMUSCULAR; INTRAVENOUS at 18:45

## 2024-09-10 RX ADMIN — SODIUM CHLORIDE 2000 MG: 900 INJECTION INTRAVENOUS at 16:30

## 2024-09-10 RX ADMIN — ONDANSETRON 4 MG: 2 INJECTION INTRAMUSCULAR; INTRAVENOUS at 06:44

## 2024-09-11 LAB
ALBUMIN SERPL-MCNC: 2.9 G/DL (ref 3.5–5.2)
ALBUMIN/GLOB SERPL: 1.3 G/DL
ALP SERPL-CCNC: 65 U/L (ref 39–117)
ALT SERPL W P-5'-P-CCNC: 5 U/L (ref 1–33)
ANION GAP SERPL CALCULATED.3IONS-SCNC: 11 MMOL/L (ref 5–15)
AST SERPL-CCNC: 26 U/L (ref 1–32)
BASOPHILS # BLD AUTO: 0.03 10*3/MM3 (ref 0–0.2)
BASOPHILS NFR BLD AUTO: 0.2 % (ref 0–1.5)
BILIRUB SERPL-MCNC: 0.2 MG/DL (ref 0–1.2)
BUN SERPL-MCNC: 58 MG/DL (ref 8–23)
BUN/CREAT SERPL: 37.2 (ref 7–25)
CALCIUM SPEC-SCNC: 9 MG/DL (ref 8.6–10.5)
CHLORIDE SERPL-SCNC: 99 MMOL/L (ref 98–107)
CO2 SERPL-SCNC: 22 MMOL/L (ref 22–29)
CREAT SERPL-MCNC: 1.56 MG/DL (ref 0.57–1)
DEPRECATED RDW RBC AUTO: 45.4 FL (ref 37–54)
EGFRCR SERPLBLD CKD-EPI 2021: 31.8 ML/MIN/1.73
EOSINOPHIL # BLD AUTO: 0 10*3/MM3 (ref 0–0.4)
EOSINOPHIL NFR BLD AUTO: 0 % (ref 0.3–6.2)
ERYTHROCYTE [DISTWIDTH] IN BLOOD BY AUTOMATED COUNT: 13.3 % (ref 12.3–15.4)
GLOBULIN UR ELPH-MCNC: 2.3 GM/DL
GLUCOSE SERPL-MCNC: 143 MG/DL (ref 65–99)
HCT VFR BLD AUTO: 24.3 % (ref 34–46.6)
HGB BLD-MCNC: 8 G/DL (ref 12–15.9)
IMM GRANULOCYTES # BLD AUTO: 0.15 10*3/MM3 (ref 0–0.05)
IMM GRANULOCYTES NFR BLD AUTO: 0.8 % (ref 0–0.5)
LYMPHOCYTES # BLD AUTO: 0.48 10*3/MM3 (ref 0.7–3.1)
LYMPHOCYTES NFR BLD AUTO: 2.6 % (ref 19.6–45.3)
MAGNESIUM SERPL-MCNC: 2.2 MG/DL (ref 1.6–2.4)
MCH RBC QN AUTO: 30.3 PG (ref 26.6–33)
MCHC RBC AUTO-ENTMCNC: 32.9 G/DL (ref 31.5–35.7)
MCV RBC AUTO: 92 FL (ref 79–97)
MONOCYTES # BLD AUTO: 1.14 10*3/MM3 (ref 0.1–0.9)
MONOCYTES NFR BLD AUTO: 6.1 % (ref 5–12)
NEUTROPHILS NFR BLD AUTO: 16.75 10*3/MM3 (ref 1.7–7)
NEUTROPHILS NFR BLD AUTO: 90.3 % (ref 42.7–76)
NRBC BLD AUTO-RTO: 0 /100 WBC (ref 0–0.2)
PHOSPHATE SERPL-MCNC: 4.5 MG/DL (ref 2.5–4.5)
PLATELET # BLD AUTO: 288 10*3/MM3 (ref 140–450)
PMV BLD AUTO: 9.3 FL (ref 6–12)
POTASSIUM SERPL-SCNC: 5.2 MMOL/L (ref 3.5–5.2)
PROCALCITONIN SERPL-MCNC: 0.12 NG/ML (ref 0–0.25)
PROT SERPL-MCNC: 5.2 G/DL (ref 6–8.5)
RBC # BLD AUTO: 2.64 10*6/MM3 (ref 3.77–5.28)
SODIUM SERPL-SCNC: 132 MMOL/L (ref 136–145)
WBC NRBC COR # BLD AUTO: 18.55 10*3/MM3 (ref 3.4–10.8)

## 2024-09-11 PROCEDURE — 97110 THERAPEUTIC EXERCISES: CPT

## 2024-09-11 PROCEDURE — 25810000003 SODIUM CHLORIDE 0.9 % SOLUTION: Performed by: INTERNAL MEDICINE

## 2024-09-11 PROCEDURE — 97530 THERAPEUTIC ACTIVITIES: CPT

## 2024-09-11 PROCEDURE — 80053 COMPREHEN METABOLIC PANEL: CPT | Performed by: INTERNAL MEDICINE

## 2024-09-11 PROCEDURE — 94799 UNLISTED PULMONARY SVC/PX: CPT

## 2024-09-11 PROCEDURE — 85025 COMPLETE CBC W/AUTO DIFF WBC: CPT | Performed by: ORTHOPAEDIC SURGERY

## 2024-09-11 PROCEDURE — 84100 ASSAY OF PHOSPHORUS: CPT | Performed by: INTERNAL MEDICINE

## 2024-09-11 PROCEDURE — 84145 PROCALCITONIN (PCT): CPT | Performed by: INTERNAL MEDICINE

## 2024-09-11 PROCEDURE — 94664 DEMO&/EVAL PT USE INHALER: CPT

## 2024-09-11 PROCEDURE — 83735 ASSAY OF MAGNESIUM: CPT | Performed by: INTERNAL MEDICINE

## 2024-09-11 PROCEDURE — 25010000002 HYDROCORTISONE SOD SUC (PF) 100 MG RECONSTITUTED SOLUTION: Performed by: HOSPITALIST

## 2024-09-11 PROCEDURE — 97162 PT EVAL MOD COMPLEX 30 MIN: CPT

## 2024-09-11 RX ORDER — LACTULOSE 10 G/15ML
10 SOLUTION ORAL DAILY
Status: DISCONTINUED | OUTPATIENT
Start: 2024-09-11 | End: 2024-09-13

## 2024-09-11 RX ADMIN — ASPIRIN 81 MG: 81 TABLET, CHEWABLE ORAL at 20:50

## 2024-09-11 RX ADMIN — PANTOPRAZOLE SODIUM 40 MG: 40 TABLET, DELAYED RELEASE ORAL at 05:45

## 2024-09-11 RX ADMIN — LEVOTHYROXINE SODIUM 50 MCG: 50 TABLET ORAL at 05:45

## 2024-09-11 RX ADMIN — ASPIRIN 81 MG: 81 TABLET, CHEWABLE ORAL at 09:08

## 2024-09-11 RX ADMIN — SUCRALFATE 1 G: 1 TABLET ORAL at 12:25

## 2024-09-11 RX ADMIN — LACTULOSE 10 G: 10 SOLUTION ORAL at 12:25

## 2024-09-11 RX ADMIN — HYDROCORTISONE SODIUM SUCCINATE 50 MG: 100 INJECTION, POWDER, FOR SOLUTION INTRAMUSCULAR; INTRAVENOUS at 16:40

## 2024-09-11 RX ADMIN — TIOTROPIUM BROMIDE INHALATION SPRAY 2 PUFF: 3.12 SPRAY, METERED RESPIRATORY (INHALATION) at 09:45

## 2024-09-11 RX ADMIN — BUDESONIDE AND FORMOTEROL FUMARATE DIHYDRATE 2 PUFF: 160; 4.5 AEROSOL RESPIRATORY (INHALATION) at 09:45

## 2024-09-11 RX ADMIN — SODIUM CHLORIDE 100 ML/HR: 9 INJECTION, SOLUTION INTRAVENOUS at 00:47

## 2024-09-11 RX ADMIN — HYDROCODONE BITARTRATE AND ACETAMINOPHEN 10 ML: 7.5; 325 SOLUTION ORAL at 20:52

## 2024-09-11 RX ADMIN — HYDROCODONE BITARTRATE AND ACETAMINOPHEN 10 ML: 7.5; 325 SOLUTION ORAL at 16:40

## 2024-09-11 RX ADMIN — FLUTICASONE PROPIONATE 1 SPRAY: 50 SPRAY, METERED NASAL at 09:09

## 2024-09-11 RX ADMIN — SUCRALFATE 1 G: 1 TABLET ORAL at 09:08

## 2024-09-11 RX ADMIN — SENNOSIDES AND DOCUSATE SODIUM 2 TABLET: 50; 8.6 TABLET ORAL at 09:08

## 2024-09-11 RX ADMIN — HYDROCODONE BITARTRATE AND ACETAMINOPHEN 10 ML: 7.5; 325 SOLUTION ORAL at 10:09

## 2024-09-11 RX ADMIN — SENNOSIDES AND DOCUSATE SODIUM 2 TABLET: 50; 8.6 TABLET ORAL at 20:50

## 2024-09-11 RX ADMIN — HYDROCODONE BITARTRATE AND ACETAMINOPHEN 10 ML: 7.5; 325 SOLUTION ORAL at 00:37

## 2024-09-11 RX ADMIN — POLYETHYLENE GLYCOL 3350 17 G: 17 POWDER, FOR SOLUTION ORAL at 09:09

## 2024-09-11 RX ADMIN — HYDROCORTISONE SODIUM SUCCINATE 100 MG: 100 INJECTION, POWDER, FOR SOLUTION INTRAMUSCULAR; INTRAVENOUS at 00:37

## 2024-09-11 RX ADMIN — SUCRALFATE 1 G: 1 TABLET ORAL at 16:40

## 2024-09-11 RX ADMIN — CEPHALEXIN 500 MG: 500 CAPSULE ORAL at 09:08

## 2024-09-11 RX ADMIN — HYDROCODONE BITARTRATE AND ACETAMINOPHEN 10 ML: 7.5; 325 SOLUTION ORAL at 05:45

## 2024-09-11 RX ADMIN — FLUOROMETHOLONE 1 DROP: 1 SUSPENSION/ DROPS OPHTHALMIC at 09:10

## 2024-09-11 RX ADMIN — HYDROCORTISONE SODIUM SUCCINATE 50 MG: 100 INJECTION, POWDER, FOR SOLUTION INTRAMUSCULAR; INTRAVENOUS at 09:08

## 2024-09-11 RX ADMIN — GABAPENTIN 100 MG: 100 CAPSULE ORAL at 20:50

## 2024-09-12 LAB
ALBUMIN SERPL-MCNC: 2.8 G/DL (ref 3.5–5.2)
ALBUMIN/GLOB SERPL: 1.1 G/DL
ALP SERPL-CCNC: 70 U/L (ref 39–117)
ALT SERPL W P-5'-P-CCNC: 5 U/L (ref 1–33)
ANION GAP SERPL CALCULATED.3IONS-SCNC: 8.5 MMOL/L (ref 5–15)
AST SERPL-CCNC: 26 U/L (ref 1–32)
BASOPHILS # BLD AUTO: 0.02 10*3/MM3 (ref 0–0.2)
BASOPHILS NFR BLD AUTO: 0.1 % (ref 0–1.5)
BILIRUB SERPL-MCNC: 0.2 MG/DL (ref 0–1.2)
BUN SERPL-MCNC: 60 MG/DL (ref 8–23)
BUN/CREAT SERPL: 38.7 (ref 7–25)
CALCIUM SPEC-SCNC: 9.7 MG/DL (ref 8.6–10.5)
CHLORIDE SERPL-SCNC: 100 MMOL/L (ref 98–107)
CO2 SERPL-SCNC: 20.5 MMOL/L (ref 22–29)
CREAT SERPL-MCNC: 1.55 MG/DL (ref 0.57–1)
DEPRECATED RDW RBC AUTO: 41.9 FL (ref 37–54)
EGFRCR SERPLBLD CKD-EPI 2021: 32.1 ML/MIN/1.73
EOSINOPHIL # BLD AUTO: 0.05 10*3/MM3 (ref 0–0.4)
EOSINOPHIL NFR BLD AUTO: 0.3 % (ref 0.3–6.2)
ERYTHROCYTE [DISTWIDTH] IN BLOOD BY AUTOMATED COUNT: 13 % (ref 12.3–15.4)
GLOBULIN UR ELPH-MCNC: 2.6 GM/DL
GLUCOSE BLDC GLUCOMTR-MCNC: 160 MG/DL (ref 70–130)
GLUCOSE SERPL-MCNC: 112 MG/DL (ref 65–99)
HCT VFR BLD AUTO: 24.3 % (ref 34–46.6)
HGB BLD-MCNC: 8.3 G/DL (ref 12–15.9)
IMM GRANULOCYTES # BLD AUTO: 0.19 10*3/MM3 (ref 0–0.05)
IMM GRANULOCYTES NFR BLD AUTO: 1.1 % (ref 0–0.5)
LYMPHOCYTES # BLD AUTO: 0.91 10*3/MM3 (ref 0.7–3.1)
LYMPHOCYTES NFR BLD AUTO: 5.1 % (ref 19.6–45.3)
MAGNESIUM SERPL-MCNC: 2.8 MG/DL (ref 1.6–2.4)
MCH RBC QN AUTO: 30.4 PG (ref 26.6–33)
MCHC RBC AUTO-ENTMCNC: 34.2 G/DL (ref 31.5–35.7)
MCV RBC AUTO: 89 FL (ref 79–97)
MONOCYTES # BLD AUTO: 1.5 10*3/MM3 (ref 0.1–0.9)
MONOCYTES NFR BLD AUTO: 8.4 % (ref 5–12)
NEUTROPHILS NFR BLD AUTO: 15.19 10*3/MM3 (ref 1.7–7)
NEUTROPHILS NFR BLD AUTO: 85 % (ref 42.7–76)
NRBC BLD AUTO-RTO: 0 /100 WBC (ref 0–0.2)
OSMOLALITY UR: 414 MOSM/KG
PHOSPHATE SERPL-MCNC: 2.9 MG/DL (ref 2.5–4.5)
PLATELET # BLD AUTO: 319 10*3/MM3 (ref 140–450)
PMV BLD AUTO: 9.9 FL (ref 6–12)
POTASSIUM SERPL-SCNC: 4.6 MMOL/L (ref 3.5–5.2)
PROT SERPL-MCNC: 5.4 G/DL (ref 6–8.5)
RBC # BLD AUTO: 2.73 10*6/MM3 (ref 3.77–5.28)
SODIUM SERPL-SCNC: 129 MMOL/L (ref 136–145)
SODIUM UR-SCNC: <20 MMOL/L
WBC NRBC COR # BLD AUTO: 17.86 10*3/MM3 (ref 3.4–10.8)

## 2024-09-12 PROCEDURE — 97530 THERAPEUTIC ACTIVITIES: CPT

## 2024-09-12 PROCEDURE — 25010000002 HYDROCORTISONE SOD SUC (PF) 100 MG RECONSTITUTED SOLUTION: Performed by: HOSPITALIST

## 2024-09-12 PROCEDURE — 97110 THERAPEUTIC EXERCISES: CPT

## 2024-09-12 PROCEDURE — 94799 UNLISTED PULMONARY SVC/PX: CPT

## 2024-09-12 PROCEDURE — 94664 DEMO&/EVAL PT USE INHALER: CPT

## 2024-09-12 PROCEDURE — 84100 ASSAY OF PHOSPHORUS: CPT | Performed by: INTERNAL MEDICINE

## 2024-09-12 PROCEDURE — 85025 COMPLETE CBC W/AUTO DIFF WBC: CPT | Performed by: ORTHOPAEDIC SURGERY

## 2024-09-12 PROCEDURE — 83935 ASSAY OF URINE OSMOLALITY: CPT | Performed by: INTERNAL MEDICINE

## 2024-09-12 PROCEDURE — 82948 REAGENT STRIP/BLOOD GLUCOSE: CPT

## 2024-09-12 PROCEDURE — 83735 ASSAY OF MAGNESIUM: CPT | Performed by: INTERNAL MEDICINE

## 2024-09-12 PROCEDURE — 84300 ASSAY OF URINE SODIUM: CPT | Performed by: INTERNAL MEDICINE

## 2024-09-12 PROCEDURE — 63710000001 PREDNISONE PER 5 MG: Performed by: HOSPITALIST

## 2024-09-12 PROCEDURE — 94761 N-INVAS EAR/PLS OXIMETRY MLT: CPT

## 2024-09-12 PROCEDURE — 80053 COMPREHEN METABOLIC PANEL: CPT | Performed by: INTERNAL MEDICINE

## 2024-09-12 PROCEDURE — 63710000001 PREDNISONE PER 1 MG: Performed by: HOSPITALIST

## 2024-09-12 RX ADMIN — ACETAMINOPHEN 500 MG: 500 TABLET ORAL at 23:47

## 2024-09-12 RX ADMIN — LACTULOSE 10 G: 10 SOLUTION ORAL at 10:10

## 2024-09-12 RX ADMIN — ACETAMINOPHEN 500 MG: 500 TABLET ORAL at 01:13

## 2024-09-12 RX ADMIN — SUCRALFATE 1 G: 1 TABLET ORAL at 23:47

## 2024-09-12 RX ADMIN — ACETAMINOPHEN 500 MG: 500 TABLET ORAL at 17:59

## 2024-09-12 RX ADMIN — SENNOSIDES AND DOCUSATE SODIUM 2 TABLET: 50; 8.6 TABLET ORAL at 09:49

## 2024-09-12 RX ADMIN — FLUOROMETHOLONE 1 DROP: 1 SUSPENSION/ DROPS OPHTHALMIC at 10:04

## 2024-09-12 RX ADMIN — GABAPENTIN 100 MG: 100 CAPSULE ORAL at 21:58

## 2024-09-12 RX ADMIN — HYDROCODONE BITARTRATE AND ACETAMINOPHEN 10 ML: 7.5; 325 SOLUTION ORAL at 18:55

## 2024-09-12 RX ADMIN — ERYTHROMYCIN 1 APPLICATION: 5 OINTMENT OPHTHALMIC at 23:47

## 2024-09-12 RX ADMIN — FLUTICASONE PROPIONATE 1 SPRAY: 50 SPRAY, METERED NASAL at 10:04

## 2024-09-12 RX ADMIN — HYDROCODONE BITARTRATE AND ACETAMINOPHEN 10 ML: 7.5; 325 SOLUTION ORAL at 14:23

## 2024-09-12 RX ADMIN — PREDNISONE 30 MG: 10 TABLET ORAL at 10:09

## 2024-09-12 RX ADMIN — BUDESONIDE AND FORMOTEROL FUMARATE DIHYDRATE 2 PUFF: 160; 4.5 AEROSOL RESPIRATORY (INHALATION) at 20:47

## 2024-09-12 RX ADMIN — ACETAMINOPHEN 500 MG: 500 TABLET ORAL at 12:20

## 2024-09-12 RX ADMIN — BUDESONIDE AND FORMOTEROL FUMARATE DIHYDRATE 2 PUFF: 160; 4.5 AEROSOL RESPIRATORY (INHALATION) at 08:51

## 2024-09-12 RX ADMIN — TIOTROPIUM BROMIDE INHALATION SPRAY 2 PUFF: 3.12 SPRAY, METERED RESPIRATORY (INHALATION) at 08:52

## 2024-09-12 RX ADMIN — LEVOTHYROXINE SODIUM 50 MCG: 50 TABLET ORAL at 06:16

## 2024-09-12 RX ADMIN — ASPIRIN 81 MG: 81 TABLET, CHEWABLE ORAL at 10:09

## 2024-09-12 RX ADMIN — ASPIRIN 81 MG: 81 TABLET, CHEWABLE ORAL at 21:58

## 2024-09-12 RX ADMIN — SUCRALFATE 1 G: 1 TABLET ORAL at 12:20

## 2024-09-12 RX ADMIN — HYDROCORTISONE SODIUM SUCCINATE 50 MG: 100 INJECTION, POWDER, FOR SOLUTION INTRAMUSCULAR; INTRAVENOUS at 06:17

## 2024-09-12 RX ADMIN — PANTOPRAZOLE SODIUM 40 MG: 40 TABLET, DELAYED RELEASE ORAL at 06:16

## 2024-09-12 RX ADMIN — ACETAMINOPHEN 500 MG: 500 TABLET ORAL at 06:17

## 2024-09-12 RX ADMIN — SUCRALFATE 1 G: 1 TABLET ORAL at 10:09

## 2024-09-13 ENCOUNTER — APPOINTMENT (OUTPATIENT)
Dept: CT IMAGING | Facility: HOSPITAL | Age: 89
DRG: 470 | End: 2024-09-13
Payer: MEDICARE

## 2024-09-13 PROBLEM — K57.32 DIVERTICULITIS OF DESCENDING COLON: Status: ACTIVE | Noted: 2024-09-13

## 2024-09-13 PROBLEM — D62 ACUTE POSTHEMORRHAGIC ANEMIA: Status: ACTIVE | Noted: 2024-09-13

## 2024-09-13 LAB
ABO GROUP BLD: NORMAL
ALBUMIN SERPL-MCNC: 2.8 G/DL (ref 3.5–5.2)
ANION GAP SERPL CALCULATED.3IONS-SCNC: 11 MMOL/L (ref 5–15)
BASOPHILS # BLD AUTO: 0.02 10*3/MM3 (ref 0–0.2)
BASOPHILS NFR BLD AUTO: 0.1 % (ref 0–1.5)
BLD GP AB SCN SERPL QL: NEGATIVE
BUN SERPL-MCNC: 67 MG/DL (ref 8–23)
BUN/CREAT SERPL: 43.2 (ref 7–25)
CALCIUM SPEC-SCNC: 9.3 MG/DL (ref 8.6–10.5)
CHLORIDE SERPL-SCNC: 98 MMOL/L (ref 98–107)
CO2 SERPL-SCNC: 21 MMOL/L (ref 22–29)
CREAT SERPL-MCNC: 1.55 MG/DL (ref 0.57–1)
DEPRECATED RDW RBC AUTO: 43.5 FL (ref 37–54)
EGFRCR SERPLBLD CKD-EPI 2021: 32.1 ML/MIN/1.73
EOSINOPHIL # BLD AUTO: 0.01 10*3/MM3 (ref 0–0.4)
EOSINOPHIL NFR BLD AUTO: 0.1 % (ref 0.3–6.2)
ERYTHROCYTE [DISTWIDTH] IN BLOOD BY AUTOMATED COUNT: 13.2 % (ref 12.3–15.4)
GLUCOSE SERPL-MCNC: 128 MG/DL (ref 65–99)
HCT VFR BLD AUTO: 19 % (ref 34–46.6)
HCT VFR BLD AUTO: 19.4 % (ref 34–46.6)
HCT VFR BLD AUTO: 23.8 % (ref 34–46.6)
HGB BLD-MCNC: 6.2 G/DL (ref 12–15.9)
HGB BLD-MCNC: 6.6 G/DL (ref 12–15.9)
HGB BLD-MCNC: 8 G/DL (ref 12–15.9)
IMM GRANULOCYTES # BLD AUTO: 0.31 10*3/MM3 (ref 0–0.05)
IMM GRANULOCYTES NFR BLD AUTO: 2.1 % (ref 0–0.5)
LDH SERPL-CCNC: 208 U/L (ref 135–214)
LYMPHOCYTES # BLD AUTO: 1.04 10*3/MM3 (ref 0.7–3.1)
LYMPHOCYTES NFR BLD AUTO: 7 % (ref 19.6–45.3)
MCH RBC QN AUTO: 30.6 PG (ref 26.6–33)
MCHC RBC AUTO-ENTMCNC: 34 G/DL (ref 31.5–35.7)
MCV RBC AUTO: 89.8 FL (ref 79–97)
MONOCYTES # BLD AUTO: 1.34 10*3/MM3 (ref 0.1–0.9)
MONOCYTES NFR BLD AUTO: 9 % (ref 5–12)
NEUTROPHILS NFR BLD AUTO: 12.23 10*3/MM3 (ref 1.7–7)
NEUTROPHILS NFR BLD AUTO: 81.7 % (ref 42.7–76)
NRBC BLD AUTO-RTO: 0.1 /100 WBC (ref 0–0.2)
PHOSPHATE SERPL-MCNC: 3.1 MG/DL (ref 2.5–4.5)
PLATELET # BLD AUTO: 303 10*3/MM3 (ref 140–450)
PMV BLD AUTO: 9.9 FL (ref 6–12)
POTASSIUM SERPL-SCNC: 4.7 MMOL/L (ref 3.5–5.2)
RBC # BLD AUTO: 2.16 10*6/MM3 (ref 3.77–5.28)
RETICS # AUTO: 0.06 10*6/MM3 (ref 0.02–0.13)
RETICS/RBC NFR AUTO: 2.79 % (ref 0.7–1.9)
RH BLD: POSITIVE
SODIUM SERPL-SCNC: 130 MMOL/L (ref 136–145)
T&S EXPIRATION DATE: NORMAL
WBC NRBC COR # BLD AUTO: 14.95 10*3/MM3 (ref 3.4–10.8)

## 2024-09-13 PROCEDURE — 85045 AUTOMATED RETICULOCYTE COUNT: CPT | Performed by: INTERNAL MEDICINE

## 2024-09-13 PROCEDURE — 80069 RENAL FUNCTION PANEL: CPT | Performed by: INTERNAL MEDICINE

## 2024-09-13 PROCEDURE — 94761 N-INVAS EAR/PLS OXIMETRY MLT: CPT

## 2024-09-13 PROCEDURE — 25010000002 PIPERACILLIN SOD-TAZOBACTAM PER 1 G: Performed by: HOSPITALIST

## 2024-09-13 PROCEDURE — 86900 BLOOD TYPING SEROLOGIC ABO: CPT

## 2024-09-13 PROCEDURE — 86850 RBC ANTIBODY SCREEN: CPT | Performed by: HOSPITALIST

## 2024-09-13 PROCEDURE — 74176 CT ABD & PELVIS W/O CONTRAST: CPT

## 2024-09-13 PROCEDURE — 94799 UNLISTED PULMONARY SVC/PX: CPT

## 2024-09-13 PROCEDURE — 36430 TRANSFUSION BLD/BLD COMPNT: CPT

## 2024-09-13 PROCEDURE — 83615 LACTATE (LD) (LDH) ENZYME: CPT | Performed by: INTERNAL MEDICINE

## 2024-09-13 PROCEDURE — 85014 HEMATOCRIT: CPT | Performed by: HOSPITALIST

## 2024-09-13 PROCEDURE — P9016 RBC LEUKOCYTES REDUCED: HCPCS

## 2024-09-13 PROCEDURE — 85025 COMPLETE CBC W/AUTO DIFF WBC: CPT | Performed by: ORTHOPAEDIC SURGERY

## 2024-09-13 PROCEDURE — 63710000001 PREDNISONE PER 5 MG: Performed by: HOSPITALIST

## 2024-09-13 PROCEDURE — 86901 BLOOD TYPING SEROLOGIC RH(D): CPT | Performed by: HOSPITALIST

## 2024-09-13 PROCEDURE — 85018 HEMOGLOBIN: CPT | Performed by: HOSPITALIST

## 2024-09-13 PROCEDURE — 86900 BLOOD TYPING SEROLOGIC ABO: CPT | Performed by: HOSPITALIST

## 2024-09-13 PROCEDURE — 25010000002 ONDANSETRON PER 1 MG

## 2024-09-13 PROCEDURE — 63710000001 PREDNISONE PER 1 MG: Performed by: HOSPITALIST

## 2024-09-13 PROCEDURE — 94760 N-INVAS EAR/PLS OXIMETRY 1: CPT

## 2024-09-13 PROCEDURE — 25010000002 HYDROCORTISONE SOD SUC (PF) 100 MG RECONSTITUTED SOLUTION: Performed by: HOSPITALIST

## 2024-09-13 PROCEDURE — 94664 DEMO&/EVAL PT USE INHALER: CPT

## 2024-09-13 PROCEDURE — 86923 COMPATIBILITY TEST ELECTRIC: CPT

## 2024-09-13 RX ORDER — HYDROCODONE BITARTRATE AND ACETAMINOPHEN 7.5; 325 MG/15ML; MG/15ML
7.5 SOLUTION ORAL EVERY 4 HOURS PRN
Status: ACTIVE | OUTPATIENT
Start: 2024-09-13 | End: 2024-09-15

## 2024-09-13 RX ORDER — PANTOPRAZOLE SODIUM 40 MG/10ML
40 INJECTION, POWDER, LYOPHILIZED, FOR SOLUTION INTRAVENOUS EVERY 12 HOURS SCHEDULED
Status: DISCONTINUED | OUTPATIENT
Start: 2024-09-13 | End: 2024-09-20 | Stop reason: HOSPADM

## 2024-09-13 RX ADMIN — HYDROCODONE BITARTRATE AND ACETAMINOPHEN 10 ML: 7.5; 325 SOLUTION ORAL at 02:57

## 2024-09-13 RX ADMIN — SENNOSIDES AND DOCUSATE SODIUM 2 TABLET: 50; 8.6 TABLET ORAL at 09:15

## 2024-09-13 RX ADMIN — HYDROCORTISONE SODIUM SUCCINATE 25 MG: 100 INJECTION, POWDER, FOR SOLUTION INTRAMUSCULAR; INTRAVENOUS at 21:10

## 2024-09-13 RX ADMIN — PREDNISONE 30 MG: 10 TABLET ORAL at 09:15

## 2024-09-13 RX ADMIN — ACETAMINOPHEN 500 MG: 500 TABLET ORAL at 06:48

## 2024-09-13 RX ADMIN — FLUOROMETHOLONE 1 DROP: 1 SUSPENSION/ DROPS OPHTHALMIC at 09:16

## 2024-09-13 RX ADMIN — SUCRALFATE 1 G: 1 TABLET ORAL at 17:50

## 2024-09-13 RX ADMIN — HYDROCORTISONE SODIUM SUCCINATE 25 MG: 100 INJECTION, POWDER, FOR SOLUTION INTRAMUSCULAR; INTRAVENOUS at 13:30

## 2024-09-13 RX ADMIN — ONDANSETRON 4 MG: 2 INJECTION INTRAMUSCULAR; INTRAVENOUS at 02:55

## 2024-09-13 RX ADMIN — PANTOPRAZOLE SODIUM 40 MG: 40 INJECTION, POWDER, FOR SOLUTION INTRAVENOUS at 09:16

## 2024-09-13 RX ADMIN — Medication 15 G: at 09:14

## 2024-09-13 RX ADMIN — PANTOPRAZOLE SODIUM 40 MG: 40 TABLET, DELAYED RELEASE ORAL at 06:48

## 2024-09-13 RX ADMIN — PIPERACILLIN AND TAZOBACTAM 3.38 G: 3; .375 INJECTION, POWDER, FOR SOLUTION INTRAVENOUS at 21:10

## 2024-09-13 RX ADMIN — ERYTHROMYCIN 1 APPLICATION: 5 OINTMENT OPHTHALMIC at 21:11

## 2024-09-13 RX ADMIN — ACETAMINOPHEN 500 MG: 500 TABLET ORAL at 17:56

## 2024-09-13 RX ADMIN — FLUTICASONE PROPIONATE 1 SPRAY: 50 SPRAY, METERED NASAL at 09:16

## 2024-09-13 RX ADMIN — SUCRALFATE 1 G: 1 TABLET ORAL at 13:05

## 2024-09-13 RX ADMIN — ASPIRIN 81 MG: 81 TABLET, CHEWABLE ORAL at 09:14

## 2024-09-13 RX ADMIN — SUCRALFATE 1 G: 1 TABLET ORAL at 09:15

## 2024-09-13 RX ADMIN — ASPIRIN 81 MG: 81 TABLET, CHEWABLE ORAL at 21:11

## 2024-09-13 RX ADMIN — BUDESONIDE AND FORMOTEROL FUMARATE DIHYDRATE 2 PUFF: 160; 4.5 AEROSOL RESPIRATORY (INHALATION) at 08:04

## 2024-09-13 RX ADMIN — TIOTROPIUM BROMIDE INHALATION SPRAY 2 PUFF: 3.12 SPRAY, METERED RESPIRATORY (INHALATION) at 08:04

## 2024-09-13 RX ADMIN — LEVOTHYROXINE SODIUM 50 MCG: 50 TABLET ORAL at 06:48

## 2024-09-13 RX ADMIN — ACETAMINOPHEN 500 MG: 500 TABLET ORAL at 12:27

## 2024-09-13 RX ADMIN — PANTOPRAZOLE SODIUM 40 MG: 40 INJECTION, POWDER, FOR SOLUTION INTRAVENOUS at 21:10

## 2024-09-13 RX ADMIN — PIPERACILLIN AND TAZOBACTAM 3.38 G: 3; .375 INJECTION, POWDER, FOR SOLUTION INTRAVENOUS at 17:51

## 2024-09-13 RX ADMIN — BUDESONIDE AND FORMOTEROL FUMARATE DIHYDRATE 2 PUFF: 160; 4.5 AEROSOL RESPIRATORY (INHALATION) at 20:06

## 2024-09-14 ENCOUNTER — INPATIENT HOSPITAL (OUTPATIENT)
Age: 89
End: 2024-09-14
Payer: MEDICARE

## 2024-09-14 ENCOUNTER — INPATIENT HOSPITAL (OUTPATIENT)
Dept: URBAN - METROPOLITAN AREA HOSPITAL 113 | Facility: HOSPITAL | Age: 89
End: 2024-09-14
Payer: MEDICARE

## 2024-09-14 DIAGNOSIS — D50.9 IRON DEFICIENCY ANEMIA, UNSPECIFIED: ICD-10-CM

## 2024-09-14 DIAGNOSIS — K57.32 DIVERTICULITIS OF LARGE INTESTINE WITHOUT PERFORATION OR ABS: ICD-10-CM

## 2024-09-14 LAB
ALBUMIN SERPL-MCNC: 2.5 G/DL (ref 3.5–5.2)
ANION GAP SERPL CALCULATED.3IONS-SCNC: 7.5 MMOL/L (ref 5–15)
BH BB BLOOD EXPIRATION DATE: NORMAL
BH BB BLOOD TYPE BARCODE: 6200
BH BB DISPENSE STATUS: NORMAL
BH BB PRODUCT CODE: NORMAL
BH BB UNIT NUMBER: NORMAL
BUN SERPL-MCNC: 66 MG/DL (ref 8–23)
BUN/CREAT SERPL: 47.1 (ref 7–25)
CALCIUM SPEC-SCNC: 8.9 MG/DL (ref 8.6–10.5)
CHLORIDE SERPL-SCNC: 101 MMOL/L (ref 98–107)
CO2 SERPL-SCNC: 20.5 MMOL/L (ref 22–29)
CREAT SERPL-MCNC: 1.4 MG/DL (ref 0.57–1)
CROSSMATCH INTERPRETATION: NORMAL
DEPRECATED RDW RBC AUTO: 43 FL (ref 37–54)
EGFRCR SERPLBLD CKD-EPI 2021: 36.3 ML/MIN/1.73
ERYTHROCYTE [DISTWIDTH] IN BLOOD BY AUTOMATED COUNT: 12.9 % (ref 12.3–15.4)
GLUCOSE SERPL-MCNC: 127 MG/DL (ref 65–99)
HCT VFR BLD AUTO: 22.7 % (ref 34–46.6)
HGB BLD-MCNC: 7.3 G/DL (ref 12–15.9)
LYMPHOCYTES # BLD MANUAL: 1.16 10*3/MM3 (ref 0.7–3.1)
LYMPHOCYTES NFR BLD MANUAL: 6 % (ref 5–12)
MCH RBC QN AUTO: 29.4 PG (ref 26.6–33)
MCHC RBC AUTO-ENTMCNC: 32.2 G/DL (ref 31.5–35.7)
MCV RBC AUTO: 91.5 FL (ref 79–97)
MONOCYTES # BLD: 1 10*3/MM3 (ref 0.1–0.9)
NEUTROPHILS # BLD AUTO: 14.43 10*3/MM3 (ref 1.7–7)
NEUTROPHILS NFR BLD MANUAL: 87 % (ref 42.7–76)
NRBC BLD AUTO-RTO: 0.3 /100 WBC (ref 0–0.2)
OSMOLALITY UR: 672 MOSM/KG
PHOSPHATE SERPL-MCNC: 2.8 MG/DL (ref 2.5–4.5)
PLAT MORPH BLD: NORMAL
PLATELET # BLD AUTO: 281 10*3/MM3 (ref 140–450)
PMV BLD AUTO: 10 FL (ref 6–12)
POTASSIUM SERPL-SCNC: 5 MMOL/L (ref 3.5–5.2)
POTASSIUM UR-SCNC: 59.4 MMOL/L
RBC # BLD AUTO: 2.48 10*6/MM3 (ref 3.77–5.28)
RBC MORPH BLD: NORMAL
SODIUM SERPL-SCNC: 129 MMOL/L (ref 136–145)
SODIUM UR-SCNC: <20 MMOL/L
UNIT  ABO: NORMAL
UNIT  RH: NORMAL
URATE SERPL-MCNC: 9.3 MG/DL (ref 2.4–5.7)
VARIANT LYMPHS NFR BLD MANUAL: 7 % (ref 19.6–45.3)
WBC MORPH BLD: NORMAL
WBC NRBC COR # BLD AUTO: 16.59 10*3/MM3 (ref 3.4–10.8)

## 2024-09-14 PROCEDURE — 85025 COMPLETE CBC W/AUTO DIFF WBC: CPT | Performed by: ORTHOPAEDIC SURGERY

## 2024-09-14 PROCEDURE — 84133 ASSAY OF URINE POTASSIUM: CPT | Performed by: INTERNAL MEDICINE

## 2024-09-14 PROCEDURE — 85007 BL SMEAR W/DIFF WBC COUNT: CPT | Performed by: ORTHOPAEDIC SURGERY

## 2024-09-14 PROCEDURE — 94799 UNLISTED PULMONARY SVC/PX: CPT

## 2024-09-14 PROCEDURE — 99222 1ST HOSP IP/OBS MODERATE 55: CPT | Performed by: INTERNAL MEDICINE

## 2024-09-14 PROCEDURE — 25010000002 PIPERACILLIN SOD-TAZOBACTAM PER 1 G: Performed by: HOSPITALIST

## 2024-09-14 PROCEDURE — 84300 ASSAY OF URINE SODIUM: CPT | Performed by: INTERNAL MEDICINE

## 2024-09-14 PROCEDURE — 25010000002 ONDANSETRON PER 1 MG

## 2024-09-14 PROCEDURE — 84550 ASSAY OF BLOOD/URIC ACID: CPT | Performed by: INTERNAL MEDICINE

## 2024-09-14 PROCEDURE — 25010000002 HYDROCORTISONE SOD SUC (PF) 100 MG RECONSTITUTED SOLUTION: Performed by: HOSPITALIST

## 2024-09-14 PROCEDURE — 97530 THERAPEUTIC ACTIVITIES: CPT

## 2024-09-14 PROCEDURE — 83935 ASSAY OF URINE OSMOLALITY: CPT | Performed by: INTERNAL MEDICINE

## 2024-09-14 PROCEDURE — 80069 RENAL FUNCTION PANEL: CPT | Performed by: HOSPITALIST

## 2024-09-14 RX ADMIN — PIPERACILLIN AND TAZOBACTAM 3.38 G: 3; .375 INJECTION, POWDER, FOR SOLUTION INTRAVENOUS at 11:48

## 2024-09-14 RX ADMIN — SUCRALFATE 1 G: 1 TABLET ORAL at 09:44

## 2024-09-14 RX ADMIN — TIOTROPIUM BROMIDE INHALATION SPRAY 2 PUFF: 3.12 SPRAY, METERED RESPIRATORY (INHALATION) at 08:02

## 2024-09-14 RX ADMIN — BUDESONIDE AND FORMOTEROL FUMARATE DIHYDRATE 2 PUFF: 160; 4.5 AEROSOL RESPIRATORY (INHALATION) at 08:01

## 2024-09-14 RX ADMIN — ACETAMINOPHEN 500 MG: 500 TABLET ORAL at 11:49

## 2024-09-14 RX ADMIN — ONDANSETRON 4 MG: 2 INJECTION INTRAMUSCULAR; INTRAVENOUS at 16:18

## 2024-09-14 RX ADMIN — SUCRALFATE 1 G: 1 TABLET ORAL at 11:49

## 2024-09-14 RX ADMIN — PANTOPRAZOLE SODIUM 40 MG: 40 INJECTION, POWDER, FOR SOLUTION INTRAVENOUS at 20:35

## 2024-09-14 RX ADMIN — PIPERACILLIN AND TAZOBACTAM 3.38 G: 3; .375 INJECTION, POWDER, FOR SOLUTION INTRAVENOUS at 20:33

## 2024-09-14 RX ADMIN — BUDESONIDE AND FORMOTEROL FUMARATE DIHYDRATE 2 PUFF: 160; 4.5 AEROSOL RESPIRATORY (INHALATION) at 20:17

## 2024-09-14 RX ADMIN — ACETAMINOPHEN 500 MG: 500 TABLET ORAL at 06:32

## 2024-09-14 RX ADMIN — ACETAMINOPHEN 500 MG: 500 TABLET ORAL at 17:53

## 2024-09-14 RX ADMIN — ERYTHROMYCIN 1 APPLICATION: 5 OINTMENT OPHTHALMIC at 20:40

## 2024-09-14 RX ADMIN — PIPERACILLIN AND TAZOBACTAM 3.38 G: 3; .375 INJECTION, POWDER, FOR SOLUTION INTRAVENOUS at 05:06

## 2024-09-14 RX ADMIN — ASPIRIN 81 MG: 81 TABLET, CHEWABLE ORAL at 09:44

## 2024-09-14 RX ADMIN — HYDROCORTISONE SODIUM SUCCINATE 25 MG: 100 INJECTION, POWDER, FOR SOLUTION INTRAMUSCULAR; INTRAVENOUS at 20:40

## 2024-09-14 RX ADMIN — HYDROCORTISONE SODIUM SUCCINATE 25 MG: 100 INJECTION, POWDER, FOR SOLUTION INTRAMUSCULAR; INTRAVENOUS at 11:48

## 2024-09-14 RX ADMIN — LEVOTHYROXINE SODIUM 50 MCG: 50 TABLET ORAL at 06:32

## 2024-09-14 RX ADMIN — PANTOPRAZOLE SODIUM 40 MG: 40 INJECTION, POWDER, FOR SOLUTION INTRAVENOUS at 09:44

## 2024-09-14 RX ADMIN — ASPIRIN 81 MG: 81 TABLET, CHEWABLE ORAL at 20:32

## 2024-09-14 RX ADMIN — HYDROCORTISONE SODIUM SUCCINATE 25 MG: 100 INJECTION, POWDER, FOR SOLUTION INTRAMUSCULAR; INTRAVENOUS at 05:05

## 2024-09-15 LAB
ALBUMIN SERPL-MCNC: 2.5 G/DL (ref 3.5–5.2)
ANION GAP SERPL CALCULATED.3IONS-SCNC: 10.5 MMOL/L (ref 5–15)
BACTERIA SPEC AEROBE CULT: NORMAL
BACTERIA SPEC AEROBE CULT: NORMAL
BASOPHILS # BLD MANUAL: 0 10*3/MM3 (ref 0–0.2)
BASOPHILS NFR BLD MANUAL: 0 % (ref 0–1.5)
BUN SERPL-MCNC: 53 MG/DL (ref 8–23)
BUN/CREAT SERPL: 46.5 (ref 7–25)
CALCIUM SPEC-SCNC: 8.7 MG/DL (ref 8.6–10.5)
CHLORIDE SERPL-SCNC: 102 MMOL/L (ref 98–107)
CO2 SERPL-SCNC: 20.5 MMOL/L (ref 22–29)
CREAT SERPL-MCNC: 1.14 MG/DL (ref 0.57–1)
DEPRECATED RDW RBC AUTO: 43.6 FL (ref 37–54)
EGFRCR SERPLBLD CKD-EPI 2021: 46.4 ML/MIN/1.73
EOSINOPHIL # BLD MANUAL: 0 10*3/MM3 (ref 0–0.4)
EOSINOPHIL NFR BLD MANUAL: 0 % (ref 0.3–6.2)
ERYTHROCYTE [DISTWIDTH] IN BLOOD BY AUTOMATED COUNT: 13.2 % (ref 12.3–15.4)
GLUCOSE SERPL-MCNC: 141 MG/DL (ref 65–99)
HCT VFR BLD AUTO: 20.4 % (ref 34–46.6)
HGB BLD-MCNC: 6.6 G/DL (ref 12–15.9)
HYPOCHROMIA BLD QL: ABNORMAL
LYMPHOCYTES # BLD MANUAL: 0.69 10*3/MM3 (ref 0.7–3.1)
LYMPHOCYTES NFR BLD MANUAL: 14.1 % (ref 5–12)
MCH RBC QN AUTO: 29.5 PG (ref 26.6–33)
MCHC RBC AUTO-ENTMCNC: 32.4 G/DL (ref 31.5–35.7)
MCV RBC AUTO: 91.1 FL (ref 79–97)
MONOCYTES # BLD: 2.43 10*3/MM3 (ref 0.1–0.9)
NEUTROPHILS # BLD AUTO: 14.1 10*3/MM3 (ref 1.7–7)
NEUTROPHILS NFR BLD MANUAL: 81.8 % (ref 42.7–76)
PHOSPHATE SERPL-MCNC: 2.4 MG/DL (ref 2.5–4.5)
PLAT MORPH BLD: NORMAL
PLATELET # BLD AUTO: 291 10*3/MM3 (ref 140–450)
PMV BLD AUTO: 9.9 FL (ref 6–12)
POLYCHROMASIA BLD QL SMEAR: ABNORMAL
POTASSIUM SERPL-SCNC: 4.5 MMOL/L (ref 3.5–5.2)
RBC # BLD AUTO: 2.24 10*6/MM3 (ref 3.77–5.28)
SODIUM SERPL-SCNC: 133 MMOL/L (ref 136–145)
URATE SERPL-MCNC: 6.7 MG/DL (ref 2.4–5.7)
VARIANT LYMPHS NFR BLD MANUAL: 4 % (ref 19.6–45.3)
WBC MORPH BLD: NORMAL
WBC NRBC COR # BLD AUTO: 17.24 10*3/MM3 (ref 3.4–10.8)

## 2024-09-15 PROCEDURE — 36430 TRANSFUSION BLD/BLD COMPNT: CPT

## 2024-09-15 PROCEDURE — 85025 COMPLETE CBC W/AUTO DIFF WBC: CPT | Performed by: ORTHOPAEDIC SURGERY

## 2024-09-15 PROCEDURE — 25010000002 HYDROCORTISONE SOD SUC (PF) 100 MG RECONSTITUTED SOLUTION: Performed by: HOSPITALIST

## 2024-09-15 PROCEDURE — P9016 RBC LEUKOCYTES REDUCED: HCPCS

## 2024-09-15 PROCEDURE — 94760 N-INVAS EAR/PLS OXIMETRY 1: CPT

## 2024-09-15 PROCEDURE — 94761 N-INVAS EAR/PLS OXIMETRY MLT: CPT

## 2024-09-15 PROCEDURE — 84550 ASSAY OF BLOOD/URIC ACID: CPT | Performed by: INTERNAL MEDICINE

## 2024-09-15 PROCEDURE — 25010000002 PIPERACILLIN SOD-TAZOBACTAM PER 1 G: Performed by: HOSPITALIST

## 2024-09-15 PROCEDURE — 94799 UNLISTED PULMONARY SVC/PX: CPT

## 2024-09-15 PROCEDURE — 94664 DEMO&/EVAL PT USE INHALER: CPT

## 2024-09-15 PROCEDURE — 85007 BL SMEAR W/DIFF WBC COUNT: CPT | Performed by: ORTHOPAEDIC SURGERY

## 2024-09-15 PROCEDURE — 80069 RENAL FUNCTION PANEL: CPT | Performed by: INTERNAL MEDICINE

## 2024-09-15 PROCEDURE — 86900 BLOOD TYPING SEROLOGIC ABO: CPT

## 2024-09-15 RX ORDER — SUCRALFATE ORAL 1 G/10ML
1 SUSPENSION ORAL
Status: DISCONTINUED | OUTPATIENT
Start: 2024-09-15 | End: 2024-09-20 | Stop reason: HOSPADM

## 2024-09-15 RX ORDER — PREDNISONE 20 MG/1
40 TABLET ORAL
Status: DISCONTINUED | OUTPATIENT
Start: 2024-09-16 | End: 2024-09-17

## 2024-09-15 RX ORDER — SUCRALFATE 1 G/1
1 TABLET ORAL
Status: DISCONTINUED | OUTPATIENT
Start: 2024-09-15 | End: 2024-09-15

## 2024-09-15 RX ORDER — GABAPENTIN 100 MG/1
100 CAPSULE ORAL 3 TIMES DAILY
Status: DISCONTINUED | OUTPATIENT
Start: 2024-09-15 | End: 2024-09-20 | Stop reason: HOSPADM

## 2024-09-15 RX ADMIN — SUCRALFATE ORAL 1 G: 1 SUSPENSION ORAL at 11:39

## 2024-09-15 RX ADMIN — BUDESONIDE AND FORMOTEROL FUMARATE DIHYDRATE 2 PUFF: 160; 4.5 AEROSOL RESPIRATORY (INHALATION) at 08:34

## 2024-09-15 RX ADMIN — ACETAMINOPHEN 500 MG: 500 TABLET ORAL at 00:22

## 2024-09-15 RX ADMIN — PANTOPRAZOLE SODIUM 40 MG: 40 INJECTION, POWDER, FOR SOLUTION INTRAVENOUS at 09:55

## 2024-09-15 RX ADMIN — ACETAMINOPHEN 500 MG: 500 TABLET ORAL at 18:42

## 2024-09-15 RX ADMIN — ASPIRIN 81 MG: 81 TABLET, CHEWABLE ORAL at 20:44

## 2024-09-15 RX ADMIN — HYDROCORTISONE SODIUM SUCCINATE 25 MG: 100 INJECTION, POWDER, FOR SOLUTION INTRAMUSCULAR; INTRAVENOUS at 13:48

## 2024-09-15 RX ADMIN — GABAPENTIN 100 MG: 100 CAPSULE ORAL at 16:20

## 2024-09-15 RX ADMIN — ACETAMINOPHEN 500 MG: 500 TABLET ORAL at 11:39

## 2024-09-15 RX ADMIN — LEVOTHYROXINE SODIUM 50 MCG: 50 TABLET ORAL at 06:33

## 2024-09-15 RX ADMIN — ACETAMINOPHEN 500 MG: 500 TABLET ORAL at 06:33

## 2024-09-15 RX ADMIN — SUCRALFATE ORAL 1 G: 1 SUSPENSION ORAL at 18:42

## 2024-09-15 RX ADMIN — GABAPENTIN 100 MG: 100 CAPSULE ORAL at 20:44

## 2024-09-15 RX ADMIN — ERYTHROMYCIN 1 APPLICATION: 5 OINTMENT OPHTHALMIC at 20:52

## 2024-09-15 RX ADMIN — TIOTROPIUM BROMIDE INHALATION SPRAY 2 PUFF: 3.12 SPRAY, METERED RESPIRATORY (INHALATION) at 08:34

## 2024-09-15 RX ADMIN — FLUOROMETHOLONE 1 DROP: 1 SUSPENSION/ DROPS OPHTHALMIC at 09:57

## 2024-09-15 RX ADMIN — PIPERACILLIN AND TAZOBACTAM 3.38 G: 3; .375 INJECTION, POWDER, FOR SOLUTION INTRAVENOUS at 04:56

## 2024-09-15 RX ADMIN — ASPIRIN 81 MG: 81 TABLET, CHEWABLE ORAL at 09:55

## 2024-09-15 RX ADMIN — HYDROCORTISONE SODIUM SUCCINATE 25 MG: 100 INJECTION, POWDER, FOR SOLUTION INTRAMUSCULAR; INTRAVENOUS at 04:56

## 2024-09-15 RX ADMIN — PANTOPRAZOLE SODIUM 40 MG: 40 INJECTION, POWDER, FOR SOLUTION INTRAVENOUS at 20:44

## 2024-09-15 RX ADMIN — BUDESONIDE AND FORMOTEROL FUMARATE DIHYDRATE 2 PUFF: 160; 4.5 AEROSOL RESPIRATORY (INHALATION) at 21:18

## 2024-09-15 RX ADMIN — PIPERACILLIN AND TAZOBACTAM 3.38 G: 3; .375 INJECTION, POWDER, FOR SOLUTION INTRAVENOUS at 13:48

## 2024-09-15 RX ADMIN — FLUTICASONE PROPIONATE 1 SPRAY: 50 SPRAY, METERED NASAL at 09:56

## 2024-09-15 RX ADMIN — PIPERACILLIN AND TAZOBACTAM 3.38 G: 3; .375 INJECTION, POWDER, FOR SOLUTION INTRAVENOUS at 20:44

## 2024-09-15 RX ADMIN — SUCRALFATE 1 G: 1 TABLET ORAL at 06:53

## 2024-09-16 LAB
ALBUMIN SERPL-MCNC: 2.5 G/DL (ref 3.5–5.2)
ANION GAP SERPL CALCULATED.3IONS-SCNC: 8 MMOL/L (ref 5–15)
BH BB BLOOD EXPIRATION DATE: NORMAL
BH BB BLOOD TYPE BARCODE: 6200
BH BB DISPENSE STATUS: NORMAL
BH BB PRODUCT CODE: NORMAL
BH BB UNIT NUMBER: NORMAL
BUN SERPL-MCNC: 37 MG/DL (ref 8–23)
BUN/CREAT SERPL: 37 (ref 7–25)
CALCIUM SPEC-SCNC: 8.8 MG/DL (ref 8.6–10.5)
CHLORIDE SERPL-SCNC: 104 MMOL/L (ref 98–107)
CLUMPED PLATELETS: PRESENT
CO2 SERPL-SCNC: 19 MMOL/L (ref 22–29)
CREAT SERPL-MCNC: 1 MG/DL (ref 0.57–1)
CROSSMATCH INTERPRETATION: NORMAL
DEPRECATED RDW RBC AUTO: 45.4 FL (ref 37–54)
EGFRCR SERPLBLD CKD-EPI 2021: 54.3 ML/MIN/1.73
ERYTHROCYTE [DISTWIDTH] IN BLOOD BY AUTOMATED COUNT: 13.5 % (ref 12.3–15.4)
GLUCOSE SERPL-MCNC: 97 MG/DL (ref 65–99)
HCT VFR BLD AUTO: 25.9 % (ref 34–46.6)
HEMOCCULT STL QL: POSITIVE
HGB BLD-MCNC: 8.5 G/DL (ref 12–15.9)
LYMPHOCYTES # BLD MANUAL: 2.05 10*3/MM3 (ref 0.7–3.1)
LYMPHOCYTES NFR BLD MANUAL: 8 % (ref 5–12)
MCH RBC QN AUTO: 30.2 PG (ref 26.6–33)
MCHC RBC AUTO-ENTMCNC: 32.8 G/DL (ref 31.5–35.7)
MCV RBC AUTO: 92.2 FL (ref 79–97)
METAMYELOCYTES NFR BLD MANUAL: 1 % (ref 0–0)
MONOCYTES # BLD: 1.64 10*3/MM3 (ref 0.1–0.9)
MYELOCYTES NFR BLD MANUAL: 3 % (ref 0–0)
NEUTROPHILS # BLD AUTO: 16 10*3/MM3 (ref 1.7–7)
NEUTROPHILS NFR BLD MANUAL: 78 % (ref 42.7–76)
NRBC BLD AUTO-RTO: 0.5 /100 WBC (ref 0–0.2)
PHOSPHATE SERPL-MCNC: 2 MG/DL (ref 2.5–4.5)
PLATELET # BLD AUTO: 333 10*3/MM3 (ref 140–450)
PMV BLD AUTO: 10.7 FL (ref 6–12)
POTASSIUM SERPL-SCNC: 4.7 MMOL/L (ref 3.5–5.2)
RBC # BLD AUTO: 2.81 10*6/MM3 (ref 3.77–5.28)
RBC MORPH BLD: NORMAL
SODIUM SERPL-SCNC: 131 MMOL/L (ref 136–145)
UNIT  ABO: NORMAL
UNIT  RH: NORMAL
VARIANT LYMPHS NFR BLD MANUAL: 10 % (ref 19.6–45.3)
WBC MORPH BLD: NORMAL
WBC NRBC COR # BLD AUTO: 20.51 10*3/MM3 (ref 3.4–10.8)

## 2024-09-16 PROCEDURE — 97530 THERAPEUTIC ACTIVITIES: CPT

## 2024-09-16 PROCEDURE — 85025 COMPLETE CBC W/AUTO DIFF WBC: CPT | Performed by: ORTHOPAEDIC SURGERY

## 2024-09-16 PROCEDURE — 94664 DEMO&/EVAL PT USE INHALER: CPT

## 2024-09-16 PROCEDURE — 94761 N-INVAS EAR/PLS OXIMETRY MLT: CPT

## 2024-09-16 PROCEDURE — 85007 BL SMEAR W/DIFF WBC COUNT: CPT | Performed by: ORTHOPAEDIC SURGERY

## 2024-09-16 PROCEDURE — 94799 UNLISTED PULMONARY SVC/PX: CPT

## 2024-09-16 PROCEDURE — 97116 GAIT TRAINING THERAPY: CPT

## 2024-09-16 PROCEDURE — 82272 OCCULT BLD FECES 1-3 TESTS: CPT | Performed by: HOSPITALIST

## 2024-09-16 PROCEDURE — 25010000002 PIPERACILLIN SOD-TAZOBACTAM PER 1 G: Performed by: HOSPITALIST

## 2024-09-16 PROCEDURE — 80069 RENAL FUNCTION PANEL: CPT | Performed by: INTERNAL MEDICINE

## 2024-09-16 PROCEDURE — 63710000001 PREDNISONE PER 1 MG: Performed by: STUDENT IN AN ORGANIZED HEALTH CARE EDUCATION/TRAINING PROGRAM

## 2024-09-16 RX ORDER — PETROLATUM 420 MG/G
1 OINTMENT TOPICAL
Status: DISCONTINUED | OUTPATIENT
Start: 2024-09-16 | End: 2024-09-20 | Stop reason: HOSPADM

## 2024-09-16 RX ORDER — PREDNISONE 10 MG/1
10 TABLET ORAL DAILY
Status: DISCONTINUED | OUTPATIENT
Start: 2024-09-22 | End: 2024-09-20 | Stop reason: HOSPADM

## 2024-09-16 RX ORDER — PREDNISONE 20 MG/1
40 TABLET ORAL DAILY
Status: COMPLETED | OUTPATIENT
Start: 2024-09-17 | End: 2024-09-17

## 2024-09-16 RX ORDER — PREDNISONE 20 MG/1
20 TABLET ORAL DAILY
Status: DISCONTINUED | OUTPATIENT
Start: 2024-09-20 | End: 2024-09-20 | Stop reason: HOSPADM

## 2024-09-16 RX ADMIN — PIPERACILLIN AND TAZOBACTAM 3.38 G: 3; .375 INJECTION, POWDER, FOR SOLUTION INTRAVENOUS at 04:30

## 2024-09-16 RX ADMIN — PIPERACILLIN AND TAZOBACTAM 3.38 G: 3; .375 INJECTION, POWDER, FOR SOLUTION INTRAVENOUS at 13:21

## 2024-09-16 RX ADMIN — GABAPENTIN 100 MG: 100 CAPSULE ORAL at 17:06

## 2024-09-16 RX ADMIN — ASPIRIN 81 MG: 81 TABLET, CHEWABLE ORAL at 20:04

## 2024-09-16 RX ADMIN — TIOTROPIUM BROMIDE INHALATION SPRAY 2 PUFF: 3.12 SPRAY, METERED RESPIRATORY (INHALATION) at 09:33

## 2024-09-16 RX ADMIN — PREDNISONE 40 MG: 20 TABLET ORAL at 09:23

## 2024-09-16 RX ADMIN — PANTOPRAZOLE SODIUM 40 MG: 40 INJECTION, POWDER, FOR SOLUTION INTRAVENOUS at 09:23

## 2024-09-16 RX ADMIN — BUDESONIDE AND FORMOTEROL FUMARATE DIHYDRATE 2 PUFF: 160; 4.5 AEROSOL RESPIRATORY (INHALATION) at 21:43

## 2024-09-16 RX ADMIN — ACETAMINOPHEN 500 MG: 500 TABLET ORAL at 20:04

## 2024-09-16 RX ADMIN — SUCRALFATE ORAL 1 G: 1 SUSPENSION ORAL at 07:23

## 2024-09-16 RX ADMIN — ERYTHROMYCIN 1 APPLICATION: 5 OINTMENT OPHTHALMIC at 20:04

## 2024-09-16 RX ADMIN — GABAPENTIN 100 MG: 100 CAPSULE ORAL at 09:23

## 2024-09-16 RX ADMIN — PETROLATUM 1 APPLICATION: 420 OINTMENT TOPICAL at 17:07

## 2024-09-16 RX ADMIN — PANTOPRAZOLE SODIUM 40 MG: 40 INJECTION, POWDER, FOR SOLUTION INTRAVENOUS at 20:04

## 2024-09-16 RX ADMIN — GABAPENTIN 100 MG: 100 CAPSULE ORAL at 20:04

## 2024-09-16 RX ADMIN — PIPERACILLIN AND TAZOBACTAM 3.38 G: 3; .375 INJECTION, POWDER, FOR SOLUTION INTRAVENOUS at 20:04

## 2024-09-16 RX ADMIN — FLUTICASONE PROPIONATE 1 SPRAY: 50 SPRAY, METERED NASAL at 09:25

## 2024-09-16 RX ADMIN — BUDESONIDE AND FORMOTEROL FUMARATE DIHYDRATE 2 PUFF: 160; 4.5 AEROSOL RESPIRATORY (INHALATION) at 09:32

## 2024-09-16 RX ADMIN — FLUOROMETHOLONE 1 DROP: 1 SUSPENSION/ DROPS OPHTHALMIC at 09:25

## 2024-09-16 RX ADMIN — ACETAMINOPHEN 500 MG: 500 TABLET ORAL at 07:22

## 2024-09-16 RX ADMIN — LEVOTHYROXINE SODIUM 50 MCG: 50 TABLET ORAL at 07:23

## 2024-09-16 RX ADMIN — SUCRALFATE ORAL 1 G: 1 SUSPENSION ORAL at 17:02

## 2024-09-16 RX ADMIN — ACETAMINOPHEN 500 MG: 500 TABLET ORAL at 13:21

## 2024-09-16 RX ADMIN — ASPIRIN 81 MG: 81 TABLET, CHEWABLE ORAL at 09:23

## 2024-09-17 LAB
ALBUMIN SERPL-MCNC: 2.4 G/DL (ref 3.5–5.2)
ANION GAP SERPL CALCULATED.3IONS-SCNC: 10 MMOL/L (ref 5–15)
ANISOCYTOSIS BLD QL: ABNORMAL
BUN SERPL-MCNC: 32 MG/DL (ref 8–23)
BUN/CREAT SERPL: 43.8 (ref 7–25)
CALCIUM SPEC-SCNC: 8.8 MG/DL (ref 8.6–10.5)
CHLORIDE SERPL-SCNC: 106 MMOL/L (ref 98–107)
CO2 SERPL-SCNC: 18 MMOL/L (ref 22–29)
CREAT SERPL-MCNC: 0.73 MG/DL (ref 0.57–1)
DEPRECATED RDW RBC AUTO: 45.1 FL (ref 37–54)
EGFRCR SERPLBLD CKD-EPI 2021: 79.2 ML/MIN/1.73
ERYTHROCYTE [DISTWIDTH] IN BLOOD BY AUTOMATED COUNT: 13.2 % (ref 12.3–15.4)
GLUCOSE SERPL-MCNC: 111 MG/DL (ref 65–99)
HCT VFR BLD AUTO: 26.3 % (ref 34–46.6)
HGB BLD-MCNC: 8.3 G/DL (ref 12–15.9)
LYMPHOCYTES # BLD MANUAL: 2.73 10*3/MM3 (ref 0.7–3.1)
LYMPHOCYTES NFR BLD MANUAL: 4 % (ref 5–12)
MAGNESIUM SERPL-MCNC: 2.4 MG/DL (ref 1.6–2.4)
MCH RBC QN AUTO: 29.5 PG (ref 26.6–33)
MCHC RBC AUTO-ENTMCNC: 31.6 G/DL (ref 31.5–35.7)
MCV RBC AUTO: 93.6 FL (ref 79–97)
METAMYELOCYTES NFR BLD MANUAL: 1 % (ref 0–0)
MONOCYTES # BLD: 0.99 10*3/MM3 (ref 0.1–0.9)
MYELOCYTES NFR BLD MANUAL: 9 % (ref 0–0)
NEUTROPHILS # BLD AUTO: 18.63 10*3/MM3 (ref 1.7–7)
NEUTROPHILS NFR BLD MANUAL: 75 % (ref 42.7–76)
PHOSPHATE SERPL-MCNC: 3.6 MG/DL (ref 2.5–4.5)
PLAT MORPH BLD: NORMAL
PLATELET # BLD AUTO: 419 10*3/MM3 (ref 140–450)
PMV BLD AUTO: 9.4 FL (ref 6–12)
POTASSIUM SERPL-SCNC: 4.5 MMOL/L (ref 3.5–5.2)
RBC # BLD AUTO: 2.81 10*6/MM3 (ref 3.77–5.28)
SMALL PLATELETS BLD QL SMEAR: ADEQUATE
SODIUM SERPL-SCNC: 134 MMOL/L (ref 136–145)
VARIANT LYMPHS NFR BLD MANUAL: 11 % (ref 19.6–45.3)
WBC MORPH BLD: NORMAL
WBC NRBC COR # BLD AUTO: 24.84 10*3/MM3 (ref 3.4–10.8)

## 2024-09-17 PROCEDURE — 63710000001 PREDNISONE PER 1 MG: Performed by: STUDENT IN AN ORGANIZED HEALTH CARE EDUCATION/TRAINING PROGRAM

## 2024-09-17 PROCEDURE — 83735 ASSAY OF MAGNESIUM: CPT | Performed by: INTERNAL MEDICINE

## 2024-09-17 PROCEDURE — 25010000002 PIPERACILLIN SOD-TAZOBACTAM PER 1 G: Performed by: HOSPITALIST

## 2024-09-17 PROCEDURE — 94664 DEMO&/EVAL PT USE INHALER: CPT

## 2024-09-17 PROCEDURE — 94761 N-INVAS EAR/PLS OXIMETRY MLT: CPT

## 2024-09-17 PROCEDURE — 25810000003 SODIUM CHLORIDE 0.9 % SOLUTION: Performed by: INTERNAL MEDICINE

## 2024-09-17 PROCEDURE — 97530 THERAPEUTIC ACTIVITIES: CPT

## 2024-09-17 PROCEDURE — 94799 UNLISTED PULMONARY SVC/PX: CPT

## 2024-09-17 PROCEDURE — 97116 GAIT TRAINING THERAPY: CPT

## 2024-09-17 PROCEDURE — 94760 N-INVAS EAR/PLS OXIMETRY 1: CPT

## 2024-09-17 PROCEDURE — 85007 BL SMEAR W/DIFF WBC COUNT: CPT | Performed by: ORTHOPAEDIC SURGERY

## 2024-09-17 PROCEDURE — 85025 COMPLETE CBC W/AUTO DIFF WBC: CPT | Performed by: ORTHOPAEDIC SURGERY

## 2024-09-17 PROCEDURE — 80069 RENAL FUNCTION PANEL: CPT | Performed by: INTERNAL MEDICINE

## 2024-09-17 RX ORDER — CARVEDILOL 12.5 MG/1
12.5 TABLET ORAL 2 TIMES DAILY WITH MEALS
Status: DISCONTINUED | OUTPATIENT
Start: 2024-09-17 | End: 2024-09-20 | Stop reason: HOSPADM

## 2024-09-17 RX ORDER — TORSEMIDE 20 MG/1
20 TABLET ORAL DAILY
Status: DISCONTINUED | OUTPATIENT
Start: 2024-09-17 | End: 2024-09-20 | Stop reason: HOSPADM

## 2024-09-17 RX ORDER — VALSARTAN 160 MG/1
160 TABLET ORAL DAILY
Status: DISCONTINUED | OUTPATIENT
Start: 2024-09-17 | End: 2024-09-20 | Stop reason: HOSPADM

## 2024-09-17 RX ADMIN — PIPERACILLIN AND TAZOBACTAM 3.38 G: 3; .375 INJECTION, POWDER, FOR SOLUTION INTRAVENOUS at 05:22

## 2024-09-17 RX ADMIN — ACETAMINOPHEN 500 MG: 500 TABLET ORAL at 06:26

## 2024-09-17 RX ADMIN — PANTOPRAZOLE SODIUM 40 MG: 40 INJECTION, POWDER, FOR SOLUTION INTRAVENOUS at 08:49

## 2024-09-17 RX ADMIN — ASPIRIN 81 MG: 81 TABLET, CHEWABLE ORAL at 20:09

## 2024-09-17 RX ADMIN — GABAPENTIN 100 MG: 100 CAPSULE ORAL at 16:05

## 2024-09-17 RX ADMIN — CARVEDILOL 12.5 MG: 12.5 TABLET, FILM COATED ORAL at 17:43

## 2024-09-17 RX ADMIN — SUCRALFATE ORAL 1 G: 1 SUSPENSION ORAL at 12:04

## 2024-09-17 RX ADMIN — TORSEMIDE 20 MG: 20 TABLET ORAL at 22:52

## 2024-09-17 RX ADMIN — SUCRALFATE ORAL 1 G: 1 SUSPENSION ORAL at 06:26

## 2024-09-17 RX ADMIN — BUDESONIDE AND FORMOTEROL FUMARATE DIHYDRATE 2 PUFF: 160; 4.5 AEROSOL RESPIRATORY (INHALATION) at 08:28

## 2024-09-17 RX ADMIN — ACETAMINOPHEN 500 MG: 500 TABLET ORAL at 12:04

## 2024-09-17 RX ADMIN — ASPIRIN 81 MG: 81 TABLET, CHEWABLE ORAL at 08:49

## 2024-09-17 RX ADMIN — ERYTHROMYCIN 1 APPLICATION: 5 OINTMENT OPHTHALMIC at 20:10

## 2024-09-17 RX ADMIN — FLUTICASONE PROPIONATE 1 SPRAY: 50 SPRAY, METERED NASAL at 08:50

## 2024-09-17 RX ADMIN — TIOTROPIUM BROMIDE INHALATION SPRAY 2 PUFF: 3.12 SPRAY, METERED RESPIRATORY (INHALATION) at 08:28

## 2024-09-17 RX ADMIN — FLUOROMETHOLONE 1 DROP: 1 SUSPENSION/ DROPS OPHTHALMIC at 08:50

## 2024-09-17 RX ADMIN — PIPERACILLIN AND TAZOBACTAM 3.38 G: 3; .375 INJECTION, POWDER, FOR SOLUTION INTRAVENOUS at 12:04

## 2024-09-17 RX ADMIN — SODIUM PHOSPHATE, MONOBASIC, MONOHYDRATE AND SODIUM PHOSPHATE, DIBASIC, ANHYDROUS 15 MMOL: 276; 142 INJECTION, SOLUTION INTRAVENOUS at 02:05

## 2024-09-17 RX ADMIN — GABAPENTIN 100 MG: 100 CAPSULE ORAL at 20:09

## 2024-09-17 RX ADMIN — LEVOTHYROXINE SODIUM 50 MCG: 50 TABLET ORAL at 06:26

## 2024-09-17 RX ADMIN — VALSARTAN 160 MG: 160 TABLET, FILM COATED ORAL at 16:03

## 2024-09-17 RX ADMIN — GABAPENTIN 100 MG: 100 CAPSULE ORAL at 08:49

## 2024-09-17 RX ADMIN — AMOXICILLIN AND CLAVULANATE POTASSIUM 1 TABLET: 875; 125 TABLET, FILM COATED ORAL at 20:09

## 2024-09-17 RX ADMIN — SUCRALFATE ORAL 1 G: 1 SUSPENSION ORAL at 17:42

## 2024-09-17 RX ADMIN — PREDNISONE 40 MG: 20 TABLET ORAL at 08:49

## 2024-09-17 RX ADMIN — PANTOPRAZOLE SODIUM 40 MG: 40 INJECTION, POWDER, FOR SOLUTION INTRAVENOUS at 20:09

## 2024-09-17 RX ADMIN — ACETAMINOPHEN 500 MG: 500 TABLET ORAL at 17:43

## 2024-09-17 RX ADMIN — BUDESONIDE AND FORMOTEROL FUMARATE DIHYDRATE 2 PUFF: 160; 4.5 AEROSOL RESPIRATORY (INHALATION) at 20:05

## 2024-09-18 PROBLEM — N17.9 ACUTE KIDNEY INJURY: Status: RESOLVED | Noted: 2024-09-10 | Resolved: 2024-09-18

## 2024-09-18 PROBLEM — D62 ACUTE POSTHEMORRHAGIC ANEMIA: Status: RESOLVED | Noted: 2024-09-13 | Resolved: 2024-09-18

## 2024-09-18 PROBLEM — E27.40 ACUTE ADRENAL INSUFFICIENCY: Status: RESOLVED | Noted: 2024-09-10 | Resolved: 2024-09-18

## 2024-09-18 LAB
ALBUMIN SERPL-MCNC: 2.7 G/DL (ref 3.5–5.2)
ANION GAP SERPL CALCULATED.3IONS-SCNC: 9.3 MMOL/L (ref 5–15)
BUN SERPL-MCNC: 22 MG/DL (ref 8–23)
BUN/CREAT SERPL: 26.8 (ref 7–25)
C DIFF GDH + TOXINS A+B STL QL IA.RAPID: NEGATIVE
C DIFF TOX GENS STL QL NAA+PROBE: POSITIVE
CALCIUM SPEC-SCNC: 9 MG/DL (ref 8.6–10.5)
CHLORIDE SERPL-SCNC: 101 MMOL/L (ref 98–107)
CO2 SERPL-SCNC: 20.7 MMOL/L (ref 22–29)
CREAT SERPL-MCNC: 0.82 MG/DL (ref 0.57–1)
DEPRECATED RDW RBC AUTO: 43.7 FL (ref 37–54)
EGFRCR SERPLBLD CKD-EPI 2021: 68.9 ML/MIN/1.73
ERYTHROCYTE [DISTWIDTH] IN BLOOD BY AUTOMATED COUNT: 13.1 % (ref 12.3–15.4)
GLUCOSE SERPL-MCNC: 131 MG/DL (ref 65–99)
HCT VFR BLD AUTO: 25.7 % (ref 34–46.6)
HGB BLD-MCNC: 8.5 G/DL (ref 12–15.9)
LYMPHOCYTES # BLD MANUAL: 2.16 10*3/MM3 (ref 0.7–3.1)
LYMPHOCYTES NFR BLD MANUAL: 8 % (ref 5–12)
MAGNESIUM SERPL-MCNC: 1.7 MG/DL (ref 1.6–2.4)
MCH RBC QN AUTO: 30.4 PG (ref 26.6–33)
MCHC RBC AUTO-ENTMCNC: 33.1 G/DL (ref 31.5–35.7)
MCV RBC AUTO: 91.8 FL (ref 79–97)
METAMYELOCYTES NFR BLD MANUAL: 3 % (ref 0–0)
MONOCYTES # BLD: 1.92 10*3/MM3 (ref 0.1–0.9)
MYELOCYTES NFR BLD MANUAL: 1 % (ref 0–0)
NEUTROPHILS # BLD AUTO: 18.94 10*3/MM3 (ref 1.7–7)
NEUTROPHILS NFR BLD MANUAL: 79 % (ref 42.7–76)
NRBC BLD AUTO-RTO: 0 /100 WBC (ref 0–0.2)
PHOSPHATE SERPL-MCNC: 2.5 MG/DL (ref 2.5–4.5)
PLAT MORPH BLD: NORMAL
PLATELET # BLD AUTO: 402 10*3/MM3 (ref 140–450)
PMV BLD AUTO: 9.3 FL (ref 6–12)
POTASSIUM SERPL-SCNC: 3.5 MMOL/L (ref 3.5–5.2)
RBC # BLD AUTO: 2.8 10*6/MM3 (ref 3.77–5.28)
RBC MORPH BLD: NORMAL
SODIUM SERPL-SCNC: 131 MMOL/L (ref 136–145)
URATE SERPL-MCNC: 4.1 MG/DL (ref 2.4–5.7)
VARIANT LYMPHS NFR BLD MANUAL: 9 % (ref 19.6–45.3)
WBC MORPH BLD: NORMAL
WBC NRBC COR # BLD AUTO: 23.98 10*3/MM3 (ref 3.4–10.8)

## 2024-09-18 PROCEDURE — 94760 N-INVAS EAR/PLS OXIMETRY 1: CPT

## 2024-09-18 PROCEDURE — 94761 N-INVAS EAR/PLS OXIMETRY MLT: CPT

## 2024-09-18 PROCEDURE — 87449 NOS EACH ORGANISM AG IA: CPT | Performed by: STUDENT IN AN ORGANIZED HEALTH CARE EDUCATION/TRAINING PROGRAM

## 2024-09-18 PROCEDURE — 85007 BL SMEAR W/DIFF WBC COUNT: CPT | Performed by: ORTHOPAEDIC SURGERY

## 2024-09-18 PROCEDURE — 85025 COMPLETE CBC W/AUTO DIFF WBC: CPT | Performed by: ORTHOPAEDIC SURGERY

## 2024-09-18 PROCEDURE — 94664 DEMO&/EVAL PT USE INHALER: CPT

## 2024-09-18 PROCEDURE — 94799 UNLISTED PULMONARY SVC/PX: CPT

## 2024-09-18 PROCEDURE — 63710000001 PREDNISONE PER 5 MG: Performed by: STUDENT IN AN ORGANIZED HEALTH CARE EDUCATION/TRAINING PROGRAM

## 2024-09-18 PROCEDURE — 80069 RENAL FUNCTION PANEL: CPT

## 2024-09-18 PROCEDURE — 83735 ASSAY OF MAGNESIUM: CPT

## 2024-09-18 PROCEDURE — 84550 ASSAY OF BLOOD/URIC ACID: CPT

## 2024-09-18 PROCEDURE — 87493 C DIFF AMPLIFIED PROBE: CPT | Performed by: STUDENT IN AN ORGANIZED HEALTH CARE EDUCATION/TRAINING PROGRAM

## 2024-09-18 PROCEDURE — 63710000001 PREDNISONE PER 1 MG: Performed by: STUDENT IN AN ORGANIZED HEALTH CARE EDUCATION/TRAINING PROGRAM

## 2024-09-18 RX ORDER — LEVOTHYROXINE SODIUM 50 UG/1
50 TABLET ORAL NIGHTLY
Status: DISCONTINUED | OUTPATIENT
Start: 2024-09-18 | End: 2024-09-20 | Stop reason: HOSPADM

## 2024-09-18 RX ORDER — NYSTATIN 100000 [USP'U]/ML
5 SUSPENSION ORAL 4 TIMES DAILY
Start: 2024-09-18 | End: 2024-09-20

## 2024-09-18 RX ORDER — PETROLATUM 420 MG/G
1 OINTMENT TOPICAL
Start: 2024-09-20

## 2024-09-18 RX ORDER — PREDNISONE 10 MG/1
TABLET ORAL
Qty: 11 TABLET | Refills: 0 | Status: SHIPPED | OUTPATIENT
Start: 2024-09-19 | End: 2024-09-20

## 2024-09-18 RX ORDER — CHOLECALCIFEROL (VITAMIN D3) 25 MCG
2000 TABLET ORAL DAILY
Status: DISCONTINUED | OUTPATIENT
Start: 2024-09-18 | End: 2024-09-20 | Stop reason: HOSPADM

## 2024-09-18 RX ORDER — TORSEMIDE 20 MG/1
20 TABLET ORAL DAILY
Qty: 60 TABLET | Refills: 1 | Status: SHIPPED | OUTPATIENT
Start: 2024-09-19

## 2024-09-18 RX ORDER — NYSTATIN 100000 [USP'U]/ML
5 SUSPENSION ORAL 4 TIMES DAILY
Status: DISCONTINUED | OUTPATIENT
Start: 2024-09-18 | End: 2024-09-20 | Stop reason: HOSPADM

## 2024-09-18 RX ORDER — ASPIRIN 81 MG/1
TABLET, CHEWABLE ORAL
Start: 2024-09-18 | End: 2024-09-20

## 2024-09-18 RX ADMIN — BUDESONIDE AND FORMOTEROL FUMARATE DIHYDRATE 2 PUFF: 160; 4.5 AEROSOL RESPIRATORY (INHALATION) at 20:03

## 2024-09-18 RX ADMIN — BUDESONIDE AND FORMOTEROL FUMARATE DIHYDRATE 2 PUFF: 160; 4.5 AEROSOL RESPIRATORY (INHALATION) at 09:13

## 2024-09-18 RX ADMIN — PANTOPRAZOLE SODIUM 40 MG: 40 INJECTION, POWDER, FOR SOLUTION INTRAVENOUS at 21:52

## 2024-09-18 RX ADMIN — ERYTHROMYCIN 1 APPLICATION: 5 OINTMENT OPHTHALMIC at 21:53

## 2024-09-18 RX ADMIN — ACETAMINOPHEN 500 MG: 500 TABLET ORAL at 06:44

## 2024-09-18 RX ADMIN — ASPIRIN 81 MG: 81 TABLET, CHEWABLE ORAL at 21:51

## 2024-09-18 RX ADMIN — FLUTICASONE PROPIONATE 1 SPRAY: 50 SPRAY, METERED NASAL at 09:00

## 2024-09-18 RX ADMIN — GABAPENTIN 100 MG: 100 CAPSULE ORAL at 17:35

## 2024-09-18 RX ADMIN — NYSTATIN 500000 UNITS: 100000 SUSPENSION ORAL at 11:59

## 2024-09-18 RX ADMIN — TORSEMIDE 20 MG: 20 TABLET ORAL at 08:53

## 2024-09-18 RX ADMIN — ACETAMINOPHEN 500 MG: 500 TABLET ORAL at 17:35

## 2024-09-18 RX ADMIN — NYSTATIN 500000 UNITS: 100000 SUSPENSION ORAL at 21:52

## 2024-09-18 RX ADMIN — SUCRALFATE ORAL 1 G: 1 SUSPENSION ORAL at 06:44

## 2024-09-18 RX ADMIN — GABAPENTIN 100 MG: 100 CAPSULE ORAL at 08:53

## 2024-09-18 RX ADMIN — SUCRALFATE ORAL 1 G: 1 SUSPENSION ORAL at 17:35

## 2024-09-18 RX ADMIN — NYSTATIN 500000 UNITS: 100000 SUSPENSION ORAL at 17:36

## 2024-09-18 RX ADMIN — FLUOROMETHOLONE 1 DROP: 1 SUSPENSION/ DROPS OPHTHALMIC at 09:00

## 2024-09-18 RX ADMIN — VALSARTAN 160 MG: 160 TABLET, FILM COATED ORAL at 14:20

## 2024-09-18 RX ADMIN — AMOXICILLIN AND CLAVULANATE POTASSIUM 1 TABLET: 875; 125 TABLET, FILM COATED ORAL at 21:51

## 2024-09-18 RX ADMIN — Medication 2000 UNITS: at 11:59

## 2024-09-18 RX ADMIN — PANTOPRAZOLE SODIUM 40 MG: 40 INJECTION, POWDER, FOR SOLUTION INTRAVENOUS at 08:53

## 2024-09-18 RX ADMIN — CARVEDILOL 12.5 MG: 12.5 TABLET, FILM COATED ORAL at 08:53

## 2024-09-18 RX ADMIN — GABAPENTIN 100 MG: 100 CAPSULE ORAL at 21:51

## 2024-09-18 RX ADMIN — CARVEDILOL 12.5 MG: 12.5 TABLET, FILM COATED ORAL at 21:51

## 2024-09-18 RX ADMIN — PREDNISONE 30 MG: 10 TABLET ORAL at 08:53

## 2024-09-18 RX ADMIN — AMOXICILLIN AND CLAVULANATE POTASSIUM 1 TABLET: 875; 125 TABLET, FILM COATED ORAL at 08:53

## 2024-09-18 RX ADMIN — ACETAMINOPHEN 500 MG: 500 TABLET ORAL at 11:59

## 2024-09-18 RX ADMIN — LEVOTHYROXINE SODIUM 50 MCG: 50 TABLET ORAL at 06:44

## 2024-09-18 RX ADMIN — ASPIRIN 81 MG: 81 TABLET, CHEWABLE ORAL at 08:53

## 2024-09-18 RX ADMIN — SUCRALFATE ORAL 1 G: 1 SUSPENSION ORAL at 11:59

## 2024-09-18 RX ADMIN — LEVOTHYROXINE SODIUM 50 MCG: 50 TABLET ORAL at 21:51

## 2024-09-18 RX ADMIN — TIOTROPIUM BROMIDE INHALATION SPRAY 2 PUFF: 3.12 SPRAY, METERED RESPIRATORY (INHALATION) at 09:14

## 2024-09-18 RX ADMIN — PETROLATUM 1 APPLICATION: 420 OINTMENT TOPICAL at 09:00

## 2024-09-19 ENCOUNTER — APPOINTMENT (OUTPATIENT)
Dept: CT IMAGING | Facility: HOSPITAL | Age: 89
DRG: 470 | End: 2024-09-19
Payer: MEDICARE

## 2024-09-19 LAB
ALBUMIN SERPL-MCNC: 3 G/DL (ref 3.5–5.2)
ANION GAP SERPL CALCULATED.3IONS-SCNC: 10 MMOL/L (ref 5–15)
BASOPHILS # BLD MANUAL: 0 10*3/MM3 (ref 0–0.2)
BASOPHILS NFR BLD MANUAL: 0 % (ref 0–1.5)
BUN SERPL-MCNC: 35 MG/DL (ref 8–23)
BUN/CREAT SERPL: 43.2 (ref 7–25)
CALCIUM SPEC-SCNC: 9.1 MG/DL (ref 8.6–10.5)
CHLORIDE SERPL-SCNC: 99 MMOL/L (ref 98–107)
CO2 SERPL-SCNC: 24 MMOL/L (ref 22–29)
CREAT SERPL-MCNC: 0.81 MG/DL (ref 0.57–1)
DEPRECATED RDW RBC AUTO: 42.5 FL (ref 37–54)
EGFRCR SERPLBLD CKD-EPI 2021: 69.9 ML/MIN/1.73
EOSINOPHIL # BLD MANUAL: 0 10*3/MM3 (ref 0–0.4)
EOSINOPHIL NFR BLD MANUAL: 0 % (ref 0.3–6.2)
ERYTHROCYTE [DISTWIDTH] IN BLOOD BY AUTOMATED COUNT: 13.2 % (ref 12.3–15.4)
GLUCOSE SERPL-MCNC: 118 MG/DL (ref 65–99)
HCT VFR BLD AUTO: 25.3 % (ref 34–46.6)
HGB BLD-MCNC: 8.5 G/DL (ref 12–15.9)
LYMPHOCYTES # BLD MANUAL: 0.96 10*3/MM3 (ref 0.7–3.1)
LYMPHOCYTES NFR BLD MANUAL: 0 % (ref 5–12)
MCH RBC QN AUTO: 29.8 PG (ref 26.6–33)
MCHC RBC AUTO-ENTMCNC: 33.6 G/DL (ref 31.5–35.7)
MCV RBC AUTO: 88.8 FL (ref 79–97)
METAMYELOCYTES NFR BLD MANUAL: 1 % (ref 0–0)
MONOCYTES # BLD: 0 10*3/MM3 (ref 0.1–0.9)
MYELOCYTES NFR BLD MANUAL: 3 % (ref 0–0)
NEUTROPHILS # BLD AUTO: 22.12 10*3/MM3 (ref 1.7–7)
NEUTROPHILS NFR BLD MANUAL: 91.9 % (ref 42.7–76)
NRBC BLD AUTO-RTO: 0 /100 WBC (ref 0–0.2)
PHOSPHATE SERPL-MCNC: 2.4 MG/DL (ref 2.5–4.5)
PLAT MORPH BLD: NORMAL
PLATELET # BLD AUTO: 470 10*3/MM3 (ref 140–450)
PMV BLD AUTO: 9.2 FL (ref 6–12)
POLYCHROMASIA BLD QL SMEAR: ABNORMAL
POTASSIUM SERPL-SCNC: 3.6 MMOL/L (ref 3.5–5.2)
RBC # BLD AUTO: 2.85 10*6/MM3 (ref 3.77–5.28)
SODIUM SERPL-SCNC: 133 MMOL/L (ref 136–145)
VARIANT LYMPHS NFR BLD MANUAL: 4 % (ref 19.6–45.3)
WBC MORPH BLD: NORMAL
WBC NRBC COR # BLD AUTO: 24.07 10*3/MM3 (ref 3.4–10.8)

## 2024-09-19 PROCEDURE — 74176 CT ABD & PELVIS W/O CONTRAST: CPT

## 2024-09-19 PROCEDURE — 94664 DEMO&/EVAL PT USE INHALER: CPT

## 2024-09-19 PROCEDURE — 94761 N-INVAS EAR/PLS OXIMETRY MLT: CPT

## 2024-09-19 PROCEDURE — 97530 THERAPEUTIC ACTIVITIES: CPT

## 2024-09-19 PROCEDURE — 94799 UNLISTED PULMONARY SVC/PX: CPT

## 2024-09-19 PROCEDURE — 80069 RENAL FUNCTION PANEL: CPT | Performed by: INTERNAL MEDICINE

## 2024-09-19 PROCEDURE — 63710000001 PREDNISONE PER 1 MG: Performed by: STUDENT IN AN ORGANIZED HEALTH CARE EDUCATION/TRAINING PROGRAM

## 2024-09-19 PROCEDURE — 85025 COMPLETE CBC W/AUTO DIFF WBC: CPT | Performed by: ORTHOPAEDIC SURGERY

## 2024-09-19 PROCEDURE — 85007 BL SMEAR W/DIFF WBC COUNT: CPT | Performed by: ORTHOPAEDIC SURGERY

## 2024-09-19 PROCEDURE — 97110 THERAPEUTIC EXERCISES: CPT

## 2024-09-19 PROCEDURE — 63710000001 PREDNISONE PER 5 MG: Performed by: STUDENT IN AN ORGANIZED HEALTH CARE EDUCATION/TRAINING PROGRAM

## 2024-09-19 PROCEDURE — 99223 1ST HOSP IP/OBS HIGH 75: CPT | Performed by: INTERNAL MEDICINE

## 2024-09-19 RX ADMIN — SUCRALFATE ORAL 1 G: 1 SUSPENSION ORAL at 16:30

## 2024-09-19 RX ADMIN — ASPIRIN 81 MG: 81 TABLET, CHEWABLE ORAL at 21:43

## 2024-09-19 RX ADMIN — NYSTATIN 500000 UNITS: 100000 SUSPENSION ORAL at 21:43

## 2024-09-19 RX ADMIN — TORSEMIDE 20 MG: 20 TABLET ORAL at 08:44

## 2024-09-19 RX ADMIN — FLUTICASONE PROPIONATE 1 SPRAY: 50 SPRAY, METERED NASAL at 08:45

## 2024-09-19 RX ADMIN — Medication 2000 UNITS: at 08:44

## 2024-09-19 RX ADMIN — ACETAMINOPHEN 500 MG: 500 TABLET ORAL at 06:42

## 2024-09-19 RX ADMIN — NYSTATIN 500000 UNITS: 100000 SUSPENSION ORAL at 18:24

## 2024-09-19 RX ADMIN — ACETAMINOPHEN 500 MG: 500 TABLET ORAL at 18:24

## 2024-09-19 RX ADMIN — GABAPENTIN 100 MG: 100 CAPSULE ORAL at 08:44

## 2024-09-19 RX ADMIN — ACETAMINOPHEN 500 MG: 500 TABLET ORAL at 12:02

## 2024-09-19 RX ADMIN — TIOTROPIUM BROMIDE INHALATION SPRAY 2 PUFF: 3.12 SPRAY, METERED RESPIRATORY (INHALATION) at 11:13

## 2024-09-19 RX ADMIN — CARVEDILOL 12.5 MG: 12.5 TABLET, FILM COATED ORAL at 06:42

## 2024-09-19 RX ADMIN — PREDNISONE 30 MG: 10 TABLET ORAL at 08:44

## 2024-09-19 RX ADMIN — BUDESONIDE AND FORMOTEROL FUMARATE DIHYDRATE 2 PUFF: 160; 4.5 AEROSOL RESPIRATORY (INHALATION) at 21:31

## 2024-09-19 RX ADMIN — ERYTHROMYCIN 1 APPLICATION: 5 OINTMENT OPHTHALMIC at 21:43

## 2024-09-19 RX ADMIN — LEVOTHYROXINE SODIUM 50 MCG: 50 TABLET ORAL at 21:43

## 2024-09-19 RX ADMIN — VALSARTAN 160 MG: 160 TABLET, FILM COATED ORAL at 12:02

## 2024-09-19 RX ADMIN — SUCRALFATE ORAL 1 G: 1 SUSPENSION ORAL at 06:42

## 2024-09-19 RX ADMIN — GABAPENTIN 100 MG: 100 CAPSULE ORAL at 16:28

## 2024-09-19 RX ADMIN — GABAPENTIN 100 MG: 100 CAPSULE ORAL at 21:43

## 2024-09-19 RX ADMIN — PANTOPRAZOLE SODIUM 40 MG: 40 INJECTION, POWDER, FOR SOLUTION INTRAVENOUS at 21:43

## 2024-09-19 RX ADMIN — SUCRALFATE ORAL 1 G: 1 SUSPENSION ORAL at 12:03

## 2024-09-19 RX ADMIN — NYSTATIN 500000 UNITS: 100000 SUSPENSION ORAL at 12:03

## 2024-09-19 RX ADMIN — NYSTATIN 500000 UNITS: 100000 SUSPENSION ORAL at 08:45

## 2024-09-19 RX ADMIN — FLUOROMETHOLONE 1 DROP: 1 SUSPENSION/ DROPS OPHTHALMIC at 08:45

## 2024-09-19 RX ADMIN — BUDESONIDE AND FORMOTEROL FUMARATE DIHYDRATE 2 PUFF: 160; 4.5 AEROSOL RESPIRATORY (INHALATION) at 08:14

## 2024-09-19 RX ADMIN — ASPIRIN 81 MG: 81 TABLET, CHEWABLE ORAL at 08:44

## 2024-09-19 RX ADMIN — PANTOPRAZOLE SODIUM 40 MG: 40 INJECTION, POWDER, FOR SOLUTION INTRAVENOUS at 08:45

## 2024-09-20 VITALS
SYSTOLIC BLOOD PRESSURE: 145 MMHG | OXYGEN SATURATION: 97 % | DIASTOLIC BLOOD PRESSURE: 63 MMHG | TEMPERATURE: 96.8 F | RESPIRATION RATE: 18 BRPM | HEIGHT: 60 IN | HEART RATE: 74 BPM | BODY MASS INDEX: 35.58 KG/M2 | WEIGHT: 181.22 LBS

## 2024-09-20 LAB
ALBUMIN SERPL-MCNC: 2.8 G/DL (ref 3.5–5.2)
ANION GAP SERPL CALCULATED.3IONS-SCNC: 8 MMOL/L (ref 5–15)
BUN SERPL-MCNC: 35 MG/DL (ref 8–23)
BUN/CREAT SERPL: 46.7 (ref 7–25)
CALCIUM SPEC-SCNC: 8.8 MG/DL (ref 8.6–10.5)
CHLORIDE SERPL-SCNC: 101 MMOL/L (ref 98–107)
CO2 SERPL-SCNC: 25 MMOL/L (ref 22–29)
CREAT SERPL-MCNC: 0.75 MG/DL (ref 0.57–1)
DACRYOCYTES BLD QL SMEAR: ABNORMAL
DEPRECATED RDW RBC AUTO: 44.8 FL (ref 37–54)
EGFRCR SERPLBLD CKD-EPI 2021: 76.7 ML/MIN/1.73
ERYTHROCYTE [DISTWIDTH] IN BLOOD BY AUTOMATED COUNT: 13.3 % (ref 12.3–15.4)
GLUCOSE SERPL-MCNC: 94 MG/DL (ref 65–99)
HCT VFR BLD AUTO: 26.2 % (ref 34–46.6)
HGB BLD-MCNC: 8.5 G/DL (ref 12–15.9)
LYMPHOCYTES # BLD MANUAL: 2.19 10*3/MM3 (ref 0.7–3.1)
LYMPHOCYTES NFR BLD MANUAL: 4 % (ref 5–12)
MAGNESIUM SERPL-MCNC: 2.4 MG/DL (ref 1.6–2.4)
MCH RBC QN AUTO: 29.6 PG (ref 26.6–33)
MCHC RBC AUTO-ENTMCNC: 32.4 G/DL (ref 31.5–35.7)
MCV RBC AUTO: 91.3 FL (ref 79–97)
MONOCYTES # BLD: 0.88 10*3/MM3 (ref 0.1–0.9)
NEUTROPHILS # BLD AUTO: 18.82 10*3/MM3 (ref 1.7–7)
NEUTROPHILS NFR BLD MANUAL: 86 % (ref 42.7–76)
NRBC BLD AUTO-RTO: 0 /100 WBC (ref 0–0.2)
OVALOCYTES BLD QL SMEAR: ABNORMAL
PHOSPHATE SERPL-MCNC: 3.9 MG/DL (ref 2.5–4.5)
PLATELET # BLD AUTO: 484 10*3/MM3 (ref 140–450)
PMV BLD AUTO: 9 FL (ref 6–12)
POTASSIUM SERPL-SCNC: 3.7 MMOL/L (ref 3.5–5.2)
RBC # BLD AUTO: 2.87 10*6/MM3 (ref 3.77–5.28)
SMALL PLATELETS BLD QL SMEAR: ADEQUATE
SODIUM SERPL-SCNC: 134 MMOL/L (ref 136–145)
VARIANT LYMPHS NFR BLD MANUAL: 10 % (ref 19.6–45.3)
WBC MORPH BLD: NORMAL
WBC NRBC COR # BLD AUTO: 21.88 10*3/MM3 (ref 3.4–10.8)

## 2024-09-20 PROCEDURE — 94799 UNLISTED PULMONARY SVC/PX: CPT

## 2024-09-20 PROCEDURE — 25810000003 SODIUM CHLORIDE 0.9 % SOLUTION: Performed by: INTERNAL MEDICINE

## 2024-09-20 PROCEDURE — 63710000001 PREDNISONE PER 1 MG: Performed by: STUDENT IN AN ORGANIZED HEALTH CARE EDUCATION/TRAINING PROGRAM

## 2024-09-20 PROCEDURE — 94664 DEMO&/EVAL PT USE INHALER: CPT

## 2024-09-20 PROCEDURE — 99233 SBSQ HOSP IP/OBS HIGH 50: CPT | Performed by: INTERNAL MEDICINE

## 2024-09-20 PROCEDURE — 94761 N-INVAS EAR/PLS OXIMETRY MLT: CPT

## 2024-09-20 PROCEDURE — 85025 COMPLETE CBC W/AUTO DIFF WBC: CPT | Performed by: ORTHOPAEDIC SURGERY

## 2024-09-20 PROCEDURE — 85007 BL SMEAR W/DIFF WBC COUNT: CPT | Performed by: ORTHOPAEDIC SURGERY

## 2024-09-20 PROCEDURE — 83735 ASSAY OF MAGNESIUM: CPT | Performed by: INTERNAL MEDICINE

## 2024-09-20 PROCEDURE — 80069 RENAL FUNCTION PANEL: CPT | Performed by: INTERNAL MEDICINE

## 2024-09-20 RX ORDER — NYSTATIN 100000 [USP'U]/ML
5 SUSPENSION ORAL 4 TIMES DAILY
Start: 2024-09-20 | End: 2024-09-27

## 2024-09-20 RX ORDER — ASPIRIN 81 MG/1
TABLET, CHEWABLE ORAL
Start: 2024-09-20 | End: 2024-11-12

## 2024-09-20 RX ORDER — PREDNISONE 10 MG/1
TABLET ORAL
Qty: 8 TABLET | Refills: 0 | Status: SHIPPED | OUTPATIENT
Start: 2024-09-21 | End: 2024-09-27

## 2024-09-20 RX ADMIN — ASPIRIN 81 MG: 81 TABLET, CHEWABLE ORAL at 09:02

## 2024-09-20 RX ADMIN — NYSTATIN 500000 UNITS: 100000 SUSPENSION ORAL at 09:02

## 2024-09-20 RX ADMIN — PETROLATUM 1 APPLICATION: 420 OINTMENT TOPICAL at 11:55

## 2024-09-20 RX ADMIN — TIOTROPIUM BROMIDE INHALATION SPRAY 2 PUFF: 3.12 SPRAY, METERED RESPIRATORY (INHALATION) at 08:31

## 2024-09-20 RX ADMIN — ACETAMINOPHEN 500 MG: 500 TABLET ORAL at 11:55

## 2024-09-20 RX ADMIN — SODIUM PHOSPHATE, MONOBASIC, MONOHYDRATE AND SODIUM PHOSPHATE, DIBASIC, ANHYDROUS 15 MMOL: 276; 142 INJECTION, SOLUTION INTRAVENOUS at 01:25

## 2024-09-20 RX ADMIN — PREDNISONE 20 MG: 20 TABLET ORAL at 09:02

## 2024-09-20 RX ADMIN — FLUOROMETHOLONE 1 DROP: 1 SUSPENSION/ DROPS OPHTHALMIC at 09:02

## 2024-09-20 RX ADMIN — FLUTICASONE PROPIONATE 1 SPRAY: 50 SPRAY, METERED NASAL at 09:03

## 2024-09-20 RX ADMIN — NYSTATIN 500000 UNITS: 100000 SUSPENSION ORAL at 11:55

## 2024-09-20 RX ADMIN — VALSARTAN 160 MG: 160 TABLET, FILM COATED ORAL at 11:55

## 2024-09-20 RX ADMIN — GABAPENTIN 100 MG: 100 CAPSULE ORAL at 09:02

## 2024-09-20 RX ADMIN — SUCRALFATE ORAL 1 G: 1 SUSPENSION ORAL at 06:46

## 2024-09-20 RX ADMIN — TORSEMIDE 20 MG: 20 TABLET ORAL at 09:02

## 2024-09-20 RX ADMIN — BUDESONIDE AND FORMOTEROL FUMARATE DIHYDRATE 2 PUFF: 160; 4.5 AEROSOL RESPIRATORY (INHALATION) at 08:30

## 2024-09-20 RX ADMIN — PANTOPRAZOLE SODIUM 40 MG: 40 INJECTION, POWDER, FOR SOLUTION INTRAVENOUS at 09:02

## 2024-09-20 RX ADMIN — SUCRALFATE ORAL 1 G: 1 SUSPENSION ORAL at 11:55

## 2024-09-20 RX ADMIN — ACETAMINOPHEN 500 MG: 500 TABLET ORAL at 06:45

## 2024-09-20 RX ADMIN — Medication 2000 UNITS: at 09:02

## 2024-09-20 RX ADMIN — CARVEDILOL 12.5 MG: 12.5 TABLET, FILM COATED ORAL at 06:46

## 2024-10-01 ENCOUNTER — TELEPHONE (OUTPATIENT)
Dept: NEUROSURGERY | Facility: CLINIC | Age: 89
End: 2024-10-01
Payer: MEDICARE

## 2024-10-01 NOTE — TELEPHONE ENCOUNTER
Lvm needing to reschedule 10/22/24 appt due to provider being out. Can move appt to next day if pt agrees. Please route to me

## 2024-10-01 NOTE — TELEPHONE ENCOUNTER
Hub staff attempted to follow warm transfer process and was unsuccessful     Caller: ALEX    Relationship to patient: SELF    Best call back number: [phone]    Patient is needing: PATIENTS DAUGHTER ALEX RETURNING FIOR'S PHONE CALL ABOUT GETTING PATIENT RESCHEDULED SAMAN LOZANO 10/22/24 - IS OK TO RESCHEDULE IN NOV IF POSSIBLE - PATIENT IS IN REHAB    PLEASE CALL [phone] THE RESCHEDULE  THANK YOU

## 2024-10-04 DIAGNOSIS — I10 ESSENTIAL (PRIMARY) HYPERTENSION: Chronic | ICD-10-CM

## 2024-10-04 RX ORDER — VALSARTAN 160 MG/1
160 TABLET ORAL DAILY
Qty: 30 TABLET | Refills: 1 | Status: SHIPPED | OUTPATIENT
Start: 2024-10-04

## 2024-10-04 NOTE — TELEPHONE ENCOUNTER
Rx Refill Note  Requested Prescriptions     Pending Prescriptions Disp Refills    valsartan (DIOVAN) 160 MG tablet 30 tablet 1     Sig: Take 1 tablet by mouth Daily.      Last office visit with prescribing clinician: Visit date not found   Last telemedicine visit with prescribing clinician: Visit date not found   Next office visit with prescribing clinician: Visit date not found

## 2024-11-26 ENCOUNTER — TELEPHONE (OUTPATIENT)
Dept: INFECTIOUS DISEASES | Facility: CLINIC | Age: 89
End: 2024-11-26
Payer: MEDICARE

## 2024-11-26 NOTE — TELEPHONE ENCOUNTER
SPOKE WITH DEBRA AT FACILITY. INFORMED HER PER VIVIAN'S REQUEST PATIENT IS SCHEDULED FOR FOLLOW UP WITH OUT OFFICE ON 12/3/24 ARRIVING AT 12:45 FOR A 1PM APPT.  PER DEBRA APPT WAS OK AND THEY WOULD GET HER TRANSPORTATION ARRANGED. ASKED HER TO CALL OUR OFFICE IF RESCHEDULE WAS NEEDED. FACILITY REP STATED UNDERSTANDING AND WAS AGREEABLE

## 2024-12-03 ENCOUNTER — OFFICE VISIT (OUTPATIENT)
Dept: INFECTIOUS DISEASES | Facility: CLINIC | Age: 89
End: 2024-12-03
Payer: MEDICARE

## 2024-12-03 VITALS
RESPIRATION RATE: 16 BRPM | TEMPERATURE: 97.3 F | SYSTOLIC BLOOD PRESSURE: 128 MMHG | WEIGHT: 165.4 LBS | HEART RATE: 72 BPM | DIASTOLIC BLOOD PRESSURE: 69 MMHG | BODY MASS INDEX: 32.3 KG/M2

## 2024-12-03 DIAGNOSIS — N39.0 RECURRENT UTI: Primary | ICD-10-CM

## 2024-12-03 RX ORDER — ASPIRIN 81 MG/1
81 TABLET ORAL DAILY
COMMUNITY

## 2024-12-03 RX ORDER — TAMSULOSIN HYDROCHLORIDE 0.4 MG/1
1 CAPSULE ORAL DAILY
COMMUNITY

## 2024-12-03 RX ORDER — LOSARTAN POTASSIUM 100 MG/1
100 TABLET ORAL DAILY
COMMUNITY

## 2024-12-03 RX ORDER — TRAMADOL HYDROCHLORIDE 100 MG/1
25 TABLET, FILM COATED, EXTENDED RELEASE ORAL AS NEEDED
COMMUNITY

## 2024-12-03 RX ORDER — CLONIDINE 0.1 MG/24H
1 PATCH, EXTENDED RELEASE TRANSDERMAL WEEKLY
COMMUNITY

## 2024-12-03 NOTE — PROGRESS NOTES
cc: recurrent uti    This very nice 89-year-old we are asked to see for recurrent UTI.  She is known to me for admission for colitis in September 2024.  She was a C. difficile carrier at that time.  Patient says she has a longstanding history of recurrent UTIs.  She was previously on her the care of of Dr. Cherie Sage and had been diagnosed with rectocele, cystocele, prolapse and interstitial cystitis.  She has had a difficult time with UTIs lately and has received a course of Zosyn in November and is now on levofloxacin.  She has developed some myalgias while on the levofloxacin.  No constitutional symptoms of infection.  She tells me it feels like she is not emptying her bladder well.  On topical estrogen already.      Review of Systems: All other reviewed and negative except as per HPI    Blood pressure 128/69, pulse 72, temperature 97.3 °F (36.3 °C), resp. rate 16, weight 75 kg (165 lb 6.4 oz).  GENERAL: Awake and alert, in no acute distress.   HEENT: Oropharynx is clear. Hearing is grossly normal.   EYES: . No conjunctival injection. No lid lag.   LUNGS:normal respiratory effort.   SKIN: no cutaneous eruptions in exposed areas  PSYCHIATRIC: Appropriate mood, affect, insight, and judgment.         DIAGNOSTICS:  Urine culture from 11/21 grew E. coli  Creatinine 1.6      Assessment and Plan  1.  Recurrent UTIs likely secondary to #2  2.  Incomplete bladder evacuation likely due to prolapse  3.  Interstitial cystitis, potentially explaining some of her symptoms although less likely sole culprit given that she seems to improve with antibiotics  4.  Multiple antibiotic allergies complicating above    Discussed with patient that the suspected etiology of her symptoms is incomplete bladder evacuation related to bladder stasis and recurrent UTIs.  Unfortunately there is no single antibiotic that is going to fix this problem and she needs to follow back up with urology to see if she needs correction or to initiate  catheterizations.  I did ask that, if possible, her facility checked postvoid residual.  I offered her referral to another urologist or urogynecologist but her and her daughter declined.  She is already completed a week of the levofloxacin and I do not think there is much benefit from extending this to 2 weeks.  She is also having some myalgias which may or may not be related to the antibiotic.  We will go ahead and stop the antibiotic.  I would consider fosfomycin 3 g orally x 1 dose if she develops recurrent signs of cystitis.  Return to clinic in 3 months or sooner if necessary  Total time of encounter was over 40 minutes including extensive counseling, history, record review, note construction

## 2024-12-05 ENCOUNTER — PATIENT ROUNDING (BHMG ONLY) (OUTPATIENT)
Dept: INFECTIOUS DISEASES | Facility: CLINIC | Age: 89
End: 2024-12-05
Payer: MEDICARE

## 2024-12-05 NOTE — PROGRESS NOTES
December 5, 2024                Done in office             We're always looking for ways to make our patients' experiences even better. Do you have recommendations on ways we may improve?  no    Overall were you satisfied with your first visit to our practice? yes

## 2024-12-18 ENCOUNTER — TELEPHONE (OUTPATIENT)
Dept: INTERNAL MEDICINE | Facility: CLINIC | Age: 89
End: 2024-12-18
Payer: MEDICARE

## 2024-12-18 NOTE — TELEPHONE ENCOUNTER
Daughter adebayo answered due [facility] - pt refill req are now being prescribed by providers in facility. Called pts pharm to update

## 2025-02-24 ENCOUNTER — TELEPHONE (OUTPATIENT)
Dept: INFECTIOUS DISEASES | Facility: CLINIC | Age: 89
End: 2025-02-24
Payer: MEDICARE

## 2025-02-24 NOTE — TELEPHONE ENCOUNTER
CALLED TO R/S APPT FROM 4/1/25 TO 4/7/25 DUE TO PROVIDER BEING OUT OF OFFICE ON 4/1. PATIENT DAUGHTER CANCELED APPT ALL TOGETHER AND STATES SHE WILL CALL BACK AT A LATER DATE TO RESCHEDULE

## 2025-03-26 DIAGNOSIS — R09.89 DECREASED PEDAL PULSES: ICD-10-CM

## 2025-03-26 DIAGNOSIS — R60.0 PERIPHERAL EDEMA: ICD-10-CM

## 2025-03-26 DIAGNOSIS — I65.23 CAROTID STENOSIS, BILATERAL: ICD-10-CM

## 2025-03-26 DIAGNOSIS — I65.23 BILATERAL CAROTID ARTERY STENOSIS: Primary | ICD-10-CM

## 2025-03-26 DIAGNOSIS — I70.1 RENAL ARTERY STENOSIS: ICD-10-CM

## 2025-08-04 ENCOUNTER — OFFICE VISIT (OUTPATIENT)
Age: 89
End: 2025-08-04
Payer: MEDICARE

## 2025-08-04 ENCOUNTER — HOSPITAL ENCOUNTER (OUTPATIENT)
Facility: HOSPITAL | Age: 89
Discharge: HOME | End: 2025-08-04
Payer: MEDICARE

## 2025-08-04 VITALS
WEIGHT: 164 LBS | HEIGHT: 60 IN | DIASTOLIC BLOOD PRESSURE: 74 MMHG | SYSTOLIC BLOOD PRESSURE: 166 MMHG | BODY MASS INDEX: 32.2 KG/M2

## 2025-08-04 DIAGNOSIS — R60.0 PERIPHERAL EDEMA: ICD-10-CM

## 2025-08-04 DIAGNOSIS — I70.1 RENAL ARTERY STENOSIS: ICD-10-CM

## 2025-08-04 DIAGNOSIS — R09.89 DECREASED PEDAL PULSES: ICD-10-CM

## 2025-08-04 DIAGNOSIS — I65.23 BILATERAL CAROTID ARTERY STENOSIS: ICD-10-CM

## 2025-08-04 DIAGNOSIS — I70.1 RENAL ARTERY STENOSIS: Primary | ICD-10-CM

## 2025-08-04 LAB
BH CV ECHO MEAS - DIST REN A EDV LEFT: 17.4 CM/S
BH CV ECHO MEAS - DIST REN A PSV LEFT: 137 CM/S
BH CV ECHO MEAS - MID REN A EDV LEFT: 30.6 CM/S
BH CV ECHO MEAS - MID REN A PSV LEFT: 158.9 CM/S
BH CV ECHO MEAS - PROX REN A EDV LEFT: 34.2 CM/S
BH CV ECHO MEAS - PROX REN A PSV LEFT: 198 CM/S
BH CV LOWER ARTERIAL LEFT ABI RATIO: NORMAL
BH CV LOWER ARTERIAL LEFT DORSALIS PEDIS SYS MAX: NORMAL
BH CV LOWER ARTERIAL LEFT POST TIBIAL SYS MAX: NORMAL
BH CV LOWER ARTERIAL RIGHT ABI RATIO: NORMAL
BH CV LOWER ARTERIAL RIGHT DORSALIS PEDIS SYS MAX: NORMAL
BH CV LOWER ARTERIAL RIGHT POST TIBIAL SYS MAX: NORMAL
BH CV VAS BP LEFT ARM: NORMAL MMHG
BH CV VAS BP RIGHT ARM: NORMAL MMHG
BH CV VAS RENAL AORTIC MID EDV: 15.5 CM/S
BH CV VAS RENAL AORTIC MID PSV: 118 CM/S
BH CV VAS RENAL HILUM LEFT EDV: 5 CM/S
BH CV VAS RENAL HILUM LEFT PSV: 28.7 CM/S
BH CV VAS RENAL HILUM RIGHT EDV: 8.7 CM/S
BH CV VAS RENAL HILUM RIGHT PSV: 53.4 CM/S
BH CV XLRA MEAS - KID L LEFT: 9.9 CM
BH CV XLRA MEAS DIST REN A EDV RIGHT: 24.4 CM/S
BH CV XLRA MEAS DIST REN A PSV RIGHT: 158.9 CM/S
BH CV XLRA MEAS KID L RIGHT: 10.2 CM
BH CV XLRA MEAS KID W RIGHT: 4 CM
BH CV XLRA MEAS LEFT CAROTID BULB EDV: 27.5 CM/SEC
BH CV XLRA MEAS LEFT CAROTID BULB PSV: 148.9 CM/SEC
BH CV XLRA MEAS LEFT DIST CCA EDV: -19 CM/SEC
BH CV XLRA MEAS LEFT DIST CCA PSV: -129 CM/SEC
BH CV XLRA MEAS LEFT DIST ICA EDV: -33.2 CM/SEC
BH CV XLRA MEAS LEFT DIST ICA PSV: -118.5 CM/SEC
BH CV XLRA MEAS LEFT ICA/CCA RATIO: 0.95
BH CV XLRA MEAS LEFT MID CCA EDV: 16.3 CM/SEC
BH CV XLRA MEAS LEFT MID CCA PSV: 94.8 CM/SEC
BH CV XLRA MEAS LEFT MID ICA EDV: -23.7 CM/SEC
BH CV XLRA MEAS LEFT MID ICA PSV: -109.1 CM/SEC
BH CV XLRA MEAS LEFT PROX CCA EDV: 13.4 CM/SEC
BH CV XLRA MEAS LEFT PROX CCA PSV: 82.1 CM/SEC
BH CV XLRA MEAS LEFT PROX ECA PSV: -134.7 CM/SEC
BH CV XLRA MEAS LEFT PROX ICA EDV: -30.3 CM/SEC
BH CV XLRA MEAS LEFT PROX ICA PSV: -123.3 CM/SEC
BH CV XLRA MEAS LEFT PROX SCLA PSV: 171.9 CM/SEC
BH CV XLRA MEAS LEFT VERTEBRAL A EDV: 14.2 CM/SEC
BH CV XLRA MEAS LEFT VERTEBRAL A PSV: 71.5 CM/SEC
BH CV XLRA MEAS MID REN A EDV RIGHT: 22 CM/S
BH CV XLRA MEAS MID REN A PSV RIGHT: 160.1 CM/S
BH CV XLRA MEAS PROX REN A EDV RIGHT: 33.2 CM/S
BH CV XLRA MEAS PROX REN A PSV RIGHT: 363.3 CM/S
BH CV XLRA MEAS RAR LEFT: 1.7
BH CV XLRA MEAS RAR RIGHT: 3.1
BH CV XLRA MEAS RENAL A ORG EDV LEFT: 21.9 CM/S
BH CV XLRA MEAS RENAL A ORG EDV RIGHT: 36.9 CM/S
BH CV XLRA MEAS RENAL A ORG PSV LEFT: 177 CM/S
BH CV XLRA MEAS RENAL A ORG PSV RIGHT: 351 CM/S
BH CV XLRA MEAS RIGHT CAROTID BULB EDV: -24.9 CM/SEC
BH CV XLRA MEAS RIGHT CAROTID BULB PSV: -151.7 CM/SEC
BH CV XLRA MEAS RIGHT DIST CCA EDV: 15.6 CM/SEC
BH CV XLRA MEAS RIGHT DIST CCA PSV: 112.5 CM/SEC
BH CV XLRA MEAS RIGHT DIST ICA EDV: -23.7 CM/SEC
BH CV XLRA MEAS RIGHT DIST ICA PSV: -131.8 CM/SEC
BH CV XLRA MEAS RIGHT ICA/CCA RATIO: -1.26
BH CV XLRA MEAS RIGHT MID CCA EDV: 21.8 CM/SEC
BH CV XLRA MEAS RIGHT MID CCA PSV: 110.9 CM/SEC
BH CV XLRA MEAS RIGHT MID ICA EDV: -22.8 CM/SEC
BH CV XLRA MEAS RIGHT MID ICA PSV: -109.1 CM/SEC
BH CV XLRA MEAS RIGHT PROX CCA EDV: -18 CM/SEC
BH CV XLRA MEAS RIGHT PROX CCA PSV: -112.8 CM/SEC
BH CV XLRA MEAS RIGHT PROX ECA EDV: 13 CM/SEC
BH CV XLRA MEAS RIGHT PROX ECA PSV: 156.5 CM/SEC
BH CV XLRA MEAS RIGHT PROX ICA EDV: -23.7 CM/SEC
BH CV XLRA MEAS RIGHT PROX ICA PSV: -142.2 CM/SEC
BH CV XLRA MEAS RIGHT PROX SCLA PSV: 133.9 CM/SEC
BH CV XLRA MEAS RIGHT VERTEBRAL A EDV: -9.2 CM/SEC
BH CV XLRA MEAS RIGHT VERTEBRAL A PSV: -59.4 CM/SEC
LEFT ARM BP: NORMAL MMHG
LEFT KIDNEY WIDTH: 4.2 CM
LEFT RENAL UPPER PARENCHYMA MAX: 12.4 CM/S
LEFT RENAL UPPER PARENCHYMA MIN: 4.4 CM/S
LEFT RENAL UPPER PARENCHYMA RI: 0.65
RIGHT ARM BP: NORMAL MMHG
RIGHT RENAL UPPER PARENCHYMA MAX: 15 CM/S
RIGHT RENAL UPPER PARENCHYMA MIN: 4.1 CM/S
RIGHT RENAL UPPER PARENCHYMA RI: 0.73
UPPER ARTERIAL LEFT ARM BRACHIAL SYS MAX: NORMAL
UPPER ARTERIAL RIGHT ARM BRACHIAL SYS MAX: NORMAL

## 2025-08-04 PROCEDURE — 93975 VASCULAR STUDY: CPT

## 2025-08-04 PROCEDURE — 93880 EXTRACRANIAL BILAT STUDY: CPT

## 2025-08-04 PROCEDURE — 93922 UPR/L XTREMITY ART 2 LEVELS: CPT

## 2025-08-04 PROCEDURE — 93880 EXTRACRANIAL BILAT STUDY: CPT | Performed by: SURGERY

## 2025-08-04 PROCEDURE — 93922 UPR/L XTREMITY ART 2 LEVELS: CPT | Performed by: SURGERY

## 2025-08-04 PROCEDURE — 93975 VASCULAR STUDY: CPT | Performed by: SURGERY

## 2025-08-04 RX ORDER — HYDROCODONE BITARTRATE AND ACETAMINOPHEN 7.5; 325 MG/1; MG/1
1 TABLET ORAL
COMMUNITY
Start: 2025-07-23

## 2025-08-04 RX ORDER — BISACODYL 5 MG
TABLET, DELAYED RELEASE (ENTERIC COATED) ORAL
COMMUNITY
Start: 2025-06-04

## 2025-08-04 RX ORDER — ALLOPURINOL 100 MG/1
TABLET ORAL
COMMUNITY
Start: 2025-06-02

## (undated) DEVICE — CEMENT MIXING SYSTEM WITH FEMORAL BREAKWAY NOZZLE: Brand: REVOLUTION

## (undated) DEVICE — BITEBLOCK OMNI BLOC

## (undated) DEVICE — SOL ISO/ALC 70PCT 4OZ

## (undated) DEVICE — BNDG ELAS CO-FLEX SLF ADHR 6IN 5YD LF STRL

## (undated) DEVICE — THE TORRENT IRRIGATION SCOPE CONNECTOR IS USED WITH THE TORRENT IRRIGATION TUBING TO PROVIDE IRRIGATION FLUIDS SUCH AS STERILE WATER DURING GASTROINTESTINAL ENDOSCOPIC PROCEDURES WHEN USED IN CONJUNCTION WITH AN IRRIGATION PUMP (OR ELECTROSURGICAL UNIT).: Brand: TORRENT

## (undated) DEVICE — SYR LUERLOK 20CC BX/50

## (undated) DEVICE — LN SMPL CO2 SHTRM SD STREAM W/M LUER

## (undated) DEVICE — JACKSON-PRATT 100CC BULB RESERVOIR: Brand: CARDINAL HEALTH

## (undated) DEVICE — SENSR O2 OXIMAX FNGR A/ 18IN NONSTR

## (undated) DEVICE — GLV SURG BIOGEL LTX PF 7

## (undated) DEVICE — PK ANT HIP 40

## (undated) DEVICE — TBG 02 CRUSH RESIST LF CLR 7FT

## (undated) DEVICE — 450 ML BOTTLE OF 0.05% CHLORHEXIDINE GLUCONATE IN 99.95% STERILE WATER FOR IRRIGATION, USP AND APPLICATOR.: Brand: IRRISEPT ANTIMICROBIAL WOUND LAVAGE

## (undated) DEVICE — DRSNG WND BORDR/ADHS NONADHR/GZ LF 4X4IN STRL

## (undated) DEVICE — GLV SURG PREMIERPRO ORTHO LTX PF SZ8.5 BRN

## (undated) DEVICE — KT ORCA ORCAPOD DISP STRL

## (undated) DEVICE — CANN O2 ETCO2 FITS ALL CONN CO2 SMPL A/ 7IN DISP LF

## (undated) DEVICE — GAUZE,SPONGE,4"X4",16PLY,XRAY,STRL,LF: Brand: MEDLINE

## (undated) DEVICE — NEEDLE, QUINCKE, 20GX3.5": Brand: MEDLINE

## (undated) DEVICE — UNDYED BRAIDED (POLYGLACTIN 910), SYNTHETIC ABSORBABLE SUTURE: Brand: COATED VICRYL

## (undated) DEVICE — APPL CHLORAPREP HI/LITE 26ML ORNG

## (undated) DEVICE — MAT FLR ABSORBENT LG 4FT 10 2.5FT

## (undated) DEVICE — SOL NACL 0.9PCT 100ML SGL

## (undated) DEVICE — GLV SURG SIGNATURE ESSENTIAL PF LTX SZ8.5

## (undated) DEVICE — Device: Brand: DEFENDO AIR/WATER/SUCTION AND BIOPSY VALVE

## (undated) DEVICE — 3M™ IOBAN™ 2 ANTIMICROBIAL INCISE DRAPE 6640EZ: Brand: IOBAN™ 2

## (undated) DEVICE — TUBING, SUCTION, 1/4" X 10', STRAIGHT: Brand: MEDLINE

## (undated) DEVICE — STPLR SKIN VISISTAT WD 35CT

## (undated) DEVICE — SYR LUERLOK 30CC

## (undated) DEVICE — ADAPT CLN BIOGUARD AIR/H2O DISP

## (undated) DEVICE — FRCP BX RADJAW4 NDL 2.8 240CM LG OG BX40

## (undated) DEVICE — CANN NASL CO2 TRULINK W/O2 A/

## (undated) DEVICE — 3M™ STERI-STRIP™ COMPOUND BENZOIN TINCTURE 40 BAGS/CARTON 4 CARTONS/CASE C1544: Brand: 3M™ STERI-STRIP™

## (undated) DEVICE — SUT VIC 2/0 CT1 36IN

## (undated) DEVICE — SOL ISO/ALC RUB 70PCT 4OZ

## (undated) DEVICE — TRAP FLD MINIVAC MEGADYNE 100ML

## (undated) DEVICE — PK HIP PINNING 40

## (undated) DEVICE — CATH ASP DUODENAL 2.5MM 180CM

## (undated) DEVICE — VIOLET BRAIDED (POLYGLACTIN 910), SYNTHETIC ABSORBABLE SUTURE: Brand: COATED VICRYL

## (undated) DEVICE — GLV SURG SENSICARE PI MIC PF SZ7 LF STRL

## (undated) DEVICE — INTENDED FOR TISSUE SEPARATION, AND OTHER PROCEDURES THAT REQUIRE A SHARP SURGICAL BLADE TO PUNCTURE OR CUT.: Brand: BARD-PARKER ® CARBON RIB-BACK BLADES

## (undated) DEVICE — 4.0MM LONG AO PILOT DRILL: Brand: TRIGEN

## (undated) DEVICE — GUIDE PIN 3.2MM X 343MM: Brand: TRIGEN

## (undated) DEVICE — PREP IM ENCHANCED TOTAL HIP BONE                                    PREPARATION KIT: Brand: PREP-IM

## (undated) DEVICE — SUT SILK 2/0 FS BLK 18IN 685G

## (undated) DEVICE — COAXIAL FEMORAL CANAL TIP

## (undated) DEVICE — SINGLE-USE BIOPSY FORCEPS: Brand: RADIAL JAW 4

## (undated) DEVICE — DRN WND JP RND W TROC SIL 10F 1/8IN

## (undated) DEVICE — SUT SILK 3/0 TIES 18IN A184H

## (undated) DEVICE — APPL CHLORAPREP W/TINT 26ML ORNG

## (undated) DEVICE — CVR TRANSD CIV FLX TPR 11.9 TO 3.8X61CM

## (undated) DEVICE — RECIPROCATING BLADE HEAVY DUTY LONG, OFFSET  (77.6 X 0.77 X 11.2MM)

## (undated) DEVICE — DRSNG SURESITE WNDW 2.38X2.75

## (undated) DEVICE — ELECTRD BLD EDGE/INSUL1P 2.4X5.1MM STRL

## (undated) DEVICE — THE STERILE LIGHT HANDLE COVER IS USED WITH STERIS SURGICAL LIGHTING AND VISUALIZATION SYSTEMS.

## (undated) DEVICE — CONTAINER,SPECIMEN,OR STERILE,4OZ: Brand: MEDLINE

## (undated) DEVICE — ENCORE® LATEX ORTHO SIZE 8, STERILE LATEX POWDER-FREE SURGICAL GLOVE: Brand: ENCORE

## (undated) DEVICE — HANDPIECE SET WITH COAXIAL HIGH FLOW TIP AND SUCTION TUBE: Brand: INTERPULSE

## (undated) DEVICE — INTERTAN LAG SCREW DRILL: Brand: TRIGEN

## (undated) DEVICE — SYS CLS SKIN PREMIERPRO EXOFINFUSION 22CM

## (undated) DEVICE — DRSNG WND GZ PAD BORDERED 4X8IN STRL

## (undated) DEVICE — PK PROC MINOR TOWER 40

## (undated) DEVICE — DRSNG BURN ACTICOAT FLEX 7 1X24IN

## (undated) DEVICE — ADHS SKIN SURG TISS VISC PREMIERPRO EXOFIN HI/VISC FAST/DRY

## (undated) DEVICE — PENCL SMOKE/EVAC MEGADYNE TELESCP 10FT

## (undated) DEVICE — DRP C/ARMOR

## (undated) DEVICE — PATIENT RETURN ELECTRODE, SINGLE-USE, CONTACT QUALITY MONITORING, ADULT, WITH 9FT CORD, FOR PATIENTS WEIGING OVER 33LBS. (15KG): Brand: MEGADYNE

## (undated) DEVICE — SUT VIC 3/0 SH 27IN J416H

## (undated) DEVICE — SPNG GZ WOVN 4X4IN 12PLY 10/BX STRL